# Patient Record
Sex: FEMALE | Race: WHITE | NOT HISPANIC OR LATINO | URBAN - METROPOLITAN AREA
[De-identification: names, ages, dates, MRNs, and addresses within clinical notes are randomized per-mention and may not be internally consistent; named-entity substitution may affect disease eponyms.]

---

## 2017-01-05 ENCOUNTER — OUTPATIENT (OUTPATIENT)
Dept: OUTPATIENT SERVICES | Facility: HOSPITAL | Age: 67
LOS: 1 days | Discharge: HOME | End: 2017-01-05

## 2017-06-27 DIAGNOSIS — G56.01 CARPAL TUNNEL SYNDROME, RIGHT UPPER LIMB: ICD-10-CM

## 2017-06-27 DIAGNOSIS — Z88.2 ALLERGY STATUS TO SULFONAMIDES: ICD-10-CM

## 2017-06-27 DIAGNOSIS — I10 ESSENTIAL (PRIMARY) HYPERTENSION: ICD-10-CM

## 2017-06-27 DIAGNOSIS — Z88.0 ALLERGY STATUS TO PENICILLIN: ICD-10-CM

## 2017-07-20 ENCOUNTER — OUTPATIENT (OUTPATIENT)
Dept: OUTPATIENT SERVICES | Facility: HOSPITAL | Age: 67
LOS: 1 days | Discharge: HOME | End: 2017-07-20

## 2017-07-20 DIAGNOSIS — E11.9 TYPE 2 DIABETES MELLITUS WITHOUT COMPLICATIONS: ICD-10-CM

## 2017-07-20 DIAGNOSIS — L03.90 CELLULITIS, UNSPECIFIED: ICD-10-CM

## 2017-07-20 DIAGNOSIS — D51.0 VITAMIN B12 DEFICIENCY ANEMIA DUE TO INTRINSIC FACTOR DEFICIENCY: ICD-10-CM

## 2017-09-18 ENCOUNTER — OUTPATIENT (OUTPATIENT)
Dept: OUTPATIENT SERVICES | Facility: HOSPITAL | Age: 67
LOS: 1 days | Discharge: HOME | End: 2017-09-18

## 2017-09-18 DIAGNOSIS — M86.9 OSTEOMYELITIS, UNSPECIFIED: ICD-10-CM

## 2017-09-18 DIAGNOSIS — M86.241: ICD-10-CM

## 2017-11-30 PROBLEM — Z00.00 ENCOUNTER FOR PREVENTIVE HEALTH EXAMINATION: Status: ACTIVE | Noted: 2017-11-30

## 2017-12-05 ENCOUNTER — APPOINTMENT (OUTPATIENT)
Dept: PLASTIC SURGERY | Facility: CLINIC | Age: 67
End: 2017-12-05
Payer: MEDICARE

## 2017-12-05 VITALS — HEIGHT: 67 IN | BODY MASS INDEX: 31.23 KG/M2 | WEIGHT: 199 LBS

## 2017-12-05 DIAGNOSIS — Z87.898 PERSONAL HISTORY OF OTHER SPECIFIED CONDITIONS: ICD-10-CM

## 2017-12-05 DIAGNOSIS — B36.9 SUPERFICIAL MYCOSIS, UNSPECIFIED: ICD-10-CM

## 2017-12-05 DIAGNOSIS — M17.0 BILATERAL PRIMARY OSTEOARTHRITIS OF KNEE: ICD-10-CM

## 2017-12-05 DIAGNOSIS — Z87.09 PERSONAL HISTORY OF OTHER DISEASES OF THE RESPIRATORY SYSTEM: ICD-10-CM

## 2017-12-05 PROCEDURE — 99203 OFFICE O/P NEW LOW 30 MIN: CPT

## 2017-12-05 RX ORDER — TRAMADOL HYDROCHLORIDE 25 MG/1
TABLET, COATED ORAL
Refills: 0 | Status: ACTIVE | COMMUNITY

## 2017-12-05 RX ORDER — CYCLOBENZAPRINE HYDROCHLORIDE 10 MG/1
10 TABLET, FILM COATED ORAL
Refills: 0 | Status: ACTIVE | COMMUNITY

## 2017-12-05 RX ORDER — MULTIVIT,IRON,MINERALS/LUTEIN
TABLET ORAL
Refills: 0 | Status: ACTIVE | COMMUNITY

## 2017-12-05 RX ORDER — MUPIROCIN 20 MG/G
2 OINTMENT TOPICAL
Refills: 0 | Status: ACTIVE | COMMUNITY

## 2017-12-05 RX ORDER — HYDROCHLOROTHIAZIDE 12.5 MG/1
12.5 CAPSULE ORAL
Refills: 0 | Status: ACTIVE | COMMUNITY

## 2017-12-05 RX ORDER — DULOXETINE HYDROCHLORIDE 30 MG/1
30 CAPSULE, DELAYED RELEASE PELLETS ORAL
Refills: 0 | Status: ACTIVE | COMMUNITY

## 2017-12-05 RX ORDER — DICLOFENAC POTASSIUM 50 MG/1
50 TABLET, COATED ORAL
Refills: 0 | Status: ACTIVE | COMMUNITY

## 2017-12-14 ENCOUNTER — OUTPATIENT (OUTPATIENT)
Dept: OUTPATIENT SERVICES | Facility: HOSPITAL | Age: 67
LOS: 1 days | Discharge: HOME | End: 2017-12-14

## 2017-12-14 DIAGNOSIS — Z02.9 ENCOUNTER FOR ADMINISTRATIVE EXAMINATIONS, UNSPECIFIED: ICD-10-CM

## 2021-06-25 ENCOUNTER — INPATIENT (INPATIENT)
Facility: HOSPITAL | Age: 71
LOS: 11 days | Discharge: NURSING, REHAB OR LONG-TERM CARE | End: 2021-07-07
Attending: INTERNAL MEDICINE | Admitting: INTERNAL MEDICINE
Payer: MEDICARE

## 2021-06-25 VITALS
RESPIRATION RATE: 18 BRPM | SYSTOLIC BLOOD PRESSURE: 106 MMHG | TEMPERATURE: 106 F | WEIGHT: 164.91 LBS | OXYGEN SATURATION: 97 % | DIASTOLIC BLOOD PRESSURE: 54 MMHG | HEART RATE: 89 BPM

## 2021-06-25 LAB
ALBUMIN SERPL ELPH-MCNC: 2.8 G/DL — LOW (ref 3.5–5.2)
ALP SERPL-CCNC: 98 U/L — SIGNIFICANT CHANGE UP (ref 30–115)
ALT FLD-CCNC: 12 U/L — SIGNIFICANT CHANGE UP (ref 0–41)
ANION GAP SERPL CALC-SCNC: 6 MMOL/L — LOW (ref 7–14)
APPEARANCE UR: CLEAR — SIGNIFICANT CHANGE UP
APTT BLD: 30 SEC — SIGNIFICANT CHANGE UP (ref 27–39.2)
AST SERPL-CCNC: 22 U/L — SIGNIFICANT CHANGE UP (ref 0–41)
BACTERIA # UR AUTO: ABNORMAL
BASE EXCESS BLDV CALC-SCNC: 7.9 MMOL/L — HIGH (ref -2–2)
BASOPHILS # BLD AUTO: 0.03 K/UL — SIGNIFICANT CHANGE UP (ref 0–0.2)
BASOPHILS NFR BLD AUTO: 0.4 % — SIGNIFICANT CHANGE UP (ref 0–1)
BILIRUB SERPL-MCNC: 0.6 MG/DL — SIGNIFICANT CHANGE UP (ref 0.2–1.2)
BILIRUB UR-MCNC: NEGATIVE — SIGNIFICANT CHANGE UP
BLD GP AB SCN SERPL QL: SIGNIFICANT CHANGE UP
BUN SERPL-MCNC: 13 MG/DL — SIGNIFICANT CHANGE UP (ref 10–20)
CA-I SERPL-SCNC: 1.14 MMOL/L — SIGNIFICANT CHANGE UP (ref 1.12–1.3)
CALCIUM SERPL-MCNC: 8 MG/DL — LOW (ref 8.5–10.1)
CHLORIDE SERPL-SCNC: 99 MMOL/L — SIGNIFICANT CHANGE UP (ref 98–110)
CO2 SERPL-SCNC: 31 MMOL/L — SIGNIFICANT CHANGE UP (ref 17–32)
COLOR SPEC: YELLOW — SIGNIFICANT CHANGE UP
CREAT SERPL-MCNC: 0.6 MG/DL — LOW (ref 0.7–1.5)
DIFF PNL FLD: ABNORMAL
EOSINOPHIL # BLD AUTO: 0.06 K/UL — SIGNIFICANT CHANGE UP (ref 0–0.7)
EOSINOPHIL NFR BLD AUTO: 0.8 % — SIGNIFICANT CHANGE UP (ref 0–8)
EPI CELLS # UR: 1 /HPF — SIGNIFICANT CHANGE UP (ref 0–5)
GAS PNL BLDV: 136 MMOL/L — SIGNIFICANT CHANGE UP (ref 136–145)
GAS PNL BLDV: SIGNIFICANT CHANGE UP
GLUCOSE SERPL-MCNC: 91 MG/DL — SIGNIFICANT CHANGE UP (ref 70–99)
GLUCOSE UR QL: NEGATIVE — SIGNIFICANT CHANGE UP
HCO3 BLDV-SCNC: 34 MMOL/L — HIGH (ref 22–29)
HCT VFR BLD CALC: 22.2 % — LOW (ref 37–47)
HCT VFR BLDA CALC: 19.1 % — LOW (ref 34–44)
HGB BLD CALC-MCNC: 6.2 G/DL — LOW (ref 14–18)
HGB BLD-MCNC: 6.4 G/DL — CRITICAL LOW (ref 12–16)
HYALINE CASTS # UR AUTO: 0 /LPF — SIGNIFICANT CHANGE UP (ref 0–7)
IMM GRANULOCYTES NFR BLD AUTO: 0.5 % — HIGH (ref 0.1–0.3)
INR BLD: 1.24 RATIO — SIGNIFICANT CHANGE UP (ref 0.65–1.3)
KETONES UR-MCNC: NEGATIVE — SIGNIFICANT CHANGE UP
LACTATE BLDV-MCNC: 1.6 MMOL/L — SIGNIFICANT CHANGE UP (ref 0.5–1.6)
LACTATE SERPL-SCNC: 1.6 MMOL/L — SIGNIFICANT CHANGE UP (ref 0.7–2)
LEUKOCYTE ESTERASE UR-ACNC: ABNORMAL
LIDOCAIN IGE QN: 12 U/L — SIGNIFICANT CHANGE UP (ref 7–60)
LYMPHOCYTES # BLD AUTO: 1.42 K/UL — SIGNIFICANT CHANGE UP (ref 1.2–3.4)
LYMPHOCYTES # BLD AUTO: 18.6 % — LOW (ref 20.5–51.1)
MCHC RBC-ENTMCNC: 22.5 PG — LOW (ref 27–31)
MCHC RBC-ENTMCNC: 28.6 G/DL — LOW (ref 32–37)
MCV RBC AUTO: 78.9 FL — LOW (ref 81–99)
MONOCYTES # BLD AUTO: 0.52 K/UL — SIGNIFICANT CHANGE UP (ref 0.1–0.6)
MONOCYTES NFR BLD AUTO: 6.8 % — SIGNIFICANT CHANGE UP (ref 1.7–9.3)
NEUTROPHILS # BLD AUTO: 5.58 K/UL — SIGNIFICANT CHANGE UP (ref 1.4–6.5)
NEUTROPHILS NFR BLD AUTO: 72.9 % — SIGNIFICANT CHANGE UP (ref 42.2–75.2)
NITRITE UR-MCNC: NEGATIVE — SIGNIFICANT CHANGE UP
NRBC # BLD: 0 /100 WBCS — SIGNIFICANT CHANGE UP (ref 0–0)
NT-PROBNP SERPL-SCNC: 1509 PG/ML — HIGH (ref 0–300)
PCO2 BLDV: 56 MMHG — HIGH (ref 41–51)
PH BLDV: 7.39 — SIGNIFICANT CHANGE UP (ref 7.26–7.43)
PH UR: 8 — SIGNIFICANT CHANGE UP (ref 5–8)
PLATELET # BLD AUTO: 305 K/UL — SIGNIFICANT CHANGE UP (ref 130–400)
PO2 BLDV: 21 MMHG — SIGNIFICANT CHANGE UP (ref 20–40)
POTASSIUM BLDV-SCNC: 3.9 MMOL/L — SIGNIFICANT CHANGE UP (ref 3.3–5.6)
POTASSIUM SERPL-MCNC: 4.9 MMOL/L — SIGNIFICANT CHANGE UP (ref 3.5–5)
POTASSIUM SERPL-SCNC: 4.9 MMOL/L — SIGNIFICANT CHANGE UP (ref 3.5–5)
PROT SERPL-MCNC: 5.8 G/DL — LOW (ref 6–8)
PROT UR-MCNC: SIGNIFICANT CHANGE UP
PROTHROM AB SERPL-ACNC: 14.3 SEC — HIGH (ref 9.95–12.87)
RBC # BLD: 2.84 M/UL — LOW (ref 4.2–5.4)
RBC # FLD: 24.9 % — HIGH (ref 11.5–14.5)
RBC CASTS # UR COMP ASSIST: 1 /HPF — SIGNIFICANT CHANGE UP (ref 0–4)
SAO2 % BLDV: 32 % — SIGNIFICANT CHANGE UP
SARS-COV-2 RNA SPEC QL NAA+PROBE: SIGNIFICANT CHANGE UP
SODIUM SERPL-SCNC: 136 MMOL/L — SIGNIFICANT CHANGE UP (ref 135–146)
SP GR SPEC: 1.01 — SIGNIFICANT CHANGE UP (ref 1.01–1.03)
TROPONIN T SERPL-MCNC: <0.01 NG/ML — SIGNIFICANT CHANGE UP
UROBILINOGEN FLD QL: SIGNIFICANT CHANGE UP
WBC # BLD: 7.65 K/UL — SIGNIFICANT CHANGE UP (ref 4.8–10.8)
WBC # FLD AUTO: 7.65 K/UL — SIGNIFICANT CHANGE UP (ref 4.8–10.8)
WBC UR QL: 5 /HPF — SIGNIFICANT CHANGE UP (ref 0–5)

## 2021-06-25 PROCEDURE — 99291 CRITICAL CARE FIRST HOUR: CPT

## 2021-06-25 PROCEDURE — 74177 CT ABD & PELVIS W/CONTRAST: CPT | Mod: 26,MA

## 2021-06-25 PROCEDURE — 93010 ELECTROCARDIOGRAM REPORT: CPT

## 2021-06-25 PROCEDURE — 71045 X-RAY EXAM CHEST 1 VIEW: CPT | Mod: 26

## 2021-06-25 RX ORDER — SODIUM ZIRCONIUM CYCLOSILICATE 10 G/10G
10 POWDER, FOR SUSPENSION ORAL ONCE
Refills: 0 | Status: DISCONTINUED | OUTPATIENT
Start: 2021-06-25 | End: 2021-06-25

## 2021-06-25 RX ORDER — CALCIUM GLUCONATE 100 MG/ML
1 VIAL (ML) INTRAVENOUS ONCE
Refills: 0 | Status: DISCONTINUED | OUTPATIENT
Start: 2021-06-25 | End: 2021-06-25

## 2021-06-25 RX ORDER — MORPHINE SULFATE 50 MG/1
6 CAPSULE, EXTENDED RELEASE ORAL ONCE
Refills: 0 | Status: DISCONTINUED | OUTPATIENT
Start: 2021-06-25 | End: 2021-06-25

## 2021-06-25 RX ORDER — IOHEXOL 300 MG/ML
30 INJECTION, SOLUTION INTRAVENOUS ONCE
Refills: 0 | Status: COMPLETED | OUTPATIENT
Start: 2021-06-25 | End: 2021-06-25

## 2021-06-25 RX ORDER — SODIUM CHLORIDE 9 MG/ML
1000 INJECTION, SOLUTION INTRAVENOUS ONCE
Refills: 0 | Status: COMPLETED | OUTPATIENT
Start: 2021-06-25 | End: 2021-06-25

## 2021-06-25 RX ORDER — METRONIDAZOLE 500 MG
500 TABLET ORAL ONCE
Refills: 0 | Status: COMPLETED | OUTPATIENT
Start: 2021-06-25 | End: 2021-06-25

## 2021-06-25 RX ORDER — INSULIN HUMAN 100 [IU]/ML
10 INJECTION, SOLUTION SUBCUTANEOUS ONCE
Refills: 0 | Status: DISCONTINUED | OUTPATIENT
Start: 2021-06-25 | End: 2021-06-25

## 2021-06-25 RX ORDER — IPRATROPIUM/ALBUTEROL SULFATE 18-103MCG
3 AEROSOL WITH ADAPTER (GRAM) INHALATION
Refills: 0 | Status: DISCONTINUED | OUTPATIENT
Start: 2021-06-25 | End: 2021-06-26

## 2021-06-25 RX ORDER — CEFEPIME 1 G/1
2000 INJECTION, POWDER, FOR SOLUTION INTRAMUSCULAR; INTRAVENOUS ONCE
Refills: 0 | Status: COMPLETED | OUTPATIENT
Start: 2021-06-25 | End: 2021-06-25

## 2021-06-25 RX ORDER — PANTOPRAZOLE SODIUM 20 MG/1
80 TABLET, DELAYED RELEASE ORAL ONCE
Refills: 0 | Status: COMPLETED | OUTPATIENT
Start: 2021-06-25 | End: 2021-06-25

## 2021-06-25 RX ORDER — DEXTROSE 50 % IN WATER 50 %
50 SYRINGE (ML) INTRAVENOUS ONCE
Refills: 0 | Status: DISCONTINUED | OUTPATIENT
Start: 2021-06-25 | End: 2021-06-25

## 2021-06-25 RX ADMIN — CEFEPIME 2000 MILLIGRAM(S): 1 INJECTION, POWDER, FOR SOLUTION INTRAMUSCULAR; INTRAVENOUS at 19:48

## 2021-06-25 RX ADMIN — SODIUM CHLORIDE 1000 MILLILITER(S): 9 INJECTION, SOLUTION INTRAVENOUS at 15:02

## 2021-06-25 RX ADMIN — SODIUM CHLORIDE 1000 MILLILITER(S): 9 INJECTION, SOLUTION INTRAVENOUS at 19:48

## 2021-06-25 RX ADMIN — Medication 100 MILLIGRAM(S): at 18:11

## 2021-06-25 RX ADMIN — PANTOPRAZOLE SODIUM 80 MILLIGRAM(S): 20 TABLET, DELAYED RELEASE ORAL at 17:23

## 2021-06-25 RX ADMIN — MORPHINE SULFATE 6 MILLIGRAM(S): 50 CAPSULE, EXTENDED RELEASE ORAL at 17:23

## 2021-06-25 RX ADMIN — IOHEXOL 30 MILLILITER(S): 300 INJECTION, SOLUTION INTRAVENOUS at 15:26

## 2021-06-25 RX ADMIN — Medication 500 MILLIGRAM(S): at 19:48

## 2021-06-25 RX ADMIN — SODIUM CHLORIDE 1000 MILLILITER(S): 9 INJECTION, SOLUTION INTRAVENOUS at 19:46

## 2021-06-25 RX ADMIN — Medication 3 MILLILITER(S): at 19:59

## 2021-06-25 RX ADMIN — CEFEPIME 100 MILLIGRAM(S): 1 INJECTION, POWDER, FOR SOLUTION INTRAMUSCULAR; INTRAVENOUS at 17:23

## 2021-06-25 NOTE — ED PROVIDER NOTE - PHYSICAL EXAMINATION
Vital Signs: I have reviewed the initial vital signs.  Constitutional: appears stated age, +appears fatigued, pale-yellow, in no acute distress  HEENT: Airway patent, moist MM, no erythema/swelling/deformity of oral structures. EOMI, PERRLA.  CV: regular rate, regular rhythm, well-perfused extremities  Lungs: +b/l wheeze, b/l chest rise  ABD: +abdomen soft, +ascites, nontender, +diffusely distended, +caput medusa, no guarding or rebound, +nontender mass on right hip  MSK: Neck supple, nontender, nl ROM, no stepoff. Chest nontender. Back nontender in TLS spine or to b/l bony structures. Ext nontender, nl rom, no deformity.   INTEG: Skin warm, dry, no rash.  NEURO: A&Ox3, moving all extremities, normal speech  PSYCH: Calm, cooperative, normal affect and interaction.

## 2021-06-25 NOTE — ED PROVIDER NOTE - CARE PLAN
Principal Discharge DX:	Colitis  Secondary Diagnosis:	Perianal infection  Secondary Diagnosis:	GI bleed

## 2021-06-25 NOTE — CONSULT NOTE ADULT - ASSESSMENT
ASSESSMENT:  70F w/ PMH of COPD, smoking, and PSH of gastric bypass in 1999 who presented to the ED with 1-2 weeks of diarrhea. Physical exam findings, imaging, and labs as documented above.     PLAN:  - extramural air on CT likely 2/2 multiple fistulas --> no acute surgical intervention  - recommend colonoscopy, transfuse PRN    Lines/Tubes: PIV    Above plan discussed with Attending Surgeon Dr. Roman, patient, and Primary team  06-25-21 @ 21:52 ASSESSMENT:  70F w/ PMH of COPD, smoking, and PSH of gastric bypass in 1999 who presented to the ED with 1-2 weeks of diarrhea. Physical exam findings, imaging, and labs as documented above.     PLAN:  - extramural air on CT likely 2/2 multiple fistulas --> no acute surgical intervention  - recommend EGD/colonoscopy to r/o marginal ulcer, cancer  - transfuse PRN    Lines/Tubes: PIV    Above plan discussed with Attending Surgeon Dr. Roman, patient, and Primary team  06-25-21 @ 21:52

## 2021-06-25 NOTE — ED PROVIDER NOTE - OBJECTIVE STATEMENT
71 y/o F with COPD not on O2, current smoker, remote h/o gastric bypass p/w 5 days of diarrhea. Pt admits to 5 days of "oily" brown - black stools, and today has constipation with red stools. Used Imodium for 2 days without relief. Admits to diffuse abdominal pain, fatigue, shortness of breath. Able to tolerate PO fluids. Denies n/v.    Vaccinated with 2 doses Moderna vaccine in March/April. 71 y/o F with COPD not on home O2, current smoker, remote h/o gastric bypass p/w 5 days of diarrhea. Pt admits to 5 days of "oily" brown - black stools, and today has constipation with red stools. Used Imodium for 2 days without relief.  Has been constipated and had a distended abdomen intermittent for past 3-4 months. Admits to diffuse abdominal pain, fatigue, shortness of breath. Able to tolerate PO fluids. Denies n/v, h/o or current IVDO, h/o Hep. Admits to current and former alcohol use limited to 1 drink (liquor or wine) 1-2x a week.    Vaccinated with 2 doses Moderna vaccine in March/April. 69 y/o F with COPD not on home O2, current smoker, remote h/o gastric bypass p/w 5 days of diarrhea. Pt admits to 5 days of "oily" brown - black stools, and today has constipation with red stools. Used Imodium for 2 days without relief.  Has been constipated and had a distended abdomen intermittenttently for past 3-4 months. Admits to diffuse abdominal pain, fatigue, shortness of breath. Able to tolerate PO fluids. Denies n/v, h/o or current IVDU, h/o Hep. Admits to current and former alcohol use limited to 1 drink (liquor or wine) 1-2x a week.    Pt has never had a colonoscopy.    Vaccinated with 2 doses Moderna COVID vaccine in March/April.

## 2021-06-25 NOTE — ED PROVIDER NOTE - NS ED ROS FT
Constitutional: (+) fever  Eyes/ENT: (-) blurry vision, (-) epistaxis  Cardiovascular: (-) chest pain, (-) syncope  Respiratory: (-) cough, (+) shortness of breath  Gastrointestinal: (-) vomiting, (+) diarrhea  Musculoskeletal: (-) neck pain, (-) back pain, (-) joint pain  Integumentary: (-) rash, (-) edema  Neurological: (+) headache, (-) altered mental status  Psychiatric: (-) hallucinations  Allergic/Immunologic: (-) pruritus

## 2021-06-25 NOTE — ED PROVIDER NOTE - ATTENDING CONTRIBUTION TO CARE
69yo F history of COPD gastric bypass, presenting with bloody stools- per pt, loose stools x5d but today noted it to be bloody, BRBPR. No fevers/chills. mild abdominal distension. no other acute complaints. See MDM

## 2021-06-25 NOTE — CONSULT NOTE ADULT - SUBJECTIVE AND OBJECTIVE BOX
GENERAL SURGERY CONSULT NOTE    Patient: WEGENER, LOIS , 70y (50)Female   MRN: [id number]  Location: [facility] ED  Visit: 21 Emergency  Date: 21 @ 21:52    HPI:  70F w/ PMH of COPD, smoking, and PSH of gastric bypass in  who presented to the ED with 1-2 weeks of diarrhea. Pt reports soft, loose, mucus-filled stools that intermittently contain bright red or dark blood. Denies associated abdominal pain, nausea, vomiting. Endorses subjective fever and chills at home. Pt reports recent history of constipation and more straining than usual while having a BM. In the ED, Hb 6.4. CT A/P showed diffuse wall thickening of the descending colon, sigmoid colon, rectum, and anus with extensive surrounding soft tissue inflammation of the anus as well as extramural air visualized posterolateral to the anus and 5.3 cm subcutaneous fluid collection lateral to the right gluteus described above.    PAST MEDICAL & SURGICAL HISTORY:  COPD  current smoker  PSH gastric bypass    VITALS:  T(F): 99 (21 @ 19:44), Max: 106 (21 @ 13:05)  HR: 73 (21 @ 19:44) (73 - 96)  BP: 106/55 (21 @ 19:44) (91/55 - 106/55)  RR: 18 (21 @ 21:45) (18 - 18)  SpO2: 97% (21 @ 21:45) (95% - 97%)    PHYSICAL EXAM:  General: NAD, AAOx3, calm and cooperative  HEENT: NCAT, SYL, EOMI, Trachea ML, Neck supple  Cardiac: RRR S1, S2, no Murmurs, rubs or gallops  Respiratory: CTAB, normal respiratory effort, breath sounds equal BL, no wheeze, rhonchi or crackles  Abdomen: Soft, distended, non-tender, no rebound, no guarding. +BS. well-healed midline scar  Rectal: Good tone, no blood, multiple louise-anal masses/lesions and fistulas (midline - pilonidal area and perirectal), +multiple external hemorrhoids  Skin: Warm/dry, normal color, no jaundice  Incision/wound: healing well, dressings in place, clean, dry and intact    MEDICATIONS  (STANDING):  albuterol/ipratropium for Nebulization 3 milliLiter(s) Nebulizer every 15 minutes    LAB/STUDIES:                      6.4    7.65  )-----------( 305      ( 2021 15:10 )             22.2         136  |  99  |  13  ----------------------------<  91  4.9   |  31  |  0.6<L>    Ca    8.0<L>      2021 15:10    TPro  5.8<L>  /  Alb  2.8<L>  /  TBili  0.6  /  DBili  x   /  AST  22  /  ALT  12  /  AlkPhos  98      PT/INR - ( 2021 17:54 )   PT: 14.30 sec;   INR: 1.24 ratio      PTT - ( 2021 17:54 )  PTT:30.0 sec  LIVER FUNCTIONS - ( 2021 15:10 )  Alb: 2.8 g/dL / Pro: 5.8 g/dL / ALK PHOS: 98 U/L / ALT: 12 U/L / AST: 22 U/L / GGT: x           Urinalysis Basic - ( 2021 15:50 )    Color: Yellow / Appearance: Clear / S.010 / pH: x  Gluc: x / Ketone: Negative  / Bili: Negative / Urobili: <2 mg/dL   Blood: x / Protein: Trace / Nitrite: Negative   Leuk Esterase: Small / RBC: 1 /HPF / WBC 5 /HPF   Sq Epi: x / Non Sq Epi: 1 /HPF / Bacteria: Few    CARDIAC MARKERS ( 2021 15:10 )  x     / <0.01 ng/mL / x     / x     / x          IMAGING:  < from: CT Abdomen and Pelvis w/ Oral Cont and w/ IV Cont (21 @ 18:38) >  FINDINGS:    PERITONEUM/MESENTERY/BOWEL: Postoperative appearance of the bowel. Diffuse wall thickening of the descending colon, sigmoid colon, rectum, and anus with extensive surrounding soft tissue infiltration of the anus. Extramural foci of air visualized posterolateral to the anus (series 4, image 369). No evidence of bowel obstruction, ascites, or free intraabdominal air.    BONES/SOFT TISSUES: 3.4 x 5.0 x 5.3 cm (TV x AP x CC) rim enhancing, subcutaneous fluid collection (series 4, image 237) adjacent to the right gluteal musculature. Severe degenerative changes of the spine.    IMPRESSION:  ·	Diffuse wall thickening of the descending colon, sigmoid colon, rectum, and anus with extensive surrounding soft tissue inflammation of the anus. Of note, extramural air visualized posterolateral to the anus.  ·	Small bilateral pleural effusions.  ·	5.3 cm subcutaneous fluid collection lateral to the right gluteus described above, which may represent an abscess.  ·	3 mm right middle lobe subpleural solid pulmonary nodule. Per Fleischner Society guidelines, an optional follow up CT in 12 months is recommended in high risk patients.  < end of copied text >      ACCESS DEVICES:  [X] Peripheral IV  [ ] Central Venous Line	[ ] R	[ ] L	[ ] IJ	[ ] Fem	[ ] SC	Placed:   [ ] Arterial Line		[ ] R	[ ] L	[ ] Fem	[ ] Rad	[ ] Ax	Placed:   [ ] PICC:					[ ] Mediport  [ ] Urinary Catheter, Date Placed:  GENERAL SURGERY CONSULT NOTE    Patient: WEGENER, LOIS , 70y (50)Female   MRN: [id number]  Location: [facility] ED  Visit: 21 Emergency  Date: 21 @ 21:52    HPI:  70F w/ PMH of COPD, smoking, and PSH of gastric bypass in  who presented to the ED with 1-2 weeks of diarrhea. Pt reports soft, loose, mucus-filled stools that intermittently contain bright red or dark blood. Denies associated abdominal pain, nausea, vomiting. Endorses subjective fever and chills at home. Pt reports recent history of constipation and more straining than usual while having a BM. In the ED, Hb 6.4. CT A/P showed diffuse wall thickening of the descending colon, sigmoid colon, rectum, and anus with extensive surrounding soft tissue inflammation of the anus as well as extramural air visualized posterolateral to the anus and 5.3 cm subcutaneous fluid collection lateral to the right gluteus described above.    PAST MEDICAL & SURGICAL HISTORY:  COPD  current smoker  PSH gastric bypass    VITALS:  T(F): 99 (21 @ 19:44), Max: 106 (21 @ 13:05)  HR: 73 (21 @ 19:44) (73 - 96)  BP: 106/55 (21 @ 19:44) (91/55 - 106/55)  RR: 18 (21 @ 21:45) (18 - 18)  SpO2: 97% (21 @ 21:45) (95% - 97%)    PHYSICAL EXAM:  General: NAD, AAOx3, calm and cooperative  HEENT: NCAT, SYL, EOMI, Trachea ML, Neck supple  Cardiac: RRR S1, S2, no Murmurs, rubs or gallops  Respiratory: CTAB, normal respiratory effort, breath sounds equal BL, no wheeze, rhonchi or crackles  Abdomen: Soft, distended, non-tender, no rebound, no guarding. +BS. well-healed midline scar  Rectal: Good tone, no blood, multiple louise-anal masses/lesions and fistulas (midline - pilonidal area and perirectal), +multiple external hemorrhoids  Skin: Warm/dry, normal color, no jaundice    MEDICATIONS  (STANDING):  albuterol/ipratropium for Nebulization 3 milliLiter(s) Nebulizer every 15 minutes    LAB/STUDIES:                      6.4    7.65  )-----------( 305      ( 2021 15:10 )             22.2         136  |  99  |  13  ----------------------------<  91  4.9   |  31  |  0.6<L>    Ca    8.0<L>      2021 15:10    TPro  5.8<L>  /  Alb  2.8<L>  /  TBili  0.6  /  DBili  x   /  AST  22  /  ALT  12  /  AlkPhos  98      PT/INR - ( 2021 17:54 )   PT: 14.30 sec;   INR: 1.24 ratio      PTT - ( 2021 17:54 )  PTT:30.0 sec  LIVER FUNCTIONS - ( 2021 15:10 )  Alb: 2.8 g/dL / Pro: 5.8 g/dL / ALK PHOS: 98 U/L / ALT: 12 U/L / AST: 22 U/L / GGT: x           Urinalysis Basic - ( 2021 15:50 )    Color: Yellow / Appearance: Clear / S.010 / pH: x  Gluc: x / Ketone: Negative  / Bili: Negative / Urobili: <2 mg/dL   Blood: x / Protein: Trace / Nitrite: Negative   Leuk Esterase: Small / RBC: 1 /HPF / WBC 5 /HPF   Sq Epi: x / Non Sq Epi: 1 /HPF / Bacteria: Few    CARDIAC MARKERS ( 2021 15:10 )  x     / <0.01 ng/mL / x     / x     / x          IMAGING:  < from: CT Abdomen and Pelvis w/ Oral Cont and w/ IV Cont (21 @ 18:38) >  FINDINGS:    PERITONEUM/MESENTERY/BOWEL: Postoperative appearance of the bowel. Diffuse wall thickening of the descending colon, sigmoid colon, rectum, and anus with extensive surrounding soft tissue infiltration of the anus. Extramural foci of air visualized posterolateral to the anus (series 4, image 369). No evidence of bowel obstruction, ascites, or free intraabdominal air.    BONES/SOFT TISSUES: 3.4 x 5.0 x 5.3 cm (TV x AP x CC) rim enhancing, subcutaneous fluid collection (series 4, image 237) adjacent to the right gluteal musculature. Severe degenerative changes of the spine.    IMPRESSION:  ·	Diffuse wall thickening of the descending colon, sigmoid colon, rectum, and anus with extensive surrounding soft tissue inflammation of the anus. Of note, extramural air visualized posterolateral to the anus.  ·	Small bilateral pleural effusions.  ·	5.3 cm subcutaneous fluid collection lateral to the right gluteus described above, which may represent an abscess.  ·	3 mm right middle lobe subpleural solid pulmonary nodule. Per Fleischner Society guidelines, an optional follow up CT in 12 months is recommended in high risk patients.  < end of copied text >      ACCESS DEVICES:  [X] Peripheral IV  [ ] Central Venous Line	[ ] R	[ ] L	[ ] IJ	[ ] Fem	[ ] SC	Placed:   [ ] Arterial Line		[ ] R	[ ] L	[ ] Fem	[ ] Rad	[ ] Ax	Placed:   [ ] PICC:					[ ] Mediport  [ ] Urinary Catheter, Date Placed:

## 2021-06-25 NOTE — ED ADULT TRIAGE NOTE - CHIEF COMPLAINT QUOTE
Patient c/o diarrhea dizziness weakness blurred vison and chills  x 1 week with no change in symptoms in the last 24 hours

## 2021-06-25 NOTE — ED PROVIDER NOTE - PROGRESS NOTE DETAILS
SS- Spoke with GI Fellow Dr. Jon who recommended adding C. diff and GI PCR stool panel. OK with CT Angio    Consented pt for transfusion.     Pt noted to be in new onset afib on EKG. Pt denies h/o afib, or current palpitations. Not on A/C or rate control agents. Previous EKGs in [place] from 2016 do not demonstrate afib. sp surgery eval, recs resusc first, scope, prior to any surgical procedure. jennifer Talley, approved for SDU. will admit

## 2021-06-25 NOTE — ED PROVIDER NOTE - CLINICAL SUMMARY MEDICAL DECISION MAKING FREE TEXT BOX
69yo F history of COPD gastric bypass, presenting with bloody stools- per pt, loose stools x5d but today noted it to be bloody, BRBPR. No fevers/chills. mild abdominal distension. no other acute complaints. ill appearing  NCAT PERRLA EOMI neck supple non tender normal wob cta bl rrr abdomen s mildly distended, no rebound no guarding [id number] neuro non focal +erythema and tenderness perianal with gluteal erythema warmth induration and tenderness. see progress ntoes for further eval. dw GI, ICU, surgery. will admit for further eval

## 2021-06-26 LAB
ALBUMIN SERPL ELPH-MCNC: 2.8 G/DL — LOW (ref 3.5–5.2)
ALP SERPL-CCNC: 98 U/L — SIGNIFICANT CHANGE UP (ref 30–115)
ALT FLD-CCNC: 11 U/L — SIGNIFICANT CHANGE UP (ref 0–41)
ANION GAP SERPL CALC-SCNC: 4 MMOL/L — LOW (ref 7–14)
AST SERPL-CCNC: 15 U/L — SIGNIFICANT CHANGE UP (ref 0–41)
BASOPHILS # BLD AUTO: 0.03 K/UL — SIGNIFICANT CHANGE UP (ref 0–0.2)
BASOPHILS NFR BLD AUTO: 0.6 % — SIGNIFICANT CHANGE UP (ref 0–1)
BILIRUB SERPL-MCNC: 1 MG/DL — SIGNIFICANT CHANGE UP (ref 0.2–1.2)
BUN SERPL-MCNC: 11 MG/DL — SIGNIFICANT CHANGE UP (ref 10–20)
C DIFF BY PCR RESULT: NEGATIVE — SIGNIFICANT CHANGE UP
C DIFF TOX GENS STL QL NAA+PROBE: SIGNIFICANT CHANGE UP
CALCIUM SERPL-MCNC: 7.9 MG/DL — LOW (ref 8.5–10.1)
CHLORIDE SERPL-SCNC: 99 MMOL/L — SIGNIFICANT CHANGE UP (ref 98–110)
CO2 SERPL-SCNC: 32 MMOL/L — SIGNIFICANT CHANGE UP (ref 17–32)
COVID-19 SPIKE DOMAIN AB INTERP: POSITIVE
COVID-19 SPIKE DOMAIN ANTIBODY RESULT: >250 U/ML — HIGH
CREAT SERPL-MCNC: 0.6 MG/DL — LOW (ref 0.7–1.5)
CRP SERPL-MCNC: 31 MG/L — HIGH
CULTURE RESULTS: SIGNIFICANT CHANGE UP
EOSINOPHIL # BLD AUTO: 0.1 K/UL — SIGNIFICANT CHANGE UP (ref 0–0.7)
EOSINOPHIL NFR BLD AUTO: 1.9 % — SIGNIFICANT CHANGE UP (ref 0–8)
ERYTHROCYTE [SEDIMENTATION RATE] IN BLOOD: 65 MM/HR — HIGH (ref 0–20)
GLUCOSE SERPL-MCNC: 84 MG/DL — SIGNIFICANT CHANGE UP (ref 70–99)
HCT VFR BLD CALC: 23.9 % — LOW (ref 37–47)
HCT VFR BLD CALC: 27.5 % — LOW (ref 37–47)
HCV AB S/CO SERPL IA: 0.04 COI — SIGNIFICANT CHANGE UP
HCV AB SERPL-IMP: SIGNIFICANT CHANGE UP
HGB BLD-MCNC: 6.9 G/DL — CRITICAL LOW (ref 12–16)
HGB BLD-MCNC: 8 G/DL — LOW (ref 12–16)
IMM GRANULOCYTES NFR BLD AUTO: 0.6 % — HIGH (ref 0.1–0.3)
LYMPHOCYTES # BLD AUTO: 1.33 K/UL — SIGNIFICANT CHANGE UP (ref 1.2–3.4)
LYMPHOCYTES # BLD AUTO: 25 % — SIGNIFICANT CHANGE UP (ref 20.5–51.1)
MAGNESIUM SERPL-MCNC: 2 MG/DL — SIGNIFICANT CHANGE UP (ref 1.8–2.4)
MCHC RBC-ENTMCNC: 22.5 PG — LOW (ref 27–31)
MCHC RBC-ENTMCNC: 23.5 PG — LOW (ref 27–31)
MCHC RBC-ENTMCNC: 28.9 G/DL — LOW (ref 32–37)
MCHC RBC-ENTMCNC: 29.1 G/DL — LOW (ref 32–37)
MCV RBC AUTO: 77.9 FL — LOW (ref 81–99)
MCV RBC AUTO: 80.6 FL — LOW (ref 81–99)
MONOCYTES # BLD AUTO: 0.41 K/UL — SIGNIFICANT CHANGE UP (ref 0.1–0.6)
MONOCYTES NFR BLD AUTO: 7.7 % — SIGNIFICANT CHANGE UP (ref 1.7–9.3)
NEUTROPHILS # BLD AUTO: 3.42 K/UL — SIGNIFICANT CHANGE UP (ref 1.4–6.5)
NEUTROPHILS NFR BLD AUTO: 64.2 % — SIGNIFICANT CHANGE UP (ref 42.2–75.2)
NRBC # BLD: 0 /100 WBCS — SIGNIFICANT CHANGE UP (ref 0–0)
NRBC # BLD: 0 /100 WBCS — SIGNIFICANT CHANGE UP (ref 0–0)
PLATELET # BLD AUTO: 312 K/UL — SIGNIFICANT CHANGE UP (ref 130–400)
PLATELET # BLD AUTO: 333 K/UL — SIGNIFICANT CHANGE UP (ref 130–400)
POTASSIUM SERPL-MCNC: 4.2 MMOL/L — SIGNIFICANT CHANGE UP (ref 3.5–5)
POTASSIUM SERPL-SCNC: 4.2 MMOL/L — SIGNIFICANT CHANGE UP (ref 3.5–5)
PROT SERPL-MCNC: 5.5 G/DL — LOW (ref 6–8)
RBC # BLD: 3.07 M/UL — LOW (ref 4.2–5.4)
RBC # BLD: 3.41 M/UL — LOW (ref 4.2–5.4)
RBC # FLD: 23.9 % — HIGH (ref 11.5–14.5)
RBC # FLD: 24 % — HIGH (ref 11.5–14.5)
SARS-COV-2 IGG+IGM SERPL QL IA: >250 U/ML — HIGH
SARS-COV-2 IGG+IGM SERPL QL IA: POSITIVE
SODIUM SERPL-SCNC: 135 MMOL/L — SIGNIFICANT CHANGE UP (ref 135–146)
SPECIMEN SOURCE: SIGNIFICANT CHANGE UP
T4 FREE SERPL-MCNC: 0.9 NG/DL — SIGNIFICANT CHANGE UP (ref 0.9–1.8)
TSH SERPL-MCNC: 4.73 UIU/ML — HIGH (ref 0.27–4.2)
WBC # BLD: 5.32 K/UL — SIGNIFICANT CHANGE UP (ref 4.8–10.8)
WBC # BLD: 7.57 K/UL — SIGNIFICANT CHANGE UP (ref 4.8–10.8)
WBC # FLD AUTO: 5.32 K/UL — SIGNIFICANT CHANGE UP (ref 4.8–10.8)
WBC # FLD AUTO: 7.57 K/UL — SIGNIFICANT CHANGE UP (ref 4.8–10.8)

## 2021-06-26 PROCEDURE — 99223 1ST HOSP IP/OBS HIGH 75: CPT

## 2021-06-26 PROCEDURE — 99221 1ST HOSP IP/OBS SF/LOW 40: CPT | Mod: GC

## 2021-06-26 PROCEDURE — 99232 SBSQ HOSP IP/OBS MODERATE 35: CPT

## 2021-06-26 RX ORDER — PANTOPRAZOLE SODIUM 20 MG/1
40 TABLET, DELAYED RELEASE ORAL
Refills: 0 | Status: DISCONTINUED | OUTPATIENT
Start: 2021-06-26 | End: 2021-07-04

## 2021-06-26 RX ORDER — CEFTRIAXONE 500 MG/1
2000 INJECTION, POWDER, FOR SOLUTION INTRAMUSCULAR; INTRAVENOUS EVERY 24 HOURS
Refills: 0 | Status: COMPLETED | OUTPATIENT
Start: 2021-06-26 | End: 2021-07-02

## 2021-06-26 RX ORDER — SUCRALFATE 1 G
1 TABLET ORAL EVERY 6 HOURS
Refills: 0 | Status: DISCONTINUED | OUTPATIENT
Start: 2021-06-26 | End: 2021-07-07

## 2021-06-26 RX ORDER — METRONIDAZOLE 500 MG
TABLET ORAL
Refills: 0 | Status: DISCONTINUED | OUTPATIENT
Start: 2021-06-26 | End: 2021-07-06

## 2021-06-26 RX ORDER — METRONIDAZOLE 500 MG
500 TABLET ORAL ONCE
Refills: 0 | Status: COMPLETED | OUTPATIENT
Start: 2021-06-26 | End: 2021-06-26

## 2021-06-26 RX ORDER — PIPERACILLIN AND TAZOBACTAM 4; .5 G/20ML; G/20ML
3.38 INJECTION, POWDER, LYOPHILIZED, FOR SOLUTION INTRAVENOUS EVERY 8 HOURS
Refills: 0 | Status: DISCONTINUED | OUTPATIENT
Start: 2021-06-26 | End: 2021-06-26

## 2021-06-26 RX ORDER — HYDROMORPHONE HYDROCHLORIDE 2 MG/ML
1 INJECTION INTRAMUSCULAR; INTRAVENOUS; SUBCUTANEOUS ONCE
Refills: 0 | Status: DISCONTINUED | OUTPATIENT
Start: 2021-06-26 | End: 2021-06-26

## 2021-06-26 RX ORDER — IPRATROPIUM/ALBUTEROL SULFATE 18-103MCG
3 AEROSOL WITH ADAPTER (GRAM) INHALATION EVERY 6 HOURS
Refills: 0 | Status: COMPLETED | OUTPATIENT
Start: 2021-06-26 | End: 2021-06-26

## 2021-06-26 RX ORDER — METRONIDAZOLE 500 MG
500 TABLET ORAL EVERY 8 HOURS
Refills: 0 | Status: DISCONTINUED | OUTPATIENT
Start: 2021-06-26 | End: 2021-07-06

## 2021-06-26 RX ADMIN — Medication 3 MILLILITER(S): at 13:46

## 2021-06-26 RX ADMIN — PANTOPRAZOLE SODIUM 40 MILLIGRAM(S): 20 TABLET, DELAYED RELEASE ORAL at 17:20

## 2021-06-26 RX ADMIN — Medication 1 GRAM(S): at 17:20

## 2021-06-26 RX ADMIN — PIPERACILLIN AND TAZOBACTAM 25 GRAM(S): 4; .5 INJECTION, POWDER, LYOPHILIZED, FOR SOLUTION INTRAVENOUS at 05:11

## 2021-06-26 RX ADMIN — Medication 100 MILLIGRAM(S): at 13:19

## 2021-06-26 RX ADMIN — PANTOPRAZOLE SODIUM 40 MILLIGRAM(S): 20 TABLET, DELAYED RELEASE ORAL at 01:40

## 2021-06-26 RX ADMIN — Medication 3 MILLILITER(S): at 21:09

## 2021-06-26 RX ADMIN — Medication 1 GRAM(S): at 21:53

## 2021-06-26 RX ADMIN — HYDROMORPHONE HYDROCHLORIDE 1 MILLIGRAM(S): 2 INJECTION INTRAMUSCULAR; INTRAVENOUS; SUBCUTANEOUS at 11:48

## 2021-06-26 RX ADMIN — Medication 1 GRAM(S): at 11:42

## 2021-06-26 RX ADMIN — PANTOPRAZOLE SODIUM 40 MILLIGRAM(S): 20 TABLET, DELAYED RELEASE ORAL at 05:11

## 2021-06-26 RX ADMIN — CEFTRIAXONE 100 MILLIGRAM(S): 500 INJECTION, POWDER, FOR SOLUTION INTRAMUSCULAR; INTRAVENOUS at 11:32

## 2021-06-26 RX ADMIN — Medication 3 MILLILITER(S): at 08:11

## 2021-06-26 RX ADMIN — HYDROMORPHONE HYDROCHLORIDE 1 MILLIGRAM(S): 2 INJECTION INTRAMUSCULAR; INTRAVENOUS; SUBCUTANEOUS at 11:33

## 2021-06-26 RX ADMIN — Medication 100 MILLIGRAM(S): at 21:53

## 2021-06-26 NOTE — H&P ADULT - ASSESSMENT
70 yr F COPD not on O2, current smoker, ETOH abuse and h/o gastric bypass presented due to bloody stool and change in bowel habits.    #left sided colitis with 5cm gluteal abscess and fistulas: very suspicous for IBD  #septic on admission, fever, tachycardia and evidenc of organ damage (colitis)  - GIB lower, r/u upper    - start Zosyn, IVF,  - s/p 1 units of PRBC, cbc q12h, hemodynamically stable, 2 large IV, keep active t/s  - f/u c.diff and GI PCR, unlikely the cause   - GI eval, will need EGD and C-scope once more stable  - colorectal surgery on board, f/u final recom  - ID evaluation    #new onset Afib suspected paroxysmal, likely caused by sepsis and anemia,   - currently rate controled with no meds  - hold AC given GIB    #COPD not on O2:  - current smoker, counselled to stop smoking  - mild wheeze on exam, start albuterol    #ETOH abuse   - drinks in the weekend but can drink very heavy if feel depressed, last heavy drink was 1 week ago  - monitor for withdrawal signs     #Diet: regular  #DVT pro: SCD  #GI pro: PPI  #Dispo: from home, lives alone      70 yr F COPD not on O2, current smoker, ETOH abuse and h/o gastric bypass presented due to bloody stool and change in bowel habits.    #left sided colitis with 5cm gluteal abscess and fistulas: very suspicous for IBD  #septic on admission, fever, tachycardia and evidenc of organ damage (colitis)  - GIB lower, r/u upper    - start Zosyn, IVF,  - s/p 1 units of PRBC, cbc q12h, hemodynamically stable, 2 large IV, keep active t/s  - given reported Melena give PPI BID  - f/u c.diff and GI PCR, unlikely the cause   - GI eval, will need EGD and C-scope once more stable  - colorectal surgery on board, f/u final recom  - ID evaluation    #new onset Afib suspected paroxysmal, likely caused by sepsis and anemia,   - check TSH/FT4  - currently rate controlled with no meds  - hold AC given GIB    #COPD not on O2:  - current smoker, counselled to stop smoking  - mild wheeze on exam, start albuterol    #ETOH abuse   - drinks in the weekend but can drink very heavy if feel depressed, last heavy drink was 1 week ago  - monitor for withdrawal signs     #Diet: regular  #DVT pro: SCD  #GI pro: PPI  #Dispo: from home, lives alone      70 yr F COPD not on O2, current smoker, ETOH abuse and h/o gastric bypass presented due to bloody stool and change in bowel habits.    please do med req in AM, pt not sure about her meds    #left sided colitis with 5cm gluteal abscess and fistulas: very suspicious for IBD on setting of chronic use of NSAID (Diclofenac), pt never had EGD or C-scope  #septic on admission, fever, tachycardia and evidence of organ damage (colitis)  - GIB lower, r/u upper    - start Zosyn, IVF,  - s/p 1 units of PRBC, cbc q12h, hemodynamically stable, 2 large IV, keep active t/s  - given reported Melena give PPI BID  - f/u c.diff and GI PCR, unlikely the cause   - GI eval, will need EGD and C-scope once more stable  - colorectal surgery on board, f/u final recom  - ID evaluation  - pt reports taking Diclofenac, avoid NSAID    #new onset Afib suspected paroxysmal, likely caused by sepsis and anemia,   - check TSH/FT4  - currently rate controlled with no meds  - hold AC given GIB    #COPD not on O2:  - current smoker, counselled to stop smoking  - mild wheeze on exam, start albuterol    #ETOH abuse   - drinks in the weekend but can drink very heavy if feel depressed, last heavy drink was 1 week ago  - monitor for withdrawal signs     #Diet: regular  #DVT pro: SCD  #GI pro: PPI  #Dispo: from home, lives alone

## 2021-06-26 NOTE — PRE-ANESTHESIA EVALUATION ADULT - NSPROPOSEDPROCEDFT_GEN_ALL_CORE
Pt will use otc salicylic acid daily as directed
Detail Level: Detailed
Pt will use a good moisturizer cream once to twice daily and will soak hands nightly for about 5 minutes at bedtime then will apply aquaphor ointment.
I&D

## 2021-06-26 NOTE — CONSULT NOTE ADULT - SUBJECTIVE AND OBJECTIVE BOX
WEGENER, LOIS  70y, Female  Allergy: No Known Allergies      All historical available data reviewed.    HPI:  70 yr F COPD O2, current smoker, ETOH abuse and h/o gastric bypass presented due to bloody stool and change in bowel habits.    the pt reports change in bowel habits 4 weeks ago, it started with having loose watery stool sometimes mixed with fresh blood and fecal incontinence,  3 days ago she noted dark black soft BM, and since then felt constipated and passed formed stool with fresh blood,   she also reports having distended abd since few months,  she denies fever, chills, n/v, abd pain, chest pain, SOB or urinary symptoms.  she reports that her father had Hx of fistula from his GI system.     ER: fever 106F, normal wbc, lactate 1.6, hbg 6.4, , new onset Afib on EKG, rectal exam showed blood in her clothes and at least 3 perianal fistula, CT abd  showed left sided colitis and 5 cm abscess at R gluteal area,, admitted for further veal.    (2021 00:00)    FAMILY HISTORY:    PAST MEDICAL & SURGICAL HISTORY:        VITALS:  T(F): 98.2, Max: 106 (21 @ 13:05)  HR: 74  BP: 128/65  RR: 18Vital Signs Last 24 Hrs  T(C): 36.8 (2021 05:00), Max: 41.1 (2021 13:05)  T(F): 98.2 (2021 05:00), Max: 106 (2021 13:05)  HR: 74 (2021 05:00) (68 - 97)  BP: 128/65 (2021 05:00) (91/55 - 160/81)  BP(mean): --  RR: 18 (2021 05:00) (18 - 20)  SpO2: 100% (2021 05:00) (95% - 100%)    TESTS & MEASUREMENTS:                        6.9    5.32  )-----------( 312      ( 2021 06:24 )             23.9     06-26    135  |  99  |  11  ----------------------------<  84  4.2   |  32  |  0.6<L>    Ca    7.9<L>      2021 06:24  Mg     2.0     -    TPro  5.5<L>  /  Alb  2.8<L>  /  TBili  1.0  /  DBili  x   /  AST  15  /  ALT  11  /  AlkPhos  98  -    LIVER FUNCTIONS - ( 2021 06:24 )  Alb: 2.8 g/dL / Pro: 5.5 g/dL / ALK PHOS: 98 U/L / ALT: 11 U/L / AST: 15 U/L / GGT: x             Urinalysis Basic - ( 2021 15:50 )    Color: Yellow / Appearance: Clear / S.010 / pH: x  Gluc: x / Ketone: Negative  / Bili: Negative / Urobili: <2 mg/dL   Blood: x / Protein: Trace / Nitrite: Negative   Leuk Esterase: Small / RBC: 1 /HPF / WBC 5 /HPF   Sq Epi: x / Non Sq Epi: 1 /HPF / Bacteria: Few          RADIOLOGY & ADDITIONAL TESTS:  Personal review of radiological diagnostics performed  Echo and EKG results noted when applicable.     MEDICATIONS:  albuterol/ipratropium for Nebulization 3 milliLiter(s) Nebulizer every 6 hours  pantoprazole  Injectable 40 milliGRAM(s) IV Push two times a day  piperacillin/tazobactam IVPB.. 3.375 Gram(s) IV Intermittent every 8 hours      ANTIBIOTICS:  piperacillin/tazobactam IVPB.. 3.375 Gram(s) IV Intermittent every 8 hours

## 2021-06-26 NOTE — CONSULT NOTE ADULT - ASSESSMENT
70F w/ PMH of COPD, smoking, and PSH of gastric bypass in 1999 who presented to the ED with 1-2 weeks of diarrhea.     IMPRESSION;  Colitis with right gluteus abscess  Infectious vs inflammatory colitis( despite high fevers )  6/25 CT A/P  Diffuse wall thickening of the descending colon, sigmoid colon, rectum, and anus with extensive surrounding soft tissue inflammation of the anus.  5.3 cm subcutaneous fluid collection lateral to the right gluteus described above, which may represent an abscess.  Colorectal Sx : conservative management    RECOMMENDATIONS;  GI evaluation   Stool PCR  Rocephin 2 gm iv q24h  Flagyl 500 mg iv q8h

## 2021-06-26 NOTE — PROGRESS NOTE ADULT - ASSESSMENT
ASSESSMENT:  70F w/ PMH of COPD, smoking, and PSH of gastric bypass in 1999 who presented to the ED with 1-2 weeks of diarrhea. Physical exam findings, imaging, and labs as documented above.     PLAN:  - extramural air on CT likely 2/2 multiple fistulas --> no acute surgical intervention  - recommend colonoscopy, transfuse PRN    Lines/Tubes: PIV    Above plan discussed with Attending Surgeon Dr. Roman, patient, and Primary team  06-25-21 @ 21:52 ASSESSMENT:  70F w/ PMH of COPD, smoking, and PSH of gastric bypass in 1999 who presented to the ED with 1-2 weeks of diarrhea. Physical exam findings, imaging, and labs as documented above.     PLAN:  - extramural air on CT likely 2/2 multiple fistulas -->* 5cm abscess spontaneously draining, will need further debridement*  (* added/edited by Dr. Honre on 6/26 at 8:51am)  - recommend colonoscopy, transfuse PRN    Lines/Tubes: PIV    Above plan discussed with Attending Surgeon Dr. Roman, patient, and Primary team  06-25-21 @ 21:52

## 2021-06-26 NOTE — PROGRESS NOTE ADULT - SUBJECTIVE AND OBJECTIVE BOX
Patient: WEGENER, LOIS , 70y (50)Female   MRN: [id number]  Location: [facility] ED  Visit: 21 Emergency  Date: 21 @ 21:52    Upon further workup patient found to have diffuse wall thickening of the descending colon, sigmoid clon and rectum with ext surrounding soft tissue inflammation of the anus. Patient needs colonoscopy. Hb 6.4, transfused overnight.    PAST MEDICAL & SURGICAL HISTORY:  COPD  current smoker  PSH gastric bypass    VITALS:  T(F): 99 (21 @ 19:44), Max: 106 (21 @ 13:05)  HR: 73 (21 @ 19:44) (73 - 96)  BP: 106/55 (21 @ 19:44) (91/55 - 106/55)  RR: 18 (21 @ 21:45) (18 - 18)  SpO2: 97% (21 @ 21:45) (95% - 97%)    PHYSICAL EXAM:  General: NAD, AAOx3, calm and cooperative  HEENT: NCAT, SYL, EOMI, Trachea ML, Neck supple  Cardiac: RRR S1, S2, no Murmurs, rubs or gallops  Respiratory: CTAB, normal respiratory effort, breath sounds equal BL, no wheeze, rhonchi or crackles  Abdomen: Soft, distended, non-tender, no rebound, no guarding. +BS. well-healed midline scar  Rectal: Good tone, no blood, multiple louise-anal masses/lesions and fistulas (midline - pilonidal area and perirectal), +multiple external hemorrhoids  Skin: Warm/dry, normal color, no jaundice  Incision/wound: healing well, dressings in place, clean, dry and intact    MEDICATIONS  (STANDING):  albuterol/ipratropium for Nebulization 3 milliLiter(s) Nebulizer every 15 minutes    LAB/STUDIES:                      6.4    7.65  )-----------( 305      ( 2021 15:10 )             22.2         136  |  99  |  13  ----------------------------<  91  4.9   |  31  |  0.6<L>    Ca    8.0<L>      2021 15:10    TPro  5.8<L>  /  Alb  2.8<L>  /  TBili  0.6  /  DBili  x   /  AST  22  /  ALT  12  /  AlkPhos  98  06-25    PT/INR - ( 2021 17:54 )   PT: 14.30 sec;   INR: 1.24 ratio      PTT - ( 2021 17:54 )  PTT:30.0 sec  LIVER FUNCTIONS - ( 2021 15:10 )  Alb: 2.8 g/dL / Pro: 5.8 g/dL / ALK PHOS: 98 U/L / ALT: 12 U/L / AST: 22 U/L / GGT: x           Urinalysis Basic - ( 2021 15:50 )    Color: Yellow / Appearance: Clear / S.010 / pH: x  Gluc: x / Ketone: Negative  / Bili: Negative / Urobili: <2 mg/dL   Blood: x / Protein: Trace / Nitrite: Negative   Leuk Esterase: Small / RBC: 1 /HPF / WBC 5 /HPF   Sq Epi: x / Non Sq Epi: 1 /HPF / Bacteria: Few    CARDIAC MARKERS ( 2021 15:10 )  x     / <0.01 ng/mL / x     / x     / x          IMAGING:  < from: CT Abdomen and Pelvis w/ Oral Cont and w/ IV Cont (21 @ 18:38) >  FINDINGS:    PERITONEUM/MESENTERY/BOWEL: Postoperative appearance of the bowel. Diffuse wall thickening of the descending colon, sigmoid colon, rectum, and anus with extensive surrounding soft tissue infiltration of the anus. Extramural foci of air visualized posterolateral to the anus (series 4, image 369). No evidence of bowel obstruction, ascites, or free intraabdominal air.    BONES/SOFT TISSUES: 3.4 x 5.0 x 5.3 cm (TV x AP x CC) rim enhancing, subcutaneous fluid collection (series 4, image 237) adjacent to the right gluteal musculature. Severe degenerative changes of the spine.    IMPRESSION:  ·	Diffuse wall thickening of the descending colon, sigmoid colon, rectum, and anus with extensive surrounding soft tissue inflammation of the anus. Of note, extramural air visualized posterolateral to the anus.  ·	Small bilateral pleural effusions.  ·	5.3 cm subcutaneous fluid collection lateral to the right gluteus described above, which may represent an abscess.  ·	3 mm right middle lobe subpleural solid pulmonary nodule. Per Fleischner Society guidelines, an optional follow up CT in 12 months is recommended in high risk patients.  < end of copied text >      ACCESS DEVICES:  [X] Peripheral IV  [ ] Central Venous Line	[ ] R	[ ] L	[ ] IJ	[ ] Fem	[ ] SC	Placed:   [ ] Arterial Line		[ ] R	[ ] L	[ ] Fem	[ ] Rad	[ ] Ax	Placed:   [ ] PICC:					[ ] Mediport  [ ] Urinary Catheter, Date Placed:

## 2021-06-26 NOTE — H&P ADULT - HISTORY OF PRESENT ILLNESS
70 yr F COPD O2, current smoker, ETOH abuse and h/o gastric bypass presented due to bloody stool and change in bowel habits.    the pt reports change in bowel habits 4 weeks ago, it started with having loose watery stool sometimes mixed with fresh blood and fecal incontinence,  3 days ago she noted dark black soft BM, and since then felt constipated and passed formed stool with fresh blood,   she also reports having distended abd since few months,  she denies fever, chills, n/v, abd pain, chest pain, SOB or urinary symptoms.    ER: fever 106F, normal wbc, lactate 1.6, hbg 6.4, , new onset Afib on EKG, rectal exam showed blood in her clothes and at least 3 perianal fistula, CT abd  showed left sided colitis and 5 cm abscess at R gluteal area,, admitted for further veal.    70 yr F COPD O2, current smoker, ETOH abuse and h/o gastric bypass presented due to bloody stool and change in bowel habits.    the pt reports change in bowel habits 4 weeks ago, it started with having loose watery stool sometimes mixed with fresh blood and fecal incontinence,  3 days ago she noted dark black soft BM, and since then felt constipated and passed formed stool with fresh blood,   she also reports having distended abd since few months,  she denies fever, chills, n/v, abd pain, chest pain, SOB or urinary symptoms.  she reports that her father had Hx of fistula from his GI system.     ER: fever 106F, normal wbc, lactate 1.6, hbg 6.4, , new onset Afib on EKG, rectal exam showed blood in her clothes and at least 3 perianal fistula, CT abd  showed left sided colitis and 5 cm abscess at R gluteal area,, admitted for further veal.

## 2021-06-26 NOTE — CHART NOTE - NSCHARTNOTEFT_GEN_A_CORE
Pt seen and examined. Admitted for GIB, and found to have diffuse colitis c/b colonic fistulas and rectal abscess. Surgery and GI team to follow-up for intervention. Hg was 6.9 today AM s/p 1U PRBC transfusion. Will transfuse 1 more unit today. Repeat Hg in the afternoon. Pt seen and examined. Admitted for GIB, and found to have diffuse colitis c/b colonic fistulas and rectal abscess. Surgery and GI team to follow-up for intervention. Hg was 6.9 today AM s/p 1U PRBC transfusion. Will transfuse 1 more unit today. Repeat Hg in the afternoon.    Pt reports minimal blood in stool overnight.

## 2021-06-26 NOTE — H&P ADULT - NSHPPHYSICALEXAM_GEN_ALL_CORE
CONSTITUTIONAL: elderly lady looks ill,  AAOx3  HEAD: Atraumatic, normocephalic  EYES: EOM intact, PERRLA, conjunctiva and sclera clear  ENT: Supple, no masses, no thyromegaly, no bruits, no JVD; moist mucous membranes  PULMONARY: Clear to auscultation bilaterally; no wheezes, rales, or rhonchi  CARDIOVASCULAR: Regular rate and rhythm; no murmurs, rubs, or gallops  GASTROINTESTINAL: Soft, non-tender, bu distended; bowel sounds present  MARVIN: blood noted on her underwear, at least 2 perianal fistula and one at midline sacral area, multiple hemorrhoidal lesions, no blood at the vault   MUSCULOSKELETAL: 2+ peripheral pulses; no clubbing, no cyanosis, no edema  NEUROLOGY: non-focal  SKIN: No rashes or lesions; warm and dry

## 2021-06-26 NOTE — H&P ADULT - ATTENDING COMMENTS
HPI:  70 yr F COPD O2, current smoker, ETOH abuse and h/o gastric bypass presented due to bloody stool and change in bowel habits.    the pt reports change in bowel habits 4 weeks ago, it started with having loose watery stool sometimes mixed with fresh blood and fecal incontinence,  3 days ago she noted dark black soft BM, and since then felt constipated and passed formed stool with fresh blood,   she also reports having distended abd since few months,  she denies fever, chills, n/v, abd pain, chest pain, SOB or urinary symptoms.  she reports that her father had Hx of fistula from his GI system.     ER: fever 106F, normal wbc, lactate 1.6, hbg 6.4, , new onset Afib on EKG, rectal exam showed blood in her clothes and at least 3 perianal fistula, CT abd  showed left sided colitis and 5 cm abscess at R gluteal area,, admitted for further veal.    (26 Jun 2021 00:00)    REVIEW OF SYSTEMS: see cc/HPI  CONSTITUTIONAL: No weakness, fevers or chills  EYES/ENT: No visual changes;  No vertigo or throat pain   NECK: No pain or stiffness  RESPIRATORY: No cough, wheezing, hemoptysis; No shortness of breath  CARDIOVASCULAR: No chest pain or palpitations  GASTROINTESTINAL: No abdominal or epigastric pain. No nausea, vomiting, or hematemesis; No diarrhea or constipation. No melena or hematochezia.  GENITOURINARY: No dysuria, frequency or hematuria  NEUROLOGICAL: No numbness or weakness  SKIN: No itching, rashes, (+) perirectal fistula     Physical Exam:   General: WN/WD NAD  Neurology: A&Ox3, nonfocal, follows commands  Eyes: PERRLA/ EOMI  ENT/Neck: Neck supple, trachea midline, No JVD  Respiratory: CTA B/L, No wheezing, rales, rhonchi  CV: Normal rate regular rhythm, S1S2, no murmurs, rubs or gallops  Abdominal: Soft, NT, ND +BS,   Rectal - fistula in the gluteal area - draining pus,  external hemorrhoids and deep ulceration superior to the anus, ?? fistula  Extremities: No edema, + peripheral pulses  Skin: No Rashes, Hematoma, Ecchymosis  Incisions: n/a  Tubes: n/a HPI:  70 yr F COPD O2, current smoker, ETOH abuse and h/o gastric bypass presented due to bloody stool and change in bowel habits.    the pt reports change in bowel habits 4 weeks ago, it started with having loose watery stool sometimes mixed with fresh blood and fecal incontinence,  3 days ago she noted dark black soft BM, and since then felt constipated and passed formed stool with fresh blood,   she also reports having distended abd since few months,  she denies fever, chills, n/v, abd pain, chest pain, SOB or urinary symptoms.  she reports that her father had Hx of fistula from his GI system.     ER: fever 106F, normal wbc, lactate 1.6, Hbg 6.4, , new onset Afib on EKG, rectal exam showed blood in her clothes and at least 3 perianal fistula, CT abd  showed left sided colitis and 5 cm abscess at R gluteal area,, admitted for further veal.    (26 Jun 2021 00:00)    REVIEW OF SYSTEMS: see cc/HPI  CONSTITUTIONAL: No weakness, fevers or chills  EYES/ENT: No visual changes;  No vertigo or throat pain   NECK: No pain or stiffness  RESPIRATORY: No cough, wheezing, hemoptysis; No shortness of breath  CARDIOVASCULAR: No chest pain or palpitations  GASTROINTESTINAL: No abdominal or epigastric pain. No nausea, vomiting, or hematemesis; No diarrhea or constipation. No melena or hematochezia.  GENITOURINARY: No dysuria, frequency or hematuria  NEUROLOGICAL: No numbness or weakness  SKIN: No itching, rashes, (+) perirectal fistula     Physical Exam:   General: WN/WD NAD  Neurology: A&Ox3, nonfocal, follows commands  Eyes: PERRLA/ EOMI  ENT/Neck: Neck supple, trachea midline, No JVD  Respiratory: CTA B/L, No wheezing, rales, rhonchi  CV: Normal rate regular rhythm, S1S2, no murmurs, rubs or gallops  Abdominal: Soft, NT, ND +BS,   Rectal - fistula in the gluteal area - draining pus,  external hemorrhoids and deep ulceration superior to the anus, ?? fistula  Extremities: No edema, + peripheral pulses  Skin: No Rashes, Hematoma, Ecchymosis  Incisions: n/a  Tubes: n/a    A/p  GI bleed ( initially melena and currently BRBPR)  Sepsis / Colitis   Gluteal abscess and fistula  - agree w/ IV fluid / IV abx - agree w/ Zosyn   -GI eval   -Surgical follow up   -local wound care   -ID eval   -PPI   -type and screen and serial CBC     New onset A.fib rate controlled   -TSH     COPD   -pulse ox monitoring   -PRN bronchodilators   - verify outpatient Rx    EtOH abuse   -[facility] protocol monitoring     DVT prophylaxis

## 2021-06-26 NOTE — H&P ADULT - NSHPSOCIALHISTORY_GEN_ALL_CORE
live alone, smokes cigarets, drinks alcohol in the weekend and sometimes heavy drink if she feels depressed, last drink 1 week ago

## 2021-06-26 NOTE — H&P ADULT - NSHPLABSRESULTS_GEN_ALL_CORE
LABS:                        6.4    7.65  )-----------( 305      ( 25 Jun 2021 15:10 )             22.2     25 Jun 2021 15:10    136    |  99     |  13     ----------------------------<  91     4.9     |  31     |  0.6      Ca    8.0        25 Jun 2021 15:10    TPro  5.8    /  Alb  2.8    /  TBili  0.6    /  DBili  x      /  AST  22     /  ALT  12     /  AlkPhos  98     25 Jun 2021 15:10    PT/INR - ( 25 Jun 2021 17:54 )   PT: 14.30 sec;   INR: 1.24 ratio         PTT - ( 25 Jun 2021 17:54 )  PTT:30.0 sec  Lactate, Blood: 1.6 mmol/L (06.25.21 @ 15:10)  < from: 12 Lead ECG (06.25.21 @ 16:18) >      Ventricular Rate 85 BPM    Atrial Rate 80 BPM    QRS Duration 94 ms    Q-T Interval 392 ms    QTC Calculation(Bazett) 466 ms    R Axis 103 degrees    T Axis 62 degrees    Diagnosis Line Atrial fibrillation  Rightward axis  Low voltage QRS  Incomplete right bundle branch block  Abnormal ECG    Confirmed by Dino Mcclure ([id number]) on 6/25/2021 6:06:54 PM    < end of copied text >    < from: CT Abdomen and Pelvis w/ Oral Cont and w/ IV Cont (06.25.21 @ 18:38) >    IMPRESSION:    Diffuse wall thickening of the descending colon, sigmoid colon, rectum, and anus with extensive surrounding soft tissue inflammation of the anus. Of note, extramural air visualized posterolateral to the anus.    Small bilateral pleural effusions.    5.3 cm subcutaneous fluid collection lateral to the right gluteus described above, which may represent an abscess.    3 mm right middle lobe subpleural solid pulmonary nodule. Per Fleischner Society guidelines, an optional follow up CT in 12 months is recommended in high risk patients.    < end of copied text >

## 2021-06-26 NOTE — PROGRESS NOTE ADULT - SUBJECTIVE AND OBJECTIVE BOX
LOIS WEGENER 70y Female  MRN#: [id number]   CODE STATUS: full    Hospital Day: 1d    Pt is currently admitted with the primary diagnosis of GI bleed    SUBJECTIVE    No acute events overnight, no bleeding. She denies any chest pain, shortness of breath, nausea, vomiting, diarrhea. Pt remains stable.      Present Today:   - Rodriguez:  No [ x ], Yes [   ] : Indication:     - Type of IV Access:       .. CVC/Piccline:  No [ x ], Yes [   ] : Indication:       .. Midline: No [x  ], Yes [   ] : Indication:                                             ----------------------------------------------------------  OBJECTIVE  PAST MEDICAL & SURGICAL HISTORY                                            -----------------------------------------------------------  ALLERGIES:  No Known Allergies                                            ------------------------------------------------------------    HOME MEDICATIONS  Home Medications:                           MEDICATIONS:  STANDING MEDICATIONS  albuterol/ipratropium for Nebulization 3 milliLiter(s) Nebulizer every 6 hours  cefTRIAXone   IVPB 2000 milliGRAM(s) IV Intermittent every 24 hours  metroNIDAZOLE  IVPB      metroNIDAZOLE  IVPB 500 milliGRAM(s) IV Intermittent every 8 hours  pantoprazole  Injectable 40 milliGRAM(s) IV Push two times a day  sucralfate 1 Gram(s) Oral every 6 hours    PRN MEDICATIONS                                            ------------------------------------------------------------  VITAL SIGNS: Last 24 Hours  T(C): 37.2 (2021 13:15), Max: 37.4 (2021 18:37)  T(F): 99 (2021 13:15), Max: 99.4 (2021 18:37)  HR: 73 (2021 13:15) (68 - 97)  BP: 101/60 (2021 13:15) (91/55 - 160/81)  BP(mean): 75 (2021 13:15) (75 - 77)  RR: 18 (2021 13:15) (17 - 20)  SpO2: 98% (2021 13:15) (95% - 100%)      21 @ 07:01  -  21 @ 14:57  --------------------------------------------------------  IN: 460 mL / OUT: 500 mL / NET: -40 mL                                             --------------------------------------------------------------  LABS:                        6.9    5.32  )-----------( 312      ( 2021 06:24 )             23.9     26    135  |  99  |  11  ----------------------------<  84  4.2   |  32  |  0.6<L>    Ca    7.9<L>      2021 06:24  Mg     2.0         TPro  5.5<L>  /  Alb  2.8<L>  /  TBili  1.0  /  DBili  x   /  AST  15  /  ALT  11  /  AlkPhos  98      PT/INR - ( 2021 17:54 )   PT: 14.30 sec;   INR: 1.24 ratio         PTT - ( 2021 17:54 )  PTT:30.0 sec  Urinalysis Basic - ( 2021 15:50 )    Color: Yellow / Appearance: Clear / S.010 / pH: x  Gluc: x / Ketone: Negative  / Bili: Negative / Urobili: <2 mg/dL   Blood: x / Protein: Trace / Nitrite: Negative   Leuk Esterase: Small / RBC: 1 /HPF / WBC 5 /HPF   Sq Epi: x / Non Sq Epi: 1 /HPF / Bacteria: Few        Sedimentation Rate, Erythrocyte: 65 mm/Hr *H* (21 @ 06:24)  Lactate, Blood: 1.6 mmol/L (21 @ 15:10)  Troponin T, Serum: <0.01 ng/mL (21 @ 15:10)          CARDIAC MARKERS ( 2021 15:10 )  x     / <0.01 ng/mL / x     / x     / x                                                  -------------------------------------------------------------  RADIOLOGY:  IMPRESSION:    Diffuse wall thickening of the descending colon, sigmoid colon, rectum, and anus with extensive surrounding soft tissue inflammation of the anus. Of note, extramural air visualized posterolateral to the anus.    Small bilateral pleural effusions.    5.3 cm subcutaneous fluid collection lateral to the right gluteus described above, which may represent an abscess.    3 mm right middle lobe subpleural solid pulmonary nodule. Per Fleischner Society guidelines, an optional follow up CT in 12 months is recommended in high risk patients.                                            --------------------------------------------------------------    PHYSICAL EXAM:  GENERAL: well appearing, in no acute distress  HEAD:  Atraumatic, normocephalic  EYES: EOMI, PERRL, conjunctiva and sclera clear  ENT: Moist mucous membranes  NECK: Supple, No JVD  CHEST/LUNG: Clear to auscultation bilaterally, non-labored respirations, no wheezes, no crackles  HEART: Regular rate and rhythm, S1S2  ABDOMEN: Bowel sounds present; Soft, Nontender, Nondistended. No guarding or rigidity    EXTREMITIES:  2+ Peripheral Pulses, No LE edema  NERVOUS SYSTEM:  Alert & Oriented X3, speech clear. Answers questions appropriately.    SKIN: Warm and dry, No cyanosis                                               --------------------------------------------------------------    ASSESSMENT & PLAN  70 yr F COPD O2, current smoker, ETOH abuse and h/o gastric bypass presented due to bloody stool and change in bowel habits. Admitted for GI bleed.    #GI bleed likely lower due to hematochezia  -pt currently hemodynamically stable  -hgb 6.4 -> 6.9 s/p 1 pRBC. will transfuse 1 more unit and monitor CBC cosely  -no hx colonoscopy, could be IBD given alternating bowel habitus or malignancy  -f/u C. diff, GI PCR. - Fecal calprotectin and lactoferrin  -clear liquid diet as per GI  -c/w PPI    #Sepsis / Colitis   Gluteal abscess and fistula  -Surgery -> s/p bedside I&D (), surgery following  -ID -> IV Rocephin/flagyl  -local wound care     New onset A-fib   -stable, rate controlled currently on no meds  -f/u TSH, ECHO    COPD   -pulse ox monitoring   -PRN bronchodilators   - verify outpatient Rx    EtOH abuse   -[facility] protocol monitoring     Tobacco abuse  -to  smoking cessation                                                                                  ----------------------------------------------------  # DVT prophylaxis : SCD, hold AC given GI BB    # GI prophylaxis : PPI     # Diet : clear liquid as per GI    # Activity Score (AM-PAC) : IAT    # Code status : full    # Disposition : acute condition

## 2021-06-26 NOTE — CONSULT NOTE ADULT - ASSESSMENT
70 yr F COPD O2, current smoker, ETOH abuse and h/o gastric bypass presented due to bloody stool and change in bowel habits.      # Hematochezia:  - Pancolitis in CT scan with louise-rectal abscess and fistulization  - Hgb: 6.4 ( no baseline) s/p 1UPRBC--> 6.9  - VS stable  - Never had colonoscopy  -No previous h/o IBD  - was seen by surgery team--> bedside I&D today    Rec:  - IV abx ( ID on board)  - C. diff, GI PCR  - Fecal calprotectin and lactoferrin  - transfuse 1 UPRBC  - Active type and screen  - Keep hgb>8  - Monitor CBC  - will consider colonoscopy next week  - surgery follow up  -clear liquid diet      #PSH gastric bypass:  -active smoker  -high risk for marginal ulcer which could also be contributing to her anemia       Rec:  -PPI BID  -Carafate liquid QID  - EGD as outpatient  - stop smoking

## 2021-06-26 NOTE — CONSULT NOTE ADULT - SUBJECTIVE AND OBJECTIVE BOX
Gastroenterology Consultation:    Patient is a 70y old  Female who presents with a chief complaint of abd abscess (26 Jun 2021 08:16)      Admitted on: 06-25-21  HPI:  70 yr F COPD O2, current smoker, ETOH abuse and h/o gastric bypass presented due to bloody stool and change in bowel habits.  pt reports change in bowel habits 4 weeks ago, it started with having loose watery stool sometimes mixed with fresh blood and fecal incontinence,  3 days ago she noted dark black soft BM, and since then felt constipated and passed formed stool with fresh blood,   she also reports having distended abd since few months,  she denies fever, chills, n/v, abd pain, chest pain, SOB or urinary symptoms.  she reports that her father had Hx of fistula from his GI system.     ER: fever 106F, normal wbc, lactate 1.6, hbg 6.4, , new onset Afib on EKG, rectal exam showed blood in her clothes and at least 3 perianal fistula, CT abd  showed left sided colitis and 5 cm abscess at R gluteal area,, admitted for further veal.         Prior records Reviewed (Y/N): Y  History obtained from person other than patient (Y/N): N    Prior EGD: 1999 before gastric bypass, unremarkable as per patient.   Prior Colonoscopy: Never      PAST MEDICAL & SURGICAL HISTORY:      FAMILY HISTORY:  Mother unknown cancer    Social History:  Tobacco: stopped 2-3 weeks ago  Alcohol: N  Drugs: N    Home Medications:    MEDICATIONS  (STANDING):  albuterol/ipratropium for Nebulization 3 milliLiter(s) Nebulizer every 6 hours  cefTRIAXone   IVPB 2000 milliGRAM(s) IV Intermittent every 24 hours  metroNIDAZOLE  IVPB      metroNIDAZOLE  IVPB 500 milliGRAM(s) IV Intermittent every 8 hours  pantoprazole  Injectable 40 milliGRAM(s) IV Push two times a day  sucralfate 1 Gram(s) Oral every 6 hours    MEDICATIONS  (PRN):      Allergies  No Known Allergies      Review of Systems:   Constitutional:  No Fever, No Chills  ENT/Mouth:  No Hearing Changes,  No Difficulty Swallowing  Eyes:  No Eye Pain, No Vision Changes  Cardiovascular:  No Chest Pain, No Palpitations  Respiratory:  No Cough, No Dyspnea  Gastrointestinal:  As described in HPI  Musculoskeletal:  No Joint Swelling, No Back Pain  Skin:  No Skin Lesions, No Jaundice  Neuro:  No Syncope, No Dizziness  Heme/Lymph:  No Bruising, No Bleeding.          Physical Examination:  T(C): 36.8 (06-26-21 @ 05:00), Max: 41.1 (06-25-21 @ 13:05)  HR: 78 (06-26-21 @ 08:22) (68 - 97)  BP: 119/56 (06-26-21 @ 08:22) (91/55 - 160/81)  RR: 18 (06-26-21 @ 08:22) (18 - 20)  SpO2: 100% (06-26-21 @ 08:22) (95% - 100%)    Weight (kg): 74.8 (06-25-21 @ 13:05)    06-26-21 @ 07:01  -  06-26-21 @ 11:40  --------------------------------------------------------  IN: 410 mL / OUT: 500 mL / NET: -90 mL        Constitutional: No acute distress.  Eyes:. Conjunctivae are clear, Sclera is non-icteric.  Ears Nose and Throat: The external ears are normal appearing,  Oral mucosa is pink and moist.  Respiratory:  No signs of respiratory distress. Lung sounds are clear bilaterally.  Cardiovascular:  S1 S2, Regular rate and rhythm.  GI: Abdomen is soft, symmetric, and non-tender without distention. There are no visible lesions. Bowel sounds are present and normoactive in all four quadrants. No masses, hepatomegaly, or splenomegaly are noted. MARVIN showed: Good tone, no blood, multiple louise-anal masses/lesions and fistulas (midline - pilonidal area and perirectal), +multiple external hemorrhoids  Neuro: No Tremor, No involuntary movements            Data: (reviewed by attending)                        6.9    5.32  )-----------( 312      ( 26 Jun 2021 06:24 )             23.9     Hgb Trend:  6.9  06-26-21 @ 06:24  6.4  06-25-21 @ 15:10        06-26    135  |  99  |  11  ----------------------------<  84  4.2   |  32  |  0.6<L>    Ca    7.9<L>      26 Jun 2021 06:24  Mg     2.0     06-26    TPro  5.5<L>  /  Alb  2.8<L>  /  TBili  1.0  /  DBili  x   /  AST  15  /  ALT  11  /  AlkPhos  98  06-26    Liver panel trend:  TBili 1.0   /   AST 15   /   ALT 11   /   AlkP 98   /   Tptn 5.5   /   Alb 2.8    /   DBili --      06-26  TBili 0.6   /   AST 22   /   ALT 12   /   AlkP 98   /   Tptn 5.8   /   Alb 2.8    /   DBili --      06-25      PT/INR - ( 25 Jun 2021 17:54 )   PT: 14.30 sec;   INR: 1.24 ratio         PTT - ( 25 Jun 2021 17:54 )  PTT:30.0 sec        Radiology:(reviewed by attending)  CT Abdomen and Pelvis w/ Oral Cont and w/ IV Cont:   EXAM:  CT ABDOMEN AND PELVIS OC IC            PROCEDURE DATE:  06/25/2021            INTERPRETATION:  CLINICAL STATEMENT: Bloody diarrhea of 5 day duration. Remote gastric bypass.    TECHNIQUE: Contiguous axial CT images were obtained from the lower chest to the pubic symphysis following the administration of intravenous contrast. Oral contrast was administered. Reformatted images in the coronal and sagittal planes were acquired.    COMPARISON: None    FINDINGS:    LOWER CHEST: 3 mm right middle lobe subpleural solid pulmonary nodule (series 4, image 63). Small bilateral pleural effusions and bilateral dependent atelectatic changes. Bibasilar scarring / subsegmental atelectasis. Coronary artery calcifications. Biatrial enlargement.    HEPATOBILIARY: Post cholecystectomy. Intrahepatic and extrahepatic biliary ductal dilation.    SPLEEN: Unremarkable.    PANCREAS: Unremarkable.    ADRENAL GLANDS: Left adrenal thickening. Unremarkable right adrenal gland.    KIDNEYS: Left upper pole renal cyst. Symmetric renal enhancement. No hydronephrosis.    ABDOMINOPELVIC NODES: No abdominopelvic lymphadenopathy.    PELVIC ORGANS: Unremarkable.    PERITONEUM/MESENTERY/BOWEL: Postoperative appearance of the bowel. Diffuse wall thickening of the descending colon, sigmoid colon, rectum, and anus with extensive surrounding soft tissue infiltration of the anus. Extramural foci of air visualized posterolateral to the anus (series 4, image 369). No evidence of bowel obstruction, ascites, or free intraabdominal air.    BONES/SOFT TISSUES: 3.4 x 5.0 x 5.3 cm (TV x AP x CC) rim enhancing, subcutaneous fluid collection (series 4, image 237) adjacent to the right gluteal musculature. Severe degenerative changes of the spine.    OTHER: Diffuse vascular calcifications.      IMPRESSION:    Diffuse wall thickening of the descending colon, sigmoid colon, rectum, and anus with extensive surrounding soft tissue inflammation of the anus. Of note, extramural air visualized posterolateral to the anus.    Small bilateral pleural effusions.    5.3 cm subcutaneous fluid collection lateral to the right gluteus described above, which may represent an abscess.    3 mm right middle lobe subpleural solid pulmonary nodule. Per Fleischner Society guidelines, an optional follow up CT in 12 months is recommended in high risk patients.    Dr. Laura Vazquez discussed preliminary findings with Dr. Vargas on 6/25/2021 at 7:22 PM with readback.            LAURA VAZQUEZ MD; Resident Radiologist  This document has been electronically signed.  KATHRINE CHRISTIAN MD; Attending Radiologist  This document has been electronically signed. Jun 25 2021  7:48PM (06-25-21 @ 18:38)

## 2021-06-27 LAB
ANION GAP SERPL CALC-SCNC: 6 MMOL/L — LOW (ref 7–14)
ANION GAP SERPL CALC-SCNC: 9 MMOL/L — SIGNIFICANT CHANGE UP (ref 7–14)
BUN SERPL-MCNC: 6 MG/DL — LOW (ref 10–20)
BUN SERPL-MCNC: 7 MG/DL — LOW (ref 10–20)
CALCIUM SERPL-MCNC: 8 MG/DL — LOW (ref 8.5–10.1)
CALCIUM SERPL-MCNC: 8.2 MG/DL — LOW (ref 8.5–10.1)
CHLORIDE SERPL-SCNC: 101 MMOL/L — SIGNIFICANT CHANGE UP (ref 98–110)
CHLORIDE SERPL-SCNC: 99 MMOL/L — SIGNIFICANT CHANGE UP (ref 98–110)
CO2 SERPL-SCNC: 30 MMOL/L — SIGNIFICANT CHANGE UP (ref 17–32)
CO2 SERPL-SCNC: 30 MMOL/L — SIGNIFICANT CHANGE UP (ref 17–32)
CREAT SERPL-MCNC: 0.6 MG/DL — LOW (ref 0.7–1.5)
CREAT SERPL-MCNC: 0.6 MG/DL — LOW (ref 0.7–1.5)
GLUCOSE SERPL-MCNC: 91 MG/DL — SIGNIFICANT CHANGE UP (ref 70–99)
GLUCOSE SERPL-MCNC: 94 MG/DL — SIGNIFICANT CHANGE UP (ref 70–99)
HCT VFR BLD CALC: 27.7 % — LOW (ref 37–47)
HCT VFR BLD CALC: 28.3 % — LOW (ref 37–47)
HGB BLD-MCNC: 8.3 G/DL — LOW (ref 12–16)
HGB BLD-MCNC: 8.3 G/DL — LOW (ref 12–16)
INR BLD: 1.31 RATIO — HIGH (ref 0.65–1.3)
MCHC RBC-ENTMCNC: 23.5 PG — LOW (ref 27–31)
MCHC RBC-ENTMCNC: 24.3 PG — LOW (ref 27–31)
MCHC RBC-ENTMCNC: 29.3 G/DL — LOW (ref 32–37)
MCHC RBC-ENTMCNC: 30 G/DL — LOW (ref 32–37)
MCV RBC AUTO: 80.2 FL — LOW (ref 81–99)
MCV RBC AUTO: 81 FL — SIGNIFICANT CHANGE UP (ref 81–99)
NRBC # BLD: 0 /100 WBCS — SIGNIFICANT CHANGE UP (ref 0–0)
NRBC # BLD: 0 /100 WBCS — SIGNIFICANT CHANGE UP (ref 0–0)
PLATELET # BLD AUTO: 341 K/UL — SIGNIFICANT CHANGE UP (ref 130–400)
PLATELET # BLD AUTO: 347 K/UL — SIGNIFICANT CHANGE UP (ref 130–400)
POTASSIUM SERPL-MCNC: 3.9 MMOL/L — SIGNIFICANT CHANGE UP (ref 3.5–5)
POTASSIUM SERPL-MCNC: 4.6 MMOL/L — SIGNIFICANT CHANGE UP (ref 3.5–5)
POTASSIUM SERPL-SCNC: 3.9 MMOL/L — SIGNIFICANT CHANGE UP (ref 3.5–5)
POTASSIUM SERPL-SCNC: 4.6 MMOL/L — SIGNIFICANT CHANGE UP (ref 3.5–5)
PROTHROM AB SERPL-ACNC: 15.1 SEC — HIGH (ref 9.95–12.87)
RBC # BLD: 3.42 M/UL — LOW (ref 4.2–5.4)
RBC # BLD: 3.53 M/UL — LOW (ref 4.2–5.4)
RBC # FLD: 24.1 % — HIGH (ref 11.5–14.5)
RBC # FLD: 24.5 % — HIGH (ref 11.5–14.5)
SODIUM SERPL-SCNC: 137 MMOL/L — SIGNIFICANT CHANGE UP (ref 135–146)
SODIUM SERPL-SCNC: 138 MMOL/L — SIGNIFICANT CHANGE UP (ref 135–146)
WBC # BLD: 6.2 K/UL — SIGNIFICANT CHANGE UP (ref 4.8–10.8)
WBC # BLD: 7.02 K/UL — SIGNIFICANT CHANGE UP (ref 4.8–10.8)
WBC # FLD AUTO: 6.2 K/UL — SIGNIFICANT CHANGE UP (ref 4.8–10.8)
WBC # FLD AUTO: 7.02 K/UL — SIGNIFICANT CHANGE UP (ref 4.8–10.8)

## 2021-06-27 PROCEDURE — 99233 SBSQ HOSP IP/OBS HIGH 50: CPT

## 2021-06-27 PROCEDURE — 93306 TTE W/DOPPLER COMPLETE: CPT | Mod: 26

## 2021-06-27 RX ORDER — SOD SULF/SODIUM/NAHCO3/KCL/PEG
4000 SOLUTION, RECONSTITUTED, ORAL ORAL ONCE
Refills: 0 | Status: COMPLETED | OUTPATIENT
Start: 2021-06-27 | End: 2021-06-27

## 2021-06-27 RX ADMIN — Medication 1 GRAM(S): at 22:07

## 2021-06-27 RX ADMIN — Medication 100 MILLIGRAM(S): at 06:05

## 2021-06-27 RX ADMIN — Medication 1 GRAM(S): at 11:34

## 2021-06-27 RX ADMIN — PANTOPRAZOLE SODIUM 40 MILLIGRAM(S): 20 TABLET, DELAYED RELEASE ORAL at 06:06

## 2021-06-27 RX ADMIN — Medication 10 MILLIGRAM(S): at 14:00

## 2021-06-27 RX ADMIN — Medication 1 GRAM(S): at 06:05

## 2021-06-27 RX ADMIN — CEFTRIAXONE 100 MILLIGRAM(S): 500 INJECTION, POWDER, FOR SOLUTION INTRAMUSCULAR; INTRAVENOUS at 11:34

## 2021-06-27 RX ADMIN — PANTOPRAZOLE SODIUM 40 MILLIGRAM(S): 20 TABLET, DELAYED RELEASE ORAL at 17:08

## 2021-06-27 RX ADMIN — Medication 100 MILLIGRAM(S): at 22:06

## 2021-06-27 RX ADMIN — Medication 1 GRAM(S): at 17:08

## 2021-06-27 RX ADMIN — Medication 4000 MILLILITER(S): at 16:14

## 2021-06-27 RX ADMIN — Medication 100 MILLIGRAM(S): at 14:00

## 2021-06-27 NOTE — PROGRESS NOTE ADULT - SUBJECTIVE AND OBJECTIVE BOX
LOIS WEGENER 70y Female  MRN#: [id number]   CODE STATUS:________    Hospital Day: 2d    Pt is currently admitted with the primary diagnosis of colitis    SUBJECTIVE  No acute events overnight. Pt remains hemodynamically stable.                                               ----------------------------------------------------------  OBJECTIVE  PAST MEDICAL & SURGICAL HISTORY                                            -----------------------------------------------------------  ALLERGIES:  No Known Allergies                                            ------------------------------------------------------------    HOME MEDICATIONS  Home Medications:                           MEDICATIONS:  STANDING MEDICATIONS  bisacodyl 10 milliGRAM(s) Oral once  bisacodyl 10 milliGRAM(s) Oral once  cefTRIAXone   IVPB 2000 milliGRAM(s) IV Intermittent every 24 hours  metroNIDAZOLE  IVPB      metroNIDAZOLE  IVPB 500 milliGRAM(s) IV Intermittent every 8 hours  pantoprazole  Injectable 40 milliGRAM(s) IV Push two times a day  polyethylene glycol/electrolyte Solution. 4000 milliLiter(s) Oral once  sucralfate 1 Gram(s) Oral every 6 hours    PRN MEDICATIONS                                            ------------------------------------------------------------  VITAL SIGNS: Last 24 Hours  T(C): 36.6 (2021 12:00), Max: 37.6 (2021 16:28)  T(F): 97.8 (2021 12:00), Max: 99.7 (2021 16:28)  HR: 66 (2021 12:00) (64 - 79)  BP: 98/58 (2021 12:00) (98/58 - 130/60)  BP(mean): 75 (2021 16:53) (75 - 79)  RR: 19 (2021 12:00) (17 - 19)  SpO2: 99% (2021 12:00) (98% - 99%)      21 @ 07:01  -  21 @ 07:00  --------------------------------------------------------  IN: 710 mL / OUT: 500 mL / NET: 210 mL                                             --------------------------------------------------------------  LABS:                        8.3    6.20  )-----------( 347      ( 2021 09:05 )             28.3     06-27    138  |  99  |  7<L>  ----------------------------<  94  3.9   |  30  |  0.6<L>    Ca    8.0<L>      2021 09:05  Mg     2.0     -    TPro  5.5<L>  /  Alb  2.8<L>  /  TBili  1.0  /  DBili  x   /  AST  15  /  ALT  11  /  AlkPhos  98  06-26    PT/INR - ( 2021 17:54 )   PT: 14.30 sec;   INR: 1.24 ratio         PTT - ( 2021 17:54 )  PTT:30.0 sec  Urinalysis Basic - ( 2021 15:50 )    Color: Yellow / Appearance: Clear / S.010 / pH: x  Gluc: x / Ketone: Negative  / Bili: Negative / Urobili: <2 mg/dL   Blood: x / Protein: Trace / Nitrite: Negative   Leuk Esterase: Small / RBC: 1 /HPF / WBC 5 /HPF   Sq Epi: x / Non Sq Epi: 1 /HPF / Bacteria: Few              GI PCR Panel, Stool (collected 2021 08:46)  Source: .Stool Feces  Final Report (2021 21:07):    GI PCR Results: NOT detected    *******Please Note:*******    GI panel PCR evaluates for:    Campylobacter, Plesiomonas shigelloides, Salmonella,    Vibrio, Yersinia enterocolitica, Enteroaggregative    Escherichia coli (EAEC), Enteropathogenic E.coli (EPEC),    Enterotoxigenic E. coli (ETEC) lt/st, Shiga-like    toxin-producing E. coli (STEC) stx1/stx2,    Shigella/ Enteroinvasive E. coli (EIEC), Cryptosporidium,    Cyclospora cayetanensis, Entamoeba histolytica,    Giardia lamblia, Adenovirus F 40/41, Astrovirus,    Norovirus GI/GII, Rotavirus A, Sapovirus    Culture - Urine (collected 2021 15:50)  Source: .Urine Clean Catch (Midstream)  Preliminary Report (2021 10:34):    >100,000 CFU/ml Gram Negative Rods    Culture - Blood (collected 2021 15:00)  Source: .Blood Blood  Preliminary Report (2021 01:01):    No growth to date.    Culture - Blood (collected 2021 15:00)  Source: .Blood Blood  Preliminary Report (2021 01:01):    No growth to date.        CARDIAC MARKERS ( 2021 15:10 )  x     / <0.01 ng/mL / x     / x     / x                                                  -------------------------------------------------------------  RADIOLOGY:< from: CT Abdomen and Pelvis w/ Oral Cont and w/ IV Cont (21 @ 18:38) >  IMPRESSION:    Diffuse wall thickening of the descending colon, sigmoid colon, rectum, and anus with extensive surrounding soft tissue inflammation of the anus. Of note, extramural air visualized posterolateral to the anus.    Small bilateral pleural effusions.    5.3 cm subcutaneous fluid collection lateral to the right gluteus described above, which may represent an abscess.    3 mm right middle lobe subpleural solid pulmonary nodule. Per Fleischner Society guidelines, an optional follow up CT in 12 months is recommended in high risk patients.      < end of copied text >                                              --------------------------------------------------------------    PHYSICAL EXAM:  GENERAL: NAD, lying in bed comfortably  HEAD:  Atraumatic, normocephalic  EYES: EOMI  ENT: Moist mucous membranes  NECK: Supple, no JVD  HEART: Regular rate and rhythm, no murmurs, rubs, or gallops  LUNGS: Unlabored respirations.  Clear to auscultation bilaterally, no crackles, wheezing, or rhonchi  ABDOMEN: Soft, nontender, nondistended, +BS  EXTREMITIES: 2+ peripheral pulses bilaterally. No clubbing, cyanosis, or edema  NERVOUS SYSTEM:  no focal deficits

## 2021-06-27 NOTE — PROGRESS NOTE ADULT - SUBJECTIVE AND OBJECTIVE BOX
GENERAL SURGERY    Patient: WEGENER, LOIS , 70y (50)Female   MRN: [id number]  Location: [facility] ED  Visit: 21 Emergency    HPI:  70F w/ PMH of COPD, smoking, and PSH of gastric bypass in  who presented to the ED with 1-2 weeks of diarrhea. Pt reports soft, loose, mucus-filled stools that intermittently contain bright red or dark blood. Denies associated abdominal pain, nausea, vomiting. Endorses subjective fever and chills at home. Pt reports recent history of constipation and more straining than usual while having a BM. In the ED, Hb 6.4. CT A/P showed diffuse wall thickening of the descending colon, sigmoid colon, rectum, and anus with extensive surrounding soft tissue inflammation of the anus as well as extramural air visualized posterolateral to the anus and 5.3 cm subcutaneous fluid collection lateral to the right gluteus described above.      Vitals:   T(F): 98.4 (21 @ 00:16), Max: 99.7 (21 @ 16:28)  HR: 68 (21 @ 00:16)  BP: 130/60 (21 @ 00:16)  RR: 18 (21 @ 00:16)  SpO2: 99% (21 @ 00:16)      Diet, NPO after Midnight:      NPO Start Date: 2021,   NPO Start Time: 23:59  Diet, Clear Liquid      Fluids:     I & O's:      Bowel Movement: : [x] YES [] NO  Flatus: : [x] YES [] NO    PHYSICAL EXAM:  General: NAD, AAOx3, calm and cooperative  HEENT: NCAT, SYL, EOMI, Trachea ML, Neck supple  Cardiac: RRR S1, S2, no Murmurs, rubs or gallops  Respiratory: CTAB, normal respiratory effort, breath sounds equal BL, no wheeze, rhonchi or crackles  Abdomen: Soft, distended, non-tender, no rebound, no guarding. +BS. well-healed midline scar  Rectal: Good tone, no blood, multiple louise-anal masses/lesions and fistulas (midline - pilonidal area and perirectal), +multiple external hemorrhoids  Skin: Warm/dry, normal color, no jaundice  Incision/wound: healing well, dressings in place, clean, dry and intact    MEDICATIONS  (STANDING):  cefTRIAXone   IVPB 2000 milliGRAM(s) IV Intermittent every 24 hours  metroNIDAZOLE  IVPB      metroNIDAZOLE  IVPB 500 milliGRAM(s) IV Intermittent every 8 hours  pantoprazole  Injectable 40 milliGRAM(s) IV Push two times a day  sucralfate 1 Gram(s) Oral every 6 hours    MEDICATIONS  (PRN):      DVT PROPHYLAXIS:   GI PROPHYLAXIS: pantoprazole  Injectable 40 milliGRAM(s) IV Push two times a day    ANTICOAGULATION:   ANTIBIOTICS:  cefTRIAXone   IVPB 2000 milliGRAM(s)  metroNIDAZOLE  IVPB    metroNIDAZOLE  IVPB 500 milliGRAM(s)            LAB/STUDIES:  Labs:  CAPILLARY BLOOD GLUCOSE                              8.0    7.57  )-----------( 333      ( 2021 17:21 )             27.5       Auto Immature Granulocyte %: 0.6 % (21 @ 06:24)  Auto Neutrophil %: 64.2 % (21 @ 06:24)        135  |  99  |  11  ----------------------------<  84  4.2   |  32  |  0.6<L>      Calcium, Total Serum: 7.9 mg/dL (21 @ 06:24)      LFTs:             5.5  | 1.0  | 15       ------------------[98      ( 2021 06:24 )  2.8  | x    | 11          Lipase:x      Amylase:x         Lactate, Blood: 1.6 mmol/L (21 @ 15:10)  Blood Gas Venous - Lactate: 1.6 mmoL/L (21 @ 14:15)      Coags:     14.30  ----< 1.24    ( 2021 17:54 )     30.0        CARDIAC MARKERS ( 2021 15:10 )  x     / <0.01 ng/mL / x     / x     / x          Serum Pro-Brain Natriuretic Peptide: 1509 pg/mL (21 @ 15:10)      Urinalysis Basic - ( 2021 15:50 )    Color: Yellow / Appearance: Clear / S.010 / pH: x  Gluc: x / Ketone: Negative  / Bili: Negative / Urobili: <2 mg/dL   Blood: x / Protein: Trace / Nitrite: Negative   Leuk Esterase: Small / RBC: 1 /HPF / WBC 5 /HPF   Sq Epi: x / Non Sq Epi: 1 /HPF / Bacteria: Few        GI PCR Panel, Stool (collected 2021 08:46)  Source: .Stool Feces  Final Report (2021 21:07):    GI PCR Results: NOT detected    *******Please Note:*******    GI panel PCR evaluates for:    Campylobacter, Plesiomonas shigelloides, Salmonella,    Vibrio, Yersinia enterocolitica, Enteroaggregative    Escherichia coli (EAEC), Enteropathogenic E.coli (EPEC),    Enterotoxigenic E. coli (ETEC) lt/st, Shiga-like    toxin-producing E. coli (STEC) stx1/stx2,    Shigella/ Enteroinvasive E. coli (EIEC), Cryptosporidium,    Cyclospora cayetanensis, Entamoeba histolytica,    Giardia lamblia, Adenovirus F 40/41, Astrovirus,    Norovirus GI/GII, Rotavirus A, Sapovirus    Culture - Blood (collected 2021 15:00)  Source: .Blood Blood  Preliminary Report (2021 01:01):    No growth to date.    Culture - Blood (collected 2021 15:00)  Source: .Blood Blood  Preliminary Report (2021 01:01):    No growth to date.              IMAGING:      ACCESS/ DEVICES:  [ ] Peripheral IV  [ ] Central Venous Line	[ ] R	[ ] L	[ ] IJ	[ ] Fem	[ ] SC	Placed:   [ ] Arterial Line		[ ] R	[ ] L	[ ] Fem	[ ] Rad	[ ] Ax	Placed:   [ ] PICC:					[ ] Mediport  [ ] Urinary Catheter,  Date Placed:   [ ] Chest tube: [ ] Right, [ ] Left  [ ] MARCO A/Nelson Drains      PAST MEDICAL & SURGICAL HISTORY:  COPD  current smoker  PSH gastric bypass      IMAGING:  < from: CT Abdomen and Pelvis w/ Oral Cont and w/ IV Cont (21 @ 18:38) >  FINDINGS:    PERITONEUM/MESENTERY/BOWEL: Postoperative appearance of the bowel. Diffuse wall thickening of the descending colon, sigmoid colon, rectum, and anus with extensive surrounding soft tissue infiltration of the anus. Extramural foci of air visualized posterolateral to the anus (series 4, image 369). No evidence of bowel obstruction, ascites, or free intraabdominal air.    BONES/SOFT TISSUES: 3.4 x 5.0 x 5.3 cm (TV x AP x CC) rim enhancing, subcutaneous fluid collection (series 4, image 237) adjacent to the right gluteal musculature. Severe degenerative changes of the spine.    IMPRESSION:  ·	Diffuse wall thickening of the descending colon, sigmoid colon, rectum, and anus with extensive surrounding soft tissue inflammation of the anus. Of note, extramural air visualized posterolateral to the anus.  ·	Small bilateral pleural effusions.  ·	5.3 cm subcutaneous fluid collection lateral to the right gluteus described above, which may represent an abscess.  ·	3 mm right middle lobe subpleural solid pulmonary nodule. Per Fleischner Society guidelines, an optional follow up CT in 12 months is recommended in high risk patients.  < end of copied text >      ACCESS DEVICES:  [X] Peripheral IV  [ ] Central Venous Line	[ ] R	[ ] L	[ ] IJ	[ ] Fem	[ ] SC	Placed:   [ ] Arterial Line		[ ] R	[ ] L	[ ] Fem	[ ] Rad	[ ] Ax	Placed:   [ ] PICC:					[ ] Mediport  [ ] Urinary Catheter, Date Placed:

## 2021-06-27 NOTE — PROGRESS NOTE ADULT - SUBJECTIVE AND OBJECTIVE BOX
WEGENER, LOIS  70y, Female  Allergy: No Known Allergies    Hospital Day: 2d    Patient seen and examined earlier today. No acute events overnight.    PMH/PSH:  PAST MEDICAL & SURGICAL HISTORY:    VITALS:  T(F): 97.8 (21 @ 12:00), Max: 99.7 (21 @ 16:28)  HR: 66 (21 @ 12:00)  BP: 98/58 (21 @ 12:00) (98/58 - 130/60)  RR: 19 (21 @ 12:00)  SpO2: 99% (21 @ 12:00)    TESTS & MEASUREMENTS:  Weight (Kg): 74.8 (21 @ 16:53)    21 @ 07:01  -  21 @ 07:00  --------------------------------------------------------  IN: 710 mL / OUT: 500 mL / NET: 210 mL                        8.3    6.20  )-----------( 347      ( 2021 09:05 )             28.3     PT/INR - ( 2021 17:54 )   PT: 14.30 sec;   INR: 1.24 ratio       PTT - ( 2021 17:54 )  PTT:30.0 sec      138  |  99  |  7<L>  ----------------------------<  94  3.9   |  30  |  0.6<L>    Ca    8.0<L>      2021 09:05  Mg     2.0         TPro  5.5<L>  /  Alb  2.8<L>  /  TBili  1.0  /  DBili  x   /  AST  15  /  ALT  11  /  AlkPhos  98      LIVER FUNCTIONS - ( 2021 06:24 )  Alb: 2.8 g/dL / Pro: 5.5 g/dL / ALK PHOS: 98 U/L / ALT: 11 U/L / AST: 15 U/L / GGT: x           CARDIAC MARKERS ( 2021 15:10 )  x     / <0.01 ng/mL / x     / x     / x        GI PCR Panel, Stool (collected 21 @ 08:46)  Source: .Stool Feces  Final Report (21 @ 21:07):    GI PCR Results: NOT detected    *******Please Note:*******    GI panel PCR evaluates for:    Campylobacter, Plesiomonas shigelloides, Salmonella,    Vibrio, Yersinia enterocolitica, Enteroaggregative    Escherichia coli (EAEC), Enteropathogenic E.coli (EPEC),    Enterotoxigenic E. coli (ETEC) lt/st, Shiga-like    toxin-producing E. coli (STEC) stx1/stx2,    Shigella/ Enteroinvasive E. coli (EIEC), Cryptosporidium,    Cyclospora cayetanensis, Entamoeba histolytica,    Giardia lamblia, Adenovirus F 40/41, Astrovirus,    Norovirus GI/GII, Rotavirus A, Sapovirus    Culture - Urine (collected 21 @ 15:50)  Source: .Urine Clean Catch (Midstream)  Preliminary Report (21 @ 12:45):    >100,000 CFU/ml Escherichia coli    Culture - Blood (collected 21 @ 15:00)  Source: .Blood Blood  Preliminary Report (21 @ 01:01):    No growth to date.    Culture - Blood (collected 21 @ 15:00)  Source: .Blood Blood  Preliminary Report (21 @ 01:01):    No growth to date.    Urinalysis Basic - ( 2021 15:50 )    Color: Yellow / Appearance: Clear / S.010 / pH: x  Gluc: x / Ketone: Negative  / Bili: Negative / Urobili: <2 mg/dL   Blood: x / Protein: Trace / Nitrite: Negative   Leuk Esterase: Small / RBC: 1 /HPF / WBC 5 /HPF   Sq Epi: x / Non Sq Epi: 1 /HPF / Bacteria: Few    RECENT DIAGNOSTIC ORDERS:  Prothrombin Time and INR, Plasma:  Start Date:2021. 16:00 (21 @ 09:45)  Basic Metabolic Panel: 16:00 (21 @ 09:45)  Diet, NPO after Midnight:      NPO Start Date: 2021,   NPO Start Time: 23:59 (21 @ 09:42)  Culture - Abscess with Gram Stain: 13:43 (21 @ 19:00)    MEDICATIONS:  MEDICATIONS  (STANDING):  bisacodyl 10 milliGRAM(s) Oral once  bisacodyl 10 milliGRAM(s) Oral once  cefTRIAXone   IVPB 2000 milliGRAM(s) IV Intermittent every 24 hours  metroNIDAZOLE  IVPB      metroNIDAZOLE  IVPB 500 milliGRAM(s) IV Intermittent every 8 hours  pantoprazole  Injectable 40 milliGRAM(s) IV Push two times a day  polyethylene glycol/electrolyte Solution. 4000 milliLiter(s) Oral once  sucralfate 1 Gram(s) Oral every 6 hours    MEDICATIONS  (PRN):    HOME MEDICATIONS:    REVIEW OF SYSTEMS:  All other review of systems is negative unless indicated above.     PHYSICAL EXAM:  GENERAL: NAD  HEENT: No Swelling  CHEST/LUNG: Good air entry, No wheezing  HEART: RRR, No murmurs  ABDOMEN: Soft, Bowel sounds present  EXTREMITIES:  No clubbing

## 2021-06-27 NOTE — PROGRESS NOTE ADULT - ASSESSMENT
70 yr F COPD O2, current smoker, ETOH abuse and h/o gastric bypass presented due to bloody stool and change in bowel habits. Admitted for GIB, and found to have diffuse colitis c/b colonic fistulas and rectal abscess.    #GI bleed  #Sepsis, POA due to Colitis   #Gluteal abscess and fistula  CT Abdomen/pelvis: Diffuse wall thickening of the descending colon, sigmoid colon, rectum, and anus, with extensive surround tissue inflammation of the anus  s/p ID by surgery 06/26, possible plans for OR after colonoscopy  Hg has been stable s/p 2U transfusion  - Cont ceftriaxone/flagyl  - Colonoscopy planned tomorrow per GI, will order bowel prep, NPO after midnight  -local wound care   -PPI   -type and screen and serial CBC     #Suspected persistent atrial fibrillation  - Currently rate controlled off medications  - Cont to monitor, AC to be possibly initiated after colitis and fistulas are managed    #EtOH abuse   -CIWA protocol monitoring     DVT PPX, SCD    #Progress Note Handoff  Pending (specify): Colonoscopy  Family discussion: d/w pt regarding pending procedure  Disposition: Home

## 2021-06-27 NOTE — PROGRESS NOTE ADULT - ASSESSMENT
ASSESSMENT & PLAN  70 yr F COPD O2, current smoker, ETOH abuse and h/o gastric bypass presented due to bloody stool and change in bowel habits. Admitted for GI bleed.    #GI bleed likely lower due to hematochezia  -pt currently hemodynamically stable  -s/p 2 pRBC  -monitor CBC cosely  -GI plans for scope tomorrow  - clear liquid diet  - Golytely 4 L start at 4pm, Dulcolax 20 mg po as per GI  -NPO after midnight  -C. diff neg,  GI PCR. not detected  -c/w PPI    #Sepsis / Colitis   Gluteal abscess and fistula  -Surgery -> s/p bedside I&D (6/26), surgery following  -ID -> IV Rocephin/flagyl  -local wound care     New onset A-fib   -stable, rate controlled currently on no meds  -f/u TSH, ECHO    COPD   -pulse ox monitoring   -PRN bronchodilators   - verify outpatient Rx    EtOH abuse   -CIWA protocol monitoring     Tobacco abuse  -to  smoking cessation                                                                                  ----------------------------------------------------  # DVT prophylaxis : SCD, hold AC given GI BB    # GI prophylaxis : PPI     # Diet : clear liquid, npo after midnight    # Activity Score (AM-PAC) : IAT    # Code status : full    # Disposition : acute condition

## 2021-06-27 NOTE — PROGRESS NOTE ADULT - SUBJECTIVE AND OBJECTIVE BOX
WEGENER, LOIS  70y, Female    All available historical data reviewed    OVERNIGHT EVENTS:    none  ROS:  General: Denies rigors, nightsweats  HEENT: Denies headache, rhinorrhea, sore throat, eye pain  CV: Denies CP, palpitations  PULM: Denies wheezing, hemoptysis  GI: Denies hematemesis, hematochezia, melena  : Denies discharge, hematuria  MSK: Denies arthralgias, myalgias  SKIN: Denies rash, lesions  NEURO: Denies paresthesias, weakness  PSYCH: Denies depression, anxiety    VITALS:  T(F): 97.6, Max: 99.7 (21 @ 16:28)  HR: 78  BP: 117/56  RR: 18Vital Signs Last 24 Hrs  T(C): 36.4 (2021 08:21), Max: 37.6 (2021 16:28)  T(F): 97.6 (2021 08:21), Max: 99.7 (2021 16:28)  HR: 78 (2021 08:21) (64 - 79)  BP: 117/56 (2021 08:21) (101/60 - 130/60)  BP(mean): 75 (2021 16:53) (75 - 79)  RR: 18 (2021 08:21) (17 - 19)  SpO2: 99% (2021 08:21) (98% - 99%)    TESTS & MEASUREMENTS:                        8.0    7.57  )-----------( 333      ( 2021 17:21 )             27.5     -    135  |  99  |  11  ----------------------------<  84  4.2   |  32  |  0.6<L>    Ca    7.9<L>      2021 06:24  Mg     2.0     -    TPro  5.5<L>  /  Alb  2.8<L>  /  TBili  1.0  /  DBili  x   /  AST  15  /  ALT  11  /  AlkPhos  98  06-    LIVER FUNCTIONS - ( 2021 06:24 )  Alb: 2.8 g/dL / Pro: 5.5 g/dL / ALK PHOS: 98 U/L / ALT: 11 U/L / AST: 15 U/L / GGT: x             GI PCR Panel, Stool (collected 21 @ 08:46)  Source: .Stool Feces  Final Report (21 @ 21:07):    GI PCR Results: NOT detected    *******Please Note:*******    GI panel PCR evaluates for:    Campylobacter, Plesiomonas shigelloides, Salmonella,    Vibrio, Yersinia enterocolitica, Enteroaggregative    Escherichia coli (EAEC), Enteropathogenic E.coli (EPEC),    Enterotoxigenic E. coli (ETEC) lt/st, Shiga-like    toxin-producing E. coli (STEC) stx1/stx2,    Shigella/ Enteroinvasive E. coli (EIEC), Cryptosporidium,    Cyclospora cayetanensis, Entamoeba histolytica,    Giardia lamblia, Adenovirus F 40/41, Astrovirus,    Norovirus GI/GII, Rotavirus A, Sapovirus    Culture - Blood (collected 21 @ 15:00)  Source: .Blood Blood  Preliminary Report (21 @ 01:01):    No growth to date.    Culture - Blood (collected 21 @ 15:00)  Source: .Blood Blood  Preliminary Report (21 @ 01:01):    No growth to date.      Urinalysis Basic - ( 2021 15:50 )    Color: Yellow / Appearance: Clear / S.010 / pH: x  Gluc: x / Ketone: Negative  / Bili: Negative / Urobili: <2 mg/dL   Blood: x / Protein: Trace / Nitrite: Negative   Leuk Esterase: Small / RBC: 1 /HPF / WBC 5 /HPF   Sq Epi: x / Non Sq Epi: 1 /HPF / Bacteria: Few          RADIOLOGY & ADDITIONAL TESTS:  Personal review of radiological diagnostics performed  Echo and EKG results noted when applicable.     MEDICATIONS:  cefTRIAXone   IVPB 2000 milliGRAM(s) IV Intermittent every 24 hours  metroNIDAZOLE  IVPB      metroNIDAZOLE  IVPB 500 milliGRAM(s) IV Intermittent every 8 hours  pantoprazole  Injectable 40 milliGRAM(s) IV Push two times a day  sucralfate 1 Gram(s) Oral every 6 hours      ANTIBIOTICS:  cefTRIAXone   IVPB 2000 milliGRAM(s) IV Intermittent every 24 hours  metroNIDAZOLE  IVPB      metroNIDAZOLE  IVPB 500 milliGRAM(s) IV Intermittent every 8 hours

## 2021-06-27 NOTE — PROGRESS NOTE ADULT - ASSESSMENT
ASSESSMENT:  70F w/ PMH of COPD, smoking, and PSH of gastric bypass in 1999 who presented to the ED with 1-2 weeks of diarrhea. Physical exam findings, imaging, and labs as documented above.     PLAN:  - s/p bedside i&d, likely will still need OR  - extramural air on CT likely 2/2 multiple fistula  - recommend EGD/colonoscopy to r/o marginal ulcer, cancer  - transfuse PRN    Lines/Tubes: PIV    BLUE [id number]

## 2021-06-27 NOTE — PROGRESS NOTE ADULT - ASSESSMENT
70 yr F COPD O2, current smoker, ETOH abuse and h/o gastric bypass presented due to bloody stool and change in bowel habits.      # Hematochezia:  - Pancolitis in CT scan with louise-rectal abscess and fistulization  - s/p I&D of perirectal abscess by surgery yesterday   - Hgb: 6.4 ( no baseline) s/p 2UPRBC--> 8.1  - VS stable  - Never had colonoscopy  - No previous h/o IBD  - C. diff, GI PCR--> neg    Rec:  - IV abx ( ID on board)  - Fecal calprotectin and lactoferrin  - Active type and screen  - Keep hgb>8  - Monitor CBC  - clear liquid diet  - Golytely 4 L start at 4pm  - Dulcolax 20 mg po   - NPO after midnight  - EGD/colonoscopy tomorrow  - surgery follow up  -clear liquid diet      #PSH gastric bypass:  -active smoker  -high risk for marginal ulcer which could also be contributing to her anemia       Rec:  -PPI BID  -Carafate liquid QID  - EGD tomorrow  - stop smoking

## 2021-06-27 NOTE — PROGRESS NOTE ADULT - SUBJECTIVE AND OBJECTIVE BOX
Gastroenterology progress note:     Patient is a 70y old  Female who presents with a chief complaint of abd abscess (27 Jun 2021 08:54)       Admitted on: 06-25-21    We are following the patient for: Hematochezia     Interval History:  s/p bedside I&D by surgery, patient had 1 BM last night, can tolerate PO intake      PAST MEDICAL & SURGICAL HISTORY:      MEDICATIONS  (STANDING):  bisacodyl 10 milliGRAM(s) Oral once  bisacodyl 10 milliGRAM(s) Oral once  cefTRIAXone   IVPB 2000 milliGRAM(s) IV Intermittent every 24 hours  metroNIDAZOLE  IVPB      metroNIDAZOLE  IVPB 500 milliGRAM(s) IV Intermittent every 8 hours  pantoprazole  Injectable 40 milliGRAM(s) IV Push two times a day  polyethylene glycol/electrolyte Solution. 4000 milliLiter(s) Oral once  sucralfate 1 Gram(s) Oral every 6 hours    MEDICATIONS  (PRN):      Allergies  No Known Allergies      Review of Systems:   Constitutional: Ne Fever, No chills, No weakness  ENT: No visual changes, No throat pain  Cardiovascular:  No Chest Pain, No Palpitations  Respiratory:  No Cough, No Dyspnea  Gastrointestinal:  As described in HPI  Neurological: No numbness or weakness  Skin: No rash, no itching    Physical Examination:  T(C): 36.4 (06-27-21 @ 08:21), Max: 37.6 (06-26-21 @ 16:28)  HR: 78 (06-27-21 @ 08:21) (64 - 79)  BP: 117/56 (06-27-21 @ 08:21) (101/60 - 130/60)  RR: 18 (06-27-21 @ 08:21) (17 - 19)  SpO2: 99% (06-27-21 @ 08:21) (98% - 99%)  Weight (kg): 74.8 (06-26-21 @ 16:53)    06-26-21 @ 07:01  -  06-27-21 @ 07:00  --------------------------------------------------------  IN: 710 mL / OUT: 500 mL / NET: 210 mL      Constitutional: No acute distress.  Respiratory:  No signs of respiratory distress. Lung sounds are clear bilaterally.  Cardiovascular:  S1 S2, Regular rate and rhythm.  Abdominal: Abdomen is soft, symmetric, and non-tender without distention. There are no visible lesions. Bowel sounds are present and normoactive in all four quadrants. No masses, hepatomegaly, or splenomegaly are noted.    Rectal: Good tone, no blood, multiple louise-anal masses/lesions and fistulas (midline - pilonidal area and perirectal), +multiple external hemorrhoids  Skin: No rashes, No Jaundice.  Neurology: AAOX3, Non-focal  Skin: No rash, No excoriation  Vascular: No varicose vein, No cyanosis, No edema        Data: (reviewed by attending)                        8.3    6.20  )-----------( 347      ( 27 Jun 2021 09:05 )             28.3     Hgb trend:  8.3  06-27-21 @ 09:05  8.0  06-26-21 @ 17:21  6.9  06-26-21 @ 06:24  6.4  06-25-21 @ 15:10      06-26-21 @ 07:01  -  06-27-21 @ 07:00  --------------------------------------------------------  IN: 250 mL      06-27    138  |  99  |  7<L>  ----------------------------<  94  3.9   |  30  |  0.6<L>    Ca    8.0<L>      27 Jun 2021 09:05  Mg     2.0     06-26    TPro  5.5<L>  /  Alb  2.8<L>  /  TBili  1.0  /  DBili  x   /  AST  15  /  ALT  11  /  AlkPhos  98  06-26    Liver panel trend:  TBili 1.0   /   AST 15   /   ALT 11   /   AlkP 98   /   Tptn 5.5   /   Alb 2.8    /   DBili --      06-26  TBili 0.6   /   AST 22   /   ALT 12   /   AlkP 98   /   Tptn 5.8   /   Alb 2.8    /   DBili --      06-25      PT/INR - ( 25 Jun 2021 17:54 )   PT: 14.30 sec;   INR: 1.24 ratio         PTT - ( 25 Jun 2021 17:54 )  PTT:30.0 sec    GI PCR Panel, Stool (collected 26 Jun 2021 08:46)  Source: .Stool Feces  Final Report (26 Jun 2021 21:07):    GI PCR Results: NOT detected    *******Please Note:*******    GI panel PCR evaluates for:    Campylobacter, Plesiomonas shigelloides, Salmonella,    Vibrio, Yersinia enterocolitica, Enteroaggregative    Escherichia coli (EAEC), Enteropathogenic E.coli (EPEC),    Enterotoxigenic E. coli (ETEC) lt/st, Shiga-like    toxin-producing E. coli (STEC) stx1/stx2,    Shigella/ Enteroinvasive E. coli (EIEC), Cryptosporidium,    Cyclospora cayetanensis, Entamoeba histolytica,    Giardia lamblia, Adenovirus F 40/41, Astrovirus,    Norovirus GI/GII, Rotavirus A, Sapovirus    Culture - Urine (collected 25 Jun 2021 15:50)  Source: .Urine Clean Catch (Midstream)  Preliminary Report (27 Jun 2021 10:34):    >100,000 CFU/ml Gram Negative Rods    Culture - Blood (collected 25 Jun 2021 15:00)  Source: .Blood Blood  Preliminary Report (27 Jun 2021 01:01):    No growth to date.    Culture - Blood (collected 25 Jun 2021 15:00)  Source: .Blood Blood  Preliminary Report (27 Jun 2021 01:01):    No growth to date.

## 2021-06-27 NOTE — PROGRESS NOTE ADULT - ASSESSMENT
70F w/ PMH of COPD, smoking, and PSH of gastric bypass in 1999 who presented to the ED with 1-2 weeks of diarrhea.     IMPRESSION;  Colitis with right gluteus abscess  Infectious vs inflammatory colitis  6/25 BCx NG  6/26 Stool PCR NG  6/25 CT A/P  Diffuse wall thickening of the descending colon, sigmoid colon, rectum, and anus with extensive surrounding soft tissue inflammation of the anus.  5.3 cm subcutaneous fluid collection lateral to the right gluteus described above, which may represent an abscess.  Colorectal Sx : conservative management  GI : possible colonoscopy this week    RECOMMENDATIONS;  F/u with GI   Rocephin 2 gm iv q24h  Flagyl 500 mg iv q8h

## 2021-06-28 LAB
-  AMIKACIN: SIGNIFICANT CHANGE UP
-  AMOXICILLIN/CLAVULANIC ACID: SIGNIFICANT CHANGE UP
-  AMPICILLIN/SULBACTAM: SIGNIFICANT CHANGE UP
-  AMPICILLIN: SIGNIFICANT CHANGE UP
-  AZTREONAM: SIGNIFICANT CHANGE UP
-  CEFAZOLIN: SIGNIFICANT CHANGE UP
-  CEFEPIME: SIGNIFICANT CHANGE UP
-  CEFOTAXIME: SIGNIFICANT CHANGE UP
-  CEFOXITIN: SIGNIFICANT CHANGE UP
-  CEFTAZIDIME: SIGNIFICANT CHANGE UP
-  CEFTRIAXONE: SIGNIFICANT CHANGE UP
-  CEFUROXIME: SIGNIFICANT CHANGE UP
-  CIPROFLOXACIN: SIGNIFICANT CHANGE UP
-  ERTAPENEM: SIGNIFICANT CHANGE UP
-  GENTAMICIN: SIGNIFICANT CHANGE UP
-  IMIPENEM: SIGNIFICANT CHANGE UP
-  LEVOFLOXACIN: SIGNIFICANT CHANGE UP
-  MEROPENEM: SIGNIFICANT CHANGE UP
-  MINOCYCLINE: SIGNIFICANT CHANGE UP
-  NITROFURANTOIN: SIGNIFICANT CHANGE UP
-  PIPERACILLIN/TAZOBACTAM: SIGNIFICANT CHANGE UP
-  TIGECYCLINE: SIGNIFICANT CHANGE UP
-  TOBRAMYCIN: SIGNIFICANT CHANGE UP
-  TRIMETHOPRIM/SULFAMETHOXAZOLE: SIGNIFICANT CHANGE UP
CULTURE RESULTS: SIGNIFICANT CHANGE UP
METHOD TYPE: SIGNIFICANT CHANGE UP
ORGANISM # SPEC MICROSCOPIC CNT: SIGNIFICANT CHANGE UP
SPECIMEN SOURCE: SIGNIFICANT CHANGE UP

## 2021-06-28 PROCEDURE — 99233 SBSQ HOSP IP/OBS HIGH 50: CPT

## 2021-06-28 PROCEDURE — 99232 SBSQ HOSP IP/OBS MODERATE 35: CPT

## 2021-06-28 RX ORDER — TIOTROPIUM BROMIDE 18 UG/1
1 CAPSULE ORAL; RESPIRATORY (INHALATION) DAILY
Refills: 0 | Status: DISCONTINUED | OUTPATIENT
Start: 2021-06-28 | End: 2021-06-30

## 2021-06-28 RX ORDER — SOD SULF/SODIUM/NAHCO3/KCL/PEG
4000 SOLUTION, RECONSTITUTED, ORAL ORAL ONCE
Refills: 0 | Status: COMPLETED | OUTPATIENT
Start: 2021-06-28 | End: 2021-06-28

## 2021-06-28 RX ORDER — BUDESONIDE AND FORMOTEROL FUMARATE DIHYDRATE 160; 4.5 UG/1; UG/1
2 AEROSOL RESPIRATORY (INHALATION)
Refills: 0 | Status: DISCONTINUED | OUTPATIENT
Start: 2021-06-28 | End: 2021-06-30

## 2021-06-28 RX ORDER — SOD SULF/SODIUM/NAHCO3/KCL/PEG
4000 SOLUTION, RECONSTITUTED, ORAL ORAL ONCE
Refills: 0 | Status: DISCONTINUED | OUTPATIENT
Start: 2021-06-28 | End: 2021-06-28

## 2021-06-28 RX ORDER — IPRATROPIUM/ALBUTEROL SULFATE 18-103MCG
3 AEROSOL WITH ADAPTER (GRAM) INHALATION EVERY 6 HOURS
Refills: 0 | Status: DISCONTINUED | OUTPATIENT
Start: 2021-06-28 | End: 2021-06-30

## 2021-06-28 RX ORDER — ALBUTEROL 90 UG/1
1 AEROSOL, METERED ORAL EVERY 4 HOURS
Refills: 0 | Status: DISCONTINUED | OUTPATIENT
Start: 2021-06-28 | End: 2021-06-30

## 2021-06-28 RX ORDER — ACETAMINOPHEN 500 MG
650 TABLET ORAL EVERY 6 HOURS
Refills: 0 | Status: DISCONTINUED | OUTPATIENT
Start: 2021-06-28 | End: 2021-07-07

## 2021-06-28 RX ORDER — ALBUTEROL 90 UG/1
2 AEROSOL, METERED ORAL EVERY 6 HOURS
Refills: 0 | Status: DISCONTINUED | OUTPATIENT
Start: 2021-06-28 | End: 2021-07-07

## 2021-06-28 RX ADMIN — Medication 60 MILLIGRAM(S): at 17:52

## 2021-06-28 RX ADMIN — Medication 60 MILLIGRAM(S): at 10:11

## 2021-06-28 RX ADMIN — BUDESONIDE AND FORMOTEROL FUMARATE DIHYDRATE 2 PUFF(S): 160; 4.5 AEROSOL RESPIRATORY (INHALATION) at 21:38

## 2021-06-28 RX ADMIN — Medication 4000 MILLILITER(S): at 13:32

## 2021-06-28 RX ADMIN — Medication 3 MILLILITER(S): at 21:48

## 2021-06-28 RX ADMIN — CEFTRIAXONE 100 MILLIGRAM(S): 500 INJECTION, POWDER, FOR SOLUTION INTRAMUSCULAR; INTRAVENOUS at 11:48

## 2021-06-28 RX ADMIN — PANTOPRAZOLE SODIUM 40 MILLIGRAM(S): 20 TABLET, DELAYED RELEASE ORAL at 06:28

## 2021-06-28 RX ADMIN — Medication 1 GRAM(S): at 23:30

## 2021-06-28 RX ADMIN — BUDESONIDE AND FORMOTEROL FUMARATE DIHYDRATE 2 PUFF(S): 160; 4.5 AEROSOL RESPIRATORY (INHALATION) at 11:48

## 2021-06-28 RX ADMIN — ALBUTEROL 2 PUFF(S): 90 AEROSOL, METERED ORAL at 10:11

## 2021-06-28 RX ADMIN — Medication 650 MILLIGRAM(S): at 21:51

## 2021-06-28 RX ADMIN — Medication 100 MILLIGRAM(S): at 13:59

## 2021-06-28 RX ADMIN — Medication 100 MILLIGRAM(S): at 21:48

## 2021-06-28 RX ADMIN — PANTOPRAZOLE SODIUM 40 MILLIGRAM(S): 20 TABLET, DELAYED RELEASE ORAL at 17:52

## 2021-06-28 RX ADMIN — Medication 100 MILLIGRAM(S): at 06:28

## 2021-06-28 RX ADMIN — Medication 1 GRAM(S): at 17:52

## 2021-06-28 RX ADMIN — Medication 1 GRAM(S): at 11:48

## 2021-06-28 RX ADMIN — Medication 20 MILLIGRAM(S): at 16:09

## 2021-06-28 NOTE — PROGRESS NOTE ADULT - ASSESSMENT
1 yr F COPD O2, current smoker, ETOH abuse and h/o gastric bypass presented due to bloody stool and change in bowel habits.      # Hematochezia:  - Pancolitis in CT scan with louise-rectal abscess and fistulization  - s/p I&D of perirectal abscess by surgery on 6/26  - Hgb: 6.4 ( no baseline) s/p 2UPRBC--> 8.1--> 8.3  - patient had only 40% of the prep and still have solid stool  -patient is wheezing bilaterally and have SOB  - VS stable  - Never had colonoscopy  - No previous h/o IBD  - C. diff, GI PCR--> neg    Rec:  - IV abx ( ID on board)  - Fecal calprotectin and lactoferrin  - Active type and screen  - Keep hgb>8  - Monitor CBC  - EGD/colonoscopy cancelled today due to inadequate prep and COPD exacerbation  - surgery follow up  -clear liquid diet  - plan for EGD/colonoscopy after medical optimization      #PSH gastric bypass:  -active smoker  -high risk for marginal ulcer which could also be contributing to her anemia       Rec:  -PPI BID  -Carafate liquid QID  - EGD after medical optimization  - stop smoking

## 2021-06-28 NOTE — PROGRESS NOTE ADULT - SUBJECTIVE AND OBJECTIVE BOX
WEGENER, LOIS  70y, Female  Allergy: No Known Allergies    Hospital Day: 3d    Patient seen and examined earlier today. No acute events overnight.    PMH/PSH:  PAST MEDICAL & SURGICAL HISTORY:    VITALS:  T(F): 98.3 (06-28-21 @ 08:35), Max: 98.3 (06-28-21 @ 08:35)  HR: 72 (06-28-21 @ 08:35)  BP: 102/62 (06-28-21 @ 08:35) (98/58 - 109/59)  RR: 20 (06-28-21 @ 08:35)  SpO2: 100% (06-28-21 @ 08:35)    TESTS & MEASUREMENTS:  Weight (Kg): 74.8 (06-26-21 @ 16:53)      06-26-21 @ 07:01  -  06-27-21 @ 07:00  --------------------------------------------------------  IN: 710 mL / OUT: 500 mL / NET: 210 mL    06-27-21 @ 07:01  -  06-28-21 @ 07:00  --------------------------------------------------------  IN: 0 mL / OUT: 1300 mL / NET: -1300 mL                        8.3    7.02  )-----------( 341      ( 27 Jun 2021 17:19 )             27.7     PT/INR - ( 27 Jun 2021 17:19 )   PT: 15.10 sec;   INR: 1.31 ratio           06-27    137  |  101  |  6<L>  ----------------------------<  91  4.6   |  30  |  0.6<L>    Ca    8.2<L>      27 Jun 2021 17:19    Culture - Abscess with Gram Stain (collected 06-26-21 @ 13:43)  Source: .Abscess perirectal abscess  Preliminary Report (06-27-21 @ 21:59):    Rare Escherichia coli    Culture - Abscess with Gram Stain (collected 06-26-21 @ 12:14)  Source: .Abscess louise rectal  Preliminary Report (06-27-21 @ 22:09):    Few Escherichia coli    GI PCR Panel, Stool (collected 06-26-21 @ 08:46)  Source: .Stool Feces  Final Report (06-26-21 @ 21:07):    GI PCR Results: NOT detected    *******Please Note:*******    GI panel PCR evaluates for:    Campylobacter, Plesiomonas shigelloides, Salmonella,    Vibrio, Yersinia enterocolitica, Enteroaggregative    Escherichia coli (EAEC), Enteropathogenic E.coli (EPEC),    Enterotoxigenic E. coli (ETEC) lt/st, Shiga-like    toxin-producing E. coli (STEC) stx1/stx2,    Shigella/ Enteroinvasive E. coli (EIEC), Cryptosporidium,    Cyclospora cayetanensis, Entamoeba histolytica,    Giardia lamblia, Adenovirus F 40/41, Astrovirus,    Norovirus GI/GII, Rotavirus A, Sapovirus    Culture - Urine (collected 06-25-21 @ 15:50)  Source: .Urine Clean Catch (Midstream)  Final Report (06-28-21 @ 10:49):    >100,000 CFU/ml Escherichia coli  Organism: Escherichia coli (06-28-21 @ 10:49)  Organism: Escherichia coli (06-28-21 @ 10:49)      -  Amikacin: S <=16      -  Amoxicillin/Clavulanic Acid: S <=8/4      -  Ampicillin: S <=8 These ampicillin results predict results for amoxicillin      -  Ampicillin/Sulbactam: S <=4/2 Enterobacter, Citrobacter, and Serratia may develop resistance during prolonged therapy (3-4 days)      -  Aztreonam: S <=4      -  Cefazolin: S <=2 (MIC_CL_[id number]) For uncomplicated UTI with K. pneumoniae, E. coli, or P. mirablis: GHISLAINE <=16 is sensitive and GHISLAINE >=32 is resistant. This also predicts results for oral agents cefaclor, cefdinir, cefpodoxime, cefprozil, cefuroxime axetil, cephalexin and locarbef for uncomplicated UTI. Note that some isolates may be susceptible to these agents while testing resistant to cefazolin.      -  Cefepime: S <=2      -  Cefotaxime: S <=2      -  Cefoxitin: S <=8      -  Ceftazidime: S <=1      -  Ceftriaxone: S <=1 Enterobacter, Citrobacter, and Serratia may develop resistance during prolonged therapy      -  Cefuroxime: S <=4      -  Ciprofloxacin: S <=0.25      -  Ertapenem: S <=0.5      -  Gentamicin: S <=2      -  Imipenem: S <=1      -  Levofloxacin: S <=0.5      -  Meropenem: S <=1      -  Minocycline: S <=4      -  Nitrofurantoin: S <=32 Should not be used to treat pyelonephritis      -  Piperacillin/Tazobactam: S <=8      -  Tigecycline: S <=2      -  Tobramycin: S <=2      -  Trimethoprim/Sulfamethoxazole: S <=0.5/9.5      Method Type: GHISLAINE    Culture - Blood (collected 06-25-21 @ 15:00)  Source: .Blood Blood  Preliminary Report (06-27-21 @ 01:01):    No growth to date.    Culture - Blood (collected 06-25-21 @ 15:00)  Source: .Blood Blood  Preliminary Report (06-27-21 @ 01:01):    No growth to date.    RECENT DIAGNOSTIC ORDERS:  Diet, NPO after Midnight:      NPO Start Date: 28-Jun-2021,   NPO Start Time: 23:59 (06-28-21 @ 11:26)  ABO Rh Confirmatory Specimen: AM  Addl Info: Conditional: ABO Rh Confirmatory Specimen (06-28-21 @ 11:08)  Type + Screen: AM  Sched. Collection:29-Jun-2021 04:30 (06-28-21 @ 11:08)  Magnesium, Serum: AM Sched. Collection: 29-Jun-2021 04:30 (06-28-21 @ 10:26)  Comprehensive Metabolic Panel: AM Sched. Collection: 29-Jun-2021 04:30 (06-28-21 @ 10:26)  Complete Blood Count + Automated Diff: AM Sched. Collection: 29-Jun-2021 04:30 (06-28-21 @ 10:26)    MEDICATIONS:  MEDICATIONS  (STANDING):  ALBUTerol    90 MICROgram(s) HFA Inhaler 1 Puff(s) Inhalation every 4 hours  albuterol/ipratropium for Nebulization 3 milliLiter(s) Nebulizer every 6 hours  budesonide 160 MICROgram(s)/formoterol 4.5 MICROgram(s) Inhaler 2 Puff(s) Inhalation two times a day  cefTRIAXone   IVPB 2000 milliGRAM(s) IV Intermittent every 24 hours  metroNIDAZOLE  IVPB      metroNIDAZOLE  IVPB 500 milliGRAM(s) IV Intermittent every 8 hours  pantoprazole  Injectable 40 milliGRAM(s) IV Push two times a day  sucralfate 1 Gram(s) Oral every 6 hours  tiotropium 18 MICROgram(s) Capsule 1 Capsule(s) Inhalation daily    MEDICATIONS  (PRN):  ALBUTerol    90 MICROgram(s) HFA Inhaler 2 Puff(s) Inhalation every 6 hours PRN Shortness of Breath and/or Wheezing      HOME MEDICATIONS:      REVIEW OF SYSTEMS:  All other review of systems is negative unless indicated above.     PHYSICAL EXAM:  GENERAL: NAD  HEENT: No Swelling  CHEST/LUNG: Good air entry, No wheezing  HEART: RRR, No murmurs  ABDOMEN: Soft, Bowel sounds present  EXTREMITIES:  No clubbing

## 2021-06-28 NOTE — PROGRESS NOTE ADULT - SUBJECTIVE AND OBJECTIVE BOX
GENERAL SURGERY PROGRESS NOTE    Patient: WEGENER, LOIS , 70y ([birth year])Female   MRN: [id number]  Location: [address]  Visit: 06-25-21 Inpatient  Date: 06-28-21 @ 07:47    Hospital Day #:4      Procedure/Dx/Injuries: s/p bedside I&D perirectal abscess    Events of past 24 hours:  PAST MEDICAL & SURGICAL HISTORY:    Vitals:   T(F): 97.8 (06-28-21 @ 05:32), Max: 98 (06-27-21 @ 17:46)  HR: 72 (06-28-21 @ 05:32)  BP: 104/56 (06-28-21 @ 05:32)  RR: 19 (06-28-21 @ 05:32)  SpO2: 100% (06-28-21 @ 00:15)    Diet, NPO after Midnight:      NPO Start Date: 27-Jun-2021,   NPO Start Time: 23:59  Diet, Clear Liquid      Fluids:   I & O's:    06-27-21 @ 07:01  -  06-28-21 @ 07:00  --------------------------------------------------------  IN:  Total IN: 0 mL    OUT:    Voided (mL): 1300 mL  Total OUT: 1300 mL    Total NET: -1300 mL    Bowel Movement: : [X] YES [] NO  Flatus: : [X] YES [] NO    PHYSICAL EXAM:  General: NAD, AAOx3, calm and cooperative  HEENT: NCAT, SYL, EOMI, Trachea ML, Neck supple  Cardiac: RRR S1, S2, no Murmurs, rubs or gallops  Respiratory: CTAB, normal respiratory effort, breath sounds equal BL, no wheeze, rhonchi or crackles  Abdomen: Soft, non-distended, non-tender, no rebound, no guarding. +BS.  Rectal: Good tone, +stool, no blood, external hemorrhoids, perirectal abscess s/p I&D, no oozing or bleeding   Musculoskeletal: Strength 5/5 BL UE/LE, ROM intact, compartments soft  Neuro: Sensation grossly intact and equal throughout, no focal deficits  Vascular: Pulses 2+ throughout, extremities well perfused  Skin: Warm/dry, normal color, no jaundice    MEDICATIONS  (STANDING):  cefTRIAXone   IVPB 2000 milliGRAM(s) IV Intermittent every 24 hours  metroNIDAZOLE  IVPB      metroNIDAZOLE  IVPB 500 milliGRAM(s) IV Intermittent every 8 hours  pantoprazole  Injectable 40 milliGRAM(s) IV Push two times a day  sucralfate 1 Gram(s) Oral every 6 hours    MEDICATIONS  (PRN):      DVT PROPHYLAXIS:   GI PROPHYLAXIS: pantoprazole  Injectable 40 milliGRAM(s) IV Push two times a day    ANTICOAGULATION:   ANTIBIOTICS:  cefTRIAXone   IVPB 2000 milliGRAM(s)  metroNIDAZOLE  IVPB    metroNIDAZOLE  IVPB 500 milliGRAM(s)      LAB/STUDIES:  Labs:  CAPILLARY BLOOD GLUCOSE               8.3    7.02  )-----------( 341      ( 27 Jun 2021 17:19 )             27.7         06-27    137  |  101  |  6<L>  ----------------------------<  91  4.6   |  30  |  0.6<L>      Calcium, Total Serum: 8.2 mg/dL (06-27-21 @ 17:19)      LFTs:     Lactate, Blood: 1.6 mmol/L (06-25-21 @ 15:10)  Blood Gas Venous - Lactate: 1.6 mmoL/L (06-25-21 @ 14:15)      Coags:     15.10  ----< 1.31    ( 27 Jun 2021 17:19 )     x           Serum Pro-Brain Natriuretic Peptide: 1509 pg/mL (06-25-21 @ 15:10)      Culture - Abscess with Gram Stain (collected 26 Jun 2021 13:43)  Source: .Abscess perirectal abscess  Preliminary Report (27 Jun 2021 21:59):    Rare Escherichia coli    Culture - Abscess with Gram Stain (collected 26 Jun 2021 12:14)  Source: .Abscess louise rectal  Preliminary Report (27 Jun 2021 22:09):    Few Escherichia coli    GI PCR Panel, Stool (collected 26 Jun 2021 08:46)  Source: .Stool Feces  Final Report (26 Jun 2021 21:07):    GI PCR Results: NOT detected    *******Please Note:*******    GI panel PCR evaluates for:    Campylobacter, Plesiomonas shigelloides, Salmonella,    Vibrio, Yersinia enterocolitica, Enteroaggregative    Escherichia coli (EAEC), Enteropathogenic E.coli (EPEC),    Enterotoxigenic E. coli (ETEC) lt/st, Shiga-like    toxin-producing E. coli (STEC) stx1/stx2,    Shigella/ Enteroinvasive E. coli (EIEC), Cryptosporidium,    Cyclospora cayetanensis, Entamoeba histolytica,    Giardia lamblia, Adenovirus F 40/41, Astrovirus,    Norovirus GI/GII, Rotavirus A, Sapovirus    Culture - Urine (collected 25 Jun 2021 15:50)  Source: .Urine Clean Catch (Midstream)  Preliminary Report (27 Jun 2021 12:45):    >100,000 CFU/ml Escherichia coli    Culture - Blood (collected 25 Jun 2021 15:00)  Source: .Blood Blood  Preliminary Report (27 Jun 2021 01:01):    No growth to date.    Culture - Blood (collected 25 Jun 2021 15:00)  Source: .Blood Blood  Preliminary Report (27 Jun 2021 01:01):    No growth to date.    IMAGING:  < from: CT Abdomen and Pelvis w/ Oral Cont and w/ IV Cont (06.25.21 @ 18:38) >  Diffuse wall thickening of the descending colon, sigmoid colon, rectum, and anus with extensive surrounding soft tissue inflammation of the anus. Of note, extramural air visualized posterolateral to the anus.    Small bilateral pleural effusions.    5.3 cm subcutaneous fluid collection lateral to the right gluteus described above, which may represent an abscess.    3 mm right middle lobe subpleural solid pulmonary nodule. Per Fleischner Society guidelines, an optional follow up CT in 12 months is recommended in high risk patients.    < end of copied text >      ACCESS/ DEVICES:  [ X] Peripheral IV

## 2021-06-28 NOTE — PROGRESS NOTE ADULT - ASSESSMENT
70 yr F COPD O2, current smoker, ETOH abuse and h/o gastric bypass presented due to bloody stool and change in bowel habits. Admitted for GIB, and found to have diffuse colitis c/b colonic fistulas and rectal abscess.    #GI bleed  #Sepsis, POA due to Colitis   #Gluteal abscess and fistula  CT Abdomen/pelvis: Diffuse wall thickening of the descending colon, sigmoid colon, rectum, and anus, with extensive surround tissue inflammation of the anus  s/p ID by surgery 06/26, possible plans for OR after colonoscopy  Hg has been stable s/p 2U transfusion  - Cont ceftriaxone/flagyl  - Colonoscopy planned delayed to tomorrow per GI due to inadequate prep. Will encourage to drink Golytely by tonight, NPO after midnight  -local wound care   -PPI   -type and screen and serial CBC     #Suspected persistent atrial fibrillation  - Currently rate controlled off medications  - Cont to monitor, AC to be possibly initiated after colitis and fistulas are managed    #EtOH abuse   -CIWA protocol monitoring     #COPD  Wheezing today  - Cont symbicort BID  - Methylprednisolone 40mg qD  - Duonebs ATC    DVT PPX, SCD    #Progress Note Handoff  Pending (specify): Colonoscopy  Family discussion: d/w pt regarding pending procedure  Disposition: Home

## 2021-06-28 NOTE — PROGRESS NOTE ADULT - SUBJECTIVE AND OBJECTIVE BOX
Gastroenterology progress note:     Patient is a 70y old  Female who presents with a chief complaint of abd abscess (28 Jun 2021 07:46)       Admitted on: 06-25-21    We are following the patient for: hematochezia      Interval History:  patient had only 40% of the prep and still have solid stool, patient is wheezing bilaterally and have SOB      PAST MEDICAL & SURGICAL HISTORY:      MEDICATIONS  (STANDING):  ALBUTerol    90 MICROgram(s) HFA Inhaler 1 Puff(s) Inhalation every 4 hours  albuterol/ipratropium for Nebulization 3 milliLiter(s) Nebulizer every 6 hours  budesonide 160 MICROgram(s)/formoterol 4.5 MICROgram(s) Inhaler 2 Puff(s) Inhalation two times a day  cefTRIAXone   IVPB 2000 milliGRAM(s) IV Intermittent every 24 hours  methylPREDNISolone sodium succinate Injectable 60 milliGRAM(s) IV Push daily  metroNIDAZOLE  IVPB      metroNIDAZOLE  IVPB 500 milliGRAM(s) IV Intermittent every 8 hours  pantoprazole  Injectable 40 milliGRAM(s) IV Push two times a day  sucralfate 1 Gram(s) Oral every 6 hours  tiotropium 18 MICROgram(s) Capsule 1 Capsule(s) Inhalation daily    MEDICATIONS  (PRN):  ALBUTerol    90 MICROgram(s) HFA Inhaler 2 Puff(s) Inhalation every 6 hours PRN Shortness of Breath and/or Wheezing      Allergies  No Known Allergies      Review of Systems:   Constitutional: Ne Fever, No chills, No weakness  ENT: No visual changes, No throat pain  Cardiovascular:  No Chest Pain, No Palpitations  Respiratory:  No Cough, No Dyspnea  Gastrointestinal:  As described in HPI  Neurological: No numbness or weakness  Skin: No rash, no itching    Physical Examination:  T(C): 36.8 (06-28-21 @ 08:35), Max: 36.8 (06-28-21 @ 08:35)  HR: 72 (06-28-21 @ 08:35) (64 - 72)  BP: 102/62 (06-28-21 @ 08:35) (98/58 - 109/56)  RR: 20 (06-28-21 @ 08:35) (18 - 20)  SpO2: 100% (06-28-21 @ 08:35) (98% - 100%)      06-27-21 @ 07:01  -  06-28-21 @ 07:00  --------------------------------------------------------  IN: 0 mL / OUT: 1300 mL / NET: -1300 mL      Constitutional: No acute distress.  Respiratory: mild resp distress, bilateral wheezing  Cardiovascular:  S1 S2, Regular rate and rhythm.  Abdominal: Abdomen is soft, symmetric, and non-tender without distention. There are no visible lesions. Bowel sounds are present and normoactive in all four quadrants. No masses, hepatomegaly, or splenomegaly are noted.   Skin: No rashes, No Jaundice.  Neurology: AAOX3, Non-focal  Skin: No rash, No excoriation  Vascular: No varicose vein, No cyanosis, No edema        Data: (reviewed by attending)                        8.3    7.02  )-----------( 341      ( 27 Jun 2021 17:19 )             27.7     Hgb trend:  8.3  06-27-21 @ 17:19  8.3  06-27-21 @ 09:05  8.0  06-26-21 @ 17:21  6.9  06-26-21 @ 06:24  6.4  06-25-21 @ 15:10      06-26-21 @ 07:01  -  06-27-21 @ 07:00  --------------------------------------------------------  IN: 250 mL      06-27    137  |  101  |  6<L>  ----------------------------<  91  4.6   |  30  |  0.6<L>    Ca    8.2<L>      27 Jun 2021 17:19      Liver panel trend:  TBili 1.0   /   AST 15   /   ALT 11   /   AlkP 98   /   Tptn 5.5   /   Alb 2.8    /   DBili -- 06-26  TBili 0.6   /   AST 22   /   ALT 12   /   AlkP 98   /   Tptn 5.8   /   Alb 2.8    /   DBili -- 06-25      PT/INR - ( 27 Jun 2021 17:19 )   PT: 15.10 sec;   INR: 1.31 ratio             Culture - Abscess with Gram Stain (collected 26 Jun 2021 13:43)  Source: .Abscess perirectal abscess  Preliminary Report (27 Jun 2021 21:59):    Rare Escherichia coli    Culture - Abscess with Gram Stain (collected 26 Jun 2021 12:14)  Source: .Abscess louise rectal  Preliminary Report (27 Jun 2021 22:09):    Few Escherichia coli    GI PCR Panel, Stool (collected 26 Jun 2021 08:46)  Source: .Stool Feces  Final Report (26 Jun 2021 21:07):    GI PCR Results: NOT detected    *******Please Note:*******    GI panel PCR evaluates for:    Campylobacter, Plesiomonas shigelloides, Salmonella,    Vibrio, Yersinia enterocolitica, Enteroaggregative    Escherichia coli (EAEC), Enteropathogenic E.coli (EPEC),    Enterotoxigenic E. coli (ETEC) lt/st, Shiga-like    toxin-producing E. coli (STEC) stx1/stx2,    Shigella/ Enteroinvasive E. coli (EIEC), Cryptosporidium,    Cyclospora cayetanensis, Entamoeba histolytica,    Giardia lamblia, Adenovirus F 40/41, Astrovirus,    Norovirus GI/GII, Rotavirus A, Sapovirus    Culture - Urine (collected 25 Jun 2021 15:50)  Source: .Urine Clean Catch (Midstream)  Preliminary Report (27 Jun 2021 12:45):    >100,000 CFU/ml Escherichia coli    Culture - Blood (collected 25 Jun 2021 15:00)  Source: .Blood Blood  Preliminary Report (27 Jun 2021 01:01):    No growth to date.    Culture - Blood (collected 25 Jun 2021 15:00)  Source: .Blood Blood  Preliminary Report (27 Jun 2021 01:01):    No growth to date.         Radiology: (reviewed by attending)

## 2021-06-28 NOTE — PROGRESS NOTE ADULT - ASSESSMENT
ASSESSMENT:  70y F w/ PMHx of  COPD, gastric bypass presenting with pilonidal sinus, perirectal abscess, external hemorrhoids s/p bedside I&D.    PLAN:  - EGD and colonoscopy today   -keep pre-oped for possible OR  -pain control            ASSESSMENT:  70y F w/ PMHx of  COPD, gastric bypass presenting with pilonidal sinus, perirectal abscess, external hemorrhoids s/p bedside I&D.    PLAN:  - EGD and colonoscopy today   -keep pre-oped for possible OR  -pain control       BLUE TEAM [id number]

## 2021-06-28 NOTE — PROGRESS NOTE ADULT - SUBJECTIVE AND OBJECTIVE BOX
SUBJECTIVE:    Patient is a 70y old Female who presents with a chief complaint of abd abscess (28 Jun 2021 08:50)    Currently admitted to medicine with the primary diagnosis of Colitis    Today is hospital day 3d. This morning she is resting comfortably in bed and reports mild confusion upon waking up without any signs of mental status change, vertigo or lightheadedness.    ROS:   CONSTITUTIONAL: No weakness, fevers or chills, doing well and denies pain in general  EYES/ENT: No visual changes; No vertigo or throat pain   NECK: No pain or stiffness   RESPIRATORY: No cough/shortness of breath   CARDIOVASCULAR: No chest pain/palpitations   GASTROINTESTINAL: No abdominal or epigastric pain. No nausea, vomiting, or hematemesis; No diarrhea or constipation. No melena or hematochezia.  GENITOURINARY: No dysuria, frequency or hematuria  NEUROLOGICAL: No numbness or weakness  SKIN: No itching, rashes      ALLERGIES:  No Known Allergies    MEDICATIONS:  STANDING MEDICATIONS  ALBUTerol    90 MICROgram(s) HFA Inhaler 1 Puff(s) Inhalation every 4 hours  albuterol/ipratropium for Nebulization 3 milliLiter(s) Nebulizer every 6 hours  budesonide 160 MICROgram(s)/formoterol 4.5 MICROgram(s) Inhaler 2 Puff(s) Inhalation two times a day  cefTRIAXone   IVPB 2000 milliGRAM(s) IV Intermittent every 24 hours  metroNIDAZOLE  IVPB      metroNIDAZOLE  IVPB 500 milliGRAM(s) IV Intermittent every 8 hours  pantoprazole  Injectable 40 milliGRAM(s) IV Push two times a day  sucralfate 1 Gram(s) Oral every 6 hours  tiotropium 18 MICROgram(s) Capsule 1 Capsule(s) Inhalation daily    PRN MEDICATIONS  ALBUTerol    90 MICROgram(s) HFA Inhaler 2 Puff(s) Inhalation every 6 hours PRN    VITALS:   T(F): 98.3  HR: 72  BP: 102/62  RR: 20  SpO2: 100%    LABS:  Negative for smoking/alcohol/drug use.                         8.3    7.02  )-----------( 341      ( 27 Jun 2021 17:19 )             27.7     06-27    137  |  101  |  6<L>  ----------------------------<  91  4.6   |  30  |  0.6<L>    Ca    8.2<L>      27 Jun 2021 17:19      PT/INR - ( 27 Jun 2021 17:19 )   PT: 15.10 sec;   INR: 1.31 ratio        Culture - Abscess with Gram Stain (collected 26 Jun 2021 13:43)  Source: .Abscess perirectal abscess  Preliminary Report (27 Jun 2021 21:59):    Rare Escherichia coli    Culture - Abscess with Gram Stain (collected 26 Jun 2021 12:14)  Source: .Abscess louise rectal  Preliminary Report (27 Jun 2021 22:09):    Few Escherichia coli    GI PCR Panel, Stool (collected 26 Jun 2021 08:46)  Source: .Stool Feces  Final Report (26 Jun 2021 21:07):    GI PCR Results: NOT detected    *******Please Note:*******    GI panel PCR evaluates for:    Campylobacter, Plesiomonas shigelloides, Salmonella,    Vibrio, Yersinia enterocolitica, Enteroaggregative    Escherichia coli (EAEC), Enteropathogenic E.coli (EPEC),    Enterotoxigenic E. coli (ETEC) lt/st, Shiga-like    toxin-producing E. coli (STEC) stx1/stx2,    Shigella/ Enteroinvasive E. coli (EIEC), Cryptosporidium,    Cyclospora cayetanensis, Entamoeba histolytica,    Giardia lamblia, Adenovirus F 40/41, Astrovirus,    Norovirus GI/GII, Rotavirus A, Sapovirus    Culture - Urine (collected 25 Jun 2021 15:50)  Source: .Urine Clean Catch (Midstream)  Final Report (28 Jun 2021 10:49):    >100,000 CFU/ml Escherichia coli  Organism: Escherichia coli (28 Jun 2021 10:49)  Organism: Escherichia coli (28 Jun 2021 10:49)    Culture - Blood (collected 25 Jun 2021 15:00)  Source: .Blood Blood  Preliminary Report (27 Jun 2021 01:01):    No growth to date.    Culture - Blood (collected 25 Jun 2021 15:00)  Source: .Blood Blood  Preliminary Report (27 Jun 2021 01:01):    No growth to date.      RADIOLOGY:    Diffuse wall thickening of the descending colon, sigmoid colon, rectum, and anus with extensive surrounding soft tissue inflammation of the anus. Of note, extramural air visualized posterolateral to the anus.    Small bilateral pleural effusions.    5.3 cm subcutaneous fluid collection lateral to the right gluteus described above, which may represent an abscess.    3 mm right middle lobe subpleural solid pulmonary nodule. Per Fleischner Society guidelines, an optional follow up CT in 12 months is recommended in high risk patients.    TTE: 06/26  1. Severely enlarged left atrium.  2. LV Ejection Fraction by Rivera's Method with a biplane EF of 63 %.  3. Spectral Doppler shows restrictive pattern of left ventricular myocardial filling (Grade III diastolic dysfunction).  4. Mildly enlarged right ventricle.  5. Right atrial enlargement.  6. Mild to moderate mitral valve regurgitation.  7. Mild tricuspid regurgitation.  8. Estimated pulmonary artery systolic pressure is 44.8 mmHg assuming a right atrial pressure of 15 mmHg, which is consistent with mild pulmonary hypertension.      PHYSICAL EXAM:  GEN: CCOx3  HEENT: normocephalic, atraumatic, aniceteric  LUNGS: wheezing bilaterally, no rales/ rhonchi  HEART: S1/S2 present. RRR, no murmurs  ABD: Soft, non-tender, non-distended. Bowel sounds present  EXT: NC/NC/NE/2+PP/GARCIA  NEURO: AAOX3, normal affect      ASSESSMENT AND PLAN:    A:  70 yr F COPD O2, current smoker, ETOH abuse and h/o gastric bypass presented due to bloody stool and change in bowel habits. Admitted for GI bleed and was scheduled today for colonoscopy  Patient today is awake, alert and fully responsive. She is known to have COPD and have today mild wheezing noted on physical exam.     #COPD  Wheezing today  - Cont symbicort 160 BID  - Methylprednisolone 40mg qD  - Spiriva 18  - Duonebs ATC    #GI bleed likely lower due to hematochezia  -pt currently hemodynamically stable  - s/p 2 pRBC  - monitor CBC cosely  - delay in colonoscopy due to inadequate prep and patient wheezing  -GI plans for scope tomorrow  - clear liquid diet  - Golytely 4 L start at 4pm, Dulcolax 20 mg po as per GI  - NPO after midnight  -C. diff neg,  GI PCR. not detected  -c/w PPI    #Sepsis / Colitis   Gluteal abscess and fistula  -Surgery -> s/p bedside I&D (6/26), surgery following  -ID ->continue on IV Rocephin/flagyl  -local wound care     New onset A-fib   -stable, rate controlled currently on no meds    EtOH abuse   -CIWA protocol monitoring     Tobacco abuse  -to  smoking cessation    Spectra: 2234 SUBJECTIVE:    Patient is a 70y old Female who presents with a chief complaint of abd abscess (28 Jun 2021 08:50)    Currently admitted to medicine with the primary diagnosis of Colitis    Today is hospital day 3d. This morning she is resting comfortably in bed and reports mild confusion upon waking up without any signs of mental status change, vertigo or lightheadedness.  She is known to have COPD not taking her home medications for COPD on admission and was found to have bilateral wheezing overnight and on physical exam this morning.    ROS:   CONSTITUTIONAL: No weakness, fevers or chills, doing well and denies pain in general  EYES/ENT: No visual changes; No vertigo or throat pain   NECK: No pain or stiffness   RESPIRATORY: No cough/shortness of breath   CARDIOVASCULAR: No chest pain/palpitations   GASTROINTESTINAL: No abdominal or epigastric pain. No nausea, vomiting, or hematemesis; No diarrhea or constipation. No melena or hematochezia.  GENITOURINARY: No dysuria, frequency or hematuria  NEUROLOGICAL: No numbness or weakness  SKIN: No itching, rashes      ALLERGIES:  No Known Allergies    MEDICATIONS:  STANDING MEDICATIONS  ALBUTerol    90 MICROgram(s) HFA Inhaler 1 Puff(s) Inhalation every 4 hours  albuterol/ipratropium for Nebulization 3 milliLiter(s) Nebulizer every 6 hours  budesonide 160 MICROgram(s)/formoterol 4.5 MICROgram(s) Inhaler 2 Puff(s) Inhalation two times a day  cefTRIAXone   IVPB 2000 milliGRAM(s) IV Intermittent every 24 hours  metroNIDAZOLE  IVPB      metroNIDAZOLE  IVPB 500 milliGRAM(s) IV Intermittent every 8 hours  pantoprazole  Injectable 40 milliGRAM(s) IV Push two times a day  sucralfate 1 Gram(s) Oral every 6 hours  tiotropium 18 MICROgram(s) Capsule 1 Capsule(s) Inhalation daily    PRN MEDICATIONS  ALBUTerol    90 MICROgram(s) HFA Inhaler 2 Puff(s) Inhalation every 6 hours PRN    VITALS:   T(F): 98.3  HR: 72  BP: 102/62  RR: 20  SpO2: 100%    LABS:  Negative for smoking/alcohol/drug use.                         8.3    7.02  )-----------( 341      ( 27 Jun 2021 17:19 )             27.7     06-27    137  |  101  |  6<L>  ----------------------------<  91  4.6   |  30  |  0.6<L>    Ca    8.2<L>      27 Jun 2021 17:19      PT/INR - ( 27 Jun 2021 17:19 )   PT: 15.10 sec;   INR: 1.31 ratio        Culture - Abscess with Gram Stain (collected 26 Jun 2021 13:43)  Source: .Abscess perirectal abscess  Preliminary Report (27 Jun 2021 21:59):    Rare Escherichia coli    Culture - Abscess with Gram Stain (collected 26 Jun 2021 12:14)  Source: .Abscess louise rectal  Preliminary Report (27 Jun 2021 22:09):    Few Escherichia coli    GI PCR Panel, Stool (collected 26 Jun 2021 08:46)  Source: .Stool Feces  Final Report (26 Jun 2021 21:07):    GI PCR Results: NOT detected    *******Please Note:*******    GI panel PCR evaluates for:    Campylobacter, Plesiomonas shigelloides, Salmonella,    Vibrio, Yersinia enterocolitica, Enteroaggregative    Escherichia coli (EAEC), Enteropathogenic E.coli (EPEC),    Enterotoxigenic E. coli (ETEC) lt/st, Shiga-like    toxin-producing E. coli (STEC) stx1/stx2,    Shigella/ Enteroinvasive E. coli (EIEC), Cryptosporidium,    Cyclospora cayetanensis, Entamoeba histolytica,    Giardia lamblia, Adenovirus F 40/41, Astrovirus,    Norovirus GI/GII, Rotavirus A, Sapovirus    Culture - Urine (collected 25 Jun 2021 15:50)  Source: .Urine Clean Catch (Midstream)  Final Report (28 Jun 2021 10:49):    >100,000 CFU/ml Escherichia coli  Organism: Escherichia coli (28 Jun 2021 10:49)  Organism: Escherichia coli (28 Jun 2021 10:49)    Culture - Blood (collected 25 Jun 2021 15:00)  Source: .Blood Blood  Preliminary Report (27 Jun 2021 01:01):    No growth to date.    Culture - Blood (collected 25 Jun 2021 15:00)  Source: .Blood Blood  Preliminary Report (27 Jun 2021 01:01):    No growth to date.      RADIOLOGY:    Diffuse wall thickening of the descending colon, sigmoid colon, rectum, and anus with extensive surrounding soft tissue inflammation of the anus. Of note, extramural air visualized posterolateral to the anus.    Small bilateral pleural effusions.    5.3 cm subcutaneous fluid collection lateral to the right gluteus described above, which may represent an abscess.    3 mm right middle lobe subpleural solid pulmonary nodule. Per Fleischner Society guidelines, an optional follow up CT in 12 months is recommended in high risk patients.    TTE: 06/26  1. Severely enlarged left atrium.  2. LV Ejection Fraction by Rivera's Method with a biplane EF of 63 %.  3. Spectral Doppler shows restrictive pattern of left ventricular myocardial filling (Grade III diastolic dysfunction).  4. Mildly enlarged right ventricle.  5. Right atrial enlargement.  6. Mild to moderate mitral valve regurgitation.  7. Mild tricuspid regurgitation.  8. Estimated pulmonary artery systolic pressure is 44.8 mmHg assuming a right atrial pressure of 15 mmHg, which is consistent with mild pulmonary hypertension.      PHYSICAL EXAM:  GEN: CCOx3  HEENT: normocephalic   LUNGS: wheezing bilaterally, no rales/ rhonchi  HEART: S1/S2 present. RRR, no murmurs  ABD: Soft, non-tender, non-distended. Bowel sounds present  EXT: NC/NC/NE/2+PP/GARCIA  NEURO: AAOX3, normal affect      ASSESSMENT AND PLAN:    70 yr F COPD O2, current smoker, ETOH abuse and h/o gastric bypass presented due to bloody stool and change in bowel habits. Admitted for GI bleed and was scheduled today for colonoscopy    #COPD  Wheezing today  - Cont symbicort 160 BID  - Methylprednisolone 60mg BID  - Spiriva 18  - Duonebs ATC    #GI bleed likely lower due to hematochezia  -pt currently hemodynamically stable  - s/p 2 pRBC after admission,  last 2 Hbs 8.3 (06/270  - monitor CBC cosely  - delay in colonoscopy due to inadequate prep and patient wheezing  -GI plans for scope tomorrow  - clear liquid diet  - Golytely 4 L start at 4pm, Dulcolax 20 mg po as per GI  - NPO after midnight      #admission for sepsis and Colitis   Gluteal abscess and fistula  -Surgery -> s/p bedside I&D (6/26), surgery following  -ID ->continue on IV Rocephin/flagyl  -local wound care  -C.diff neg  -repeat stool PCR (prev neg)    New onset A-fib   -stable, rate controlled currently on no meds    EtOH abuse   -CIWA protocol monitoring     Tobacco abuse  -to  smoking cessation    Spectra: 0014 SUBJECTIVE:    Patient is a 70y old Female who presents with a chief complaint of abd abscess (28 Jun 2021 08:50)    Currently admitted to medicine with the primary diagnosis of Colitis    Today is hospital day 3d. This morning she is resting comfortably in bed and reports mild confusion upon waking up without any signs of mental status change, vertigo or lightheadedness.  She is known to have COPD not taking her home medications for COPD on admission and was found to have bilateral wheezing overnight and on physical exam this morning with wet cough especially after resuming Symbicort.    ROS:   CONSTITUTIONAL: No weakness, fevers or chills, doing well and denies pain in general  EYES/ENT: No visual changes; No vertigo or throat pain   NECK: No pain or stiffness   RESPIRATORY: No cough/shortness of breath   CARDIOVASCULAR: No chest pain/palpitations   GASTROINTESTINAL: No abdominal or epigastric pain. No nausea, vomiting, or hematemesis; No diarrhea or constipation. No melena or hematochezia.  GENITOURINARY: No dysuria, frequency or hematuria  NEUROLOGICAL: No numbness or weakness  SKIN: No itching, rashes      ALLERGIES:  No Known Allergies    MEDICATIONS:  STANDING MEDICATIONS  ALBUTerol    90 MICROgram(s) HFA Inhaler 1 Puff(s) Inhalation every 4 hours  albuterol/ipratropium for Nebulization 3 milliLiter(s) Nebulizer every 6 hours  budesonide 160 MICROgram(s)/formoterol 4.5 MICROgram(s) Inhaler 2 Puff(s) Inhalation two times a day  cefTRIAXone   IVPB 2000 milliGRAM(s) IV Intermittent every 24 hours  metroNIDAZOLE  IVPB      metroNIDAZOLE  IVPB 500 milliGRAM(s) IV Intermittent every 8 hours  pantoprazole  Injectable 40 milliGRAM(s) IV Push two times a day  sucralfate 1 Gram(s) Oral every 6 hours  tiotropium 18 MICROgram(s) Capsule 1 Capsule(s) Inhalation daily    PRN MEDICATIONS  ALBUTerol    90 MICROgram(s) HFA Inhaler 2 Puff(s) Inhalation every 6 hours PRN    VITALS:   T(F): 98.3  HR: 72  BP: 102/62  RR: 20  SpO2: 100%    LABS:  Negative for smoking/alcohol/drug use.                         8.3    7.02  )-----------( 341      ( 27 Jun 2021 17:19 )             27.7     06-27    137  |  101  |  6<L>  ----------------------------<  91  4.6   |  30  |  0.6<L>    Ca    8.2<L>      27 Jun 2021 17:19      PT/INR - ( 27 Jun 2021 17:19 )   PT: 15.10 sec;   INR: 1.31 ratio        Culture - Abscess with Gram Stain (collected 26 Jun 2021 13:43)  Source: .Abscess perirectal abscess  Preliminary Report (27 Jun 2021 21:59):    Rare Escherichia coli    Culture - Abscess with Gram Stain (collected 26 Jun 2021 12:14)  Source: .Abscess louise rectal  Preliminary Report (27 Jun 2021 22:09):    Few Escherichia coli    GI PCR Panel, Stool (collected 26 Jun 2021 08:46)  Source: .Stool Feces  Final Report (26 Jun 2021 21:07):    GI PCR Results: NOT detected    *******Please Note:*******    GI panel PCR evaluates for:    Campylobacter, Plesiomonas shigelloides, Salmonella,    Vibrio, Yersinia enterocolitica, Enteroaggregative    Escherichia coli (EAEC), Enteropathogenic E.coli (EPEC),    Enterotoxigenic E. coli (ETEC) lt/st, Shiga-like    toxin-producing E. coli (STEC) stx1/stx2,    Shigella/ Enteroinvasive E. coli (EIEC), Cryptosporidium,    Cyclospora cayetanensis, Entamoeba histolytica,    Giardia lamblia, Adenovirus F 40/41, Astrovirus,    Norovirus GI/GII, Rotavirus A, Sapovirus    Culture - Urine (collected 25 Jun 2021 15:50)  Source: .Urine Clean Catch (Midstream)  Final Report (28 Jun 2021 10:49):    >100,000 CFU/ml Escherichia coli  Organism: Escherichia coli (28 Jun 2021 10:49)  Organism: Escherichia coli (28 Jun 2021 10:49)    Culture - Blood (collected 25 Jun 2021 15:00)  Source: .Blood Blood  Preliminary Report (27 Jun 2021 01:01):    No growth to date.    Culture - Blood (collected 25 Jun 2021 15:00)  Source: .Blood Blood  Preliminary Report (27 Jun 2021 01:01):    No growth to date.      RADIOLOGY:    Diffuse wall thickening of the descending colon, sigmoid colon, rectum, and anus with extensive surrounding soft tissue inflammation of the anus. Of note, extramural air visualized posterolateral to the anus.    Small bilateral pleural effusions.    5.3 cm subcutaneous fluid collection lateral to the right gluteus described above, which may represent an abscess.    3 mm right middle lobe subpleural solid pulmonary nodule. Per Fleischner Society guidelines, an optional follow up CT in 12 months is recommended in high risk patients.    TTE: 06/26  1. Severely enlarged left atrium.  2. LV Ejection Fraction by Rivera's Method with a biplane EF of 63 %.  3. Spectral Doppler shows restrictive pattern of left ventricular myocardial filling (Grade III diastolic dysfunction).  4. Mildly enlarged right ventricle.  5. Right atrial enlargement.  6. Mild to moderate mitral valve regurgitation.  7. Mild tricuspid regurgitation.  8. Estimated pulmonary artery systolic pressure is 44.8 mmHg assuming a right atrial pressure of 15 mmHg, which is consistent with mild pulmonary hypertension.      PHYSICAL EXAM:  GEN: CCOx3  HEENT: normocephalic   LUNGS: wheezing bilaterally, no rales/ rhonchi  HEART: S1/S2 present. RRR, no murmurs  ABD: Soft, non-tender, non-distended. Bowel sounds present  EXT: NC/NC/NE/2+PP/GARCIA  NEURO: AAOX3, normal affect      ASSESSMENT AND PLAN:    70 yr F COPD O2, current smoker, ETOH abuse and h/o gastric bypass presented due to bloody stool and change in bowel habits. Admitted for GI bleed and was scheduled today for colonoscopy    #COPD  Wheezing today  - Cont symbicort 160 BID  - Methylprednisolone 60mg BID  - Spiriva 18  - Duonebs ATC    #GI bleed likely lower due to hematochezia  -pt currently hemodynamically stable  - s/p 2 pRBC after admission,  last 2 Hbs 8.3 (06/270  - monitor CBC cosely  - delay in colonoscopy due to inadequate prep and patient wheezing  -GI plans for scope tomorrow  - clear liquid diet  - Golytely 4 L start at 4pm, Dulcolax 20 mg po as per GI  - NPO after midnight      #admission for sepsis and Colitis   Gluteal abscess and fistula  -Surgery -> s/p bedside I&D (6/26), surgery following  -ID ->continue on IV Rocephin/flagyl  -local wound care  -C.diff neg  -repeat stool PCR (prev neg)    New onset A-fib   -stable, rate controlled currently on no meds    EtOH abuse   -[place] protocol monitoring     Tobacco abuse  -to  smoking cessation    Spectra: 2746

## 2021-06-29 LAB
ALBUMIN SERPL ELPH-MCNC: 2.9 G/DL — LOW (ref 3.5–5.2)
ALP SERPL-CCNC: 94 U/L — SIGNIFICANT CHANGE UP (ref 30–115)
ALT FLD-CCNC: 10 U/L — SIGNIFICANT CHANGE UP (ref 0–41)
ANION GAP SERPL CALC-SCNC: 12 MMOL/L — SIGNIFICANT CHANGE UP (ref 7–14)
AST SERPL-CCNC: 17 U/L — SIGNIFICANT CHANGE UP (ref 0–41)
BASOPHILS # BLD AUTO: 0.01 K/UL — SIGNIFICANT CHANGE UP (ref 0–0.2)
BASOPHILS NFR BLD AUTO: 0.1 % — SIGNIFICANT CHANGE UP (ref 0–1)
BILIRUB SERPL-MCNC: 0.4 MG/DL — SIGNIFICANT CHANGE UP (ref 0.2–1.2)
BLD GP AB SCN SERPL QL: SIGNIFICANT CHANGE UP
BUN SERPL-MCNC: 5 MG/DL — LOW (ref 10–20)
CALCIUM SERPL-MCNC: 8.2 MG/DL — LOW (ref 8.5–10.1)
CHLORIDE SERPL-SCNC: 101 MMOL/L — SIGNIFICANT CHANGE UP (ref 98–110)
CO2 SERPL-SCNC: 25 MMOL/L — SIGNIFICANT CHANGE UP (ref 17–32)
CREAT SERPL-MCNC: 0.6 MG/DL — LOW (ref 0.7–1.5)
EOSINOPHIL # BLD AUTO: 0 K/UL — SIGNIFICANT CHANGE UP (ref 0–0.7)
EOSINOPHIL NFR BLD AUTO: 0 % — SIGNIFICANT CHANGE UP (ref 0–8)
GLUCOSE SERPL-MCNC: 103 MG/DL — HIGH (ref 70–99)
HCT VFR BLD CALC: 30.2 % — LOW (ref 37–47)
HGB BLD-MCNC: 8.8 G/DL — LOW (ref 12–16)
IMM GRANULOCYTES NFR BLD AUTO: 1.2 % — HIGH (ref 0.1–0.3)
LYMPHOCYTES # BLD AUTO: 1.19 K/UL — LOW (ref 1.2–3.4)
LYMPHOCYTES # BLD AUTO: 13.8 % — LOW (ref 20.5–51.1)
MAGNESIUM SERPL-MCNC: 1.8 MG/DL — SIGNIFICANT CHANGE UP (ref 1.8–2.4)
MCHC RBC-ENTMCNC: 24 PG — LOW (ref 27–31)
MCHC RBC-ENTMCNC: 29.1 G/DL — LOW (ref 32–37)
MCV RBC AUTO: 82.3 FL — SIGNIFICANT CHANGE UP (ref 81–99)
MONOCYTES # BLD AUTO: 0.22 K/UL — SIGNIFICANT CHANGE UP (ref 0.1–0.6)
MONOCYTES NFR BLD AUTO: 2.6 % — SIGNIFICANT CHANGE UP (ref 1.7–9.3)
NEUTROPHILS # BLD AUTO: 7.1 K/UL — HIGH (ref 1.4–6.5)
NEUTROPHILS NFR BLD AUTO: 82.3 % — HIGH (ref 42.2–75.2)
NRBC # BLD: 0 /100 WBCS — SIGNIFICANT CHANGE UP (ref 0–0)
PLATELET # BLD AUTO: 374 K/UL — SIGNIFICANT CHANGE UP (ref 130–400)
POTASSIUM SERPL-MCNC: 4.3 MMOL/L — SIGNIFICANT CHANGE UP (ref 3.5–5)
POTASSIUM SERPL-SCNC: 4.3 MMOL/L — SIGNIFICANT CHANGE UP (ref 3.5–5)
PROT SERPL-MCNC: 5.9 G/DL — LOW (ref 6–8)
RBC # BLD: 3.67 M/UL — LOW (ref 4.2–5.4)
RBC # FLD: 25.5 % — HIGH (ref 11.5–14.5)
SODIUM SERPL-SCNC: 138 MMOL/L — SIGNIFICANT CHANGE UP (ref 135–146)
WBC # BLD: 8.62 K/UL — SIGNIFICANT CHANGE UP (ref 4.8–10.8)
WBC # FLD AUTO: 8.62 K/UL — SIGNIFICANT CHANGE UP (ref 4.8–10.8)

## 2021-06-29 PROCEDURE — 93970 EXTREMITY STUDY: CPT | Mod: 26

## 2021-06-29 PROCEDURE — 99233 SBSQ HOSP IP/OBS HIGH 50: CPT

## 2021-06-29 PROCEDURE — 99222 1ST HOSP IP/OBS MODERATE 55: CPT

## 2021-06-29 RX ORDER — SIMETHICONE 80 MG/1
80 TABLET, CHEWABLE ORAL DAILY
Refills: 0 | Status: DISCONTINUED | OUTPATIENT
Start: 2021-06-29 | End: 2021-07-07

## 2021-06-29 RX ADMIN — Medication 650 MILLIGRAM(S): at 21:03

## 2021-06-29 RX ADMIN — Medication 3 MILLILITER(S): at 13:01

## 2021-06-29 RX ADMIN — Medication 60 MILLIGRAM(S): at 14:22

## 2021-06-29 RX ADMIN — Medication 100 MILLIGRAM(S): at 06:04

## 2021-06-29 RX ADMIN — Medication 1 GRAM(S): at 06:04

## 2021-06-29 RX ADMIN — PANTOPRAZOLE SODIUM 40 MILLIGRAM(S): 20 TABLET, DELAYED RELEASE ORAL at 06:04

## 2021-06-29 RX ADMIN — BUDESONIDE AND FORMOTEROL FUMARATE DIHYDRATE 2 PUFF(S): 160; 4.5 AEROSOL RESPIRATORY (INHALATION) at 21:03

## 2021-06-29 RX ADMIN — Medication 1 GRAM(S): at 12:14

## 2021-06-29 RX ADMIN — Medication 3 MILLILITER(S): at 20:13

## 2021-06-29 RX ADMIN — Medication 60 MILLIGRAM(S): at 06:04

## 2021-06-29 RX ADMIN — CEFTRIAXONE 100 MILLIGRAM(S): 500 INJECTION, POWDER, FOR SOLUTION INTRAMUSCULAR; INTRAVENOUS at 12:14

## 2021-06-29 RX ADMIN — Medication 3 MILLILITER(S): at 10:09

## 2021-06-29 RX ADMIN — Medication 60 MILLIGRAM(S): at 21:01

## 2021-06-29 RX ADMIN — Medication 100 MILLIGRAM(S): at 14:22

## 2021-06-29 RX ADMIN — Medication 1 GRAM(S): at 18:36

## 2021-06-29 RX ADMIN — Medication 100 MILLIGRAM(S): at 21:01

## 2021-06-29 RX ADMIN — Medication 650 MILLIGRAM(S): at 22:00

## 2021-06-29 RX ADMIN — PANTOPRAZOLE SODIUM 40 MILLIGRAM(S): 20 TABLET, DELAYED RELEASE ORAL at 18:36

## 2021-06-29 NOTE — CONSULT NOTE ADULT - SUBJECTIVE AND OBJECTIVE BOX
Patient is a 70y old  Female who presents with a chief complaint of abd abscess (28 Jun 2021 11:27)      HPI:  70 yr F COPD O2, current smoker, ETOH abuse and h/o gastric bypass presented due to bloody stool and change in bowel habits.    the pt reports change in bowel habits 4 weeks ago, it started with having loose watery stool sometimes mixed with fresh blood and fecal incontinence,  3 days ago she noted dark black soft BM, and since then felt constipated and passed formed stool with fresh blood,   she also reports having distended abd since few months,  she denies fever, chills, n/v, abd pain, chest pain, SOB or urinary symptoms.  she reports that her father had Hx of fistula from his GI system.     ER: fever 106F, normal wbc, lactate 1.6, hbg 6.4, , new onset Afib on EKG, rectal exam showed blood in her clothes and at least 3 perianal fistula, CT abd  showed left sided colitis and 5 cm abscess at R gluteal area,, admitted for further veal.    (26 Jun 2021 00:00)    Interval History:  Pt was noted to have worsening SOB, cough and wheezing. EGD/Colonoscopy postponed due to poor prep and suspected COPD exacerbation. Pulmonary consulted for pre op risk assessment.       PAST MEDICAL & SURGICAL HISTORY:      SOCIAL HX:   Smoking  Active smoker                       ETOH                            Other    FAMILY HISTORY:  .  No cardiovascular or pulmonary family history     REVIEW OF SYSTEMS:    All ROS are negative except per HPI     Allergies    No Known Allergies    Intolerances          PHYSICAL EXAM  Vital Signs Last 24 Hrs  T(C): 36.7 (29 Jun 2021 08:11), Max: 36.9 (28 Jun 2021 12:26)  T(F): 98 (29 Jun 2021 08:11), Max: 98.4 (28 Jun 2021 12:26)  HR: 70 (29 Jun 2021 08:11) (65 - 71)  BP: 121/67 (29 Jun 2021 08:11) (106/59 - 121/67)  BP(mean): 79 (28 Jun 2021 16:00) (79 - 79)  RR: 18 (29 Jun 2021 08:11) (17 - 18)  SpO2: 96% (29 Jun 2021 08:11) (96% - 100%)    CONSTITUTIONAL:  Well nourished.  NAD    ENT:   Airway patent,   No thrush    EYES:   Clear bilaterally,   pupils equal,   round and reactive to light.    CARDIAC:   Normal rate,   regular rhythm.    no edema      CAROTID:   normal systolic impulse  no bruits    RESPIRATORY:   No wheezing  Nnormal chest expansion  Not tachypneic,  No use of accessory muscles    GASTROINTESTINAL:  Abdomen soft,   non-tender,   no guarding,   + BS    GENITOURINARY  normal genitalia for sex  no edema    MUSCULOSKELETAL:   range of motion is not limited,  no clubbing, cyanosis    NEUROLOGICAL:   Alert and oriented   no motor deficits.    SKIN:   Skin normal color for race,   No evidence of rash.    PSYCHIATRIC:   normal mood and affect.   no apparent risk to self or others.    HEME LYMPH:   No cervical  lymphadenopathy.  no inguinal lymphadenopathy          LABS:                          8.8    8.62  )-----------( 374      ( 29 Jun 2021 07:19 )             30.2                                               06-29    138  |  101  |  5<L>  ----------------------------<  103<H>  4.3   |  25  |  0.6<L>    Ca    8.2<L>      29 Jun 2021 07:19  Mg     1.8     06-29    TPro  5.9<L>  /  Alb  2.9<L>  /  TBili  0.4  /  DBili  x   /  AST  17  /  ALT  10  /  AlkPhos  94  06-29      PT/INR - ( 27 Jun 2021 17:19 )   PT: 15.10 sec;   INR: 1.31 ratio                                                                                              LIVER FUNCTIONS - ( 29 Jun 2021 07:19 )  Alb: 2.9 g/dL / Pro: 5.9 g/dL / ALK PHOS: 94 U/L / ALT: 10 U/L / AST: 17 U/L / GGT: x                                                  Culture - Abscess with Gram Stain (collected 26 Jun 2021 13:43)  Source: .Abscess perirectal abscess  Final Report (28 Jun 2021 17:44):    Rare Escherichia coli    Few Bacteroides thetaiotamcron group "Susceptibilities not performed"    Moderate Most closely resembling Bifidobacterium species    "Susceptibilities not performed"  Organism: Escherichia coli (28 Jun 2021 17:44)  Organism: Escherichia coli (28 Jun 2021 17:44)    Culture - Abscess with Gram Stain (collected 26 Jun 2021 12:14)  Source: .Abscess louise rectal  Final Report (28 Jun 2021 17:45):    Few Escherichia coli    Few Bacteroides caccae "Susceptibilities not performed"  Organism: Escherichia coli (28 Jun 2021 17:45)  Organism: Escherichia coli (28 Jun 2021 17:45)                                                    MEDICATIONS  (STANDING):  ALBUTerol    90 MICROgram(s) HFA Inhaler 1 Puff(s) Inhalation every 4 hours  albuterol/ipratropium for Nebulization 3 milliLiter(s) Nebulizer every 6 hours  budesonide 160 MICROgram(s)/formoterol 4.5 MICROgram(s) Inhaler 2 Puff(s) Inhalation two times a day  cefTRIAXone   IVPB 2000 milliGRAM(s) IV Intermittent every 24 hours  methylPREDNISolone sodium succinate Injectable 60 milliGRAM(s) IV Push two times a day  metroNIDAZOLE  IVPB      metroNIDAZOLE  IVPB 500 milliGRAM(s) IV Intermittent every 8 hours  pantoprazole  Injectable 40 milliGRAM(s) IV Push two times a day  simethicone 80 milliGRAM(s) Chew daily  sucralfate 1 Gram(s) Oral every 6 hours  tiotropium 18 MICROgram(s) Capsule 1 Capsule(s) Inhalation daily    MEDICATIONS  (PRN):  acetaminophen   Tablet .. 650 milliGRAM(s) Oral every 6 hours PRN Temp greater or equal to 38C (100.4F), Mild Pain (1 - 3), Moderate Pain (4 - 6)  ALBUTerol    90 MICROgram(s) HFA Inhaler 2 Puff(s) Inhalation every 6 hours PRN Shortness of Breath and/or Wheezing      X-Rays reviewed:    CXR interpreted by me:  < from: TTE Echo Complete w/o Contrast w/ Doppler (06.27.21 @ 11:15) >  Summary:   1. Severely enlarged left atrium.   2. LV Ejection Fraction by Rivera's Method with a biplane EF of 63 %.   3. Spectral Doppler shows restrictive pattern of left ventricular myocardial filling (Grade III diastolic dysfunction).   4. Mildly enlarged right ventricle.   5. Right atrial enlargement.   6. Mild to moderate mitral valve regurgitation.   7. Mild tricuspid regurgitation.   8. Estimated pulmonary artery systolic pressure is 44.8 mmHg assuming a right atrial pressure of 15 mmHg, which is consistent with mild pulmonary hypertension.    < end of copied text >         Patient is a 70y old  Female who presents with a chief complaint of abd abscess (28 Jun 2021 11:27)      HPI:  70 yr F COPD O2, current smoker, ETOH abuse and h/o gastric bypass presented due to bloody stool and change in bowel habits.    the pt reports change in bowel habits 4 weeks ago, it started with having loose watery stool sometimes mixed with fresh blood and fecal incontinence,  3 days ago she noted dark black soft BM, and since then felt constipated and passed formed stool with fresh blood,   she also reports having distended abd since few months,  she denies fever, chills, n/v, abd pain, chest pain, SOB or urinary symptoms.  she reports that her father had Hx of fistula from his GI system.     ER: fever 106F, normal wbc, lactate 1.6, hbg 6.4, , new onset Afib on EKG, rectal exam showed blood in her clothes and at least 3 perianal fistula, CT abd  showed left sided colitis and 5 cm abscess at R gluteal area,, admitted for further veal.    (26 Jun 2021 00:00)    Interval History:  Pt was noted to have worsening SOB, cough and wheezing. EGD/Colonoscopy postponed due to poor prep and suspected COPD exacerbation. Pulmonary consulted for pre op risk assessment.       PAST MEDICAL & SURGICAL HISTORY:      SOCIAL HX:   Smoking  Active smoker 3ppy x 35 years        ETOH Active                           Other    FAMILY HISTORY:  .  No cardiovascular or pulmonary family history     REVIEW OF SYSTEMS:    All ROS are negative except per HPI     Allergies    No Known Allergies    Intolerances - none    PHYSICAL EXAM  Vital Signs Last 24 Hrs  T(C): 36.7 (29 Jun 2021 08:11), Max: 36.9 (28 Jun 2021 12:26)  T(F): 98 (29 Jun 2021 08:11), Max: 98.4 (28 Jun 2021 12:26)  HR: 70 (29 Jun 2021 08:11) (65 - 71)  BP: 121/67 (29 Jun 2021 08:11) (106/59 - 121/67)  BP(mean): 79 (28 Jun 2021 16:00) (79 - 79)  RR: 18 (29 Jun 2021 08:11) (17 - 18)  SpO2: 96% (29 Jun 2021 08:11) (96% - 100%)    CONSTITUTIONAL:  Well nourished.  NAD    ENT:   Airway patent,   No thrush    EYES:   Clear bilaterally,   pupils equal,   round and reactive to light.    CARDIAC:   ireegular rate,   irregular rhythm.    no edema      CAROTID:   normal systolic impulse  no bruits    RESPIRATORY:   mild wheezing bilaterally  Nnormal chest expansion  Not tachypneic,  No use of accessory muscles    GASTROINTESTINAL:  Abdomen soft,   non-tender,   no guarding,   + BS    GENITOURINARY  normal genitalia for sex  no edema    MUSCULOSKELETAL:   range of motion is not limited,  no clubbing, cyanosis    NEUROLOGICAL:   Alert and oriented   no motor deficits.    SKIN:   Skin normal color for race,   No evidence of rash.    PSYCHIATRIC:   normal mood and affect.   no apparent risk to self or others.    HEME LYMPH:   No cervical  lymphadenopathy.  no inguinal lymphadenopathy          LABS:                          8.8    8.62  )-----------( 374      ( 29 Jun 2021 07:19 )             30.2                                               06-29    138  |  101  |  5<L>  ----------------------------<  103<H>  4.3   |  25  |  0.6<L>    Ca    8.2<L>      29 Jun 2021 07:19  Mg     1.8     06-29    TPro  5.9<L>  /  Alb  2.9<L>  /  TBili  0.4  /  DBili  x   /  AST  17  /  ALT  10  /  AlkPhos  94  06-29      PT/INR - ( 27 Jun 2021 17:19 )   PT: 15.10 sec;   INR: 1.31 ratio                                                                                              LIVER FUNCTIONS - ( 29 Jun 2021 07:19 )  Alb: 2.9 g/dL / Pro: 5.9 g/dL / ALK PHOS: 94 U/L / ALT: 10 U/L / AST: 17 U/L / GGT: x                                                  Culture - Abscess with Gram Stain (collected 26 Jun 2021 13:43)  Source: .Abscess perirectal abscess  Final Report (28 Jun 2021 17:44):    Rare Escherichia coli    Few Bacteroides thetaiotamcron group "Susceptibilities not performed"    Moderate Most closely resembling Bifidobacterium species    "Susceptibilities not performed"  Organism: Escherichia coli (28 Jun 2021 17:44)  Organism: Escherichia coli (28 Jun 2021 17:44)    Culture - Abscess with Gram Stain (collected 26 Jun 2021 12:14)  Source: .Abscess louise rectal  Final Report (28 Jun 2021 17:45):    Few Escherichia coli    Few Bacteroides caccae "Susceptibilities not performed"  Organism: Escherichia coli (28 Jun 2021 17:45)  Organism: Escherichia coli (28 Jun 2021 17:45)                                                    MEDICATIONS  (STANDING):  ALBUTerol    90 MICROgram(s) HFA Inhaler 1 Puff(s) Inhalation every 4 hours  albuterol/ipratropium for Nebulization 3 milliLiter(s) Nebulizer every 6 hours  budesonide 160 MICROgram(s)/formoterol 4.5 MICROgram(s) Inhaler 2 Puff(s) Inhalation two times a day  cefTRIAXone   IVPB 2000 milliGRAM(s) IV Intermittent every 24 hours  methylPREDNISolone sodium succinate Injectable 60 milliGRAM(s) IV Push two times a day  metroNIDAZOLE  IVPB      metroNIDAZOLE  IVPB 500 milliGRAM(s) IV Intermittent every 8 hours  pantoprazole  Injectable 40 milliGRAM(s) IV Push two times a day  simethicone 80 milliGRAM(s) Chew daily  sucralfate 1 Gram(s) Oral every 6 hours  tiotropium 18 MICROgram(s) Capsule 1 Capsule(s) Inhalation daily    MEDICATIONS  (PRN):  acetaminophen   Tablet .. 650 milliGRAM(s) Oral every 6 hours PRN Temp greater or equal to 38C (100.4F), Mild Pain (1 - 3), Moderate Pain (4 - 6)  ALBUTerol    90 MICROgram(s) HFA Inhaler 2 Puff(s) Inhalation every 6 hours PRN Shortness of Breath and/or Wheezing      X-Rays reviewed:    CXR interpreted by me:  < from: TTE Echo Complete w/o Contrast w/ Doppler (06.27.21 @ 11:15) >  Summary:   1. Severely enlarged left atrium.   2. LV Ejection Fraction by Rivera's Method with a biplane EF of 63 %.   3. Spectral Doppler shows restrictive pattern of left ventricular myocardial filling (Grade III diastolic dysfunction).   4. Mildly enlarged right ventricle.   5. Right atrial enlargement.   6. Mild to moderate mitral valve regurgitation.   7. Mild tricuspid regurgitation.   8. Estimated pulmonary artery systolic pressure is 44.8 mmHg assuming a right atrial pressure of 15 mmHg, which is consistent with mild pulmonary hypertension.    < end of copied text >

## 2021-06-29 NOTE — CONSULT NOTE ADULT - SUBJECTIVE AND OBJECTIVE BOX
Patient is a 70y old  Female who presents with a chief complaint of abd abscess (28 Jun 2021 11:27)    HPI:  70 year old female current smoker, ETOH abuse and h/o gastric bypass presented due to bloody stool and change in bowel habits.  the pt reports change in bowel habits 4 weeks ago, it started with having loose watery stool sometimes mixed with fresh blood and fecal incontinence,  3 days ago she noted dark black soft BM, and since then felt constipated and passed formed stool with fresh blood,   she also reports having distended abd since few months,  she denies fever, chills, n/v, abd pain, chest pain, SOB or urinary symptoms.  she reports that her father had Hx of fistula from his GI system.   ER: fever 106F, normal wbc, lactate 1.6, hbg 6.4, , new onset Afib on EKG, rectal exam showed blood in her clothes and at least 3 perianal fistula, CT abd  showed left sided colitis and 5 cm abscess at R gluteal area,, admitted for further veal.    (26 Jun 2021 00:00)    PAST MEDICAL & SURGICAL HISTORY:  HO EtOH abuse  Gastric bypass    SOCIAL HX:   smoked for 55 years, last cigarette 3 weeks ago  binge alcohol abuse, last drink 1 week ago    FAMILY HISTORY:  No known cardiovascular family history     Review Of Systems:   All ROS are negative except per HPI     Allergies  No Known Allergies  Intolerances      PHYSICAL EXAM  ICU Vital Signs Last 24 Hrs  T(C): 36.7 (29 Jun 2021 08:11), Max: 36.9 (28 Jun 2021 12:26)  T(F): 98 (29 Jun 2021 08:11), Max: 98.4 (28 Jun 2021 12:26)  HR: 70 (29 Jun 2021 08:11) (65 - 71)  BP: 121/67 (29 Jun 2021 08:11) (106/59 - 121/67)  BP(mean): 79 (28 Jun 2021 16:00) (79 - 79)  RR: 18 (29 Jun 2021 08:11) (17 - 18)  SpO2: 96% (29 Jun 2021 08:11) (96% - 100%)    CONSTITUTIONAL:  Well nourished.  NAD    ENT:   Airway patent,   Mouth with normal mucosa.   No thrush    EYES:   pupils equal,   round and reactive to light.    CARDIAC:   Normal rate,   Irregular rhythm.    Heart sounds S1, S2.   mild lower extremity edema    RESPIRATORY:   + expiratory wheezing   Normal chest expansion  No use of accessory muscles    GASTROINTESTINAL:  Abdomen soft   Non-tender,   No guarding,   + BS    MUSCULOSKELETAL:   Range of motion is not limited,  No clubbing, cyanosis    NEUROLOGICAL:  Alert and oriented   No motor or sensory deficits.  Pertinent DTRs normal    SKIN:   Skin normal color for race,   Warm and dry  No evidence of rash.  perirectal abscess    PSYCHIATRIC:   Normal mood and affect.   No apparent risk to self or others.      06-28-21 @ 07:01  -  06-29-21 @ 07:00  --------------------------------------------------------  IN:    IV PiggyBack: 50 mL    IV PiggyBack: 100 mL  Total IN: 150 mL    OUT:    Voided (mL): 1110 mL  Total OUT: 1110 mL    Total NET: -960 mL    06-29-21 @ 07:01  -  06-29-21 @ 09:05  --------------------------------------------------------  IN:    Oral Fluid: 360 mL  Total IN: 360 mL    OUT:  Total OUT: 0 mL    Total NET: 360 mL      LABS:                          8.8    8.62  )-----------( 374      ( 29 Jun 2021 07:19 )             30.2     138  |  101  |  5<L>  ----------------------------<  103<H>  4.3   |  25  |  0.6<L>    Ca    8.2<L>      29 Jun 2021 07:19  Mg     1.8     06-29    TPro  5.9<L>  /  Alb  2.9<L>  /  TBili  0.4  /  DBili  x   /  AST  17  /  ALT  10  /  AlkPhos  94  06-29  PT/INR - ( 27 Jun 2021 17:19 )   PT: 15.10 sec;   INR: 1.31 ratio      LIVER FUNCTIONS - ( 29 Jun 2021 07:19 )  Alb: 2.9 g/dL / Pro: 5.9 g/dL / ALK PHOS: 94 U/L / ALT: 10 U/L / AST: 17 U/L / GGT: x                                              Culture - Abscess with Gram Stain (collected 26 Jun 2021 13:43)  Source: .Abscess perirectal abscess  Final Report (28 Jun 2021 17:44):    Rare Escherichia coli    Few Bacteroides thetaiotamcron group "Susceptibilities not performed"    Moderate Most closely resembling Bifidobacterium species    "Susceptibilities not performed"  Organism: Escherichia coli (28 Jun 2021 17:44)  Organism: Escherichia coli (28 Jun 2021 17:44)    Culture - Abscess with Gram Stain (collected 26 Jun 2021 12:14)  Source: .Abscess louise rectal  Final Report (28 Jun 2021 17:45):    Few Escherichia coli    Few Bacteroides caccae "Susceptibilities not performed"  Organism: Escherichia coli (28 Jun 2021 17:45)  Organism: Escherichia coli (28 Jun 2021 17:45)                                                 X-Rays reviewed    CXR interpreted by me:  no acute cardiopulmonary pathology    MEDICATIONS  (STANDING):  ALBUTerol    90 MICROgram(s) HFA Inhaler 1 Puff(s) Inhalation every 4 hours  albuterol/ipratropium for Nebulization 3 milliLiter(s) Nebulizer every 6 hours  budesonide 160 MICROgram(s)/formoterol 4.5 MICROgram(s) Inhaler 2 Puff(s) Inhalation two times a day  cefTRIAXone   IVPB 2000 milliGRAM(s) IV Intermittent every 24 hours  methylPREDNISolone sodium succinate Injectable 60 milliGRAM(s) IV Push two times a day  metroNIDAZOLE  IVPB      metroNIDAZOLE  IVPB 500 milliGRAM(s) IV Intermittent every 8 hours  pantoprazole  Injectable 40 milliGRAM(s) IV Push two times a day  simethicone 80 milliGRAM(s) Chew daily  sucralfate 1 Gram(s) Oral every 6 hours  tiotropium 18 MICROgram(s) Capsule 1 Capsule(s) Inhalation daily    MEDICATIONS  (PRN):  acetaminophen   Tablet .. 650 milliGRAM(s) Oral every 6 hours PRN Temp greater or equal to 38C (100.4F), Mild Pain (1 - 3), Moderate Pain (4 - 6)  ALBUTerol    90 MICROgram(s) HFA Inhaler 2 Puff(s) Inhalation every 6 hours PRN Shortness of Breath and/or Wheezing         Patient is a 70y old  Female who presents with a chief complaint of abd abscess (28 Jun 2021 11:27)    HPI:  70 year old female current smoker, ETOH abuse and h/o gastric bypass presented due to bloody stool and change in bowel habits.  the pt reports change in bowel habits 4 weeks ago, it started with having loose watery stool sometimes mixed with fresh blood and fecal incontinence,  3 days ago she noted dark black soft BM, and since then felt constipated and passed formed stool with fresh blood,   she also reports having distended abd since few months,  she denies fever, chills, n/v, abd pain, chest pain, SOB or urinary symptoms.  she reports that her father had Hx of fistula from his GI system.   ER: fever 106F, normal wbc, lactate 1.6, hbg 6.4, , new onset Afib on EKG, rectal exam showed blood in her clothes and at least 3 perianal fistula, CT abd  showed left sided colitis and 5 cm abscess at R gluteal area,, admitted for further veal.    (26 Jun 2021 00:00)    Interval History:  Pt was noted to have worsening SOB, cough associated with increased mucus production and wheezing yesteray. EGD/Colonoscopy postponed due to poor prep and suspected COPD exacerbation. Pulmonary consulted for pre op risk assessment. Pt reports that she does not follow up with pulmonologist as out patient, was told by PMD that she might have COPD less than a year ago, takes inhalers at home. No past PFTs.        PAST MEDICAL & SURGICAL HISTORY:  HO EtOH abuse  Gastric bypass    SOCIAL HX:   smoked for 55 years, last cigarette 3 weeks ago  binge alcohol abuse, last drink 1 week ago    FAMILY HISTORY:  No known cardiovascular family history     Review Of Systems:   All ROS are negative except per HPI     Allergies  No Known Allergies  Intolerances      PHYSICAL EXAM  ICU Vital Signs Last 24 Hrs  T(C): 36.7 (29 Jun 2021 08:11), Max: 36.9 (28 Jun 2021 12:26)  T(F): 98 (29 Jun 2021 08:11), Max: 98.4 (28 Jun 2021 12:26)  HR: 70 (29 Jun 2021 08:11) (65 - 71)  BP: 121/67 (29 Jun 2021 08:11) (106/59 - 121/67)  BP(mean): 79 (28 Jun 2021 16:00) (79 - 79)  RR: 18 (29 Jun 2021 08:11) (17 - 18)  SpO2: 96% (29 Jun 2021 08:11) (96% - 100%)    CONSTITUTIONAL:  Well nourished.  NAD    ENT:   Airway patent,   Mouth with normal mucosa.   No thrush    EYES:   pupils equal,   round and reactive to light.    CARDIAC:   Normal rate,   Irregular rhythm.    Heart sounds S1, S2.   mild lower extremity edema    RESPIRATORY:   + expiratory wheezing   Normal chest expansion  No use of accessory muscles    GASTROINTESTINAL:  Abdomen soft   Non-tender,   No guarding,   + BS    MUSCULOSKELETAL:   Range of motion is not limited,  No clubbing, cyanosis    NEUROLOGICAL:  Alert and oriented   No motor or sensory deficits.  Pertinent DTRs normal    SKIN:   Skin normal color for race,   Warm and dry  No evidence of rash.  perirectal abscess    PSYCHIATRIC:   Normal mood and affect.   No apparent risk to self or others.      06-28-21 @ 07:01  -  06-29-21 @ 07:00  --------------------------------------------------------  IN:    IV PiggyBack: 50 mL    IV PiggyBack: 100 mL  Total IN: 150 mL    OUT:    Voided (mL): 1110 mL  Total OUT: 1110 mL    Total NET: -960 mL    06-29-21 @ 07:01  -  06-29-21 @ 09:05  --------------------------------------------------------  IN:    Oral Fluid: 360 mL  Total IN: 360 mL    OUT:  Total OUT: 0 mL    Total NET: 360 mL      LABS:                          8.8    8.62  )-----------( 374      ( 29 Jun 2021 07:19 )             30.2     138  |  101  |  5<L>  ----------------------------<  103<H>  4.3   |  25  |  0.6<L>    Ca    8.2<L>      29 Jun 2021 07:19  Mg     1.8     06-29    TPro  5.9<L>  /  Alb  2.9<L>  /  TBili  0.4  /  DBili  x   /  AST  17  /  ALT  10  /  AlkPhos  94  06-29  PT/INR - ( 27 Jun 2021 17:19 )   PT: 15.10 sec;   INR: 1.31 ratio      LIVER FUNCTIONS - ( 29 Jun 2021 07:19 )  Alb: 2.9 g/dL / Pro: 5.9 g/dL / ALK PHOS: 94 U/L / ALT: 10 U/L / AST: 17 U/L / GGT: x                                              Culture - Abscess with Gram Stain (collected 26 Jun 2021 13:43)  Source: .Abscess perirectal abscess  Final Report (28 Jun 2021 17:44):    Rare Escherichia coli    Few Bacteroides thetaiotamcron group "Susceptibilities not performed"    Moderate Most closely resembling Bifidobacterium species    "Susceptibilities not performed"  Organism: Escherichia coli (28 Jun 2021 17:44)  Organism: Escherichia coli (28 Jun 2021 17:44)    Culture - Abscess with Gram Stain (collected 26 Jun 2021 12:14)  Source: .Abscess louise rectal  Final Report (28 Jun 2021 17:45):    Few Escherichia coli    Few Bacteroides caccae "Susceptibilities not performed"  Organism: Escherichia coli (28 Jun 2021 17:45)  Organism: Escherichia coli (28 Jun 2021 17:45)                                                 X-Rays reviewed    CXR interpreted by me:  no acute cardiopulmonary pathology    MEDICATIONS  (STANDING):  ALBUTerol    90 MICROgram(s) HFA Inhaler 1 Puff(s) Inhalation every 4 hours  albuterol/ipratropium for Nebulization 3 milliLiter(s) Nebulizer every 6 hours  budesonide 160 MICROgram(s)/formoterol 4.5 MICROgram(s) Inhaler 2 Puff(s) Inhalation two times a day  cefTRIAXone   IVPB 2000 milliGRAM(s) IV Intermittent every 24 hours  methylPREDNISolone sodium succinate Injectable 60 milliGRAM(s) IV Push two times a day  metroNIDAZOLE  IVPB      metroNIDAZOLE  IVPB 500 milliGRAM(s) IV Intermittent every 8 hours  pantoprazole  Injectable 40 milliGRAM(s) IV Push two times a day  simethicone 80 milliGRAM(s) Chew daily  sucralfate 1 Gram(s) Oral every 6 hours  tiotropium 18 MICROgram(s) Capsule 1 Capsule(s) Inhalation daily    MEDICATIONS  (PRN):  acetaminophen   Tablet .. 650 milliGRAM(s) Oral every 6 hours PRN Temp greater or equal to 38C (100.4F), Mild Pain (1 - 3), Moderate Pain (4 - 6)  ALBUTerol    90 MICROgram(s) HFA Inhaler 2 Puff(s) Inhalation every 6 hours PRN Shortness of Breath and/or Wheezing         Patient is a 70y old  Female who presents with a chief complaint of abd abscess (28 Jun 2021 11:27)    HPI:  70 year old female current smoker, ETOH abuse and h/o gastric bypass presented due to bloody stool and change in bowel habits.  the pt reports change in bowel habits 4 weeks ago, it started with having loose watery stool sometimes mixed with fresh blood and fecal incontinence,  3 days ago she noted dark black soft BM, and since then felt constipated and passed formed stool with fresh blood,   she also reports having distended abd since few months,  Patient reports episode of fever and chills at home.  Denies n/v, abd pain, chest pain, SOB or urinary symptoms.  she reports that her father had Hx of fistula from his GI system.   ER: fever 106F, normal wbc, lactate 1.6, hbg 6.4, , new onset Afib on EKG, rectal exam showed blood in her clothes and at least 3 perianal fistula, CT abd  showed left sided colitis and 5 cm abscess at R gluteal area,, admitted for further veal.    (26 Jun 2021 00:00)    Interval History:  Pt was noted to have worsening SOB, cough associated with increased mucus production and wheezing yesteray. EGD/Colonoscopy postponed due to poor prep and suspected COPD exacerbation. Pulmonary consulted for pre op risk assessment. Pt reports that she does not follow up with pulmonologist as out patient, was told by PMD that she might have COPD less than a year ago, takes inhalers at home. No past PFTs.      PAST MEDICAL & SURGICAL HISTORY:  HO EtOH abuse  Gastric bypass    SOCIAL HX:   smoked for 55 years, last cigarette 3 weeks ago  binge alcohol abuse, last drink 1 week ago    FAMILY HISTORY:  No known cardiovascular family history     Review Of Systems:   All ROS are negative except per HPI     Allergies  No Known Allergies  Intolerances      PHYSICAL EXAM  ICU Vital Signs Last 24 Hrs  T(C): 36.7 (29 Jun 2021 08:11), Max: 36.9 (28 Jun 2021 12:26)  T(F): 98 (29 Jun 2021 08:11), Max: 98.4 (28 Jun 2021 12:26)  HR: 70 (29 Jun 2021 08:11) (65 - 71)  BP: 121/67 (29 Jun 2021 08:11) (106/59 - 121/67)  BP(mean): 79 (28 Jun 2021 16:00) (79 - 79)  RR: 18 (29 Jun 2021 08:11) (17 - 18)  SpO2: 96% (29 Jun 2021 08:11) (96% - 100%)    CONSTITUTIONAL:  Well nourished.  NAD    ENT:   Airway patent,   Mouth with normal mucosa.   No thrush    EYES:   pupils equal,   round and reactive to light.    CARDIAC:   Normal rate,   Irregular rhythm.    Heart sounds S1, S2.   mild lower extremity edema    RESPIRATORY:   + expiratory wheezing   Normal chest expansion  No use of accessory muscles    GASTROINTESTINAL:  Abdomen soft   Non-tender,   No guarding,   + BS    MUSCULOSKELETAL:   Range of motion is not limited,  No clubbing, cyanosis    NEUROLOGICAL:  Alert and oriented   No motor or sensory deficits.  Pertinent DTRs normal    SKIN:   Skin normal color for race,   Warm and dry  No evidence of rash.  perirectal abscess    PSYCHIATRIC:   Normal mood and affect.   No apparent risk to self or others.      06-28-21 @ 07:01  -  06-29-21 @ 07:00  --------------------------------------------------------  IN:    IV PiggyBack: 50 mL    IV PiggyBack: 100 mL  Total IN: 150 mL    OUT:    Voided (mL): 1110 mL  Total OUT: 1110 mL    Total NET: -960 mL    06-29-21 @ 07:01  -  06-29-21 @ 09:05  --------------------------------------------------------  IN:    Oral Fluid: 360 mL  Total IN: 360 mL    OUT:  Total OUT: 0 mL    Total NET: 360 mL      LABS:                          8.8    8.62  )-----------( 374      ( 29 Jun 2021 07:19 )             30.2     138  |  101  |  5<L>  ----------------------------<  103<H>  4.3   |  25  |  0.6<L>    Ca    8.2<L>      29 Jun 2021 07:19  Mg     1.8     06-29    TPro  5.9<L>  /  Alb  2.9<L>  /  TBili  0.4  /  DBili  x   /  AST  17  /  ALT  10  /  AlkPhos  94  06-29  PT/INR - ( 27 Jun 2021 17:19 )   PT: 15.10 sec;   INR: 1.31 ratio      LIVER FUNCTIONS - ( 29 Jun 2021 07:19 )  Alb: 2.9 g/dL / Pro: 5.9 g/dL / ALK PHOS: 94 U/L / ALT: 10 U/L / AST: 17 U/L / GGT: x                                              Culture - Abscess with Gram Stain (collected 26 Jun 2021 13:43)  Source: .Abscess perirectal abscess  Final Report (28 Jun 2021 17:44):    Rare Escherichia coli    Few Bacteroides thetaiotamcron group "Susceptibilities not performed"    Moderate Most closely resembling Bifidobacterium species    "Susceptibilities not performed"  Organism: Escherichia coli (28 Jun 2021 17:44)  Organism: Escherichia coli (28 Jun 2021 17:44)    Culture - Abscess with Gram Stain (collected 26 Jun 2021 12:14)  Source: .Abscess louise rectal  Final Report (28 Jun 2021 17:45):    Few Escherichia coli    Few Bacteroides caccae "Susceptibilities not performed"  Organism: Escherichia coli (28 Jun 2021 17:45)  Organism: Escherichia coli (28 Jun 2021 17:45)                                                 X-Rays reviewed    CXR interpreted by me:  no acute cardiopulmonary pathology    MEDICATIONS  (STANDING):  ALBUTerol    90 MICROgram(s) HFA Inhaler 1 Puff(s) Inhalation every 4 hours  albuterol/ipratropium for Nebulization 3 milliLiter(s) Nebulizer every 6 hours  budesonide 160 MICROgram(s)/formoterol 4.5 MICROgram(s) Inhaler 2 Puff(s) Inhalation two times a day  cefTRIAXone   IVPB 2000 milliGRAM(s) IV Intermittent every 24 hours  methylPREDNISolone sodium succinate Injectable 60 milliGRAM(s) IV Push two times a day  metroNIDAZOLE  IVPB      metroNIDAZOLE  IVPB 500 milliGRAM(s) IV Intermittent every 8 hours  pantoprazole  Injectable 40 milliGRAM(s) IV Push two times a day  simethicone 80 milliGRAM(s) Chew daily  sucralfate 1 Gram(s) Oral every 6 hours  tiotropium 18 MICROgram(s) Capsule 1 Capsule(s) Inhalation daily    MEDICATIONS  (PRN):  acetaminophen   Tablet .. 650 milliGRAM(s) Oral every 6 hours PRN Temp greater or equal to 38C (100.4F), Mild Pain (1 - 3), Moderate Pain (4 - 6)  ALBUTerol    90 MICROgram(s) HFA Inhaler 2 Puff(s) Inhalation every 6 hours PRN Shortness of Breath and/or Wheezing

## 2021-06-29 NOTE — PROGRESS NOTE ADULT - ASSESSMENT
ASSESSMENT:  70y F w/ PMHx of COPD, gastric bypass presents with perirectal abscess, black stools, fever, leukocytosis. Surgery consulted and bedside I&D performed. EGD and colonoscopy scheduled for 6/28 cancelled 2/2 COPD exacerbation and poor prep.    PLAN:  - Continue prep for Cscope and EGD- GI states patient is not scheduled for scopes 6/29 at this time  - after scopes- Pt should have repeat CT A/P to determine need for possible OR  - pulm for COPD control   - surgery following     BLUE TEAM [id number]   ASSESSMENT:  70y F w/ PMHx of COPD, gastric bypass presents with perirectal abscess, black stools, fever, leukocytosis. Surgery consulted and bedside I&D performed. EGD and colonoscopy scheduled for 6/28 cancelled 2/2 COPD exacerbation and poor prep.    PLAN:  - Continue prep for Cscope and EGD- GI states patient is not scheduled for scopes 6/29 at this time  - Pt should have repeat CT A/P with PO and IV con to determine need for possible OR  - pulm for COPD control   - surgery following     BLUE TEAM [id number]

## 2021-06-29 NOTE — CONSULT NOTE ADULT - ASSESSMENT
Impression;  COPD exacerbation  Colitis r/o infectious vs inflammatory  Sepsis present on admission  HFpEF (LVEF 63%, G3DD, mild Pulm HTN)  Active smoker    Plan:  - get peak expiratory flow rate.   - CXR and echo noted  - ARISCAT score of 14  - get ABG  - c/w inhalers - Symbicort atc, albuterol hfa and duoneb prn  - c/w solumedrol  - abx as per ID     Impression;  COPD exacerbation  Colitis r/o infectious vs inflammatory  Sepsis present on admission  Perianal abscess & fistula s/p I & D  RML 3mm solid pulmonary nodule  HO HFpEF (LVEF 63%, G3DD, mild Pulm HTN)  Active smoker    Plan:  - get peak expiratory flow rate, get previous PFT records if possible   - CXR and echo noted  - ARISCAT score of 14  - get ABG  - c/w inhalers - Symbicort atc, albuterol hfa and duoneb prn  - c/w solumedrol  - abx as per ID  - NIV qhs  - PFT as out patient  - cardiac clearance      Impression;  COPD exacerbation  Colitis r/o infectious vs inflammatory  Sepsis present on admission  Perianal abscess & fistula s/p I & D  RML 3mm solid pulmonary nodule  Atrial fibrillation  HO HFpEF (LVEF 63%, G3DD, mild Pulm HTN)  Active smoker    Plan:  - get peak expiratory flow rate, get previous PFT records if possible   - CXR and echo noted  - ARISCAT score of 14   - get ABG  - c/w inhalers - Symbicort atc, albuterol hfa and duoneb prn  - c/w solumedrol  - NIV qhs  - abx as per ID  - bowel regimen as per GI  - PFT as out patient  - cardiac clearance   - rate control and diuresis as needed  - DVT ppx w/ SCD

## 2021-06-29 NOTE — PROGRESS NOTE ADULT - SUBJECTIVE AND OBJECTIVE BOX
GENERAL SURGERY PROGRESS NOTE    Patient: WEGENER, LOIS , 70y ([birth year])Female   MRN: [id number]  Location: [address]  Visit: 06-25-21 Inpatient  Date: 06-29-21 @ 09:28    Hospital Day #: 5      Procedure/Dx/Injuries: anal fistula, pilonidal sinus, perirectal abscess     Events of past 24 hours: CScope and EGD postponed 2/2 COPD exacerbation and poor prep     PAST MEDICAL & SURGICAL HISTORY:      Vitals:   T(F): 98 (06-29-21 @ 08:11), Max: 98.4 (06-28-21 @ 12:26)  HR: 70 (06-29-21 @ 08:11)  BP: 121/67 (06-29-21 @ 08:11)  RR: 18 (06-29-21 @ 08:11)  SpO2: 96% (06-29-21 @ 08:11)      Diet, Full Liquid  Diet, Clear Liquid      Fluids:     I & O's:    06-28-21 @ 07:01  -  06-29-21 @ 07:00  --------------------------------------------------------  IN:    IV PiggyBack: 50 mL    IV PiggyBack: 100 mL  Total IN: 150 mL    OUT:    Voided (mL): 1110 mL  Total OUT: 1110 mL    Total NET: -960 mL    Bowel Movement: : [X] YES [] NO  Flatus: : [X] YES [] NO    PHYSICAL EXAM:  General: NAD, AAOx3, calm and cooperative  HEENT: NCAT, SYL, EOMI, Trachea ML, Neck supple  Cardiac: RRR S1, S2, no Murmurs, rubs or gallops  Respiratory: CTAB, normal respiratory effort, breath sounds equal BL, no wheeze, rhonchi or crackles  Abdomen: Soft, non-distended, non-tender, no rebound, no guarding. +BS.  Rectal: Good tone, +stool, no blood, external hemorrhoids seen, perirectal abscess s/p I&D not actively draining, no packing   Musculoskeletal: Strength 5/5 BL UE/LE, ROM intact, compartments soft  Neuro: Sensation grossly intact and equal throughout, no focal deficits  Vascular: Pulses 2+ throughout, extremities well perfused  Skin: Warm/dry, normal color, no jaundice    MEDICATIONS  (STANDING):  ALBUTerol    90 MICROgram(s) HFA Inhaler 1 Puff(s) Inhalation every 4 hours  albuterol/ipratropium for Nebulization 3 milliLiter(s) Nebulizer every 6 hours  budesonide 160 MICROgram(s)/formoterol 4.5 MICROgram(s) Inhaler 2 Puff(s) Inhalation two times a day  cefTRIAXone   IVPB 2000 milliGRAM(s) IV Intermittent every 24 hours  methylPREDNISolone sodium succinate Injectable 60 milliGRAM(s) IV Push two times a day  metroNIDAZOLE  IVPB      metroNIDAZOLE  IVPB 500 milliGRAM(s) IV Intermittent every 8 hours  pantoprazole  Injectable 40 milliGRAM(s) IV Push two times a day  simethicone 80 milliGRAM(s) Chew daily  sucralfate 1 Gram(s) Oral every 6 hours  tiotropium 18 MICROgram(s) Capsule 1 Capsule(s) Inhalation daily    MEDICATIONS  (PRN):  acetaminophen   Tablet .. 650 milliGRAM(s) Oral every 6 hours PRN Temp greater or equal to 38C (100.4F), Mild Pain (1 - 3), Moderate Pain (4 - 6)  ALBUTerol    90 MICROgram(s) HFA Inhaler 2 Puff(s) Inhalation every 6 hours PRN Shortness of Breath and/or Wheezing      DVT PROPHYLAXIS:   GI PROPHYLAXIS: pantoprazole  Injectable 40 milliGRAM(s) IV Push two times a day    ANTICOAGULATION:   ANTIBIOTICS:  cefTRIAXone   IVPB 2000 milliGRAM(s)  metroNIDAZOLE  IVPB    metroNIDAZOLE  IVPB 500 milliGRAM(s)      LAB/STUDIES:  Labs:  CAPILLARY BLOOD GLUCOSE                          8.8    8.62  )-----------( 374      ( 29 Jun 2021 07:19 )             30.2       Auto Neutrophil %: 82.3 % (06-29-21 @ 07:19)  Auto Immature Granulocyte %: 1.2 % (06-29-21 @ 07:19)    06-29    138  |  101  |  5<L>  ----------------------------<  103<H>  4.3   |  25  |  0.6<L>      Calcium, Total Serum: 8.2 mg/dL (06-29-21 @ 07:19)      LFTs:             5.9  | 0.4  | 17       ------------------[94      ( 29 Jun 2021 07:19 )  2.9  | x    | 10          Lipase:x      Amylase:x             Coags:     15.10  ----< 1.31    ( 27 Jun 2021 17:19 )     x               Serum Pro-Brain Natriuretic Peptide: 1509 pg/mL (06-25-21 @ 15:10)      Culture - Abscess with Gram Stain (collected 26 Jun 2021 13:43)  Source: .Abscess perirectal abscess  Final Report (28 Jun 2021 17:44):    Rare Escherichia coli    Few Bacteroides thetaiotamcron group "Susceptibilities not performed"    Moderate Most closely resembling Bifidobacterium species    "Susceptibilities not performed"  Organism: Escherichia coli (28 Jun 2021 17:44)  Organism: Escherichia coli (28 Jun 2021 17:44)    Culture - Abscess with Gram Stain (collected 26 Jun 2021 12:14)  Source: .Abscess louise rectal  Final Report (28 Jun 2021 17:45):    Few Escherichia coli    Few Bacteroides caccae "Susceptibilities not performed"  Organism: Escherichia coli (28 Jun 2021 17:45)  Organism: Escherichia coli (28 Jun 2021 17:45)      ACCESS/ DEVICES:  [ X] Peripheral IV

## 2021-06-29 NOTE — PROGRESS NOTE ADULT - SUBJECTIVE AND OBJECTIVE BOX
WEGENER, LOIS  70y, Female  Allergy: No Known Allergies    Hospital Day: 4d    Patient seen and examined earlier today. No acute events overnight.    PMH/PSH:  PAST MEDICAL & SURGICAL HISTORY:      VITALS:  T(F): 98 (06-29-21 @ 08:11), Max: 98 (06-28-21 @ 16:00)  HR: 70 (06-29-21 @ 08:11)  BP: 121/67 (06-29-21 @ 08:11) (106/59 - 121/67)  RR: 18 (06-29-21 @ 08:11)  SpO2: 96% (06-29-21 @ 08:11)    TESTS & MEASUREMENTS:  Weight (Kg):       06-27-21 @ 07:01  -  06-28-21 @ 07:00  --------------------------------------------------------  IN: 0 mL / OUT: 1300 mL / NET: -1300 mL    06-28-21 @ 07:01  -  06-29-21 @ 07:00  --------------------------------------------------------  IN: 150 mL / OUT: 1110 mL / NET: -960 mL    06-29-21 @ 07:01  -  06-29-21 @ 12:48  --------------------------------------------------------  IN: 360 mL / OUT: 0 mL / NET: 360 mL                            8.8    8.62  )-----------( 374      ( 29 Jun 2021 07:19 )             30.2     PT/INR - ( 27 Jun 2021 17:19 )   PT: 15.10 sec;   INR: 1.31 ratio           06-29    138  |  101  |  5<L>  ----------------------------<  103<H>  4.3   |  25  |  0.6<L>    Ca    8.2<L>      29 Jun 2021 07:19  Mg     1.8     06-29    TPro  5.9<L>  /  Alb  2.9<L>  /  TBili  0.4  /  DBili  x   /  AST  17  /  ALT  10  /  AlkPhos  94  06-29    LIVER FUNCTIONS - ( 29 Jun 2021 07:19 )  Alb: 2.9 g/dL / Pro: 5.9 g/dL / ALK PHOS: 94 U/L / ALT: 10 U/L / AST: 17 U/L / GGT: x                 Culture - Abscess with Gram Stain (collected 06-26-21 @ 13:43)  Source: .Abscess perirectal abscess  Final Report (06-28-21 @ 17:44):    Rare Escherichia coli    Few Bacteroides thetaiotamcron group "Susceptibilities not performed"    Moderate Most closely resembling Bifidobacterium species    "Susceptibilities not performed"  Organism: Escherichia coli (06-28-21 @ 17:44)  Organism: Escherichia coli (06-28-21 @ 17:44)      -  Amikacin: S <=16      -  Amoxicillin/Clavulanic Acid: S <=8/4      -  Ampicillin: S <=8 These ampicillin results predict results for amoxicillin      -  Ampicillin/Sulbactam: S <=4/2 Enterobacter, Citrobacter, and Serratia may develop resistance during prolonged therapy (3-4 days)      -  Aztreonam: S <=4      -  Cefazolin: S <=2 Enterobacter, Citrobacter, and Serratia may develop resistance during prolonged therapy (3-4 days)      -  Cefepime: S <=2      -  Cefoxitin: S <=8      -  Ceftriaxone: S <=1 Enterobacter, Citrobacter, and Serratia may develop resistance during prolonged therapy      -  Ciprofloxacin: S <=0.25      -  Ertapenem: S <=0.5      -  Gentamicin: S <=2      -  Imipenem: S <=1      -  Levofloxacin: S <=0.5      -  Meropenem: S <=1      -  Piperacillin/Tazobactam: S <=8      -  Tobramycin: S <=2      -  Trimethoprim/Sulfamethoxazole: S <=0.5/9.5      Method Type: GHISLAINE    Culture - Abscess with Gram Stain (collected 06-26-21 @ 12:14)  Source: .Abscess louise rectal  Final Report (06-28-21 @ 17:45):    Few Escherichia coli    Few Bacteroides caccae "Susceptibilities not performed"  Organism: Escherichia coli (06-28-21 @ 17:45)  Organism: Escherichia coli (06-28-21 @ 17:45)      -  Amikacin: S <=16      -  Amoxicillin/Clavulanic Acid: S <=8/4      -  Ampicillin: S <=8 These ampicillin results predict results for amoxicillin      -  Ampicillin/Sulbactam: S <=4/2 Enterobacter, Citrobacter, and Serratia may develop resistance during prolonged therapy (3-4 days)      -  Aztreonam: S <=4      -  Cefazolin: S <=2 Enterobacter, Citrobacter, and Serratia may develop resistance during prolonged therapy (3-4 days)      -  Cefepime: S <=2      -  Cefoxitin: S <=8      -  Ceftriaxone: S <=1 Enterobacter, Citrobacter, and Serratia may develop resistance during prolonged therapy      -  Ciprofloxacin: S <=0.25      -  Ertapenem: S <=0.5      -  Gentamicin: S <=2      -  Imipenem: S <=1      -  Levofloxacin: S <=0.5      -  Meropenem: S <=1      -  Piperacillin/Tazobactam: S <=8      -  Tobramycin: S <=2      -  Trimethoprim/Sulfamethoxazole: S <=0.5/9.5      Method Type: GHISLAINE    GI PCR Panel, Stool (collected 06-26-21 @ 08:46)  Source: .Stool Feces  Final Report (06-26-21 @ 21:07):    GI PCR Results: NOT detected    *******Please Note:*******    GI panel PCR evaluates for:    Campylobacter, Plesiomonas shigelloides, Salmonella,    Vibrio, Yersinia enterocolitica, Enteroaggregative    Escherichia coli (EAEC), Enteropathogenic E.coli (EPEC),    Enterotoxigenic E. coli (ETEC) lt/st, Shiga-like    toxin-producing E. coli (STEC) stx1/stx2,    Shigella/ Enteroinvasive E. coli (EIEC), Cryptosporidium,    Cyclospora cayetanensis, Entamoeba histolytica,    Giardia lamblia, Adenovirus F 40/41, Astrovirus,    Norovirus GI/GII, Rotavirus A, Sapovirus    Culture - Urine (collected 06-25-21 @ 15:50)  Source: .Urine Clean Catch (Midstream)  Final Report (06-28-21 @ 10:49):    >100,000 CFU/ml Escherichia coli  Organism: Escherichia coli (06-28-21 @ 10:49)  Organism: Escherichia coli (06-28-21 @ 10:49)      -  Amikacin: S <=16      -  Amoxicillin/Clavulanic Acid: S <=8/4      -  Ampicillin: S <=8 These ampicillin results predict results for amoxicillin      -  Ampicillin/Sulbactam: S <=4/2 Enterobacter, Citrobacter, and Serratia may develop resistance during prolonged therapy (3-4 days)      -  Aztreonam: S <=4      -  Cefazolin: S <=2 (MIC_CL_COM_[id number]) For uncomplicated UTI with K. pneumoniae, E. coli, or P. mirablis: GHISLAINE <=16 is sensitive and GHISLAINE >=32 is resistant. This also predicts results for oral agents cefaclor, cefdinir, cefpodoxime, cefprozil, cefuroxime axetil, cephalexin and locarbef for uncomplicated UTI. Note that some isolates may be susceptible to these agents while testing resistant to cefazolin.      -  Cefepime: S <=2      -  Cefotaxime: S <=2      -  Cefoxitin: S <=8      -  Ceftazidime: S <=1      -  Ceftriaxone: S <=1 Enterobacter, Citrobacter, and Serratia may develop resistance during prolonged therapy      -  Cefuroxime: S <=4      -  Ciprofloxacin: S <=0.25      -  Ertapenem: S <=0.5      -  Gentamicin: S <=2      -  Imipenem: S <=1      -  Levofloxacin: S <=0.5      -  Meropenem: S <=1      -  Minocycline: S <=4      -  Nitrofurantoin: S <=32 Should not be used to treat pyelonephritis      -  Piperacillin/Tazobactam: S <=8      -  Tigecycline: S <=2      -  Tobramycin: S <=2      -  Trimethoprim/Sulfamethoxazole: S <=0.5/9.5      Method Type: GHISLAINE    Culture - Blood (collected 06-25-21 @ 15:00)  Source: .Blood Blood  Preliminary Report (06-27-21 @ 01:01):    No growth to date.    Culture - Blood (collected 06-25-21 @ 15:00)  Source: .Blood Blood  Preliminary Report (06-27-21 @ 01:01):    No growth to date.    RECENT DIAGNOSTIC ORDERS:  GI PCR Panel, Stool: Routine  Specimen Source: Feces (06-29-21 @ 09:09)  Diet, Full Liquid (06-28-21 @ 17:49)      MEDICATIONS:  MEDICATIONS  (STANDING):  ALBUTerol    90 MICROgram(s) HFA Inhaler 1 Puff(s) Inhalation every 4 hours  albuterol/ipratropium for Nebulization 3 milliLiter(s) Nebulizer every 6 hours  budesonide 160 MICROgram(s)/formoterol 4.5 MICROgram(s) Inhaler 2 Puff(s) Inhalation two times a day  cefTRIAXone   IVPB 2000 milliGRAM(s) IV Intermittent every 24 hours  methylPREDNISolone sodium succinate Injectable 60 milliGRAM(s) IV Push every 8 hours  metroNIDAZOLE  IVPB      metroNIDAZOLE  IVPB 500 milliGRAM(s) IV Intermittent every 8 hours  pantoprazole  Injectable 40 milliGRAM(s) IV Push two times a day  simethicone 80 milliGRAM(s) Chew daily  sucralfate 1 Gram(s) Oral every 6 hours  tiotropium 18 MICROgram(s) Capsule 1 Capsule(s) Inhalation daily    MEDICATIONS  (PRN):  acetaminophen   Tablet .. 650 milliGRAM(s) Oral every 6 hours PRN Temp greater or equal to 38C (100.4F), Mild Pain (1 - 3), Moderate Pain (4 - 6)  ALBUTerol    90 MICROgram(s) HFA Inhaler 2 Puff(s) Inhalation every 6 hours PRN Shortness of Breath and/or Wheezing    REVIEW OF SYSTEMS:  All other review of systems is negative unless indicated above.     PHYSICAL EXAM:  GENERAL: NAD  HEENT: No Swelling  CHEST/LUNG: Good air entry, No wheezing, b/l wheeze  HEART: RRR, No murmurs  ABDOMEN: Soft, Bowel sounds present  EXTREMITIES:  No clubbing

## 2021-06-29 NOTE — PROGRESS NOTE ADULT - SUBJECTIVE AND OBJECTIVE BOX
SUBJECTIVE:    Patient is a 70y old Female who presents with a chief complaint of abd abscess (29 Jun 2021 12:48)    Currently admitted to medicine with the primary diagnosis of Colitis    Today is hospital day 4d. This morning she is resting comfortably in bed and reports no new issues or overnight events.     ROS:   Patient -->  Denies headache, fatigue, shortness of breath, chest pain, abdominal pain  Positive for cough and abdominal distention.    ALLERGIES:  No Known Allergies    MEDICATIONS:  STANDING MEDICATIONS  ALBUTerol    90 MICROgram(s) HFA Inhaler 1 Puff(s) Inhalation every 4 hours  albuterol/ipratropium for Nebulization 3 milliLiter(s) Nebulizer every 6 hours  budesonide 160 MICROgram(s)/formoterol 4.5 MICROgram(s) Inhaler 2 Puff(s) Inhalation two times a day  cefTRIAXone   IVPB 2000 milliGRAM(s) IV Intermittent every 24 hours  methylPREDNISolone sodium succinate Injectable 60 milliGRAM(s) IV Push every 8 hours  metroNIDAZOLE  IVPB      metroNIDAZOLE  IVPB 500 milliGRAM(s) IV Intermittent every 8 hours  pantoprazole  Injectable 40 milliGRAM(s) IV Push two times a day  simethicone 80 milliGRAM(s) Chew daily  sucralfate 1 Gram(s) Oral every 6 hours  tiotropium 18 MICROgram(s) Capsule 1 Capsule(s) Inhalation daily    PRN MEDICATIONS  acetaminophen   Tablet .. 650 milliGRAM(s) Oral every 6 hours PRN  ALBUTerol    90 MICROgram(s) HFA Inhaler 2 Puff(s) Inhalation every 6 hours PRN    VITALS:   T(F): 98  HR: 70  BP: 121/67  RR: 18  SpO2: 96%    LABS:  Negative for smoking/alcohol/drug use.                         8.8    8.62  )-----------( 374      ( 29 Jun 2021 07:19 )             30.2     06-29    138  |  101  |  5<L>  ----------------------------<  103<H>  4.3   |  25  |  0.6<L>    Ca    8.2<L>      29 Jun 2021 07:19  Mg     1.8     06-29    TPro  5.9<L>  /  Alb  2.9<L>  /  TBili  0.4  /  DBili  x   /  AST  17  /  ALT  10  /  AlkPhos  94  06-29    PT/INR - ( 27 Jun 2021 17:19 )   PT: 15.10 sec;   INR: 1.31 ratio        RADIOLOGY:    US lower extremity --> no signs of DVT    PHYSICAL EXAM:  GEN: No acute distress  HEENT: normocephalic, atraumatic, aniceteric  LUNGS: wheezing diffuse all over and bilateral  HEART: S1/S2 present. RRR, -  ABD: Soft, non-tender, distended. Bowel sounds present  EXT: No edema/erythema  NEURO: AAOX3, normal affect and responsive      ASSESSMENT AND PLAN:    70 yr F COPD O2, current smoker, ETOH abuse and h/o gastric bypass presented due to bloody stool and change in bowel habits. Admitted for GIB, and found to have diffuse colitis c/b colonic fistulas and rectal abscess.    #GI bleed  #Sepsis, POA due to Colitis   #Gluteal abscess and fistula  CT Abdomen/pelvis: Diffuse wall thickening of the descending colon, sigmoid colon, rectum, and anus, with extensive surround tissue inflammation of the anus  s/p ID by surgery 06/26,  Hg has been stable s/p 2U transfusion  - Cont ceftriaxone/flagyl + wound care/sterilization  - Colonoscopy delayed due to COPD exacerbation  - Cardiology clearance required  - F/u on PCR stool sample    #COPD Exacerbation  Pulmonary on board for optimization prior to colonoscopy  - Symbicort BID  - Methylprednisolone increased to 60mg q8h  - Duonebs ATC and PRN    #Chronic HFpEF  - Monitor fluid status  - Cardiology clearance required for colonoscopy    #Suspected persistent atrial fibrillation  - Currently rate controlled off medications  - Monitor, AC to be possibly initiated after colitis and fistulas are managed    #EtOH abuse   -[facility] protocol monitoring     #Medication reconciliation done with pharmacy   SUBJECTIVE:    Patient is a 70y old Female who presents with a chief complaint of abd abscess (29 Jun 2021 12:48)    Currently admitted to medicine with the primary diagnosis of Colitis    Today is hospital day 4d. This morning she is resting comfortably in bed and reports no new issues or overnight events.     ROS:   Patient -->  Denies headache, fatigue, shortness of breath, chest pain, abdominal pain  Positive for cough and abdominal distention.    ALLERGIES:  No Known Allergies    MEDICATIONS:  STANDING MEDICATIONS  ALBUTerol    90 MICROgram(s) HFA Inhaler 1 Puff(s) Inhalation every 4 hours  albuterol/ipratropium for Nebulization 3 milliLiter(s) Nebulizer every 6 hours  budesonide 160 MICROgram(s)/formoterol 4.5 MICROgram(s) Inhaler 2 Puff(s) Inhalation two times a day  cefTRIAXone   IVPB 2000 milliGRAM(s) IV Intermittent every 24 hours  methylPREDNISolone sodium succinate Injectable 60 milliGRAM(s) IV Push every 8 hours  metroNIDAZOLE  IVPB      metroNIDAZOLE  IVPB 500 milliGRAM(s) IV Intermittent every 8 hours  pantoprazole  Injectable 40 milliGRAM(s) IV Push two times a day  simethicone 80 milliGRAM(s) Chew daily  sucralfate 1 Gram(s) Oral every 6 hours  tiotropium 18 MICROgram(s) Capsule 1 Capsule(s) Inhalation daily    PRN MEDICATIONS  acetaminophen   Tablet .. 650 milliGRAM(s) Oral every 6 hours PRN  ALBUTerol    90 MICROgram(s) HFA Inhaler 2 Puff(s) Inhalation every 6 hours PRN    VITALS:   T(F): 98  HR: 70  BP: 121/67  RR: 18  SpO2: 96%    I&O's Detail    28 Jun 2021 07:01  -  29 Jun 2021 07:00  --------------------------------------------------------  IN:    IV PiggyBack: 50 mL    IV PiggyBack: 100 mL  Total IN: 150 mL    OUT:    Voided (mL): 1110 mL  Total OUT: 1110 mL    Total NET: -960 mL      29 Jun 2021 07:01  -  29 Jun 2021 17:02  --------------------------------------------------------  IN:    Oral Fluid: 900 mL  Total IN: 900 mL    OUT:    Incontinent per Collection Bag (mL): 1000 mL  Total OUT: 1000 mL    Total NET: -100 mL      LABS:  Negative for smoking/alcohol/drug use.                         8.8    8.62  )-----------( 374      ( 29 Jun 2021 07:19 )             30.2     06-29    138  |  101  |  5<L>  ----------------------------<  103<H>  4.3   |  25  |  0.6<L>    Ca    8.2<L>      29 Jun 2021 07:19  Mg     1.8     06-29    TPro  5.9<L>  /  Alb  2.9<L>  /  TBili  0.4  /  DBili  x   /  AST  17  /  ALT  10  /  AlkPhos  94  06-29    PT/INR - ( 27 Jun 2021 17:19 )   PT: 15.10 sec;   INR: 1.31 ratio        RADIOLOGY:    US lower extremity --> no signs of DVT    PHYSICAL EXAM:  GEN: No acute distress  HEENT: normocephalic, atraumatic, aniceteric  LUNGS: wheezing diffuse all over and bilateral  HEART: S1/S2 present. RRR, -  ABD: Soft, non-tender, distended. Bowel sounds present  EXT: No edema/erythema  NEURO: AAOX3, normal affect and responsive      ASSESSMENT AND PLAN:    70 yr F COPD O2, current smoker, ETOH abuse and h/o gastric bypass presented due to bloody stool and change in bowel habits. Admitted for GIB, and found to have diffuse colitis c/b colonic fistulas and rectal abscess.    #GI bleed  #Sepsis, POA due to Colitis   #Gluteal abscess and fistula  CT Abdomen/pelvis: Diffuse wall thickening of the descending colon, sigmoid colon, rectum, and anus, with extensive surround tissue inflammation of the anus  s/p ID by surgery 06/26,  Hg has been stable s/p 2U transfusion  - Cont ceftriaxone/flagyl + wound care/sterilization  - Colonoscopy delayed due to COPD exacerbation  - Cardiology clearance required  - F/u on PCR stool sample    #COPD Exacerbation  Pulmonary on board for optimization prior to colonoscopy  - Symbicort BID  - Methylprednisolone increased to 60mg q8h  - Duonebs ATC and PRN    #Chronic HFpEF  - Monitor fluid status  - Cardiology clearance required for colonoscopy    #Suspected persistent atrial fibrillation  - Currently rate controlled off medications  - Monitor, AC to be possibly initiated after colitis and fistulas are managed    #EtOH abuse   -[facility] protocol monitoring     #Medication reconciliation done with pharmacy    [employer] [id number]

## 2021-06-29 NOTE — PROGRESS NOTE ADULT - ASSESSMENT
70 yr F COPD O2, current smoker, ETOH abuse and h/o gastric bypass presented due to bloody stool and change in bowel habits. Admitted for GIB, and found to have diffuse colitis c/b colonic fistulas and rectal abscess.    #GI bleed  #Sepsis, POA due to Colitis   #Gluteal abscess and fistula  CT Abdomen/pelvis: Diffuse wall thickening of the descending colon, sigmoid colon, rectum, and anus, with extensive surround tissue inflammation of the anus  s/p ID by surgery 06/26, possible plans for OR after colonoscopy  Hg has been stable s/p 2U transfusion  - Cont ceftriaxone/flagyl  - Colonoscopy delayed due to COPD exacerbation  - local wound care   - PPI   - type and screen and serial CBC     #COPD Exacerbation  Pulmonary on board for optimization prior to colonoscopy  - Cont symbicort BID  - Methylprednisolone increased to 60mg q8h  - Duonebs ATC and PRN    #Chronic HFpEF  - Monitor fluid status  - Cardio consult for med optimization and risk startification prior to colonoscopy    #Suspected persistent atrial fibrillation  - Currently rate controlled off medications  - Cont to monitor, AC to be possibly initiated after colitis and fistulas are managed    #EtOH abuse   -[facility] protocol monitoring     DVT PPX, SCD    #Progress Note Handoff  Pending (specify): Colonoscopy  Family discussion: d/w pt regarding pending procedure  Disposition: Home

## 2021-06-29 NOTE — CHART NOTE - NSCHARTNOTEFT_GEN_A_CORE
Transfer to telemetry service:    70 yr F COPD O2, current smoker, ETOH abuse and h/o gastric bypass presented due to bloody stool and change in bowel habits.    the pt reports change in bowel habits 4 weeks ago, it started with having loose watery stool sometimes mixed with fresh blood and fecal incontinence,  3 days ago she noted dark black soft BM, and since then felt constipated and passed formed stool with fresh blood,   she also reports having distended abd since few months,  she denies fever, chills, n/v, abd pain, chest pain, SOB or urinary symptoms.  she reports that her father had Hx of fistula from his GI system.     In the ER: fever 106F, normal wbc, lactate 1.6, hbg 6.4, , new onset Afib on EKG, rectal exam showed blood in her clothes and at least 3 perianal fistula, CT abd  showed left sided colitis (on Ceftriaxone and Flagyl) and 5 cm abscess at R gluteal area s/p I&D by surgery team. Hb on presentation was 6.4 --> 8.1 after 2 pRBC. She stopped her home medications including the COPD meds/inhalers    Transferred to CEU: for monitoring for GI bleeding and colitis, scheduled for colonoscopy unfortunately cancelled due to COPD exacerbation with increased wet cough and diffuse bilateral wheezing. She was started on Symbicort/Spiriva/Solumedrol 60 Q8. Pulmo/cardio consulted and patient requires clearance before rescheduling colonoscopy. Patient today had an episode of tachycardia and Spiriva/Symbicort were stopped.    Patient is stable (vitals/hemoglobin). Please transfer her to telemetry service for downgrading.    [employer] [phone]

## 2021-06-29 NOTE — CONSULT NOTE ADULT - ATTENDING COMMENTS
Seen and examined, has pancolitis and what appears to be a perirectal abscess.  For I&D by surgery, will plan for flexible sigmoidoscopy for early next week continue management as above
IMPRESSION:  COPD exacerbation  Heavy smoker  AFib   RML 3mm solid pulmonary nodule  HO HFpEF (III), moderate MR  HO Arthritis  HO EtOH abuse  From the pulmonary stand point, favor delaying non urgent procedure until more compensated     Plan as outlined above

## 2021-06-29 NOTE — CONSULT NOTE ADULT - ASSESSMENT
IMPRESSION:  COPD exacerbation  Chronic Hypercapnic Respiratory Failure  Heavy smoker  RML 3mm solid pulmonary nodule  AFib in SVR  Perianal abscess & fistula s/p I & D  HO HFpEF (III), moderate MR  HO Arthritis  HO EtOH abuse      PLAN:    CNS: Avoid CNS depressants.    HEENT: Oral care    PULMONARY:  HOB @ 45 degrees.  Aspiration precautions.  Solumedrol 60mg IV q8h.  Duoneb q6h & q4h prn.  NIV prn & HS.  ABG once clinically stable.  Patient is currently not cleared for EGD & colonoscopy.  Outpatient Pulmonary FU for PFTs & CT.  Nicotine patch.    CARDIOVASCULAR:  c/w diuresis.  Rate control if needed.  Avoid volume overload.  Maximize cardiac therapy and volume status.  Strict I & O.  Needs Cardio clearance.    GI: GI prophylaxis.  Feeding.  Bowel regimen.  EGD & Colonoscopy by GI once clinically stable.    RENAL:  Follow up lytes.  Correct as needed    INFECTIOUS DISEASE:  ABX per ID.  Follow up cultures    HEMATOLOGICAL:  DVT prophylaxis.    ENDOCRINE:  Follow up FS.  Insulin protocol if needed.    MUSCULOSKELETAL:  c/w home medications, bed rest    Full Code  Monitor in Step Down Unit IMPRESSION:  COPD exacerbation  Chronic Hypercapnic Respiratory Failure  Colitis r/o infectious vs inflammatory  Sepsis present on admission  Heavy smoker  RML 3mm solid pulmonary nodule  AFib in SVR  Perianal abscess & fistula s/p I & D  HO HFpEF (III), moderate MR  HO Arthritis  HO EtOH abuse      PLAN:    CNS: Avoid CNS depressants.    HEENT: Oral care    PULMONARY:  HOB @ 45 degrees.  Aspiration precautions.  Solumedrol 60mg IV q8h.  Duoneb q6h & q4h prn.  NIV prn & HS.  ABG once clinically stable.  Patient is currently not cleared for EGD & colonoscopy.  Outpatient Pulmonary FU for PFTs & CT.  Nicotine patch, get peak expiratory flow,     CARDIOVASCULAR:  c/w diuresis.  Rate control if needed.  Avoid volume overload.  Maximize cardiac therapy and volume status.  Strict I & O.  Needs Cardio clearance.    GI: GI prophylaxis.  Feeding.  Bowel regimen.  EGD & Colonoscopy by GI once clinically stable.    RENAL:  Follow up lytes.  Correct as needed    INFECTIOUS DISEASE:  ABX per ID.  Follow up cultures    HEMATOLOGICAL:  DVT prophylaxis w/ SCD    ENDOCRINE:  Follow up FS.  Insulin protocol if needed.    MUSCULOSKELETAL:  c/w home medications, bed rest    Full Code  Monitor in Step Down Unit IMPRESSION:  COPD exacerbation  Chronic Hypercapnic Respiratory Failure  Sepsis present on admission  Perianal abscess & fistula s/p I & D  Heavy smoker  AFib in SVR  RML 3mm solid pulmonary nodule  HO HFpEF (III), moderate MR  HO Arthritis  HO EtOH abuse      PLAN:    CNS: Avoid CNS depressants.    HEENT: Oral care    PULMONARY:  HOB @ 45 degrees.  Aspiration precautions.  Solumedrol 60mg IV q8h.  Duoneb q6h & q4h prn.  NIV prn & HS.  ABG once clinically stable.  Patient is currently not cleared for EGD & colonoscopy.  Outpatient Pulmonary FU for PFTs & CT.  Nicotine patch.    CARDIOVASCULAR:  c/w diuresis.  Rate control if needed.  Avoid volume overload.  Maximize cardiac therapy and volume status.  Strict I & O.  Needs Cardio clearance.    GI: GI prophylaxis.  Feeding.  Bowel regimen.  EGD & Colonoscopy by GI once clinically stable.    RENAL:  Follow up lytes.  Correct as needed    INFECTIOUS DISEASE:  ABX per ID.  Follow up cultures    HEMATOLOGICAL:  VDUS lower extremities.   DVT prophylaxis    ENDOCRINE:  Follow up FS.  Insulin protocol if needed.    MUSCULOSKELETAL:  c/w home medications, bed rest    Full Code  Monitor in Step Down Unit IMPRESSION:  COPD exacerbation  Chronic Hypercapnic Respiratory Failure  Sepsis present on admission  Perianal abscess & fistula s/p I & D  Heavy smoker  AFib in SVR  RML 3mm solid pulmonary nodule  HO HFpEF (III), moderate MR  HO Arthritis  HO EtOH abuse      PLAN:    CNS: Avoid CNS depressants.    HEENT: Oral care    PULMONARY:  HOB @ 45 degrees.  Aspiration precautions.  Solumedrol 60mg IV q8h.  Duoneb q6h & q4h prn.  NIV prn & HS.  ABG once clinically stable.  Patient is currently not cleared for EGD & colonoscopy.  Outpatient Pulmonary FU for PFTs & CT.  Nicotine patch.    CARDIOVASCULAR:  c/w diuresis.  Rate control if needed.  Avoid volume overload.  Maximize cardiac therapy and volume status.  Strict I & O.  Needs Cardio clearance.    GI: GI prophylaxis.  Feeding.  Bowel regimen.  EGD & Colonoscopy by GI once clinically stable.    RENAL:  Follow up lytes.  Correct as needed    INFECTIOUS DISEASE:  ABX per ID.  full RVP panel. Follow up cultures    HEMATOLOGICAL:  VDUS lower extremities.   DVT prophylaxis    ENDOCRINE:  Follow up FS.  Insulin protocol if needed.    MUSCULOSKELETAL:  c/w home medications, bed rest    Full Code  Monitor in Step Down Unit IMPRESSION:  COPD exacerbation  Heavy smoker  AFib   RML 3mm solid pulmonary nodule  HO HFpEF (III), moderate MR  HO Arthritis  HO EtOH abuse  From the pulmonary stand point, favor delaying non urgent procedure until more compensated     PLAN:    CNS: Avoid CNS depressants.    HEENT: Oral care    PULMONARY:  HOB @ 45 degrees.  Aspiration precautions.  Solumedrol 60mg IV q12h.  Duoneb q6h & q4h prn.  Outpatient Pulmonary FU for PFTs & CT.  Hold Symbicort and Spiriva at this time     CARDIOVASCULAR:   Rate control if needed.  Avoid volume overload.  Maximize cardiac therapy and volume status.  Strict I & O. FU with cardiology     GI: GI prophylaxis.  Feeding.  Bowel regimen.  EGD & Colonoscopy by GI once clinically stable.    RENAL:  Follow up lytes.  Correct as needed    INFECTIOUS DISEASE:  ABX per ID.  full RVP panel. Follow up cultures    HEMATOLOGICAL:  FU Duplex.  DVT prophylaxis    ENDOCRINE:  Follow up FS.  Insulin protocol if needed.    MUSCULOSKELETAL:  c/w home medications, bed rest    Full Code    Down grade to floor

## 2021-06-30 DIAGNOSIS — K52.9 NONINFECTIVE GASTROENTERITIS AND COLITIS, UNSPECIFIED: ICD-10-CM

## 2021-06-30 DIAGNOSIS — J44.1 CHRONIC OBSTRUCTIVE PULMONARY DISEASE WITH (ACUTE) EXACERBATION: ICD-10-CM

## 2021-06-30 DIAGNOSIS — D64.9 ANEMIA, UNSPECIFIED: ICD-10-CM

## 2021-06-30 DIAGNOSIS — I50.32 CHRONIC DIASTOLIC (CONGESTIVE) HEART FAILURE: ICD-10-CM

## 2021-06-30 DIAGNOSIS — I48.91 UNSPECIFIED ATRIAL FIBRILLATION: ICD-10-CM

## 2021-06-30 LAB
ALBUMIN SERPL ELPH-MCNC: 3.2 G/DL — LOW (ref 3.5–5.2)
ALP SERPL-CCNC: 88 U/L — SIGNIFICANT CHANGE UP (ref 30–115)
ALT FLD-CCNC: 10 U/L — SIGNIFICANT CHANGE UP (ref 0–41)
ANION GAP SERPL CALC-SCNC: 10 MMOL/L — SIGNIFICANT CHANGE UP (ref 7–14)
APTT BLD: 28.4 SEC — SIGNIFICANT CHANGE UP (ref 27–39.2)
AST SERPL-CCNC: 15 U/L — SIGNIFICANT CHANGE UP (ref 0–41)
BASOPHILS # BLD AUTO: 0 K/UL — SIGNIFICANT CHANGE UP (ref 0–0.2)
BASOPHILS NFR BLD AUTO: 0 % — SIGNIFICANT CHANGE UP (ref 0–1)
BILIRUB SERPL-MCNC: 0.4 MG/DL — SIGNIFICANT CHANGE UP (ref 0.2–1.2)
BLD GP AB SCN SERPL QL: SIGNIFICANT CHANGE UP
BUN SERPL-MCNC: 8 MG/DL — LOW (ref 10–20)
CALCIUM SERPL-MCNC: 8.5 MG/DL — SIGNIFICANT CHANGE UP (ref 8.5–10.1)
CALPROTECTIN STL-MCNT: 135 UG/G — HIGH (ref 0–120)
CHLORIDE SERPL-SCNC: 99 MMOL/L — SIGNIFICANT CHANGE UP (ref 98–110)
CO2 SERPL-SCNC: 23 MMOL/L — SIGNIFICANT CHANGE UP (ref 17–32)
CREAT SERPL-MCNC: 0.5 MG/DL — LOW (ref 0.7–1.5)
EOSINOPHIL # BLD AUTO: 0 K/UL — SIGNIFICANT CHANGE UP (ref 0–0.7)
EOSINOPHIL NFR BLD AUTO: 0 % — SIGNIFICANT CHANGE UP (ref 0–8)
GLUCOSE SERPL-MCNC: 111 MG/DL — HIGH (ref 70–99)
HCT VFR BLD CALC: 29.7 % — LOW (ref 37–47)
HGB BLD-MCNC: 8.8 G/DL — LOW (ref 12–16)
IMM GRANULOCYTES NFR BLD AUTO: 0.9 % — HIGH (ref 0.1–0.3)
INR BLD: 1.1 RATIO — SIGNIFICANT CHANGE UP (ref 0.65–1.3)
LYMPHOCYTES # BLD AUTO: 0.57 K/UL — LOW (ref 1.2–3.4)
LYMPHOCYTES # BLD AUTO: 5.5 % — LOW (ref 20.5–51.1)
MAGNESIUM SERPL-MCNC: 1.9 MG/DL — SIGNIFICANT CHANGE UP (ref 1.8–2.4)
MCHC RBC-ENTMCNC: 24.9 PG — LOW (ref 27–31)
MCHC RBC-ENTMCNC: 29.6 G/DL — LOW (ref 32–37)
MCV RBC AUTO: 83.9 FL — SIGNIFICANT CHANGE UP (ref 81–99)
MONOCYTES # BLD AUTO: 0.07 K/UL — LOW (ref 0.1–0.6)
MONOCYTES NFR BLD AUTO: 0.7 % — LOW (ref 1.7–9.3)
NEUTROPHILS # BLD AUTO: 9.63 K/UL — HIGH (ref 1.4–6.5)
NEUTROPHILS NFR BLD AUTO: 92.9 % — HIGH (ref 42.2–75.2)
NRBC # BLD: 0 /100 WBCS — SIGNIFICANT CHANGE UP (ref 0–0)
PHOSPHATE SERPL-MCNC: 2.9 MG/DL — SIGNIFICANT CHANGE UP (ref 2.1–4.9)
PLATELET # BLD AUTO: 340 K/UL — SIGNIFICANT CHANGE UP (ref 130–400)
POTASSIUM SERPL-MCNC: 4.2 MMOL/L — SIGNIFICANT CHANGE UP (ref 3.5–5)
POTASSIUM SERPL-SCNC: 4.2 MMOL/L — SIGNIFICANT CHANGE UP (ref 3.5–5)
PROT SERPL-MCNC: 6 G/DL — SIGNIFICANT CHANGE UP (ref 6–8)
PROTHROM AB SERPL-ACNC: 12.7 SEC — SIGNIFICANT CHANGE UP (ref 9.95–12.87)
RAPID RVP RESULT: SIGNIFICANT CHANGE UP
RBC # BLD: 3.54 M/UL — LOW (ref 4.2–5.4)
RBC # FLD: 26.7 % — HIGH (ref 11.5–14.5)
SARS-COV-2 RNA SPEC QL NAA+PROBE: SIGNIFICANT CHANGE UP
SODIUM SERPL-SCNC: 132 MMOL/L — LOW (ref 135–146)
WBC # BLD: 10.36 K/UL — SIGNIFICANT CHANGE UP (ref 4.8–10.8)
WBC # FLD AUTO: 10.36 K/UL — SIGNIFICANT CHANGE UP (ref 4.8–10.8)

## 2021-06-30 PROCEDURE — 99233 SBSQ HOSP IP/OBS HIGH 50: CPT

## 2021-06-30 PROCEDURE — 74177 CT ABD & PELVIS W/CONTRAST: CPT | Mod: 26

## 2021-06-30 PROCEDURE — 99232 SBSQ HOSP IP/OBS MODERATE 35: CPT

## 2021-06-30 PROCEDURE — 71045 X-RAY EXAM CHEST 1 VIEW: CPT | Mod: 26

## 2021-06-30 RX ORDER — IOHEXOL 300 MG/ML
30 INJECTION, SOLUTION INTRAVENOUS ONCE
Refills: 0 | Status: COMPLETED | OUTPATIENT
Start: 2021-06-30 | End: 2021-06-30

## 2021-06-30 RX ORDER — FOLIC ACID 0.8 MG
1 TABLET ORAL DAILY
Refills: 0 | Status: DISCONTINUED | OUTPATIENT
Start: 2021-06-30 | End: 2021-07-07

## 2021-06-30 RX ORDER — THIAMINE MONONITRATE (VIT B1) 100 MG
100 TABLET ORAL DAILY
Refills: 0 | Status: DISCONTINUED | OUTPATIENT
Start: 2021-06-30 | End: 2021-07-07

## 2021-06-30 RX ORDER — SOD SULF/SODIUM/NAHCO3/KCL/PEG
4000 SOLUTION, RECONSTITUTED, ORAL ORAL ONCE
Refills: 0 | Status: COMPLETED | OUTPATIENT
Start: 2021-06-30 | End: 2021-06-30

## 2021-06-30 RX ORDER — IPRATROPIUM/ALBUTEROL SULFATE 18-103MCG
3 AEROSOL WITH ADAPTER (GRAM) INHALATION EVERY 8 HOURS
Refills: 0 | Status: DISCONTINUED | OUTPATIENT
Start: 2021-06-30 | End: 2021-07-07

## 2021-06-30 RX ADMIN — Medication 100 MILLIGRAM(S): at 22:48

## 2021-06-30 RX ADMIN — Medication 1 GRAM(S): at 11:54

## 2021-06-30 RX ADMIN — Medication 60 MILLIGRAM(S): at 05:31

## 2021-06-30 RX ADMIN — PANTOPRAZOLE SODIUM 40 MILLIGRAM(S): 20 TABLET, DELAYED RELEASE ORAL at 05:32

## 2021-06-30 RX ADMIN — Medication 1 GRAM(S): at 17:42

## 2021-06-30 RX ADMIN — Medication 100 MILLIGRAM(S): at 05:32

## 2021-06-30 RX ADMIN — SIMETHICONE 80 MILLIGRAM(S): 80 TABLET, CHEWABLE ORAL at 12:51

## 2021-06-30 RX ADMIN — Medication 20 MILLIGRAM(S): at 22:48

## 2021-06-30 RX ADMIN — CEFTRIAXONE 100 MILLIGRAM(S): 500 INJECTION, POWDER, FOR SOLUTION INTRAMUSCULAR; INTRAVENOUS at 12:51

## 2021-06-30 RX ADMIN — BUDESONIDE AND FORMOTEROL FUMARATE DIHYDRATE 2 PUFF(S): 160; 4.5 AEROSOL RESPIRATORY (INHALATION) at 08:09

## 2021-06-30 RX ADMIN — Medication 100 MILLIGRAM(S): at 14:57

## 2021-06-30 RX ADMIN — PANTOPRAZOLE SODIUM 40 MILLIGRAM(S): 20 TABLET, DELAYED RELEASE ORAL at 17:42

## 2021-06-30 RX ADMIN — Medication 4000 MILLILITER(S): at 20:02

## 2021-06-30 RX ADMIN — Medication 1 GRAM(S): at 05:31

## 2021-06-30 RX ADMIN — IOHEXOL 30 MILLILITER(S): 300 INJECTION, SOLUTION INTRAVENOUS at 16:06

## 2021-06-30 RX ADMIN — Medication 60 MILLIGRAM(S): at 11:54

## 2021-06-30 NOTE — PROGRESS NOTE ADULT - ASSESSMENT
70 yr F COPD O2, current smoker, ETOH abuse and h/o gastric bypass presented due to bloody stool and change in bowel habits. Admitted for GIB, and found to have diffuse colitis c/b colonic fistulas and rectal abscess.    Hematochezia/Pancolitis  Sepsis, POA due to Colitis   Gluteal abscess and fistula  CT Abdomen/pelvis: Diffuse wall thickening of the descending colon, sigmoid colon, rectum, and anus, with extensive surround tissue inflammation of the anus  Hg has been stable s/p 2U transfusion  Cont ceftriaxone/flagyl for now  Colonoscopy planned for tomorrow, prep today and NPO after midnight  cleared by pulm and cardio  Pending repeat CT A/P with IV and po contrast to assess abscess as requested by colorectal team  monitor CBC/keep active T&S    COPD Exacerbation  s/p IV steroids, now adonay  start prednisone taper tomorrow  nebs Q8H and PRN  hold inhalers for now, will need full pulm eval as OP    Chronic HFpEF  clinically euvolemic, avoid fluid overload  monitor I&O    Suspected persistent atrial fibrillation  Currently rate controlled off medications  Cont to monitor, AC to be possibly initiated after colitis and fistulas are managed    EtOH abuse with suspected folate/thiamine deficiency  -[facility] protocol monitoring   - start folate and thiamine supplementation     DVT PPX, SCD    #Progress Note Handoff  Pending (specify): Colonoscopy tomorrow, CT A/P, surgery f/u, stability of Hb  Family discussion: plan of care discussed with patient, aware and agreeable   Disposition: Home

## 2021-06-30 NOTE — CONSULT NOTE ADULT - SUBJECTIVE AND OBJECTIVE BOX
HPI:  70 yr F COPD not on home O2, ex smoker, ETOH abuse and h/o gastric bypass presented due to bloody stool and change in bowel habits.    the pt reports change in bowel habits 4 weeks ago, it started with having loose watery stool sometimes mixed with fresh blood and fecal incontinence,  3 days ago she noted dark black soft BM, and since then felt constipated and passed formed stool with fresh blood,   she also reports having distended abd since few months,  she denies fever, chills, n/v, abd pain, chest pain, SOB or urinary symptoms.  she reports that her father had Hx of fistula from his GI system.     ER: fever 106F, normal wbc, lactate 1.6, hbg 6.4, , new onset Afib on EKG, rectal exam showed blood in her clothes and at least 3 perianal fistula, CT abd  showed left sided colitis and 5 cm abscess at R gluteal area,, admitted for further veal.    (26 Jun 2021 00:00)    Cardiology HPI:   Patient underwent cardiac cath in 2016 with Dr. Lundberg that showed mild coronary artery disease. Pt states she saw him for preop clearance for her carpel tunnel surgery. She also states she is on a "water pill" at home but does not know the name or dose. Denies chest pain or palpitations.       PAST MEDICAL & SURGICAL HISTORY    Gastric bypass surgery in 1999  Carpel tunnel surgery     FAMILY HISTORY:  FAMILY HISTORY: No cardiac history in first degree relatives       SOCIAL HISTORY:  [x]ex-smoker, quit 1 month ago. Smoked <1 PPD. Started at the age of 17.   [x]Alcohol: H/o alcohol abuse. Pt states she drinks socially now.   []Drug    ALLERGIES:  No Known Allergies      MEDICATIONS:  MEDICATIONS  (STANDING):  ALBUTerol    90 MICROgram(s) HFA Inhaler 1 Puff(s) Inhalation every 4 hours  albuterol/ipratropium for Nebulization 3 milliLiter(s) Nebulizer every 6 hours  budesonide 160 MICROgram(s)/formoterol 4.5 MICROgram(s) Inhaler 2 Puff(s) Inhalation two times a day  cefTRIAXone   IVPB 2000 milliGRAM(s) IV Intermittent every 24 hours  methylPREDNISolone sodium succinate Injectable 60 milliGRAM(s) IV Push every 8 hours  metroNIDAZOLE  IVPB      metroNIDAZOLE  IVPB 500 milliGRAM(s) IV Intermittent every 8 hours  pantoprazole  Injectable 40 milliGRAM(s) IV Push two times a day  simethicone 80 milliGRAM(s) Chew daily  sucralfate 1 Gram(s) Oral every 6 hours  tiotropium 18 MICROgram(s) Capsule 1 Capsule(s) Inhalation daily    MEDICATIONS  (PRN):  acetaminophen   Tablet .. 650 milliGRAM(s) Oral every 6 hours PRN Temp greater or equal to 38C (100.4F), Mild Pain (1 - 3), Moderate Pain (4 - 6)  ALBUTerol    90 MICROgram(s) HFA Inhaler 2 Puff(s) Inhalation every 6 hours PRN Shortness of Breath and/or Wheezing      HOME MEDICATIONS:  Home Medications:  albuterol 90 mcg/inh inhalation aerosol with adapter:  (29 Jun 2021 16:07)  diclofenac sodium 75 mg oral delayed release tablet: 1 tab(s) orally 2 times a day, As Needed (29 Jun 2021 16:07)  DULoxetine 60 mg oral delayed release capsule: 1 cap(s) orally once a day (29 Jun 2021 16:07)  Flexeril 10 mg oral tablet: 10 milligram(s) orally 2 times a day (29 Jun 2021 16:07)  hydroCHLOROthiazide 25 mg oral tablet: 25 milligram(s) orally once a day (29 Jun 2021 16:07)      VITALS:   T(F): 97.4 (06-30 @ 06:47), Max: 98.4 (06-28 @ 12:26)  HR: 72 (06-30 @ 06:47) (64 - 82)  BP: 136/77 (06-30 @ 06:47) (98/58 - 136/77)  BP(mean): 79 (06-28 @ 16:00) (79 - 79)  RR: 18 (06-30 @ 06:47) (17 - 20)  SpO2: 96% (06-30 @ 00:00) (96% - 100%)    I&O's Summary    29 Jun 2021 07:01  -  30 Jun 2021 07:00  --------------------------------------------------------  IN: 900 mL / OUT: 1800 mL / NET: -900 mL        REVIEW OF SYSTEMS:  CONSTITUTIONAL: No weakness, fevers or chills  EYES: No visual changes  ENT: No vertigo or throat pain   NECK: No pain or stiffness  RESPIRATORY: No cough, wheezing, hemoptysis; No shortness of breath  CARDIOVASCULAR: No chest pain or palpitations  GASTROINTESTINAL: No abdominal or epigastric pain. No nausea, vomiting, or hematemesis; No diarrhea or constipation. No melena or hematochezia.  GENITOURINARY: No dysuria, frequency or hematuria  NEUROLOGICAL: No numbness or weakness  SKIN: No itching, no rashes  MSK: no    PHYSICAL EXAM:  NEURO: patient is awake, alert and oriented, pale conjunctiva   GEN: Not in acute distress  NECK: no thyroid enlargement, no JVD  LUNGS: Clear to auscultation bilaterally   CARDIOVASCULAR: S1/S2 present, RRR , no murmurs or rubs, no carotid bruits,  + PP bilaterally  ABD: Soft, non-tender, non-distended, +BS  EXT: BL LE edema 2+    LABS:                        8.8    8.62  )-----------( 374      ( 29 Jun 2021 07:19 )             30.2     06-29    138  |  101  |  5<L>  ----------------------------<  103<H>  4.3   |  25  |  0.6<L>    Ca    8.2<L>      29 Jun 2021 07:19  Mg     1.8     06-29    TPro  5.9<L>  /  Alb  2.9<L>  /  TBili  0.4  /  DBili  x   /  AST  17  /  ALT  10  /  AlkPhos  94  06-29      Troponin trend:    trop <0.01        RADIOLOGY:  -CXR: < from: Xray Chest 1 View- PORTABLE-Urgent (Xray Chest 1 View- PORTABLE-Urgent .) (06.25.21 @ 16:20) >  Impression: Trace bilateral pleural effusion/opacities.    -TTE: < from: TTE Echo Complete w/o Contrast w/ Doppler (06.27.21 @ 11:15) >  Summary:   1. Severely enlarged left atrium.   2. LV Ejection Fraction by Rivera's Method with a biplane EF of 63 %.   3. Spectral Doppler shows restrictive pattern of left ventricular myocardial filling (Grade III diastolic dysfunction).   4. Mildly enlarged right ventricle.   5. Right atrial enlargement.   6. Mild to moderate mitral valve regurgitation.   7. Mild tricuspid regurgitation.   8. Estimated pulmonary artery systolic pressure is 44.8 mmHg assuming a right atrial pressure of 15 mmHg, which is consistent with mild pulmonary hypertension.    -CCTA:  -STRESS TEST:  -CATHETERIZATION: Cath in 2016 showing mild coronary artery disease.     ECG:  Prior EKGs showed first degree AV block  EKG this admission shows Atrial fibrillation     TELEMETRY EVENTS:  Rate controlled atrial fibrillation

## 2021-06-30 NOTE — PROGRESS NOTE ADULT - SUBJECTIVE AND OBJECTIVE BOX
WEGENER, LOIS  70y Female    CHIEF COMPLAINT:    Patient is a 70y old  Female who presents with a chief complaint of abd abscess (2021 09:53)      INTERVAL HPI/OVERNIGHT EVENTS:    Patient seen and examined. No acute events overnight. Clinically feels well, denies any SOB    ROS: All other systems are negative.    Vital Signs:    T(F): 97.9 (21 @ 14:02), Max: 98 (21 @ 20:00)  HR: 61 (21 @ 14:02) (61 - 72)  BP: 115/63 (21 @ 14:02) (115/59 - 136/77)  RR: 18 (21 @ 14:02) (18 - 18)  SpO2: 88% (21 @ 11:53) (88% - 98%)    2021 07:01  -  2021 07:00  --------------------------------------------------------  IN: 900 mL / OUT: 1800 mL / NET: -900 mL    2021 07:01  -  2021 16:49  --------------------------------------------------------  IN: 360 mL / OUT: 1000 mL / NET: -640 mL    Daily Weight in k.1 (2021 06:47)  CAPILLARY BLOOD GLUCOSE    PHYSICAL EXAM:    GENERAL:  NAD  SKIN: No rashes or lesions  HEENT: Atraumatic. Normocephalic.   NECK: Supple, No JVD.   PULMONARY: Coarse breath sounds. No wheezing. No rales  CVS: Normal S1, S2. Rate and Rhythm are regular.  ABDOMEN/GI: Soft, Nontender, mildly distended; BS present  MSK:  No clubbing or cyanosis   NEUROLOGIC: moves all extremities  PSYCH: Awake and alert, answers all questions appropriately     Consultant(s) Notes Reviewed:  [x ] YES  [ ] NO  Care Discussed with Consultants/Other Providers [ x] YES  [ ] NO    LABS:                        8.8    8.62  )-----------( 374      ( 2021 07:19 )             30.2     138  |  101  |  5<L>  ----------------------------<  103<H>  4.3   |  25  |  0.6<L>    Ca    8.2<L>      2021 07:19  Mg     1.8         TPro  5.9<L>  /  Alb  2.9<L>  /  TBili  0.4  /  DBili  x   /  AST  17  /  ALT  10  /  AlkPhos  94      Serum Pro-Brain Natriuretic Peptide: 1509 pg/mL (21 @ 15:10)    RADIOLOGY & ADDITIONAL TESTS:    Imaging or report Personally Reviewed:  [x] YES  [ ] NO  EKG reviewed: [x] YES  [ ] NO    Medications:  Standing  albuterol/ipratropium for Nebulization 3 milliLiter(s) Nebulizer every 8 hours  bisacodyl 20 milliGRAM(s) Oral <User Schedule>  cefTRIAXone   IVPB 2000 milliGRAM(s) IV Intermittent every 24 hours  metroNIDAZOLE  IVPB      metroNIDAZOLE  IVPB 500 milliGRAM(s) IV Intermittent every 8 hours  pantoprazole  Injectable 40 milliGRAM(s) IV Push two times a day  polyethylene glycol/electrolyte Solution. 4000 milliLiter(s) Oral once  simethicone 80 milliGRAM(s) Chew daily  sucralfate 1 Gram(s) Oral every 6 hours    PRN Meds  acetaminophen   Tablet .. 650 milliGRAM(s) Oral every 6 hours PRN  ALBUTerol    90 MICROgram(s) HFA Inhaler 2 Puff(s) Inhalation every 6 hours PRN

## 2021-06-30 NOTE — PROGRESS NOTE ADULT - ASSESSMENT
· Assessment	  70F w/ PMH of COPD, smoking, and PSH of gastric bypass in 1999 who presented to the ED with 1-2 weeks of diarrhea.     IMPRESSION;  Colitis with right gluteus abscess  S/p local debridement  6/26 Abscess cultures : E coli, B fragilis  6/25 BCx NG  6/26 Stool PCR NG  6/25 CT A/P  Diffuse wall thickening of the descending colon, sigmoid colon, rectum, and anus with extensive surrounding soft tissue inflammation of the anus.  5.3 cm subcutaneous fluid collection lateral to the right gluteus described above, which may represent an abscess.  Colorectal Sx : conservative management  GI : colonoscopy planned once stable    RECOMMENDATIONS;  Rocephin 2 gm iv q24h  Flagyl 500 mg iv q8h  F/u with GI/colorectal Sx

## 2021-06-30 NOTE — PROGRESS NOTE ADULT - SUBJECTIVE AND OBJECTIVE BOX
WEGENER, LOIS  70y, Female    All available historical data reviewed    OVERNIGHT EVENTS:  no fevers    ROS:  General: Denies rigors, nightsweats  HEENT: Denies headache, rhinorrhea, sore throat, eye pain  CV: Denies CP, palpitations  PULM: Denies wheezing, hemoptysis  GI: Denies hematemesis, hematochezia, melena  : Denies discharge, hematuria  MSK: Denies arthralgias, myalgias  SKIN: Denies rash, lesions  NEURO: Denies paresthesias, weakness  PSYCH: Denies depression, anxiety    VITALS:  T(F): 97.4, Max: 98 (06-29-21 @ 20:00)  HR: 72  BP: 136/77  RR: 18Vital Signs Last 24 Hrs  T(C): 36.3 (30 Jun 2021 06:47), Max: 36.7 (29 Jun 2021 20:00)  T(F): 97.4 (30 Jun 2021 06:47), Max: 98 (29 Jun 2021 20:00)  HR: 72 (30 Jun 2021 06:47) (65 - 74)  BP: 136/77 (30 Jun 2021 06:47) (115/59 - 136/77)  BP(mean): --  RR: 18 (30 Jun 2021 06:47) (18 - 18)  SpO2: 96% (30 Jun 2021 00:00) (96% - 98%)    TESTS & MEASUREMENTS:                        8.8    8.62  )-----------( 374      ( 29 Jun 2021 07:19 )             30.2     06-29    138  |  101  |  5<L>  ----------------------------<  103<H>  4.3   |  25  |  0.6<L>    Ca    8.2<L>      29 Jun 2021 07:19  Mg     1.8     06-29    TPro  5.9<L>  /  Alb  2.9<L>  /  TBili  0.4  /  DBili  x   /  AST  17  /  ALT  10  /  AlkPhos  94  06-29    LIVER FUNCTIONS - ( 29 Jun 2021 07:19 )  Alb: 2.9 g/dL / Pro: 5.9 g/dL / ALK PHOS: 94 U/L / ALT: 10 U/L / AST: 17 U/L / GGT: x             Culture - Abscess with Gram Stain (collected 06-26-21 @ 13:43)  Source: .Abscess perirectal abscess  Final Report (06-28-21 @ 17:44):    Rare Escherichia coli    Few Bacteroides thetaiotamcron group "Susceptibilities not performed"    Moderate Most closely resembling Bifidobacterium species    "Susceptibilities not performed"  Organism: Escherichia coli (06-28-21 @ 17:44)  Organism: Escherichia coli (06-28-21 @ 17:44)      -  Amikacin: S <=16      -  Amoxicillin/Clavulanic Acid: S <=8/4      -  Ampicillin: S <=8 These ampicillin results predict results for amoxicillin      -  Ampicillin/Sulbactam: S <=4/2 Enterobacter, Citrobacter, and Serratia may develop resistance during prolonged therapy (3-4 days)      -  Aztreonam: S <=4      -  Cefazolin: S <=2 Enterobacter, Citrobacter, and Serratia may develop resistance during prolonged therapy (3-4 days)      -  Cefepime: S <=2      -  Cefoxitin: S <=8      -  Ceftriaxone: S <=1 Enterobacter, Citrobacter, and Serratia may develop resistance during prolonged therapy      -  Ciprofloxacin: S <=0.25      -  Ertapenem: S <=0.5      -  Gentamicin: S <=2      -  Imipenem: S <=1      -  Levofloxacin: S <=0.5      -  Meropenem: S <=1      -  Piperacillin/Tazobactam: S <=8      -  Tobramycin: S <=2      -  Trimethoprim/Sulfamethoxazole: S <=0.5/9.5      Method Type: GHISLAINE    Culture - Abscess with Gram Stain (collected 06-26-21 @ 12:14)  Source: .Abscess louise rectal  Final Report (06-28-21 @ 17:45):    Few Escherichia coli    Few Bacteroides caccae "Susceptibilities not performed"  Organism: Escherichia coli (06-28-21 @ 17:45)  Organism: Escherichia coli (06-28-21 @ 17:45)      -  Amikacin: S <=16      -  Amoxicillin/Clavulanic Acid: S <=8/4      -  Ampicillin: S <=8 These ampicillin results predict results for amoxicillin      -  Ampicillin/Sulbactam: S <=4/2 Enterobacter, Citrobacter, and Serratia may develop resistance during prolonged therapy (3-4 days)      -  Aztreonam: S <=4      -  Cefazolin: S <=2 Enterobacter, Citrobacter, and Serratia may develop resistance during prolonged therapy (3-4 days)      -  Cefepime: S <=2      -  Cefoxitin: S <=8      -  Ceftriaxone: S <=1 Enterobacter, Citrobacter, and Serratia may develop resistance during prolonged therapy      -  Ciprofloxacin: S <=0.25      -  Ertapenem: S <=0.5      -  Gentamicin: S <=2      -  Imipenem: S <=1      -  Levofloxacin: S <=0.5      -  Meropenem: S <=1      -  Piperacillin/Tazobactam: S <=8      -  Tobramycin: S <=2      -  Trimethoprim/Sulfamethoxazole: S <=0.5/9.5      Method Type: GHISLAINE    GI PCR Panel, Stool (collected 06-26-21 @ 08:46)  Source: .Stool Feces  Final Report (06-26-21 @ 21:07):    GI PCR Results: NOT detected    *******Please Note:*******    GI panel PCR evaluates for:    Campylobacter, Plesiomonas shigelloides, Salmonella,    Vibrio, Yersinia enterocolitica, Enteroaggregative    Escherichia coli (EAEC), Enteropathogenic E.coli (EPEC),    Enterotoxigenic E. coli (ETEC) lt/st, Shiga-like    toxin-producing E. coli (STEC) stx1/stx2,    Shigella/ Enteroinvasive E. coli (EIEC), Cryptosporidium,    Cyclospora cayetanensis, Entamoeba histolytica,    Giardia lamblia, Adenovirus F 40/41, Astrovirus,    Norovirus GI/GII, Rotavirus A, Sapovirus    Culture - Urine (collected 06-25-21 @ 15:50)  Source: .Urine Clean Catch (Midstream)  Final Report (06-28-21 @ 10:49):    >100,000 CFU/ml Escherichia coli  Organism: Escherichia coli (06-28-21 @ 10:49)  Organism: Escherichia coli (06-28-21 @ 10:49)      -  Amikacin: S <=16      -  Amoxicillin/Clavulanic Acid: S <=8/4      -  Ampicillin: S <=8 These ampicillin results predict results for amoxicillin      -  Ampicillin/Sulbactam: S <=4/2 Enterobacter, Citrobacter, and Serratia may develop resistance during prolonged therapy (3-4 days)      -  Aztreonam: S <=4      -  Cefazolin: S <=2 ([id number]) For uncomplicated UTI with K. pneumoniae, E. coli, or P. mirablis: GHISLAINE <=16 is sensitive and GHISLAINE >=32 is resistant. This also predicts results for oral agents cefaclor, cefdinir, cefpodoxime, cefprozil, cefuroxime axetil, cephalexin and locarbef for uncomplicated UTI. Note that some isolates may be susceptible to these agents while testing resistant to cefazolin.      -  Cefepime: S <=2      -  Cefotaxime: S <=2      -  Cefoxitin: S <=8      -  Ceftazidime: S <=1      -  Ceftriaxone: S <=1 Enterobacter, Citrobacter, and Serratia may develop resistance during prolonged therapy      -  Cefuroxime: S <=4      -  Ciprofloxacin: S <=0.25      -  Ertapenem: S <=0.5      -  Gentamicin: S <=2      -  Imipenem: S <=1      -  Levofloxacin: S <=0.5      -  Meropenem: S <=1      -  Minocycline: S <=4      -  Nitrofurantoin: S <=32 Should not be used to treat pyelonephritis      -  Piperacillin/Tazobactam: S <=8      -  Tigecycline: S <=2      -  Tobramycin: S <=2      -  Trimethoprim/Sulfamethoxazole: S <=0.5/9.5      Method Type: GHISLAINE    Culture - Blood (collected 06-25-21 @ 15:00)  Source: .Blood Blood  Preliminary Report (06-27-21 @ 01:01):    No growth to date.    Culture - Blood (collected 06-25-21 @ 15:00)  Source: .Blood Blood  Preliminary Report (06-27-21 @ 01:01):    No growth to date.            RADIOLOGY & ADDITIONAL TESTS:  Personal review of radiological diagnostics performed  Echo and EKG results noted when applicable.     MEDICATIONS:  acetaminophen   Tablet .. 650 milliGRAM(s) Oral every 6 hours PRN  ALBUTerol    90 MICROgram(s) HFA Inhaler 1 Puff(s) Inhalation every 4 hours  ALBUTerol    90 MICROgram(s) HFA Inhaler 2 Puff(s) Inhalation every 6 hours PRN  albuterol/ipratropium for Nebulization 3 milliLiter(s) Nebulizer every 6 hours  budesonide 160 MICROgram(s)/formoterol 4.5 MICROgram(s) Inhaler 2 Puff(s) Inhalation two times a day  cefTRIAXone   IVPB 2000 milliGRAM(s) IV Intermittent every 24 hours  methylPREDNISolone sodium succinate Injectable 60 milliGRAM(s) IV Push every 8 hours  metroNIDAZOLE  IVPB      metroNIDAZOLE  IVPB 500 milliGRAM(s) IV Intermittent every 8 hours  pantoprazole  Injectable 40 milliGRAM(s) IV Push two times a day  simethicone 80 milliGRAM(s) Chew daily  sucralfate 1 Gram(s) Oral every 6 hours  tiotropium 18 MICROgram(s) Capsule 1 Capsule(s) Inhalation daily      ANTIBIOTICS:  cefTRIAXone   IVPB 2000 milliGRAM(s) IV Intermittent every 24 hours  metroNIDAZOLE  IVPB      metroNIDAZOLE  IVPB 500 milliGRAM(s) IV Intermittent every 8 hours

## 2021-06-30 NOTE — PROGRESS NOTE ADULT - PROBLEM SELECTOR PLAN 5
-monitor fluid status  -cardio consult for colonoscopy procedure clearance -EF 63% with grade III diastolic dysfunction on 6/27)  -Resume HCTZ home dose once patient is cleared by GI and pulm    #alcohol use disorder  -CIWA protocol

## 2021-06-30 NOTE — PROGRESS NOTE ADULT - PROBLEM SELECTOR PLAN 1
-Solumedrol 60 mg qd today  -Prednisone taper tomorrow  -duoneb q8h and q6h prn  -Hold symbicort and spiriva for now  -Head of bed at 45 deg  -aspiration precautions

## 2021-06-30 NOTE — PROGRESS NOTE ADULT - SUBJECTIVE AND OBJECTIVE BOX
LOIS WEGENER 70y Female  MRN#: [id number]   Hospital Day: 5d    HPI:  70 yr F COPD O2, current smoker, ETOH abuse and h/o gastric bypass presented due to bloody stool and change in bowel habits.    the pt reports change in bowel habits 4 weeks ago, it started with having loose watery stool sometimes mixed with fresh blood and fecal incontinence,  3 days ago she noted dark black soft BM, and since then felt constipated and passed formed stool with fresh blood,   she also reports having distended abd since few months,  she denies fever, chills, n/v, abd pain, chest pain, SOB or urinary symptoms.  she reports that her father had Hx of fistula from his GI system.     ER: fever 106F, normal wbc, lactate 1.6, hbg 6.4, , new onset Afib on EKG, rectal exam showed blood in her clothes and at least 3 perianal fistula, CT abd  showed left sided colitis and 5 cm abscess at R gluteal area,, admitted for further veal.    (26 Jun 2021 00:00)      SUBJECTIVE  Patient is a 70y old Female who presents with a chief complaint of abd abscess (30 Jun 2021 02:00)  Currently admitted to medicine with the primary diagnosis of Colitis      INTERVAL HPI AND OVERNIGHT EVENTS:  Patient was examined and seen at bedside. This morning she is resting comfortably in bed and reports no issues or overnight events.    REVIEW OF SYMPTOMS:  CONSTITUTIONAL: No weakness, fevers or chills; No headaches  EYES: No visual changes, eye pain, or discharge  ENT: No vertigo; No ear pain or change in hearing; No sore throat or difficulty swallowing  NECK: No pain or stiffness  RESPIRATORY: No cough, wheezing, or hemoptysis; No shortness of breath  CARDIOVASCULAR: No chest pain or palpitations  GASTROINTESTINAL: No abdominal or epigastric pain; No nausea, vomiting, or hematemesis; No diarrhea or constipation; No melena or hematochezia  GENITOURINARY: No dysuria, frequency or hematuria  MUSCULOSKELETAL: No joint pain, no muscle pain, no weakness  NEUROLOGICAL: No numbness or weakness  SKIN: No itching or rashes    OBJECTIVE  PAST MEDICAL & SURGICAL HISTORY    ALLERGIES:  No Known Allergies    MEDICATIONS:  STANDING MEDICATIONS  ALBUTerol    90 MICROgram(s) HFA Inhaler 1 Puff(s) Inhalation every 4 hours  albuterol/ipratropium for Nebulization 3 milliLiter(s) Nebulizer every 6 hours  budesonide 160 MICROgram(s)/formoterol 4.5 MICROgram(s) Inhaler 2 Puff(s) Inhalation two times a day  cefTRIAXone   IVPB 2000 milliGRAM(s) IV Intermittent every 24 hours  methylPREDNISolone sodium succinate Injectable 60 milliGRAM(s) IV Push every 8 hours  metroNIDAZOLE  IVPB      metroNIDAZOLE  IVPB 500 milliGRAM(s) IV Intermittent every 8 hours  pantoprazole  Injectable 40 milliGRAM(s) IV Push two times a day  simethicone 80 milliGRAM(s) Chew daily  sucralfate 1 Gram(s) Oral every 6 hours  tiotropium 18 MICROgram(s) Capsule 1 Capsule(s) Inhalation daily    PRN MEDICATIONS  acetaminophen   Tablet .. 650 milliGRAM(s) Oral every 6 hours PRN  ALBUTerol    90 MICROgram(s) HFA Inhaler 2 Puff(s) Inhalation every 6 hours PRN      VITAL SIGNS: Last 24 Hours  T(C): 36.7 (29 Jun 2021 20:00), Max: 36.7 (29 Jun 2021 08:11)  T(F): 98 (29 Jun 2021 20:00), Max: 98 (29 Jun 2021 08:11)  HR: 65 (30 Jun 2021 00:00) (65 - 74)  BP: 119/56 (30 Jun 2021 00:00) (115/59 - 130/65)  BP(mean): --  RR: 18 (30 Jun 2021 00:00) (18 - 18)  SpO2: 96% (30 Jun 2021 00:00) (96% - 98%)    LABS:                        8.8    8.62  )-----------( 374      ( 29 Jun 2021 07:19 )             30.2     06-29    138  |  101  |  5<L>  ----------------------------<  103<H>  4.3   |  25  |  0.6<L>    Ca    8.2<L>      29 Jun 2021 07:19  Mg     1.8     06-29    TPro  5.9<L>  /  Alb  2.9<L>  /  TBili  0.4  /  DBili  x   /  AST  17  /  ALT  10  /  AlkPhos  94  06-29                  RADIOLOGY:    < from: CT Abdomen and Pelvis w/ Oral Cont and w/ IV Cont (06.25.21 @ 18:38) >    IMPRESSION:    Diffuse wall thickening of the descending colon, sigmoid colon, rectum, and anus with extensive surrounding soft tissue inflammation of the anus. Of note, extramural air visualized posterolateral to the anus.    Small bilateral pleural effusions.    5.3 cm subcutaneous fluid collection lateral to the right gluteus described above, which may represent an abscess.    3 mm right middle lobe subpleural solid pulmonary nodule. Per Fleischner Society guidelines, an optional follow up CT in 12 months is recommended in high risk patients.    < end of copied text >  < from: Xray Chest 1 View- PORTABLE-Urgent (Xray Chest 1 View- PORTABLE-Urgent .) (06.25.21 @ 16:20) >    Impression: Trace bilateral pleural effusion/opacities..      < end of copied text >        PHYSICAL EXAM: incomplete  CONSTITUTIONAL: No acute distress, well-developed, well-groomed, AAOx3  HEAD: Atraumatic, normocephalic  EYES: EOM intact, PERRLA, conjunctiva and sclera clear  ENT: Supple, no masses, no thyromegaly, no bruits, no JVD; moist mucous membranes  PULMONARY: Clear to auscultation bilaterally; no wheezes, rales, or rhonchi  CARDIOVASCULAR: Regular rate and rhythm; no murmurs, rubs, or gallops  GASTROINTESTINAL: Soft, non-tender, non-distended; bowel sounds present  MUSCULOSKELETAL: 2+ peripheral pulses; no clubbing, no cyanosis, no edema  NEUROLOGY: non-focal  SKIN: No rashes or lesions; warm and dry   LOIS WEGENER 70y Female  MRN#: [id number]   Hospital Day: 5d    HPI:  70 yr F COPD, former smoker quit one month ago, ETOH abuse and h/o gastric bypass presented due to bloody stool and change in bowel habits.    the pt reports change in bowel habits 4 weeks ago, it started with having loose watery stool sometimes mixed with fresh blood and fecal incontinence,  3 days ago she noted dark black soft BM, and since then felt constipated and passed formed stool with fresh blood,   she also reports having distended abd since few months,  she denies fever, chills, n/v, abd pain, chest pain, SOB or urinary symptoms.  she reports that her father had Hx of fistula from his GI system.     ER: fever 106F, normal wbc, lactate 1.6, hbg 6.4, , new onset Afib on EKG, rectal exam showed blood in her clothes and at least 3 perianal fistula, CT abd  showed left sided colitis and 5 cm abscess at R gluteal area,, admitted for further veal.    (26 Jun 2021 00:00)      SUBJECTIVE  Patient is a 70y old Female who presents with a chief complaint of abd abscess (30 Jun 2021 02:00)  Currently admitted to medicine with the primary diagnosis of Colitis      INTERVAL HPI AND OVERNIGHT EVENTS:  Patient was examined and seen in chair at bedside. She complains of bloating and gas. States she stopped smoking 1 month ago. No longer has blood in the stool. No wheezing.     REVIEW OF SYMPTOMS:  CONSTITUTIONAL: No weakness, fevers or chills; No headaches  EYES: No visual changes, eye pain, or discharge  ENT: No vertigo; No ear pain or change in hearing; No sore throat or difficulty swallowing  NECK: No pain or stiffness  RESPIRATORY: No cough, wheezing, or hemoptysis; No shortness of breath  CARDIOVASCULAR: No chest pain or palpitations  GASTROINTESTINAL: No abdominal or epigastric pain; No nausea, vomiting, or hematemesis; No diarrhea or constipation; No melena or hematochezia  GENITOURINARY: No dysuria, frequency or hematuria  MUSCULOSKELETAL: No joint pain, no muscle pain, no weakness  NEUROLOGICAL: No numbness or weakness  SKIN: No itching or rashes    OBJECTIVE  PAST MEDICAL & SURGICAL HISTORY    ALLERGIES:  No Known Allergies    MEDICATIONS:  STANDING MEDICATIONS  ALBUTerol    90 MICROgram(s) HFA Inhaler 1 Puff(s) Inhalation every 4 hours  albuterol/ipratropium for Nebulization 3 milliLiter(s) Nebulizer every 6 hours  budesonide 160 MICROgram(s)/formoterol 4.5 MICROgram(s) Inhaler 2 Puff(s) Inhalation two times a day  cefTRIAXone   IVPB 2000 milliGRAM(s) IV Intermittent every 24 hours  methylPREDNISolone sodium succinate Injectable 60 milliGRAM(s) IV Push every 8 hours  metroNIDAZOLE  IVPB      metroNIDAZOLE  IVPB 500 milliGRAM(s) IV Intermittent every 8 hours  pantoprazole  Injectable 40 milliGRAM(s) IV Push two times a day  simethicone 80 milliGRAM(s) Chew daily  sucralfate 1 Gram(s) Oral every 6 hours  tiotropium 18 MICROgram(s) Capsule 1 Capsule(s) Inhalation daily    PRN MEDICATIONS  acetaminophen   Tablet .. 650 milliGRAM(s) Oral every 6 hours PRN  ALBUTerol    90 MICROgram(s) HFA Inhaler 2 Puff(s) Inhalation every 6 hours PRN      VITAL SIGNS: Last 24 Hours  T(C): 36.7 (29 Jun 2021 20:00), Max: 36.7 (29 Jun 2021 08:11)  T(F): 98 (29 Jun 2021 20:00), Max: 98 (29 Jun 2021 08:11)  HR: 65 (30 Jun 2021 00:00) (65 - 74)  BP: 119/56 (30 Jun 2021 00:00) (115/59 - 130/65)  BP(mean): --  RR: 18 (30 Jun 2021 00:00) (18 - 18)  SpO2: 96% (30 Jun 2021 00:00) (96% - 98%)    LABS:                        8.8    8.62  )-----------( 374      ( 29 Jun 2021 07:19 )             30.2     06-29    138  |  101  |  5<L>  ----------------------------<  103<H>  4.3   |  25  |  0.6<L>    Ca    8.2<L>      29 Jun 2021 07:19  Mg     1.8     06-29    TPro  5.9<L>  /  Alb  2.9<L>  /  TBili  0.4  /  DBili  x   /  AST  17  /  ALT  10  /  AlkPhos  94  06-29                  RADIOLOGY:    < from: CT Abdomen and Pelvis w/ Oral Cont and w/ IV Cont (06.25.21 @ 18:38) >    IMPRESSION:    Diffuse wall thickening of the descending colon, sigmoid colon, rectum, and anus with extensive surrounding soft tissue inflammation of the anus. Of note, extramural air visualized posterolateral to the anus.    Small bilateral pleural effusions.    5.3 cm subcutaneous fluid collection lateral to the right gluteus described above, which may represent an abscess.    3 mm right middle lobe subpleural solid pulmonary nodule. Per Fleischner Society guidelines, an optional follow up CT in 12 months is recommended in high risk patients.    < end of copied text >  < from: Xray Chest 1 View- PORTABLE-Urgent (Xray Chest 1 View- PORTABLE-Urgent .) (06.25.21 @ 16:20) >    Impression: Trace bilateral pleural effusion/opacities..      < end of copied text >        PHYSICAL EXAM: incomplete  CONSTITUTIONAL: No acute distress, well-developed, well-groomed, AAOx3  HEAD: Atraumatic, normocephalic  EYES: EOM intact, PERRLA, conjunctiva and sclera clear  ENT: Supple, no masses, no thyromegaly, no bruits, no JVD; moist mucous membranes  PULMONARY: Clear to auscultation bilaterally; no wheezes, rales, or rhonchi  CARDIOVASCULAR: Regular rate and rhythm; no murmurs, rubs, or gallops  GASTROINTESTINAL: Soft, non-tender, non-distended; bowel sounds present  vascular: 2+ peripheral pulses; no clubbing, no cyanosis, no edema  SKIN: No rashes or lesions; warm and dry   LOIS WEGENER 70y Female  MRN#: [id number]   Hospital Day: 5d    HPI:  70 yr F COPD, former smoker quit one month ago, ETOH abuse and h/o gastric bypass presented due to bloody stool and change in bowel habits.    the pt reports change in bowel habits 4 weeks ago, it started with having loose watery stool sometimes mixed with fresh blood and fecal incontinence,  3 days ago she noted dark black soft BM, and since then felt constipated and passed formed stool with fresh blood,   she also reports having distended abd since few months,  she denies fever, chills, n/v, abd pain, chest pain, SOB or urinary symptoms.  she reports that her father had Hx of fistula from his GI system.     ER: fever 106F, normal wbc, lactate 1.6, hbg 6.4, , new onset Afib on EKG, rectal exam showed blood in her clothes and at least 3 perianal fistula, CT abd  showed left sided colitis and 5 cm abscess at R gluteal area,, admitted for further veal.    (26 Jun 2021 00:00)      SUBJECTIVE  Patient is a 70y old Female who presents with a chief complaint of abd abscess (30 Jun 2021 02:00)  Currently admitted to medicine with the primary diagnosis of Colitis      INTERVAL HPI AND OVERNIGHT EVENTS:  Patient was examined and seen in chair at bedside. She complains of bloating and gas. States she stopped smoking 1 month ago. No longer has blood in the stool. No wheezing.     REVIEW OF SYMPTOMS:  CONSTITUTIONAL: No weakness, fevers or chills; No headaches  EYES: No visual changes, eye pain, or discharge  ENT: No vertigo; No ear pain or change in hearing; No sore throat or difficulty swallowing  NECK: No pain or stiffness  RESPIRATORY: No cough, wheezing, or hemoptysis; No shortness of breath  CARDIOVASCULAR: No chest pain or palpitations  GASTROINTESTINAL: No abdominal or epigastric pain; No nausea, vomiting, or hematemesis; No diarrhea or constipation; No melena or hematochezia  GENITOURINARY: No dysuria, frequency or hematuria  MUSCULOSKELETAL: No joint pain, no muscle pain, no weakness  NEUROLOGICAL: No numbness or weakness  SKIN: No itching or rashes    OBJECTIVE  PAST MEDICAL & SURGICAL HISTORY    ALLERGIES:  No Known Allergies    MEDICATIONS:  STANDING MEDICATIONS  ALBUTerol    90 MICROgram(s) HFA Inhaler 1 Puff(s) Inhalation every 4 hours  albuterol/ipratropium for Nebulization 3 milliLiter(s) Nebulizer every 6 hours  budesonide 160 MICROgram(s)/formoterol 4.5 MICROgram(s) Inhaler 2 Puff(s) Inhalation two times a day  cefTRIAXone   IVPB 2000 milliGRAM(s) IV Intermittent every 24 hours  methylPREDNISolone sodium succinate Injectable 60 milliGRAM(s) IV Push every 8 hours  metroNIDAZOLE  IVPB      metroNIDAZOLE  IVPB 500 milliGRAM(s) IV Intermittent every 8 hours  pantoprazole  Injectable 40 milliGRAM(s) IV Push two times a day  simethicone 80 milliGRAM(s) Chew daily  sucralfate 1 Gram(s) Oral every 6 hours  tiotropium 18 MICROgram(s) Capsule 1 Capsule(s) Inhalation daily    PRN MEDICATIONS  acetaminophen   Tablet .. 650 milliGRAM(s) Oral every 6 hours PRN  ALBUTerol    90 MICROgram(s) HFA Inhaler 2 Puff(s) Inhalation every 6 hours PRN      VITAL SIGNS: Last 24 Hours  T(C): 36.7 (29 Jun 2021 20:00), Max: 36.7 (29 Jun 2021 08:11)  T(F): 98 (29 Jun 2021 20:00), Max: 98 (29 Jun 2021 08:11)  HR: 65 (30 Jun 2021 00:00) (65 - 74)  BP: 119/56 (30 Jun 2021 00:00) (115/59 - 130/65)  BP(mean): --  RR: 18 (30 Jun 2021 00:00) (18 - 18)  SpO2: 96% (30 Jun 2021 00:00) (96% - 98%)    LABS:                        8.8    8.62  )-----------( 374      ( 29 Jun 2021 07:19 )             30.2     06-29    138  |  101  |  5<L>  ----------------------------<  103<H>  4.3   |  25  |  0.6<L>    Ca    8.2<L>      29 Jun 2021 07:19  Mg     1.8     06-29    TPro  5.9<L>  /  Alb  2.9<L>  /  TBili  0.4  /  DBili  x   /  AST  17  /  ALT  10  /  AlkPhos  94  06-29                  RADIOLOGY:        < from: CT Abdomen and Pelvis w/ Oral Cont and w/ IV Cont (06.25.21 @ 18:38) >    IMPRESSION:    Diffuse wall thickening of the descending colon, sigmoid colon, rectum, and anus with extensive surrounding soft tissue inflammation of the anus. Of note, extramural air visualized posterolateral to the anus.    Small bilateral pleural effusions.    5.3 cm subcutaneous fluid collection lateral to the right gluteus described above, which may represent an abscess.    3 mm right middle lobe subpleural solid pulmonary nodule. Per Fleischner Society guidelines, an optional follow up CT in 12 months is recommended in high risk patients.    < end of copied text >  < from: Xray Chest 1 View- PORTABLE-Urgent (Xray Chest 1 View- PORTABLE-Urgent .) (06.25.21 @ 16:20) >    Impression: Trace bilateral pleural effusion/opacities..      < end of copied text >        PHYSICAL EXAM: incomplete  CONSTITUTIONAL: No acute distress, well-developed, well-groomed, AAOx3  HEAD: Atraumatic, normocephalic  EYES: EOM intact, PERRLA, conjunctiva and sclera clear  ENT: Supple, no masses, no thyromegaly, no bruits, no JVD; moist mucous membranes  PULMONARY: Clear to auscultation bilaterally; no wheezes, rales, or rhonchi  CARDIOVASCULAR: Regular rate and rhythm; no murmurs, rubs, or gallops  GASTROINTESTINAL: Soft, non-tender, non-distended; bowel sounds present  vascular: 2+ peripheral pulses; no clubbing, no cyanosis, no edema  SKIN: No rashes or lesions; warm and dry

## 2021-06-30 NOTE — PROGRESS NOTE ADULT - SUBJECTIVE AND OBJECTIVE BOX
GENERAL SURGERY PROGRESS NOTE    Patient: WEGENER, LOIS , 70y ([birth year])Female   MRN: [id number]  Location: [address]  Visit: 06-25-21 Inpatient  Date: 06-29-21 @ 09:28    Hospital Day #: 6      Procedure/Dx/Injuries: anal fistula, pilonidal sinus, perirectal abscess     Events of past 24 hours: CScope and EGD postponed 2/2 COPD exacerbation and poor prep         Vitals:   T(F): 98 (06-29-21 @ 20:00), Max: 98 (06-29-21 @ 08:11)  HR: 65 (06-30-21 @ 00:00)  BP: 119/56 (06-30-21 @ 00:00)  RR: 18 (06-30-21 @ 00:00)  SpO2: 96% (06-30-21 @ 00:00)      Diet, Soft  Diet, Full Liquid      Fluids:     I & O's:    06-28-21 @ 07:01  -  06-29-21 @ 07:00  --------------------------------------------------------  IN:    IV PiggyBack: 50 mL    IV PiggyBack: 100 mL  Total IN: 150 mL    OUT:    Voided (mL): 1110 mL  Total OUT: 1110 mL    Total NET: -960 mL        Bowel Movement: : [] YES [] NO  Flatus: : [] YES [] NO    PHYSICAL EXAM:  General Appearance: NAD  HEENT: EOMI, sclera non-icteric.  Heart: RRR   Lungs: No increased work of breathing or accessory muscle use. Symmetric chest wall rise and fall.   Rectal: Good tone, +stool, no blood, external hemorrhoids seen, perirectal abscess s/p I&D not actively draining, no packing   Abdomen:  Soft, nontender, nondistended.   MSK/Extremities: Warm & well-perfused.   Skin: Warm, dry. No jaundice.   Incision/wound: healing well, dressings in place, clean, dry and intact    MEDICATIONS  (STANDING):  ALBUTerol    90 MICROgram(s) HFA Inhaler 1 Puff(s) Inhalation every 4 hours  albuterol/ipratropium for Nebulization 3 milliLiter(s) Nebulizer every 6 hours  budesonide 160 MICROgram(s)/formoterol 4.5 MICROgram(s) Inhaler 2 Puff(s) Inhalation two times a day  cefTRIAXone   IVPB 2000 milliGRAM(s) IV Intermittent every 24 hours  methylPREDNISolone sodium succinate Injectable 60 milliGRAM(s) IV Push every 8 hours  metroNIDAZOLE  IVPB      metroNIDAZOLE  IVPB 500 milliGRAM(s) IV Intermittent every 8 hours  pantoprazole  Injectable 40 milliGRAM(s) IV Push two times a day  simethicone 80 milliGRAM(s) Chew daily  sucralfate 1 Gram(s) Oral every 6 hours  tiotropium 18 MICROgram(s) Capsule 1 Capsule(s) Inhalation daily    MEDICATIONS  (PRN):  acetaminophen   Tablet .. 650 milliGRAM(s) Oral every 6 hours PRN Temp greater or equal to 38C (100.4F), Mild Pain (1 - 3), Moderate Pain (4 - 6)  ALBUTerol    90 MICROgram(s) HFA Inhaler 2 Puff(s) Inhalation every 6 hours PRN Shortness of Breath and/or Wheezing      DVT PROPHYLAXIS:   GI PROPHYLAXIS: pantoprazole  Injectable 40 milliGRAM(s) IV Push two times a day    ANTICOAGULATION:   ANTIBIOTICS:  cefTRIAXone   IVPB 2000 milliGRAM(s)  metroNIDAZOLE  IVPB    metroNIDAZOLE  IVPB 500 milliGRAM(s)            LAB/STUDIES:  Labs:  CAPILLARY BLOOD GLUCOSE                              8.8    8.62  )-----------( 374      ( 29 Jun 2021 07:19 )             30.2       Auto Neutrophil %: 82.3 % (06-29-21 @ 07:19)  Auto Immature Granulocyte %: 1.2 % (06-29-21 @ 07:19)    06-29    138  |  101  |  5<L>  ----------------------------<  103<H>  4.3   |  25  |  0.6<L>      Calcium, Total Serum: 8.2 mg/dL (06-29-21 @ 07:19)      LFTs:             5.9  | 0.4  | 17       ------------------[94      ( 29 Jun 2021 07:19 )  2.9  | x    | 10          Serum Pro-Brain Natriuretic Peptide: 1509 pg/mL (06-25-21 @ 15:10)        ACCESS/ DEVICES:  [ x] Peripheral IV  [ ] Central Venous Line	[ ] R	[ ] L	[ ] IJ	[ ] Fem	[ ] SC	Placed:   [ ] Arterial Line		[ ] R	[ ] L	[ ] Fem	[ ] Rad	[ ] Ax	Placed:   [ ] PICC:					[ ] Mediport  [ ] Urinary Catheter,  Date Placed:   [ ] Chest tube: [ ] Right, [ ] Left  [ ] MARCO A/Nelson Drains  [ ] Wound vac      [facility] [facility] #[id number]

## 2021-06-30 NOTE — CONSULT NOTE ADULT - ASSESSMENT
IMPRESSION:     Atrial fibrillation, new onset, rate controlled off meds. CHADSVASC 3  Heart failure with preserved EF (EF 63% with grade III diastolic dysfunction on 6/27)  H/O smoking (quit one month ago)  Microcytic Anemia 2/2 GI bleed s/p 2U pRBCs  Sepsis POA 2/2 colitis + gluteal abscess with fistula   TSH 4.73    RECOMMENDATIONS:  - can resume home medication HCTZ 25mg daily   - lasix 40mg PO daily   - Recommend starting anticoagulation after the patient has been cleared by GI     * Patient-based characteristics (Functional capacity)  Patient is able to achieve more than 4 MET (walk 4 blocks, climb 2 flights of stairs, etc...)          Y [] / N [x]    High-risk patient features:  - Recent (<30 days) or active MI          Y [] / N [X]  - Unstable or severe angina          Y [] / N [X]  - Decompensated heart failure, or worsening or new-onset heart failure          Y [] / N [X]  - Severe valvular disease          Y [] / N [X]  - Significant arrhythmia (Tachy- or Bradyarrhythmia)          Y [] / N [X]    * Surgery/Procedure-based characteristics (Type of surgery)  - Low-risk procedure    * Revised Cardiac Risk Index (RCRI)  1- History of ischemic heart disease          Y [] / N [X]  2- History of congestive heart failure          Y [x] / N []  3- History of stroke/TIA          Y [] / N [X]  4- History of insulin-dependent diabetes          Y [] / N [X]  5- Chronic kidney disease (Cr >2mg/dL)          Y [] / N [X]  6- Undergoing suprainguinal vascular, intraperitoneal, or intrathoracic surgery          Y [] / N [X]    Class I risk (Zero factors) --> 3.9% (30-day risk of death, MI, or cardiac arrest)    * IMPRESSION & RECOMMENDATIONS:  Moderate risk patient for a Low-risk surgery/procedure  No further cardiac work-up is needed at the moment. There are no current cardiac contraindications to prevent from proceeding with the scheduled surgery/procedure.    - This consult serves only as a louise-operative cardiac risk stratification and evaluation to predict 30-days cardiac complications risk and mortality. The decision to proceed with the surgery/procedure is made by the performing physician and the patient -      *****THIS IS AN INCOMPLETE NOTE. FINAL RECOMMENDATIONS TO FOLLOW PENDING DISCUSSION WITH THE ATTENDING IMPRESSION:     Atrial fibrillation, new onset, rate controlled off meds. CHADSVASC 3  Heart failure with preserved EF (EF 63% with grade III diastolic dysfunction on 6/27)  H/O smoking (quit one month ago)  Microcytic Anemia 2/2 GI bleed s/p 2U pRBCs  Sepsis POA 2/2 colitis + gluteal abscess with fistula   TSH 4.73    RECOMMENDATIONS:  - can resume home medication HCTZ 25mg daily   - lasix 40mg PO daily   - Recommend starting anticoagulation after the patient has been cleared by GI     * Patient-based characteristics (Functional capacity)  Patient is able to achieve more than 4 MET (walk 4 blocks, climb 2 flights of stairs, etc...)          Y [] / N [x]    High-risk patient features:  - Recent (<30 days) or active MI          Y [] / N [X]  - Unstable or severe angina          Y [] / N [X]  - Decompensated heart failure, or worsening or new-onset heart failure          Y [] / N [X]  - Severe valvular disease          Y [] / N [X]  - Significant arrhythmia (Tachy- or Bradyarrhythmia)          Y [] / N [X]    * Surgery/Procedure-based characteristics (Type of surgery)  - Low-risk procedure    * Revised Cardiac Risk Index (RCRI)  1- History of ischemic heart disease          Y [] / N [X]  2- History of congestive heart failure          Y [x] / N []  3- History of stroke/TIA          Y [] / N [X]  4- History of insulin-dependent diabetes          Y [] / N [X]  5- Chronic kidney disease (Cr >2mg/dL)          Y [] / N [X]  6- Undergoing suprainguinal vascular, intraperitoneal, or intrathoracic surgery          Y [] / N [X]    Class II risk --> 6.0% (30-day risk of death, MI, or cardiac arrest)    * IMPRESSION & RECOMMENDATIONS:  Moderate risk patient for a Low-risk surgery/procedure  No further cardiac work-up is needed at the moment. There are no current cardiac contraindications to prevent from proceeding with the scheduled surgery/procedure.    - This consult serves only as a louise-operative cardiac risk stratification and evaluation to predict 30-days cardiac complications risk and mortality. The decision to proceed with the surgery/procedure is made by the performing physician and the patient -      *****THIS IS AN INCOMPLETE NOTE. FINAL RECOMMENDATIONS TO FOLLOW PENDING DISCUSSION WITH THE ATTENDING IMPRESSION:     Atrial fibrillation, new onset, rate controlled off meds. CHADSVASC 3  Heart failure with preserved EF (EF 63% with grade III diastolic dysfunction on 6/27)  H/O smoking (quit one month ago)  Microcytic Anemia 2/2 GI bleed s/p 2U pRBCs  Sepsis POA 2/2 colitis + gluteal abscess with fistula   TSH 4.73    RECOMMENDATIONS:  - Recommend IV diuresis, monitor daily Is and Os and daily weights, adjust dose accordingly    - Recommend starting anticoagulation after the patient has been cleared by GI   - Once stabilized can start beta blocker     PREOP RISK ASSESSMENT   * Patient-based characteristics (Functional capacity)  Patient is able to achieve more than 4 MET (walk 4 blocks, climb 2 flights of stairs, etc...)          Y [] / N [x]    High-risk patient features:  - Recent (<30 days) or active MI          Y [] / N [X]  - Unstable or severe angina          Y [] / N [X]  - Decompensated heart failure, or worsening or new-onset heart failure          Y [] / N [X]  - Severe valvular disease          Y [] / N [X]  - Significant arrhythmia (Tachy- or Bradyarrhythmia)          Y [] / N [X]    * Surgery/Procedure-based characteristics (Type of surgery)  - Low-risk procedure    * Revised Cardiac Risk Index (RCRI)  1- History of ischemic heart disease          Y [] / N [X]  2- History of congestive heart failure          Y [x] / N []  3- History of stroke/TIA          Y [] / N [X]  4- History of insulin-dependent diabetes          Y [] / N [X]  5- Chronic kidney disease (Cr >2mg/dL)          Y [] / N [X]  6- Undergoing suprainguinal vascular, intraperitoneal, or intrathoracic surgery          Y [] / N [X]    Class II risk --> 6.0% (30-day risk of death, MI, or cardiac arrest)    * IMPRESSION & RECOMMENDATIONS:  Moderate risk patient for a Low-risk surgery/procedure  No further cardiac work-up is needed at the moment. There are no current cardiac contraindications to prevent from proceeding with the scheduled surgery/procedure.    - This consult serves only as a louise-operative cardiac risk stratification and evaluation to predict 30-days cardiac complications risk and mortality. The decision to proceed with the surgery/procedure is made by the performing physician and the patient -      *****THIS IS AN INCOMPLETE NOTE. FINAL RECOMMENDATIONS TO FOLLOW PENDING DISCUSSION WITH THE ATTENDING IMPRESSION:     Atrial fibrillation, new onset, rate controlled off meds. CHADSVASC 3  Heart failure with preserved EF (EF 63% with grade III diastolic dysfunction on 6/27)  H/O smoking (quit one month ago)  Microcytic Anemia 2/2 GI bleed s/p 2U pRBCs  Sepsis POA 2/2 colitis + gluteal abscess with fistula   TSH 4.73    RECOMMENDATIONS:  - Can resume home HCTZ 25mg PO daily if tolerated by blood pressure   - Recommend starting anticoagulation after the patient has been cleared by GI and primary team    PREOP RISK ASSESSMENT   * Patient-based characteristics (Functional capacity)  Patient is able to achieve more than 4 MET (walk 4 blocks, climb 2 flights of stairs, etc...)          Y [] / N [x]    High-risk patient features:  - Recent (<30 days) or active MI          Y [] / N [X]  - Unstable or severe angina          Y [] / N [X]  - Decompensated heart failure, or worsening or new-onset heart failure          Y [] / N [X]  - Severe valvular disease          Y [] / N [X]  - Significant arrhythmia (Tachy- or Bradyarrhythmia)          Y [] / N [X]    * Surgery/Procedure-based characteristics (Type of surgery)  - Low-risk procedure    * Revised Cardiac Risk Index (RCRI)  1- History of ischemic heart disease          Y [] / N [X]  2- History of congestive heart failure          Y [x] / N []  3- History of stroke/TIA          Y [] / N [X]  4- History of insulin-dependent diabetes          Y [] / N [X]  5- Chronic kidney disease (Cr >2mg/dL)          Y [] / N [X]  6- Undergoing suprainguinal vascular, intraperitoneal, or intrathoracic surgery          Y [] / N [X]    Class II risk --> 6.0% (30-day risk of death, MI, or cardiac arrest)    * IMPRESSION & RECOMMENDATIONS:  Moderate risk patient for a Low-risk surgery/procedure  No further cardiac work-up is needed at the moment. There are no current cardiac contraindications to prevent from proceeding with the scheduled surgery/procedure.    - This consult serves only as a louise-operative cardiac risk stratification and evaluation to predict 30-days cardiac complications risk and mortality. The decision to proceed with the surgery/procedure is made by the performing physician and the patient -      *****THIS IS AN INCOMPLETE NOTE. FINAL RECOMMENDATIONS TO FOLLOW PENDING DISCUSSION WITH THE ATTENDING IMPRESSION:     Atrial fibrillation, new onset, rate controlled off meds. CHADSVASC 3  Heart failure with preserved EF (EF 63% with grade III diastolic dysfunction on 6/27)  H/O smoking (quit one month ago)  Microcytic Anemia 2/2 GI bleed s/p 2U pRBCs  Sepsis POA 2/2 colitis + gluteal abscess with fistula   TSH 4.73    RECOMMENDATIONS:  - Can resume home HCTZ 25mg PO daily if tolerated by blood pressure   - Recommend starting anticoagulation after the patient has been cleared by GI and primary team    PREOP RISK ASSESSMENT   * Patient-based characteristics (Functional capacity)  Patient is able to achieve more than 4 MET (walk 4 blocks, climb 2 flights of stairs, etc...)          Y [] / N [x]    High-risk patient features:  - Recent (<30 days) or active MI          Y [] / N [X]  - Unstable or severe angina          Y [] / N [X]  - Decompensated heart failure, or worsening or new-onset heart failure          Y [] / N [X]  - Severe valvular disease          Y [] / N [X]  - Significant arrhythmia (Tachy- or Bradyarrhythmia)          Y [] / N [X]    * Surgery/Procedure-based characteristics (Type of surgery)  - Low-risk procedure    * Revised Cardiac Risk Index (RCRI)  1- History of ischemic heart disease          Y [] / N [X]  2- History of congestive heart failure          Y [x] / N []  3- History of stroke/TIA          Y [] / N [X]  4- History of insulin-dependent diabetes          Y [] / N [X]  5- Chronic kidney disease (Cr >2mg/dL)          Y [] / N [X]  6- Undergoing suprainguinal vascular, intraperitoneal, or intrathoracic surgery          Y [] / N [X]    Class II risk --> 6.0% (30-day risk of death, MI, or cardiac arrest)    * IMPRESSION & RECOMMENDATIONS:  Moderate risk patient for a Low-risk surgery/procedure  No further cardiac work-up is needed at the moment. There are no current cardiac contraindications to prevent from proceeding with the scheduled surgery/procedure.    - This consult serves only as a louise-operative cardiac risk stratification and evaluation to predict 30-days cardiac complications risk and mortality. The decision to proceed with the surgery/procedure is made by the performing physician and the patient -

## 2021-06-30 NOTE — PROGRESS NOTE ADULT - SUBJECTIVE AND OBJECTIVE BOX
Patient is a 70y old  Female who presents with a chief complaint of abd abscess (30 Jun 2021 09:41)    SUBJECTIVE:  Feels much better today    REVIEW OF SYSTEMS:  All Pertinent ROS are negative except per HPI     PHYSICAL EXAM  Vital Signs Last 24 Hrs  T(C): 36.3 (30 Jun 2021 06:47), Max: 36.7 (29 Jun 2021 20:00)  T(F): 97.4 (30 Jun 2021 06:47), Max: 98 (29 Jun 2021 20:00)  HR: 72 (30 Jun 2021 06:47) (65 - 74)  BP: 136/77 (30 Jun 2021 06:47) (115/59 - 136/77)  RR: 18 (30 Jun 2021 06:47) (18 - 18)  SpO2: 96% (30 Jun 2021 00:00) (96% - 98%)    CONSTITUTIONAL:  Well nourished.  NAD    ENT:   Airway patent,   Mouth with normal mucosa.   No thrush    EYES:   pupils equal,   round and reactive to light.    CARDIAC:   Normal rate,   Irregular rhythm.    Heart sounds S1, S2.   mild lower extremity edema    RESPIRATORY:   Good air entry bilaterally  No audible wheezes  Normal chest expansion  No use of accessory muscles    GASTROINTESTINAL:  Abdomen soft   Non-tender,   No guarding,   + BS    MUSCULOSKELETAL:   Range of motion is not limited,  No clubbing, cyanosis    NEUROLOGICAL:  Alert and oriented   No motor or sensory deficits.  Pertinent DTRs normal    SKIN:   Skin normal color for race,   Warm and dry  No evidence of rash.  perirectal abscess    PSYCHIATRIC:   Normal mood and affect.   No apparent risk to self or others.    06-29-21 @ 07:01  -  06-30-21 @ 07:00  --------------------------------------------------------  IN:    Oral Fluid: 900 mL  Total IN: 900 mL    OUT:    Incontinent per Collection Bag (mL): 1000 mL    Voided (mL): 800 mL  Total OUT: 1800 mL    Total NET: -900 mL      06-30-21 @ 07:01  -  06-30-21 @ 09:53  --------------------------------------------------------  IN:    Oral Fluid: 240 mL  Total IN: 240 mL    OUT:  Total OUT: 0 mL    Total NET: 240 mL      LABS:                          8.8    8.62  )-----------( 374      ( 29 Jun 2021 07:19 )             30.2     138  |  101  |  5<L>  ----------------------------<  103<H>  4.3   |  25  |  0.6<L>    Ca    8.2<L>      29 Jun 2021 07:19  Mg     1.8     06-29    TPro  5.9<L>  /  Alb  2.9<L>  /  TBili  0.4  /  DBili  x   /  AST  17  /  ALT  10  /  AlkPhos  94  06-29    LIVER FUNCTIONS - ( 29 Jun 2021 07:19 )  Alb: 2.9 g/dL / Pro: 5.9 g/dL / ALK PHOS: 94 U/L / ALT: 10 U/L / AST: 17 U/L / GGT: x                                                MEDICATIONS  (STANDING):  ALBUTerol    90 MICROgram(s) HFA Inhaler 1 Puff(s) Inhalation every 4 hours  albuterol/ipratropium for Nebulization 3 milliLiter(s) Nebulizer every 6 hours  budesonide 160 MICROgram(s)/formoterol 4.5 MICROgram(s) Inhaler 2 Puff(s) Inhalation two times a day  cefTRIAXone   IVPB 2000 milliGRAM(s) IV Intermittent every 24 hours  methylPREDNISolone sodium succinate Injectable 60 milliGRAM(s) IV Push every 8 hours  metroNIDAZOLE  IVPB      metroNIDAZOLE  IVPB 500 milliGRAM(s) IV Intermittent every 8 hours  pantoprazole  Injectable 40 milliGRAM(s) IV Push two times a day  simethicone 80 milliGRAM(s) Chew daily  sucralfate 1 Gram(s) Oral every 6 hours  tiotropium 18 MICROgram(s) Capsule 1 Capsule(s) Inhalation daily    MEDICATIONS  (PRN):  acetaminophen   Tablet .. 650 milliGRAM(s) Oral every 6 hours PRN Temp greater or equal to 38C (100.4F), Mild Pain (1 - 3), Moderate Pain (4 - 6)  ALBUTerol    90 MICROgram(s) HFA Inhaler 2 Puff(s) Inhalation every 6 hours PRN Shortness of Breath and/or Wheezing

## 2021-06-30 NOTE — CHART NOTE - NSCHARTNOTEFT_GEN_A_CORE
patient has been cleared by pulmonary team for EGD/colonoscopy.    Rec:  - clear liquid diet  - Golytely 4L start at 4pm  - dulcolax 20 mg po once at 10 pm  - NPO after midnight  - CBC, BMP, PT/INR at 16:00  - active type and screen

## 2021-06-30 NOTE — PROGRESS NOTE ADULT - ASSESSMENT
Impression:  COPD exacerbation, improving  Heavy smoker  RML 3mm solid pulmonary nodule  AFib  HO HFpEF (III), moderate MR  From the pulmonary stand point, favor delaying non urgent procedure until more compensated       Recommendations:  Solumedrol 60mg qd today  Prednisone taper tomorrow  Duoneb q8h and q6h prn  Hold Symbicort and Spiriva at this time  Maximize cardiac therapy, rate control and volume status  EGD & Colonoscopy by GI once clinically stable  ABX per ID, full RVP  HOB @ 45 degrees  Aspiration precautions.    Outpatient Pulmonary FU for PFTs & CT   Impression:  COPD exacerbation, improved  Heavy smoker  RML 3mm solid pulmonary nodule  AFib  HO HFpEF (III), moderate MR  From the pulmonary stand point, patient is stable for procedure      Recommendations:  Solumedrol 60mg qd today  Prednisone taper tomorrow  Duoneb q8h and q6h prn  Hold Symbicort and Spiriva at this time  Maximize cardiac therapy, rate control and volume status  EGD & Colonoscopy by GI once clinically stable  ABX per ID, full RVP  HOB @ 45 degrees  Aspiration precautions.    Outpatient Pulmonary FU for PFTs & CT   Impression:  COPD exacerbation, improved  Heavy smoker  RML 3mm solid pulmonary nodule  AFib  HO HFpEF (III), moderate MR  From the pulmonary stand point, patient is stable for endoscopy       Recommendations:  Solumedrol 60mg qd today  Prednisone taper tomorrow  Duoneb q8h and q6h prn  Hold Symbicort and Spiriva at this time  Maximize cardiac therapy, rate control and volume status  ABX per ID, full RVP  HOB @ 45 degrees  Aspiration precautions.    Outpatient Pulmonary FU for PFTs & CT

## 2021-06-30 NOTE — PROGRESS NOTE ADULT - ASSESSMENT
ASSESSMENT:  70y F w/ PMHx of COPD, gastric bypass presents with perirectal abscess, black stools, fever, leukocytosis. Surgery consulted and bedside I&D performed. EGD and colonoscopy scheduled for 6/28 cancelled 2/2 COPD exacerbation and poor prep.    PLAN:  - Cscope and EGD- GI states patient is not scheduled for scopes at this time  - Pt should have repeat CT Pelvis with PO and IV contrast to assess abscess and pathology again. this was relayed to day time twice yesterday  - pulm for COPD control   - surgery following   - follow h/h    BLUE TEAM [id number]

## 2021-06-30 NOTE — PROGRESS NOTE ADULT - ASSESSMENT
This is a 70 year old female former smoker (stopped one month ago) with COPD, ethoh use disorderm and h/o of gastric bypass who presented with bloody stool and change in bowel habits. While awaiting colonoscopy, pt had COPD exacerbation.  This is a 70 year old female former smoker (stopped one month ago) with COPD, ethoh use disorder and h/o of gastric bypass who presented with bloody stool and change in bowel habits. CT abd showed possible 5cm Rt gluteal abscess. In hospital while awaiting procedure pt began to have inc sob, and progressive cough with phlegm. Pt also had episode of tachycardia and ECG revealed new onset Afib.

## 2021-06-30 NOTE — PROGRESS NOTE ADULT - PROBLEM SELECTOR PLAN 3
-rate controlled, off meds  -Repeat 12 Lead ECG  -monitor; possibly restart anticoag after fistula and colitis managed

## 2021-07-01 ENCOUNTER — TRANSCRIPTION ENCOUNTER (OUTPATIENT)
Age: 71
End: 2021-07-01

## 2021-07-01 ENCOUNTER — RESULT REVIEW (OUTPATIENT)
Age: 71
End: 2021-07-01

## 2021-07-01 LAB
BASOPHILS # BLD AUTO: 0.01 K/UL — SIGNIFICANT CHANGE UP (ref 0–0.2)
BASOPHILS NFR BLD AUTO: 0.1 % — SIGNIFICANT CHANGE UP (ref 0–1)
CULTURE RESULTS: SIGNIFICANT CHANGE UP
CULTURE RESULTS: SIGNIFICANT CHANGE UP
EOSINOPHIL # BLD AUTO: 0 K/UL — SIGNIFICANT CHANGE UP (ref 0–0.7)
EOSINOPHIL NFR BLD AUTO: 0 % — SIGNIFICANT CHANGE UP (ref 0–8)
HCT VFR BLD CALC: 30.1 % — LOW (ref 37–47)
HGB BLD-MCNC: 8.6 G/DL — LOW (ref 12–16)
IMM GRANULOCYTES NFR BLD AUTO: 0.5 % — HIGH (ref 0.1–0.3)
LYMPHOCYTES # BLD AUTO: 0.98 K/UL — LOW (ref 1.2–3.4)
LYMPHOCYTES # BLD AUTO: 8.6 % — LOW (ref 20.5–51.1)
MCHC RBC-ENTMCNC: 24 PG — LOW (ref 27–31)
MCHC RBC-ENTMCNC: 28.6 G/DL — LOW (ref 32–37)
MCV RBC AUTO: 83.8 FL — SIGNIFICANT CHANGE UP (ref 81–99)
MONOCYTES # BLD AUTO: 0.37 K/UL — SIGNIFICANT CHANGE UP (ref 0.1–0.6)
MONOCYTES NFR BLD AUTO: 3.3 % — SIGNIFICANT CHANGE UP (ref 1.7–9.3)
NEUTROPHILS # BLD AUTO: 9.93 K/UL — HIGH (ref 1.4–6.5)
NEUTROPHILS NFR BLD AUTO: 87.5 % — HIGH (ref 42.2–75.2)
NRBC # BLD: 0 /100 WBCS — SIGNIFICANT CHANGE UP (ref 0–0)
PLATELET # BLD AUTO: 325 K/UL — SIGNIFICANT CHANGE UP (ref 130–400)
RBC # BLD: 3.59 M/UL — LOW (ref 4.2–5.4)
RBC # FLD: 26.8 % — HIGH (ref 11.5–14.5)
SPECIMEN SOURCE: SIGNIFICANT CHANGE UP
SPECIMEN SOURCE: SIGNIFICANT CHANGE UP
WBC # BLD: 11.35 K/UL — HIGH (ref 4.8–10.8)
WBC # FLD AUTO: 11.35 K/UL — HIGH (ref 4.8–10.8)

## 2021-07-01 PROCEDURE — 88312 SPECIAL STAINS GROUP 1: CPT | Mod: 26

## 2021-07-01 PROCEDURE — 45385 COLONOSCOPY W/LESION REMOVAL: CPT

## 2021-07-01 PROCEDURE — 45380 COLONOSCOPY AND BIOPSY: CPT | Mod: XU

## 2021-07-01 PROCEDURE — 99233 SBSQ HOSP IP/OBS HIGH 50: CPT

## 2021-07-01 PROCEDURE — 43239 EGD BIOPSY SINGLE/MULTIPLE: CPT | Mod: XS

## 2021-07-01 PROCEDURE — 88305 TISSUE EXAM BY PATHOLOGIST: CPT | Mod: 26

## 2021-07-01 RX ORDER — FUROSEMIDE 40 MG
40 TABLET ORAL ONCE
Refills: 0 | Status: COMPLETED | OUTPATIENT
Start: 2021-07-01 | End: 2021-07-01

## 2021-07-01 RX ADMIN — CEFTRIAXONE 100 MILLIGRAM(S): 500 INJECTION, POWDER, FOR SOLUTION INTRAMUSCULAR; INTRAVENOUS at 11:36

## 2021-07-01 RX ADMIN — Medication 100 MILLIGRAM(S): at 22:20

## 2021-07-01 RX ADMIN — PANTOPRAZOLE SODIUM 40 MILLIGRAM(S): 20 TABLET, DELAYED RELEASE ORAL at 06:42

## 2021-07-01 RX ADMIN — Medication 100 MILLIGRAM(S): at 06:37

## 2021-07-01 RX ADMIN — Medication 1 GRAM(S): at 06:42

## 2021-07-01 RX ADMIN — Medication 1 MILLIGRAM(S): at 11:35

## 2021-07-01 RX ADMIN — Medication 40 MILLIGRAM(S): at 17:46

## 2021-07-01 RX ADMIN — Medication 100 MILLIGRAM(S): at 11:35

## 2021-07-01 RX ADMIN — Medication 3 MILLILITER(S): at 08:38

## 2021-07-01 RX ADMIN — SIMETHICONE 80 MILLIGRAM(S): 80 TABLET, CHEWABLE ORAL at 11:35

## 2021-07-01 RX ADMIN — PANTOPRAZOLE SODIUM 40 MILLIGRAM(S): 20 TABLET, DELAYED RELEASE ORAL at 17:42

## 2021-07-01 RX ADMIN — Medication 1 GRAM(S): at 11:35

## 2021-07-01 RX ADMIN — Medication 100 MILLIGRAM(S): at 17:43

## 2021-07-01 RX ADMIN — Medication 40 MILLIGRAM(S): at 06:42

## 2021-07-01 RX ADMIN — Medication 1 GRAM(S): at 17:42

## 2021-07-01 NOTE — CHART NOTE - NSCHARTNOTEFT_GEN_A_CORE
PACU ANESTHESIA ADMISSION NOTE      Procedure:   Post op diagnosis:          __x__  Patent Airway    ____  Full return of protective reflexes    __x__  Full recovery from anesthesia / back to baseline     Vitals:   see anesthesia record      Mental Status:  __x__ Awake   _____ Alert   _____ Drowsy   _____ Sedated    Nausea/Vomiting:  __x__ NO  ______Yes,   See Post - Op Orders          Pain Scale (0-10):  _____    Treatment: ____ None    ___x_ See Post - Op/PCA Orders    Post - Operative Fluids:   ____ Oral   __x__ See Post - Op Orders    Plan: Discharge:   ____Home       _x____Floor     _____Critical Care    _____  Other:_________________    Comments:  Uneventful intraoperative course. No anesthesia issues or complications noted. Patient stable upon arrival to PACU. Report given to RN. Discharge when criteria met.

## 2021-07-01 NOTE — PROGRESS NOTE ADULT - SUBJECTIVE AND OBJECTIVE BOX
WEGENER, LOIS  70y Female    CHIEF COMPLAINT:    Patient is a 70y old  Female who presents with a chief complaint of abd abscess (01 Jul 2021 11:27)      INTERVAL HPI/OVERNIGHT EVENTS:    Patient seen and examined. No acute events overnight. Feels well, denies any complaints     ROS: All other systems are negative.    Vital Signs:    T(F): 98.4 (07-01-21 @ 13:31), Max: 98.4 (07-01-21 @ 13:31)  HR: 56 (07-01-21 @ 15:02) (50 - 61)  BP: 133/73 (07-01-21 @ 15:02) (124/62 - 133/73)  RR: 18 (07-01-21 @ 15:02) (18 - 18)  SpO2: 92% (07-01-21 @ 07:59) (92% - 92%)    30 Jun 2021 07:01  -  01 Jul 2021 07:00  --------------------------------------------------------  IN: 360 mL / OUT: 1800 mL / NET: -1440 mL    01 Jul 2021 07:01  -  01 Jul 2021 15:19  --------------------------------------------------------  IN: 0 mL / OUT: 475 mL / NET: -475 mL    Daily Height in cm: 170.18 (01 Jul 2021 15:02)    Daily     PHYSICAL EXAM:    GENERAL:  NAD  SKIN: No rashes or lesions  HEENT: Atraumatic. Normocephalic.   NECK: Supple, No JVD.    PULMONARY: fine bibasilar crackles. No wheezing. No rales  CVS: Normal S1, S2. Rate and Rhythm are regular.    ABDOMEN/GI: Soft, Nontender, mildly distended; BS present  MSK:  No clubbing or cyanosis   NEUROLOGIC: moves all extremities.  PSYCH: Alert & oriented x 3, normal affect    Consultant(s) Notes Reviewed:  [x ] YES  [ ] NO  Care Discussed with Consultants/Other Providers [ x] YES  [ ] NO    LABS:                        8.8    10.36 )-----------( 340      ( 30 Jun 2021 17:29 )             29.7     06-30    132<L>  |  99  |  8<L>  ----------------------------<  111<H>  4.2   |  23  |  0.5<L>    Ca    8.5      30 Jun 2021 17:29  Phos  2.9     06-30  Mg     1.9     06-30    TPro  6.0  /  Alb  3.2<L>  /  TBili  0.4  /  DBili  x   /  AST  15  /  ALT  10  /  AlkPhos  88  06-30    PT/INR - ( 30 Jun 2021 17:29 )   PT: 12.70 sec;   INR: 1.10 ratio      PTT - ( 30 Jun 2021 17:29 )  PTT:28.4 sec  Serum Pro-Brain Natriuretic Peptide: 1509 pg/mL (06-25-21 @ 15:10)    RADIOLOGY & ADDITIONAL TESTS:  Imaging or report Personally Reviewed:  [x] YES  [ ] NO  EKG reviewed: [x] YES  [ ] NO    Medications:  Standing  albuterol/ipratropium for Nebulization 3 milliLiter(s) Nebulizer every 8 hours  cefTRIAXone   IVPB 2000 milliGRAM(s) IV Intermittent every 24 hours  folic acid 1 milliGRAM(s) Oral daily  metroNIDAZOLE  IVPB      metroNIDAZOLE  IVPB 500 milliGRAM(s) IV Intermittent every 8 hours  pantoprazole  Injectable 40 milliGRAM(s) IV Push two times a day  predniSONE   Tablet 40 milliGRAM(s) Oral daily  simethicone 80 milliGRAM(s) Chew daily  sucralfate 1 Gram(s) Oral every 6 hours  thiamine 100 milliGRAM(s) Oral daily    PRN Meds  acetaminophen   Tablet .. 650 milliGRAM(s) Oral every 6 hours PRN  ALBUTerol    90 MICROgram(s) HFA Inhaler 2 Puff(s) Inhalation every 6 hours PRN

## 2021-07-01 NOTE — CHART NOTE - NSCHARTNOTESELECT_GEN_ALL_CORE
Transfer Note
Event Note
Gastroenetrology/Event Note
PACU Admission/Event Note
Pre-op Note/Event Note

## 2021-07-01 NOTE — PROGRESS NOTE ADULT - SUBJECTIVE AND OBJECTIVE BOX
LOIS WEGENER 70y Female  MRN#: [id number]   Hospital Day: 6d    HPI:  70 yr F COPD not on home O2, former smoker, ETOH abuse and h/o gastric bypass presented due to bloody stool and change in bowel habits.    the pt reports change in bowel habits 4 weeks ago, it started with having loose watery stool sometimes mixed with fresh blood and fecal incontinence,  3 days ago she noted dark black soft BM, and since then felt constipated and passed formed stool with fresh blood,   she also reports having distended abd since few months,  she denies fever, chills, n/v, abd pain, chest pain, SOB or urinary symptoms.  she reports that her father had Hx of fistula from his GI system.     ER: fever 106F, normal wbc, lactate 1.6, hbg 6.4, , new onset Afib on EKG, rectal exam showed blood in her clothes and at least 3 perianal fistula, CT abd  showed left sided colitis and 5 cm abscess at R gluteal area,, admitted for further eval.    (26 Jun 2021 00:00)      SUBJECTIVE  Patient is a 70y old Female who presents with a chief complaint of abd abscess (30 Jun 2021 16:49)  Currently admitted to medicine with the primary diagnosis of Colitis      INTERVAL HPI AND OVERNIGHT EVENTS:  Patient was examined and seen at bedside. Pt     OBJECTIVE  PAST MEDICAL & SURGICAL HISTORY    ALLERGIES:  No Known Allergies    MEDICATIONS:  STANDING MEDICATIONS  albuterol/ipratropium for Nebulization 3 milliLiter(s) Nebulizer every 8 hours  cefTRIAXone   IVPB 2000 milliGRAM(s) IV Intermittent every 24 hours  folic acid 1 milliGRAM(s) Oral daily  metroNIDAZOLE  IVPB      metroNIDAZOLE  IVPB 500 milliGRAM(s) IV Intermittent every 8 hours  pantoprazole  Injectable 40 milliGRAM(s) IV Push two times a day  predniSONE   Tablet 40 milliGRAM(s) Oral daily  simethicone 80 milliGRAM(s) Chew daily  sucralfate 1 Gram(s) Oral every 6 hours  thiamine 100 milliGRAM(s) Oral daily    PRN MEDICATIONS  acetaminophen   Tablet .. 650 milliGRAM(s) Oral every 6 hours PRN  ALBUTerol    90 MICROgram(s) HFA Inhaler 2 Puff(s) Inhalation every 6 hours PRN      VITAL SIGNS: Last 24 Hours  T(C): 35.7 (01 Jul 2021 07:59), Max: 36.6 (30 Jun 2021 14:02)  T(F): 96.2 (01 Jul 2021 07:59), Max: 97.9 (30 Jun 2021 14:02)  HR: 61 (01 Jul 2021 08:04) (50 - 61)  BP: 124/62 (01 Jul 2021 07:59) (115/63 - 124/62)  BP(mean): --  RR: 18 (01 Jul 2021 07:59) (18 - 18)  SpO2: 92% (01 Jul 2021 07:59) (88% - 92%)    LABS:                        8.8    10.36 )-----------( 340      ( 30 Jun 2021 17:29 )             29.7     06-30    132<L>  |  99  |  8<L>  ----------------------------<  111<H>  4.2   |  23  |  0.5<L>    Ca    8.5      30 Jun 2021 17:29  Phos  2.9     06-30  Mg     1.9     06-30    TPro  6.0  /  Alb  3.2<L>  /  TBili  0.4  /  DBili  x   /  AST  15  /  ALT  10  /  AlkPhos  88  06-30    PT/INR - ( 30 Jun 2021 17:29 )   PT: 12.70 sec;   INR: 1.10 ratio         PTT - ( 30 Jun 2021 17:29 )  PTT:28.4 sec              RADIOLOGY:  < from: CT Abdomen and Pelvis w/ Oral Cont and w/ IV Cont (06.30.21 @ 19:21) >    IMPRESSION:  Since June 25, 2021:    1. No significant change in gluteal subcutaneous stranding with foci of subcutaneous emphysema within right medial gluteal fold and perianal region, suggestive of soft tissue infection; evaluation for small abscesses and fistulas limited by modality. Previously described a gluteal abscess not definitely delineated on current CT.    2. Decreased colonic wall thickening,which may represent resolving colitis.    3. Increased small bilateral pleural effusions.      < end of copied text >    < from: CT Abdomen and Pelvis w/ Oral Cont and w/ IV Cont (06.25.21 @ 18:38) >    IMPRESSION:    Diffuse wall thickening of the descending colon, sigmoid colon, rectum, and anus with extensive surrounding soft tissue inflammation of the anus. Of note, extramural air visualized posterolateral to the anus.    Small bilateral pleural effusions.    5.3 cm subcutaneous fluid collection lateral to the right gluteus described above, which may represent an abscess.    3 mm right middle lobe subpleural solid pulmonary nodule. Per Fleischner Society guidelines, an optional follow up CT in 12 months is recommended in high risk patients.    < end of copied text >    < from: Xray Chest 1 View- PORTABLE-Urgent (Xray Chest 1 View- PORTABLE-Urgent .) (06.30.21 @ 10:35) >    Impression:    1. Stable small bibasilar opacities, possibly atelectasis.    < end of copied text >  < from: VA Duplex Lower Ext Vein Scan, Bilat (06.29.21 @ 14:53) >  Impression:    No evidence of deep venous thrombosis or superficial thrombophlebitis in the bilateral lower extremities.    < end of copied text >        PHYSICAL EXAM:  CONSTITUTIONAL: No acute distress, well-developed, well-groomed, AAOx3  HEAD: Atraumatic, normocephalic  EYES: EOM intact, PERRLA, conjunctiva and sclera clear  ENT: Supple, no masses, no thyromegaly, no bruits, no JVD; moist mucous membranes  PULMONARY: Clear to auscultation bilaterally; no wheezes, rales, or rhonchi  CARDIOVASCULAR: Regular rate and rhythm; no murmurs, rubs, or gallops  GASTROINTESTINAL: Soft, non-tender, non-distended; bowel sounds present  MUSCULOSKELETAL: 2+ peripheral pulses; no clubbing, no cyanosis, no edema  NEUROLOGY: non-focal  SKIN: No rashes or lesions; warm and dry   LOIS WEGENER 70y Female  MRN#: [id number]   Hospital Day: 6d    HPI:  70 yr F COPD not on home O2, former smoker, ETOH abuse and h/o gastric bypass presented due to bloody stool and change in bowel habits.    the pt reports change in bowel habits 4 weeks ago, it started with having loose watery stool sometimes mixed with fresh blood and fecal incontinence,  3 days ago she noted dark black soft BM, and since then felt constipated and passed formed stool with fresh blood,   she also reports having distended abd since few months,  she denies fever, chills, n/v, abd pain, chest pain, SOB or urinary symptoms.  she reports that her father had Hx of fistula from his GI system.     ER: fever 106F, normal wbc, lactate 1.6, hbg 6.4, , new onset Afib on EKG, rectal exam showed blood in her clothes and at least 3 perianal fistula, CT abd  showed left sided colitis and 5 cm abscess at R gluteal area,, admitted for further eval.    (26 Jun 2021 00:00)      SUBJECTIVE  Patient is a 70y old Female who presents with a chief complaint of abd abscess (30 Jun 2021 16:49)  Currently admitted to medicine with the primary diagnosis of Colitis      INTERVAL HPI AND OVERNIGHT EVENTS:  Patient was examined and seen at bedside. Pt looks uncomfortable. Pt complains of loose stools due to colonoscopy prep.    OBJECTIVE  PAST MEDICAL & SURGICAL HISTORY    ALLERGIES:  No Known Allergies    MEDICATIONS:  STANDING MEDICATIONS  albuterol/ipratropium for Nebulization 3 milliLiter(s) Nebulizer every 8 hours  cefTRIAXone   IVPB 2000 milliGRAM(s) IV Intermittent every 24 hours  folic acid 1 milliGRAM(s) Oral daily  metroNIDAZOLE  IVPB      metroNIDAZOLE  IVPB 500 milliGRAM(s) IV Intermittent every 8 hours  pantoprazole  Injectable 40 milliGRAM(s) IV Push two times a day  predniSONE   Tablet 40 milliGRAM(s) Oral daily  simethicone 80 milliGRAM(s) Chew daily  sucralfate 1 Gram(s) Oral every 6 hours  thiamine 100 milliGRAM(s) Oral daily    PRN MEDICATIONS  acetaminophen   Tablet .. 650 milliGRAM(s) Oral every 6 hours PRN  ALBUTerol    90 MICROgram(s) HFA Inhaler 2 Puff(s) Inhalation every 6 hours PRN      VITAL SIGNS: Last 24 Hours  T(C): 35.7 (01 Jul 2021 07:59), Max: 36.6 (30 Jun 2021 14:02)  T(F): 96.2 (01 Jul 2021 07:59), Max: 97.9 (30 Jun 2021 14:02)  HR: 61 (01 Jul 2021 08:04) (50 - 61)  BP: 124/62 (01 Jul 2021 07:59) (115/63 - 124/62)  BP(mean): --  RR: 18 (01 Jul 2021 07:59) (18 - 18)  SpO2: 92% (01 Jul 2021 07:59) (88% - 92%)    LABS:                        8.8    10.36 )-----------( 340      ( 30 Jun 2021 17:29 )             29.7     06-30    132<L>  |  99  |  8<L>  ----------------------------<  111<H>  4.2   |  23  |  0.5<L>    Ca    8.5      30 Jun 2021 17:29  Phos  2.9     06-30  Mg     1.9     06-30    TPro  6.0  /  Alb  3.2<L>  /  TBili  0.4  /  DBili  x   /  AST  15  /  ALT  10  /  AlkPhos  88  06-30    PT/INR - ( 30 Jun 2021 17:29 )   PT: 12.70 sec;   INR: 1.10 ratio         PTT - ( 30 Jun 2021 17:29 )  PTT:28.4 sec              RADIOLOGY:  < from: CT Abdomen and Pelvis w/ Oral Cont and w/ IV Cont (06.30.21 @ 19:21) >    IMPRESSION:  Since June 25, 2021:    1. No significant change in gluteal subcutaneous stranding with foci of subcutaneous emphysema within right medial gluteal fold and perianal region, suggestive of soft tissue infection; evaluation for small abscesses and fistulas limited by modality. Previously described a gluteal abscess not definitely delineated on current CT.    2. Decreased colonic wall thickening,which may represent resolving colitis.    3. Increased small bilateral pleural effusions.      < end of copied text >    < from: CT Abdomen and Pelvis w/ Oral Cont and w/ IV Cont (06.25.21 @ 18:38) >    IMPRESSION:    Diffuse wall thickening of the descending colon, sigmoid colon, rectum, and anus with extensive surrounding soft tissue inflammation of the anus. Of note, extramural air visualized posterolateral to the anus.    Small bilateral pleural effusions.    5.3 cm subcutaneous fluid collection lateral to the right gluteus described above, which may represent an abscess.    3 mm right middle lobe subpleural solid pulmonary nodule. Per Fleischner Society guidelines, an optional follow up CT in 12 months is recommended in high risk patients.    < end of copied text >    < from: Xray Chest 1 View- PORTABLE-Urgent (Xray Chest 1 View- PORTABLE-Urgent .) (06.30.21 @ 10:35) >    Impression:    1. Stable small bibasilar opacities, possibly atelectasis.    < end of copied text >  < from: VA Duplex Lower Ext Vein Scan, Bilat (06.29.21 @ 14:53) >  Impression:    No evidence of deep venous thrombosis or superficial thrombophlebitis in the bilateral lower extremities.    < end of copied text >        PHYSICAL EXAM:  CONSTITUTIONAL: No acute distress, well-developed, well-groomed, AAOx3  HEAD: Atraumatic, normocephalic  EYES: EOM intact, PERRLA, conjunctiva and sclera clear  ENT: Supple, no masses, no thyromegaly, no bruits, no JVD; moist mucous membranes  PULMONARY: Clear to auscultation bilaterally; no wheezes, rales, or rhonchi  CARDIOVASCULAR: Regular rate and rhythm; no murmurs, rubs, or gallops  GASTROINTESTINAL: Soft, non-tender, non-distended; bowel sounds present  MUSCULOSKELETAL: , b/l leg edema  NEUROLOGY: non-focal  SKIN: lower leg rashes bilaterally

## 2021-07-01 NOTE — PROGRESS NOTE ADULT - ASSESSMENT
70 yr F COPD O2, current smoker, ETOH abuse and h/o gastric bypass presented due to bloody stool and change in bowel habits. Admitted for GIB, and found to have diffuse colitis c/b colonic fistulas and rectal abscess.    Hematochezia/Pancolitis  Sepsis, POA due to Colitis   Gluteal abscess and fistula  CT Abdomen/pelvis: Diffuse wall thickening of the descending colon, sigmoid colon, rectum, and anus, with extensive surround tissue inflammation of the anus  Hg has been stable s/p 2U transfusion  Cont ceftriaxone/flagyl for now  cleared by pulm and cardio, for colonoscopy today  repeat CT A/P with IV and po contrast 6/30 unchanged, f/u colorectal sx  monitor CBC/keep active T&S    COPD Exacerbation  s/p IV steroids, now better  currently on prednisone taper   nebs Q8H and PRN  hold inhalers for now, will need full pulm eval as OP    Chronic HFpEF  clinically euvolemic but worsening b/l pleural effusions   avoid fluid overload, give lasix 40 x1 today   monitor I&O    Suspected persistent atrial fibrillation  Currently rate controlled off medications  Cont to monitor, AC to be possibly initiated after colitis and fistulas are managed    EtOH abuse with suspected folate/thiamine deficiency  -WA protocol monitoring   c/w  folate and thiamine supplementation     DVT PPX, SCD    #Progress Note Handoff  Pending (specify): Colonoscopy today, ct surgery f/u, stability of Hb and downtitrating supplemental o2 requirements   Family discussion: plan of care discussed with patient, aware and agreeable   Disposition: Home

## 2021-07-01 NOTE — PRE-ANESTHESIA EVALUATION ADULT - NSANTHOSAYNRD_GEN_A_CORE
No. OSEAS screening performed.  STOP BANG Legend: 0-2 = LOW Risk; 3-4 = INTERMEDIATE Risk; 5-8 = HIGH Risk
No. OSEAS screening performed.  STOP BANG Legend: 0-2 = LOW Risk; 3-4 = INTERMEDIATE Risk; 5-8 = HIGH Risk

## 2021-07-01 NOTE — PRE-ANESTHESIA EVALUATION ADULT - NSANTHPMHFT_GEN_ALL_CORE
70 yr F COPD O2, current smoker, ETOH abuse and h/o gastric bypass presented due to bloody stool and change in bowel habits.
COPD, Ex smoker, ETOH abuse, Gastric bypass (1999)

## 2021-07-01 NOTE — PHYSICAL THERAPY INITIAL EVALUATION ADULT - GAIT DEVIATIONS NOTED, PT EVAL
decreased darnell/decreased velocity of limb motion/decreased step length/decreased weight-shifting ability

## 2021-07-01 NOTE — PRE-ANESTHESIA EVALUATION ADULT - ANESTHESIA, PREVIOUS REACTION, PROFILE
states occasional nausea with anesthesia/nausea/vomiting
states occasional nausea with anesthesia/nausea/vomiting

## 2021-07-01 NOTE — DISCHARGE NOTE NURSING/CASE MANAGEMENT/SOCIAL WORK - PATIENT PORTAL LINK FT
You can access the FollowMyHealth Patient Portal offered by [facility] by registering at the following website: [email or web address]. By joining [employer]’s FollowMyHealth portal, you will also be able to view your health information using other applications (apps) compatible with our system.

## 2021-07-01 NOTE — PROGRESS NOTE ADULT - ASSESSMENT
· Assessment	  This is a 70 year old female former smoker (stopped one month ago) with COPD, ethoh use disorder and h/o of gastric bypass who presented with bloody stool and change in bowel habits. CT abd showed possible 5cm Rt gluteal abscess. In hospital while awaiting procedure pt began to have inc sob, and progressive cough with phlegm. Pt also had episode of tachycardia and ECG revealed new onset Afib.     #sepsis on POA #colitis #gluteal abscess    #HFpEF  (EF 63% with grade III diastolic dysfunction on 6/27)      #COPD exacerbation    #new onset afib  rate controlled off meds. CHADSVASC 3    #alcohol use disorder  -monitor; patient on CIWA protocol      Dispo: Pending colonoscopy · Assessment	  This is a 70 year old female former smoker (stopped one month ago) with COPD, ethoh use disorder and h/o of gastric bypass who presented with bloody stool and change in bowel habits. CT abd showed possible 5cm Rt gluteal abscess. In hospital while awaiting procedure pt began to have inc sob, and progressive cough with phlegm. Pt also had episode of tachycardia and ECG revealed new onset Afib.     #sepsis on POA #colitis #gluteal abscess    #HFpEF  (EF 63% with grade III diastolic dysfunction on 6/27)  -Lasix 40 mg x1 today  -monitoring I&O;s    RO #persistent atrial fib  -controlled off meds  -will modify management after management of fistulas and colitis (consider Anticoag)    #COPD exacerbation  -s/p iv steroids now on steroid taper pred 40 mg 5 days followed by pred 20 mg 5 days (d/c with course if pt leaves before completion)  #new onset afib  rate controlled off meds. CHADSVASC 3    #alcohol use disorder  -monitor; patient on CIWA protocol  -thiamine and folate IV supplementation      Dispo: Pending colonoscopy; discuss with colorectal about their rec's · Assessment	  This is a 70 year old female former smoker (stopped one month ago) with COPD, ethoh use disorder and h/o of gastric bypass who presented with bloody stool and change in bowel habits. CT abd showed possible 5cm Rt gluteal abscess. In hospital while awaiting procedure pt began to have inc sob, and progressive cough with phlegm. Pt also had episode of tachycardia and ECG revealed new onset Afib.     #sepsis on POA #colitis #gluteal abscess    #HFpEF  (EF 63% with grade III diastolic dysfunction on 6/27)  -Lasix 40 mg x1 today  -monitoring I&O;s    RO #persistent atrial fib  -controlled off meds  -will modify management after management of fistulas and colitis (consider Anticoag)    #COPD exacerbation  -s/p iv steroids now on steroid taper pred 40 mg 5 days followed by pred 20 mg 5 days (d/c with course if pt leaves before completion)  #new onset afib  rate controlled off meds. CHADSVASC 3    #alcohol use disorder  -monitor; patient on CIWA protocol  -thiamine and folate IV supplementation    #Candida  -Found on EGD; fluconazole recommended      Dispo: Pending colonoscopy; discuss with colorectal about their rec's

## 2021-07-01 NOTE — CHART NOTE - NSCHARTNOTEFT_GEN_A_CORE
Pre-Op Note    Patient: LOIS WEGENER  MRN: [id number]  70y Female  Location: [facility] [id number] 018 B  21 @ 17:38    Admit Diagnosis: COLITIS;PERIANAL INFECTION;GI BLEED        Procedure: Exam under anesthesia I&D of perirectal collection   Consent in Chart: [ ] Yes [ ] No  Diet: Diet, NPO after Midnight:      NPO Start Date: 2021,   NPO Start Time: 23:59  Except Medications (21 @ 21:57)  Diet, NPO after Midnight:      NPO Start Date: 2021,   NPO Start Time: 23:59  Except Medications (21 @ 21:14)    Fluids: folic acid 1 milliGRAM(s) Oral daily  thiamine 100 milliGRAM(s) Oral daily    EK Lead ECG:   Ventricular Rate 85 BPM    Atrial Rate 80 BPM    QRS Duration 94 ms    Q-T Interval 392 ms    QTC Calculation(Bazett) 466 ms    R Axis 103 degrees    T Axis 62 degrees    Diagnosis Line Atrial fibrillation  Rightward axis  Low voltage QRS  Incomplete right bundle branch block  Abnormal ECG    Confirmed by Dino Mcclure ([id number]) on 2021 6:06:54 PM (21 @ 16:18)    CXR:  Xray Chest 1 View- PORTABLE-Urgent:     EXAM:  XR CHEST PORTABLE URGENT 1V            PROCEDURE DATE:  2021        INTERPRETATION:  Clinical History / Reason for exam: COPD exacerbation.    Comparison : Chest radiograph dated 2021.    Technique/Positioning: Portable frontal.    Findings:    Support devices: Overlying EKG leads.    Cardiac/mediastinum/hilum: Stable.    Lung parenchyma/Pleura: Stable small bibasilar opacities. No pneumothorax.    Skeleton/soft tissues: Stable.    Impression:    1. Stable small bibasilar opacities, possibly atelectasis.                  CINDY DOWLING MD; Attending Radiologist  This document has been electronically signed. 2021 10:50AM (21 @ 10:35)      Vitals:  T(F): 97.7 (21 @ 16:40), Max: 98.4 (21 @ 13:31)  HR: 96 (21 @ 16:55) (50 - 96)  BP: 133/73 (21 @ 15:02) (124/62 - 133/73)  RR: 22 (21 @ 16:55) (18 - 34)  SpO2: 92% (21 @ 07:59) (92% - 92%)    Pre-OP Labs:  CAPILLARY BLOOD GLUCOSE                              8.8    10.36 )-----------( 340      ( 2021 17:29 )             29.7         132<L>  |  99  |  8<L>  ----------------------------<  111<H>  4.2   |  23  |  0.5<L>    Ca    8.5      2021 17:29  Phos  2.9       Mg     1.9         TPro  6.0  /  Alb  3.2<L>  /  TBili  0.4  /  DBili  x   /  AST  15  /  ALT  10  /  AlkPhos  88  30    PT/INR - ( 2021 17:29 )   PT: 12.70 sec;   INR: 1.10 ratio         PTT - ( 2021 17:29 )  PTT:28.4 sec      Type & Screen: ABO RH Interpretation: A POS (21 @ 17:29)      Pregnancy Test:  N/A    COVID: SARS-CoV-2: NotDetec (2021 10:50)  COVID-19 PCR: NotDetec (2021 15:00) Pre-Op Note    Patient: LOIS WEGENER  MRN: [id number]  70y Female  Location: [facility] T6-3C 018 B  21 @ 17:38    Admit Diagnosis: COLITIS;PERIANAL INFECTION;GI BLEED        Procedure: Exam under anesthesia I&D of perirectal collection   Consent in Chart: [ ] Yes [x] No  Diet: Diet, NPO after Midnight:      NPO Start Date: 2021,   NPO Start Time: 23:59  Except Medications (21 @ 21:57)  Diet, NPO after Midnight:      NPO Start Date: 2021,   NPO Start Time: 23:59  Except Medications (21 @ 21:14)    Fluids: folic acid 1 milliGRAM(s) Oral daily  thiamine 100 milliGRAM(s) Oral daily    EK Lead ECG:   Ventricular Rate 85 BPM    Atrial Rate 80 BPM    QRS Duration 94 ms    Q-T Interval 392 ms    QTC Calculation(Bazett) 466 ms    R Axis 103 degrees    T Axis 62 degrees    Diagnosis Line Atrial fibrillation  Rightward axis  Low voltage QRS  Incomplete right bundle branch block  Abnormal ECG    Confirmed by Dino Mcclure ([id number]) on 2021 6:06:54 PM (21 @ 16:18)    CXR:  Xray Chest 1 View- PORTABLE-Urgent:     EXAM:  XR CHEST PORTABLE URGENT 1V            PROCEDURE DATE:  2021        INTERPRETATION:  Clinical History / Reason for exam: COPD exacerbation.    Comparison : Chest radiograph dated 2021.    Technique/Positioning: Portable frontal.    Findings:    Support devices: Overlying EKG leads.    Cardiac/mediastinum/hilum: Stable.    Lung parenchyma/Pleura: Stable small bibasilar opacities. No pneumothorax.    Skeleton/soft tissues: Stable.    Impression:    1. Stable small bibasilar opacities, possibly atelectasis.                  CINDY DOWLING MD; Attending Radiologist  This document has been electronically signed. 2021 10:50AM (21 @ 10:35)      Vitals:  T(F): 97.7 (21 @ 16:40), Max: 98.4 (21 @ 13:31)  HR: 96 (21 @ 16:55) (50 - 96)  BP: 133/73 (21 @ 15:02) (124/62 - 133/73)  RR: 22 (21 @ 16:55) (18 - 34)  SpO2: 92% (21 @ 07:59) (92% - 92%)    Pre-OP Labs:  CAPILLARY BLOOD GLUCOSE                              8.8    10.36 )-----------( 340      ( 2021 17:29 )             29.7         132<L>  |  99  |  8<L>  ----------------------------<  111<H>  4.2   |  23  |  0.5<L>    Ca    8.5      2021 17:29  Phos  2.9       Mg     1.9         TPro  6.0  /  Alb  3.2<L>  /  TBili  0.4  /  DBili  x   /  AST  15  /  ALT  10  /  AlkPhos  88  30    PT/INR - ( 2021 17:29 )   PT: 12.70 sec;   INR: 1.10 ratio         PTT - ( 2021 17:29 )  PTT:28.4 sec      Type & Screen: ABO RH Interpretation: A POS (21 @ 17:29)      Pregnancy Test:  N/A    COVID: SARS-CoV-2: NotDetec (2021 10:50)  COVID-19 PCR: NotDetec (2021 15:00)

## 2021-07-01 NOTE — PHYSICAL THERAPY INITIAL EVALUATION ADULT - PERTINENT HX OF CURRENT PROBLEM, REHAB EVAL
70 yr F COPD O2, current smoker, ETOH abuse and h/o gastric bypass presented due to bloody stool and change in bowel habits. Admitted for GIB, and found to have diffuse colitis c/b colonic fistulas and rectal abscess COPD exacerbation

## 2021-07-01 NOTE — PHYSICAL THERAPY INITIAL EVALUATION ADULT - BED MOBILITY TRAINING, PT EVAL
Goal: pt will come supine to sit to supine with supervision by discharge to facilitate return to PLOF.

## 2021-07-02 LAB
ALBUMIN SERPL ELPH-MCNC: 3 G/DL — LOW (ref 3.5–5.2)
ALP SERPL-CCNC: 79 U/L — SIGNIFICANT CHANGE UP (ref 30–115)
ALT FLD-CCNC: 11 U/L — SIGNIFICANT CHANGE UP (ref 0–41)
ANION GAP SERPL CALC-SCNC: 11 MMOL/L — SIGNIFICANT CHANGE UP (ref 7–14)
ANION GAP SERPL CALC-SCNC: 9 MMOL/L — SIGNIFICANT CHANGE UP (ref 7–14)
AST SERPL-CCNC: 19 U/L — SIGNIFICANT CHANGE UP (ref 0–41)
BASOPHILS # BLD AUTO: 0 K/UL — SIGNIFICANT CHANGE UP (ref 0–0.2)
BASOPHILS NFR BLD AUTO: 0 % — SIGNIFICANT CHANGE UP (ref 0–1)
BILIRUB SERPL-MCNC: 0.4 MG/DL — SIGNIFICANT CHANGE UP (ref 0.2–1.2)
BUN SERPL-MCNC: 8 MG/DL — LOW (ref 10–20)
BUN SERPL-MCNC: 9 MG/DL — LOW (ref 10–20)
CALCIUM SERPL-MCNC: 8.2 MG/DL — LOW (ref 8.5–10.1)
CALCIUM SERPL-MCNC: 8.3 MG/DL — LOW (ref 8.5–10.1)
CHLORIDE SERPL-SCNC: 100 MMOL/L — SIGNIFICANT CHANGE UP (ref 98–110)
CHLORIDE SERPL-SCNC: 100 MMOL/L — SIGNIFICANT CHANGE UP (ref 98–110)
CO2 SERPL-SCNC: 26 MMOL/L — SIGNIFICANT CHANGE UP (ref 17–32)
CO2 SERPL-SCNC: 29 MMOL/L — SIGNIFICANT CHANGE UP (ref 17–32)
CREAT SERPL-MCNC: 0.6 MG/DL — LOW (ref 0.7–1.5)
CREAT SERPL-MCNC: 0.7 MG/DL — SIGNIFICANT CHANGE UP (ref 0.7–1.5)
EOSINOPHIL # BLD AUTO: 0 K/UL — SIGNIFICANT CHANGE UP (ref 0–0.7)
EOSINOPHIL NFR BLD AUTO: 0 % — SIGNIFICANT CHANGE UP (ref 0–8)
GLUCOSE SERPL-MCNC: 72 MG/DL — SIGNIFICANT CHANGE UP (ref 70–99)
GLUCOSE SERPL-MCNC: 96 MG/DL — SIGNIFICANT CHANGE UP (ref 70–99)
HCT VFR BLD CALC: 31 % — LOW (ref 37–47)
HGB BLD-MCNC: 8.9 G/DL — LOW (ref 12–16)
IMM GRANULOCYTES NFR BLD AUTO: 0.6 % — HIGH (ref 0.1–0.3)
IRON SATN MFR SERPL: 18 UG/DL — LOW (ref 35–150)
IRON SATN MFR SERPL: 6 % — LOW (ref 15–50)
LYMPHOCYTES # BLD AUTO: 1.1 K/UL — LOW (ref 1.2–3.4)
LYMPHOCYTES # BLD AUTO: 13.8 % — LOW (ref 20.5–51.1)
MAGNESIUM SERPL-MCNC: 1.8 MG/DL — SIGNIFICANT CHANGE UP (ref 1.8–2.4)
MAGNESIUM SERPL-MCNC: 1.9 MG/DL — SIGNIFICANT CHANGE UP (ref 1.8–2.4)
MCHC RBC-ENTMCNC: 24.3 PG — LOW (ref 27–31)
MCHC RBC-ENTMCNC: 28.7 G/DL — LOW (ref 32–37)
MCV RBC AUTO: 84.7 FL — SIGNIFICANT CHANGE UP (ref 81–99)
MONOCYTES # BLD AUTO: 0.33 K/UL — SIGNIFICANT CHANGE UP (ref 0.1–0.6)
MONOCYTES NFR BLD AUTO: 4.1 % — SIGNIFICANT CHANGE UP (ref 1.7–9.3)
NEUTROPHILS # BLD AUTO: 6.52 K/UL — HIGH (ref 1.4–6.5)
NEUTROPHILS NFR BLD AUTO: 81.5 % — HIGH (ref 42.2–75.2)
NRBC # BLD: 0 /100 WBCS — SIGNIFICANT CHANGE UP (ref 0–0)
PHOSPHATE SERPL-MCNC: 3.2 MG/DL — SIGNIFICANT CHANGE UP (ref 2.1–4.9)
PLATELET # BLD AUTO: 298 K/UL — SIGNIFICANT CHANGE UP (ref 130–400)
POTASSIUM SERPL-MCNC: 3.4 MMOL/L — LOW (ref 3.5–5)
POTASSIUM SERPL-MCNC: 3.8 MMOL/L — SIGNIFICANT CHANGE UP (ref 3.5–5)
POTASSIUM SERPL-SCNC: 3.4 MMOL/L — LOW (ref 3.5–5)
POTASSIUM SERPL-SCNC: 3.8 MMOL/L — SIGNIFICANT CHANGE UP (ref 3.5–5)
PROT SERPL-MCNC: 5.6 G/DL — LOW (ref 6–8)
RBC # BLD: 3.66 M/UL — LOW (ref 4.2–5.4)
RBC # BLD: 3.69 M/UL — LOW (ref 4.2–5.4)
RBC # FLD: 26.7 % — HIGH (ref 11.5–14.5)
RETICS #: 102.2 K/UL — SIGNIFICANT CHANGE UP (ref 25–125)
RETICS/RBC NFR: 2.8 % — HIGH (ref 0.5–1.5)
SARS-COV-2 RNA SPEC QL NAA+PROBE: SIGNIFICANT CHANGE UP
SODIUM SERPL-SCNC: 137 MMOL/L — SIGNIFICANT CHANGE UP (ref 135–146)
SODIUM SERPL-SCNC: 138 MMOL/L — SIGNIFICANT CHANGE UP (ref 135–146)
TIBC SERPL-MCNC: 297 UG/DL — SIGNIFICANT CHANGE UP (ref 220–430)
TRANSFERRIN SERPL-MCNC: 242 MG/DL — SIGNIFICANT CHANGE UP (ref 200–360)
UIBC SERPL-MCNC: 279 UG/DL — SIGNIFICANT CHANGE UP (ref 110–370)
WBC # BLD: 8 K/UL — SIGNIFICANT CHANGE UP (ref 4.8–10.8)
WBC # FLD AUTO: 8 K/UL — SIGNIFICANT CHANGE UP (ref 4.8–10.8)

## 2021-07-02 PROCEDURE — 99233 SBSQ HOSP IP/OBS HIGH 50: CPT

## 2021-07-02 RX ORDER — METHOCARBAMOL 500 MG/1
500 TABLET, FILM COATED ORAL
Refills: 0 | Status: DISCONTINUED | OUTPATIENT
Start: 2021-07-02 | End: 2021-07-07

## 2021-07-02 RX ORDER — POTASSIUM CHLORIDE 20 MEQ
40 PACKET (EA) ORAL ONCE
Refills: 0 | Status: COMPLETED | OUTPATIENT
Start: 2021-07-02 | End: 2021-07-02

## 2021-07-02 RX ORDER — POTASSIUM CHLORIDE 20 MEQ
20 PACKET (EA) ORAL EVERY 4 HOURS
Refills: 0 | Status: COMPLETED | OUTPATIENT
Start: 2021-07-02 | End: 2021-07-02

## 2021-07-02 RX ORDER — DULOXETINE HYDROCHLORIDE 30 MG/1
60 CAPSULE, DELAYED RELEASE ORAL DAILY
Refills: 0 | Status: DISCONTINUED | OUTPATIENT
Start: 2021-07-02 | End: 2021-07-07

## 2021-07-02 RX ADMIN — Medication 100 MILLIGRAM(S): at 06:24

## 2021-07-02 RX ADMIN — Medication 50 MILLIEQUIVALENT(S): at 12:00

## 2021-07-02 RX ADMIN — PANTOPRAZOLE SODIUM 40 MILLIGRAM(S): 20 TABLET, DELAYED RELEASE ORAL at 17:21

## 2021-07-02 RX ADMIN — Medication 1 GRAM(S): at 06:24

## 2021-07-02 RX ADMIN — Medication 1 GRAM(S): at 17:21

## 2021-07-02 RX ADMIN — Medication 1 GRAM(S): at 23:00

## 2021-07-02 RX ADMIN — Medication 100 MILLIGRAM(S): at 11:40

## 2021-07-02 RX ADMIN — Medication 100 MILLIGRAM(S): at 13:15

## 2021-07-02 RX ADMIN — Medication 1 MILLIGRAM(S): at 11:39

## 2021-07-02 RX ADMIN — Medication 1 GRAM(S): at 00:38

## 2021-07-02 RX ADMIN — CEFTRIAXONE 100 MILLIGRAM(S): 500 INJECTION, POWDER, FOR SOLUTION INTRAMUSCULAR; INTRAVENOUS at 11:37

## 2021-07-02 RX ADMIN — Medication 100 MILLIGRAM(S): at 21:55

## 2021-07-02 RX ADMIN — Medication 1 GRAM(S): at 11:40

## 2021-07-02 RX ADMIN — METHOCARBAMOL 500 MILLIGRAM(S): 500 TABLET, FILM COATED ORAL at 23:47

## 2021-07-02 RX ADMIN — PANTOPRAZOLE SODIUM 40 MILLIGRAM(S): 20 TABLET, DELAYED RELEASE ORAL at 06:24

## 2021-07-02 RX ADMIN — SIMETHICONE 80 MILLIGRAM(S): 80 TABLET, CHEWABLE ORAL at 11:39

## 2021-07-02 RX ADMIN — Medication 40 MILLIGRAM(S): at 06:24

## 2021-07-02 RX ADMIN — Medication 40 MILLIEQUIVALENT(S): at 17:58

## 2021-07-02 NOTE — PROGRESS NOTE ADULT - TIME BILLING
Counseled patient about diagnostic testing and treatment plan. All questions answered.

## 2021-07-02 NOTE — PROGRESS NOTE ADULT - SUBJECTIVE AND OBJECTIVE BOX
WEGENER, LOIS  70y Female    CHIEF COMPLAINT:    Patient is a 70y old  Female who presents with a chief complaint of abd abscess (02 Jul 2021 12:33)      INTERVAL HPI/OVERNIGHT EVENTS:    Patient seen and examined. No acute events overnight. awaiting I&D with Colorectal surgery     ROS: All other systems are negative.    Vital Signs:    T(F): 97.2 (07-02-21 @ 14:09), Max: 97.9 (07-02-21 @ 05:00)  HR: 69 (07-02-21 @ 14:09) (50 - 96)  BP: 107/89 (07-02-21 @ 14:09) (97/59 - 116/70)  RR: 18 (07-02-21 @ 14:09) (18 - 34)  SpO2: 87% (07-02-21 @ 12:30) (84% - 96%)    01 Jul 2021 07:01  -  02 Jul 2021 07:00  --------------------------------------------------------  IN: 320 mL / OUT: 1675 mL / NET: -1355 mL    02 Jul 2021 07:01  -  02 Jul 2021 15:57  --------------------------------------------------------  IN: 0 mL / OUT: 700 mL / NET: -700 mL    PHYSICAL EXAM:    GENERAL:  NAD  SKIN: No rashes or lesions  HEENT: Atraumatic. Normocephalic.   NECK: Supple, No JVD.  PULMONARY: CTA B/L. No wheezing. No rales  CVS: Normal S1, S2. Rate and Rhythm are regular.   ABDOMEN/GI: Soft, Nontender, mildly distended; BS present  MSK:  No edema B/L LE. No clubbing or cyanosis   NEUROLOGIC: moves all extremities  PSYCH: Alert & oriented x 3, normal affect    Consultant(s) Notes Reviewed:  [x ] YES  [ ] NO  Care Discussed with Consultants/Other Providers [ x] YES  [ ] NO    LABS:                        8.9    8.00  )-----------( 298      ( 02 Jul 2021 07:04 )             31.0     138  |  100  |  8<L>  ----------------------------<  72  3.4<L>   |  29  |  0.6<L>    Ca    8.3<L>      02 Jul 2021 07:04  Phos  3.2     07-01  Mg     1.8     07-02    TPro  5.6<L>  /  Alb  3.0<L>  /  TBili  0.4  /  DBili  x   /  AST  19  /  ALT  11  /  AlkPhos  79  07-02    PT/INR - ( 30 Jun 2021 17:29 )   PT: 12.70 sec;   INR: 1.10 ratio      PTT - ( 30 Jun 2021 17:29 )  PTT:28.4 sec    RADIOLOGY & ADDITIONAL TESTS:  Imaging or report Personally Reviewed:  [x] YES  [ ] NO  EKG reviewed: [x] YES  [ ] NO    Medications:  Standing  albuterol/ipratropium for Nebulization 3 milliLiter(s) Nebulizer every 8 hours  folic acid 1 milliGRAM(s) Oral daily  metroNIDAZOLE  IVPB 500 milliGRAM(s) IV Intermittent every 8 hours  metroNIDAZOLE  IVPB      pantoprazole  Injectable 40 milliGRAM(s) IV Push two times a day  predniSONE   Tablet 40 milliGRAM(s) Oral daily  simethicone 80 milliGRAM(s) Chew daily  sucralfate 1 Gram(s) Oral every 6 hours  thiamine 100 milliGRAM(s) Oral daily    PRN Meds  acetaminophen   Tablet .. 650 milliGRAM(s) Oral every 6 hours PRN  ALBUTerol    90 MICROgram(s) HFA Inhaler 2 Puff(s) Inhalation every 6 hours PRN

## 2021-07-02 NOTE — PROGRESS NOTE ADULT - ASSESSMENT
70 yr F COPD O2, current smoker, ETOH abuse and h/o gastric bypass presented due to bloody stool and change in bowel habits. Admitted for GIB, and found to have diffuse colitis c/b colonic fistulas and rectal abscess.    Hematochezia/Pancolitis  Sepsis, POA due to Colitis   Gluteal abscess and fistula  CT Abdomen/pelvis: Diffuse wall thickening of the descending colon, sigmoid colon, rectum, and anus, with extensive surround tissue inflammation of the anus  Hg has been stable s/p 2U transfusion  Cont ceftriaxone/flagyl for now  s/p EGD and colonoscopy 7/1: White plaques in esophagus P biopsy, nonerosive gastritis. Colono with small polyps s/p bx, large internal/external hemorrhoids, perianal abscess  repeat CT A/P with IV and po contrast 6/30 unchanged, for I&D with colorectal surgery today  monitor CBC/keep active T&S  ID on board     COPD Exacerbation  s/p IV steroids, now better  currently on prednisone taper   nebs Q8H and PRN  hold inhalers for now, will need full pulm eval as OP  may need home oxygen     Chronic HFpEF  clinically euvolemic but worsening b/l pleural effusions   avoid fluid overload, s/p IV lasix x1    monitor I&O    Suspected persistent atrial fibrillation  Currently rate controlled off medications  Cont to monitor, AC to be possibly initiated after colitis and fistulas are managed    EtOH abuse with suspected folate/thiamine deficiency  -WA protocol monitoring   c/w  folate and thiamine supplementation     DVT PPX, SCD    #Progress Note Handoff  Pending (specify): possible I&D with colorectal surgery, onced cleared by them, can switch to PO antibiotics,  stability of Hb and downtitrating supplemental o2 requirements   Family discussion: plan of care discussed with patient, aware and agreeable   Disposition: Home

## 2021-07-02 NOTE — PROGRESS NOTE ADULT - SUBJECTIVE AND OBJECTIVE BOX
LOIS WEGENER 70y Female  MRN#: [id number]   Hospital Day: 7d    HPI:  70 yr F COPD O2, current smoker, ETOH abuse and h/o gastric bypass presented due to bloody stool and change in bowel habits.    the pt reports change in bowel habits 4 weeks ago, it started with having loose watery stool sometimes mixed with fresh blood and fecal incontinence,  3 days ago she noted dark black soft BM, and since then felt constipated and passed formed stool with fresh blood,   she also reports having distended abd since few months,  she denies fever, chills, n/v, abd pain, chest pain, SOB or urinary symptoms.  she reports that her father had Hx of fistula from his GI system.     ER: fever 106F, normal wbc, lactate 1.6, hbg 6.4, , new onset Afib on EKG, rectal exam showed blood in her clothes and at least 3 perianal fistula, CT abd  showed left sided colitis and 5 cm abscess at R gluteal area,, admitted for further veal.    (26 Jun 2021 00:00)      SUBJECTIVE  Patient is a 70y old Female who presents with a chief complaint of abd abscess (01 Jul 2021 15:19)  Currently admitted to medicine with the primary diagnosis of Colitis      INTERVAL HPI AND OVERNIGHT EVENTS:  Patient was examined and seen at bedside. This morning she is resting comfortably in bed and reports no issues or overnight events.    REVIEW OF SYMPTOMS:  CONSTITUTIONAL: No weakness, fevers or chills; No headaches  EYES: No visual changes, eye pain, or discharge  ENT: No vertigo; No ear pain or change in hearing; No sore throat or difficulty swallowing  NECK: No pain or stiffness  RESPIRATORY: No cough, wheezing, or hemoptysis; No shortness of breath  CARDIOVASCULAR: No chest pain or palpitations  GASTROINTESTINAL: No abdominal or epigastric pain; No nausea, vomiting, or hematemesis; No diarrhea or constipation; No melena or hematochezia  GENITOURINARY: No dysuria, frequency or hematuria  MUSCULOSKELETAL: No joint pain, no muscle pain, no weakness  NEUROLOGICAL: No numbness or weakness  SKIN: No itching or rashes    OBJECTIVE  PAST MEDICAL & SURGICAL HISTORY    ALLERGIES:  No Known Allergies    MEDICATIONS:  STANDING MEDICATIONS  albuterol/ipratropium for Nebulization 3 milliLiter(s) Nebulizer every 8 hours  cefTRIAXone   IVPB 2000 milliGRAM(s) IV Intermittent every 24 hours  folic acid 1 milliGRAM(s) Oral daily  metroNIDAZOLE  IVPB      metroNIDAZOLE  IVPB 500 milliGRAM(s) IV Intermittent every 8 hours  pantoprazole  Injectable 40 milliGRAM(s) IV Push two times a day  predniSONE   Tablet 40 milliGRAM(s) Oral daily  simethicone 80 milliGRAM(s) Chew daily  sucralfate 1 Gram(s) Oral every 6 hours  thiamine 100 milliGRAM(s) Oral daily    PRN MEDICATIONS  acetaminophen   Tablet .. 650 milliGRAM(s) Oral every 6 hours PRN  ALBUTerol    90 MICROgram(s) HFA Inhaler 2 Puff(s) Inhalation every 6 hours PRN      VITAL SIGNS: Last 24 Hours  T(C): 36.6 (02 Jul 2021 05:00), Max: 36.9 (01 Jul 2021 13:31)  T(F): 97.9 (02 Jul 2021 05:00), Max: 98.4 (01 Jul 2021 13:31)  HR: 60 (02 Jul 2021 05:00) (50 - 96)  BP: 116/70 (02 Jul 2021 05:00) (97/59 - 133/73)  BP(mean): --  RR: 18 (02 Jul 2021 05:00) (18 - 34)  SpO2: 96% (01 Jul 2021 19:52) (84% - 96%)    LABS:                        8.6    11.35 )-----------( 325      ( 01 Jul 2021 21:44 )             30.1     07-01    137  |  100  |  9<L>  ----------------------------<  96  3.8   |  26  |  0.7    Ca    8.2<L>      01 Jul 2021 21:44  Phos  3.2     07-01  Mg     1.9     07-01    TPro  6.0  /  Alb  3.2<L>  /  TBili  0.4  /  DBili  x   /  AST  15  /  ALT  10  /  AlkPhos  88  06-30    PT/INR - ( 30 Jun 2021 17:29 )   PT: 12.70 sec;   INR: 1.10 ratio         PTT - ( 30 Jun 2021 17:29 )  PTT:28.4 sec              RADIOLOGY:   < from: CT Abdomen and Pelvis w/ Oral Cont and w/ IV Cont (06.30.21 @ 19:21) >  IMPRESSION:  Since June 25, 2021:    1. No significant change in gluteal subcutaneous stranding with foci of subcutaneous emphysema within right medial gluteal fold and perianal region, suggestive of soft tissue infection; evaluation for small abscesses and fistulas limited by modality. Previously described a gluteal abscess not definitely delineated on current CT.    2. Decreased colonic wall thickening,which may represent resolving colitis.    3. Increased small bilateral pleural effusions.    < end of copied text >    ECG 6/25  Atrial fibrillation  Rightward axis  Low voltage QRS  Incomplete right bundle branch block        PHYSICAL EXAM:   LOIS WEGENER 70y Female  MRN#: [id number]   Hospital Day: 7d    HPI:  70 yr F COPD O2, current smoker, ETOH abuse and h/o gastric bypass presented due to bloody stool and change in bowel habits.    the pt reports change in bowel habits 4 weeks ago, it started with having loose watery stool sometimes mixed with fresh blood and fecal incontinence,  3 days ago she noted dark black soft BM, and since then felt constipated and passed formed stool with fresh blood,   she also reports having distended abd since few months,  she denies fever, chills, n/v, abd pain, chest pain, SOB or urinary symptoms.  she reports that her father had Hx of fistula from his GI system.     ER: fever 106F, normal wbc, lactate 1.6, hbg 6.4, , new onset Afib on EKG, rectal exam showed blood in her clothes and at least 3 perianal fistula, CT abd  showed left sided colitis and 5 cm abscess at R gluteal area,, admitted for further veal.    (26 Jun 2021 00:00)      SUBJECTIVE  Patient is a 70y old Female who presents with a chief complaint of abd abscess (01 Jul 2021 15:19)  Currently admitted to medicine with the primary diagnosis of Colitis      INTERVAL HPI AND OVERNIGHT EVENTS:  Patient was examined and seen at bedside. This morning she is resting comfortably in bed and reports no issues or overnight events.    REVIEW OF SYMPTOMS:  CONSTITUTIONAL: No weakness, fevers or chills; No headaches  EYES: No visual changes, eye pain, or discharge  ENT: No vertigo; No ear pain or change in hearing; No sore throat or difficulty swallowing  NECK: No pain or stiffness  RESPIRATORY: No cough, wheezing, or hemoptysis; No shortness of breath  CARDIOVASCULAR: No chest pain or palpitations  GASTROINTESTINAL: No abdominal or epigastric pain; No nausea, vomiting, or hematemesis; No diarrhea or constipation; No melena or hematochezia  GENITOURINARY: No dysuria, frequency or hematuria  MUSCULOSKELETAL: No joint pain, no muscle pain, no weakness  NEUROLOGICAL: No numbness or weakness  SKIN: No itching or rashes    OBJECTIVE  PAST MEDICAL & SURGICAL HISTORY    ALLERGIES:  No Known Allergies    MEDICATIONS:  STANDING MEDICATIONS  albuterol/ipratropium for Nebulization 3 milliLiter(s) Nebulizer every 8 hours  cefTRIAXone   IVPB 2000 milliGRAM(s) IV Intermittent every 24 hours  folic acid 1 milliGRAM(s) Oral daily  metroNIDAZOLE  IVPB      metroNIDAZOLE  IVPB 500 milliGRAM(s) IV Intermittent every 8 hours  pantoprazole  Injectable 40 milliGRAM(s) IV Push two times a day  predniSONE   Tablet 40 milliGRAM(s) Oral daily  simethicone 80 milliGRAM(s) Chew daily  sucralfate 1 Gram(s) Oral every 6 hours  thiamine 100 milliGRAM(s) Oral daily    PRN MEDICATIONS  acetaminophen   Tablet .. 650 milliGRAM(s) Oral every 6 hours PRN  ALBUTerol    90 MICROgram(s) HFA Inhaler 2 Puff(s) Inhalation every 6 hours PRN      VITAL SIGNS: Last 24 Hours  T(C): 36.6 (02 Jul 2021 05:00), Max: 36.9 (01 Jul 2021 13:31)  T(F): 97.9 (02 Jul 2021 05:00), Max: 98.4 (01 Jul 2021 13:31)  HR: 60 (02 Jul 2021 05:00) (50 - 96)  BP: 116/70 (02 Jul 2021 05:00) (97/59 - 133/73)  BP(mean): --  RR: 18 (02 Jul 2021 05:00) (18 - 34)  SpO2: 96% (01 Jul 2021 19:52) (84% - 96%)    LABS:                        8.6    11.35 )-----------( 325      ( 01 Jul 2021 21:44 )             30.1     07-01    137  |  100  |  9<L>  ----------------------------<  96  3.8   |  26  |  0.7    Ca    8.2<L>      01 Jul 2021 21:44  Phos  3.2     07-01  Mg     1.9     07-01    TPro  6.0  /  Alb  3.2<L>  /  TBili  0.4  /  DBili  x   /  AST  15  /  ALT  10  /  AlkPhos  88  06-30    PT/INR - ( 30 Jun 2021 17:29 )   PT: 12.70 sec;   INR: 1.10 ratio         PTT - ( 30 Jun 2021 17:29 )  PTT:28.4 sec    RADIOLOGY:     < from: EGD-Colonoscopy (07.01.21 @ 14:30) >   White plaques in the esophagus (Biopsy).    Erythema in the stomach compatible with non-erosive gastritis. (Biopsy).    No evidence of gastric bypass surgery inEGD. 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 No ulcer was noted at the anastomosis site. .     < end of copied text >  < from: EGD-Colonoscopy (07.01.21 @ 14:30) >   Normal mucosa in the whole colon. (Biopsy).    Polyp (8 mm) in the cecum. (Polypectomy).    Polyp (8 mm) in the ascending colon. (Polypectomy).    Polyps (4 mm) in the ascending colon. (Polypectomy).    Internal and external hemorrhoids.    Multiple opening of perianal abscess were noted on physical exam. .     < end of copied text >    < from: CT Abdomen and Pelvis w/ Oral Cont and w/ IV Cont (06.30.21 @ 19:21) >  IMPRESSION:  Since June 25, 2021:    1. No significant change in gluteal subcutaneous stranding with foci of subcutaneous emphysema within right medial gluteal fold and perianal region, suggestive of soft tissue infection; evaluation for small abscesses and fistulas limited by modality. Previously described a gluteal abscess not definitely delineated on current CT.    2. Decreased colonic wall thickening,which may represent resolving colitis.    3. Increased small bilateral pleural effusions.    < end of copied text >    ECG 6/25  Atrial fibrillation  Rightward axis  Low voltage QRS  Incomplete right bundle branch block        PHYSICAL EXAM:

## 2021-07-02 NOTE — PROGRESS NOTE ADULT - SUBJECTIVE AND OBJECTIVE BOX
WEGENER, LOIS  70y, Female    All available historical data reviewed    OVERNIGHT EVENTS:  no fevers  feels well and has no complaints     ROS:  General: Denies rigors, nightsweats  HEENT: Denies headache, rhinorrhea, sore throat, eye pain  CV: Denies CP, palpitations  PULM: Denies wheezing, hemoptysis  GI: Denies hematemesis, hematochezia, melena  : Denies discharge, hematuria  MSK: Denies arthralgias, myalgias  SKIN: Denies rash, lesions  NEURO: Denies paresthesias, weakness  PSYCH: Denies depression, anxiety    VITALS:  T(F): 97.9, Max: 98.4 (07-01-21 @ 13:31)  HR: 60  BP: 116/70  RR: 18Vital Signs Last 24 Hrs  T(C): 36.6 (02 Jul 2021 05:00), Max: 36.9 (01 Jul 2021 13:31)  T(F): 97.9 (02 Jul 2021 05:00), Max: 98.4 (01 Jul 2021 13:31)  HR: 60 (02 Jul 2021 05:00) (50 - 96)  BP: 116/70 (02 Jul 2021 05:00) (97/59 - 133/73)  BP(mean): --  RR: 18 (02 Jul 2021 12:30) (18 - 34)  SpO2: 87% (02 Jul 2021 12:30) (84% - 96%)    TESTS & MEASUREMENTS:                        8.9    8.00  )-----------( 298      ( 02 Jul 2021 07:04 )             31.0     07-02    138  |  100  |  8<L>  ----------------------------<  72  3.4<L>   |  29  |  0.6<L>    Ca    8.3<L>      02 Jul 2021 07:04  Phos  3.2     07-01  Mg     1.8     07-02    TPro  5.6<L>  /  Alb  3.0<L>  /  TBili  0.4  /  DBili  x   /  AST  19  /  ALT  11  /  AlkPhos  79  07-02    LIVER FUNCTIONS - ( 02 Jul 2021 07:04 )  Alb: 3.0 g/dL / Pro: 5.6 g/dL / ALK PHOS: 79 U/L / ALT: 11 U/L / AST: 19 U/L / GGT: x             Culture - Abscess with Gram Stain (collected 06-26-21 @ 13:43)  Source: .Abscess perirectal abscess  Final Report (06-28-21 @ 17:44):    Rare Escherichia coli    Few Bacteroides thetaiotamcron group "Susceptibilities not performed"    Moderate Most closely resembling Bifidobacterium species    "Susceptibilities not performed"  Organism: Escherichia coli (06-28-21 @ 17:44)  Organism: Escherichia coli (06-28-21 @ 17:44)      -  Amikacin: S <=16      -  Amoxicillin/Clavulanic Acid: S <=8/4      -  Ampicillin: S <=8 These ampicillin results predict results for amoxicillin      -  Ampicillin/Sulbactam: S <=4/2 Enterobacter, Citrobacter, and Serratia may develop resistance during prolonged therapy (3-4 days)      -  Aztreonam: S <=4      -  Cefazolin: S <=2 Enterobacter, Citrobacter, and Serratia may develop resistance during prolonged therapy (3-4 days)      -  Cefepime: S <=2      -  Cefoxitin: S <=8      -  Ceftriaxone: S <=1 Enterobacter, Citrobacter, and Serratia may develop resistance during prolonged therapy      -  Ciprofloxacin: S <=0.25      -  Ertapenem: S <=0.5      -  Gentamicin: S <=2      -  Imipenem: S <=1      -  Levofloxacin: S <=0.5      -  Meropenem: S <=1      -  Piperacillin/Tazobactam: S <=8      -  Tobramycin: S <=2      -  Trimethoprim/Sulfamethoxazole: S <=0.5/9.5      Method Type: GHISLAINE    Culture - Abscess with Gram Stain (collected 06-26-21 @ 12:14)  Source: .Abscess louise rectal  Final Report (06-28-21 @ 17:45):    Few Escherichia coli    Few Bacteroides caccae "Susceptibilities not performed"  Organism: Escherichia coli (06-28-21 @ 17:45)  Organism: Escherichia coli (06-28-21 @ 17:45)      -  Amikacin: S <=16      -  Amoxicillin/Clavulanic Acid: S <=8/4      -  Ampicillin: S <=8 These ampicillin results predict results for amoxicillin      -  Ampicillin/Sulbactam: S <=4/2 Enterobacter, Citrobacter, and Serratia may develop resistance during prolonged therapy (3-4 days)      -  Aztreonam: S <=4      -  Cefazolin: S <=2 Enterobacter, Citrobacter, and Serratia may develop resistance during prolonged therapy (3-4 days)      -  Cefepime: S <=2      -  Cefoxitin: S <=8      -  Ceftriaxone: S <=1 Enterobacter, Citrobacter, and Serratia may develop resistance during prolonged therapy      -  Ciprofloxacin: S <=0.25      -  Ertapenem: S <=0.5      -  Gentamicin: S <=2      -  Imipenem: S <=1      -  Levofloxacin: S <=0.5      -  Meropenem: S <=1      -  Piperacillin/Tazobactam: S <=8      -  Tobramycin: S <=2      -  Trimethoprim/Sulfamethoxazole: S <=0.5/9.5      Method Type: GHISLAINE    GI PCR Panel, Stool (collected 06-26-21 @ 08:46)  Source: .Stool Feces  Final Report (06-26-21 @ 21:07):    GI PCR Results: NOT detected    *******Please Note:*******    GI panel PCR evaluates for:    Campylobacter, Plesiomonas shigelloides, Salmonella,    Vibrio, Yersinia enterocolitica, Enteroaggregative    Escherichia coli (EAEC), Enteropathogenic E.coli (EPEC),    Enterotoxigenic E. coli (ETEC) lt/st, Shiga-like    toxin-producing E. coli (STEC) stx1/stx2,    Shigella/ Enteroinvasive E. coli (EIEC), Cryptosporidium,    Cyclospora cayetanensis, Entamoeba histolytica,    Giardia lamblia, Adenovirus F 40/41, Astrovirus,    Norovirus GI/GII, Rotavirus A, Sapovirus    Culture - Urine (collected 06-25-21 @ 15:50)  Source: .Urine Clean Catch (Midstream)  Final Report (06-28-21 @ 10:49):    >100,000 CFU/ml Escherichia coli  Organism: Escherichia coli (06-28-21 @ 10:49)  Organism: Escherichia coli (06-28-21 @ 10:49)      -  Amikacin: S <=16      -  Amoxicillin/Clavulanic Acid: S <=8/4      -  Ampicillin: S <=8 These ampicillin results predict results for amoxicillin      -  Ampicillin/Sulbactam: S <=4/2 Enterobacter, Citrobacter, and Serratia may develop resistance during prolonged therapy (3-4 days)      -  Aztreonam: S <=4      -  Cefazolin: S <=2 (MIC_CL_[id number]) For uncomplicated UTI with K. pneumoniae, E. coli, or P. mirablis: GHISLAINE <=16 is sensitive and GHISLAINE >=32 is resistant. This also predicts results for oral agents cefaclor, cefdinir, cefpodoxime, cefprozil, cefuroxime axetil, cephalexin and locarbef for uncomplicated UTI. Note that some isolates may be susceptible to these agents while testing resistant to cefazolin.      -  Cefepime: S <=2      -  Cefotaxime: S <=2      -  Cefoxitin: S <=8      -  Ceftazidime: S <=1      -  Ceftriaxone: S <=1 Enterobacter, Citrobacter, and Serratia may develop resistance during prolonged therapy      -  Cefuroxime: S <=4      -  Ciprofloxacin: S <=0.25      -  Ertapenem: S <=0.5      -  Gentamicin: S <=2      -  Imipenem: S <=1      -  Levofloxacin: S <=0.5      -  Meropenem: S <=1      -  Minocycline: S <=4      -  Nitrofurantoin: S <=32 Should not be used to treat pyelonephritis      -  Piperacillin/Tazobactam: S <=8      -  Tigecycline: S <=2      -  Tobramycin: S <=2      -  Trimethoprim/Sulfamethoxazole: S <=0.5/9.5      Method Type: GHISLAINE    Culture - Blood (collected 06-25-21 @ 15:00)  Source: .Blood Blood  Final Report (07-01-21 @ 01:01):    No Growth Final    Culture - Blood (collected 06-25-21 @ 15:00)  Source: .Blood Blood  Final Report (07-01-21 @ 01:01):    No Growth Final            RADIOLOGY & ADDITIONAL TESTS:  Personal review of radiological diagnostics performed  Echo and EKG results noted when applicable.     MEDICATIONS:  acetaminophen   Tablet .. 650 milliGRAM(s) Oral every 6 hours PRN  ALBUTerol    90 MICROgram(s) HFA Inhaler 2 Puff(s) Inhalation every 6 hours PRN  albuterol/ipratropium for Nebulization 3 milliLiter(s) Nebulizer every 8 hours  folic acid 1 milliGRAM(s) Oral daily  metroNIDAZOLE  IVPB      metroNIDAZOLE  IVPB 500 milliGRAM(s) IV Intermittent every 8 hours  pantoprazole  Injectable 40 milliGRAM(s) IV Push two times a day  potassium chloride  20 mEq/100 mL IVPB 20 milliEquivalent(s) IV Intermittent every 4 hours  predniSONE   Tablet 40 milliGRAM(s) Oral daily  simethicone 80 milliGRAM(s) Chew daily  sucralfate 1 Gram(s) Oral every 6 hours  thiamine 100 milliGRAM(s) Oral daily      ANTIBIOTICS:  metroNIDAZOLE  IVPB      metroNIDAZOLE  IVPB 500 milliGRAM(s) IV Intermittent every 8 hours

## 2021-07-02 NOTE — PROGRESS NOTE ADULT - ASSESSMENT
ASSESSMENT:  70y F w/ PMHx of  COPD, gastric bypass presents with perirectal abscess, black stools, fever, leukocytosis. Surgery consulted and bedside I&D performed, still with large cavity that is not improved on CT and pain and is draining some pus as was examined while patient was undergoing flex sig yesterday in endoscopy suite    PLAN:  -Continue current management as per primary team  -possible OR today for EUA of anorectum  -F/u H/H and transfuse as needed  - will f/u biopsies from EGD/flex sig yesterday    Lines/Tubes: PIV.    BLUE TEAM: [id number]     ASSESSMENT:  70y F w/ PMHx of  COPD, gastric bypass presents with perirectal abscess, black stools, fever, leukocytosis. Surgery consulted and bedside I&D performed, still with large cavity that is not improved on CT and pain and is draining some pus as was examined while patient was undergoing flex sig yesterday in endoscopy suite    PLAN:  -Continue current management as per primary team  -OR cancelled 7/2, no current plan for surgical intervention  -Recommend burn consult  -F/u H/H and transfuse as needed  - will f/u biopsies from EGD/flex sig yesterday    Lines/Tubes: PIV.    BLUE TEAM: [id number]

## 2021-07-02 NOTE — PROGRESS NOTE ADULT - ASSESSMENT
70F w/ PMH of COPD, smoking, and PSH of gastric bypass in 1999 who presented to the ED with 1-2 weeks of diarrhea.     IMPRESSION;  Colitis with right gluteus abscess  S/p local debridement  6/26 Abscess cultures : E coli, B fragilis  6/25 BCx NG  6/26 Stool PCR NG  6/25 CT A/P  Diffuse wall thickening of the descending colon, sigmoid colon, rectum, and anus with extensive surrounding soft tissue inflammation of the anus.  5.3 cm subcutaneous fluid collection lateral to the right gluteus described above, which may represent an abscess.      RECOMMENDATIONS;  Rocephin 2 gm iv q24h  Flagyl 500 mg iv q8h  F/u with Burn. Once cleared by them could change iv to po  Augmentin 875 mg q12h till 7/14  recall prn please

## 2021-07-02 NOTE — PROGRESS NOTE ADULT - SUBJECTIVE AND OBJECTIVE BOX
GENERAL SURGERY PROGRESS NOTE    Patient: WEGENER, LOIS , 70y ([birth year])Female   MRN: [id number]  Location: [address]  Visit: 06-25-21 Inpatient    Hospital Day #: 8    Procedure/Dx/Injuries: Anal fistula, pilonidal sinus, perirectal abscess    Events of past 24 hours: No acute events overnight. Plan for possible EUA in OR today  npo since midnight     PAST MEDICAL & SURGICAL HISTORY:  COPD  Anal fissures  Gastric bypass 1999    Vitals:   T(F): 97.9 (07-02-21 @ 05:00), Max: 98.4 (07-01-21 @ 13:31)  HR: 60 (07-02-21 @ 05:00)  BP: 116/70 (07-02-21 @ 05:00)  RR: 18 (07-02-21 @ 05:00)  SpO2: 96% (07-01-21 @ 19:52)      Diet, NPO after Midnight:      NPO Start Date: 01-Jul-2021,   NPO Start Time: 23:59  Except Medications      Fluids:     I & O's:    07-01-21 @ 07:01  -  07-02-21 @ 07:00  --------------------------------------------------------  IN:    IV PiggyBack: 200 mL    Oral Fluid: 120 mL  Total IN: 320 mL    OUT:    Incontinent per Collection Bag (mL): 600 mL    Voided (mL): 1075 mL  Total OUT: 1675 mL    Total NET: -1355 mL      Bowel Movement: : [X] YES [] NO  Flatus: : [X] YES [] NO    PHYSICAL EXAM:  General: NAD, AAOx3, calm and cooperative.  HEENT: SYL, EOMI.  Cardiac: RRR S1, S2, no Murmurs, rubs or gallops.  Respiratory: CTAB, normal respiratory effort.  Abdomen: Soft, non-distended, non-tender, no rebound, no guarding. +BS.  Rectal: Good tone, +stool, no blood, external hemorrhoids seen, perirectal abscess s/p I&D not actively draining, no packing.  Musculoskeletal: Warm and well perfused.  Skin: Warm/dry, normal color, no jaundice      MEDICATIONS  (STANDING):  albuterol/ipratropium for Nebulization 3 milliLiter(s) Nebulizer every 8 hours  cefTRIAXone   IVPB 2000 milliGRAM(s) IV Intermittent every 24 hours  folic acid 1 milliGRAM(s) Oral daily  metroNIDAZOLE  IVPB      metroNIDAZOLE  IVPB 500 milliGRAM(s) IV Intermittent every 8 hours  pantoprazole  Injectable 40 milliGRAM(s) IV Push two times a day  predniSONE   Tablet 40 milliGRAM(s) Oral daily  simethicone 80 milliGRAM(s) Chew daily  sucralfate 1 Gram(s) Oral every 6 hours  thiamine 100 milliGRAM(s) Oral daily    MEDICATIONS  (PRN):  acetaminophen   Tablet .. 650 milliGRAM(s) Oral every 6 hours PRN Temp greater or equal to 38C (100.4F), Mild Pain (1 - 3), Moderate Pain (4 - 6)  ALBUTerol    90 MICROgram(s) HFA Inhaler 2 Puff(s) Inhalation every 6 hours PRN Shortness of Breath and/or Wheezing      DVT PROPHYLAXIS:   GI PROPHYLAXIS: pantoprazole  Injectable 40 milliGRAM(s) IV Push two times a day    ANTICOAGULATION:   ANTIBIOTICS:  cefTRIAXone   IVPB 2000 milliGRAM(s)  metroNIDAZOLE  IVPB    metroNIDAZOLE  IVPB 500 milliGRAM(s)            LAB/STUDIES:                          8.9    8.00  )-----------( 298      ( 02 Jul 2021 07:04 )             31.0       Auto Neutrophil %: 81.5 % (07-02-21 @ 07:04)  Auto Immature Granulocyte %: 0.6 % (07-02-21 @ 07:04)  Auto Immature Granulocyte %: 0.5 % (07-01-21 @ 21:44)  Auto Neutrophil %: 87.5 % (07-01-21 @ 21:44)    07-02    138  |  100  |  8<L>  ----------------------------<  72  3.4<L>   |  29  |  0.6<L>      Calcium, Total Serum: 8.3 mg/dL (07-02-21 @ 07:04)      LFTs:             5.6  | 0.4  | 19       ------------------[79      ( 02 Jul 2021 07:04 )  3.0  | x    | 11          Lipase:x      Amylase:x             Coags:     12.70  ----< 1.10    ( 30 Jun 2021 17:29 )     28.4            Serum Pro-Brain Natriuretic Peptide: 1509 pg/mL (06-25-21 @ 15:10)        BLUE TEAM SPECTRA #[id number]        IMAGING:  < from: Xray Chest 1 View- PORTABLE-Urgent (Xray Chest 1 View- PORTABLE-Urgent .) (06.30.21 @ 10:35) >  Impression:    1. Stable small bibasilar opacities, possibly atelectasis.    < end of copied text >    < from: CT Abdomen and Pelvis w/ Oral Cont and w/ IV Cont (06.30.21 @ 19:21) >  IMPRESSION:  Since June 25, 2021:    1. No significant change in gluteal subcutaneous stranding with foci of subcutaneous emphysema within right medial gluteal fold and perianal region, suggestive of soft tissue infection; evaluation for small abscesses and fistulas limited by modality. Previously described a gluteal abscess not definitely delineated on current CT.    2. Decreased colonic wall thickening,which may represent resolving colitis.    3. Increased small bilateral pleural effusions.    < end of copied text >    ACCESS/ DEVICES:  [ X ] Peripheral IV  [ ] Central Venous Line	[ ] R	[ ] L	[ ] IJ	[ ] Fem	[ ] SC	Placed:   [ ] Arterial Line		[ ] R	[ ] L	[ ] Fem	[ ] Rad	[ ] Ax	Placed:   [ ] PICC:					[ ] Mediport  [ ] Urinary Catheter,  Date Placed:   [ ] Chest tube: [ ] Right, [ ] Left  [ ] MARCO A/Nelson Drains

## 2021-07-02 NOTE — PROGRESS NOTE ADULT - ASSESSMENT
This is a 70 year old woman w/ pmhx COPD; etoh and tobacco use dx who came in with change in bowel habits over one month; hematochezia; melena; distended abdomen and fecal incontinence. On CT abd; gluteal abscess and multiple perianal fistulas were found. Pt developed wheezing and difficulty breathing prior to colonoscopy. Treated with prednisone and O2. Colonoscopy and EGD displayed gastrtis with multiple polyps in colon.     #Colitis # gluteal abscess  s/p EGD and colonoscopy 7/1: White plaques in esophagus; non erosive gastritis  s/p local debridement  -rocephin/ flagyl tx  -ID monitoring case  -HGB stable    #Afib  -management possble AC after colitis and fistula management    #HFpEF #COPD exacerbation  `-IV pred completed on Pred taper  -pred taper DAy 2 (Day 1-5 40 mg, Day 6-10 20 mg)  -nebs Q8H and PRN  -IV lasix one dose 7/1 (home meds include HCTZ; hold due to recent hyponatremia on lab 7/1?)    Misc: started pt on home med robaxin 10mg 2x day; duloxetine 60 mg 1x day    Pending: Pt waiting fr Burn consult; surgery recc's

## 2021-07-03 LAB
ANION GAP SERPL CALC-SCNC: 10 MMOL/L — SIGNIFICANT CHANGE UP (ref 7–14)
BASOPHILS # BLD AUTO: 0.01 K/UL — SIGNIFICANT CHANGE UP (ref 0–0.2)
BASOPHILS NFR BLD AUTO: 0.1 % — SIGNIFICANT CHANGE UP (ref 0–1)
BUN SERPL-MCNC: 8 MG/DL — LOW (ref 10–20)
CALCIUM SERPL-MCNC: 8.5 MG/DL — SIGNIFICANT CHANGE UP (ref 8.5–10.1)
CHLORIDE SERPL-SCNC: 100 MMOL/L — SIGNIFICANT CHANGE UP (ref 98–110)
CO2 SERPL-SCNC: 29 MMOL/L — SIGNIFICANT CHANGE UP (ref 17–32)
CREAT SERPL-MCNC: 0.7 MG/DL — SIGNIFICANT CHANGE UP (ref 0.7–1.5)
EOSINOPHIL # BLD AUTO: 0.01 K/UL — SIGNIFICANT CHANGE UP (ref 0–0.7)
EOSINOPHIL NFR BLD AUTO: 0.1 % — SIGNIFICANT CHANGE UP (ref 0–8)
FERRITIN SERPL-MCNC: 16 NG/ML — SIGNIFICANT CHANGE UP (ref 15–150)
GLUCOSE SERPL-MCNC: 78 MG/DL — SIGNIFICANT CHANGE UP (ref 70–99)
HCT VFR BLD CALC: 32.4 % — LOW (ref 37–47)
HGB BLD-MCNC: 9.2 G/DL — LOW (ref 12–16)
IMM GRANULOCYTES NFR BLD AUTO: 0.5 % — HIGH (ref 0.1–0.3)
LYMPHOCYTES # BLD AUTO: 1.12 K/UL — LOW (ref 1.2–3.4)
LYMPHOCYTES # BLD AUTO: 14.5 % — LOW (ref 20.5–51.1)
MAGNESIUM SERPL-MCNC: 1.9 MG/DL — SIGNIFICANT CHANGE UP (ref 1.8–2.4)
MCHC RBC-ENTMCNC: 24.1 PG — LOW (ref 27–31)
MCHC RBC-ENTMCNC: 28.4 G/DL — LOW (ref 32–37)
MCV RBC AUTO: 84.8 FL — SIGNIFICANT CHANGE UP (ref 81–99)
MONOCYTES # BLD AUTO: 0.28 K/UL — SIGNIFICANT CHANGE UP (ref 0.1–0.6)
MONOCYTES NFR BLD AUTO: 3.6 % — SIGNIFICANT CHANGE UP (ref 1.7–9.3)
NEUTROPHILS # BLD AUTO: 6.26 K/UL — SIGNIFICANT CHANGE UP (ref 1.4–6.5)
NEUTROPHILS NFR BLD AUTO: 81.2 % — HIGH (ref 42.2–75.2)
NRBC # BLD: 0 /100 WBCS — SIGNIFICANT CHANGE UP (ref 0–0)
PLATELET # BLD AUTO: 283 K/UL — SIGNIFICANT CHANGE UP (ref 130–400)
POTASSIUM SERPL-MCNC: 3.9 MMOL/L — SIGNIFICANT CHANGE UP (ref 3.5–5)
POTASSIUM SERPL-SCNC: 3.9 MMOL/L — SIGNIFICANT CHANGE UP (ref 3.5–5)
RBC # BLD: 3.82 M/UL — LOW (ref 4.2–5.4)
RBC # FLD: 26.8 % — HIGH (ref 11.5–14.5)
SODIUM SERPL-SCNC: 139 MMOL/L — SIGNIFICANT CHANGE UP (ref 135–146)
WBC # BLD: 7.72 K/UL — SIGNIFICANT CHANGE UP (ref 4.8–10.8)
WBC # FLD AUTO: 7.72 K/UL — SIGNIFICANT CHANGE UP (ref 4.8–10.8)

## 2021-07-03 PROCEDURE — 99221 1ST HOSP IP/OBS SF/LOW 40: CPT

## 2021-07-03 PROCEDURE — 99233 SBSQ HOSP IP/OBS HIGH 50: CPT

## 2021-07-03 PROCEDURE — 93010 ELECTROCARDIOGRAM REPORT: CPT

## 2021-07-03 RX ORDER — CEFTRIAXONE 500 MG/1
2 INJECTION, POWDER, FOR SOLUTION INTRAMUSCULAR; INTRAVENOUS EVERY 24 HOURS
Refills: 0 | Status: DISCONTINUED | OUTPATIENT
Start: 2021-07-03 | End: 2021-07-03

## 2021-07-03 RX ORDER — ONDANSETRON 8 MG/1
4 TABLET, FILM COATED ORAL ONCE
Refills: 0 | Status: COMPLETED | OUTPATIENT
Start: 2021-07-03 | End: 2021-07-04

## 2021-07-03 RX ORDER — FUROSEMIDE 40 MG
40 TABLET ORAL DAILY
Refills: 0 | Status: DISCONTINUED | OUTPATIENT
Start: 2021-07-03 | End: 2021-07-05

## 2021-07-03 RX ORDER — CEFTRIAXONE 500 MG/1
2000 INJECTION, POWDER, FOR SOLUTION INTRAMUSCULAR; INTRAVENOUS EVERY 24 HOURS
Refills: 0 | Status: DISCONTINUED | OUTPATIENT
Start: 2021-07-03 | End: 2021-07-06

## 2021-07-03 RX ORDER — ENOXAPARIN SODIUM 100 MG/ML
40 INJECTION SUBCUTANEOUS DAILY
Refills: 0 | Status: DISCONTINUED | OUTPATIENT
Start: 2021-07-03 | End: 2021-07-05

## 2021-07-03 RX ADMIN — Medication 100 MILLIGRAM(S): at 11:14

## 2021-07-03 RX ADMIN — Medication 40 MILLIGRAM(S): at 17:40

## 2021-07-03 RX ADMIN — Medication 1 GRAM(S): at 23:00

## 2021-07-03 RX ADMIN — Medication 100 MILLIGRAM(S): at 05:46

## 2021-07-03 RX ADMIN — METHOCARBAMOL 500 MILLIGRAM(S): 500 TABLET, FILM COATED ORAL at 05:48

## 2021-07-03 RX ADMIN — Medication 1 GRAM(S): at 17:25

## 2021-07-03 RX ADMIN — CEFTRIAXONE 100 MILLIGRAM(S): 500 INJECTION, POWDER, FOR SOLUTION INTRAMUSCULAR; INTRAVENOUS at 11:13

## 2021-07-03 RX ADMIN — Medication 1 MILLIGRAM(S): at 11:14

## 2021-07-03 RX ADMIN — Medication 40 MILLIGRAM(S): at 05:47

## 2021-07-03 RX ADMIN — DULOXETINE HYDROCHLORIDE 60 MILLIGRAM(S): 30 CAPSULE, DELAYED RELEASE ORAL at 11:14

## 2021-07-03 RX ADMIN — Medication 100 MILLIGRAM(S): at 17:24

## 2021-07-03 RX ADMIN — METHOCARBAMOL 500 MILLIGRAM(S): 500 TABLET, FILM COATED ORAL at 23:00

## 2021-07-03 RX ADMIN — PANTOPRAZOLE SODIUM 40 MILLIGRAM(S): 20 TABLET, DELAYED RELEASE ORAL at 17:25

## 2021-07-03 RX ADMIN — Medication 1 GRAM(S): at 05:47

## 2021-07-03 RX ADMIN — Medication 1 GRAM(S): at 11:14

## 2021-07-03 RX ADMIN — SIMETHICONE 80 MILLIGRAM(S): 80 TABLET, CHEWABLE ORAL at 11:14

## 2021-07-03 RX ADMIN — PANTOPRAZOLE SODIUM 40 MILLIGRAM(S): 20 TABLET, DELAYED RELEASE ORAL at 05:47

## 2021-07-03 NOTE — PROGRESS NOTE ADULT - ASSESSMENT
70 yr F COPD U4uclarmv smoker, ETOH abuse and h/o gastric bypass presented due to bloody stool and change in bowel habits. Admitted for GIB, and found to have diffuse colitis c/b colonic fistulas and rectal abscess.    Hematochezia/Pancolitis  Sepsis, POA due to Colitis   Gluteal abscess and fistula- improved  CT Abdomen/pelvis6/25: Diffuse wall thickening of the descending colon, sigmoid colon, rectum, and anus, with extensive surround tissue inflammation of the anus    repeat CT A/P with IV and po contrast 6/30 unchanged    s/p EGD and colonoscopy 7/1: White plaques in esophagus s/p biopsy, nonerosive gastritis. Colono with small polyps s/p bx, large internal/external hemorrhoids, perianal abscess    s/p I&D on 6/26  currently per colorectal surgery abscess has improved and no need for surgical intervention  burn team following for wound care- continue recommendations    Hg has been stable s/p 2U transfusion  Cont ceftriaxone/flagyl for now- plan for augmentin on d/c until 6/14- appreciate ID input    COPD Exacerbation- improved  s/p IV steroids, now better  currently on prednisone taper   nebs Q8H and PRN  may need home oxygen (patient denies being on home oxygen- but per care manaement note patient has home oxygen), - need to clarify  wean off oxygen; monitor for oxygen requirement    mild acute on Chronic HFpEF-with fluid overload status  monitor I&O  start on PO lasix 40 mg daily; i/o monitoring    Suspected persistent atrial fibrillation  Currently rate controlled off medications  Cont to monitor, AC to be possibly initiated after colitis and fistulas are managed    EtOH abuse with suspected folate/thiamine deficiency  -[facility] protocol monitoring   c/w  folate and thiamine supplementation     DVT PPX, SCD    pending: home care arrangements for wound care  home oxygen assessment  monitor hg; initiation of anticoagulation for a fib    Family discussion: plan of care discussed with patient, aware and agreeable   Disposition: Home

## 2021-07-03 NOTE — CONSULT NOTE ADULT - SUBJECTIVE AND OBJECTIVE BOX
70y  Female  HPI:  70 yr F COPD O2, current smoker, ETOH abuse and h/o gastric bypass presented due to bloody stool and change in bowel habits.    the pt reports change in bowel habits 4 weeks ago, it started with having loose watery stool sometimes mixed with fresh blood and fecal incontinence,  3 days ago she noted dark black soft BM, and since then felt constipated and passed formed stool with fresh blood,   she also reports having distended abd since few months,  she denies fever, chills, n/v, abd pain, chest pain, SOB or urinary symptoms.  she reports that her father had Hx of fistula from his GI system.     ER: fever 106F, normal wbc, lactate 1.6, hbg 6.4, , new onset Afib on EKG, rectal exam showed blood in her clothes and at least 3 perianal fistula, CT abd  showed left sided colitis and 5 cm abscess at R gluteal area,, admitted for further veal.    (26 Jun 2021 00:00)    Hospital course***  Allergies    No Known Allergies    Intolerances      PAST MEDICAL & SURGICAL HISTORY:      Labs:                        9.2    7.72  )-----------( 283      ( 03 Jul 2021 05:41 )             32.4         PE:  PHYSICAL EXAM: AAO x 3  Full thickness wound to sacrum with granulation tissue s/p pilonidal cyst - chronic  full thickness wound to perirectal area with granulation tissue  serosang dc  no erythema, no swelling

## 2021-07-03 NOTE — PROGRESS NOTE ADULT - SUBJECTIVE AND OBJECTIVE BOX
HPI  Patient is a 70y old Female who presents with a chief complaint of abd abscess (03 Jul 2021 13:55)    Currently admitted to medicine with the primary diagnosis of Colitis       Today is hospital day 8d.     INTERVAL HPI / OVERNIGHT EVENTS:  Patient was examined and seen at bedside.  no new complaints and asking when she can go home  denies any chest pain or SOB  no fever  currenlty on nasal canula oxygen        PAST MEDICAL & SURGICAL HISTORY    ALLERGIES  No Known Allergies    MEDICATIONS  STANDING MEDICATIONS  albuterol/ipratropium for Nebulization 3 milliLiter(s) Nebulizer every 8 hours  cefTRIAXone   IVPB 2000 milliGRAM(s) IV Intermittent every 24 hours  DULoxetine 60 milliGRAM(s) Oral daily  enoxaparin Injectable 40 milliGRAM(s) SubCutaneous daily  folic acid 1 milliGRAM(s) Oral daily  methocarbamol 500 milliGRAM(s) Oral two times a day  metroNIDAZOLE  IVPB      metroNIDAZOLE  IVPB 500 milliGRAM(s) IV Intermittent every 8 hours  pantoprazole  Injectable 40 milliGRAM(s) IV Push two times a day  predniSONE   Tablet 40 milliGRAM(s) Oral daily  simethicone 80 milliGRAM(s) Chew daily  sucralfate 1 Gram(s) Oral every 6 hours  thiamine 100 milliGRAM(s) Oral daily    PRN MEDICATIONS  acetaminophen   Tablet .. 650 milliGRAM(s) Oral every 6 hours PRN  ALBUTerol    90 MICROgram(s) HFA Inhaler 2 Puff(s) Inhalation every 6 hours PRN    VITALS:  T(F): 96.8  HR: 60  BP: 110/57  RR: 17  SpO2: --    PHYSICAL EXAM  GEN: NAD, Resting comfortably in bed  PULM:b/l bsal crepts present; no wheeze  CVS: Regular rate and rhythm, S1-S2, no murmurs  ABD: Soft, non-tender, non-distended, no guarding  EXT: 2+ edema  NEURO: A&Ox3, no focal deficits    LABS                        9.2    7.72  )-----------( 283      ( 03 Jul 2021 05:41 )             32.4     07-03    139  |  100  |  8<L>  ----------------------------<  78  3.9   |  29  |  0.7    Ca    8.5      03 Jul 2021 05:41  Phos  3.2     07-01  Mg     1.9     07-03    TPro  5.6<L>  /  Alb  3.0<L>  /  TBili  0.4  /  DBili  x   /  AST  19  /  ALT  11  /  AlkPhos  79  07-02                  RADIOLOGY

## 2021-07-03 NOTE — CONSULT NOTE ADULT - CONSULT REASON
COPD exacerbation
HFpEF (Grade 3 diastolic dysfunction), New Afib, cardio optimization and risk startification prior to colonoscopy
Perirectal wound
extramural air
COPD exacerbation, pre op clearance.
hematochezia
abscess

## 2021-07-03 NOTE — CONSULT NOTE ADULT - TIME BILLING
Counseled patient about diagnostic testing and treatment plan. All questions answered.
wound eval and treat  answered all pts concerns

## 2021-07-03 NOTE — CONSULT NOTE ADULT - ASSESSMENT
ASSESSMENT:  Sacral woudn s/p pilonidal cyst - chronic and perirectal full thickness wound, no sign of infection    RECOMMENDATION:  Wound care - Silvadene/ DPD BID to sacrum  Xeroform/ DPD BID to perirectal wound  No Surgical debridement   Offloading/ positional changes

## 2021-07-03 NOTE — CONSULT NOTE ADULT - CONSULT REQUESTED DATE/TIME
03-Jul-2021 11:00
25-Jun-2021 21:51
26-Jun-2021 11:40
29-Jun-2021 09:04
29-Jun-2021 09:13
30-Jun-2021 08:22
26-Jun-2021 08:16

## 2021-07-04 LAB
ALBUMIN SERPL ELPH-MCNC: 3.5 G/DL — SIGNIFICANT CHANGE UP (ref 3.5–5.2)
ALP SERPL-CCNC: 80 U/L — SIGNIFICANT CHANGE UP (ref 30–115)
ALT FLD-CCNC: 12 U/L — SIGNIFICANT CHANGE UP (ref 0–41)
ANION GAP SERPL CALC-SCNC: 4 MMOL/L — LOW (ref 7–14)
AST SERPL-CCNC: 16 U/L — SIGNIFICANT CHANGE UP (ref 0–41)
BASOPHILS # BLD AUTO: 0.01 K/UL — SIGNIFICANT CHANGE UP (ref 0–0.2)
BASOPHILS NFR BLD AUTO: 0.1 % — SIGNIFICANT CHANGE UP (ref 0–1)
BILIRUB SERPL-MCNC: 0.6 MG/DL — SIGNIFICANT CHANGE UP (ref 0.2–1.2)
BUN SERPL-MCNC: 9 MG/DL — LOW (ref 10–20)
CALCIUM SERPL-MCNC: 8.4 MG/DL — LOW (ref 8.5–10.1)
CHLORIDE SERPL-SCNC: 93 MMOL/L — LOW (ref 98–110)
CO2 SERPL-SCNC: 37 MMOL/L — HIGH (ref 17–32)
CREAT SERPL-MCNC: 0.8 MG/DL — SIGNIFICANT CHANGE UP (ref 0.7–1.5)
EOSINOPHIL # BLD AUTO: 0.03 K/UL — SIGNIFICANT CHANGE UP (ref 0–0.7)
EOSINOPHIL NFR BLD AUTO: 0.3 % — SIGNIFICANT CHANGE UP (ref 0–8)
GLUCOSE SERPL-MCNC: 70 MG/DL — SIGNIFICANT CHANGE UP (ref 70–99)
HCT VFR BLD CALC: 32.1 % — LOW (ref 37–47)
HGB BLD-MCNC: 9.3 G/DL — LOW (ref 12–16)
IMM GRANULOCYTES NFR BLD AUTO: 0.5 % — HIGH (ref 0.1–0.3)
LYMPHOCYTES # BLD AUTO: 1.34 K/UL — SIGNIFICANT CHANGE UP (ref 1.2–3.4)
LYMPHOCYTES # BLD AUTO: 15.1 % — LOW (ref 20.5–51.1)
MAGNESIUM SERPL-MCNC: 1.9 MG/DL — SIGNIFICANT CHANGE UP (ref 1.8–2.4)
MCHC RBC-ENTMCNC: 24.7 PG — LOW (ref 27–31)
MCHC RBC-ENTMCNC: 29 G/DL — LOW (ref 32–37)
MCV RBC AUTO: 85.4 FL — SIGNIFICANT CHANGE UP (ref 81–99)
MONOCYTES # BLD AUTO: 0.34 K/UL — SIGNIFICANT CHANGE UP (ref 0.1–0.6)
MONOCYTES NFR BLD AUTO: 3.8 % — SIGNIFICANT CHANGE UP (ref 1.7–9.3)
NEUTROPHILS # BLD AUTO: 7.11 K/UL — HIGH (ref 1.4–6.5)
NEUTROPHILS NFR BLD AUTO: 80.2 % — HIGH (ref 42.2–75.2)
NRBC # BLD: 0 /100 WBCS — SIGNIFICANT CHANGE UP (ref 0–0)
PLATELET # BLD AUTO: 290 K/UL — SIGNIFICANT CHANGE UP (ref 130–400)
POTASSIUM SERPL-MCNC: 4.5 MMOL/L — SIGNIFICANT CHANGE UP (ref 3.5–5)
POTASSIUM SERPL-SCNC: 4.5 MMOL/L — SIGNIFICANT CHANGE UP (ref 3.5–5)
PROT SERPL-MCNC: 5.9 G/DL — LOW (ref 6–8)
RBC # BLD: 3.76 M/UL — LOW (ref 4.2–5.4)
RBC # FLD: 26.4 % — HIGH (ref 11.5–14.5)
SODIUM SERPL-SCNC: 134 MMOL/L — LOW (ref 135–146)
WBC # BLD: 8.87 K/UL — SIGNIFICANT CHANGE UP (ref 4.8–10.8)
WBC # FLD AUTO: 8.87 K/UL — SIGNIFICANT CHANGE UP (ref 4.8–10.8)

## 2021-07-04 PROCEDURE — 99233 SBSQ HOSP IP/OBS HIGH 50: CPT

## 2021-07-04 RX ORDER — PANTOPRAZOLE SODIUM 20 MG/1
40 TABLET, DELAYED RELEASE ORAL
Refills: 0 | Status: DISCONTINUED | OUTPATIENT
Start: 2021-07-04 | End: 2021-07-07

## 2021-07-04 RX ORDER — IRON SUCROSE 20 MG/ML
100 INJECTION, SOLUTION INTRAVENOUS EVERY 24 HOURS
Refills: 0 | Status: COMPLETED | OUTPATIENT
Start: 2021-07-04 | End: 2021-07-06

## 2021-07-04 RX ADMIN — Medication 100 MILLIGRAM(S): at 11:03

## 2021-07-04 RX ADMIN — SIMETHICONE 80 MILLIGRAM(S): 80 TABLET, CHEWABLE ORAL at 11:08

## 2021-07-04 RX ADMIN — PANTOPRAZOLE SODIUM 40 MILLIGRAM(S): 20 TABLET, DELAYED RELEASE ORAL at 05:24

## 2021-07-04 RX ADMIN — Medication 1 APPLICATION(S): at 17:29

## 2021-07-04 RX ADMIN — Medication 100 MILLIGRAM(S): at 00:50

## 2021-07-04 RX ADMIN — METHOCARBAMOL 500 MILLIGRAM(S): 500 TABLET, FILM COATED ORAL at 22:39

## 2021-07-04 RX ADMIN — METHOCARBAMOL 500 MILLIGRAM(S): 500 TABLET, FILM COATED ORAL at 05:24

## 2021-07-04 RX ADMIN — Medication 100 MILLIGRAM(S): at 14:23

## 2021-07-04 RX ADMIN — Medication 1 GRAM(S): at 17:29

## 2021-07-04 RX ADMIN — Medication 1 GRAM(S): at 11:04

## 2021-07-04 RX ADMIN — CEFTRIAXONE 100 MILLIGRAM(S): 500 INJECTION, POWDER, FOR SOLUTION INTRAMUSCULAR; INTRAVENOUS at 11:10

## 2021-07-04 RX ADMIN — ENOXAPARIN SODIUM 40 MILLIGRAM(S): 100 INJECTION SUBCUTANEOUS at 11:10

## 2021-07-04 RX ADMIN — DULOXETINE HYDROCHLORIDE 60 MILLIGRAM(S): 30 CAPSULE, DELAYED RELEASE ORAL at 11:05

## 2021-07-04 RX ADMIN — Medication 1 APPLICATION(S): at 05:24

## 2021-07-04 RX ADMIN — Medication 40 MILLIGRAM(S): at 05:24

## 2021-07-04 RX ADMIN — Medication 100 MILLIGRAM(S): at 22:38

## 2021-07-04 RX ADMIN — Medication 100 MILLIGRAM(S): at 11:04

## 2021-07-04 RX ADMIN — Medication 1 GRAM(S): at 05:24

## 2021-07-04 RX ADMIN — Medication 1 GRAM(S): at 22:39

## 2021-07-04 RX ADMIN — IRON SUCROSE 210 MILLIGRAM(S): 20 INJECTION, SOLUTION INTRAVENOUS at 17:27

## 2021-07-04 RX ADMIN — Medication 1 MILLIGRAM(S): at 11:08

## 2021-07-04 RX ADMIN — ONDANSETRON 4 MILLIGRAM(S): 8 TABLET, FILM COATED ORAL at 20:28

## 2021-07-04 NOTE — PROGRESS NOTE ADULT - ASSESSMENT
70 yr F COPD current smoker, ETOH abuse and h/o gastric bypass presented due to bloody stool and change in bowel habits. Admitted for GIB, and found to have diffuse colitis c/b colonic fistulas and rectal abscess.    # Hematochezia/Pancolitis  Sepsis, POA due to Colitis   Gluteal abscess and fistula- improved  acute blood loss and iron deficiney related anemia- hg stable  CT Abdomen/pelvis6/25: Diffuse wall thickening of the descending colon, sigmoid colon, rectum, and anus, with extensive surround tissue inflammation of the anus    repeat CT A/P with IV and po contrast 6/30 unchanged    s/p EGD and colonoscopy 7/1: White plaques in esophagus s/p biopsy, nonerosive gastritis. Colono with small polyps s/p bx, large internal/external hemorrhoids, perianal abscess    s/p I&D on 6/26  currently per colorectal surgery abscess has improved and no need for surgical intervention  burn team following for wound care- continue recommendations    Hg has been stable s/p 2U transfusion; start on IV iron  Cont ceftriaxone/flagyl for now- plan for augmentin on d/c until 6/14  Protonix 40 mg po daily   Fluconazole if path shows esophageal candidiasis; Await pathology result  Repeat colonoscopy in 1 year  Follow up as outpatient with [facility]    # acute hypooxic respiratory failure secondary to COPD Exacerbation- improved  s/p IV steroids, now better  currently on prednisone taper   nebs Q8H and PRN  ashtyn does not have oxygen at home    mild acute on Chronic HFpEF-with fluid overload status  monitor I&O  start on PO lasix 40 mg daily; i/o monitoring    Suspected persistent atrial fibrillation  Currently rate controlled off medications  Cont to monitor, AC to be possibly initiated after colitis and fistulas are managed    EtOH abuse with suspected folate/thiamine deficiency  c/w  folate and thiamine supplementation     DVT PPX, lovenox    pending: patietn agreeable for STR; pending auth and approval  monitor hg; initiation of anticoagulation for a fib- to be cleared by GI and burn team    Family discussion: plan of care discussed with patient, aware and agreeable   Disposition: STR

## 2021-07-04 NOTE — PROGRESS NOTE ADULT - SUBJECTIVE AND OBJECTIVE BOX
LOIS WEGENER 70y Female  MRN#: [id number]   Hospital Day: 9d    HPI:  70 yr F COPD O2, current smoker, ETOH abuse and h/o gastric bypass presented due to bloody stool and change in bowel habits.    the pt reports change in bowel habits 4 weeks ago, it started with having loose watery stool sometimes mixed with fresh blood and fecal incontinence,  3 days ago she noted dark black soft BM, and since then felt constipated and passed formed stool with fresh blood,   she also reports having distended abd since few months,  she denies fever, chills, n/v, abd pain, chest pain, SOB or urinary symptoms.  she reports that her father had Hx of fistula from his GI system.     ER: fever 106F, normal wbc, lactate 1.6, hbg 6.4, , new onset Afib on EKG, rectal exam showed blood in her clothes and at least 3 perianal fistula, CT abd  showed left sided colitis and 5 cm abscess at R gluteal area,, admitted for further veal.    (26 Jun 2021 00:00)      SUBJECTIVE  Patient is a 70y old Female who presents with a chief complaint of abd abscess (03 Jul 2021 17:14)  Currently admitted to medicine with the primary diagnosis of Colitis      INTERVAL HPI AND OVERNIGHT EVENTS:  Patient was examined and seen at bedside. This morning she is resting comfortably in bed and reports no issues or overnight events.    REVIEW OF SYMPTOMS:  CONSTITUTIONAL: No weakness, fevers or chills; No headaches  EYES: No visual changes, eye pain, or discharge  ENT: No vertigo; No ear pain or change in hearing; No sore throat or difficulty swallowing  NECK: No pain or stiffness  RESPIRATORY: No cough, wheezing, or hemoptysis; No shortness of breath  CARDIOVASCULAR: No chest pain or palpitations  GASTROINTESTINAL: No abdominal or epigastric pain; No nausea, vomiting, or hematemesis; No diarrhea or constipation; No melena or hematochezia  GENITOURINARY: No dysuria, frequency or hematuria  MUSCULOSKELETAL: No joint pain, no muscle pain, no weakness  NEUROLOGICAL: No numbness or weakness  SKIN: No itching or rashes    OBJECTIVE  PAST MEDICAL & SURGICAL HISTORY    ALLERGIES:  No Known Allergies    MEDICATIONS:  STANDING MEDICATIONS  albuterol/ipratropium for Nebulization 3 milliLiter(s) Nebulizer every 8 hours  cefTRIAXone   IVPB 2000 milliGRAM(s) IV Intermittent every 24 hours  DULoxetine 60 milliGRAM(s) Oral daily  enoxaparin Injectable 40 milliGRAM(s) SubCutaneous daily  folic acid 1 milliGRAM(s) Oral daily  furosemide    Tablet 40 milliGRAM(s) Oral daily  methocarbamol 500 milliGRAM(s) Oral two times a day  metroNIDAZOLE  IVPB 500 milliGRAM(s) IV Intermittent every 8 hours  metroNIDAZOLE  IVPB      pantoprazole  Injectable 40 milliGRAM(s) IV Push two times a day  predniSONE   Tablet 40 milliGRAM(s) Oral daily  silver sulfADIAZINE 1% Cream 1 Application(s) Topical two times a day  simethicone 80 milliGRAM(s) Chew daily  sucralfate 1 Gram(s) Oral every 6 hours  thiamine 100 milliGRAM(s) Oral daily    PRN MEDICATIONS  acetaminophen   Tablet .. 650 milliGRAM(s) Oral every 6 hours PRN  ALBUTerol    90 MICROgram(s) HFA Inhaler 2 Puff(s) Inhalation every 6 hours PRN  ondansetron    Tablet 4 milliGRAM(s) Oral once PRN      VITAL SIGNS: Last 24 Hours  T(C): 36.3 (03 Jul 2021 20:00), Max: 36.3 (03 Jul 2021 20:00)  T(F): 97.4 (03 Jul 2021 20:00), Max: 97.4 (03 Jul 2021 20:00)  HR: 65 (03 Jul 2021 20:00) (60 - 65)  BP: 129/69 (03 Jul 2021 20:00) (110/57 - 133/65)  BP(mean): --  RR: 18 (03 Jul 2021 20:00) (17 - 18)  SpO2: 98% (03 Jul 2021 19:58) (98% - 98%)    LABS:                        9.2    7.72  )-----------( 283      ( 03 Jul 2021 05:41 )             32.4     07-03    139  |  100  |  8<L>  ----------------------------<  78  3.9   |  29  |  0.7    Ca    8.5      03 Jul 2021 05:41  Mg     1.9     07-03    TPro  5.6<L>  /  Alb  3.0<L>  /  TBili  0.4  /  DBili  x   /  AST  19  /  ALT  11  /  AlkPhos  79  07-02                  RADIOLOGY:      PHYSICAL EXAM:  CONSTITUTIONAL: No acute distress, well-developed, well-groomed, AAOx3  HEAD: Atraumatic, normocephalic  EYES: EOM intact, PERRLA, conjunctiva and sclera clear  ENT: Supple, no masses, no thyromegaly, no bruits, no JVD; moist mucous membranes  PULMONARY: Clear to auscultation bilaterally; no wheezes, rales, or rhonchi  CARDIOVASCULAR: Regular rate and rhythm; no murmurs, rubs, or gallops  GASTROINTESTINAL: Soft, non-tender, non-distended; bowel sounds present  MUSCULOSKELETAL: 2+ peripheral pulses; no clubbing, no cyanosis, no edema  NEUROLOGY: non-focal  SKIN: No rashes or lesions; warm and dry    ASSESSMENT & PLAN  #    PAST MEDICAL & SURGICAL HISTORY:      #Misc  - DVT Prophylaxis:  - Diet:  - GI Prophylaxis:  - Activity:  - IV Fluids:  - Code Status:    Dispo: LOIS WEGENER 70y Female  MRN#: [id number]   Hospital Day: 9d    HPI:  70 yr F COPD O2, current smoker, ETOH abuse and h/o gastric bypass presented due to bloody stool and change in bowel habits.    the pt reports change in bowel habits 4 weeks ago, it started with having loose watery stool sometimes mixed with fresh blood and fecal incontinence,  3 days ago she noted dark black soft BM, and since then felt constipated and passed formed stool with fresh blood,   she also reports having distended abd since few months,  she denies fever, chills, n/v, abd pain, chest pain, SOB or urinary symptoms.  she reports that her father had Hx of fistula from his GI system.     ER: fever 106F, normal wbc, lactate 1.6, hbg 6.4, , new onset Afib on EKG, rectal exam showed blood in her clothes and at least 3 perianal fistula, CT abd  showed left sided colitis and 5 cm abscess at R gluteal area,, admitted for further veal.    (26 Jun 2021 00:00)      SUBJECTIVE  Patient is a 70y old Female who presents with a chief complaint of abd abscess (03 Jul 2021 17:14)  Currently admitted to medicine with the primary diagnosis of Colitis      INTERVAL HPI AND OVERNIGHT EVENTS:  Patient was examined and seen at bedside. This afternoon she has no complaints. States she may be well enough to leave tomorrow.      OBJECTIVE  PAST MEDICAL & SURGICAL HISTORY    ALLERGIES:  No Known Allergies    MEDICATIONS:  STANDING MEDICATIONS  albuterol/ipratropium for Nebulization 3 milliLiter(s) Nebulizer every 8 hours  cefTRIAXone   IVPB 2000 milliGRAM(s) IV Intermittent every 24 hours  DULoxetine 60 milliGRAM(s) Oral daily  enoxaparin Injectable 40 milliGRAM(s) SubCutaneous daily  folic acid 1 milliGRAM(s) Oral daily  furosemide    Tablet 40 milliGRAM(s) Oral daily  methocarbamol 500 milliGRAM(s) Oral two times a day  metroNIDAZOLE  IVPB 500 milliGRAM(s) IV Intermittent every 8 hours  metroNIDAZOLE  IVPB      pantoprazole  Injectable 40 milliGRAM(s) IV Push two times a day  predniSONE   Tablet 40 milliGRAM(s) Oral daily  silver sulfADIAZINE 1% Cream 1 Application(s) Topical two times a day  simethicone 80 milliGRAM(s) Chew daily  sucralfate 1 Gram(s) Oral every 6 hours  thiamine 100 milliGRAM(s) Oral daily    PRN MEDICATIONS  acetaminophen   Tablet .. 650 milliGRAM(s) Oral every 6 hours PRN  ALBUTerol    90 MICROgram(s) HFA Inhaler 2 Puff(s) Inhalation every 6 hours PRN  ondansetron    Tablet 4 milliGRAM(s) Oral once PRN      VITAL SIGNS: Last 24 Hours  T(C): 36.3 (03 Jul 2021 20:00), Max: 36.3 (03 Jul 2021 20:00)  T(F): 97.4 (03 Jul 2021 20:00), Max: 97.4 (03 Jul 2021 20:00)  HR: 65 (03 Jul 2021 20:00) (60 - 65)  BP: 129/69 (03 Jul 2021 20:00) (110/57 - 133/65)  BP(mean): --  RR: 18 (03 Jul 2021 20:00) (17 - 18)  SpO2: 98% (03 Jul 2021 19:58) (98% - 98%)    LABS:                        9.2    7.72  )-----------( 283      ( 03 Jul 2021 05:41 )             32.4     07-03    139  |  100  |  8<L>  ----------------------------<  78  3.9   |  29  |  0.7    Ca    8.5      03 Jul 2021 05:41  Mg     1.9     07-03    TPro  5.6<L>  /  Alb  3.0<L>  /  TBili  0.4  /  DBili  x   /  AST  19  /  ALT  11  /  AlkPhos  79  07-02    RADIOLOGY:    < from: EGD-Colonoscopy (07.01.21 @ 14:30) >   White plaques in the esophagus (Biopsy).    Erythema in the stomach compatible with non-erosive gastritis. (Biopsy).    No evidence of gastric bypass surgery inEGD. 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 No ulcer was noted at the anastomosis site. .     < end of copied text >  < from: EGD-Colonoscopy (07.01.21 @ 14:30) >   Normal mucosa in the whole colon. (Biopsy).    Polyp (8 mm) in the cecum. (Polypectomy).    Polyp (8 mm) in the ascending colon. (Polypectomy).    Polyps (4 mm) in the ascending colon. (Polypectomy).    Internal and external hemorrhoids.    Multiple opening of perianal abscess were noted on physical exam. .     < end of copied text >    < from: CT Abdomen and Pelvis w/ Oral Cont and w/ IV Cont (06.30.21 @ 19:21) >  IMPRESSION:  Since June 25, 2021:    1. No significant change in gluteal subcutaneous stranding with foci of subcutaneous emphysema within right medial gluteal fold and perianal region, suggestive of soft tissue infection; evaluation for small abscesses and fistulas limited by modality. Previously described a gluteal abscess not definitely delineated on current CT.    2. Decreased colonic wall thickening, which may represent resolving colitis.    3. Increased small bilateral pleural effusions.    < end of copied text >    ECG 6/25  Atrial fibrillation  Rightward axis  Low voltage QRS  Incomplete right bundle branch block      PHYSICAL EXAM:  CONSTITUTIONAL: No acute distress, well-developed, well-groomed, AAOx3  HEAD: Atraumatic, normocephalic  EYES: conjunctiva and sclera clear  PULMONARY: rhonchi in right posterior middle lobes  CARDIOVASCULAR: Decreased S1 S2 intensity possibly due to stethoscope quality  GASTROINTESTINAL: , non-tender, slightly distended; bowel sounds present  MUSCULOSKELETAL: 3+ peripheral pulses rt radial ; no clubbing, no cyanosis, biaterally edematous le left over right  NEUROLOGY: non-focal  SKIN: venous stasis lesions left leg

## 2021-07-04 NOTE — PROGRESS NOTE ADULT - ASSESSMENT
70 year old female with copd and gastric bypass presents with change in stools abdominal abscess on CT experiencing copd exacerbation before her colonoscopy and s/p surgical debridement pilonidal cyst who developed new onset afib during hospital stay.    #Afib  -EKG 7/3 Atrial fibrillation with slow ventricular response  Low voltage QRS limb leads  RSR' or QR pattern in V1 suggests right ventricular conduction delay  Anteroseptal infarct Possible  -Anticipate AC treatment once cleared by burn/pulm    #colitis #pilonidal cyst #gluteal abscess  -< from: CT Abdomen and Pelvis w/ Oral Cont and w/ IV Cont (06.25.21 @ 18:38) >  Diffuse wall thickening of the descending colon, sigmoid colon, rectum, and anus with extensive surrounding soft tissue inflammation of the anus. Of note, extramural air visualized posterolateral to the anus   5.3 cm subcutaneous fluid collection lateral to the right gluteus described above, which may represent an abscess.  -< from: CT Abdomen and Pelvis w/ Oral Cont and w/ IV Cont (06.30.21 @ 19:21) >  . Decreased colonic wall thickening,which may represent resolving colitis.  -Sacral woudn s/p pilonidal cyst - chronic and perirectal full thickness wound, no sign of infection  -Wound care - Silvadene/ DPD BID to sacrum/Xeroform/ DPD BID to perirectal wound/No Surgical debridement     #HFpEF #COPD exacerbation  -`< from: TTE Echo Complete w/o Contrast w/ Doppler (06.27.21 @ 11:15) >  LV Ejection Fraction by Rivera's Method with a biplane EF of 63 %.  < from: TTE Echo Complete w/o Contrast w/ Doppler (06.27.21 @ 11:15) >  Spectral Doppler shows restrictive pattern of left ventricular myocardial filling (Grade III diastolic dysfunction).  < from: TTE Echo Complete w/o Contrast w/ Doppler (06.27.21 @ 11:15) >   Estimated pulmonary artery systolic pressure is 44.8 mmHg assuming a right atrial pressure of 15 mmHg, which is consistent with mild pulmonary hypertension.  `-Pred taper day 4;  (Day 1-5 40 mg, Day 6-10 20 mg)  -nebs Q8H and PRN  -IV lasix one dose 7/1 (home meds include HCTZ; hold due to recent hyponatremia on lab 7/1?)  -started on PO lasix 40 mg daily on 7/2  -PT NEEDS HOME OXYGEN; PT O2 ON RA 88% ON ROOM AIR    DVT proph enoxaparin Injectable 40 milliGRAM(s) SubCutaneous daily  GI proph pantoprazole  Injectable 40 milliGRAM(s) IV Push two times a day  activity As tolerated  diet: DASH  dispo: awaiting STR placement

## 2021-07-05 LAB
ALBUMIN SERPL ELPH-MCNC: 3.3 G/DL — LOW (ref 3.5–5.2)
ALP SERPL-CCNC: 74 U/L — SIGNIFICANT CHANGE UP (ref 30–115)
ALT FLD-CCNC: 12 U/L — SIGNIFICANT CHANGE UP (ref 0–41)
ANION GAP SERPL CALC-SCNC: 5 MMOL/L — LOW (ref 7–14)
AST SERPL-CCNC: 13 U/L — SIGNIFICANT CHANGE UP (ref 0–41)
BASOPHILS # BLD AUTO: 0 K/UL — SIGNIFICANT CHANGE UP (ref 0–0.2)
BASOPHILS NFR BLD AUTO: 0 % — SIGNIFICANT CHANGE UP (ref 0–1)
BILIRUB SERPL-MCNC: 0.5 MG/DL — SIGNIFICANT CHANGE UP (ref 0.2–1.2)
BUN SERPL-MCNC: 10 MG/DL — SIGNIFICANT CHANGE UP (ref 10–20)
CALCIUM SERPL-MCNC: 8.2 MG/DL — LOW (ref 8.5–10.1)
CHLORIDE SERPL-SCNC: 90 MMOL/L — LOW (ref 98–110)
CO2 SERPL-SCNC: 36 MMOL/L — HIGH (ref 17–32)
CREAT SERPL-MCNC: 0.5 MG/DL — LOW (ref 0.7–1.5)
EOSINOPHIL # BLD AUTO: 0.03 K/UL — SIGNIFICANT CHANGE UP (ref 0–0.7)
EOSINOPHIL NFR BLD AUTO: 0.3 % — SIGNIFICANT CHANGE UP (ref 0–8)
GLUCOSE SERPL-MCNC: 87 MG/DL — SIGNIFICANT CHANGE UP (ref 70–99)
HCT VFR BLD CALC: 30.3 % — LOW (ref 37–47)
HGB BLD-MCNC: 8.9 G/DL — LOW (ref 12–16)
IMM GRANULOCYTES NFR BLD AUTO: 0.4 % — HIGH (ref 0.1–0.3)
LYMPHOCYTES # BLD AUTO: 1.25 K/UL — SIGNIFICANT CHANGE UP (ref 1.2–3.4)
LYMPHOCYTES # BLD AUTO: 13.8 % — LOW (ref 20.5–51.1)
MAGNESIUM SERPL-MCNC: 1.8 MG/DL — SIGNIFICANT CHANGE UP (ref 1.8–2.4)
MCHC RBC-ENTMCNC: 24.2 PG — LOW (ref 27–31)
MCHC RBC-ENTMCNC: 29.4 G/DL — LOW (ref 32–37)
MCV RBC AUTO: 82.3 FL — SIGNIFICANT CHANGE UP (ref 81–99)
MONOCYTES # BLD AUTO: 0.34 K/UL — SIGNIFICANT CHANGE UP (ref 0.1–0.6)
MONOCYTES NFR BLD AUTO: 3.7 % — SIGNIFICANT CHANGE UP (ref 1.7–9.3)
NEUTROPHILS # BLD AUTO: 7.41 K/UL — HIGH (ref 1.4–6.5)
NEUTROPHILS NFR BLD AUTO: 81.8 % — HIGH (ref 42.2–75.2)
NRBC # BLD: 0 /100 WBCS — SIGNIFICANT CHANGE UP (ref 0–0)
PLATELET # BLD AUTO: 259 K/UL — SIGNIFICANT CHANGE UP (ref 130–400)
POTASSIUM SERPL-MCNC: 3.8 MMOL/L — SIGNIFICANT CHANGE UP (ref 3.5–5)
POTASSIUM SERPL-SCNC: 3.8 MMOL/L — SIGNIFICANT CHANGE UP (ref 3.5–5)
PROT SERPL-MCNC: 5.5 G/DL — LOW (ref 6–8)
RBC # BLD: 3.68 M/UL — LOW (ref 4.2–5.4)
RBC # FLD: 25.8 % — HIGH (ref 11.5–14.5)
SODIUM SERPL-SCNC: 131 MMOL/L — LOW (ref 135–146)
WBC # BLD: 9.07 K/UL — SIGNIFICANT CHANGE UP (ref 4.8–10.8)
WBC # FLD AUTO: 9.07 K/UL — SIGNIFICANT CHANGE UP (ref 4.8–10.8)

## 2021-07-05 PROCEDURE — 99233 SBSQ HOSP IP/OBS HIGH 50: CPT

## 2021-07-05 RX ORDER — ENOXAPARIN SODIUM 100 MG/ML
80 INJECTION SUBCUTANEOUS EVERY 12 HOURS
Refills: 0 | Status: DISCONTINUED | OUTPATIENT
Start: 2021-07-05 | End: 2021-07-05

## 2021-07-05 RX ORDER — MAGNESIUM SULFATE 500 MG/ML
2 VIAL (ML) INJECTION ONCE
Refills: 0 | Status: COMPLETED | OUTPATIENT
Start: 2021-07-05 | End: 2021-07-05

## 2021-07-05 RX ORDER — APIXABAN 2.5 MG/1
5 TABLET, FILM COATED ORAL EVERY 12 HOURS
Refills: 0 | Status: DISCONTINUED | OUTPATIENT
Start: 2021-07-05 | End: 2021-07-07

## 2021-07-05 RX ADMIN — CEFTRIAXONE 100 MILLIGRAM(S): 500 INJECTION, POWDER, FOR SOLUTION INTRAMUSCULAR; INTRAVENOUS at 12:01

## 2021-07-05 RX ADMIN — IRON SUCROSE 210 MILLIGRAM(S): 20 INJECTION, SOLUTION INTRAVENOUS at 17:53

## 2021-07-05 RX ADMIN — Medication 1 GRAM(S): at 17:54

## 2021-07-05 RX ADMIN — Medication 650 MILLIGRAM(S): at 15:49

## 2021-07-05 RX ADMIN — Medication 100 MILLIGRAM(S): at 21:06

## 2021-07-05 RX ADMIN — Medication 1 APPLICATION(S): at 17:54

## 2021-07-05 RX ADMIN — Medication 100 MILLIGRAM(S): at 16:30

## 2021-07-05 RX ADMIN — METHOCARBAMOL 500 MILLIGRAM(S): 500 TABLET, FILM COATED ORAL at 05:35

## 2021-07-05 RX ADMIN — METHOCARBAMOL 500 MILLIGRAM(S): 500 TABLET, FILM COATED ORAL at 17:53

## 2021-07-05 RX ADMIN — Medication 1 MILLIGRAM(S): at 11:57

## 2021-07-05 RX ADMIN — Medication 1 GRAM(S): at 11:57

## 2021-07-05 RX ADMIN — Medication 100 MILLIGRAM(S): at 11:57

## 2021-07-05 RX ADMIN — DULOXETINE HYDROCHLORIDE 60 MILLIGRAM(S): 30 CAPSULE, DELAYED RELEASE ORAL at 11:57

## 2021-07-05 RX ADMIN — PANTOPRAZOLE SODIUM 40 MILLIGRAM(S): 20 TABLET, DELAYED RELEASE ORAL at 05:35

## 2021-07-05 RX ADMIN — Medication 100 MILLIGRAM(S): at 05:36

## 2021-07-05 RX ADMIN — Medication 25 GRAM(S): at 11:57

## 2021-07-05 RX ADMIN — Medication 3 MILLILITER(S): at 08:35

## 2021-07-05 RX ADMIN — Medication 1 APPLICATION(S): at 05:36

## 2021-07-05 RX ADMIN — APIXABAN 5 MILLIGRAM(S): 2.5 TABLET, FILM COATED ORAL at 17:53

## 2021-07-05 RX ADMIN — Medication 3 MILLILITER(S): at 17:39

## 2021-07-05 RX ADMIN — Medication 40 MILLIGRAM(S): at 05:35

## 2021-07-05 RX ADMIN — Medication 1 GRAM(S): at 05:36

## 2021-07-05 RX ADMIN — Medication 40 MILLIGRAM(S): at 05:36

## 2021-07-05 RX ADMIN — SIMETHICONE 80 MILLIGRAM(S): 80 TABLET, CHEWABLE ORAL at 11:58

## 2021-07-05 NOTE — PROGRESS NOTE ADULT - ASSESSMENT
70 year old female with copd and gastric bypass presents with change in stools abdominal abscess on CT experiencing copd exacerbation before her colonoscopy and s/p surgical debridement pilonidal cyst who developed new onset afib during hospital stay.    # New onset Afib    - EKG 7/3 Atrial fibrillation with slow ventricular response  - not on rate control  - will start AC since no surgical plan if no other CI  - CHADVASC 3    #colitis #pilonidal cyst #gluteal abscess    - CT Abdomen and Pelvis  Diffuse wall thickening of the descending colon, sigmoid colon, rectum, and anus with extensive surrounding soft tissue inflammation of the anus. Of note, extramural air visualized posterolateral to the anus   5.3 cm subcutaneous fluid collection lateral to the right gluteus described above, which may represent an abscess.  -< from: CT Abdomen and Pelvis w/ Oral Cont and w/ IV Cont (06.30.21 @ 19:21) >  . Decreased colonic wall thickening,which may represent resolving colitis.  -Sacral woudn s/p pilonidal cyst - chronic and perirectal full thickness wound, no sign of infection  -Wound care - Silvadene/ DPD BID to sacrum/Xeroform/ DPD BID to perirectal wound/No Surgical debridement     #HFpEF #COPD exacerbation  -`< from: TTE Echo Complete w/o Contrast w/ Doppler (06.27.21 @ 11:15) >  LV Ejection Fraction by Rivera's Method with a biplane EF of 63 %.  < from: TTE Echo Complete w/o Contrast w/ Doppler (06.27.21 @ 11:15) >  Spectral Doppler shows restrictive pattern of left ventricular myocardial filling (Grade III diastolic dysfunction).  < from: TTE Echo Complete w/o Contrast w/ Doppler (06.27.21 @ 11:15) >   Estimated pulmonary artery systolic pressure is 44.8 mmHg assuming a right atrial pressure of 15 mmHg, which is consistent with mild pulmonary hypertension.  `-Pred taper day 4;  (Day 1-5 40 mg, Day 6-10 20 mg)  -nebs Q8H and PRN  -IV lasix one dose 7/1 (home meds include HCTZ; hold due to recent hyponatremia on lab 7/1?)  -started on PO lasix 40 mg daily on 7/2      DVT ppx lovenox  GI ppx protonix  activity As tolerated  diet: DASH TLC  dispo: awaiting STR placement     70 year old female with copd and gastric bypass presents with change in stools abdominal abscess on CT experiencing copd exacerbation before her colonoscopy and s/p surgical debridement pilonidal cyst who developed new onset afib during hospital stay.    # New onset Afib    - EKG 7/3 Atrial fibrillation with slow ventricular response  - not on rate control  - will start AC   - CHADVASC 3    # sepsis secondary to pancolitis   #pilonidal cyst #gluteal abscess    - CT Abdomen and Pelvis  Diffuse wall thickening of the descending colon, sigmoid colon, rectum, and anus with extensive surrounding soft tissue inflammation of the anus. Of note, extramural air visualized posterolateral to the anus   5.3 cm subcutaneous fluid collection lateral to the right gluteus described above, which may represent an abscess.  - CT Abdomen and Pelvis w/ Oral Cont and w/ IV Cont   Decreased colonic wall thickening,which may represent resolving colitis.  -Sacral woudn s/p pilonidal cyst - chronic and perirectal full thickness wound, no sign of infection  -Wound care - Silvadene/ DPD BID to sacrum/Xeroform/ DPD BID to perirectal wound/No Surgical debridement   - Burn on board  - No Surgical debridement  - CW rocephine d3 and flagyl d9   - Offloading/ positional changes  -  EGD-Colonoscopy (07.01.21 @ 14:30) >     Normal mucosa in the whole colon. (Biopsy).    Polyp (8 mm) in the cecum. (Polypectomy).    Polyp (8 mm) in the ascending colon. (Polypectomy).    Polyps (4 mm) in the ascending colon. (Polypectomy).    Internal and external hemorrhoids.    Multiple opening of perianal abscess were noted on physical exam. .     Colonoscopy Limitations/Complications:   There were no apparent limitations or complications    Plan: Advance diet as tolerated  Protonix 40 mg po daily   Fluconazole if path shows esophageal candidiasis  Await pathology result  Repeat colonoscopy in 1 year  Follow up as outpatient with [facility]      # acute hypooxic respiratory failure secondary to COPD Exacerbation- improved    - s/p IV steroids,  - currently on prednisone taper   - nebs Q8H and PRN  - pateitn does not have oxygen at home    #HFpEF    -`TTE EF of 63 %.  - monitor I&O  - start on PO lasix 40 mg daily    # EtOH abuse with suspected folate/thiamine deficiency    -c/w  folate and thiamine supplementation     DVT ppx lovenox  GI ppx protonix  activity As tolerated  diet: DASH TLC  dispo: awaiting STR placement     70 year old female with copd and gastric bypass presents with change in stools abdominal abscess on CT experiencing copd exacerbation before her colonoscopy and s/p surgical debridement pilonidal cyst who developed new onset afib during hospital stay.    # New onset Afib    - EKG 7/3 Atrial fibrillation with slow ventricular response  - not on rate control  - will start AC   - CHADVASC 3    # sepsis secondary to pancolitis   #pilonidal cyst #gluteal abscess    - CT Abdomen and Pelvis  Diffuse wall thickening of the descending colon, sigmoid colon, rectum, and anus with extensive surrounding soft tissue inflammation of the anus. Of note, extramural air visualized posterolateral to the anus   5.3 cm subcutaneous fluid collection lateral to the right gluteus described above, which may represent an abscess.  - CT Abdomen and Pelvis w/ Oral Cont and w/ IV Cont   Decreased colonic wall thickening,which may represent resolving colitis.  -Sacral woudn s/p pilonidal cyst - chronic and perirectal full thickness wound, no sign of infection  -Wound care - Silvadene/ DPD BID to sacrum/Xeroform/ DPD BID to perirectal wound/No Surgical debridement   - Burn on board  - No Surgical debridement  - CW rocephine d3 and flagyl d9   - Offloading/ positional changes  -  EGD-Colonoscopy (07.01.21 @ 14:30) >     Normal mucosa in the whole colon. (Biopsy).    Polyp (8 mm) in the cecum. (Polypectomy).    Polyp (8 mm) in the ascending colon. (Polypectomy).    Polyps (4 mm) in the ascending colon. (Polypectomy).    Internal and external hemorrhoids.    Multiple opening of perianal abscess were noted on physical exam. .     Colonoscopy Limitations/Complications:   There were no apparent limitations or complications    Plan: Advance diet as tolerated  Protonix 40 mg po daily   Fluconazole if path shows esophageal candidiasis  Await pathology result  Repeat colonoscopy in 1 year  Follow up as outpatient with [facility]      # acute hypooxic respiratory failure secondary to COPD Exacerbation- improved    - s/p IV steroids,  - currently on prednisone taper   - nebs Q8H and PRN  - pateitn does not have oxygen at home    #HFpEF    -`TTE EF of 63 %.  - monitor I&O  - start on PO lasix 40 mg daily    # EtOH abuse with suspected folate/thiamine deficiency    -c/w  folate and thiamine supplementation     DVT ppx eliquis  GI ppx protonix  activity As tolerated  diet: DASH TLC  dispo: awaiting STR placement

## 2021-07-05 NOTE — PROGRESS NOTE ADULT - SUBJECTIVE AND OBJECTIVE BOX
24H events:    Patient is a 70y old Female who presents with a chief complaint of abd abscess (04 Jul 2021 16:01)    Primary diagnosis of Colitis       Today is hospital day 10d. This morning patient was seen and examined at bedside, resting comfortably in bed.    Afib  Rvr on tele  no major events  having BM  no pain  stable clinically  on room air    PAST MEDICAL & SURGICAL HISTORY    SOCIAL HISTORY:  Social History:  live alone, smokes cigarets, drinks alcohol in the weekend and sometimes heavy drink if she feels depressed, last drink 1 week ago (26 Jun 2021 00:00)      ALLERGIES:  No Known Allergies    MEDICATIONS:  STANDING MEDICATIONS  albuterol/ipratropium for Nebulization 3 milliLiter(s) Nebulizer every 8 hours  cefTRIAXone   IVPB 2000 milliGRAM(s) IV Intermittent every 24 hours  DULoxetine 60 milliGRAM(s) Oral daily  enoxaparin Injectable 40 milliGRAM(s) SubCutaneous daily  folic acid 1 milliGRAM(s) Oral daily  furosemide    Tablet 40 milliGRAM(s) Oral daily  iron sucrose IVPB 100 milliGRAM(s) IV Intermittent every 24 hours  methocarbamol 500 milliGRAM(s) Oral two times a day  metroNIDAZOLE  IVPB      metroNIDAZOLE  IVPB 500 milliGRAM(s) IV Intermittent every 8 hours  pantoprazole    Tablet 40 milliGRAM(s) Oral before breakfast  predniSONE   Tablet 40 milliGRAM(s) Oral daily  silver sulfADIAZINE 1% Cream 1 Application(s) Topical two times a day  simethicone 80 milliGRAM(s) Chew daily  sucralfate 1 Gram(s) Oral every 6 hours  thiamine 100 milliGRAM(s) Oral daily    PRN MEDICATIONS  acetaminophen   Tablet .. 650 milliGRAM(s) Oral every 6 hours PRN  ALBUTerol    90 MICROgram(s) HFA Inhaler 2 Puff(s) Inhalation every 6 hours PRN    VITALS:   T(F): 97.8  HR: 72  BP: 96/62  RR: 18  SpO2: 97%    PHYSICAL EXAM:  GENERAL: NAD  NERVOUS SYSTEM:  Alert & Oriented X3, non focal   CHEST/LUNG: Clear to auscultation bilaterally;   HEART: irregular rate and rhythm;   ABDOMEN: Soft, Nontender, Nondistended; Bowel sounds present  EXTREMITIES:   No clubbing, cyanosis, edema pitting bilateral    LABS:                        8.9    9.07  )-----------( 259      ( 05 Jul 2021 06:04 )             30.3     07-05    131<L>  |  90<L>  |  10  ----------------------------<  87  3.8   |  36<H>  |  0.5<L>    Ca    8.2<L>      05 Jul 2021 06:04  Mg     1.8     07-05    TPro  5.5<L>  /  Alb  3.3<L>  /  TBili  0.5  /  DBili  x   /  AST  13  /  ALT  12  /  AlkPhos  74  07-05                  RADIOLOGY:

## 2021-07-05 NOTE — PROGRESS NOTE ADULT - ATTENDING COMMENTS
Patient seen and examined assessment and plan as above
Pt seen and examined.  She denies any abdominal pain. She states she had fecal incontinence at home, says the stool was brown, not bloody.  Reports that she was feeling very weak. She also reports history of pilonidal cyst surgery in the past. C/o discomfort perianal region  Tmax recorded as 106 in ER?  Currently afebrile, VSS, new onset afib  A&OX3, AND  Abd softly distended/tympanitic, no tenderness, no peritoneal signs  Rectal: multiple cutaneous pits on sacral region and upper posterior thighs; (+) external hemorrhoids, no BRBPR on digital exam, one sinus posterior to anus is spontaneously actively draining purulent fluid, I probed it with a q tip and it extended several cm. Fullness palpated on hluteal muscle, likely undrained abscess    Labs reviewed.  Hgb increased appropriately after 1 unit pRBCs but is still 6.5/24.9    WBC normal  Stool studies not resulted    CT reviewed; (+) colitis as well as 5cm perigluteal abscess    Plan  Will need further I&D of perirectal abscess; will have residents debride at bedside today. Pt will need additional pRBC transfusions, as Hgb still under 7. She will likely need definitive I&D of perirectal abscess in OR. D/W Dr. Roman  Continue Abx, f/u stool studies  Pt with PSH gastric bypass, is active smoker (+) alcohol, high risk for marginal ulcer which could also be contributing to her anemia.  She needs BID PPI IV plus Carafate liquid QID. Will need upper endoscopy as outpatient  Will need colonoscopy for colitis
pt with colitis, anal fistula with abscess now s/p abscess drainage. initially planned for colonoscopy for evaluation, however pt is having active wheezing. will await improvement in pulmonary status, can then determine whether pt needs colonoscopy inpt vs. outpt.
Impression:  COPD exacerbation, improved  Heavy smoker  RML 3mm solid pulmonary nodule  AFib  HO HFpEF (III), moderate MR  From the pulmonary stand point, patient is stable for endoscopy     Plan as above
Patient seen and examined independently. Agree with resident note/ history / physical exam and plan of care with following exceptions/additions/updates. Case discussed with patient. My notes supersedes the resident's notes in case of discrepancies.    O/e protuberant   # atrial fibrillation- new diagnosis;   discussed in detail with the patient regarding anticoagultion plan. patient verabalized understanding of benefits and risks and agreed with treatment plan to initiate anticoagulation. Gi cleared patient for anticoagulation    # # Hematochezia/Pancolitis- improved  # Sepsis, POA due to Colitis - improved  #Gluteal abscess and fistula- improved  # acute blood loss and iron deficiney related anemia- hg stable  as per resident physician. COntinue antibiotics. change to oral antibiotics augmentin till 7/14 on discharge  abscess culutre-6/26-  E coli  continue wound care per burn team's recommendation  continue IV iron and oral iron upon discharge    # possible mild acute on Chronic HFpEF  patient has 2+ leg edema; but with worsening hyponatremia and hypochloremia on diuretics  hold diuretics; monitor levels  check BNP; from 6/25- 1500    # COPD exacerbation - improving; off from oxygen supplementation  on day 8 of steroids; taper to 30 mg prednisone for 2 days and discontinue    discharge plan to STR once auth available  COVID swab once plan ready

## 2021-07-06 ENCOUNTER — TRANSCRIPTION ENCOUNTER (OUTPATIENT)
Age: 71
End: 2021-07-06

## 2021-07-06 LAB
ALBUMIN SERPL ELPH-MCNC: 3.2 G/DL — LOW (ref 3.5–5.2)
ALP SERPL-CCNC: 75 U/L — SIGNIFICANT CHANGE UP (ref 30–115)
ALT FLD-CCNC: 10 U/L — SIGNIFICANT CHANGE UP (ref 0–41)
ANION GAP SERPL CALC-SCNC: 4 MMOL/L — LOW (ref 7–14)
AST SERPL-CCNC: 12 U/L — SIGNIFICANT CHANGE UP (ref 0–41)
BASOPHILS # BLD AUTO: 0.01 K/UL — SIGNIFICANT CHANGE UP (ref 0–0.2)
BASOPHILS NFR BLD AUTO: 0.1 % — SIGNIFICANT CHANGE UP (ref 0–1)
BILIRUB SERPL-MCNC: 0.5 MG/DL — SIGNIFICANT CHANGE UP (ref 0.2–1.2)
BLD GP AB SCN SERPL QL: SIGNIFICANT CHANGE UP
BUN SERPL-MCNC: 10 MG/DL — SIGNIFICANT CHANGE UP (ref 10–20)
CALCIUM SERPL-MCNC: 8.3 MG/DL — LOW (ref 8.5–10.1)
CHLORIDE SERPL-SCNC: 87 MMOL/L — LOW (ref 98–110)
CO2 SERPL-SCNC: 37 MMOL/L — HIGH (ref 17–32)
CREAT SERPL-MCNC: 0.7 MG/DL — SIGNIFICANT CHANGE UP (ref 0.7–1.5)
EOSINOPHIL # BLD AUTO: 0.01 K/UL — SIGNIFICANT CHANGE UP (ref 0–0.7)
EOSINOPHIL NFR BLD AUTO: 0.1 % — SIGNIFICANT CHANGE UP (ref 0–8)
GLUCOSE SERPL-MCNC: 80 MG/DL — SIGNIFICANT CHANGE UP (ref 70–99)
HCT VFR BLD CALC: 30.1 % — LOW (ref 37–47)
HGB BLD-MCNC: 9 G/DL — LOW (ref 12–16)
IMM GRANULOCYTES NFR BLD AUTO: 0.6 % — HIGH (ref 0.1–0.3)
LYMPHOCYTES # BLD AUTO: 1.48 K/UL — SIGNIFICANT CHANGE UP (ref 1.2–3.4)
LYMPHOCYTES # BLD AUTO: 15.5 % — LOW (ref 20.5–51.1)
MAGNESIUM SERPL-MCNC: 1.7 MG/DL — LOW (ref 1.8–2.4)
MCHC RBC-ENTMCNC: 24.5 PG — LOW (ref 27–31)
MCHC RBC-ENTMCNC: 29.9 G/DL — LOW (ref 32–37)
MCV RBC AUTO: 82 FL — SIGNIFICANT CHANGE UP (ref 81–99)
MONOCYTES # BLD AUTO: 0.44 K/UL — SIGNIFICANT CHANGE UP (ref 0.1–0.6)
MONOCYTES NFR BLD AUTO: 4.6 % — SIGNIFICANT CHANGE UP (ref 1.7–9.3)
NEUTROPHILS # BLD AUTO: 7.53 K/UL — HIGH (ref 1.4–6.5)
NEUTROPHILS NFR BLD AUTO: 79.1 % — HIGH (ref 42.2–75.2)
NRBC # BLD: 0 /100 WBCS — SIGNIFICANT CHANGE UP (ref 0–0)
NT-PROBNP SERPL-SCNC: 912 PG/ML — HIGH (ref 0–300)
OSMOLALITY UR: 541 MOS/KG — SIGNIFICANT CHANGE UP (ref 50–1200)
PLATELET # BLD AUTO: 257 K/UL — SIGNIFICANT CHANGE UP (ref 130–400)
POTASSIUM SERPL-MCNC: 3.6 MMOL/L — SIGNIFICANT CHANGE UP (ref 3.5–5)
POTASSIUM SERPL-SCNC: 3.6 MMOL/L — SIGNIFICANT CHANGE UP (ref 3.5–5)
PROT SERPL-MCNC: 5.5 G/DL — LOW (ref 6–8)
RBC # BLD: 3.67 M/UL — LOW (ref 4.2–5.4)
RBC # FLD: 25.3 % — HIGH (ref 11.5–14.5)
SARS-COV-2 RNA SPEC QL NAA+PROBE: SIGNIFICANT CHANGE UP
SODIUM SERPL-SCNC: 128 MMOL/L — LOW (ref 135–146)
SODIUM UR-SCNC: 147 MMOL/L — SIGNIFICANT CHANGE UP
SURGICAL PATHOLOGY STUDY: SIGNIFICANT CHANGE UP
WBC # BLD: 9.53 K/UL — SIGNIFICANT CHANGE UP (ref 4.8–10.8)
WBC # FLD AUTO: 9.53 K/UL — SIGNIFICANT CHANGE UP (ref 4.8–10.8)

## 2021-07-06 PROCEDURE — 99232 SBSQ HOSP IP/OBS MODERATE 35: CPT

## 2021-07-06 RX ORDER — PANTOPRAZOLE SODIUM 20 MG/1
1 TABLET, DELAYED RELEASE ORAL
Qty: 0 | Refills: 0 | DISCHARGE
Start: 2021-07-06

## 2021-07-06 RX ORDER — ALBUTEROL 90 UG/1
2 AEROSOL, METERED ORAL
Qty: 0 | Refills: 0 | DISCHARGE
Start: 2021-07-06

## 2021-07-06 RX ORDER — CYCLOBENZAPRINE HYDROCHLORIDE 10 MG/1
10 TABLET, FILM COATED ORAL
Qty: 0 | Refills: 0 | DISCHARGE

## 2021-07-06 RX ORDER — DULOXETINE HYDROCHLORIDE 30 MG/1
1 CAPSULE, DELAYED RELEASE ORAL
Qty: 0 | Refills: 0 | DISCHARGE
Start: 2021-07-06

## 2021-07-06 RX ORDER — SUCRALFATE 1 G
1 TABLET ORAL
Qty: 0 | Refills: 0 | DISCHARGE
Start: 2021-07-06

## 2021-07-06 RX ORDER — METHOCARBAMOL 500 MG/1
1 TABLET, FILM COATED ORAL
Qty: 0 | Refills: 0 | DISCHARGE
Start: 2021-07-06

## 2021-07-06 RX ORDER — FOLIC ACID 0.8 MG
1 TABLET ORAL
Qty: 0 | Refills: 0 | DISCHARGE
Start: 2021-07-06

## 2021-07-06 RX ORDER — IPRATROPIUM/ALBUTEROL SULFATE 18-103MCG
3 AEROSOL WITH ADAPTER (GRAM) INHALATION ONCE
Refills: 0 | Status: COMPLETED | OUTPATIENT
Start: 2021-07-06 | End: 2021-07-06

## 2021-07-06 RX ORDER — APIXABAN 2.5 MG/1
1 TABLET, FILM COATED ORAL
Qty: 0 | Refills: 0 | DISCHARGE
Start: 2021-07-06

## 2021-07-06 RX ORDER — ALBUTEROL 90 UG/1
0 AEROSOL, METERED ORAL
Qty: 0 | Refills: 0 | DISCHARGE

## 2021-07-06 RX ORDER — DICLOFENAC SODIUM 75 MG/1
1 TABLET, DELAYED RELEASE ORAL
Qty: 0 | Refills: 0 | DISCHARGE

## 2021-07-06 RX ORDER — DULOXETINE HYDROCHLORIDE 30 MG/1
1 CAPSULE, DELAYED RELEASE ORAL
Qty: 0 | Refills: 0 | DISCHARGE

## 2021-07-06 RX ORDER — THIAMINE MONONITRATE (VIT B1) 100 MG
1 TABLET ORAL
Qty: 0 | Refills: 0 | DISCHARGE
Start: 2021-07-06

## 2021-07-06 RX ORDER — MAGNESIUM SULFATE 500 MG/ML
2 VIAL (ML) INJECTION ONCE
Refills: 0 | Status: COMPLETED | OUTPATIENT
Start: 2021-07-06 | End: 2021-07-06

## 2021-07-06 RX ADMIN — Medication 3 MILLILITER(S): at 16:05

## 2021-07-06 RX ADMIN — APIXABAN 5 MILLIGRAM(S): 2.5 TABLET, FILM COATED ORAL at 17:06

## 2021-07-06 RX ADMIN — Medication 1 TABLET(S): at 17:06

## 2021-07-06 RX ADMIN — Medication 3 MILLILITER(S): at 09:08

## 2021-07-06 RX ADMIN — Medication 1 MILLIGRAM(S): at 12:46

## 2021-07-06 RX ADMIN — Medication 3 MILLILITER(S): at 22:21

## 2021-07-06 RX ADMIN — METHOCARBAMOL 500 MILLIGRAM(S): 500 TABLET, FILM COATED ORAL at 17:06

## 2021-07-06 RX ADMIN — Medication 100 MILLIGRAM(S): at 05:04

## 2021-07-06 RX ADMIN — METHOCARBAMOL 500 MILLIGRAM(S): 500 TABLET, FILM COATED ORAL at 05:04

## 2021-07-06 RX ADMIN — Medication 1 APPLICATION(S): at 17:06

## 2021-07-06 RX ADMIN — Medication 1 GRAM(S): at 05:04

## 2021-07-06 RX ADMIN — APIXABAN 5 MILLIGRAM(S): 2.5 TABLET, FILM COATED ORAL at 05:04

## 2021-07-06 RX ADMIN — SIMETHICONE 80 MILLIGRAM(S): 80 TABLET, CHEWABLE ORAL at 12:46

## 2021-07-06 RX ADMIN — IRON SUCROSE 210 MILLIGRAM(S): 20 INJECTION, SOLUTION INTRAVENOUS at 17:05

## 2021-07-06 RX ADMIN — Medication 1 APPLICATION(S): at 05:05

## 2021-07-06 RX ADMIN — DULOXETINE HYDROCHLORIDE 60 MILLIGRAM(S): 30 CAPSULE, DELAYED RELEASE ORAL at 12:46

## 2021-07-06 RX ADMIN — Medication 1 GRAM(S): at 12:46

## 2021-07-06 RX ADMIN — Medication 30 MILLIGRAM(S): at 05:04

## 2021-07-06 RX ADMIN — Medication 1 GRAM(S): at 17:06

## 2021-07-06 RX ADMIN — PANTOPRAZOLE SODIUM 40 MILLIGRAM(S): 20 TABLET, DELAYED RELEASE ORAL at 05:04

## 2021-07-06 RX ADMIN — Medication 25 GRAM(S): at 12:46

## 2021-07-06 RX ADMIN — Medication 100 MILLIGRAM(S): at 12:46

## 2021-07-06 NOTE — DISCHARGE NOTE PROVIDER - NPI NUMBER (FOR SYSADMIN USE ONLY) :
[[id number]],[[id number]] [[id number]],[[id number]],[[id number]]

## 2021-07-06 NOTE — PROGRESS NOTE ADULT - ASSESSMENT
70 year old female with copd and gastric bypass presents with change in stools abdominal abscess on CT experiencing copd exacerbation before her colonoscopy and s/p surgical debridement pilonidal cyst who developed new onset afib during hospital stay.    # New onset Afib    - EKG 7/3 Atrial fibrillation with slow ventricular response  - not on rate control;   - on eliquis  - CHADVASC 3    # sepsis secondary to pancolitis   #pilonidal cyst #gluteal abscess    s/p I&D on 6/26  currently per colorectal surgery abscess has improved and no need for surgical intervention  burn team following for wound care- continue recommendations    - CT Abdomen and Pelvis  Diffuse wall thickening of the descending colon, sigmoid colon, rectum, and anus with extensive surrounding soft tissue inflammation of the anus. Of note, extramural air visualized posterolateral to the anus   5.3 cm subcutaneous fluid collection lateral to the right gluteus described above, which may represent an abscess.  - CT Abdomen and Pelvis w/ Oral Cont and w/ IV Cont   Decreased colonic wall thickening,which may represent resolving colitis.  -Sacral woudn s/p pilonidal cyst - chronic and perirectal full thickness wound, no sign of infection  -Wound care - Silvadene/ DPD BID to sacrum/Xeroform/ DPD BID to perirectal wound/No Surgical debridement   - Burn on board  - No further Surgical debridement  - CW rocephine d3 and flagyl d10 completed   - plan for augmentin on d/c until 7/14  - Offloading/ positional changes  -  EGD-Colonoscopy (07.01.21 @ 14:30) >     Normal mucosa in the whole colon. (Biopsy).    Polyp (8 mm) in the cecum. (Polypectomy).    Polyp (8 mm) in the ascending colon. (Polypectomy).    Polyps (4 mm) in the ascending colon. (Polypectomy).    Internal and external hemorrhoids.    Multiple opening of perianal abscess were noted on physical exam. .     Colonoscopy Limitations/Complications:   There were no apparent limitations or complications    Plan: Advance diet as tolerated  Protonix 40 mg po daily   Fluconazole if path shows esophageal candidiasis  Await pathology result  Repeat colonoscopy in 1 year  Follow up as outpatient with [facility]      # acute hypooxic respiratory failure secondary to COPD Exacerbation- improved    - s/p IV steroids,  - currently on prednisone taper ; 30 mg daily - will finish course today  - nebs Q8H and PRN  - patient does not have oxygen at home    #HFpEF    -`TTE EF of 63 %.  - monitor I&O  -  lasix 40 mg  on hold for hyponatremia  - BNP; from 6/25- 1500-->912  - fluid restriction; check urine sodium, osm, serum osm    # EtOH abuse with suspected folate/thiamine deficiency    -c/w  folate and thiamine supplementation     DVT ppx eliquis  GI ppx protonix  activity As tolerated  diet: DASH TLC  dispo: awaiting STR placement 70 year old female with copd and gastric bypass presents with change in stools abdominal abscess on CT experiencing copd exacerbation before her colonoscopy and s/p surgical debridement pilonidal cyst who developed new onset afib during hospital stay.    # New onset Afib    - EKG 7/3 Atrial fibrillation with slow ventricular response  - not on rate control;   - on eliquis  - CHADVASC 3    # sepsis secondary to pancolitis   #pilonidal cyst #gluteal abscess    s/p I&D on 6/26  currently per colorectal surgery abscess has improved and no need for surgical intervention  burn team following for wound care- continue recommendations    - CT Abdomen and Pelvis  Diffuse wall thickening of the descending colon, sigmoid colon, rectum, and anus with extensive surrounding soft tissue inflammation of the anus. Of note, extramural air visualized posterolateral to the anus   5.3 cm subcutaneous fluid collection lateral to the right gluteus described above, which may represent an abscess.  - CT Abdomen and Pelvis w/ Oral Cont and w/ IV Cont   Decreased colonic wall thickening,which may represent resolving colitis.  -Sacral woudn s/p pilonidal cyst - chronic and perirectal full thickness wound, no sign of infection  -Wound care - Silvadene/ DPD BID to sacrum/Xeroform/ DPD BID to perirectal wound/No Surgical debridement   - Burn on board  - No further Surgical debridement  - dc rocephine  and flagyl d10 completed   -  start augmentin until 7/14  - Offloading/ positional changes  -  EGD-Colonoscopy (07.01.21 @ 14:30) >     Normal mucosa in the whole colon. (Biopsy).    Polyp (8 mm) in the cecum. (Polypectomy).    Polyp (8 mm) in the ascending colon. (Polypectomy).    Polyps (4 mm) in the ascending colon. (Polypectomy).    Internal and external hemorrhoids.    Multiple opening of perianal abscess were noted on physical exam. .     Colonoscopy Limitations/Complications:   There were no apparent limitations or complications    Plan: Advance diet as tolerated  Protonix 40 mg po daily   Fluconazole if path shows esophageal candidiasis  Await pathology result  Repeat colonoscopy in 1 year  Follow up as outpatient with [facility]      # acute hypooxic respiratory failure secondary to COPD Exacerbation- improved    - s/p IV steroids,  - currently on prednisone taper ; 30 mg daily - will finish course today  - nebs Q8H and PRN  - patient does not have oxygen at home    #HFpEF    -`TTE EF of 63 %.  - monitor I&O  -  lasix 40 mg  on hold for hyponatremia  - BNP; from 6/25- 1500-->912  - fluid restriction; check urine sodium, osm, serum osm    # EtOH abuse with suspected folate/thiamine deficiency    -c/w  folate and thiamine supplementation     DVT ppx eliquis  GI ppx protonix  activity As tolerated  diet: DASH TLC  dispo: awaiting STR placement

## 2021-07-06 NOTE — PROGRESS NOTE ADULT - SUBJECTIVE AND OBJECTIVE BOX
HPI  Patient is a 70y old Female who presents with a chief complaint of abd abscess (05 Jul 2021 09:42)    Currently admitted to medicine with the primary diagnosis of Colitis       Today is hospital day 11d.     INTERVAL HPI / OVERNIGHT EVENTS:  Patient was seen and examined at bedside    Patient Feels better  No new complaints  Denies any complains of chest pain or shortness of breath  had regular bowel movements and reported hematochezia or melena      PAST MEDICAL & SURGICAL HISTORY    ALLERGIES  No Known Allergies    MEDICATIONS  STANDING MEDICATIONS  albuterol/ipratropium for Nebulization 3 milliLiter(s) Nebulizer every 8 hours  apixaban 5 milliGRAM(s) Oral every 12 hours  cefTRIAXone   IVPB 2000 milliGRAM(s) IV Intermittent every 24 hours  DULoxetine 60 milliGRAM(s) Oral daily  folic acid 1 milliGRAM(s) Oral daily  iron sucrose IVPB 100 milliGRAM(s) IV Intermittent every 24 hours  methocarbamol 500 milliGRAM(s) Oral two times a day  metroNIDAZOLE  IVPB      metroNIDAZOLE  IVPB 500 milliGRAM(s) IV Intermittent every 8 hours  pantoprazole    Tablet 40 milliGRAM(s) Oral before breakfast  predniSONE   Tablet 30 milliGRAM(s) Oral daily  silver sulfADIAZINE 1% Cream 1 Application(s) Topical two times a day  simethicone 80 milliGRAM(s) Chew daily  sucralfate 1 Gram(s) Oral every 6 hours  thiamine 100 milliGRAM(s) Oral daily    PRN MEDICATIONS  acetaminophen   Tablet .. 650 milliGRAM(s) Oral every 6 hours PRN  ALBUTerol    90 MICROgram(s) HFA Inhaler 2 Puff(s) Inhalation every 6 hours PRN    VITALS:  T(F): 98.1  HR: 63  BP: 101/60  RR: 18  SpO2: 95%    PHYSICAL EXAM  GEN: NAD, Resting comfortably in bed  PULM: Clear to auscultation bilaterally, No wheezing  CVS:irregular,, S1-S2, no murmurs  ABD: Soft, non-tender, non-distended, no guarding  EXT: 1+ edema  NEURO: A&Ox3, no focal deficits    LABS                        9.0    9.53  )-----------( 257      ( 06 Jul 2021 05:58 )             30.1     07-06    128<L>  |  87<L>  |  10  ----------------------------<  80  3.6   |  37<H>  |  0.7    Ca    8.3<L>      06 Jul 2021 05:58  Mg     1.7     07-06    TPro  5.5<L>  /  Alb  3.2<L>  /  TBili  0.5  /  DBili  x   /  AST  12  /  ALT  10  /  AlkPhos  75  07-06                  RADIOLOGY

## 2021-07-06 NOTE — PROGRESS NOTE ADULT - PROVIDER SPECIALTY LIST ADULT
Internal Medicine
Colorectal Surgery
Colorectal Surgery
Infectious Disease
Internal Medicine
Surgery
Colorectal Surgery
Gastroenterology
Gastroenterology
Hospitalist
Hospitalist
Internal Medicine
Internal Medicine
Pulmonology
Surgery
Colorectal Surgery
Internal Medicine
Colorectal Surgery
Hospitalist
Internal Medicine
Infectious Disease
Infectious Disease

## 2021-07-06 NOTE — PROGRESS NOTE ADULT - SUBJECTIVE AND OBJECTIVE BOX
24H events:    Patient is a 70y old Female who presents with a chief complaint of abd abscess (06 Jul 2021 09:17)    Primary diagnosis of Colitis       Today is hospital day 11d. This morning patient was seen and examined at bedside, resting comfortably in bed.    no major events overnight  no events on tele  no bloody BM  stable clinically]    PAST MEDICAL & SURGICAL HISTORY    SOCIAL HISTORY:  Social History:  live alone, smokes cigarets, drinks alcohol in the weekend and sometimes heavy drink if she feels depressed, last drink 1 week ago (26 Jun 2021 00:00)      ALLERGIES:  No Known Allergies    MEDICATIONS:  STANDING MEDICATIONS  albuterol/ipratropium for Nebulization 3 milliLiter(s) Nebulizer every 8 hours  apixaban 5 milliGRAM(s) Oral every 12 hours  cefTRIAXone   IVPB 2000 milliGRAM(s) IV Intermittent every 24 hours  DULoxetine 60 milliGRAM(s) Oral daily  folic acid 1 milliGRAM(s) Oral daily  iron sucrose IVPB 100 milliGRAM(s) IV Intermittent every 24 hours  magnesium sulfate  IVPB 2 Gram(s) IV Intermittent once  methocarbamol 500 milliGRAM(s) Oral two times a day  pantoprazole    Tablet 40 milliGRAM(s) Oral before breakfast  predniSONE   Tablet 30 milliGRAM(s) Oral daily  silver sulfADIAZINE 1% Cream 1 Application(s) Topical two times a day  simethicone 80 milliGRAM(s) Chew daily  sucralfate 1 Gram(s) Oral every 6 hours  thiamine 100 milliGRAM(s) Oral daily    PRN MEDICATIONS  acetaminophen   Tablet .. 650 milliGRAM(s) Oral every 6 hours PRN  ALBUTerol    90 MICROgram(s) HFA Inhaler 2 Puff(s) Inhalation every 6 hours PRN    VITALS:   T(F): 98.1  HR: 63  BP: 101/60  RR: 18  SpO2: 95%    PHYSICAL EXAM:  GENERAL: NAD  NERVOUS SYSTEM:  Alert & Oriented X3, non focal   CHEST/LUNG: Clear to auscultation bilaterally;   HEART: irregular rate and rhythm;   ABDOMEN: Soft, Nontender, Nondistended; Bowel sounds present  EXTREMITIES:   No clubbing, cyanosis, edema pitting bilateral  LABS:                        9.0    9.53  )-----------( 257      ( 06 Jul 2021 05:58 )             30.1     07-06    128<L>  |  87<L>  |  10  ----------------------------<  80  3.6   |  37<H>  |  0.7    Ca    8.3<L>      06 Jul 2021 05:58  Mg     1.7     07-06    TPro  5.5<L>  /  Alb  3.2<L>  /  TBili  0.5  /  DBili  x   /  AST  12  /  ALT  10  /  AlkPhos  75  07-06                  RADIOLOGY:

## 2021-07-06 NOTE — DISCHARGE NOTE PROVIDER - CARE PROVIDERS DIRECT ADDRESSES
,DirectAddress_Unknown,[email or web address],DirectAddress_Unknown,DirectAddress_Unknown ,DirectAddress_Unknown,[email or web address],DirectAddress_Unknown,DirectAddress_Unknown,DirectAddress_Unknown,[email or web address]

## 2021-07-06 NOTE — PROGRESS NOTE ADULT - NSICDXPILOT_GEN_ALL_CORE
[place]

## 2021-07-06 NOTE — PROGRESS NOTE ADULT - ASSESSMENT
70 yr F COPD current smoker, ETOH abuse and h/o gastric bypass presented due to bloody stool and change in bowel habits. Admitted for GIB, and found to have diffuse colitis c/b colonic fistulas and rectal abscess.    # Hematochezia/Pancolitis  Sepsis, POA due to Colitis   Gluteal abscess and fistula- improved  acute blood loss and iron deficiney related anemia- hg stable  CT Abdomen/pelvis6/25: Diffuse wall thickening of the descending colon, sigmoid colon, rectum, and anus, with extensive surround tissue inflammation of the anus  repeat CT A/P with IV and po contrast 6/30 unchanged    s/p EGD and colonoscopy 7/1: White plaques in esophagus s/p biopsy, nonerosive gastritis. Colono with small polyps s/p bx, large internal/external hemorrhoids, perianal abscess    s/p I&D on 6/26  currently per colorectal surgery abscess has improved and no need for surgical intervention  burn team following for wound care- continue recommendations    Hg has been stable s/p 2U transfusion; started on IV iron; iron supplement on discharge  Cont ceftriaxone/flagyl for now- plan for augmentin on d/c until 6/14  Protonix 40 mg po daily   Fluconazole if path shows esophageal candidiasis; results pending  Repeat colonoscopy in 1 year  Follow up as outpatient with [facility]    # New onset Afib- persistent  hear rate is controlled and BP unable to tolerate rate control medicaitons  - not on rate control medicaitons  discussed in detail with the patient regarding anticoagultion plan. patient verabalized understanding of benefits and risks and agreed with treatment plan to initiate anticoagulation. Gi cleared patient for anticoagulation  started on eliquis 5 mg BID      # possible mild acute on Chronic HFpEF- improved  # hyponatremia- with worsening; possibly due to diuretics  hold diuretics; monitor levels   BNP; from 6/25- 1500-->912   fluid restriction; check urine sodium, osm, serum osm    # COPD exacerbation - improving; off from oxygen supplementation  on steroid taper; on 30 mg prednisone- will finish course today    #EtOH abuse with suspected folate/thiamine deficiency  c/w  folate and thiamine supplementation     DVT PPX- eliquis    pending: pending auth and approval for STR  Family discussion: plan of care discussed with patient, aware and agreeable   Disposition: STR   70 yr F COPD current smoker, ETOH abuse and h/o gastric bypass presented due to bloody stool and change in bowel habits. Admitted for GIB, and found to have diffuse colitis c/b colonic fistulas and rectal abscess.    # Hematochezia/Pancolitis  Sepsis, POA due to Colitis   Gluteal abscess and fistula- improved  acute blood loss and iron deficiney related anemia- hg stable  CT Abdomen/pelvis6/25: Diffuse wall thickening of the descending colon, sigmoid colon, rectum, and anus, with extensive surround tissue inflammation of the anus  repeat CT A/P with IV and po contrast 6/30 unchanged    s/p EGD and colonoscopy 7/1: White plaques in esophagus s/p biopsy, nonerosive gastritis. Colono with small polyps s/p bx, large internal/external hemorrhoids, perianal abscess    s/p I&D on 6/26  currently per colorectal surgery abscess has improved and no need for surgical intervention  burn team following for wound care- continue recommendations    Hg has been stable s/p 2U transfusion; started on IV iron; iron supplement on discharge  Cont ceftriaxone/flagyl for now- plan for augmentin on d/c until 7/14  Protonix 40 mg po daily   Fluconazole if path shows esophageal candidiasis; results pending  Repeat colonoscopy in 1 year  Follow up as outpatient with [facility]    # New onset Afib- persistent  hear rate is controlled and BP unable to tolerate rate control medicaitons  - not on rate control medicaitons  discussed in detail with the patient regarding anticoagultion plan. patient verabalized understanding of benefits and risks and agreed with treatment plan to initiate anticoagulation. Gi cleared patient for anticoagulation  started on eliquis 5 mg BID      # possible mild acute on Chronic HFpEF- improved  # hyponatremia- with worsening; possibly due to diuretics  hold diuretics; monitor levels   BNP; from 6/25- 1500-->912   fluid restriction; check urine sodium, osm, serum osm    # COPD exacerbation - improving; off from oxygen supplementation  on steroid taper; on 30 mg prednisone- will finish course today    #EtOH abuse with suspected folate/thiamine deficiency  c/w  folate and thiamine supplementation     DVT PPX- eliquis    pending: pending auth and approval for STR  Family discussion: plan of care discussed with patient, aware and agreeable   Disposition: STR   70 yr F COPD current smoker, ETOH abuse and h/o gastric bypass presented due to bloody stool and change in bowel habits. Admitted for GIB, and found to have diffuse colitis c/b colonic fistulas and rectal abscess.    # Hematochezia/Pancolitis  Sepsis, POA due to Colitis   Gluteal abscess and fistula- improved  acute blood loss and iron deficiney related anemia- hg stable  CT Abdomen/pelvis6/25: Diffuse wall thickening of the descending colon, sigmoid colon, rectum, and anus, with extensive surround tissue inflammation of the anus  repeat CT A/P with IV and po contrast 6/30 unchanged    s/p EGD and colonoscopy 7/1: White plaques in esophagus s/p biopsy, nonerosive gastritis. Colono with small polyps s/p bx, large internal/external hemorrhoids, perianal abscess    s/p I&D on 6/26  currently per colorectal surgery abscess has improved and no need for surgical intervention  burn team following for wound care- continue recommendations    Hg has been stable s/p 2U transfusion; started on IV iron; iron supplement on discharge  patient was treated with ceftriaxone/flagyl - switch antibiotics to augmentin - until 7/14  Protonix 40 mg po daily   Fluconazole if path shows esophageal candidiasis; results pending  Repeat colonoscopy in 1 year  Follow up as outpatient with [facility]    # New onset Afib- persistent  hear rate is controlled and BP unable to tolerate rate control medicaitons  - not on rate control medicaitons  discussed in detail with the patient regarding anticoagultion plan. patient verabalized understanding of benefits and risks and agreed with treatment plan to initiate anticoagulation. Gi cleared patient for anticoagulation  started on eliquis 5 mg BID      # possible mild acute on Chronic HFpEF- improved  # hyponatremia- with worsening; possibly due to diuretics  hold diuretics; monitor levels   BNP; from 6/25- 1500-->912   fluid restriction; check urine sodium, osm, serum osm    # COPD exacerbation - improving; off from oxygen supplementation  on steroid taper; on 30 mg prednisone- will finish course today    #EtOH abuse with suspected folate/thiamine deficiency  c/w  folate and thiamine supplementation     DVT PPX- eliquis    pending: pending auth and approval for STR  Family discussion: plan of care discussed with patient, aware and agreeable   Disposition: STR

## 2021-07-06 NOTE — DISCHARGE NOTE PROVIDER - CARE PROVIDER_API CALL
Kamala Kwon  INTERNAL MEDICINE  [address] [facility] [facility] [address], [address]  [place] [address]  Phone: [phone]  Fax: [phone]  Follow Up Time: 2 weeks    Aminah Cervantes)  Gastroenterology; Internal Medicine  [address] [place] [address]  Phone: [phone]  Fax: [phone]  Follow Up Time: 2 weeks    Omar Shirley  CARDIOVASCULAR DISEASE  [address] [place] [address] [address]  [place] [address]  Phone: [phone]  Fax: [phone]  Follow Up Time: 2 weeks    Scar Keith)  Critical Care Medicine; Pulmonary Disease; Sleep Medicine  [address] [place], [place] [id number]  [place] [address]  Phone: [phone]  Fax: [phone]  Follow Up Time: 1 month   Kamala Kwon  INTERNAL MEDICINE  [address] [facility]  [place] [address]  Phone: [phone]  Fax: [phone]  Follow Up Time: 2 weeks    Aminah Cervantes)  Gastroenterology; Internal Medicine  [address] [place] [address]  Phone: [phone]  Fax: [phone]  Follow Up Time: 2 weeks    Omar Shirley  CARDIOVASCULAR DISEASE  [address] [facility] [address]  [place] [address]  Phone: [phone]  Fax: [phone]  Follow Up Time: 2 weeks    Scar Keith)  Critical Care Medicine; Pulmonary Disease; Sleep Medicine  [address] [facility]  [place] [address]  Phone: [phone]  Fax: [phone]  Follow Up Time: 1 month    Katerin Rahman)  Wound Care  [address]  [place] [address]  Phone: [phone]  Fax: [phone]  Follow Up Time:     Jori Tabares)  Plastic Surgery  [address] [place] [address]  Phone: [phone]  Fax: [phone]  Follow Up Time:

## 2021-07-06 NOTE — DISCHARGE NOTE PROVIDER - NSDCMRMEDTOKEN_GEN_ALL_CORE_FT
albuterol 90 mcg/inh inhalation aerosol: 2 puff(s) inhaled every 6 hours, As needed, Shortness of Breath and/or Wheezing  amoxicillin-clavulanate 875 mg-125 mg oral tablet: 1 tab(s) orally every 12 hours till 7/14/2021  apixaban 5 mg oral tablet: 1 tab(s) orally every 12 hours  DULoxetine 60 mg oral delayed release capsule: 1 cap(s) orally once a day  folic acid 1 mg oral tablet: 1 tab(s) orally once a day  methocarbamol 500 mg oral tablet: 1 tab(s) orally 2 times a day  pantoprazole 40 mg oral delayed release tablet: 1 tab(s) orally once a day (before a meal)  silver sulfADIAZINE 1% topical cream: 1 application topically 2 times a day  sucralfate 1 g oral tablet: 1 tab(s) orally every 6 hours  thiamine 100 mg oral tablet: 1 tab(s) orally once a day

## 2021-07-06 NOTE — DISCHARGE NOTE PROVIDER - PROVIDER TOKENS
PROVIDER:[TOKEN:[[id number]],FOLLOWUP:[2 weeks]],PROVIDER:[TOKEN:[[id number]],FOLLOWUP:[2 weeks]],PROVIDER:[TOKEN:[[id number]],FOLLOWUP:[2 weeks]],PROVIDER:[TOKEN:[[id number]],FOLLOWUP:[1 month]] PROVIDER:[TOKEN:[[id number]],FOLLOWUP:[2 weeks]],PROVIDER:[TOKEN:[[id number]],FOLLOWUP:[2 weeks]],PROVIDER:[TOKEN:[[id number]],FOLLOWUP:[2 weeks]],PROVIDER:[TOKEN:[[id number]],FOLLOWUP:[1 month]],PROVIDER:[TOKEN:[[id number]]],PROVIDER:[TOKEN:[[id number]]]

## 2021-07-06 NOTE — DISCHARGE NOTE PROVIDER - NSDCCPCAREPLAN_GEN_ALL_CORE_FT
PRINCIPAL DISCHARGE DIAGNOSIS  Diagnosis: Colitis  Assessment and Plan of Treatment:         SECONDARY DISCHARGE DIAGNOSES  Diagnosis: Atrial fibrillation  Assessment and Plan of Treatment: - EKG 7/3 Atrial fibrillation with slow ventricular response  - not on rate control;   - continue taking eliquis  you are at increased risk of stroke        PRINCIPAL DISCHARGE DIAGNOSIS  Diagnosis: Colitis  Assessment and Plan of Treatment: s/p I&D on 6/26  currently per colorectal surgery abscess has improved and no need for surgical intervention  burn team following for wound care- continue recommendations  - CT Abdomen and Pelvis  Diffuse wall thickening of the descending colon, sigmoid colon, rectum, and anus with extensive surrounding soft tissue inflammation of the anus. Of note, extramural air visualized posterolateral to the anus   5.3 cm subcutaneous fluid collection lateral to the right gluteus described above, which may represent an abscess.  - CT Abdomen and Pelvis w/ Oral Cont and w/ IV Cont   Decreased colonic wall thickening,which may represent resolving colitis.  -Sacral woudn s/p pilonidal cyst - chronic and perirectal full thickness wound, no sign of infection  -Wound care - Silvadene/ DPD BID to sacrum/Xeroform/ DPD BID to perirectal wound/No Surgical debridement   - Burn on board  - No further Surgical debridement  - dc rocephine  and flagyl d10 completed   -  start augmentin until 7/14  - Offloading/ positional changes  -  EGD-Colonoscopy (07.01.21 @ 14:30) >   Normal mucosa in the whole colon. (Biopsy).    Polyp (8 mm) in the cecum. (Polypectomy).    Polyp (8 mm) in the ascending colon. (Polypectomy).    Polyps (4 mm) in the ascending colon. (Polypectomy).    Internal and external hemorrhoids.    Multiple opening of perianal abscess were noted on physical exam. .   Colonoscopy Limitations/Complications:   There were no apparent limitations or complications  Plan: Advance diet as tolerated  Protonix 40 mg po daily   Fluconazole if path shows esophageal candidiasis  Await pathology result  Repeat colonoscopy in 1 year  Follow up as outpatient with [facility]      SECONDARY DISCHARGE DIAGNOSES  Diagnosis: Atrial fibrillation  Assessment and Plan of Treatment: - EKG 7/3 Atrial fibrillation with slow ventricular response  - not on rate control;   - continue taking eliquis  you are at increased risk of stroke

## 2021-07-06 NOTE — DISCHARGE NOTE PROVIDER - HOSPITAL COURSE
70 year old female with copd and gastric bypass presents with change in stools abdominal abscess on CT experiencing copd exacerbation before her colonoscopy and s/p surgical debridement pilonidal cyst who developed new onset afib during hospital stay.    # New onset Afib    - EKG 7/3 Atrial fibrillation with slow ventricular response  - not on rate control;   - on eliquis  - CHADVASC 3    # sepsis secondary to pancolitis   #pilonidal cyst #gluteal abscess    s/p I&D on 6/26  currently per colorectal surgery abscess has improved and no need for surgical intervention  burn team following for wound care- continue recommendations    - CT Abdomen and Pelvis  Diffuse wall thickening of the descending colon, sigmoid colon, rectum, and anus with extensive surrounding soft tissue inflammation of the anus. Of note, extramural air visualized posterolateral to the anus   5.3 cm subcutaneous fluid collection lateral to the right gluteus described above, which may represent an abscess.  - CT Abdomen and Pelvis w/ Oral Cont and w/ IV Cont   Decreased colonic wall thickening,which may represent resolving colitis.  -Sacral woudn s/p pilonidal cyst - chronic and perirectal full thickness wound, no sign of infection  -Wound care - Silvadene/ DPD BID to sacrum/Xeroform/ DPD BID to perirectal wound/No Surgical debridement   - Burn on board  - No further Surgical debridement  - dc rocephine  and flagyl d10 completed   -  start augmentin until 7/14  - Offloading/ positional changes  -  EGD-Colonoscopy (07.01.21 @ 14:30) >     Normal mucosa in the whole colon. (Biopsy).    Polyp (8 mm) in the cecum. (Polypectomy).    Polyp (8 mm) in the ascending colon. (Polypectomy).    Polyps (4 mm) in the ascending colon. (Polypectomy).    Internal and external hemorrhoids.    Multiple opening of perianal abscess were noted on physical exam. .     Colonoscopy Limitations/Complications:   There were no apparent limitations or complications    Plan: Advance diet as tolerated  Protonix 40 mg po daily   Fluconazole if path shows esophageal candidiasis  Await pathology result  Repeat colonoscopy in 1 year  Follow up as outpatient with [facility]      # acute hypooxic respiratory failure secondary to COPD Exacerbation- improved    - s/p IV steroids,  - currently on prednisone taper ; 30 mg daily - will finish course today  - nebs Q8H and PRN  - patient does not have oxygen at home    #HFpEF    -`TTE EF of 63 %.  - monitor I&O  -  lasix 40 mg  on hold for hyponatremia  - BNP; from 6/25- 1500-->912  - fluid restriction; check urine sodium, osm, serum osm    # EtOH abuse with suspected folate/thiamine deficiency    -c/w  folate and thiamine supplementation

## 2021-07-06 NOTE — PROGRESS NOTE ADULT - REASON FOR ADMISSION
abd abscess

## 2021-07-07 VITALS
TEMPERATURE: 97 F | RESPIRATION RATE: 18 BRPM | SYSTOLIC BLOOD PRESSURE: 98 MMHG | HEART RATE: 78 BPM | DIASTOLIC BLOOD PRESSURE: 57 MMHG

## 2021-07-07 LAB
ALBUMIN SERPL ELPH-MCNC: 3.2 G/DL — LOW (ref 3.5–5.2)
ALP SERPL-CCNC: 70 U/L — SIGNIFICANT CHANGE UP (ref 30–115)
ALT FLD-CCNC: 11 U/L — SIGNIFICANT CHANGE UP (ref 0–41)
ANION GAP SERPL CALC-SCNC: 7 MMOL/L — SIGNIFICANT CHANGE UP (ref 7–14)
AST SERPL-CCNC: 15 U/L — SIGNIFICANT CHANGE UP (ref 0–41)
BASOPHILS # BLD AUTO: 0.01 K/UL — SIGNIFICANT CHANGE UP (ref 0–0.2)
BASOPHILS NFR BLD AUTO: 0.1 % — SIGNIFICANT CHANGE UP (ref 0–1)
BILIRUB SERPL-MCNC: 0.7 MG/DL — SIGNIFICANT CHANGE UP (ref 0.2–1.2)
BUN SERPL-MCNC: 11 MG/DL — SIGNIFICANT CHANGE UP (ref 10–20)
CALCIUM SERPL-MCNC: 8.4 MG/DL — LOW (ref 8.5–10.1)
CHLORIDE SERPL-SCNC: 88 MMOL/L — LOW (ref 98–110)
CO2 SERPL-SCNC: 34 MMOL/L — HIGH (ref 17–32)
CREAT SERPL-MCNC: 0.5 MG/DL — LOW (ref 0.7–1.5)
EOSINOPHIL # BLD AUTO: 0.02 K/UL — SIGNIFICANT CHANGE UP (ref 0–0.7)
EOSINOPHIL NFR BLD AUTO: 0.2 % — SIGNIFICANT CHANGE UP (ref 0–8)
GLUCOSE SERPL-MCNC: 83 MG/DL — SIGNIFICANT CHANGE UP (ref 70–99)
HCT VFR BLD CALC: 31.7 % — LOW (ref 37–47)
HGB BLD-MCNC: 9.5 G/DL — LOW (ref 12–16)
IMM GRANULOCYTES NFR BLD AUTO: 0.6 % — HIGH (ref 0.1–0.3)
LYMPHOCYTES # BLD AUTO: 0.55 K/UL — LOW (ref 1.2–3.4)
LYMPHOCYTES # BLD AUTO: 5 % — LOW (ref 20.5–51.1)
MAGNESIUM SERPL-MCNC: 1.9 MG/DL — SIGNIFICANT CHANGE UP (ref 1.8–2.4)
MCHC RBC-ENTMCNC: 24.6 PG — LOW (ref 27–31)
MCHC RBC-ENTMCNC: 30 G/DL — LOW (ref 32–37)
MCV RBC AUTO: 82.1 FL — SIGNIFICANT CHANGE UP (ref 81–99)
MONOCYTES # BLD AUTO: 0.46 K/UL — SIGNIFICANT CHANGE UP (ref 0.1–0.6)
MONOCYTES NFR BLD AUTO: 4.2 % — SIGNIFICANT CHANGE UP (ref 1.7–9.3)
NEUTROPHILS # BLD AUTO: 9.84 K/UL — HIGH (ref 1.4–6.5)
NEUTROPHILS NFR BLD AUTO: 89.9 % — HIGH (ref 42.2–75.2)
NRBC # BLD: 0 /100 WBCS — SIGNIFICANT CHANGE UP (ref 0–0)
OSMOLALITY SERPL: 264 MOS/KG — LOW (ref 280–301)
PLATELET # BLD AUTO: 252 K/UL — SIGNIFICANT CHANGE UP (ref 130–400)
POTASSIUM SERPL-MCNC: 3.9 MMOL/L — SIGNIFICANT CHANGE UP (ref 3.5–5)
POTASSIUM SERPL-SCNC: 3.9 MMOL/L — SIGNIFICANT CHANGE UP (ref 3.5–5)
PROT SERPL-MCNC: 5.5 G/DL — LOW (ref 6–8)
RBC # BLD: 3.86 M/UL — LOW (ref 4.2–5.4)
RBC # FLD: 25.9 % — HIGH (ref 11.5–14.5)
SODIUM SERPL-SCNC: 129 MMOL/L — LOW (ref 135–146)
SURGICAL PATHOLOGY STUDY: SIGNIFICANT CHANGE UP
WBC # BLD: 10.95 K/UL — HIGH (ref 4.8–10.8)
WBC # FLD AUTO: 10.95 K/UL — HIGH (ref 4.8–10.8)

## 2021-07-07 PROCEDURE — 99239 HOSP IP/OBS DSCHRG MGMT >30: CPT

## 2021-07-07 RX ADMIN — Medication 1 APPLICATION(S): at 05:23

## 2021-07-07 RX ADMIN — Medication 1 GRAM(S): at 01:43

## 2021-07-07 RX ADMIN — PANTOPRAZOLE SODIUM 40 MILLIGRAM(S): 20 TABLET, DELAYED RELEASE ORAL at 05:24

## 2021-07-07 RX ADMIN — Medication 30 MILLIGRAM(S): at 05:24

## 2021-07-07 RX ADMIN — SIMETHICONE 80 MILLIGRAM(S): 80 TABLET, CHEWABLE ORAL at 11:11

## 2021-07-07 RX ADMIN — Medication 100 MILLIGRAM(S): at 11:11

## 2021-07-07 RX ADMIN — Medication 1 MILLIGRAM(S): at 11:11

## 2021-07-07 RX ADMIN — Medication 1 GRAM(S): at 05:24

## 2021-07-07 RX ADMIN — METHOCARBAMOL 500 MILLIGRAM(S): 500 TABLET, FILM COATED ORAL at 05:24

## 2021-07-07 RX ADMIN — APIXABAN 5 MILLIGRAM(S): 2.5 TABLET, FILM COATED ORAL at 05:24

## 2021-07-07 RX ADMIN — Medication 1 TABLET(S): at 05:24

## 2021-07-07 RX ADMIN — Medication 3 MILLILITER(S): at 08:43

## 2021-07-07 RX ADMIN — DULOXETINE HYDROCHLORIDE 60 MILLIGRAM(S): 30 CAPSULE, DELAYED RELEASE ORAL at 11:11

## 2021-07-07 RX ADMIN — Medication 1 GRAM(S): at 11:11

## 2021-07-13 DIAGNOSIS — K59.00 CONSTIPATION, UNSPECIFIED: ICD-10-CM

## 2021-07-13 DIAGNOSIS — K63.5 POLYP OF COLON: ICD-10-CM

## 2021-07-13 DIAGNOSIS — J96.01 ACUTE RESPIRATORY FAILURE WITH HYPOXIA: ICD-10-CM

## 2021-07-13 DIAGNOSIS — R91.8 OTHER NONSPECIFIC ABNORMAL FINDING OF LUNG FIELD: ICD-10-CM

## 2021-07-13 DIAGNOSIS — E51.9 THIAMINE DEFICIENCY, UNSPECIFIED: ICD-10-CM

## 2021-07-13 DIAGNOSIS — K52.9 NONINFECTIVE GASTROENTERITIS AND COLITIS, UNSPECIFIED: ICD-10-CM

## 2021-07-13 DIAGNOSIS — R19.7 DIARRHEA, UNSPECIFIED: ICD-10-CM

## 2021-07-13 DIAGNOSIS — F17.219 NICOTINE DEPENDENCE, CIGARETTES, WITH UNSPECIFIED NICOTINE-INDUCED DISORDERS: ICD-10-CM

## 2021-07-13 DIAGNOSIS — K61.1 RECTAL ABSCESS: ICD-10-CM

## 2021-07-13 DIAGNOSIS — I48.19 OTHER PERSISTENT ATRIAL FIBRILLATION: ICD-10-CM

## 2021-07-13 DIAGNOSIS — Z79.1 LONG TERM (CURRENT) USE OF NON-STEROIDAL ANTI-INFLAMMATORIES (NSAID): ICD-10-CM

## 2021-07-13 DIAGNOSIS — Z71.6 TOBACCO ABUSE COUNSELING: ICD-10-CM

## 2021-07-13 DIAGNOSIS — J44.1 CHRONIC OBSTRUCTIVE PULMONARY DISEASE WITH (ACUTE) EXACERBATION: ICD-10-CM

## 2021-07-13 DIAGNOSIS — A41.9 SEPSIS, UNSPECIFIED ORGANISM: ICD-10-CM

## 2021-07-13 DIAGNOSIS — R15.1 FECAL SMEARING: ICD-10-CM

## 2021-07-13 DIAGNOSIS — I50.33 ACUTE ON CHRONIC DIASTOLIC (CONGESTIVE) HEART FAILURE: ICD-10-CM

## 2021-07-13 DIAGNOSIS — E87.1 HYPO-OSMOLALITY AND HYPONATREMIA: ICD-10-CM

## 2021-07-13 DIAGNOSIS — K92.1 MELENA: ICD-10-CM

## 2021-07-13 DIAGNOSIS — I86.8 VARICOSE VEINS OF OTHER SPECIFIED SITES: ICD-10-CM

## 2021-07-13 DIAGNOSIS — I11.0 HYPERTENSIVE HEART DISEASE WITH HEART FAILURE: ICD-10-CM

## 2021-07-13 DIAGNOSIS — Z98.0 INTESTINAL BYPASS AND ANASTOMOSIS STATUS: ICD-10-CM

## 2021-07-13 DIAGNOSIS — L05.91 PILONIDAL CYST WITHOUT ABSCESS: ICD-10-CM

## 2021-07-13 DIAGNOSIS — I27.20 PULMONARY HYPERTENSION, UNSPECIFIED: ICD-10-CM

## 2021-07-13 DIAGNOSIS — E53.8 DEFICIENCY OF OTHER SPECIFIED B GROUP VITAMINS: ICD-10-CM

## 2021-07-13 DIAGNOSIS — D62 ACUTE POSTHEMORRHAGIC ANEMIA: ICD-10-CM

## 2021-07-13 DIAGNOSIS — F10.10 ALCOHOL ABUSE, UNCOMPLICATED: ICD-10-CM

## 2021-07-13 DIAGNOSIS — I45.10 UNSPECIFIED RIGHT BUNDLE-BRANCH BLOCK: ICD-10-CM

## 2021-07-13 DIAGNOSIS — L02.31 CUTANEOUS ABSCESS OF BUTTOCK: ICD-10-CM

## 2021-07-13 DIAGNOSIS — B96.20 UNSPECIFIED ESCHERICHIA COLI [E. COLI] AS THE CAUSE OF DISEASES CLASSIFIED ELSEWHERE: ICD-10-CM

## 2021-07-13 DIAGNOSIS — I34.0 NONRHEUMATIC MITRAL (VALVE) INSUFFICIENCY: ICD-10-CM

## 2021-07-22 ENCOUNTER — APPOINTMENT (OUTPATIENT)
Dept: BURN CARE | Facility: CLINIC | Age: 71
End: 2021-07-22
Payer: MEDICARE

## 2021-07-22 ENCOUNTER — OUTPATIENT (OUTPATIENT)
Dept: OUTPATIENT SERVICES | Facility: HOSPITAL | Age: 71
LOS: 1 days | Discharge: HOME | End: 2021-07-22

## 2021-07-22 DIAGNOSIS — L98.422: ICD-10-CM

## 2021-07-22 DIAGNOSIS — S71.101A UNSPECIFIED OPEN WOUND, RIGHT THIGH, INITIAL ENCOUNTER: ICD-10-CM

## 2021-07-22 PROCEDURE — 99203 OFFICE O/P NEW LOW 30 MIN: CPT

## 2021-07-25 PROBLEM — L98.422 SKIN ULCER OF SACRUM WITH FAT LAYER EXPOSED: Status: ACTIVE | Noted: 2021-07-25

## 2021-07-25 PROBLEM — S71.101A OPEN WOUND OF RIGHT THIGH, INITIAL ENCOUNTER: Status: ACTIVE | Noted: 2021-07-25

## 2021-07-25 NOTE — ASSESSMENT
[FreeTextEntry1] : Wounds right thigh and sacrum \par Rec: Triad cream to sites ; offloading sacrum

## 2021-07-25 NOTE — HISTORY OF PRESENT ILLNESS
[de-identified] : unknown  [de-identified] : pt developed pressure ulcer of sacrum and irregular wound right thigh  [de-identified] : according to record . Pt is s/p I & D of right buttock  abscess

## 2021-07-25 NOTE — REASON FOR VISIT
[Initial] : initial visit [Formal Caregiver] : formal caregiver [Were you seen in the Emergency Room?] : not seen in the emergency room [Were you admitted to the burn center at [facility]?] : not admitted to the burn center at [facility]

## 2021-07-25 NOTE — PHYSICAL EXAM
[Small] : small  [Infected?] : Infected: No [de-identified] : Triad cream  [] : no [de-identified] : < 1 cm open wound of sacrum shallow with loosening pale eschar /slough\par prominent sacral bone \par \par right thigh - irregular wound with raised areas \par  [TWNoteComboBox1] : adaptic

## 2021-08-19 ENCOUNTER — APPOINTMENT (OUTPATIENT)
Dept: BURN CARE | Facility: CLINIC | Age: 71
End: 2021-08-19

## 2021-09-03 ENCOUNTER — INPATIENT (INPATIENT)
Facility: HOSPITAL | Age: 71
LOS: 14 days | Discharge: HOME HEALTH | End: 2021-09-18
Attending: STUDENT IN AN ORGANIZED HEALTH CARE EDUCATION/TRAINING PROGRAM | Admitting: STUDENT IN AN ORGANIZED HEALTH CARE EDUCATION/TRAINING PROGRAM
Payer: MEDICARE

## 2021-09-03 VITALS
OXYGEN SATURATION: 98 % | HEART RATE: 77 BPM | HEIGHT: 67 IN | RESPIRATION RATE: 18 BRPM | DIASTOLIC BLOOD PRESSURE: 59 MMHG | WEIGHT: 134.92 LBS | TEMPERATURE: 98 F | SYSTOLIC BLOOD PRESSURE: 102 MMHG

## 2021-09-03 DIAGNOSIS — K59.39 OTHER MEGACOLON: ICD-10-CM

## 2021-09-03 DIAGNOSIS — R26.0 ATAXIC GAIT: ICD-10-CM

## 2021-09-03 DIAGNOSIS — R15.9 FULL INCONTINENCE OF FECES: ICD-10-CM
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
## 2021-09-03 DIAGNOSIS — E86.1 HYPOVOLEMIA: ICD-10-CM

## 2021-09-03 DIAGNOSIS — F17.210 NICOTINE DEPENDENCE, CIGARETTES, UNCOMPLICATED: ICD-10-CM

## 2021-09-03 DIAGNOSIS — Z98.84 BARIATRIC SURGERY STATUS: ICD-10-CM

## 2021-09-03 DIAGNOSIS — A04.72 ENTEROCOLITIS DUE TO CLOSTRIDIUM DIFFICILE, NOT SPECIFIED AS RECURRENT: ICD-10-CM

## 2021-09-03 DIAGNOSIS — K64.8 OTHER HEMORRHOIDS: ICD-10-CM

## 2021-09-03 DIAGNOSIS — Z86.010 PERSONAL HISTORY OF COLONIC POLYPS: ICD-10-CM

## 2021-09-03 DIAGNOSIS — E53.8 DEFICIENCY OF OTHER SPECIFIED B GROUP VITAMINS: ICD-10-CM

## 2021-09-03 DIAGNOSIS — I50.32 CHRONIC DIASTOLIC (CONGESTIVE) HEART FAILURE: ICD-10-CM

## 2021-09-03 DIAGNOSIS — S01.81XA LACERATION WITHOUT FOREIGN BODY OF OTHER PART OF HEAD, INITIAL ENCOUNTER: ICD-10-CM

## 2021-09-03 DIAGNOSIS — J44.9 CHRONIC OBSTRUCTIVE PULMONARY DISEASE, UNSPECIFIED: ICD-10-CM
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
## 2021-09-03 DIAGNOSIS — F10.10 ALCOHOL ABUSE, UNCOMPLICATED: ICD-10-CM

## 2021-09-03 DIAGNOSIS — I48.91 UNSPECIFIED ATRIAL FIBRILLATION: ICD-10-CM

## 2021-09-03 DIAGNOSIS — K64.4 RESIDUAL HEMORRHOIDAL SKIN TAGS: ICD-10-CM

## 2021-09-03 DIAGNOSIS — G47.00 INSOMNIA, UNSPECIFIED: ICD-10-CM

## 2021-09-03 DIAGNOSIS — E87.1 HYPO-OSMOLALITY AND HYPONATREMIA: ICD-10-CM

## 2021-09-03 DIAGNOSIS — M85.80 OTHER SPECIFIED DISORDERS OF BONE DENSITY AND STRUCTURE, UNSPECIFIED SITE: ICD-10-CM

## 2021-09-03 DIAGNOSIS — E51.9 THIAMINE DEFICIENCY, UNSPECIFIED: ICD-10-CM

## 2021-09-03 DIAGNOSIS — E87.6 HYPOKALEMIA: ICD-10-CM

## 2021-09-03 DIAGNOSIS — D62 ACUTE POSTHEMORRHAGIC ANEMIA: ICD-10-CM

## 2021-09-03 DIAGNOSIS — Z79.01 LONG TERM (CURRENT) USE OF ANTICOAGULANTS: ICD-10-CM

## 2021-09-03 DIAGNOSIS — F32.9 MAJOR DEPRESSIVE DISORDER, SINGLE EPISODE, UNSPECIFIED: ICD-10-CM

## 2021-09-03 DIAGNOSIS — I11.0 HYPERTENSIVE HEART DISEASE WITH HEART FAILURE: ICD-10-CM

## 2021-09-03 LAB
ALBUMIN SERPL ELPH-MCNC: 3.4 G/DL — LOW (ref 3.5–5.2)
ALP SERPL-CCNC: 81 U/L — SIGNIFICANT CHANGE UP (ref 30–115)
ALT FLD-CCNC: 21 U/L — SIGNIFICANT CHANGE UP (ref 0–41)
ANION GAP SERPL CALC-SCNC: 9 MMOL/L — SIGNIFICANT CHANGE UP (ref 7–14)
APTT BLD: 34 SEC — SIGNIFICANT CHANGE UP (ref 27–39.2)
AST SERPL-CCNC: 22 U/L — SIGNIFICANT CHANGE UP (ref 0–41)
BASOPHILS # BLD AUTO: 0.02 K/UL — SIGNIFICANT CHANGE UP (ref 0–0.2)
BASOPHILS NFR BLD AUTO: 0.2 % — SIGNIFICANT CHANGE UP (ref 0–1)
BILIRUB SERPL-MCNC: 0.9 MG/DL — SIGNIFICANT CHANGE UP (ref 0.2–1.2)
BLD GP AB SCN SERPL QL: SIGNIFICANT CHANGE UP
BUN SERPL-MCNC: 17 MG/DL — SIGNIFICANT CHANGE UP (ref 10–20)
CALCIUM SERPL-MCNC: 8.3 MG/DL — LOW (ref 8.5–10.1)
CHLORIDE SERPL-SCNC: 93 MMOL/L — LOW (ref 98–110)
CO2 SERPL-SCNC: 28 MMOL/L — SIGNIFICANT CHANGE UP (ref 17–32)
CREAT SERPL-MCNC: 0.6 MG/DL — LOW (ref 0.7–1.5)
EOSINOPHIL # BLD AUTO: 0.03 K/UL — SIGNIFICANT CHANGE UP (ref 0–0.7)
EOSINOPHIL NFR BLD AUTO: 0.4 % — SIGNIFICANT CHANGE UP (ref 0–8)
GLUCOSE SERPL-MCNC: 73 MG/DL — SIGNIFICANT CHANGE UP (ref 70–99)
HCT VFR BLD CALC: 31.6 % — LOW (ref 37–47)
HCT VFR BLD CALC: 33.1 % — LOW (ref 37–47)
HGB BLD-MCNC: 10.2 G/DL — LOW (ref 12–16)
HGB BLD-MCNC: 10.3 G/DL — LOW (ref 12–16)
IMM GRANULOCYTES NFR BLD AUTO: 0.5 % — HIGH (ref 0.1–0.3)
INR BLD: 1.24 RATIO — SIGNIFICANT CHANGE UP (ref 0.65–1.3)
LACTATE SERPL-SCNC: 1.2 MMOL/L — SIGNIFICANT CHANGE UP (ref 0.7–2)
LIDOCAIN IGE QN: 14 U/L — SIGNIFICANT CHANGE UP (ref 7–60)
LYMPHOCYTES # BLD AUTO: 1.26 K/UL — SIGNIFICANT CHANGE UP (ref 1.2–3.4)
LYMPHOCYTES # BLD AUTO: 14.9 % — LOW (ref 20.5–51.1)
MCHC RBC-ENTMCNC: 29.6 PG — SIGNIFICANT CHANGE UP (ref 27–31)
MCHC RBC-ENTMCNC: 29.9 PG — SIGNIFICANT CHANGE UP (ref 27–31)
MCHC RBC-ENTMCNC: 31.1 G/DL — LOW (ref 32–37)
MCHC RBC-ENTMCNC: 32.3 G/DL — SIGNIFICANT CHANGE UP (ref 32–37)
MCV RBC AUTO: 92.7 FL — SIGNIFICANT CHANGE UP (ref 81–99)
MCV RBC AUTO: 95.1 FL — SIGNIFICANT CHANGE UP (ref 81–99)
MONOCYTES # BLD AUTO: 0.47 K/UL — SIGNIFICANT CHANGE UP (ref 0.1–0.6)
MONOCYTES NFR BLD AUTO: 5.5 % — SIGNIFICANT CHANGE UP (ref 1.7–9.3)
NEUTROPHILS # BLD AUTO: 6.66 K/UL — HIGH (ref 1.4–6.5)
NEUTROPHILS NFR BLD AUTO: 78.5 % — HIGH (ref 42.2–75.2)
NRBC # BLD: 0 /100 WBCS — SIGNIFICANT CHANGE UP (ref 0–0)
NRBC # BLD: 0 /100 WBCS — SIGNIFICANT CHANGE UP (ref 0–0)
PLATELET # BLD AUTO: 213 K/UL — SIGNIFICANT CHANGE UP (ref 130–400)
PLATELET # BLD AUTO: 254 K/UL — SIGNIFICANT CHANGE UP (ref 130–400)
POTASSIUM SERPL-MCNC: 3.1 MMOL/L — LOW (ref 3.5–5)
POTASSIUM SERPL-SCNC: 3.1 MMOL/L — LOW (ref 3.5–5)
PROT SERPL-MCNC: 5.5 G/DL — LOW (ref 6–8)
PROTHROM AB SERPL-ACNC: 14.2 SEC — HIGH (ref 9.95–12.87)
RBC # BLD: 3.41 M/UL — LOW (ref 4.2–5.4)
RBC # BLD: 3.48 M/UL — LOW (ref 4.2–5.4)
RBC # FLD: 19.9 % — HIGH (ref 11.5–14.5)
RBC # FLD: 19.9 % — HIGH (ref 11.5–14.5)
SARS-COV-2 RNA SPEC QL NAA+PROBE: SIGNIFICANT CHANGE UP
SODIUM SERPL-SCNC: 130 MMOL/L — LOW (ref 135–146)
WBC # BLD: 7.11 K/UL — SIGNIFICANT CHANGE UP (ref 4.8–10.8)
WBC # BLD: 8.48 K/UL — SIGNIFICANT CHANGE UP (ref 4.8–10.8)
WBC # FLD AUTO: 7.11 K/UL — SIGNIFICANT CHANGE UP (ref 4.8–10.8)
WBC # FLD AUTO: 8.48 K/UL — SIGNIFICANT CHANGE UP (ref 4.8–10.8)

## 2021-09-03 PROCEDURE — 99223 1ST HOSP IP/OBS HIGH 75: CPT

## 2021-09-03 PROCEDURE — 99285 EMERGENCY DEPT VISIT HI MDM: CPT

## 2021-09-03 PROCEDURE — 99222 1ST HOSP IP/OBS MODERATE 55: CPT

## 2021-09-03 PROCEDURE — 74174 CTA ABD&PLVS W/CONTRAST: CPT | Mod: 26,MA

## 2021-09-03 RX ORDER — ALBUTEROL 90 UG/1
2 AEROSOL, METERED ORAL EVERY 6 HOURS
Refills: 0 | Status: DISCONTINUED | OUTPATIENT
Start: 2021-09-03 | End: 2021-09-18

## 2021-09-03 RX ORDER — CHLORHEXIDINE GLUCONATE 213 G/1000ML
1 SOLUTION TOPICAL
Refills: 0 | Status: DISCONTINUED | OUTPATIENT
Start: 2021-09-03 | End: 2021-09-18

## 2021-09-03 RX ORDER — POTASSIUM CHLORIDE 20 MEQ
40 PACKET (EA) ORAL ONCE
Refills: 0 | Status: COMPLETED | OUTPATIENT
Start: 2021-09-03 | End: 2021-09-03

## 2021-09-03 RX ORDER — FOLIC ACID 0.8 MG
1 TABLET ORAL DAILY
Refills: 0 | Status: DISCONTINUED | OUTPATIENT
Start: 2021-09-03 | End: 2021-09-18

## 2021-09-03 RX ORDER — MORPHINE SULFATE 50 MG/1
4 CAPSULE, EXTENDED RELEASE ORAL ONCE
Refills: 0 | Status: DISCONTINUED | OUTPATIENT
Start: 2021-09-03 | End: 2021-09-03

## 2021-09-03 RX ORDER — METHOCARBAMOL 500 MG/1
500 TABLET, FILM COATED ORAL
Refills: 0 | Status: DISCONTINUED | OUTPATIENT
Start: 2021-09-03 | End: 2021-09-18

## 2021-09-03 RX ORDER — DULOXETINE HYDROCHLORIDE 30 MG/1
60 CAPSULE, DELAYED RELEASE ORAL DAILY
Refills: 0 | Status: DISCONTINUED | OUTPATIENT
Start: 2021-09-03 | End: 2021-09-18

## 2021-09-03 RX ORDER — SODIUM CHLORIDE 9 MG/ML
1000 INJECTION, SOLUTION INTRAVENOUS ONCE
Refills: 0 | Status: COMPLETED | OUTPATIENT
Start: 2021-09-03 | End: 2021-09-03

## 2021-09-03 RX ORDER — THIAMINE MONONITRATE (VIT B1) 100 MG
100 TABLET ORAL DAILY
Refills: 0 | Status: DISCONTINUED | OUTPATIENT
Start: 2021-09-03 | End: 2021-09-18

## 2021-09-03 RX ORDER — SODIUM CHLORIDE 9 MG/ML
500 INJECTION, SOLUTION INTRAVENOUS
Refills: 0 | Status: DISCONTINUED | OUTPATIENT
Start: 2021-09-03 | End: 2021-09-06

## 2021-09-03 RX ADMIN — MORPHINE SULFATE 4 MILLIGRAM(S): 50 CAPSULE, EXTENDED RELEASE ORAL at 05:47

## 2021-09-03 RX ADMIN — Medication 40 MILLIEQUIVALENT(S): at 06:39

## 2021-09-03 RX ADMIN — SODIUM CHLORIDE 1000 MILLILITER(S): 9 INJECTION, SOLUTION INTRAVENOUS at 05:47

## 2021-09-03 RX ADMIN — METHOCARBAMOL 500 MILLIGRAM(S): 500 TABLET, FILM COATED ORAL at 18:00

## 2021-09-03 RX ADMIN — SODIUM CHLORIDE 50 MILLILITER(S): 9 INJECTION, SOLUTION INTRAVENOUS at 18:00

## 2021-09-03 NOTE — H&P ADULT - NSHPPHYSICALEXAM_GEN_ALL_CORE
GENERAL: NAD, lying in bed comfortably  HEAD:  Atraumatic, Normocephalic  EYES: EOMI, PERRLA, conjunctiva and sclera clear  ENT: Moist mucous membranes  NECK: Supple, No JVD  CHEST/LUNG: Clear to auscultation bilaterally  HEART: Regular rate and rhythm; No murmurs,  ABDOMEN: Soft, Nontender, Nondistended.   EXTREMITIES:  No clubbing, cyanosis, or edema  NERVOUS SYSTEM:  Alert & Oriented X3, speech clear. No deficits   MSK: FROM all 4 extremities, full and equal strength  SKIN: multiple bruises on all ext.
Attending Only

## 2021-09-03 NOTE — ED PROVIDER NOTE - PROGRESS NOTE DETAILS
Enoc: Received signout from Dr. Goldstein. Pending CT. Enoc: 70F PMHx Afib on Eliquis, abdominal abscess 2 mo ago, hx gastric bypass sx, COPD presents with bloody stools x 3 days. Reports noticing blood streak stools with bright red blood the past few days but last night had a large bloody BM with bright blood dripping all over toilet bowel.  Denies hx of colonoscopy. Last dose of Eliquis last night (on 5mg bid). No n/v/d, chest pain, sob, focal weakness, trauma. Reports mild dizziness.  On exam, calm, NAD in stretcher. CV RRR, lungs CTAB, abd s, nt, nd.  per Dr. Goldsetin, AMRVIN showed maroon stool. Hb 10.2 on CBC. BUN/CR wnl.  pending CTA. Enoc: CTA negative for active GI bleed. Pt reports she feels unsafe to go home, unable to care for herself. Plan for admission. Enoc: Pt endorsed to MAR

## 2021-09-03 NOTE — ED PROVIDER NOTE - PHYSICAL EXAMINATION
CONSTITUTIONAL: in no acute distress, afebrile  SKIN: Warm, dry  HEAD: Normocephalic; atraumatic  EYES: No conjunctival injection. EOMI  ENT: No nasal discharge; oropharynx nonerythematous; airway clear  CARD:  Regular rate and rhythm  RESP: CTAB; No wheezes, crackles, rales or rhonchi  ABD: Soft non distended, mild diffuse TTP, worse midline; No guarding or rebound tenderness, non peritonitic; no flank or cva tenderness  RECTAL: + tender external hemorrhoids, maroon stool on MARVIN exam, no fissures  EXT: Normal ROM.  chronic venous stasis ulcers  NEURO: A&O x3, grossly unremarkable, no focal deficits  PSYCH: Cooperative, appropriate  *Chaperone MIGUELITO Bhakta was used during the encounter. A professional environment was maintained and explained to patient CONSTITUTIONAL: in no acute distress, afebrile  SKIN: Warm, dry  HEAD: Normocephalic; atraumatic  EYES: No conjunctival injection. EOMI  ENT: No nasal discharge; oropharynx nonerythematous; airway clear  CARD:  Regular rate and irregular rhythm  RESP: CTAB; No wheezes, crackles, rales or rhonchi  ABD: Soft non distended, mild diffuse TTP, worse midline; No guarding or rebound tenderness, non peritonitic; no flank or cva tenderness  RECTAL: + tender external hemorrhoids, maroon stool on MARVIN exam, no bright red blood, no fissures  EXT: Normal ROM.  chronic venous stasis ulcers; multiple bruises on all 4 extremities after beginning Eliquis  NEURO: A&O x3, grossly unremarkable, no focal deficits  PSYCH: Cooperative, appropriate  *Chaperone MIGUELITO Bhakta was used during the encounter. A professional environment was maintained and explained to patient

## 2021-09-03 NOTE — CONSULT NOTE ADULT - SUBJECTIVE AND OBJECTIVE BOX
Gastroenterology Consultation:    Patient is a 70y old  Female who presents with a chief complaint of BRBPR (03 Sep 2021 14:33)      Admitted on: 09-03-21  HPI:  71 yo F with recent dx of Afib on Eliquis, Abdominal abscess 2 mo ago, Gastric surgery 20+ years ago, COPD who presents to the ED with BRBPR , one episode before she presented , no bleeding in hospital  The patient has been having intermittent abdominal cramps and watery diarrhea for months ,along with nausea and fecal incontinence that had affected her daily function .She reports an episode of bright red blood that filled the toilet ,and came to the ED.   Reports weight loss 30 Ib ,low appetite ,low energy and easy fatigue .  Reports sad ,depressed mood ,insomnia ,low energy   Denies fever ,vomiting ,abdominal distention .  patient was admitted in june 2021 for hematochezia and perianal abscess, colonoscopy and surgical debridement was done and developed new onset afib during hospital stay.    In ED , hemodynamically stable ,Hb 10.2 , /59 , MARVIN was in ED and showed melena and tender external hemorrhoidal ,  CT Angio Abdomen and Pelvis w/ IV Cont 09.03.21 : No CTA evidence of active GI bleeding. Anorectal wall thickening, neoplasm is not excluded.     (03 Sep 2021 14:33)      Prior records Reviewed (Y/N):  History obtained from person other than patient (Y/N):    Prior EGD:   white plaques in the esophagus , pathology negative for candida   Erythema in the stomach compatible with non-erosive gastritis. No H. pylori   No evidence of gastric bypass surgery in EGD. 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 No ulcer was noted at the anastomosis site.     Prior Colonoscopy: < from: EGD-Colonoscopy (07.01.21 @ 14:30) >  Normal mucosa in the whole colon. (Biopsy).    Polyp (8 mm) in the cecum. (Polypectomy).  T.A   Polyp (8 mm) in the ascending colon. (Polypectomy).  T.A    Polyps (4 mm) in the ascending colon. (Polypectomy). T. A   Internal and external hemorrhoids.    Multiple opening of perianal abscess were noted on physical exam. .   terminal ileum biopsy showed Fragments of enteric mucosa showing hyperemia with preserved  villous architecture.  random colon biopsy: Fragments of colonic mucosa showing hyperemia without  significant inflammation.        PAST MEDICAL & SURGICAL HISTORY:  Atrial fibrillation    History of COPD        FAMILY HISTORY: No pertinent family history         Home Medications:  albuterol 90 mcg/inh inhalation aerosol: 2 puff(s) inhaled every 6 hours, As needed, Shortness of Breath and/or Wheezing (06 Jul 2021 14:58)  apixaban 5 mg oral tablet: 1 tab(s) orally every 12 hours (06 Jul 2021 14:58)  DULoxetine 60 mg oral delayed release capsule: 1 cap(s) orally once a day (06 Jul 2021 14:58)  folic acid 1 mg oral tablet: 1 tab(s) orally once a day (06 Jul 2021 14:58)  methocarbamol 500 mg oral tablet: 1 tab(s) orally 2 times a day (06 Jul 2021 14:58)  thiamine 100 mg oral tablet: 1 tab(s) orally once a day (06 Jul 2021 14:58)    MEDICATIONS  (STANDING):  chlorhexidine 4% Liquid 1 Application(s) Topical <User Schedule>  DULoxetine 60 milliGRAM(s) Oral daily  folic acid 1 milliGRAM(s) Oral daily  lactated ringers. 500 milliLiter(s) (50 mL/Hr) IV Continuous <Continuous>  methocarbamol 500 milliGRAM(s) Oral two times a day  thiamine 100 milliGRAM(s) Oral daily    MEDICATIONS  (PRN):  ALBUTerol    90 MICROgram(s) HFA Inhaler 2 Puff(s) Inhalation every 6 hours PRN Shortness of Breath and/or Wheezing      Allergies  No Known Allergies      Review of Systems:   Constitutional:  No Fever, No Chills  ENT/Mouth:  No Hearing Changes,  No Difficulty Swallowing  Eyes:  No Eye Pain, No Vision Changes  Cardiovascular:  No Chest Pain, No Palpitations  Respiratory:  No Cough, No Dyspnea  Gastrointestinal:  As described in HPI  Musculoskeletal:  No Joint Swelling, No Back Pain  Skin:  No Skin Lesions, No Jaundice  Neuro:  No Syncope, No Dizziness  Heme/Lymph:  No Bruising, No Bleeding.          Physical Examination:  T(C): 36.3 (09-03-21 @ 15:27), Max: 36.9 (09-03-21 @ 04:15)  HR: 66 (09-03-21 @ 15:27) (66 - 77)  BP: 98/65 (09-03-21 @ 16:46) (81/51 - 102/59)  RR: 17 (09-03-21 @ 15:27) (17 - 18)  SpO2: 98% (09-03-21 @ 15:27) (98% - 98%)  Height (cm): 170.2 (09-03-21 @ 04:15)  Weight (kg): 61.2 (09-03-21 @ 04:15)      Constitutional: No acute distress.  Eyes:. Conjunctivae are clear, Sclera is non-icteric.  Ears Nose and Throat: The external ears are normal appearing,  Oral mucosa is pink and moist.  Respiratory:  No signs of respiratory distress. Lung sounds are clear bilaterally.  Cardiovascular:  S1 S2, Regular rate and rhythm.  GI: Abdomen is soft, symmetric, and non-tender without distention. Bowel sounds are present and normoactive in all four quadrants. No masses, hepatomegaly, or splenomegaly are noted.   Neuro: No Tremor, No involuntary movements  Skin: No rashes, No Jaundice.          Data: (reviewed by attending)                        10.2   8.48  )-----------( 254      ( 03 Sep 2021 05:07 )             31.6     Hgb Trend:  10.2  09-03-21 @ 05:07      09-03    130<L>  |  93<L>  |  17  ----------------------------<  73  3.1<L>   |  28  |  0.6<L>    Ca    8.3<L>      03 Sep 2021 05:07    TPro  5.5<L>  /  Alb  3.4<L>  /  TBili  0.9  /  DBili  x   /  AST  22  /  ALT  21  /  AlkPhos  81  09-03    Liver panel trend:  TBili 0.9   /   AST 22   /   ALT 21   /   AlkP 81   /   Tptn 5.5   /   Alb 3.4    /   DBili --      09-03      PT/INR - ( 03 Sep 2021 05:07 )   PT: 14.20 sec;   INR: 1.24 ratio         PTT - ( 03 Sep 2021 05:07 )  PTT:34.0 sec        Radiology:(reviewed by attending)  CT Angio Abdomen and Pelvis w/ IV Cont:   EXAM:  CT ANGIO ABD PELV (W)AW IC            PROCEDURE DATE:  09/03/2021            INTERPRETATION:  CTA abdomen and pelvis with and without IV contrast    CLINICAL HISTORY/REASON FOR EXAM: GI bleed.    TECHNIQUE: CTA abdomen and pelvis with and without IV contrast (gastrointestinal bleeding protocol). Contiguous axial CTA images of the abdomen and pelvis were obtained before and after the administration of 100 cc Omnipaque 350 intravenous contrast. Arterial and venous phase images obtained. 0 cc contrast discarded. Oral contrast was not administered. Reformatted images in the coronal and sagittal planes were acquired. 3-D/MIP images obtained.    COMPARISON: 6/30/2021.      LOWER CHEST: Bibasilar scarring / subsegmental atelectasis. Coronary artery calcifications. Biatrial enlargement.    HEPATOBILIARY: Post cholecystectomy. Intrahepatic and extrahepatic biliary ductal dilation.    SPLEEN: Unremarkable.    PANCREAS: Unremarkable.    ADRENAL GLANDS: Left adrenal thickening. Unremarkable right adrenal gland.    KIDNEYS: Left upper pole renal cyst. Symmetric renal enhancement. No hydronephrosis.    ABDOMINOPELVIC NODES: No abdominopelvic lymphadenopathy.    PELVIC ORGANS: Uterine atrophy.    PERITONEUM/MESENTERY/BOWEL: No evidence of gastrointestinal arterial extravasation or venous pooling to suggest active intestinal bleed. No bowel obstruction. No ascites or pneumoperitoneum. Postoperative appearance of the bowel. Anorectal wall thickening is noted.    BONES/SOFT TISSUES: Unchanged right gluteal subcutaneous 5.0 cm ovoid hypodense cyst an/or collection. Degenerative changes of the spine. No definite acute abnormalities.    OTHER: Diffuse vascular calcifications.      IMPRESSION:    No CTA evidence of active GI bleeding.    Anorectal wall thickening, neoplasm is not excluded.    Chronic and incidental findings as above    --- End of Report ---              MARTINEZ COMBS MD; Attending Radiologist  This document has been electronically signed. Sep  3 2021  9:51AM (09-03-21 @ 09:39)

## 2021-09-03 NOTE — H&P ADULT - ATTENDING COMMENTS
71 YO F with a PMH of COPD, ETOH abuse, h/o gastric bypass, and recent hx of pancolitis w/ louise-rectal abscess/fistulization who presents to the hospital with a c/o bloody BMs over the past x 2 days. Associated with + lower ABD pain. - hematemesis. - recent use of NSAIDs/EtOH/ASA. Denies any hematuria, dysuria, rashes, bruising, N/V/D, or fevers/chills. ROS negative, except as stated above.     In the ED, MARVIN with melena and tender external hemorrhoidal. CTA-AP w/ IV contrast showed no active bleed, but did show ano-rectal wall thickening. Hgb was 10.2. T&S obtained.     FMHx: Reviewed, not relevant    Physical exam shows pt in NAD. VSS, afebrile, not hypoxic on RA. Neuro exam intact. CTA B/L with no W/C/R. RRR, no M/G/R. ABD with TTP in the LLQ, normoactive BSs. LEs without swelling. No rashes or ecchymosis noted. Labs and radiology as above.     Normocytic anemia due to gastrointestinal bleeding, likely hemorrhoidal. HD stable. IVFs (LR). Trend CBC. Active T&S. Transfuse PRN for Hgb < 8. Two large bore IVs. NPO. GI consult.    Hypokalemia. Replace. Repeat BMP in the AM.     Hypoalbuminemia, from poor oral intake. Nutrition eval.     HX of COPD, ETOH abuse, and h/o gastric bypass. Restart home meds, except as stated above. DVT PPX. Inform PCP of pt's admission to hospital. My note supersedes the residents note.     Spoke with family:     Date seen by Attending:

## 2021-09-03 NOTE — H&P ADULT - HISTORY OF PRESENT ILLNESS
71 yo F with recent dx of Afib on Eliquis, Abdominal abscess 2 mo ago, Gastric Bypass surgery 20+ years ago, COPD who presents to the ED with BRBPR .  The patient has been having intermittent abdominal cramps and watery diarrhea for months ,along with nausea and fecal incontinence that had affected her daily function .  She reports an episode of bright red blood that filled the toilet ,and came to the ED.   Reports weight loss 30 Ib ,low appetite ,low energy and easy fatigue .  Reports sad ,depressed mood ,insomnia ,low energy ,no suicidal ideation.  Denies fever ,vomiting ,abdominal distention .    In ED , hemodynamically stable ,Hb 10.2 , /59 , MARVIN melena and tender external hemorrhoidal ,  CT Angio Abdomen and Pelvis w/ IV Cont 09.03.21 : No CTA evidence of active GI bleeding. Anorectal wall thickening, neoplasm is not excluded.  EGD-Colonoscopy 07.01.21 - White plaques in the esophagus (Biopsy).   Erythema in the stomach compatible with non-erosive gastritis. (Biopsy).   No evidence of gastric bypass surgery inEGD. 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 No ulcer was noted at the anastomosis site.        71 yo F with recent dx of Afib on Eliquis, Abdominal abscess 2 mo ago, Gastric Bypass surgery 20+ years ago, COPD who presents to the ED with BRBPR .  The patient has been having intermittent abdominal cramps and watery diarrhea for months ,along with nausea and fecal incontinence that had affected her daily function .She reports an episode of bright red blood that filled the toilet ,and came to the ED.   Reports weight loss 30 Ib ,low appetite ,low energy and easy fatigue .  Reports sad ,depressed mood ,insomnia ,low energy ,no suicidal ideation.  Denies fever ,vomiting ,abdominal distention .  patient was admitted in june 2021 for hematochezia and perianal abscess, colonoscopy and surgical debridement was done and developed new onset afib during hospital stay.    In ED , hemodynamically stable ,Hb 10.2 , /59 , MARVIN was in ED and showed melena and tender external hemorrhoidal ,  CT Angio Abdomen and Pelvis w/ IV Cont 09.03.21 : No CTA evidence of active GI bleeding. Anorectal wall thickening, neoplasm is not excluded.  EGD-Colonoscopy 07.01.21 - White plaques in the esophagus  Erythema in the stomach compatible with non-erosive gastritis.  No evidence of gastric bypass surgery in EGD. 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 No ulcer was noted at the anastomosis site.   multiple polyps in colonoscopy.

## 2021-09-03 NOTE — ED PROVIDER NOTE - CLINICAL SUMMARY MEDICAL DECISION MAKING FREE TEXT BOX
.  .  71 y/o F pmh afib on eliquis, gastric bypass > 20yr ago, COPD, smoker, p/w bloody stool x2d.  Labs reviewed. CT no active bleeding or acute finding. No active bleeding or diarrhea in ED.  No acute ED events, pt remains HD stable. Pt admitted to medicine for further w/up.  .  .

## 2021-09-03 NOTE — CONSULT NOTE ADULT - ASSESSMENT
71 yo F with recent dx of Afib on Eliquis, Abdominal abscess 2 mo ago, Gastric surgery 20+ years ago, COPD who presents to the ED with BRBPR , one episode before she presented , no bleeding in hospital.  No CTA evidence of active GI bleeding. Anorectal wall thickening      #hematochezia : lower GI bleed , likely hemorrhoidal   HD stable   HGB at baseline   recent EGD/ colon July 2020 showing large internal and external hemorrhoids   patient on Eliquis , needs 2 days off Eliquis before colonoscopy unless worsened GI bleed   CTA negative for GI bleed    REC:   trend CBC and keep HGB above 8  consult surgery for hemorrhoidal bleed (  July 2020 showing large internal and external hemorrhoids)   Anusol suppositories once every night    clear liquid diet and advance as tolerated if no further bleeding   please hold Eliquis , can switch to heparin if high thromboembolic risk  colonoscopy early next week or on urgent bases if worsened bleeding or any sign of HD instability secondary to GI bleed       #history of anorectal abscesses s/p drainage / fistulization   Endoscopic workup negative for IBD ( random biopsies taken from T.I and terminal ileum 7/2021 are negative )   REC:  Please check ANCA , ASCA serology   follow up with GI and colorectal surgery as outpatient after discharge

## 2021-09-03 NOTE — ED PROVIDER NOTE - NS ED ROS FT
Review of Systems:  CONSTITUTIONAL: No fever, No diaphoresis  SKIN: No rash  HEMATOLOGIC: + bleeding, No bruising  EYES: No eye pain, No blurred vision  ENT: No change in hearing, No sore throat, No neck pain, No rhinorrhea  RESPIRATORY: No shortness of breath, No cough  CARDIAC: No chest pain, No palpitations  GI: + abdominal pain, No nausea, No vomiting, No diarrhea, No constipation, + bright red blood per rectum, No melena. No flank pain  : No dysuria, frequency, hematuria  MUSCULOSKELETAL: No joint paint, No swelling, No back pain  NEUROLOGIC: No numbness, No focal weakness, No headache, No dizziness  All other systems negative, unless specified in HPI

## 2021-09-03 NOTE — H&P ADULT - ASSESSMENT
69 yo F with recent dx of Afib on Eliquis, Abdominal abscess 2 mo ago, Gastric Bypass surgery 20+ years ago, COPD who presents to the ED with BRBPR .    #episode of BRBPR , hemorrhoidal bleeding vs post polypectomy bleeding vs croh's disease flare  - chronic diarrhea and fecal incontinence   - hx of fistula , and multiple opening of perianal abscess  - Large non-bleeding internal and external hemorrhoids were noted.   - MARVIN melena and tender external hemorrhoidal   - colonoscopy 7/1/21 - multiple polypectomy   - Hb stable 10.2 ,f/u repeat ,active T&S .  - GI consult     #Atrial fibrillation on eliquis  - CHADVASC 3    #COPD ,stable not in exacerbation  - not on home oxygen ,no wheezing on exam  - inhaler PRN    #HFpEF ,euvolemic on exam   -TTE 6/27 EF of 63 %.  - monitor I&O  - lasix 40 mg    - BNP; from 6/25- 1500-->912    # EtOH abuse with suspected folate/thiamine deficiency  - on folic acid and thiamine     Diet - NPO  GI px  DVT px  Disposition - floor  71 yo F with recent dx of Afib on Eliquis, Abdominal abscess 2 mo ago, Gastric Bypass surgery 20+ years ago, COPD who presents to the ED with BRBPR .    #episode of BRBPR , hemorrhoidal bleeding vs post polypectomy bleeding vs croh's disease flare  - chronic diarrhea and fecal incontinence   - hx of fistula , and multiple opening of perianal abscess  - Large non-bleeding internal and external hemorrhoids were noted.   - MARVIN melena and tender external hemorrhoidal   - colonoscopy 7/1/21 - multiple polypectomy   - Hb stable 10.2 ,f/u repeat ,active T&S . started on IVF   - GI consult     #Hypokalemia   - K 3.1 ,repleted ,f/u repeat     #Atrial fibrillation on eliquis  - CHADVASC 3    #COPD ,stable not in exacerbation  - not on home oxygen ,no wheezing on exam  - inhaler PRN    #HFpEF ,euvolemic on exam   -TTE 6/27 EF of 63 %.  - monitor I&O  - lasix 40 mg    - BNP; from 6/25- 1500-->912    # EtOH abuse with suspected folate/thiamine deficiency  - on folic acid and thiamine     Diet - NPO for GI eval  GI px  DVT px  Disposition - floor  71 yo F with recent dx of Afib on Eliquis, Abdominal abscess 2 mo ago, Gastric Bypass surgery 20+ years ago, COPD who presents to the ED with BRBPR .    #episode of BRBPR , hemorrhoidal bleeding vs post polypectomy bleeding vs croh's disease flare  - chronic diarrhea and fecal incontinence   - hx of fistula , and multiple opening of perianal abscess  - Large non-bleeding internal and external hemorrhoids were noted.   - MARVIN melena and tender external hemorrhoidal   - colonoscopy 7/1/21 - multiple polypectomy   - Hb stable 10.2 ,f/u repeat ,active T&S . started on IVF   - GI consult   - orthostatic vital signs     #Hypokalemia   - K 3.1 ,repleted ,f/u repeat     #Atrial fibrillation on eliquis  - CHADVASC 3  - hold Eliquis for now     #COPD ,stable not in exacerbation  - not on home oxygen ,no wheezing on exam  - inhaler PRN    #HFpEF ,euvolemic on exam   -TTE 6/27 EF of 63 %.  - monitor I&O  - BNP; from 6/25- 1500-->912  - avoid volume overload    # EtOH abuse with suspected folate/thiamine deficiency  - on folic acid and thiamine     # Depression on duloxetine  - no suicidal ideation  - c/w SSRI  - f/u w psychiatry as outpatient    Diet - NPO for GI eval  GI px  DVT px  Disposition - floor

## 2021-09-03 NOTE — ED ADULT NURSE NOTE - NSIMPLEMENTINTERV_GEN_ALL_ED
Implemented All Fall with Harm Risk Interventions:  [place] to call system. Call bell, personal items and telephone within reach. Instruct patient to call for assistance. Room bathroom lighting operational. Non-slip footwear when patient is off stretcher. Physically safe environment: no spills, clutter or unnecessary equipment. Stretcher in lowest position, wheels locked, appropriate side rails in place. Provide visual cue, wrist band, yellow gown, etc. Monitor gait and stability. Monitor for mental status changes and reorient to person, place, and time. Review medications for side effects contributing to fall risk. Reinforce activity limits and safety measures with patient and family. Provide visual clues: red socks.

## 2021-09-03 NOTE — ED PROVIDER NOTE - OBJECTIVE STATEMENT
69 yo F with recent dx of Afib on Eliquis, Abdominal abscess 2 mo ago, Gastric Bypass surgery 20+ years ago, COPD who presents with 2 day hx of bloody stools.  Pt endorsing BRBPR in toilet, today, pt noticed worsening bleeding and blood on floor of bathroom so came to ER.  Last took Eliquis around 7 PM last night.  Pt also endorsing mild diffuse abdominal pain.  Pt denies n/v/d, chest pain, SOB, back pain, headache, focal weakness, fevers, chills. 69 yo F with recent dx of Afib on Eliquis, Abdominal abscess 2 mo ago, Gastric Bypass surgery 20+ years ago, COPD who presents with 2 day hx of bloody stools.  Pt endorsing BRBPR in toilet, today, pt noticed worsening bleeding and blood on floor of bathroom so came to ER.  Last took Eliquis around 7 PM last night.  Pt also endorsing mild diffuse abdominal pain, non radiating.  Pt endorses multiple bruises on all exremities since starting Eliquis 2 mo ago.  Pt denies n/v/d, chest pain, SOB, back pain, headache, focal weakness, fevers, chills, trauma, falls.

## 2021-09-03 NOTE — H&P ADULT - NSHPLABSRESULTS_GEN_ALL_CORE
< from: CT Angio Abdomen and Pelvis w/ IV Cont (09.03.21 @ 09:39) >    IMPRESSION:    No CTA evidence of active GI bleeding.    Anorectal wall thickening, neoplasm is not excluded.    Chronic and incidental findings as above    < end of copied text >    < from: CT Abdomen and Pelvis w/ Oral Cont and w/ IV Cont (06.30.21 @ 19:21) >    Since June 25, 2021:    1. No significant change in gluteal subcutaneous stranding with foci of subcutaneous emphysema within right medial gluteal fold and perianal region, suggestive of soft tissue infection; evaluation for small abscesses and fistulas limited by modality. Previously described a gluteal abscess not definitely delineated on current CT.    2. Decreased colonic wall thickening,which may represent resolving colitis.    3. Increased small bilateral pleural effusions.    < end of copied text >    < from: EGD-Colonoscopy (07.01.21 @ 14:30) >    Colonoscopy Findings:   Mucosa Normal mucosa was noted in the whole colon. There were no AVMs, masses,  evidence of colitis or other abnormalities seen. Retroflexion of the scope in  the rectum revealed no abnormalities. Multiple cold forceps biopsies were  performed for histology from terminal ileum, Cecum, ascending colon, transverse  colon, descending colon, sigmoid and rectum.   Protruding lesions A single sessile 8 mm polyp of benign appearance was found in  the cecum. A single-piece polypectomy was performed using a hot snare. The polyp  was completely removed.   A single sessile 8 mm polyp of benign appearance was found in the ascending  colon. A single-piece polypectomy was performed using a hot snare. The polyp was  completely removed.   Two sessile 4 mm polyps were found in the ascending colon. A single-piece  polypectomy was performed using a cold forceps. The polyps were completely  removed.   Large non-bleeding internal and external hemorrhoids were noted.   Additional findings Multiple opening of perianal abscess were noted on physical  exam..     Colonoscopy Impressions:    Normal mucosa in the whole colon. (Biopsy).    Polyp (8 mm) in the cecum. (Polypectomy).    Polyp (8 mm) in the ascending colon. (Polypectomy).    Polyps (4 mm) in the ascending colon. (Polypectomy).     < end of copied text >

## 2021-09-04 LAB
ALBUMIN SERPL ELPH-MCNC: 3.2 G/DL — LOW (ref 3.5–5.2)
ALP SERPL-CCNC: 90 U/L — SIGNIFICANT CHANGE UP (ref 30–115)
ALT FLD-CCNC: 20 U/L — SIGNIFICANT CHANGE UP (ref 0–41)
ANION GAP SERPL CALC-SCNC: 15 MMOL/L — HIGH (ref 7–14)
ANION GAP SERPL CALC-SCNC: 6 MMOL/L — LOW (ref 7–14)
AST SERPL-CCNC: 25 U/L — SIGNIFICANT CHANGE UP (ref 0–41)
BILIRUB SERPL-MCNC: 1.1 MG/DL — SIGNIFICANT CHANGE UP (ref 0.2–1.2)
BUN SERPL-MCNC: 12 MG/DL — SIGNIFICANT CHANGE UP (ref 10–20)
BUN SERPL-MCNC: 14 MG/DL — SIGNIFICANT CHANGE UP (ref 10–20)
CALCIUM SERPL-MCNC: 8.1 MG/DL — LOW (ref 8.5–10.1)
CALCIUM SERPL-MCNC: 8.3 MG/DL — LOW (ref 8.5–10.1)
CHLORIDE SERPL-SCNC: 101 MMOL/L — SIGNIFICANT CHANGE UP (ref 98–110)
CHLORIDE SERPL-SCNC: 98 MMOL/L — SIGNIFICANT CHANGE UP (ref 98–110)
CO2 SERPL-SCNC: 21 MMOL/L — SIGNIFICANT CHANGE UP (ref 17–32)
CO2 SERPL-SCNC: 28 MMOL/L — SIGNIFICANT CHANGE UP (ref 17–32)
CREAT SERPL-MCNC: 0.5 MG/DL — LOW (ref 0.7–1.5)
CREAT SERPL-MCNC: 0.6 MG/DL — LOW (ref 0.7–1.5)
GLUCOSE SERPL-MCNC: 73 MG/DL — SIGNIFICANT CHANGE UP (ref 70–99)
GLUCOSE SERPL-MCNC: 82 MG/DL — SIGNIFICANT CHANGE UP (ref 70–99)
MAGNESIUM SERPL-MCNC: 2 MG/DL — SIGNIFICANT CHANGE UP (ref 1.8–2.4)
POTASSIUM SERPL-MCNC: 3.5 MMOL/L — SIGNIFICANT CHANGE UP (ref 3.5–5)
POTASSIUM SERPL-MCNC: 3.9 MMOL/L — SIGNIFICANT CHANGE UP (ref 3.5–5)
POTASSIUM SERPL-SCNC: 3.5 MMOL/L — SIGNIFICANT CHANGE UP (ref 3.5–5)
POTASSIUM SERPL-SCNC: 3.9 MMOL/L — SIGNIFICANT CHANGE UP (ref 3.5–5)
PROT SERPL-MCNC: 5.3 G/DL — LOW (ref 6–8)
SODIUM SERPL-SCNC: 132 MMOL/L — LOW (ref 135–146)
SODIUM SERPL-SCNC: 137 MMOL/L — SIGNIFICANT CHANGE UP (ref 135–146)

## 2021-09-04 PROCEDURE — 99233 SBSQ HOSP IP/OBS HIGH 50: CPT

## 2021-09-04 RX ORDER — SODIUM CHLORIDE 9 MG/ML
500 INJECTION, SOLUTION INTRAVENOUS ONCE
Refills: 0 | Status: COMPLETED | OUTPATIENT
Start: 2021-09-04 | End: 2021-09-04

## 2021-09-04 RX ADMIN — DULOXETINE HYDROCHLORIDE 60 MILLIGRAM(S): 30 CAPSULE, DELAYED RELEASE ORAL at 14:30

## 2021-09-04 RX ADMIN — Medication 1 MILLIGRAM(S): at 14:31

## 2021-09-04 RX ADMIN — Medication 100 MILLIGRAM(S): at 14:31

## 2021-09-04 RX ADMIN — CHLORHEXIDINE GLUCONATE 1 APPLICATION(S): 213 SOLUTION TOPICAL at 05:32

## 2021-09-04 RX ADMIN — SODIUM CHLORIDE 500 MILLILITER(S): 9 INJECTION, SOLUTION INTRAVENOUS at 10:23

## 2021-09-04 RX ADMIN — METHOCARBAMOL 500 MILLIGRAM(S): 500 TABLET, FILM COATED ORAL at 05:32

## 2021-09-04 RX ADMIN — METHOCARBAMOL 500 MILLIGRAM(S): 500 TABLET, FILM COATED ORAL at 18:08

## 2021-09-04 NOTE — PROGRESS NOTE ADULT - SUBJECTIVE AND OBJECTIVE BOX
Gastroenterology progress note:     Patient is a 70y old  Female who presents with a chief complaint of BRBPR (04 Sep 2021 11:37)       Admitted on: 09-03-21    We are following the patient for: hematochezia       Interval History:    No acute events overnight.   - Diet - regular  - last BM - yesterday brown  - Abdominal pain - none      PAST MEDICAL & SURGICAL HISTORY:  Atrial fibrillation    History of COPD        MEDICATIONS  (STANDING):  chlorhexidine 4% Liquid 1 Application(s) Topical <User Schedule>  DULoxetine 60 milliGRAM(s) Oral daily  folic acid 1 milliGRAM(s) Oral daily  lactated ringers. 500 milliLiter(s) (50 mL/Hr) IV Continuous <Continuous>  methocarbamol 500 milliGRAM(s) Oral two times a day  thiamine 100 milliGRAM(s) Oral daily    MEDICATIONS  (PRN):  ALBUTerol    90 MICROgram(s) HFA Inhaler 2 Puff(s) Inhalation every 6 hours PRN Shortness of Breath and/or Wheezing      Allergies  No Known Allergies      Review of Systems:   Cardiovascular:  No Chest Pain, No Palpitations  Respiratory:  No Cough, No Dyspnea  Gastrointestinal:  As described in HPI  Skin:  No Skin Lesions, No Jaundice  Neuro:  No Syncope, No Dizziness    Physical Examination:  T(C): 37.1 (09-04-21 @ 13:52), Max: 37.2 (09-03-21 @ 22:18)  HR: 62 (09-04-21 @ 13:52) (62 - 75)  BP: 92/62 (09-04-21 @ 13:52) (87/51 - 98/62)  RR: 18 (09-04-21 @ 13:52) (18 - 18)  SpO2: --      09-03-21 @ 07:01  -  09-04-21 @ 07:00  --------------------------------------------------------  IN: 450 mL / OUT: 1 mL / NET: 449 mL        Constitutional: No acute distress.  Eyes:. Conjunctivae are clear, Sclera is non-icteric.  Ears Nose and Throat: The external ears are normal appearing,  Oral mucosa is pink and moist.  Respiratory:  No signs of respiratory distress. Lung sounds are clear bilaterally.  Cardiovascular:  S1 S2, Regular rate and rhythm.  GI: Abdomen is soft, symmetric, and non-tender without distention. Bowel sounds are present and normoactive in all four quadrants. No masses, hepatomegaly, or splenomegaly are noted.   Neuro: No Tremor, No involuntary movements  Skin: No rashes, No Jaundice.     Data:                        10.3   7.11  )-----------( 213      ( 03 Sep 2021 21:06 )             33.1     Hgb trend:  10.3  09-03-21 @ 21:06  10.2  09-03-21 @ 05:07        09-04    137  |  101  |  12  ----------------------------<  73  3.5   |  21  |  0.5<L>    Ca    8.3<L>      04 Sep 2021 04:30  Mg     2.0     09-04    TPro  5.3<L>  /  Alb  3.2<L>  /  TBili  1.1  /  DBili  x   /  AST  25  /  ALT  20  /  AlkPhos  90  09-04    Liver panel trend:  TBili 1.1   /   AST 25   /   ALT 20   /   AlkP 90   /   Tptn 5.3   /   Alb 3.2    /   DBili --      09-04  TBili 0.9   /   AST 22   /   ALT 21   /   AlkP 81   /   Tptn 5.5   /   Alb 3.4    /   DBili --      09-03      PT/INR - ( 03 Sep 2021 05:07 )   PT: 14.20 sec;   INR: 1.24 ratio         PTT - ( 03 Sep 2021 05:07 )  PTT:34.0 sec       Radiology:

## 2021-09-04 NOTE — PROGRESS NOTE ADULT - ASSESSMENT
71 yo F with recent dx of Afib on Eliquis, Abdominal abscess 2 mo ago, Gastric surgery 20+ years ago, COPD who presents to the ED with BRBPR , one episode before she presented , no bleeding in hospital.  No CTA evidence of active GI bleeding. Anorectal wall thickening    #hematochezia : lower GI bleed , likely hemorrhoidal  - no overt bleeding  HD stable   HGB at baseline   recent EGD/ colon July 2020 showing large internal and external hemorrhoids   patient on Eliquis , needs 2 days off Eliquis before colonoscopy unless worsened GI bleed   CTA negative for GI bleed    REC:   - Pt is actively refusing colonscopy at this time that was planned for tuesday. She states she is no longer bleeding and would rather be discharged on eliquis and have colonoscopy as outpatient. Risks and benefits discussed with patient and she is willing to take the risk to resume eliquis. As such, can resume eliquis and pt can have colonoscopy as outpatient as per her wishes. IF any overt bleeding please call GI  - Anusol suppositories once every night    - advance diet as tolerated      #history of anorectal abscesses s/p drainage / fistulization   Endoscopic workup negative for IBD ( random biopsies taken from T.I and terminal ileum 7/2021 are negative )     REC:  Please check ANCA , ASCA serology   follow up with GI and colorectal surgery as outpatient after discharge

## 2021-09-04 NOTE — PROGRESS NOTE ADULT - SUBJECTIVE AND OBJECTIVE BOX
WEGENER, LOIS  70y  Female      Patient is a 70y old  Female who presents with a chief complaint of BRBPR (03 Sep 2021 18:50)      INTERVAL HPI/OVERNIGHT EVENTS: admitted yesterday afternoon. no acute events overnight. no episodes of hemotchezia      REVIEW OF SYSTEMS:  CONSTITUTIONAL: No fever, weight loss, or fatigue  RESPIRATORY: No cough, wheezing, chills or hemoptysis; No shortness of breath  CARDIOVASCULAR: No chest pain, palpitations, dizziness, or leg swelling  GASTROINTESTINAL: No abdominal or epigastric pain. No nausea, vomiting, or hematemesis; No diarrhea or constipation. No melena or hematochezia.  GENITOURINARY: No dysuria, frequency, hematuria, or incontinence  NEUROLOGICAL: No headaches, memory loss, loss of strength, numbness, or tremors  SKIN: No itching, burning, rashes, or lesions   MUSCULOSKELETAL: No joint pain or swelling; No muscle, back, or extremity pain  PSYCHIATRIC: No depression, anxiety, mood swings, or difficulty sleeping  All other review of systems negative    VITALS  T(C): 36.2 (09-04-21 @ 05:47), Max: 37.2 (09-03-21 @ 22:18)  HR: 75 (09-04-21 @ 05:47) (62 - 75)  BP: 87/51 (09-04-21 @ 05:47) (81/51 - 98/65)  RR: 18 (09-03-21 @ 22:18) (17 - 18)  SpO2: 98% (09-03-21 @ 15:27) (98% - 98%)  Wt(kg): --Vital Signs Last 24 Hrs  T(C): 36.2 (04 Sep 2021 05:47), Max: 37.2 (03 Sep 2021 22:18)  T(F): 97.1 (04 Sep 2021 05:47), Max: 98.9 (03 Sep 2021 22:18)  HR: 75 (04 Sep 2021 05:47) (62 - 75)  BP: 87/51 (04 Sep 2021 05:47) (81/51 - 98/65)  BP(mean): --  RR: 18 (03 Sep 2021 22:18) (17 - 18)  SpO2: 98% (03 Sep 2021 15:27) (98% - 98%)      09-03-21 @ 07:01  -  09-04-21 @ 07:00  --------------------------------------------------------  IN: 450 mL / OUT: 1 mL / NET: 449 mL        PHYSICAL EXAM:  GENERAL: elderly F, NAD, non toxic appearing  EYES: anicteric sclera, non injected conjunctiva, EOMI  ENT: hearing grossly intact, oropharynx clear, MMM  PSYCH: no agitation, baseline mentation  NERVOUS SYSTEM:  Alert & Oriented X3 CN 2-12 grossly intact  PULMONARY: Clear to percussion bilaterally; No rales, rhonchi, wheezing, or rubs  CARDIOVASCULAR: Regular rate and rhythm; No murmurs, rubs, or gallops  GI: Soft, Nontender, Nondistended; Bowel sounds present  EXTREMITIES:  2+ Peripheral Pulses, No clubbing, cyanosis, spontaneously moving all 4 ext against gravity  LYMPH: No lymphadenopathy noted  SKIN: No rashes or lesions    Consultant(s) Notes Reviewed:  [x ] YES  [ ] NO    Discussed with Consultants/Other Providers [ x] YES     LABS                          10.3   7.11  )-----------( 213      ( 03 Sep 2021 21:06 )             33.1     09-04    137  |  101  |  12  ----------------------------<  73  3.5   |  21  |  0.5<L>    Ca    8.3<L>      04 Sep 2021 04:30  Mg     2.0     09-04    TPro  5.3<L>  /  Alb  3.2<L>  /  TBili  1.1  /  DBili  x   /  AST  25  /  ALT  20  /  AlkPhos  90  09-04  PT/INR - ( 03 Sep 2021 05:07 )   PT: 14.20 sec;   INR: 1.24 ratio    PTT - ( 03 Sep 2021 05:07 )  PTT:34.0 sec  Lactate Trend  09-03 @ 05:07 Lactate:1.2     RADIOLOGY & ADDITIONAL TESTS:  CT Angio Abdomen and Pelvis w/ IV Cont (09.03.21 @ 09:39) >  IMPRESSION:  No CTA evidence of active GI bleeding.  Anorectal wall thickening, neoplasm is not excluded.  Chronic and incidental findings as above    MEDICATIONS  (STANDING):  chlorhexidine 4% Liquid 1 Application(s) Topical <User Schedule>  DULoxetine 60 milliGRAM(s) Oral daily  folic acid 1 milliGRAM(s) Oral daily  lactated ringers. 500 milliLiter(s) (50 mL/Hr) IV Continuous <Continuous>  methocarbamol 500 milliGRAM(s) Oral two times a day  thiamine 100 milliGRAM(s) Oral daily    MEDICATIONS  (PRN):  ALBUTerol    90 MICROgram(s) HFA Inhaler 2 Puff(s) Inhalation every 6 hours PRN Shortness of Breath and/or Wheezing

## 2021-09-04 NOTE — PROGRESS NOTE ADULT - ASSESSMENT
69 yo F with recent dx of Afib on Eliquis, Abdominal abscess 2 mo ago, Gastric Bypass surgery 20+ years ago, COPD who presents to the ED with BRBPR .    # Hematochezia  # Hx of hemorrhoids  # Acute blood loss anemia  # suspect lower GIB  - currently non tachycardic but hypotensive  - MARVIN melena and tender external hemorrhoidal   - unsure etiology but history of hemorrhoids and thought to be likely source  - chronic diarrhea and fecal incontinence reported; will order c.diff  - hx of fistula , and multiple opening of perianal abscess  - colonoscopy 7/1/21 - multiple polypectomy   - Hb stable 10.2  - active T&S, transfuse for hgb <7  - started on IVF   - GI consult         Anusol suppositories once every night          clear liquid diet and advance as tolerated if no further bleeding         hold Eliquis , can switch to heparin if high thromboembolic risk        colonoscopy early next week or on urgent bases if worsened bleeding or any sign of HD instability secondary to GI bleed       #Hypokalemia   - K 3.1 ,repleted-->3.5    #Atrial fibrillation on eliquis  - CHADVASC 3  - hold Eliquis for now     #COPD ,stable not in exacerbation  - not on home oxygen ,no wheezing on exam  - inhaler PRN    #HFpEF ,euvolemic on exam   -TTE 6/27 EF of 63 %.  - monitor I&O  - BNP; from 6/25- 1500-->912  - avoid volume overload    # EtOH abuse with suspected folate/thiamine deficiency  - on folic acid and thiamine     # Depression on duloxetine  - no suicidal ideation  - c/w SSRI  - f/u w psychiatry as outpatient    Diet - NPO for GI eval  GI px  DVT px  Disposition - floor     #Progress Note Handoff  Pending (specify):  GI f/up with scope next week  Family discussion: patient aware of plan. in agreement. all questions answered  Disposition: Unknown at this time________

## 2021-09-05 LAB
ALBUMIN SERPL ELPH-MCNC: 2.9 G/DL — LOW (ref 3.5–5.2)
ALP SERPL-CCNC: 83 U/L — SIGNIFICANT CHANGE UP (ref 30–115)
ALT FLD-CCNC: 17 U/L — SIGNIFICANT CHANGE UP (ref 0–41)
ANION GAP SERPL CALC-SCNC: 7 MMOL/L — SIGNIFICANT CHANGE UP (ref 7–14)
ANION GAP SERPL CALC-SCNC: 8 MMOL/L — SIGNIFICANT CHANGE UP (ref 7–14)
AST SERPL-CCNC: 18 U/L — SIGNIFICANT CHANGE UP (ref 0–41)
BASOPHILS # BLD AUTO: 0.02 K/UL — SIGNIFICANT CHANGE UP (ref 0–0.2)
BASOPHILS NFR BLD AUTO: 0.3 % — SIGNIFICANT CHANGE UP (ref 0–1)
BILIRUB SERPL-MCNC: 0.7 MG/DL — SIGNIFICANT CHANGE UP (ref 0.2–1.2)
BUN SERPL-MCNC: 14 MG/DL — SIGNIFICANT CHANGE UP (ref 10–20)
BUN SERPL-MCNC: 14 MG/DL — SIGNIFICANT CHANGE UP (ref 10–20)
CALCIUM SERPL-MCNC: 7.4 MG/DL — LOW (ref 8.5–10.1)
CALCIUM SERPL-MCNC: 7.8 MG/DL — LOW (ref 8.5–10.1)
CHLORIDE SERPL-SCNC: 100 MMOL/L — SIGNIFICANT CHANGE UP (ref 98–110)
CHLORIDE SERPL-SCNC: 102 MMOL/L — SIGNIFICANT CHANGE UP (ref 98–110)
CO2 SERPL-SCNC: 25 MMOL/L — SIGNIFICANT CHANGE UP (ref 17–32)
CO2 SERPL-SCNC: 28 MMOL/L — SIGNIFICANT CHANGE UP (ref 17–32)
COVID-19 SPIKE DOMAIN AB INTERP: POSITIVE
COVID-19 SPIKE DOMAIN ANTIBODY RESULT: 196 U/ML — HIGH
CREAT SERPL-MCNC: 0.5 MG/DL — LOW (ref 0.7–1.5)
CREAT SERPL-MCNC: 0.5 MG/DL — LOW (ref 0.7–1.5)
EOSINOPHIL # BLD AUTO: 0.04 K/UL — SIGNIFICANT CHANGE UP (ref 0–0.7)
EOSINOPHIL NFR BLD AUTO: 0.6 % — SIGNIFICANT CHANGE UP (ref 0–8)
GLUCOSE SERPL-MCNC: 84 MG/DL — SIGNIFICANT CHANGE UP (ref 70–99)
GLUCOSE SERPL-MCNC: 97 MG/DL — SIGNIFICANT CHANGE UP (ref 70–99)
HCT VFR BLD CALC: 31.3 % — LOW (ref 37–47)
HGB BLD-MCNC: 9.8 G/DL — LOW (ref 12–16)
IMM GRANULOCYTES NFR BLD AUTO: 0.3 % — SIGNIFICANT CHANGE UP (ref 0.1–0.3)
LYMPHOCYTES # BLD AUTO: 1.36 K/UL — SIGNIFICANT CHANGE UP (ref 1.2–3.4)
LYMPHOCYTES # BLD AUTO: 22 % — SIGNIFICANT CHANGE UP (ref 20.5–51.1)
MAGNESIUM SERPL-MCNC: 1.8 MG/DL — SIGNIFICANT CHANGE UP (ref 1.8–2.4)
MCHC RBC-ENTMCNC: 30 PG — SIGNIFICANT CHANGE UP (ref 27–31)
MCHC RBC-ENTMCNC: 31.3 G/DL — LOW (ref 32–37)
MCV RBC AUTO: 95.7 FL — SIGNIFICANT CHANGE UP (ref 81–99)
MONOCYTES # BLD AUTO: 0.35 K/UL — SIGNIFICANT CHANGE UP (ref 0.1–0.6)
MONOCYTES NFR BLD AUTO: 5.7 % — SIGNIFICANT CHANGE UP (ref 1.7–9.3)
NEUTROPHILS # BLD AUTO: 4.4 K/UL — SIGNIFICANT CHANGE UP (ref 1.4–6.5)
NEUTROPHILS NFR BLD AUTO: 71.1 % — SIGNIFICANT CHANGE UP (ref 42.2–75.2)
NRBC # BLD: 0 /100 WBCS — SIGNIFICANT CHANGE UP (ref 0–0)
PLATELET # BLD AUTO: 228 K/UL — SIGNIFICANT CHANGE UP (ref 130–400)
POTASSIUM SERPL-MCNC: 3.8 MMOL/L — SIGNIFICANT CHANGE UP (ref 3.5–5)
POTASSIUM SERPL-MCNC: 4 MMOL/L — SIGNIFICANT CHANGE UP (ref 3.5–5)
POTASSIUM SERPL-SCNC: 3.8 MMOL/L — SIGNIFICANT CHANGE UP (ref 3.5–5)
POTASSIUM SERPL-SCNC: 4 MMOL/L — SIGNIFICANT CHANGE UP (ref 3.5–5)
PROT SERPL-MCNC: 4.7 G/DL — LOW (ref 6–8)
RBC # BLD: 3.27 M/UL — LOW (ref 4.2–5.4)
RBC # FLD: 19.9 % — HIGH (ref 11.5–14.5)
SARS-COV-2 IGG+IGM SERPL QL IA: 196 U/ML — HIGH
SARS-COV-2 IGG+IGM SERPL QL IA: POSITIVE
SODIUM SERPL-SCNC: 135 MMOL/L — SIGNIFICANT CHANGE UP (ref 135–146)
SODIUM SERPL-SCNC: 135 MMOL/L — SIGNIFICANT CHANGE UP (ref 135–146)
WBC # BLD: 6.19 K/UL — SIGNIFICANT CHANGE UP (ref 4.8–10.8)
WBC # FLD AUTO: 6.19 K/UL — SIGNIFICANT CHANGE UP (ref 4.8–10.8)

## 2021-09-05 PROCEDURE — 99233 SBSQ HOSP IP/OBS HIGH 50: CPT

## 2021-09-05 RX ORDER — SODIUM CHLORIDE 9 MG/ML
500 INJECTION, SOLUTION INTRAVENOUS ONCE
Refills: 0 | Status: COMPLETED | OUTPATIENT
Start: 2021-09-05 | End: 2021-09-05

## 2021-09-05 RX ORDER — INFLUENZA VIRUS VACCINE 15; 15; 15; 15 UG/.5ML; UG/.5ML; UG/.5ML; UG/.5ML
0.5 SUSPENSION INTRAMUSCULAR ONCE
Refills: 0 | Status: DISCONTINUED | OUTPATIENT
Start: 2021-09-05 | End: 2021-09-18

## 2021-09-05 RX ORDER — SODIUM CHLORIDE 9 MG/ML
1000 INJECTION INTRAMUSCULAR; INTRAVENOUS; SUBCUTANEOUS ONCE
Refills: 0 | Status: COMPLETED | OUTPATIENT
Start: 2021-09-05 | End: 2021-09-05

## 2021-09-05 RX ADMIN — Medication 1 MILLIGRAM(S): at 11:54

## 2021-09-05 RX ADMIN — CHLORHEXIDINE GLUCONATE 1 APPLICATION(S): 213 SOLUTION TOPICAL at 05:39

## 2021-09-05 RX ADMIN — Medication 100 MILLIGRAM(S): at 11:54

## 2021-09-05 RX ADMIN — SODIUM CHLORIDE 1000 MILLILITER(S): 9 INJECTION INTRAMUSCULAR; INTRAVENOUS; SUBCUTANEOUS at 13:51

## 2021-09-05 RX ADMIN — METHOCARBAMOL 500 MILLIGRAM(S): 500 TABLET, FILM COATED ORAL at 05:40

## 2021-09-05 RX ADMIN — DULOXETINE HYDROCHLORIDE 60 MILLIGRAM(S): 30 CAPSULE, DELAYED RELEASE ORAL at 11:54

## 2021-09-05 RX ADMIN — METHOCARBAMOL 500 MILLIGRAM(S): 500 TABLET, FILM COATED ORAL at 17:10

## 2021-09-05 RX ADMIN — SODIUM CHLORIDE 500 MILLILITER(S): 9 INJECTION, SOLUTION INTRAVENOUS at 10:40

## 2021-09-05 NOTE — PROGRESS NOTE ADULT - SUBJECTIVE AND OBJECTIVE BOX
WEGENER, LOIS  70y  Female      Patient is a 70y old  Female who presents with a chief complaint of BRBPR (04 Sep 2021 17:21)      INTERVAL HPI/OVERNIGHT EVENTS: no acute events overnight. patient walking around room and had "accidental BM" x3 yesterday. Nursing instructed/educated patient multiple times about calling them if she needs to use restroom.       REVIEW OF SYSTEMS:  CONSTITUTIONAL: No fever, weight loss, or fatigue  RESPIRATORY: No cough, wheezing, chills or hemoptysis; No shortness of breath  CARDIOVASCULAR: No chest pain, palpitations, dizziness, or leg swelling  GASTROINTESTINAL: No abdominal or epigastric pain. No nausea, vomiting, or hematemesis; No diarrhea or constipation. No melena or hematochezia.  GENITOURINARY: No dysuria, frequency, hematuria, or incontinence  NEUROLOGICAL: No headaches, memory loss, loss of strength, numbness, or tremors  SKIN: No itching, burning, rashes, or lesions   MUSCULOSKELETAL: No joint pain or swelling; No muscle, back, or extremity pain  PSYCHIATRIC: No depression, anxiety, mood swings, or difficulty sleeping  All other review of systems negative    VITALS  T(C): 36.2 (09-05-21 @ 05:18), Max: 37.1 (09-04-21 @ 13:52)  HR: 70 (09-05-21 @ 05:18) (62 - 79)  BP: 95/53 (09-05-21 @ 05:18) (92/62 - 96/54)  RR: 18 (09-05-21 @ 05:18) (18 - 18)  SpO2: --  Wt(kg): --Vital Signs Last 24 Hrs  T(C): 36.2 (05 Sep 2021 05:18), Max: 37.1 (04 Sep 2021 13:52)  T(F): 97.1 (05 Sep 2021 05:18), Max: 98.7 (04 Sep 2021 13:52)  HR: 70 (05 Sep 2021 05:18) (62 - 79)  BP: 95/53 (05 Sep 2021 05:18) (92/62 - 96/54)  BP(mean): --  RR: 18 (05 Sep 2021 05:18) (18 - 18)  SpO2: --      09-05-21 @ 07:01  -  09-05-21 @ 10:21  --------------------------------------------------------  IN: 310 mL / OUT: 1 mL / NET: 309 mL        PHYSICAL EXAM:  GENERAL: elderly F, NAD, non toxic appearing  EYES: anicteric sclera, non injected conjunctiva, EOMI  ENT: hearing grossly intact, oropharynx clear, MMM  PSYCH: no agitation, baseline mentation  NERVOUS SYSTEM:  Alert & Oriented X3 CN 2-12 grossly intact  PULMONARY: Clear to percussion bilaterally; No rales, rhonchi, wheezing, or rubs  CARDIOVASCULAR: Regular rate and rhythm; No murmurs, rubs, or gallops  GI: Soft, Nontender, Nondistended; Bowel sounds present  EXTREMITIES:  2+ Peripheral Pulses, No clubbing, cyanosis, spontaneously moving all 4 ext against gravity  LYMPH: No lymphadenopathy noted  SKIN: No rashes or lesions      Consultant(s) Notes Reviewed:  [x ] YES  [ ] NO    Discussed with Consultants/Other Providers [ x] YES     LABS                          10.3   7.11  )-----------( 213      ( 03 Sep 2021 21:06 )             33.1     09-04    132<L>  |  98  |  14  ----------------------------<  82  3.9   |  28  |  0.6<L>    Ca    8.1<L>      04 Sep 2021 20:09  Mg     2.0     09-04    TPro  5.3<L>  /  Alb  3.2<L>  /  TBili  1.1  /  DBili  x   /  AST  25  /  ALT  20  /  AlkPhos  90  09-04    Lactate Trend  09-03 @ 05:07 Lactate:1.2         RADIOLOGY & ADDITIONAL TESTS:  CT Angio Abdomen and Pelvis w/ IV Cont (09.03.21 @ 09:39) >  IMPRESSION:  No CTA evidence of active GI bleeding.  Anorectal wall thickening, neoplasm is not excluded.  Chronic and incidental findings as above    MEDICATIONS  (STANDING):  chlorhexidine 4% Liquid 1 Application(s) Topical <User Schedule>  DULoxetine 60 milliGRAM(s) Oral daily  folic acid 1 milliGRAM(s) Oral daily  lactated ringers. 500 milliLiter(s) (50 mL/Hr) IV Continuous <Continuous>  methocarbamol 500 milliGRAM(s) Oral two times a day  thiamine 100 milliGRAM(s) Oral daily    MEDICATIONS  (PRN):  ALBUTerol    90 MICROgram(s) HFA Inhaler 2 Puff(s) Inhalation every 6 hours PRN Shortness of Breath and/or Wheezing

## 2021-09-05 NOTE — PROGRESS NOTE ADULT - ASSESSMENT
71 yo F with recent dx of Afib on Eliquis, Abdominal abscess 2 mo ago, Gastric Bypass surgery 20+ years ago, COPD who presents to the ED with BRBPR .    # Hematochezia  # Hx of hemorrhoids  # Acute blood loss anemia  # suspect lower GIB  - currently non tachycardic but hypotensive; getting mIVF  - MARVIN melena and tender external hemorrhoidal   - unsure etiology but history of hemorrhoids and thought to be likely source  - chronic diarrhea and fecal incontinence reported; will order c.diff  - hx of fistula , and multiple opening of perianal abscess  - colonoscopy 7/1/21 - multiple polypectomy   - Hb stable 10.2  - active T&S, transfuse for hgb <7  - started on IVF   - GI consult         Anusol suppositories once every night          clear liquid diet and advance as tolerated if no further bleeding         hold Eliquis , can switch to heparin if high thromboembolic risk        colonoscopy early next week or on urgent bases if worsened bleeding or any sign of HD instability secondary to GI bleed       #Hypokalemia   - K 3.1 ,repleted-->3.5    # Hyponatremia  - 129->130->137->132  - likely hypotonic hypovolemic etiology  - continue mIVF as above    #Atrial fibrillation on eliquis  - CHADVASC 3  - hold Eliquis for now     #COPD ,stable not in exacerbation  - not on home oxygen ,no wheezing on exam  - inhaler PRN    #HFpEF ,euvolemic on exam   -TTE 6/27 EF of 63 %.  - monitor I&O  - BNP; from 6/25- 1500-->912  - avoid volume overload    # EtOH abuse with suspected folate/thiamine deficiency  - on folic acid and thiamine     # Depression on duloxetine  - no suicidal ideation  - c/w SSRI  - f/u w psychiatry as outpatient    Diet - NPO for GI eval  GI px  DVT px  Disposition - floor     #Progress Note Handoff  Pending (specify):  GI f/up with scope next week  Family discussion: patient aware of plan. in agreement. all questions answered  Disposition: Unknown at this time________

## 2021-09-06 ENCOUNTER — TRANSCRIPTION ENCOUNTER (OUTPATIENT)
Age: 71
End: 2021-09-06

## 2021-09-06 LAB
ALBUMIN SERPL ELPH-MCNC: 2.8 G/DL — LOW (ref 3.5–5.2)
ALP SERPL-CCNC: 80 U/L — SIGNIFICANT CHANGE UP (ref 30–115)
ALT FLD-CCNC: 16 U/L — SIGNIFICANT CHANGE UP (ref 0–41)
ANION GAP SERPL CALC-SCNC: 7 MMOL/L — SIGNIFICANT CHANGE UP (ref 7–14)
AST SERPL-CCNC: 16 U/L — SIGNIFICANT CHANGE UP (ref 0–41)
BASOPHILS # BLD AUTO: 0.03 K/UL — SIGNIFICANT CHANGE UP (ref 0–0.2)
BASOPHILS NFR BLD AUTO: 0.5 % — SIGNIFICANT CHANGE UP (ref 0–1)
BILIRUB SERPL-MCNC: 0.7 MG/DL — SIGNIFICANT CHANGE UP (ref 0.2–1.2)
BUN SERPL-MCNC: 12 MG/DL — SIGNIFICANT CHANGE UP (ref 10–20)
C DIFF BY PCR RESULT: POSITIVE
C DIFF TOX GENS STL QL NAA+PROBE: SIGNIFICANT CHANGE UP
CALCIUM SERPL-MCNC: 7.9 MG/DL — LOW (ref 8.5–10.1)
CHLORIDE SERPL-SCNC: 101 MMOL/L — SIGNIFICANT CHANGE UP (ref 98–110)
CO2 SERPL-SCNC: 28 MMOL/L — SIGNIFICANT CHANGE UP (ref 17–32)
CREAT SERPL-MCNC: 0.5 MG/DL — LOW (ref 0.7–1.5)
EOSINOPHIL # BLD AUTO: 0.06 K/UL — SIGNIFICANT CHANGE UP (ref 0–0.7)
EOSINOPHIL NFR BLD AUTO: 1.1 % — SIGNIFICANT CHANGE UP (ref 0–8)
GLUCOSE BLDC GLUCOMTR-MCNC: 103 MG/DL — HIGH (ref 70–99)
GLUCOSE BLDC GLUCOMTR-MCNC: 107 MG/DL — HIGH (ref 70–99)
GLUCOSE BLDC GLUCOMTR-MCNC: 172 MG/DL — HIGH (ref 70–99)
GLUCOSE BLDC GLUCOMTR-MCNC: 86 MG/DL — SIGNIFICANT CHANGE UP (ref 70–99)
GLUCOSE SERPL-MCNC: 69 MG/DL — LOW (ref 70–99)
HCT VFR BLD CALC: 30.3 % — LOW (ref 37–47)
HGB BLD-MCNC: 9.5 G/DL — LOW (ref 12–16)
IMM GRANULOCYTES NFR BLD AUTO: 0.4 % — HIGH (ref 0.1–0.3)
LYMPHOCYTES # BLD AUTO: 1.34 K/UL — SIGNIFICANT CHANGE UP (ref 1.2–3.4)
LYMPHOCYTES # BLD AUTO: 23.6 % — SIGNIFICANT CHANGE UP (ref 20.5–51.1)
MAGNESIUM SERPL-MCNC: 1.8 MG/DL — SIGNIFICANT CHANGE UP (ref 1.8–2.4)
MCHC RBC-ENTMCNC: 29.9 PG — SIGNIFICANT CHANGE UP (ref 27–31)
MCHC RBC-ENTMCNC: 31.4 G/DL — LOW (ref 32–37)
MCV RBC AUTO: 95.3 FL — SIGNIFICANT CHANGE UP (ref 81–99)
MONOCYTES # BLD AUTO: 0.32 K/UL — SIGNIFICANT CHANGE UP (ref 0.1–0.6)
MONOCYTES NFR BLD AUTO: 5.6 % — SIGNIFICANT CHANGE UP (ref 1.7–9.3)
NEUTROPHILS # BLD AUTO: 3.91 K/UL — SIGNIFICANT CHANGE UP (ref 1.4–6.5)
NEUTROPHILS NFR BLD AUTO: 68.8 % — SIGNIFICANT CHANGE UP (ref 42.2–75.2)
NRBC # BLD: 0 /100 WBCS — SIGNIFICANT CHANGE UP (ref 0–0)
PLATELET # BLD AUTO: 201 K/UL — SIGNIFICANT CHANGE UP (ref 130–400)
POTASSIUM SERPL-MCNC: 4 MMOL/L — SIGNIFICANT CHANGE UP (ref 3.5–5)
POTASSIUM SERPL-SCNC: 4 MMOL/L — SIGNIFICANT CHANGE UP (ref 3.5–5)
PROT SERPL-MCNC: 4.7 G/DL — LOW (ref 6–8)
RBC # BLD: 3.18 M/UL — LOW (ref 4.2–5.4)
RBC # FLD: 19.4 % — HIGH (ref 11.5–14.5)
SODIUM SERPL-SCNC: 136 MMOL/L — SIGNIFICANT CHANGE UP (ref 135–146)
WBC # BLD: 5.68 K/UL — SIGNIFICANT CHANGE UP (ref 4.8–10.8)
WBC # FLD AUTO: 5.68 K/UL — SIGNIFICANT CHANGE UP (ref 4.8–10.8)

## 2021-09-06 PROCEDURE — 99233 SBSQ HOSP IP/OBS HIGH 50: CPT

## 2021-09-06 RX ORDER — APIXABAN 2.5 MG/1
5 TABLET, FILM COATED ORAL EVERY 12 HOURS
Refills: 0 | Status: DISCONTINUED | OUTPATIENT
Start: 2021-09-06 | End: 2021-09-06

## 2021-09-06 RX ORDER — ENOXAPARIN SODIUM 100 MG/ML
40 INJECTION SUBCUTANEOUS AT BEDTIME
Refills: 0 | Status: DISCONTINUED | OUTPATIENT
Start: 2021-09-06 | End: 2021-09-08

## 2021-09-06 RX ORDER — LANOLIN ALCOHOL/MO/W.PET/CERES
5 CREAM (GRAM) TOPICAL AT BEDTIME
Refills: 0 | Status: DISCONTINUED | OUTPATIENT
Start: 2021-09-06 | End: 2021-09-18

## 2021-09-06 RX ORDER — VANCOMYCIN HCL 1 G
125 VIAL (EA) INTRAVENOUS EVERY 6 HOURS
Refills: 0 | Status: DISCONTINUED | OUTPATIENT
Start: 2021-09-06 | End: 2021-09-09

## 2021-09-06 RX ADMIN — Medication 100 MILLIGRAM(S): at 11:16

## 2021-09-06 RX ADMIN — METHOCARBAMOL 500 MILLIGRAM(S): 500 TABLET, FILM COATED ORAL at 17:41

## 2021-09-06 RX ADMIN — METHOCARBAMOL 500 MILLIGRAM(S): 500 TABLET, FILM COATED ORAL at 06:13

## 2021-09-06 RX ADMIN — Medication 1 MILLIGRAM(S): at 11:16

## 2021-09-06 RX ADMIN — ENOXAPARIN SODIUM 40 MILLIGRAM(S): 100 INJECTION SUBCUTANEOUS at 21:30

## 2021-09-06 RX ADMIN — DULOXETINE HYDROCHLORIDE 60 MILLIGRAM(S): 30 CAPSULE, DELAYED RELEASE ORAL at 11:16

## 2021-09-06 RX ADMIN — Medication 5 MILLIGRAM(S): at 21:49

## 2021-09-06 RX ADMIN — Medication 125 MILLIGRAM(S): at 21:31

## 2021-09-06 RX ADMIN — Medication 125 MILLIGRAM(S): at 17:41

## 2021-09-06 RX ADMIN — CHLORHEXIDINE GLUCONATE 1 APPLICATION(S): 213 SOLUTION TOPICAL at 06:13

## 2021-09-06 NOTE — PHYSICAL THERAPY INITIAL EVALUATION ADULT - ADDITIONAL COMMENTS
PTA: pt lives in [facility]. Uses RW to amb indoors. Not a community ambulator. Has HHA 5 days/week, 3 hours/day. Has tub shower. Has grab bars.

## 2021-09-06 NOTE — PHYSICAL THERAPY INITIAL EVALUATION ADULT - IMPAIRMENTS FOUND, PT EVAL
aerobic capacity/endurance/ergonomics and body mechanics/gait, locomotion, and balance/gross motor/joint integrity and mobility/muscle strength/posture/ROM

## 2021-09-06 NOTE — PROGRESS NOTE ADULT - SUBJECTIVE AND OBJECTIVE BOX
WEGENER, LOIS  70y, Female  Allergy: No Known Allergies    Hospital Day: 3d    Patient seen and examined earlier today.  Spoke with patient regarding need for colonoscopy, she states she is agreeable to colonoscopy at this time.     PMH/PSH:  PAST MEDICAL & SURGICAL HISTORY:  Atrial fibrillation    History of COPD        LAST 24-Hr EVENTS:    VITALS:  T(F): 98.6 (09-06-21 @ 14:47), Max: 98.6 (09-06-21 @ 14:47)  HR: 75 (09-06-21 @ 14:47)  BP: 87/60 (09-06-21 @ 14:47) (87/60 - 101/62)  RR: 18 (09-06-21 @ 14:47)  SpO2: --        TESTS & MEASUREMENTS:  Weight (Kg):   BMI (kg/m2): 21.1 (09-03)    09-05-21 @ 07:01  -  09-06-21 @ 07:00  --------------------------------------------------------  IN: 2030 mL / OUT: 2 mL / NET: 2028 mL                            9.5    5.68  )-----------( 201      ( 06 Sep 2021 05:29 )             30.3       09-06    136  |  101  |  12  ----------------------------<  69<L>  4.0   |  28  |  0.5<L>    Ca    7.9<L>      06 Sep 2021 05:29  Mg     1.8     09-06    TPro  4.7<L>  /  Alb  2.8<L>  /  TBili  0.7  /  DBili  x   /  AST  16  /  ALT  16  /  AlkPhos  80  09-06    LIVER FUNCTIONS - ( 06 Sep 2021 05:29 )  Alb: 2.8 g/dL / Pro: 4.7 g/dL / ALK PHOS: 80 U/L / ALT: 16 U/L / AST: 16 U/L / GGT: x                           COVID-19 PCR: NotDetec (09-03-21 @ 05:07)      RADIOLOGY, ECG, & ADDITIONAL TESTS:      RECENT DIAGNOSTIC ORDERS:      MEDICATIONS:  MEDICATIONS  (STANDING):  apixaban 5 milliGRAM(s) Oral every 12 hours  chlorhexidine 4% Liquid 1 Application(s) Topical <User Schedule>  DULoxetine 60 milliGRAM(s) Oral daily  folic acid 1 milliGRAM(s) Oral daily  influenza   Vaccine 0.5 milliLiter(s) IntraMuscular once  lactated ringers. 500 milliLiter(s) (50 mL/Hr) IV Continuous <Continuous>  methocarbamol 500 milliGRAM(s) Oral two times a day  thiamine 100 milliGRAM(s) Oral daily  vancomycin    Solution 125 milliGRAM(s) Oral every 6 hours    MEDICATIONS  (PRN):  ALBUTerol    90 MICROgram(s) HFA Inhaler 2 Puff(s) Inhalation every 6 hours PRN Shortness of Breath and/or Wheezing      HOME MEDICATIONS:  albuterol 90 mcg/inh inhalation aerosol (07-06)  apixaban 5 mg oral tablet (07-06)  DULoxetine 60 mg oral delayed release capsule (07-06)  folic acid 1 mg oral tablet (07-06)  methocarbamol 500 mg oral tablet (07-06)  thiamine 100 mg oral tablet (07-06)      PHYSICAL EXAM:  GENERAL: elderly F, NAD, non toxic appearing  EYES: anicteric sclera, non injected conjunctiva, EOMI  ENT: hearing grossly intact, oropharynx clear, MMM  PSYCH: no agitation, baseline mentation  NERVOUS SYSTEM:  Alert & Oriented X3 CN 2-12 grossly intact  PULMONARY: Clear to auscultation bilaterally; No rales, rhonchi, wheezing, or rubs  CARDIOVASCULAR: Regular rate and rhythm; No murmurs, rubs, or gallops  GI: Soft, Nontender, Nondistended; Bowel sounds present  EXTREMITIES:  2+ Peripheral Pulses, No clubbing, cyanosis, spontaneously moving all 4 ext against gravity  LYMPH: No lymphadenopathy noted  SKIN: No rashes or lesions

## 2021-09-06 NOTE — PROGRESS NOTE ADULT - SUBJECTIVE AND OBJECTIVE BOX
WEGENER, LOIS  70y  Female      Patient is a 70y old  Female who presents with a chief complaint of BRBPR (05 Sep 2021 10:21)      INTERVAL HPI/OVERNIGHT EVENTS: No acute overnight events. She was found resting comfortably in her room in no acute distress. Pt complains of nausea but no more episodes of abdominal pain, hematochezia, melena, hematemesis, chest pain, shortness of breath or palpitations. Per nursing staff, pt is still having diarrhea.      REVIEW OF SYSTEMS:  CONSTITUTIONAL: No fever, weight loss, or fatigue  EYES: No eye pain, visual disturbances, or discharge  ENMT:  No difficulty hearing, tinnitus, vertigo; No sinus or throat pain  NECK: No pain or stiffness  BREASTS: No pain, masses, or nipple discharge  RESPIRATORY: No cough, wheezing, chills or hemoptysis; No shortness of breath  CARDIOVASCULAR: No chest pain, palpitations, dizziness, or leg swelling  GASTROINTESTINAL: No abdominal or epigastric pain. +nausea, vomiting, or hematemesis; +diarrhea or constipation. No melena or hematochezia.  GENITOURINARY: No dysuria, frequency, hematuria, or incontinence  NEUROLOGICAL: No headaches, memory loss, loss of strength, numbness, or tremors  SKIN: No itching, burning, rashes, or lesions   LYMPH NODES: No enlarged glands  ENDOCRINE: No heat or cold intolerance; No hair loss  MUSCULOSKELETAL: No joint pain or swelling; No muscle, back, or extremity pain  PSYCHIATRIC: No depression, anxiety, mood swings, or difficulty sleeping  HEME/LYMPH: No easy bruising, or bleeding gums  ALLERY AND IMMUNOLOGIC: No hives or eczema  FAMILY HISTORY:    T(C): 35.9 (09-06-21 @ 05:39), Max: 36.6 (09-05-21 @ 12:54)  HR: 73 (09-06-21 @ 05:39) (57 - 74)  BP: 95/63 (09-06-21 @ 05:39) (86/51 - 102/56)  RR: 18 (09-06-21 @ 05:39) (18 - 19)  SpO2: --  Wt(kg): --Vital Signs Last 24 Hrs  T(C): 35.9 (06 Sep 2021 05:39), Max: 36.6 (05 Sep 2021 12:54)  T(F): 96.6 (06 Sep 2021 05:39), Max: 97.9 (05 Sep 2021 12:54)  HR: 73 (06 Sep 2021 05:39) (57 - 74)  BP: 95/63 (06 Sep 2021 05:39) (86/51 - 102/56)  BP(mean): --  RR: 18 (06 Sep 2021 05:39) (18 - 19)  SpO2: --  No Known Allergies      PHYSICAL EXAM:  GENERAL: NAD, well-groomed, well-developed  HEAD:  Atraumatic, Normocephalic  EYES: EOMI, PERRLA, conjunctiva and sclera clear  ENMT: No tonsillar erythema, exudates, or enlargement; Moist mucous membranes, Good dentition, No lesions  NECK: Supple, No JVD, Normal thyroid  NERVOUS SYSTEM:  Alert & Oriented X3, Good concentration; Motor Strength 5/5 B/L upper and lower extremities; DTRs 2+ intact and symmetric  CHEST/LUNG: Clear to percussion bilaterally; No rales, rhonchi, wheezing, or rubs  HEART: Regular rate and rhythm; No murmurs, rubs, or gallops  ABDOMEN: Soft, Nontender, Nondistended; Bowel sounds present  EXTREMITIES:  2+ Peripheral Pulses, No clubbing, cyanosis, or edema  LYMPH: No lymphadenopathy noted  SKIN: No rashes or lesions    Consultant(s) Notes Reviewed:  [x ] YES  [ ] NO  Care Discussed with Consultants/Other Providers [ x] YES  [ ] NO    LABS:                        9.5    5.68  )-----------( 201      ( 06 Sep 2021 05:29 )             30.3   09-06    136  |  101  |  12  ----------------------------<  69<L>  4.0   |  28  |  0.5<L>    Ca    7.9<L>      06 Sep 2021 05:29  Mg     1.8     09-06    TPro  4.7<L>  /  Alb  2.8<L>  /  TBili  0.7  /  DBili  x   /  AST  16  /  ALT  16  /  AlkPhos  80  09-06      RADIOLOGY & ADDITIONAL TESTS:  < from: EGD-Colonoscopy (07.01.21 @ 14:30) >  EGD Impressions:    White plaques in the esophagus (Biopsy).    Erythema in the stomach compatible with non-erosive gastritis. (Biopsy).    No evidence of gastric bypass surgery inEGD. 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 No ulcer was noted at the anastomosis site. .     < end of copied text >  < from: EGD-Colonoscopy (07.01.21 @ 14:30) >  Colonoscopy Impressions:    Normal mucosa in the whole colon. (Biopsy).    Polyp (8 mm) in the cecum. (Polypectomy).    Polyp (8 mm) in the ascending colon. (Polypectomy).    Polyps (4 mm) in the ascending colon. (Polypectomy).    Internal and external hemorrhoids.    Multiple opening of perianal abscess were noted on physical exam. .     < end of copied text >      Imaging Personally Reviewed:  [ ] YES  [ ] NO  ALBUTerol    90 MICROgram(s) HFA Inhaler 2 Puff(s) Inhalation every 6 hours PRN  apixaban 5 milliGRAM(s) Oral every 12 hours  chlorhexidine 4% Liquid 1 Application(s) Topical <User Schedule>  DULoxetine 60 milliGRAM(s) Oral daily  folic acid 1 milliGRAM(s) Oral daily  influenza   Vaccine 0.5 milliLiter(s) IntraMuscular once  lactated ringers. 500 milliLiter(s) IV Continuous <Continuous>  methocarbamol 500 milliGRAM(s) Oral two times a day  thiamine 100 milliGRAM(s) Oral daily      HEALTH ISSUES - PROBLEM Dx:           WEGENER, LOIS  70y  Female      Patient is a 70y old  Female who presents with a chief complaint of BRBPR (05 Sep 2021 10:21)      INTERVAL HPI/OVERNIGHT EVENTS: No acute overnight events. She was found resting comfortably in her room in no acute distress. Pt complains of nausea but no more episodes of abdominal pain, hematochezia, melena, hematemesis, chest pain, shortness of breath or palpitations. Per nursing staff, pt is still having watery diarrhea.  Spoke to the patient this afternoon and she reports that now she wants to have a colonoscopy done and is okay with the team holding her Eliquis.         REVIEW OF SYSTEMS:  CONSTITUTIONAL: No fever, weight loss, or fatigue  EYES: No eye pain, visual disturbances, or discharge  ENMT:  No difficulty hearing, tinnitus, vertigo; No sinus or throat pain  NECK: No pain or stiffness  BREASTS: No pain, masses, or nipple discharge  RESPIRATORY: No cough, wheezing, chills or hemoptysis; No shortness of breath  CARDIOVASCULAR: No chest pain, palpitations, dizziness, or leg swelling  GASTROINTESTINAL: No abdominal or epigastric pain. +nausea, vomiting, or hematemesis; +diarrhea or constipation. No melena or hematochezia.  GENITOURINARY: No dysuria, frequency, hematuria, or incontinence  NEUROLOGICAL: No headaches, memory loss, loss of strength, numbness, or tremors  SKIN: No itching, burning, rashes, or lesions   LYMPH NODES: No enlarged glands  ENDOCRINE: No heat or cold intolerance; No hair loss  MUSCULOSKELETAL: No joint pain or swelling; No muscle, back, or extremity pain  PSYCHIATRIC: No depression, anxiety, mood swings, or difficulty sleeping  HEME/LYMPH: No easy bruising, or bleeding gums  ALLERY AND IMMUNOLOGIC: No hives or eczema  FAMILY HISTORY:    T(C): 35.9 (09-06-21 @ 05:39), Max: 36.6 (09-05-21 @ 12:54)  HR: 73 (09-06-21 @ 05:39) (57 - 74)  BP: 95/63 (09-06-21 @ 05:39) (86/51 - 102/56)  RR: 18 (09-06-21 @ 05:39) (18 - 19)  SpO2: --  Wt(kg): --Vital Signs Last 24 Hrs  T(C): 35.9 (06 Sep 2021 05:39), Max: 36.6 (05 Sep 2021 12:54)  T(F): 96.6 (06 Sep 2021 05:39), Max: 97.9 (05 Sep 2021 12:54)  HR: 73 (06 Sep 2021 05:39) (57 - 74)  BP: 95/63 (06 Sep 2021 05:39) (86/51 - 102/56)  BP(mean): --  RR: 18 (06 Sep 2021 05:39) (18 - 19)  SpO2: --  No Known Allergies      PHYSICAL EXAM:  GENERAL: NAD, well-groomed, well-developed  HEAD:  Atraumatic, Normocephalic  EYES: EOMI, PERRLA, conjunctiva and sclera clear  ENMT: No tonsillar erythema, exudates, or enlargement; Moist mucous membranes, Good dentition, No lesions  NECK: Supple, No JVD, Normal thyroid  NERVOUS SYSTEM:  Alert & Oriented X3, Good concentration; Motor Strength 5/5 B/L upper and lower extremities; DTRs 2+ intact and symmetric  CHEST/LUNG: Clear to percussion bilaterally; No rales, rhonchi, wheezing, or rubs  HEART: Regular rate and rhythm; No murmurs, rubs, or gallops  ABDOMEN: Soft, Nontender, Nondistended; Bowel sounds present  EXTREMITIES:  2+ Peripheral Pulses, No clubbing, cyanosis, or edema  LYMPH: No lymphadenopathy noted  SKIN: No rashes or lesions    Consultant(s) Notes Reviewed:  [x ] YES  [ ] NO  Care Discussed with Consultants/Other Providers [ x] YES  [ ] NO    LABS:                        9.5    5.68  )-----------( 201      ( 06 Sep 2021 05:29 )             30.3   09-06    136  |  101  |  12  ----------------------------<  69<L>  4.0   |  28  |  0.5<L>    Ca    7.9<L>      06 Sep 2021 05:29  Mg     1.8     09-06    TPro  4.7<L>  /  Alb  2.8<L>  /  TBili  0.7  /  DBili  x   /  AST  16  /  ALT  16  /  AlkPhos  80  09-06    Clostridium difficile Toxin by PCR (09.06.21 @ 10:12)   Clostridium difficile Toxin by PCR: RESULT INTERPRETATION:   Detected - Clostridium difficile toxin B detected by amplified DNA PCR .     RADIOLOGY & ADDITIONAL TESTS:  < from: EGD-Colonoscopy (07.01.21 @ 14:30) >  EGD Impressions:    White plaques in the esophagus (Biopsy).    Erythema in the stomach compatible with non-erosive gastritis. (Biopsy).    No evidence of gastric bypass surgery inEGD. 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 No ulcer was noted at the anastomosis site. .     < end of copied text >  < from: EGD-Colonoscopy (07.01.21 @ 14:30) >  Colonoscopy Impressions:    Normal mucosa in the whole colon. (Biopsy).    Polyp (8 mm) in the cecum. (Polypectomy).    Polyp (8 mm) in the ascending colon. (Polypectomy).    Polyps (4 mm) in the ascending colon. (Polypectomy).    Internal and external hemorrhoids.    Multiple opening of perianal abscess were noted on physical exam. .     < end of copied text >      Imaging Personally Reviewed:  [ ] YES  [ ] NO  ALBUTerol    90 MICROgram(s) HFA Inhaler 2 Puff(s) Inhalation every 6 hours PRN  apixaban 5 milliGRAM(s) Oral every 12 hours  chlorhexidine 4% Liquid 1 Application(s) Topical <User Schedule>  DULoxetine 60 milliGRAM(s) Oral daily  folic acid 1 milliGRAM(s) Oral daily  influenza   Vaccine 0.5 milliLiter(s) IntraMuscular once  lactated ringers. 500 milliLiter(s) IV Continuous <Continuous>  methocarbamol 500 milliGRAM(s) Oral two times a day  thiamine 100 milliGRAM(s) Oral daily      HEALTH ISSUES - PROBLEM Dx:

## 2021-09-06 NOTE — DISCHARGE NOTE PROVIDER - CARE PROVIDER_API CALL
Renato Self)  Gastroenterology; Internal Medicine  [address] [place] [address]  Phone: [phone]  Fax: [phone]  Follow Up Time: 1 week   Renato Self)  Gastroenterology; Internal Medicine  [address] [place] [address]  Phone: [phone]  Fax: [phone]  Follow Up Time: 1 week    Kamala Kwon  INTERNAL MEDICINE  [address] [facility], [address]  [place] [address]  Phone: [phone]  Fax: [phone]  Follow Up Time:    Renato Self)  Gastroenterology; Internal Medicine  [address] [place] [address]  Phone: [phone]  Fax: [phone]  Follow Up Time: 1 week    Kamala Kwon  INTERNAL MEDICINE  [address] [facility], [address]  [place] [address]  Phone: [phone]  Fax: [phone]  Follow Up Time:     Esteban Hubbard  PLASTIC SURGERY  [address] [place] [address]  Phone: [phone]  Fax: [phone]  Follow Up Time: 1 week

## 2021-09-06 NOTE — PROGRESS NOTE ADULT - ASSESSMENT
71 yo F with recent dx of Afib on Eliquis, Abdominal abscess 2 mo ago, Gastric Bypass surgery 20+ years ago, COPD who presents to the ED with BRBPR .    # Hematochezia  # Hx of hemorrhoids  # Acute blood loss anemia  # suspect lower GIB  - currently non tachycardic but hypotensive; getting mIVF  - MARVIN w/melena and external hemorrhoids  - chronic diarrhea and fecal incontinence reported; c diff ordered and positive   - hx of fistula/ perianal abscess  - colonoscopy 7/1/21 - multiple polypectomy   - Hb stable   - active T&S, transfuse for hgb <7  - GI consult : plan for Colonoscopy on tuesday but evidently patient refused. Discussed colonoscopy with pt this morning and she is amenable. Will need to speak with GI, and hold Eliquis for now.     #C Diff Associated Diarrhea   - start PO Vancomycin   - monitor BM's     #Hypokalemia - resolved     #Hyponatremia- likely hypotonic hypovolemic , now resolved     #Atrial fibrillation on eliquis  - CHADVASC 3  - hold Eliquis for possible colonoscopy     #COPD ,stable not in exacerbation  - not on home oxygen ,no wheezing on exam  - inhaler PRN    #HFpEF ,euvolemic on exam   -TTE 6/27 EF of 63 %.  - monitor I&O  - BNP; from 6/25- 1500-->912  - avoid volume overload    # EtOH abuse with suspected folate/thiamine deficiency  - on folic acid and thiamine     # Depression on duloxetine  - no suicidal ideation  - c/w SSRI  - f/u w psychiatry as outpatient      #Progress Note Handoff  Pending (specify):  GI f/up with scope this week   Family discussion: patient aware of plan. in agreement. all questions answered  Disposition: Unknown at this time________    Dayami Henry, DO

## 2021-09-06 NOTE — DISCHARGE NOTE PROVIDER - PROVIDER TOKENS
PROVIDER:[TOKEN:[[id number]],FOLLOWUP:[1 week]] PROVIDER:[TOKEN:[[id number]],FOLLOWUP:[1 week]],PROVIDER:[TOKEN:[[id number]]] PROVIDER:[TOKEN:[[id number]],FOLLOWUP:[1 week]],PROVIDER:[TOKEN:[[id number]]],PROVIDER:[TOKEN:[[id number]],FOLLOWUP:[1 week]]

## 2021-09-06 NOTE — PHYSICAL THERAPY INITIAL EVALUATION ADULT - PLANNED THERAPY INTERVENTIONS, PT EVAL
balance training/gait training/lumbar stabilization/manual therapy techniques/neuromuscular re-education/postural re-education/strengthening/stretching/transfer training

## 2021-09-06 NOTE — PROGRESS NOTE ADULT - ASSESSMENT
69 yo F with recent dx of Afib on Eliquis, perianal abscess, fistula, and colitis 2 mo ago,  COPD who presents to the ED with BRBPR for 2d hemodynamically stable on admission, Hb 10.2 (baseline 9) , melena on MARVIN and external hemorrhoid  and no acute signs of bleed on CT angio abd and pelvis:    # Hematochezia 2/2 hemorroids vs colonic polyps vs IBD  # Acute blood loss anemia  #Melena    - Remains but hypotensive 86s-100s systolic,  getting mIVF, no acute signs of bleeding  - colonoscopy 7/1/21 - polypectomy for sessile polyps, perianal abscess, bx at the time negative for IBD  -EGD 7/1 non erosive gastritis and white plaques in esophagus  - Hb stable 10.2  - active T&S, maintain 2 large bore IV,  transfuse for hgb <7  - started on IVF   - GI consult recs:        Anusol suppositories once every night          colonoscopy scheduled for 9/7 but pt refuses inpatient colonoscopy and wants to resume eliquis. risks and benefits discussed with pt       and she's aware of risks of bleeding. Plan for outpatient colonoscopy.      #Atrial fibrillation on eliquis  - CHADVASC 3  - Restart Eliquis per pt's wishes    #Hypokalemia -RESOLVED  - K 3.1 ,repleted-->3.5    # Hyponatremia RESOLVED  - 129->130->137->132  - likely hypotonic hypovolemic etiology  - continue mIVF as above    #COPD ,stable not in exacerbation  - not on home oxygen ,no wheezing on exam  - inhaler PRN    #HFpEF ,euvolemic on exam   -TTE 6/27 EF of 63 %.  - monitor I&O  - BNP; from 6/25- 1500-->912  - avoid volume overload, not overloaded on exam    # EtOH abuse with suspected folate/thiamine deficiency  - C/w folic acid and thiamine     # Depression on duloxetine  - no suicidal ideation  - c/w SSRI  - f/u w psychiatry as outpatient    Diet - NPO for GI eval  GI px  DVT px  Disposition - floor     #Progress Note Handoff  Pending (specify):  PT eval  Family discussion: patient aware of plan. in agreement. all questions answered  Disposition: Home___     71 yo F with recent dx of Afib on Eliquis, perianal abscess, fistula, and colitis 2 mo ago,  COPD who presents to the ED with BRBPR for 2d hemodynamically stable on admission, Hb 10.2 (baseline 9) , melena on MARVIN and external hemorrhoid  and no acute signs of bleed on CT angio abd and pelvis:    # Hematochezia 2/2 hemorroids vs colonic polyps vs IBD  # Acute blood loss anemia  #Melena    - Remains but hypotensive 86s-100s systolic,  getting mIVF, no acute signs of bleeding  - colonoscopy 7/1/21 - polypectomy for sessile polyps, perianal abscess, bx at the time negative for IBD  -EGD 7/1 non erosive gastritis and white plaques in esophagus  - Hb stable 10.2  - active T&S, maintain 2 large bore IV,  transfuse for hgb <7  - started on IVF   - GI consult recs:        Anusol suppositories once every night          colonoscopy scheduled for 9/7 but pt refuses inpatient colonoscopy and wants to resume eliquis. risks and benefits discussed with pt       and she's aware of risks of bleeding. Plan for outpatient colonoscopy.  -Update: on 9/6  pt changed her mind and now wants inpatient colonoscopy before discharge. Will f/u with GI after c diff colitis treated.    #Acute on chronic diarrhea- C diff colitis  -Stool pcr positive for c.diff  -started on po vancomycin 125mg q6h     #Atrial fibrillation on eliquis  - CHADVASC 3  - Restart Eliquis per pt's wishes    #Hypokalemia -RESOLVED  - K 3.1 ,repleted-->3.5    # Hyponatremia RESOLVED  - 129->130->137->132  - likely hypotonic hypovolemic etiology  - continue mIVF as above    #COPD ,stable not in exacerbation  - not on home oxygen ,no wheezing on exam  - inhaler PRN    #HFpEF ,euvolemic on exam   -TTE 6/27 EF of 63 %.  - monitor I&O  - BNP; from 6/25- 1500-->912  - avoid volume overload, not overloaded on exam    # EtOH abuse with suspected folate/thiamine deficiency  - C/w folic acid and thiamine     # Depression on duloxetine  - no suicidal ideation  - c/w SSRI  - f/u w psychiatry as outpatient    Diet - NPO for GI eval  GI px  DVT px  Disposition - floor     #Progress Note Handoff  Pending (specify):  PT eval  Family discussion: patient aware of plan. in agreement. all questions answered  Disposition: Home___

## 2021-09-06 NOTE — DISCHARGE NOTE PROVIDER - NSDCCPCAREPLAN_GEN_ALL_CORE_FT
PRINCIPAL DISCHARGE DIAGNOSIS  Diagnosis: Rectal bleeding  Assessment and Plan of Treatment: The term minimal bright red blood per rectum (BRBPR) is used in this topic to indicate small amounts of red blood on toilet paper after wiping or a few drops of blood in the toilet bowl after defecation. Small amounts of blood on the surface of the stool is also considered minimal BRBPR, but red blood intermixed with stool is not. Benign etiologies of BRBPR are common and appear to account for 90 percent or more of all episodes of minimal BRBPR. However more serious causes of BRPR such as cancer, polyps, inflammatory bowel disease should be ruled out.  You have a history of polyps with polypectomies done, internal hemorrhoids, as well as perianal abscess from your previous admission. These could have caused you to have BRBPR but a colonoscopy is needed to identify the source of lower gi bleed.   We discussed the need of a colonoscopy with you during your hospital stay and the necessity to hold your eliquis due to increased risk of gastrointestinal bleed and other internal organs hemorrhage. You stated that you understand the risks and wished to continue with Eliquis for your A-fib.  We recommend prompt outpatient follow up with your gastroenterologist for a colonoscopy and that you monitor any signs of acute bleeding at home. If you experience bloody emesis, bloody stools, blood in your urine, confusion, weakness please call 911 and come to the nearest emergency room.       PRINCIPAL DISCHARGE DIAGNOSIS  Diagnosis: Rectal bleeding  Assessment and Plan of Treatment: The term minimal bright red blood per rectum (BRBPR) is used in this topic to indicate small amounts of red blood on toilet paper after wiping or a few drops of blood in the toilet bowl after defecation. Small amounts of blood on the surface of the stool is also considered minimal BRBPR, but red blood intermixed with stool is not. Benign etiologies of BRBPR are common and appear to account for 90 percent or more of all episodes of minimal BRBPR. However more serious causes of BRPR such as cancer, polyps, inflammatory bowel disease should be ruled out.  You have a history of polyps with polypectomies done, internal hemorrhoids, as well as perianal abscess from your previous admission. These could have caused you to have BRBPR but a colonoscopy is needed to identify the source of lower gi bleed.   We discussed the need of a colonoscopy with you during your hospital stay and the necessity to hold your eliquis due to increased risk of gastrointestinal bleed and other internal organs hemorrhage. You stated that you understand the risks and wished to continue with Eliquis for your A-fib.  We recommend prompt outpatient follow up with your gastroenterologist for a colonoscopy and that you monitor any signs of acute bleeding at home. If you experience bloody emesis, bloody stools, blood in your urine, confusion, weakness please call 911 and come to the nearest emergency room.      SECONDARY DISCHARGE DIAGNOSES  Diagnosis: C. difficile colitis  Assessment and Plan of Treatment: Clostridioides difficile is a spore-forming, toxin-producing, gram-positive anaerobic bacterium that causes antibiotic-associated colitis. It colonizes the human intestinal tract after the normal gut scott has been disrupted (frequently in association with antibiotic therapy). C. difficile infection (CDI) is one of the most common health care-associated infections and a significant cause of morbidity and mortality, especially among older adult hospitalized patients.  Clinical manifestations of nonsevere CDI include watery diarrhea (=3 loose stools in 24 hours) with lower abdominal pain and cramping, low-grade fever, and leukocytosis.   You were found to have c difficile colitis and also developed megacolon with abdominal distention. You were treated with IV antibiotics for 5 days and will continue with antibiotics by mouth at home.  If you experience any of the following at home, please go to the nearest emergency room:  -profuse watery diarrhea  -low blood pressure  -severe abdominal pain  -blood in your stool  -dizziness

## 2021-09-06 NOTE — DISCHARGE NOTE PROVIDER - HOSPITAL COURSE
71 yo F with recent dx of Afib on Eliquis, Abdominal abscess 2 mo ago, perianal abscess, fistula, and colitis 2 mo ago,  COPD who presents to the ED with BRBPR for 2d Gastric Bypass surgery 20+ years ago, COPD who presents to the ED with BRBPR .The patient has been having intermittent abdominal cramps and watery diarrhea for months ,along with nausea and fecal incontinence that had affected her daily function .She reports an episode of bright red blood that filled the toilet ,and came to the ED.  She was recently admitted in June 2021 for hematochezia and perianal abscess and her colonoscopy at the time showed multiple sessile polyps s/p polypectomies.  In ED , hemodynamically stable ,Hb 10.2 , /59 , MARVIN showed melena and a tender external hemorrhoidal , CT Angio Abdomen and Pelvis w/ IV Cont on 09/03 showed no evidence of active GI bleeding.  The patient was evaluated by the gastroenterology team who held pt's eliquis and planned to evaluate the patient with another colonoscopy on 09/07. However, on 9/4, GI team discussed colonoscopy procedure planningwith patient but she refused to have the procedure done inpatient and wants to restart her eliquis. The risks of bleeding on eliquis were explained as well as concerns of a possible underlying malignancy were explained. Risks of CVA of eliquis was also discussed. Patient states she understands and continued to refuse colonoscopy in this admission. Outpatient colonoscopy to be arranged after discharge.     She had no more episodes of BRBPR during this admission. She was seen by PT team who recommended       71 yo F with recent dx of Afib on Eliquis, perianal abscess, fistula, and colitis 2 mo ago,  COPD who presents to the ED with BRBPR for 2d hemodynamically stable on admission, Hb 10.2 (baseline 9) , melena on MARVIN and external hemorrhoid  and no acute signs of bleed on CT angio abd and pelvis:    # Hematochezia 2/2 hemorroids vs colonic polyps vs IBD  # Acute blood loss anemia  #Melena    - Remains but hypotensive 86s-100s systolic,  getting mIVF, no acute signs of bleeding  - colonoscopy 7/1/21 - polypectomy for sessile polyps, perianal abscess, bx at the time negative for IBD  -EGD 7/1 non erosive gastritis and white plaques in esophagus  - Hb stable 10.2  - active T&S, maintain 2 large bore IV,  transfuse for hgb <7  - started on IVF   - GI consult recs:        Anusol suppositories once every night          colonoscopy scheduled for 9/7 but pt refuses inpatient colonoscopy and wants to resume eliquis. risks and benefits discussed with pt and she's aware of risks of bleeding. Plan for outpatient colonoscopy.    #Atrial fibrillation on eliquis  - CHADVASC 3  - Restart Eliquis per pt's wishes    #Hypokalemia -RESOLVED  - K 3.1 ,repleted-->3.5    # Hyponatremia RESOLVED  - 129->130->137->132  - likely hypotonic hypovolemic etiology  - continue mIVF as above    #COPD ,stable not in exacerbation  - not on home oxygen ,no wheezing on exam  - inhaler PRN    #HFpEF ,euvolemic on exam   -TTE 6/27 EF of 63 %.  - monitor I&O  - BNP; from 6/25- 1500-->912  - avoid volume overload, not overloaded on exam    # EtOH abuse with suspected folate/thiamine deficiency  - C/w folic acid and thiamine     # Depression on duloxetine  - no suicidal ideation  - c/w SSRI  - f/u w psychiatry as outpatient    Diet - NPO for GI eval  GI px  DVT px  Disposition - floor     #Progress Note Handoff  Pending (specify):  PT eval  Family discussion: patient aware of plan. in agreement. all questions answered  Disposition: Home___         69 yo F with recent dx of Afib on Eliquis, Abdominal abscess 2 mo ago, perianal abscess, fistula, and colitis 2 mo ago,  COPD who presents to the ED with BRBPR for 2d Gastric Bypass surgery 20+ years ago, COPD who presents to the ED with BRBPR .The patient has been having intermittent abdominal cramps and watery diarrhea for months ,along with nausea and fecal incontinence that had affected her daily function .She reports an episode of bright red blood that filled the toilet ,and came to the ED.  She was recently admitted in June 2021 for hematochezia and perianal abscess and her colonoscopy at the time showed multiple sessile polyps s/p polypectomies.  In ED , hemodynamically stable ,Hb 10.2 , /59 , MARVIN showed melena and a tender external hemorrhoidal , CT Angio Abdomen and Pelvis w/ IV Cont on 09/03 showed no evidence of active GI bleeding.  The patient was evaluated by the gastroenterology team who held pt's eliquis and planned to evaluate the patient with another colonoscopy on 09/07. However, on 9/4, GI team discussed colonoscopy procedure planningwith patient but she refused to have the procedure done inpatient and wants to restart her eliquis. The risks of bleeding on eliquis were explained as well as concerns of a possible underlying malignancy were explained. Risks of CVA of eliquis was also discussed. Patient states she understands and continued to refuse colonoscopy in this admission. Outpatient colonoscopy to be arranged after discharge.  She had no more episodes of BRBPR during this admission.   On 9/6, pt was found to be c diff positive and was treated with po vancomycin 125mg po q6h for 4 days without any improvement in her bm frequency. She was also found to have abdominal distention and abdominal pain on exam on day 4 of vancomycin treatment. Her KUB showed megacolon but no signs of ileus, bowel perforation, or toxic megacolon.  ID was consulted and the patient was started on IV flagyl 500mg q8h and po vancomycin 500mg q6h.  The colorectal surgery team was also consulted for concerns of toxic megacolon but the pt was non peritonitic and hemodynamically stable. They recommended no acute surgical interventions. The patient has been improving now having <3 BM in the last 24hrs reports that she's feeling better.    She was seen by PT team who recommended     69 yo F with recent dx of Afib on Eliquis, Abdominal abscess 2 mo ago, Gastric Bypass surgery 20+ years ago, COPD who presents to the ED with BRBPR .    # Hematochezia  # Hx of hemorrhoids  # Acute blood loss anemia  # suspect lower GIB  - currently non tachycardic, BP runs low   - MARVIN w/melena and external hemorrhoids  - chronic diarrhea and fecal incontinence reported; c diff ordered and positive   - hx of fistula/ perianal abscess  - colonoscopy 7/1/21 - multiple polypectomy   - Hb stable   - active T&S, transfuse for hgb <7  - GI consult : plan for Colonoscopy but patient refused more than once, plan for outpatient colonoscopy     #C Diff Colitis -improving  - distended colon noted on KUB, nontoxic   - start 500mg PO vancomycin and IV Flagyl, improving  - <3 BM in 24 hours, pt stable    - ID following , GI following , Colorectal Surgery following  and recs: c/w antibiotics po ----  - CT A/P only notable for ? transient intussusception ( d/w CRS )     #Mechanical Fall in hospital   - CTH w/ Focal hypodensity in the inferior right cerebellar hemisphere possibly representing an age-indeterminate infarct  - MRI Brain ordered negative for infarct   - CT C/A/P per Trauma surgery - unremarkable   - Xray R wrist w/ widening of scapholunate interval c/w ligament tear   - Trauma surgery following: s/p suturing laceration of chin  - Ortho surgery consulted for possible wrist ligament tear , no acute intervention- OT and wrist splint     #Hypokalemia - resolved     #Hyponatremia- likely hypotonic hypovolemic , now resolved     #Atrial fibrillation on eliquis  - CHADVASC 3  -  Eliquis     #COPD ,stable not in exacerbation  - not on home oxygen ,no wheezing on exam  - inhaler PRN    #HFpEF ,euvolemic on exam   -TTE 6/27 EF of 63 %.  - monitor I&O  - BNP; from 6/25- 1500-->912  - avoid volume overload     # EtOH abuse with suspected folate/thiamine deficiency  - on folic acid and thiamine     # Depression on duloxetine  - no suicidal ideation  - c/w SSRI  - f/u w psychiatry as outpatient      #Progress Note Handoff  Pending (specify): improvement in c diff colitis then dc to rehab   Family discussion: patient aware of plan. in agreement. all questions answered  Disposition: Unknown at this time________    Dayami Henry, DO

## 2021-09-06 NOTE — PHYSICAL THERAPY INITIAL EVALUATION ADULT - PATIENT PROFILE REVIEW, REHAB EVAL
Attempted twice, once in AM, once in PM. In AM, pt was receiving lunch and declined. In PM, pt stated she couldn't stop belching and declined. PT to attempt again tomorrow, if able./yes
Chart reviewed. PT IE completed from 1:35-2:05PM. Total of 30 minutes./yes

## 2021-09-07 LAB
ALBUMIN SERPL ELPH-MCNC: 2.8 G/DL — LOW (ref 3.5–5.2)
ALP SERPL-CCNC: 85 U/L — SIGNIFICANT CHANGE UP (ref 30–115)
ALT FLD-CCNC: 16 U/L — SIGNIFICANT CHANGE UP (ref 0–41)
ANION GAP SERPL CALC-SCNC: 8 MMOL/L — SIGNIFICANT CHANGE UP (ref 7–14)
AST SERPL-CCNC: 16 U/L — SIGNIFICANT CHANGE UP (ref 0–41)
AUTO DIFF PNL BLD: NEGATIVE — SIGNIFICANT CHANGE UP
BASOPHILS # BLD AUTO: 0.03 K/UL — SIGNIFICANT CHANGE UP (ref 0–0.2)
BASOPHILS NFR BLD AUTO: 0.5 % — SIGNIFICANT CHANGE UP (ref 0–1)
BILIRUB SERPL-MCNC: 0.6 MG/DL — SIGNIFICANT CHANGE UP (ref 0.2–1.2)
BUN SERPL-MCNC: 13 MG/DL — SIGNIFICANT CHANGE UP (ref 10–20)
C-ANCA SER-ACNC: NEGATIVE — SIGNIFICANT CHANGE UP
CALCIUM SERPL-MCNC: 8 MG/DL — LOW (ref 8.5–10.1)
CHLORIDE SERPL-SCNC: 102 MMOL/L — SIGNIFICANT CHANGE UP (ref 98–110)
CO2 SERPL-SCNC: 28 MMOL/L — SIGNIFICANT CHANGE UP (ref 17–32)
CREAT SERPL-MCNC: 0.6 MG/DL — LOW (ref 0.7–1.5)
EOSINOPHIL # BLD AUTO: 0.06 K/UL — SIGNIFICANT CHANGE UP (ref 0–0.7)
EOSINOPHIL NFR BLD AUTO: 1.1 % — SIGNIFICANT CHANGE UP (ref 0–8)
GLUCOSE BLDC GLUCOMTR-MCNC: 75 MG/DL — SIGNIFICANT CHANGE UP (ref 70–99)
GLUCOSE BLDC GLUCOMTR-MCNC: 76 MG/DL — SIGNIFICANT CHANGE UP (ref 70–99)
GLUCOSE SERPL-MCNC: 72 MG/DL — SIGNIFICANT CHANGE UP (ref 70–99)
HCT VFR BLD CALC: 31.6 % — LOW (ref 37–47)
HGB BLD-MCNC: 9.8 G/DL — LOW (ref 12–16)
IMM GRANULOCYTES NFR BLD AUTO: 0.4 % — HIGH (ref 0.1–0.3)
LYMPHOCYTES # BLD AUTO: 1.41 K/UL — SIGNIFICANT CHANGE UP (ref 1.2–3.4)
LYMPHOCYTES # BLD AUTO: 25.1 % — SIGNIFICANT CHANGE UP (ref 20.5–51.1)
MAGNESIUM SERPL-MCNC: 1.8 MG/DL — SIGNIFICANT CHANGE UP (ref 1.8–2.4)
MCHC RBC-ENTMCNC: 29.8 PG — SIGNIFICANT CHANGE UP (ref 27–31)
MCHC RBC-ENTMCNC: 31 G/DL — LOW (ref 32–37)
MCV RBC AUTO: 96 FL — SIGNIFICANT CHANGE UP (ref 81–99)
MONOCYTES # BLD AUTO: 0.39 K/UL — SIGNIFICANT CHANGE UP (ref 0.1–0.6)
MONOCYTES NFR BLD AUTO: 6.9 % — SIGNIFICANT CHANGE UP (ref 1.7–9.3)
NEUTROPHILS # BLD AUTO: 3.71 K/UL — SIGNIFICANT CHANGE UP (ref 1.4–6.5)
NEUTROPHILS NFR BLD AUTO: 66 % — SIGNIFICANT CHANGE UP (ref 42.2–75.2)
NRBC # BLD: 0 /100 WBCS — SIGNIFICANT CHANGE UP (ref 0–0)
P-ANCA SER-ACNC: NEGATIVE — SIGNIFICANT CHANGE UP
PLATELET # BLD AUTO: 204 K/UL — SIGNIFICANT CHANGE UP (ref 130–400)
POTASSIUM SERPL-MCNC: 4.2 MMOL/L — SIGNIFICANT CHANGE UP (ref 3.5–5)
POTASSIUM SERPL-SCNC: 4.2 MMOL/L — SIGNIFICANT CHANGE UP (ref 3.5–5)
PROT SERPL-MCNC: 4.7 G/DL — LOW (ref 6–8)
RBC # BLD: 3.29 M/UL — LOW (ref 4.2–5.4)
RBC # FLD: 19.5 % — HIGH (ref 11.5–14.5)
SODIUM SERPL-SCNC: 138 MMOL/L — SIGNIFICANT CHANGE UP (ref 135–146)
WBC # BLD: 5.62 K/UL — SIGNIFICANT CHANGE UP (ref 4.8–10.8)
WBC # FLD AUTO: 5.62 K/UL — SIGNIFICANT CHANGE UP (ref 4.8–10.8)

## 2021-09-07 PROCEDURE — 99233 SBSQ HOSP IP/OBS HIGH 50: CPT

## 2021-09-07 RX ADMIN — ENOXAPARIN SODIUM 40 MILLIGRAM(S): 100 INJECTION SUBCUTANEOUS at 22:20

## 2021-09-07 RX ADMIN — CHLORHEXIDINE GLUCONATE 1 APPLICATION(S): 213 SOLUTION TOPICAL at 05:11

## 2021-09-07 RX ADMIN — Medication 1 MILLIGRAM(S): at 12:56

## 2021-09-07 RX ADMIN — Medication 125 MILLIGRAM(S): at 05:11

## 2021-09-07 RX ADMIN — Medication 100 MILLIGRAM(S): at 12:56

## 2021-09-07 RX ADMIN — DULOXETINE HYDROCHLORIDE 60 MILLIGRAM(S): 30 CAPSULE, DELAYED RELEASE ORAL at 12:56

## 2021-09-07 RX ADMIN — METHOCARBAMOL 500 MILLIGRAM(S): 500 TABLET, FILM COATED ORAL at 05:11

## 2021-09-07 RX ADMIN — Medication 125 MILLIGRAM(S): at 12:57

## 2021-09-07 RX ADMIN — METHOCARBAMOL 500 MILLIGRAM(S): 500 TABLET, FILM COATED ORAL at 17:28

## 2021-09-07 RX ADMIN — Medication 125 MILLIGRAM(S): at 17:28

## 2021-09-07 NOTE — DIETITIAN INITIAL EVALUATION ADULT. - RD TO REMAIN AVAILABLE
Interventions: pt declines nutrition intervention at this time including medical food supplements, large portions, vitamin/mineral supplements Monitoring/evaluation: nutrient intake, weight, labs, skin status, NFPF/yes

## 2021-09-07 NOTE — PROGRESS NOTE ADULT - SUBJECTIVE AND OBJECTIVE BOX
WEGENER, LOIS  70y  Female      Patient is a 70y old  Female who presents with a chief complaint of BRBPR (07 Sep 2021 09:14)      INTERVAL HPI/OVERNIGHT EVENTS: No acute overnight events, pt had one formed stool last night. She denies any abdominal pain, melena, hematochezia, dizziness, chest pain, palpitations, or shortness of breath.        REVIEW OF SYSTEMS:  CONSTITUTIONAL: No fever, weight loss, or fatigue  EYES: No eye pain, visual disturbances, or discharge  ENMT:  No difficulty hearing, tinnitus, vertigo; No sinus or throat pain  NECK: No pain or stiffness  BREASTS: No pain, masses, or nipple discharge  RESPIRATORY: No cough, wheezing, chills or hemoptysis; No shortness of breath  CARDIOVASCULAR: No chest pain, palpitations, dizziness, or leg swelling  GASTROINTESTINAL: No abdominal or epigastric pain. No nausea, vomiting, or hematemesis; No diarrhea or constipation. No melena or hematochezia.  GENITOURINARY: No dysuria, frequency, hematuria, or incontinence  NEUROLOGICAL: No headaches, memory loss, loss of strength, numbness, or tremors  SKIN: No itching, burning, rashes, or lesions   LYMPH NODES: No enlarged glands  ENDOCRINE: No heat or cold intolerance; No hair loss  MUSCULOSKELETAL: No joint pain or swelling; No muscle, back, or extremity pain  PSYCHIATRIC: No depression, anxiety, mood swings, or difficulty sleeping  HEME/LYMPH: No easy bruising, or bleeding gums  ALLERY AND IMMUNOLOGIC: No hives or eczema  FAMILY HISTORY:    T(C): 36.5 (09-07-21 @ 06:30), Max: 37 (09-06-21 @ 14:47)  HR: 73 (09-07-21 @ 06:30) (73 - 77)  BP: 109/60 (09-07-21 @ 06:30) (87/60 - 109/60)  RR: 18 (09-07-21 @ 06:30) (18 - 18)  SpO2: --  Wt(kg): --Vital Signs Last 24 Hrs  T(C): 36.5 (07 Sep 2021 06:30), Max: 37 (06 Sep 2021 14:47)  T(F): 97.7 (07 Sep 2021 06:30), Max: 98.6 (06 Sep 2021 14:47)  HR: 73 (07 Sep 2021 06:30) (73 - 77)  BP: 109/60 (07 Sep 2021 06:30) (87/60 - 109/60)  BP(mean): --  RR: 18 (07 Sep 2021 06:30) (18 - 18)  SpO2: --  No Known Allergies      PHYSICAL EXAM:  GENERAL: NAD, well-groomed, well-developed  HEAD:  Atraumatic, Normocephalic  EYES: EOMI, PERRLA, conjunctiva and sclera clear  ENMT: No tonsillar erythema, exudates, or enlargement; Moist mucous membranes, Good dentition, No lesions  NECK: Supple, No JVD, Normal thyroid  NERVOUS SYSTEM:  Alert & Oriented X3, Good concentration; Motor Strength 5/5 B/L upper and lower extremities; DTRs 2+ intact and symmetric  CHEST/LUNG: Clear to percussion bilaterally; No rales, rhonchi, wheezing, or rubs  HEART: Regular rate and rhythm; No murmurs, rubs, or gallops  ABDOMEN: Soft, Nontender, Nondistended; Bowel sounds present  EXTREMITIES:  2+ Peripheral Pulses, No clubbing, cyanosis, or edema  LYMPH: No lymphadenopathy noted  SKIN: No rashes or lesions    Consultant(s) Notes Reviewed:  [x ] YES  [ ] NO  Care Discussed with Consultants/Other Providers [ x] YES  [ ] NO    LABS:                        9.8    5.62  )-----------( 204      ( 07 Sep 2021 05:40 )             31.6     09-07    138  |  102  |  13  ----------------------------<  72  4.2   |  28  |  0.6<L>    Ca    8.0<L>      07 Sep 2021 05:40  Mg     1.8     09-07    TPro  4.7<L>  /  Alb  2.8<L>  /  TBili  0.6  /  DBili  x   /  AST  16  /  ALT  16  /  AlkPhos  85  09-07        RADIOLOGY & ADDITIONAL TESTS:    < from: CT Angio Abdomen and Pelvis w/ IV Cont (09.03.21 @ 09:39) >  IMPRESSION:    No CTA evidence of active GI bleeding.    Anorectal wall thickening, neoplasm is not excluded.    Chronic and incidental findings as above    < end of copied text >        Imaging Personally Reviewed:  [ ] YES  [ ] NO  ALBUTerol    90 MICROgram(s) HFA Inhaler 2 Puff(s) Inhalation every 6 hours PRN  chlorhexidine 4% Liquid 1 Application(s) Topical <User Schedule>  DULoxetine 60 milliGRAM(s) Oral daily  enoxaparin Injectable 40 milliGRAM(s) SubCutaneous at bedtime  folic acid 1 milliGRAM(s) Oral daily  influenza   Vaccine 0.5 milliLiter(s) IntraMuscular once  melatonin 5 milliGRAM(s) Oral at bedtime PRN  methocarbamol 500 milliGRAM(s) Oral two times a day  thiamine 100 milliGRAM(s) Oral daily  vancomycin    Solution 125 milliGRAM(s) Oral every 6 hours      HEALTH ISSUES - PROBLEM Dx:

## 2021-09-07 NOTE — DIETITIAN INITIAL EVALUATION ADULT. - ORAL INTAKE PTA/DIET HISTORY
Met w/pt at bedside for nutrition consult, pt states good appetite and intake, has dentures for meals, no problems chewing/swallowing or self-feeding, does not follow a special diet at home, NKFA, does not take nutrition supplements (does not like Ensure/Boost). Pt states -140 lbs, consistent w/CBW inpt, bedscale today 143 lbs, no recent significant wt gain/loss per pt. Denies any current N/V/D/C.

## 2021-09-07 NOTE — DIETITIAN INITIAL EVALUATION ADULT. - PHYSCIAL ASSESSMENT
Cog/neuro: alert/oriented x 4  GI: LBM 9/6 (soft, yellow, fecal incontinence)  Skin: PU sacrum stage 3 Cog/neuro: alert/oriented x 4  GI: LBM 9/6 (soft, yellow, fecal incontinence)  Skin: PU sacral spine stage 3 per RN flowsheets  Oral: edentulous, dentures @ bedside  No edema Airway patent, Nasal mucosa clear. Mouth with normal mucosa. Throat has no vesicles, no oropharyngeal exudates and uvula is midline.

## 2021-09-07 NOTE — DIETITIAN INITIAL EVALUATION ADULT. - PERTINENT LABORATORY DATA
POCT Blood Glucose.: 76 mg/dL (07 Sep 2021 07:32)  POCT Blood Glucose.: 107 mg/dL (06 Sep 2021 21:37)  POCT Blood Glucose.: 86 mg/dL (06 Sep 2021 16:40)  POCT Blood Glucose.: 103 mg/dL (06 Sep 2021 11:20)    9/7 H/H 9.8/31.6 L, Cr 0.6 L, Ca 8 L

## 2021-09-07 NOTE — DIETITIAN INITIAL EVALUATION ADULT. - ADD RECOMMEND
Discussed increase needs for wound healing w/pt, pt states that wound is chronic (10+ years) and does not accept nutrition intervention, pt remains at moderate nutrition risk, will f/u in 4-6 days.

## 2021-09-07 NOTE — DIETITIAN INITIAL EVALUATION ADULT. - OTHER INFO
Per H&P: Abdominal abscess 2 mo ago, Gastric Bypass 20+ years ago, COPD. Present to ER BRBPR, abdominal cramps, watery diarrhea for months w/nausea and fecal incontinence, 30 lb wt loss, low appetite, low energy, easy fatigue.     Dx: per medicine note: suspect lower GIB, C Diff diarrhea, ETOH abuse w/folate/thiamine deficiency, hx of anorectal abscesses s/p drainage/fistulization Per H&P: Abdominal abscess 2 mo ago, Gastric Bypass 20+ years ago, COPD. Present to ER BRBPR, abdominal cramps, watery diarrhea for months w/nausea and fecal incontinence, 30 lb wt loss, low appetite, low energy, easy fatigue.     Dx: per medicine note: suspect lower GIB, C Diff diarrhea, ETOH abuse w/folate/thiamine deficiency, hx of anorectal abscesses s/p drainage/fistulization.     RD consult for PU stage 2 or >, however per pt, wound is chronic, has had for >10 years, open at times, recent admission in July for procedure as noted above.

## 2021-09-07 NOTE — DIETITIAN INITIAL EVALUATION ADULT. - PERTINENT MEDS FT
MEDICATIONS  (STANDING):  DULoxetine 60 milliGRAM(s) Oral daily  folic acid 1 milliGRAM(s) Oral daily  methocarbamol 500 milliGRAM(s) Oral two times a day  thiamine 100 milliGRAM(s) Oral daily  vancomycin    Solution 125 milliGRAM(s) Oral every 6 hours    MEDICATIONS  (PRN):  melatonin 5 milliGRAM(s) Oral at bedtime PRN Insomnia

## 2021-09-07 NOTE — PROGRESS NOTE ADULT - SUBJECTIVE AND OBJECTIVE BOX
WEGENER, LOIS  70y  Female      Patient is a 70y old  Female who presents with a chief complaint of BRBPR (07 Sep 2021 11:33)      INTERVAL HPI/OVERNIGHT EVENTS: no acute events overnight. no complaints. remains on contact isolation for newly dx cdiff.      REVIEW OF SYSTEMS:  CONSTITUTIONAL: No fever, weight loss, or fatigue  RESPIRATORY: No cough, wheezing, chills or hemoptysis; No shortness of breath  CARDIOVASCULAR: No chest pain, palpitations, dizziness, or leg swelling  GASTROINTESTINAL: No abdominal or epigastric pain. No nausea, vomiting, or hematemesis; No diarrhea or constipation. No melena or hematochezia.  GENITOURINARY: No dysuria, frequency, hematuria, or incontinence  NEUROLOGICAL: No headaches, memory loss, loss of strength, numbness, or tremors  SKIN: No itching, burning, rashes, or lesions   MUSCULOSKELETAL: No joint pain or swelling; No muscle, back, or extremity pain  PSYCHIATRIC: No depression, anxiety, mood swings, or difficulty sleeping  All other review of systems negative    VITALS  T(C): 37 (09-07-21 @ 14:28), Max: 37 (09-07-21 @ 14:28)  HR: 66 (09-07-21 @ 14:28) (66 - 77)  BP: 84/54 (09-07-21 @ 14:28) (84/54 - 109/60)  RR: 18 (09-07-21 @ 14:28) (18 - 18)  SpO2: --  Wt(kg): --Vital Signs Last 24 Hrs  T(C): 37 (07 Sep 2021 14:28), Max: 37 (07 Sep 2021 14:28)  T(F): 98.6 (07 Sep 2021 14:28), Max: 98.6 (07 Sep 2021 14:28)  HR: 66 (07 Sep 2021 14:28) (66 - 77)  BP: 84/54 (07 Sep 2021 14:28) (84/54 - 109/60)  BP(mean): --  RR: 18 (07 Sep 2021 14:28) (18 - 18)  SpO2: --      PHYSICAL EXAM:  GENERAL: elderly F, NAD, non toxic appearing  EYES: anicteric sclera, non injected conjunctiva, EOMI  ENT: hearing grossly intact, oropharynx clear, MMM  PSYCH: no agitation, baseline mentation  NERVOUS SYSTEM:  Alert & Oriented X3 CN 2-12 grossly intact  PULMONARY: Clear to percussion bilaterally; No rales, rhonchi, wheezing, or rubs  CARDIOVASCULAR: Regular rate and rhythm; No murmurs, rubs, or gallops  GI: Soft, Nontender, Nondistended; Bowel sounds present  EXTREMITIES:  2+ Peripheral Pulses, No clubbing, cyanosis, spontaneously moving all 4 ext against gravity  LYMPH: No lymphadenopathy noted  SKIN: No rashes or lesions      Consultant(s) Notes Reviewed:  [x ] YES  [ ] NO    Discussed with Consultants/Other Providers [ x] YES     LABS                          9.8    5.62  )-----------( 204      ( 07 Sep 2021 05:40 )             31.6     09-07    138  |  102  |  13  ----------------------------<  72  4.2   |  28  |  0.6<L>    Ca    8.0<L>      07 Sep 2021 05:40  Mg     1.8     09-07    TPro  4.7<L>  /  Alb  2.8<L>  /  TBili  0.6  /  DBili  x   /  AST  16  /  ALT  16  /  AlkPhos  85  09-07    Lactate Trend  09-03 @ 05:07 Lactate:1.2     POCT Blood Glucose.: 75 mg/dL (07 Sep 2021 11:34)    RADIOLOGY & ADDITIONAL TESTS:  - no images 9/7    MEDICATIONS  (STANDING):  chlorhexidine 4% Liquid 1 Application(s) Topical <User Schedule>  DULoxetine 60 milliGRAM(s) Oral daily  enoxaparin Injectable 40 milliGRAM(s) SubCutaneous at bedtime  folic acid 1 milliGRAM(s) Oral daily  influenza   Vaccine 0.5 milliLiter(s) IntraMuscular once  methocarbamol 500 milliGRAM(s) Oral two times a day  thiamine 100 milliGRAM(s) Oral daily  vancomycin    Solution 125 milliGRAM(s) Oral every 6 hours    MEDICATIONS  (PRN):  ALBUTerol    90 MICROgram(s) HFA Inhaler 2 Puff(s) Inhalation every 6 hours PRN Shortness of Breath and/or Wheezing  melatonin 5 milliGRAM(s) Oral at bedtime PRN Insomnia

## 2021-09-07 NOTE — PROGRESS NOTE ADULT - ASSESSMENT
69 yo F with recent dx of Afib on Eliquis, perianal abscess, fistula, and colitis 2 mo ago,  COPD who presents to the ED with BRBPR for 2d hemodynamically stable on admission, Hb 10.2 (baseline 9) , melena on MARVIN and external hemorrhoid  and no acute signs of bleed on CT angio abd and pelvis:    # Hematochezia 2/2 hemorroids vs colonic polyps vs IBD  # Acute blood loss anemia  #Melena  - Remains but hypotensive 86s-100s systolic,  getting mIVF, no acute signs of bleeding  - colonoscopy 7/1/21 - polypectomy for sessile polyps, perianal abscess, bx at the time negative for IBD  -EGD 7/1 non erosive gastritis and white plaques in esophagus  - Hb stable 10.2  - active T&S, maintain 2 large bore IV,  transfuse for hgb <7  - started on IVF   - GI consult recs:        Anusol suppositories once every night          colonoscopy scheduled for 9/7 but pt refuses inpatient colonoscopy and wants to resume eliquis. risks and benefits discussed with pt       and she's aware of risks of bleeding. Plan for outpatient colonoscopy.  -Update: on 9/6  pt changed her mind and now wants inpatient colonoscopy before discharge. Will f/u with GI after c diff colitis treated.    #Acute on chronic diarrhea- C diff colitis  -Stool pcr positive for c.diff  -c/w on po vancomycin 125mg q6h   -Monitor episodes of watery diarrhea    #Hypotension- 80s-109 systolic  -Monitor BP  -Give 500cc bolus if pt unstable and symptomatic  -Monitor volume status due HF status    #Atrial fibrillation on eliquis  - CHADVASC 3  - Restart Eliquis per pt's wishes    #Hypokalemia -RESOLVED  - K 3.1 ,repleted-->3.5    # Hyponatremia RESOLVED  - 129->130->137->132  - likely hypotonic hypovolemic etiology  - continue mIVF as above    #COPD stable not in exacerbation  - not on home oxygen ,no wheezing on exam  - inhaler PRN    #HFpEF ,euvolemic on exam   -TTE 6/27 EF of 63 %.  - monitor I&O  - BNP; from 6/25- 1500-->912  - avoid volume overload, not overloaded on exam    # EtOH abuse with suspected folate/thiamine deficiency  - C/w folic acid and thiamine     # Depression on duloxetine  - no suicidal ideation  - c/w SSRI  - f/u w psychiatry as outpatient    Diet - NPO for GI eval  GI px  DVT px  Disposition - floor     #Progress Note Handoff  Pending (specify):  GI for inpatient or outpatient colonoscopy  Family discussion: patient aware of plan. in agreement. all questions answered  Disposition: Home___       71 yo F with recent dx of Afib on Eliquis, perianal abscess, fistula, and colitis 2 mo ago,  COPD who presents to the ED with BRBPR for 2d hemodynamically stable on admission, Hb 10.2 (baseline 9) , melena on MARVIN and external hemorrhoid  and no acute signs of bleed on CT angio abd and pelvis:    # Hematochezia 2/2 hemorroids vs colonic polyps vs IBD  # Acute blood loss anemia  #Melena  - Remains but hypotensive 86s-100s systolic,  getting mIVF, no acute signs of bleeding  - colonoscopy 7/1/21 - polypectomy for sessile polyps, perianal abscess, bx at the time negative for IBD  -EGD 7/1 non erosive gastritis and white plaques in esophagus  - Hb stable 10.2  - active T&S, maintain 2 large bore IV,  transfuse for hgb <7  - started on IVF   - GI consult recs:        Anusol suppositories once every night          colonoscopy scheduled for 9/7 but pt refuses inpatient colonoscopy and wants to resume eliquis. risks and benefits discussed with pt       and she's aware of risks of bleeding. Plan for outpatient colonoscopy.  -Update: on 9/6  pt changed her mind and now wants inpatient colonoscopy before discharge. Will f/u with GI after c diff colitis treated.    #Acute on chronic diarrhea- C diff colitis  -Stool pcr positive for c.diff  -c/w on po vancomycin 125mg q6h   -Monitor episodes of watery diarrhea    #Hypotension- 80s-109 systolic  -Monitor BP  -Give 500cc bolus if pt unstable and symptomatic  -Monitor volume status due HF status    #Atrial fibrillation on eliquis  - CHADVASC 3  - Hold eliquis due to GI bleed    #Hypokalemia -RESOLVED  - K 3.1 ,repleted-->3.5    # Hyponatremia RESOLVED  - 129->130->137->132  - likely hypotonic hypovolemic etiology  - continue mIVF as above    #COPD stable not in exacerbation  - not on home oxygen ,no wheezing on exam  - inhaler PRN    #HFpEF ,euvolemic on exam   -TTE 6/27 EF of 63 %.  - monitor I&O  - BNP; from 6/25- 1500-->912  - avoid volume overload, not overloaded on exam    # EtOH abuse with suspected folate/thiamine deficiency  - C/w folic acid and thiamine     # Depression on duloxetine  - no suicidal ideation  - c/w SSRI  - f/u w psychiatry as outpatient    Diet - NPO for GI eval  GI px  DVT px  Disposition - floor     #Progress Note Handoff  Pending (specify):  GI for inpatient or outpatient colonoscopy  Family discussion: patient aware of plan. in agreement. all questions answered  Disposition: Home___

## 2021-09-08 LAB
ALBUMIN SERPL ELPH-MCNC: 2.9 G/DL — LOW (ref 3.5–5.2)
ALP SERPL-CCNC: 92 U/L — SIGNIFICANT CHANGE UP (ref 30–115)
ALT FLD-CCNC: 17 U/L — SIGNIFICANT CHANGE UP (ref 0–41)
ANION GAP SERPL CALC-SCNC: 8 MMOL/L — SIGNIFICANT CHANGE UP (ref 7–14)
AST SERPL-CCNC: 18 U/L — SIGNIFICANT CHANGE UP (ref 0–41)
BAKER'S YEAST IGA QN IA: 22.1 UNITS — HIGH
BAKER'S YEAST IGA QN IA: ABNORMAL
BAKER'S YEAST IGG QN IA: 16.2 UNITS — SIGNIFICANT CHANGE UP
BAKER'S YEAST IGG QN IA: NEGATIVE — SIGNIFICANT CHANGE UP
BASOPHILS # BLD AUTO: 0.03 K/UL — SIGNIFICANT CHANGE UP (ref 0–0.2)
BASOPHILS NFR BLD AUTO: 0.5 % — SIGNIFICANT CHANGE UP (ref 0–1)
BILIRUB SERPL-MCNC: 0.5 MG/DL — SIGNIFICANT CHANGE UP (ref 0.2–1.2)
BUN SERPL-MCNC: 14 MG/DL — SIGNIFICANT CHANGE UP (ref 10–20)
CALCIUM SERPL-MCNC: 8.2 MG/DL — LOW (ref 8.5–10.1)
CHLORIDE SERPL-SCNC: 103 MMOL/L — SIGNIFICANT CHANGE UP (ref 98–110)
CO2 SERPL-SCNC: 26 MMOL/L — SIGNIFICANT CHANGE UP (ref 17–32)
CREAT SERPL-MCNC: 0.6 MG/DL — LOW (ref 0.7–1.5)
EOSINOPHIL # BLD AUTO: 0.06 K/UL — SIGNIFICANT CHANGE UP (ref 0–0.7)
EOSINOPHIL NFR BLD AUTO: 1.1 % — SIGNIFICANT CHANGE UP (ref 0–8)
GLUCOSE BLDC GLUCOMTR-MCNC: 100 MG/DL — HIGH (ref 70–99)
GLUCOSE SERPL-MCNC: 65 MG/DL — LOW (ref 70–99)
HCT VFR BLD CALC: 32 % — LOW (ref 37–47)
HGB BLD-MCNC: 10 G/DL — LOW (ref 12–16)
IMM GRANULOCYTES NFR BLD AUTO: 0.2 % — SIGNIFICANT CHANGE UP (ref 0.1–0.3)
LYMPHOCYTES # BLD AUTO: 1.53 K/UL — SIGNIFICANT CHANGE UP (ref 1.2–3.4)
LYMPHOCYTES # BLD AUTO: 26.9 % — SIGNIFICANT CHANGE UP (ref 20.5–51.1)
MAGNESIUM SERPL-MCNC: 1.8 MG/DL — SIGNIFICANT CHANGE UP (ref 1.8–2.4)
MCHC RBC-ENTMCNC: 30.6 PG — SIGNIFICANT CHANGE UP (ref 27–31)
MCHC RBC-ENTMCNC: 31.3 G/DL — LOW (ref 32–37)
MCV RBC AUTO: 97.9 FL — SIGNIFICANT CHANGE UP (ref 81–99)
MONOCYTES # BLD AUTO: 0.38 K/UL — SIGNIFICANT CHANGE UP (ref 0.1–0.6)
MONOCYTES NFR BLD AUTO: 6.7 % — SIGNIFICANT CHANGE UP (ref 1.7–9.3)
NEUTROPHILS # BLD AUTO: 3.68 K/UL — SIGNIFICANT CHANGE UP (ref 1.4–6.5)
NEUTROPHILS NFR BLD AUTO: 64.6 % — SIGNIFICANT CHANGE UP (ref 42.2–75.2)
NRBC # BLD: 0 /100 WBCS — SIGNIFICANT CHANGE UP (ref 0–0)
PLATELET # BLD AUTO: 208 K/UL — SIGNIFICANT CHANGE UP (ref 130–400)
POTASSIUM SERPL-MCNC: 5.2 MMOL/L — HIGH (ref 3.5–5)
POTASSIUM SERPL-SCNC: 5.2 MMOL/L — HIGH (ref 3.5–5)
PROT SERPL-MCNC: 4.9 G/DL — LOW (ref 6–8)
RBC # BLD: 3.27 M/UL — LOW (ref 4.2–5.4)
RBC # FLD: 19.7 % — HIGH (ref 11.5–14.5)
SODIUM SERPL-SCNC: 137 MMOL/L — SIGNIFICANT CHANGE UP (ref 135–146)
WBC # BLD: 5.69 K/UL — SIGNIFICANT CHANGE UP (ref 4.8–10.8)
WBC # FLD AUTO: 5.69 K/UL — SIGNIFICANT CHANGE UP (ref 4.8–10.8)

## 2021-09-08 PROCEDURE — 74018 RADEX ABDOMEN 1 VIEW: CPT | Mod: 26

## 2021-09-08 PROCEDURE — 99233 SBSQ HOSP IP/OBS HIGH 50: CPT

## 2021-09-08 RX ORDER — SODIUM CHLORIDE 9 MG/ML
500 INJECTION INTRAMUSCULAR; INTRAVENOUS; SUBCUTANEOUS
Refills: 0 | Status: DISCONTINUED | OUTPATIENT
Start: 2021-09-08 | End: 2021-09-09

## 2021-09-08 RX ORDER — SODIUM ZIRCONIUM CYCLOSILICATE 10 G/10G
5 POWDER, FOR SUSPENSION ORAL ONCE
Refills: 0 | Status: COMPLETED | OUTPATIENT
Start: 2021-09-08 | End: 2021-09-08

## 2021-09-08 RX ORDER — APIXABAN 2.5 MG/1
5 TABLET, FILM COATED ORAL EVERY 12 HOURS
Refills: 0 | Status: DISCONTINUED | OUTPATIENT
Start: 2021-09-08 | End: 2021-09-18

## 2021-09-08 RX ORDER — LACTOBACILLUS ACIDOPHILUS 100MM CELL
1 CAPSULE ORAL DAILY
Refills: 0 | Status: DISCONTINUED | OUTPATIENT
Start: 2021-09-08 | End: 2021-09-18

## 2021-09-08 RX ADMIN — Medication 125 MILLIGRAM(S): at 12:11

## 2021-09-08 RX ADMIN — Medication 1 MILLIGRAM(S): at 12:11

## 2021-09-08 RX ADMIN — SODIUM ZIRCONIUM CYCLOSILICATE 5 GRAM(S): 10 POWDER, FOR SUSPENSION ORAL at 09:43

## 2021-09-08 RX ADMIN — Medication 125 MILLIGRAM(S): at 00:00

## 2021-09-08 RX ADMIN — METHOCARBAMOL 500 MILLIGRAM(S): 500 TABLET, FILM COATED ORAL at 17:19

## 2021-09-08 RX ADMIN — DULOXETINE HYDROCHLORIDE 60 MILLIGRAM(S): 30 CAPSULE, DELAYED RELEASE ORAL at 12:11

## 2021-09-08 RX ADMIN — Medication 125 MILLIGRAM(S): at 23:59

## 2021-09-08 RX ADMIN — APIXABAN 5 MILLIGRAM(S): 2.5 TABLET, FILM COATED ORAL at 17:18

## 2021-09-08 RX ADMIN — Medication 100 MILLIGRAM(S): at 12:10

## 2021-09-08 RX ADMIN — METHOCARBAMOL 500 MILLIGRAM(S): 500 TABLET, FILM COATED ORAL at 06:23

## 2021-09-08 RX ADMIN — SODIUM CHLORIDE 50 MILLILITER(S): 9 INJECTION INTRAMUSCULAR; INTRAVENOUS; SUBCUTANEOUS at 12:35

## 2021-09-08 RX ADMIN — CHLORHEXIDINE GLUCONATE 1 APPLICATION(S): 213 SOLUTION TOPICAL at 06:22

## 2021-09-08 RX ADMIN — Medication 125 MILLIGRAM(S): at 17:18

## 2021-09-08 RX ADMIN — Medication 125 MILLIGRAM(S): at 06:23

## 2021-09-08 NOTE — PROGRESS NOTE ADULT - ASSESSMENT
71 yo F with recent dx of Afib on Eliquis, Abdominal abscess 2 mo ago, Gastric Bypass surgery 20+ years ago, COPD who presents to the ED with BRBPR .    # Hematochezia  # Hx of hemorrhoids  # Acute blood loss anemia  # suspect lower GIB  - currently non tachycardic but hypotensive; getting mIVF  - MARVIN w/melena and external hemorrhoids  - chronic diarrhea and fecal incontinence reported; c diff ordered and positive   - hx of fistula/ perianal abscess  - colonoscopy 7/1/21 - multiple polypectomy   - Hb stable   - active T&S, transfuse for hgb <7  - GI consult : plan for Colonoscopy but patient refused more than once, plan for outpatient colonoscopy     #C Diff Associated Diarrhea   - start PO Vancomycin   - monitor BM's , currently with 2 watery BM overnight, and another 4 today so far     #Hypokalemia - resolved     #Hyponatremia- likely hypotonic hypovolemic , now resolved     #Atrial fibrillation on eliquis  - CHADVASC 3  - restart Eliquis     #COPD ,stable not in exacerbation  - not on home oxygen ,no wheezing on exam  - inhaler PRN    #HFpEF ,euvolemic on exam   -TTE 6/27 EF of 63 %.  - monitor I&O  - BNP; from 6/25- 1500-->912  - avoid volume overload in light of hypotension and fluid bolus    # EtOH abuse with suspected folate/thiamine deficiency  - on folic acid and thiamine     # Depression on duloxetine  - no suicidal ideation  - c/w SSRI  - f/u w psychiatry as outpatient      #Progress Note Handoff  Pending (specify): improvement in c diff diarrhea then dc   Family discussion: patient aware of plan. in agreement. all questions answered  Disposition: Unknown at this time________    Dayami Henry, DO

## 2021-09-08 NOTE — ADVANCED PRACTICE NURSE CONSULT - ASSESSMENT
Patient is a 71 yo F with recent dx of Afib on Eliquis, Abdominal abscess 2 mo ago, Gastric Bypass surgery 20+ years ago, COPD who presents to the ED with BRBPR .  The patient has been having intermittent abdominal cramps and watery diarrhea for months ,along with nausea and fecal incontinence that had affected her daily function .She reports an episode of bright red blood that filled the toilet ,and came to the ED.   Reports weight loss 30 Ib ,low appetite ,low energy and easy fatigue .  Reports sad ,depressed mood ,insomnia ,low energy ,no suicidal ideation.  Denies fever ,vomiting ,abdominal distention .  patient was admitted in june 2021 for hematochezia and perianal abscess, colonoscopy and surgical debridement was done and developed new onset afib during hospital stay.  In ED , hemodynamically stable ,Hb 10.2 , /59 , MARVIN was in ED and showed melena and tender external hemorrhoidal ,  CT Angio Abdomen and Pelvis w/ IV Cont 09.03.21 : No CTA evidence of active GI bleeding. Anorectal wall thickening, neoplasm is not excluded.  EGD-Colonoscopy 07.01.21 - White plaques in the esophagus  Erythema in the stomach compatible with non-erosive gastritis.  No evidence of gastric bypass surgery in EGD. 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 No ulcer was noted at the anastomosis site.   multiple polyps in colonoscopy.    PAST MEDICAL & SURGICAL HISTORY:  Atrial fibrillation    History of COPD        Assessment:     Wound #1  Location: ****  Size: Length: **** Width: **** Depth: ****    Tissue Description (Place "x" if applicable/present):   [ ] Necrotic   [ ] Slough   [ ] Infected   [ ] Granulation (firm, beefy red tissue)   [ ] Hypergranulation (soft, gelatinous)  [ ] Poor-Quality Granulation (pale, grey/brown/red granulation tissue)   [ ] Epithelium (pink/mauve at wound edges)  [ ] Macerated  [ ] Other: _______  Wound Exudate :   Wound Edge):   [ ] Epithelisation [ ] Maceration [ ] Dehydration [ ] Undermining (use clock position) [ ] Rolled Edges [ ] Other: _____  Periwound Condition (area that extends 4cm from the edge of the wound):   [ ] Maceration [ ] Excoriation [ ] Dry skin [ ] Hyperkeratosis [ ] Callus [ ] Eczema [ ] Other: _______    Pressure due to: [ ] Immobility [ ] Medical Device   [ ] Stage I  [ ]  Stage II  [ ]  Stage III  [ ]  Stage VI  [ ]  Suspected Deep Tissue Injury (DTI)  [ ]  Unstageable    Other Etiology:  [ ] Aterial  [ ] Venous   [ ] Surgical Incision  [ ] Other: ________     Patient is a 69 yo F with recent dx of Afib on Eliquis, Abdominal abscess 2 mo ago, Gastric Bypass surgery 20+ years ago, COPD who presents to the ED with BRBPR .  The patient has been having intermittent abdominal cramps and watery diarrhea for months ,along with nausea and fecal incontinence that had affected her daily function .She reports an episode of bright red blood that filled the toilet ,and came to the ED.   Reports weight loss 30 Ib ,low appetite ,low energy and easy fatigue .  Reports sad ,depressed mood ,insomnia ,low energy ,no suicidal ideation.  Denies fever ,vomiting ,abdominal distention .  patient was admitted in june 2021 for hematochezia and perianal abscess, colonoscopy and surgical debridement was done and developed new onset afib during hospital stay.  In ED , hemodynamically stable ,Hb 10.2 , /59 , MARVIN was in ED and showed melena and tender external hemorrhoidal ,  CT Angio Abdomen and Pelvis w/ IV Cont 09.03.21 : No CTA evidence of active GI bleeding. Anorectal wall thickening, neoplasm is not excluded.  EGD-Colonoscopy 07.01.21 - White plaques in the esophagus  Erythema in the stomach compatible with non-erosive gastritis.  No evidence of gastric bypass surgery in EGD. 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 No ulcer was noted at the anastomosis site.   multiple polyps in colonoscopy.  PAST MEDICAL & SURGICAL HISTORY:  Atrial fibrillation  History of COPD                      Assessment:  Patient received in bed, A/O responds appropriately to verbal command.                      Skin assessed - Coccyx linear wound about 2x1x0.2 in size . 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 healed and recently had an abscess in the same area                                           Minimal amount of drainage note at tie of assessment , no foul smell noted       Wound #1  Location: ****  Size: Length: **** Width: **** Depth: ****    Tissue Description (Place "x" if applicable/present):   [ ] Necrotic   [ ] Slough   [ ] Infected   [ ] Granulation (firm, beefy red tissue)   [ ] Hypergranulation (soft, gelatinous)  [ ] Poor-Quality Granulation (pale, grey/brown/red granulation tissue)   [ ] Epithelium (pink/mauve at wound edges)  [ ] Macerated  [ ] Other: _______  Wound Exudate :   Wound Edge):   [ ] Epithelisation [ ] Maceration [ ] Dehydration [ ] Undermining (use clock position) [ ] Rolled Edges [ ] Other: _____  Periwound Condition (area that extends 4cm from the edge of the wound):   [ ] Maceration [ ] Excoriation [ ] Dry skin [ ] Hyperkeratosis [ ] Callus [ ] Eczema [ ] Other: _______    Pressure due to: [ ] Immobility [ ] Medical Device   [ ] Stage I  [ ]  Stage II  [ ]  Stage III  [ ]  Stage VI  [ ]  Suspected Deep Tissue Injury (DTI)  [ ]  Unstageable    Other Etiology:  [ ] Aterial  [ ] Venous   [ ] Surgical Incision  [ ] Other: ________

## 2021-09-08 NOTE — PROGRESS NOTE ADULT - SUBJECTIVE AND OBJECTIVE BOX
WEGENER, LOIS  70y  Female      Patient is a 70y old  Female who presents with a chief complaint of BRBPR (07 Sep 2021 16:08)      INTERVAL HPI/OVERNIGHT EVENTS: Pt was hypotensive overnight BP 89/54, was stable didn't received any IVF. Pt had 2 loose bm last night and this morning. She denies any abdominal pain, melena, hematochezia, dizziness, chest pain, palpitations, or shortness of breath.      REVIEW OF SYSTEMS:  CONSTITUTIONAL: No fever, weight loss, or fatigue  EYES: No eye pain, visual disturbances, or discharge  ENMT:  No difficulty hearing, tinnitus, vertigo; No sinus or throat pain  NECK: No pain or stiffness  BREASTS: No pain, masses, or nipple discharge  RESPIRATORY: No cough, wheezing, chills or hemoptysis; No shortness of breath  CARDIOVASCULAR: No chest pain, palpitations, dizziness, or leg swelling  GASTROINTESTINAL: +diarrhea ,No abdominal or epigastric pain. No nausea, vomiting, or hematemesis; no constipation. No melena or hematochezia.  GENITOURINARY: No dysuria, frequency, hematuria, or incontinence  NEUROLOGICAL: No headaches, memory loss, loss of strength, numbness, or tremors  SKIN: No itching, burning, rashes, or lesions   LYMPH NODES: No enlarged glands  ENDOCRINE: No heat or cold intolerance; No hair loss  MUSCULOSKELETAL: No joint pain or swelling; No muscle, back, or extremity pain  PSYCHIATRIC: No depression, anxiety, mood swings, or difficulty sleeping  HEME/LYMPH: No easy bruising, or bleeding gums  ALLERY AND IMMUNOLOGIC: No hives or eczema    FAMILY HISTORY:    T(C): 36.8 (09-08-21 @ 06:02), Max: 37 (09-07-21 @ 14:28)  HR: 74 (09-08-21 @ 06:02) (66 - 78)  BP: 89/54 (09-08-21 @ 06:02) (84/54 - 111/64)  RR: 18 (09-08-21 @ 06:02) (18 - 19)  SpO2: 98% (09-08-21 @ 06:02) (98% - 98%)  Wt(kg): --Vital Signs Last 24 Hrs  T(C): 36.8 (08 Sep 2021 06:02), Max: 37 (07 Sep 2021 14:28)  T(F): 98.2 (08 Sep 2021 06:02), Max: 98.6 (07 Sep 2021 14:28)  HR: 74 (08 Sep 2021 06:02) (66 - 78)  BP: 89/54 (08 Sep 2021 06:02) (84/54 - 111/64)  BP(mean): --  RR: 18 (08 Sep 2021 06:02) (18 - 19)  SpO2: 98% (08 Sep 2021 06:02) (98% - 98%)  No Known Allergies      PHYSICAL EXAM:  GENERAL: NAD, well-groomed, well-developed  HEAD:  Atraumatic, Normocephalic  EYES: EOMI, PERRLA, conjunctiva and sclera clear  ENMT: No tonsillar erythema, exudates, or enlargement; Moist mucous membranes, Good dentition, No lesions  NECK: Supple, No JVD, Normal thyroid  NERVOUS SYSTEM:  Alert & Oriented X3, Good concentration; Motor Strength 5/5 B/L upper and lower extremities; DTRs 2+ intact and symmetric  CHEST/LUNG: Clear to percussion bilaterally; No rales, rhonchi, wheezing, or rubs  HEART: Regular rate and rhythm; No murmurs, rubs, or gallops  ABDOMEN: Soft, Nontender, Nondistended; Bowel sounds present  EXTREMITIES:  2+ Peripheral Pulses, No clubbing, cyanosis, or edema  LYMPH: No lymphadenopathy noted  SKIN: No rashes or lesions    Consultant(s) Notes Reviewed:  [x ] YES  [ ] NO  Care Discussed with Consultants/Other Providers [ x] YES  [ ] NO    LABS:                          10.0   5.69  )-----------( 208      ( 08 Sep 2021 05:29 )             32.0     09-08    137  |  103  |  14  ----------------------------<  65<L>  5.2<H>   |  26  |  0.6<L>    Ca    8.2<L>      08 Sep 2021 05:29  Mg     1.8     09-08    TPro  4.9<L>  /  Alb  2.9<L>  /  TBili  0.5  /  DBili  x   /  AST  18  /  ALT  17  /  AlkPhos  92  09-08        RADIOLOGY & ADDITIONAL TESTS:    Imaging Personally Reviewed:  [ ] YES  [ ] NO  ALBUTerol    90 MICROgram(s) HFA Inhaler 2 Puff(s) Inhalation every 6 hours PRN  apixaban 5 milliGRAM(s) Oral every 12 hours  chlorhexidine 4% Liquid 1 Application(s) Topical <User Schedule>  DULoxetine 60 milliGRAM(s) Oral daily  folic acid 1 milliGRAM(s) Oral daily  influenza   Vaccine 0.5 milliLiter(s) IntraMuscular once  melatonin 5 milliGRAM(s) Oral at bedtime PRN  methocarbamol 500 milliGRAM(s) Oral two times a day  sodium chloride 0.9%. 500 milliLiter(s) IV Continuous <Continuous>  thiamine 100 milliGRAM(s) Oral daily  vancomycin    Solution 125 milliGRAM(s) Oral every 6 hours      HEALTH ISSUES - PROBLEM Dx:           WEGENER, LOIS  70y  Female      Patient is a 70y old  Female who presents with a chief complaint of BRBPR (07 Sep 2021 16:08)      INTERVAL HPI/OVERNIGHT EVENTS: Pt was hypotensive overnight BP 89/54, was stable didn't received any IVF. Pt had 2 loose bm last night and this morning. She denies any abdominal pain, melena, hematochezia, dizziness, chest pain, palpitations, or shortness of breath.  In the afternoon, pt had 4 episodes of loose bm and complains of abdominal pain.      REVIEW OF SYSTEMS:  CONSTITUTIONAL: No fever, weight loss, or fatigue  EYES: No eye pain, visual disturbances, or discharge  ENMT:  No difficulty hearing, tinnitus, vertigo; No sinus or throat pain  NECK: No pain or stiffness  BREASTS: No pain, masses, or nipple discharge  RESPIRATORY: No cough, wheezing, chills or hemoptysis; No shortness of breath  CARDIOVASCULAR: No chest pain, palpitations, dizziness, or leg swelling  GASTROINTESTINAL: +diarrhea ,No abdominal or epigastric pain. No nausea, vomiting, or hematemesis; no constipation. No melena or hematochezia.  GENITOURINARY: No dysuria, frequency, hematuria, or incontinence  NEUROLOGICAL: No headaches, memory loss, loss of strength, numbness, or tremors  SKIN: No itching, burning, rashes, or lesions   LYMPH NODES: No enlarged glands  ENDOCRINE: No heat or cold intolerance; No hair loss  MUSCULOSKELETAL: No joint pain or swelling; No muscle, back, or extremity pain  PSYCHIATRIC: No depression, anxiety, mood swings, or difficulty sleeping  HEME/LYMPH: No easy bruising, or bleeding gums  ALLERY AND IMMUNOLOGIC: No hives or eczema    FAMILY HISTORY:    T(C): 36.8 (09-08-21 @ 06:02), Max: 37 (09-07-21 @ 14:28)  HR: 74 (09-08-21 @ 06:02) (66 - 78)  BP: 89/54 (09-08-21 @ 06:02) (84/54 - 111/64)  RR: 18 (09-08-21 @ 06:02) (18 - 19)  SpO2: 98% (09-08-21 @ 06:02) (98% - 98%)  Wt(kg): --Vital Signs Last 24 Hrs  T(C): 36.8 (08 Sep 2021 06:02), Max: 37 (07 Sep 2021 14:28)  T(F): 98.2 (08 Sep 2021 06:02), Max: 98.6 (07 Sep 2021 14:28)  HR: 74 (08 Sep 2021 06:02) (66 - 78)  BP: 89/54 (08 Sep 2021 06:02) (84/54 - 111/64)  BP(mean): --  RR: 18 (08 Sep 2021 06:02) (18 - 19)  SpO2: 98% (08 Sep 2021 06:02) (98% - 98%)  No Known Allergies      PHYSICAL EXAM:  GENERAL: NAD, well-groomed, well-developed  HEAD:  Atraumatic, Normocephalic  EYES: EOMI, PERRLA, conjunctiva and sclera clear  ENMT: No tonsillar erythema, exudates, or enlargement; Moist mucous membranes, Good dentition, No lesions  NECK: Supple, No JVD, Normal thyroid  NERVOUS SYSTEM:  Alert & Oriented X3, Good concentration; Motor Strength 5/5 B/L upper and lower extremities; DTRs 2+ intact and symmetric  CHEST/LUNG: Clear to percussion bilaterally; No rales, rhonchi, wheezing, or rubs  HEART: Regular rate and rhythm; No murmurs, rubs, or gallops  ABDOMEN: tender in the lower quadrants, distended; Bowel sounds present  EXTREMITIES:  2+ Peripheral Pulses, No clubbing, cyanosis, or edema  LYMPH: No lymphadenopathy noted  SKIN: No rashes or lesions    Consultant(s) Notes Reviewed:  [x ] YES  [ ] NO  Care Discussed with Consultants/Other Providers [ x] YES  [ ] NO    LABS:                          10.0   5.69  )-----------( 208      ( 08 Sep 2021 05:29 )             32.0     09-08    137  |  103  |  14  ----------------------------<  65<L>  5.2<H>   |  26  |  0.6<L>    Ca    8.2<L>      08 Sep 2021 05:29  Mg     1.8     09-08    TPro  4.9<L>  /  Alb  2.9<L>  /  TBili  0.5  /  DBili  x   /  AST  18  /  ALT  17  /  AlkPhos  92  09-08        RADIOLOGY & ADDITIONAL TESTS:    Imaging Personally Reviewed:  [ ] YES  [ ] NO  ALBUTerol    90 MICROgram(s) HFA Inhaler 2 Puff(s) Inhalation every 6 hours PRN  apixaban 5 milliGRAM(s) Oral every 12 hours  chlorhexidine 4% Liquid 1 Application(s) Topical <User Schedule>  DULoxetine 60 milliGRAM(s) Oral daily  folic acid 1 milliGRAM(s) Oral daily  influenza   Vaccine 0.5 milliLiter(s) IntraMuscular once  melatonin 5 milliGRAM(s) Oral at bedtime PRN  methocarbamol 500 milliGRAM(s) Oral two times a day  sodium chloride 0.9%. 500 milliLiter(s) IV Continuous <Continuous>  thiamine 100 milliGRAM(s) Oral daily  vancomycin    Solution 125 milliGRAM(s) Oral every 6 hours      HEALTH ISSUES - PROBLEM Dx:

## 2021-09-08 NOTE — ADVANCED PRACTICE NURSE CONSULT - RECOMMEDATIONS
Plan: Clean wound with wound cleanser , pat dry then pack  wound with iodoform packing then  apply sterile dry dressing.  Apply triad to periwound area   Continue Pressure Injury  preventive  measures   Continue skin care   Asses wound and inform primary provider of any changes   Case discussed with primary Rn   Wound/ ostomy specialist  to f/u as needed     Offloading: [ ] Frequent position changes [ ] Devices/Equipment  Cleansing: [ ] Saline [ ] Soap/Water [ ] Other: ______  Topicals: [ ] Barrier Cream [ ] Antimicrobial [ ] Enzymatic Wound Debridement  Dressings: [ ] Dry, sterile [ ] Foam [ ] Absorbant Pads [ ] Collagenase

## 2021-09-08 NOTE — PROGRESS NOTE ADULT - ASSESSMENT
69 yo F with recent dx of Afib on Eliquis, perianal abscess, fistula, and colitis 2 mo ago,  COPD who presents to the ED with BRBPR for 2d hemodynamically stable on admission, Hb 10.2 (baseline 9) , melena on MARVIN and external hemorrhoid  and no acute signs of bleed on CT angio abd and pelvis:    # Hematochezia 2/2 hemorroids vs colonic polyps vs IBD  # Acute blood loss anemia  #Melena  - Remains but hypotensive 86s-100s systolic,  getting mIVF, no acute signs of bleeding  - colonoscopy 7/1/21 - polypectomy for sessile polyps, perianal abscess, bx at the time negative for IBD  -EGD 7/1 non erosive gastritis and white plaques in esophagus  - Hb stable 10.2  - active T&S, maintain 2 large bore IV,  transfuse for hgb <7  - started on IVF   - GI consult recs:        Anusol suppositories once every night          colonoscopy scheduled for 9/7 but pt refuses inpatient colonoscopy and wants to resume eliquis. risks and benefits discussed with pt       and she's aware of risks of bleeding. Plan for outpatient colonoscopy.  -Update: on 9/6  pt changed her mind and now wants inpatient colonoscopy before discharge. Will f/u with GI after c diff colitis treated>pt will get outpatient colonoscopy, planning for d/c tomorrow based on BM frequency    #Acute on chronic diarrhea- C diff colitis  -Stool pcr positive for c.diff  -c/w on po vancomycin 125mg q6h   -2 episodes of loose BM on 9/8    #Hypotension- 80s-109 systolic  -Monito r BP  -Give 500cc bolus if pt unstable and symptomatic  -Monitor volume status due HF status    #Atrial fibrillation on eliquis  - CHADVASC 3  - Restart eliquis and watch for signs of acute bleeding    #Hypokalemia -RESOLVED  - K 3.1 ,repleted-->3.5    # Hyponatremia RESOLVED  - 129->130->137->132  - likely hypotonic hypovolemic etiology  - continue mIVF as above    #COPD stable not in exacerbation  - not on home oxygen ,no wheezing on exam  - inhaler PRN    #HFpEF ,euvolemic on exam   -TTE 6/27 EF of 63 %.  - monitor I&O  - BNP; from 6/25- 1500-->912  - avoid volume overload, euvolemic    # EtOH abuse with suspected folate/thiamine deficiency  - C/w folic acid and thiamine     # Depression on duloxetine  - no suicidal ideation  - c/w SSRI  - f/u w psychiatry as outpatient    Diet - NPO for GI eval  GI px  DVT px  Disposition - floor     #Progress Note Handoff  Pending (specify):  cdiff diarrhea  Family discussion: patient aware of plan. in agreement. all questions answered  Disposition: Home___   69 yo F with recent dx of Afib on Eliquis, perianal abscess, fistula, and colitis 2 mo ago,  COPD who presents to the ED with BRBPR for 2d hemodynamically stable on admission, Hb 10.2 (baseline 9) , melena on MARVIN and external hemorrhoid  and no acute signs of bleed on CT angio abd and pelvis:    # Hematochezia 2/2 hemorroids vs colonic polyps vs IBD  # Acute blood loss anemia  #Melena  - Remains but hypotensive 86s-100s systolic,  getting mIVF, no acute signs of bleeding  - colonoscopy 7/1/21 - polypectomy for sessile polyps, perianal abscess, bx at the time negative for IBD  -EGD 7/1 non erosive gastritis and white plaques in esophagus  - Hb stable 10.2  - active T&S, maintain 2 large bore IV,  transfuse for hgb <7  - started on IVF   - GI consult recs:        Anusol suppositories once every night          colonoscopy scheduled for 9/7 but pt refuses inpatient colonoscopy and wants to resume eliquis. risks and benefits discussed with pt       and she's aware of risks of bleeding. Plan for outpatient colonoscopy.  -Update: on 9/6  pt changed her mind and now wants inpatient colonoscopy before discharge. Will f/u with GI after c diff colitis treated>pt will get outpatient colonoscopy, planning for d/c tomorrow based on BM frequency    #Acute on chronic diarrhea- C diff colitis  -Stool pcr positive for c.diff  -6 episodes of loose BM on 9/8 abdominal distension and pain r/o  -c/w on po vancomycin 125mg q6h   -order    #Hypotension- 80s-109 systolic  -Monito r BP  -Give 500cc bolus if pt unstable and symptomatic  -Monitor volume status due HF status    #Atrial fibrillation on eliquis  - CHADVASC 3  - Restart eliquis and watch for signs of acute bleeding    #Hypokalemia -RESOLVED  - K 3.1 ,repleted-->3.5    # Hyponatremia RESOLVED  - 129->130->137->132  - likely hypotonic hypovolemic etiology  - continue mIVF as above    #COPD stable not in exacerbation  - not on home oxygen ,no wheezing on exam  - inhaler PRN    #HFpEF ,euvolemic on exam   -TTE 6/27 EF of 63 %.  - monitor I&O  - BNP; from 6/25- 1500-->912  - avoid volume overload, euvolemic    # EtOH abuse with suspected folate/thiamine deficiency  - C/w folic acid and thiamine     # Depression on duloxetine  - no suicidal ideation  - c/w SSRI  - f/u w psychiatry as outpatient    Diet - NPO for GI eval  GI px  DVT px  Disposition - floor     #Progress Note Handoff  Pending (specify):  cdiff diarrhea  Family discussion: patient aware of plan. in agreement. all questions answered  Disposition: Home___

## 2021-09-08 NOTE — PROGRESS NOTE ADULT - SUBJECTIVE AND OBJECTIVE BOX
WEGENER, JULIAN  70y, Female  Allergy: No Known Allergies    Hospital Day: 5d    Patient seen and examined earlier today. Feeling well, but continues to have frequent watery BM.     PMH/PSH:  PAST MEDICAL & SURGICAL HISTORY:  Atrial fibrillation    History of COPD        LAST 24-Hr EVENTS:    VITALS:  T(F): 98.4 (09-08-21 @ 13:40), Max: 98.5 (09-07-21 @ 22:22)  HR: 70 (09-08-21 @ 13:40)  BP: 94/53 (09-08-21 @ 13:40) (89/54 - 111/64)  RR: 18 (09-08-21 @ 13:40)  SpO2: 98% (09-08-21 @ 06:02)        TESTS & MEASUREMENTS:  Weight (Kg):                             10.0   5.69  )-----------( 208      ( 08 Sep 2021 05:29 )             32.0       09-08    137  |  103  |  14  ----------------------------<  65<L>  5.2<H>   |  26  |  0.6<L>    Ca    8.2<L>      08 Sep 2021 05:29  Mg     1.8     09-08    TPro  4.9<L>  /  Alb  2.9<L>  /  TBili  0.5  /  DBili  x   /  AST  18  /  ALT  17  /  AlkPhos  92  09-08    LIVER FUNCTIONS - ( 08 Sep 2021 05:29 )  Alb: 2.9 g/dL / Pro: 4.9 g/dL / ALK PHOS: 92 U/L / ALT: 17 U/L / AST: 18 U/L / GGT: x                           COVID-19 PCR: NotDetec (09-03-21 @ 05:07)      RADIOLOGY, ECG, & ADDITIONAL TESTS:      RECENT DIAGNOSTIC ORDERS:  Xray Kidney Ureter Bladder: Routine   Indication: Abdominal distension  Transport: Portable (09-08-21 @ 15:03)      MEDICATIONS:  MEDICATIONS  (STANDING):  apixaban 5 milliGRAM(s) Oral every 12 hours  chlorhexidine 4% Liquid 1 Application(s) Topical <User Schedule>  DULoxetine 60 milliGRAM(s) Oral daily  folic acid 1 milliGRAM(s) Oral daily  influenza   Vaccine 0.5 milliLiter(s) IntraMuscular once  lactobacillus acidophilus 1 Tablet(s) Oral daily  methocarbamol 500 milliGRAM(s) Oral two times a day  sodium chloride 0.9%. 500 milliLiter(s) (50 mL/Hr) IV Continuous <Continuous>  thiamine 100 milliGRAM(s) Oral daily  vancomycin    Solution 125 milliGRAM(s) Oral every 6 hours    MEDICATIONS  (PRN):  ALBUTerol    90 MICROgram(s) HFA Inhaler 2 Puff(s) Inhalation every 6 hours PRN Shortness of Breath and/or Wheezing  melatonin 5 milliGRAM(s) Oral at bedtime PRN Insomnia      HOME MEDICATIONS:  albuterol 90 mcg/inh inhalation aerosol (07-06)  apixaban 5 mg oral tablet (07-06)  DULoxetine 60 mg oral delayed release capsule (07-06)  folic acid 1 mg oral tablet (07-06)  methocarbamol 500 mg oral tablet (07-06)  thiamine 100 mg oral tablet (07-06)      PHYSICAL EXAM:  GENERAL: elderly F, NAD, non toxic appearing  EYES: anicteric sclera, non injected conjunctiva, EOMI  ENT: hearing grossly intact, oropharynx clear, MMM  PSYCH: no agitation, baseline mentation  NERVOUS SYSTEM:  Alert & Oriented X3 CN 2-12 grossly intact  PULMONARY: Clear to auscultation bilaterally; No rales, rhonchi, wheezing, or rubs  CARDIOVASCULAR: Regular rate and rhythm; No murmurs, rubs, or gallops  GI: Soft, Nontender, Nondistended; Bowel sounds present  EXTREMITIES:  2+ Peripheral Pulses, No clubbing, cyanosis  LYMPH: No lymphadenopathy noted  SKIN: No rashes or lesions

## 2021-09-09 LAB
ALBUMIN SERPL ELPH-MCNC: 3 G/DL — LOW (ref 3.5–5.2)
ALP SERPL-CCNC: 92 U/L — SIGNIFICANT CHANGE UP (ref 30–115)
ALT FLD-CCNC: 19 U/L — SIGNIFICANT CHANGE UP (ref 0–41)
ANION GAP SERPL CALC-SCNC: 10 MMOL/L — SIGNIFICANT CHANGE UP (ref 7–14)
AST SERPL-CCNC: 31 U/L — SIGNIFICANT CHANGE UP (ref 0–41)
BILIRUB SERPL-MCNC: 0.5 MG/DL — SIGNIFICANT CHANGE UP (ref 0.2–1.2)
BUN SERPL-MCNC: 12 MG/DL — SIGNIFICANT CHANGE UP (ref 10–20)
CALCIUM SERPL-MCNC: 8.2 MG/DL — LOW (ref 8.5–10.1)
CHLORIDE SERPL-SCNC: 101 MMOL/L — SIGNIFICANT CHANGE UP (ref 98–110)
CO2 SERPL-SCNC: 23 MMOL/L — SIGNIFICANT CHANGE UP (ref 17–32)
CREAT SERPL-MCNC: 0.6 MG/DL — LOW (ref 0.7–1.5)
GLUCOSE BLDC GLUCOMTR-MCNC: 119 MG/DL — HIGH (ref 70–99)
GLUCOSE BLDC GLUCOMTR-MCNC: 81 MG/DL — SIGNIFICANT CHANGE UP (ref 70–99)
GLUCOSE BLDC GLUCOMTR-MCNC: 88 MG/DL — SIGNIFICANT CHANGE UP (ref 70–99)
GLUCOSE SERPL-MCNC: 65 MG/DL — LOW (ref 70–99)
MAGNESIUM SERPL-MCNC: 1.8 MG/DL — SIGNIFICANT CHANGE UP (ref 1.8–2.4)
POTASSIUM SERPL-MCNC: 4.8 MMOL/L — SIGNIFICANT CHANGE UP (ref 3.5–5)
POTASSIUM SERPL-SCNC: 4.8 MMOL/L — SIGNIFICANT CHANGE UP (ref 3.5–5)
PROT SERPL-MCNC: 5 G/DL — LOW (ref 6–8)
SODIUM SERPL-SCNC: 134 MMOL/L — LOW (ref 135–146)
TROPONIN T SERPL-MCNC: <0.01 NG/ML — SIGNIFICANT CHANGE UP

## 2021-09-09 PROCEDURE — 74177 CT ABD & PELVIS W/CONTRAST: CPT | Mod: 26

## 2021-09-09 PROCEDURE — 71260 CT THORAX DX C+: CPT | Mod: 26

## 2021-09-09 PROCEDURE — 93010 ELECTROCARDIOGRAM REPORT: CPT

## 2021-09-09 PROCEDURE — 12031 INTMD RPR S/A/T/EXT 2.5 CM/<: CPT

## 2021-09-09 PROCEDURE — 99233 SBSQ HOSP IP/OBS HIGH 50: CPT

## 2021-09-09 PROCEDURE — 70450 CT HEAD/BRAIN W/O DYE: CPT | Mod: 26

## 2021-09-09 PROCEDURE — 73110 X-RAY EXAM OF WRIST: CPT | Mod: 26,RT

## 2021-09-09 PROCEDURE — 73562 X-RAY EXAM OF KNEE 3: CPT | Mod: 26,RT

## 2021-09-09 PROCEDURE — 70486 CT MAXILLOFACIAL W/O DYE: CPT | Mod: 26

## 2021-09-09 PROCEDURE — 99223 1ST HOSP IP/OBS HIGH 75: CPT | Mod: 57

## 2021-09-09 PROCEDURE — 72125 CT NECK SPINE W/O DYE: CPT | Mod: 26

## 2021-09-09 PROCEDURE — 74018 RADEX ABDOMEN 1 VIEW: CPT | Mod: 26

## 2021-09-09 RX ORDER — METRONIDAZOLE 500 MG
500 TABLET ORAL ONCE
Refills: 0 | Status: COMPLETED | OUTPATIENT
Start: 2021-09-09 | End: 2021-09-09

## 2021-09-09 RX ORDER — VANCOMYCIN HCL 1 G
500 VIAL (EA) INTRAVENOUS EVERY 6 HOURS
Refills: 0 | Status: DISCONTINUED | OUTPATIENT
Start: 2021-09-09 | End: 2021-09-18

## 2021-09-09 RX ORDER — METRONIDAZOLE 500 MG
TABLET ORAL
Refills: 0 | Status: DISCONTINUED | OUTPATIENT
Start: 2021-09-09 | End: 2021-09-18

## 2021-09-09 RX ORDER — TETANUS AND DIPHTHERIA TOXOIDS ADSORBED 2; 2 [LF]/.5ML; [LF]/.5ML
0.5 INJECTION INTRAMUSCULAR ONCE
Refills: 0 | Status: COMPLETED | OUTPATIENT
Start: 2021-09-09 | End: 2021-09-09

## 2021-09-09 RX ORDER — SODIUM CHLORIDE 9 MG/ML
1000 INJECTION INTRAMUSCULAR; INTRAVENOUS; SUBCUTANEOUS
Refills: 0 | Status: DISCONTINUED | OUTPATIENT
Start: 2021-09-09 | End: 2021-09-10

## 2021-09-09 RX ORDER — METRONIDAZOLE 500 MG
500 TABLET ORAL EVERY 8 HOURS
Refills: 0 | Status: DISCONTINUED | OUTPATIENT
Start: 2021-09-09 | End: 2021-09-18

## 2021-09-09 RX ADMIN — TETANUS AND DIPHTHERIA TOXOIDS ADSORBED 0.5 MILLILITER(S): 2; 2 INJECTION INTRAMUSCULAR at 12:06

## 2021-09-09 RX ADMIN — Medication 5 MILLIGRAM(S): at 21:57

## 2021-09-09 RX ADMIN — Medication 100 MILLIGRAM(S): at 12:08

## 2021-09-09 RX ADMIN — METHOCARBAMOL 500 MILLIGRAM(S): 500 TABLET, FILM COATED ORAL at 18:12

## 2021-09-09 RX ADMIN — METHOCARBAMOL 500 MILLIGRAM(S): 500 TABLET, FILM COATED ORAL at 05:21

## 2021-09-09 RX ADMIN — Medication 100 MILLIGRAM(S): at 21:57

## 2021-09-09 RX ADMIN — Medication 1 MILLIGRAM(S): at 12:08

## 2021-09-09 RX ADMIN — Medication 100 MILLIGRAM(S): at 13:11

## 2021-09-09 RX ADMIN — Medication 500 MILLIGRAM(S): at 12:08

## 2021-09-09 RX ADMIN — Medication 500 MILLIGRAM(S): at 18:12

## 2021-09-09 RX ADMIN — Medication 125 MILLIGRAM(S): at 05:21

## 2021-09-09 RX ADMIN — DULOXETINE HYDROCHLORIDE 60 MILLIGRAM(S): 30 CAPSULE, DELAYED RELEASE ORAL at 12:08

## 2021-09-09 RX ADMIN — APIXABAN 5 MILLIGRAM(S): 2.5 TABLET, FILM COATED ORAL at 18:13

## 2021-09-09 RX ADMIN — Medication 1 TABLET(S): at 12:08

## 2021-09-09 NOTE — PROGRESS NOTE ADULT - SUBJECTIVE AND OBJECTIVE BOX
WEGENER, LOIS  70y  Female      Patient is a 70y old  Female who presents with a chief complaint of BRBPR (08 Sep 2021 16:42)      INTERVAL HPI/OVERNIGHT EVENTS: Pt had an unwitnessed fall this morning no HT, no LOC, no tongue biting. She hit her chin and sustained a 6cm laceration to her chin and has pain in her knee and R wrist. CTH negative for any intracranial bleed and EKG  NSR and surgery was c/s for laceration repair. This morning       REVIEW OF SYSTEMS:  CONSTITUTIONAL: No fever, weight loss, or fatigue  EYES: No eye pain, visual disturbances, or discharge  ENMT:  No difficulty hearing, tinnitus, vertigo; No sinus or throat pain  NECK: No pain or stiffness  BREASTS: No pain, masses, or nipple discharge  RESPIRATORY: No cough, wheezing, chills or hemoptysis; No shortness of breath  CARDIOVASCULAR: No chest pain, palpitations, dizziness, or leg swelling  GASTROINTESTINAL: No abdominal or epigastric pain. No nausea, vomiting, or hematemesis; No diarrhea or constipation. No melena or hematochezia.  GENITOURINARY: No dysuria, frequency, hematuria, or incontinence  NEUROLOGICAL: No headaches, memory loss, loss of strength, numbness, or tremors  SKIN: No itching, burning, rashes, or lesions   LYMPH NODES: No enlarged glands  ENDOCRINE: No heat or cold intolerance; No hair loss  MUSCULOSKELETAL: No joint pain or swelling; No muscle, back, or extremity pain  PSYCHIATRIC: No depression, anxiety, mood swings, or difficulty sleeping  HEME/LYMPH: No easy bruising, or bleeding gums  ALLERY AND IMMUNOLOGIC: No hives or eczema  FAMILY HISTORY:    T(C): 36 (09-09-21 @ 05:51), Max: 37.2 (09-08-21 @ 21:26)  HR: 74 (09-09-21 @ 05:51) (68 - 74)  BP: 105/55 (09-09-21 @ 05:51) (94/53 - 126/68)  RR: 18 (09-09-21 @ 05:51) (18 - 18)  SpO2: --  Wt(kg): --Vital Signs Last 24 Hrs  T(C): 36 (09 Sep 2021 05:51), Max: 37.2 (08 Sep 2021 21:26)  T(F): 96.8 (09 Sep 2021 05:51), Max: 98.9 (08 Sep 2021 21:26)  HR: 74 (09 Sep 2021 05:51) (68 - 74)  BP: 105/55 (09 Sep 2021 05:51) (94/53 - 126/68)  BP(mean): --  RR: 18 (09 Sep 2021 05:51) (18 - 18)  SpO2: --  No Known Allergies      PHYSICAL EXAM:  GENERAL: NAD, well-groomed, well-developed  HEAD:  Atraumatic, Normocephalic  EYES: EOMI, PERRLA, conjunctiva and sclera clear  ENMT: No tonsillar erythema, exudates, or enlargement; Moist mucous membranes, Good dentition, No lesions  NECK: Supple, No JVD, Normal thyroid  NERVOUS SYSTEM:  Alert & Oriented X3, Good concentration; Motor Strength 5/5 B/L upper and lower extremities; DTRs 2+ intact and symmetric  CHEST/LUNG: Clear to percussion bilaterally; No rales, rhonchi, wheezing, or rubs  HEART: Regular rate and rhythm; No murmurs, rubs, or gallops  ABDOMEN: Soft, Nontender, Nondistended; Bowel sounds present  EXTREMITIES:  2+ Peripheral Pulses, No clubbing, cyanosis, or edema  LYMPH: No lymphadenopathy noted  SKIN: No rashes or lesions    Consultant(s) Notes Reviewed:  [x ] YES  [ ] NO  Care Discussed with Consultants/Other Providers [ x] YES  [ ] NO    LABS:      RADIOLOGY & ADDITIONAL TESTS:    Imaging Personally Reviewed:  [ ] YES  [ ] NO  ALBUTerol    90 MICROgram(s) HFA Inhaler 2 Puff(s) Inhalation every 6 hours PRN  apixaban 5 milliGRAM(s) Oral every 12 hours  chlorhexidine 4% Liquid 1 Application(s) Topical <User Schedule>  DULoxetine 60 milliGRAM(s) Oral daily  folic acid 1 milliGRAM(s) Oral daily  influenza   Vaccine 0.5 milliLiter(s) IntraMuscular once  lactobacillus acidophilus 1 Tablet(s) Oral daily  melatonin 5 milliGRAM(s) Oral at bedtime PRN  methocarbamol 500 milliGRAM(s) Oral two times a day  sodium chloride 0.9%. 500 milliLiter(s) IV Continuous <Continuous>  thiamine 100 milliGRAM(s) Oral daily  vancomycin    Solution 125 milliGRAM(s) Oral every 6 hours      HEALTH ISSUES - PROBLEM Dx:           WEGENER, LOIS  70y  Female      Patient is a 70y old  Female who presents with a chief complaint of BRBPR (08 Sep 2021 16:42)      INTERVAL HPI/OVERNIGHT EVENTS: Pt had an unwitnessed fall this morning no HT, no LOC, no tongue biting. She hit her chin and sustained a 6cm laceration to her chin and has pain in her knee and R wrist. CTH negative for any intracranial bleed and EKG  NSR and surgery was c/s for laceration repair. This morning, she's AO*3 complaining of pain to her R wrist and R knee as well as pain to her chin due to the laceration. She had 3 loose BM overnight but denies abdominal pain, bloating, nausea, vomiting, dizziness, palpitations.      REVIEW OF SYSTEMS:  CONSTITUTIONAL: No fever, weight loss, or fatigue  HEAD: 4cm laceration to the left chin, oozing  EYES: No eye pain, visual disturbances, or discharge  ENMT:  No difficulty hearing, tinnitus, vertigo; No sinus or throat pain  NECK: No pain or stiffness  BREASTS: No pain, masses, or nipple discharge  RESPIRATORY: No cough, wheezing, chills or hemoptysis; No shortness of breath  CARDIOVASCULAR: No chest pain, palpitations, dizziness, or leg swelling  GASTROINTESTINAL: +diarrhea, No abdominal or epigastric pain. No nausea, vomiting, or hematemesis;constipation. No melena or hematochezia.  GENITOURINARY: No dysuria, frequency, hematuria, or incontinence  NEUROLOGICAL: No headaches, memory loss, loss of strength, numbness, or tremors  SKIN: No itching, burning, rashes, or lesions   LYMPH NODES: No enlarged glands  ENDOCRINE: No heat or cold intolerance; No hair loss  MUSCULOSKELETAL: +knee pain and R wrist pain  PSYCHIATRIC: No depression, anxiety, mood swings, or difficulty sleeping  HEME/LYMPH: No easy bruising, or bleeding gums  ALLERY AND IMMUNOLOGIC: No hives or eczema  FAMILY HISTORY:    ICU Vital Signs Last 24 Hrs  T(C): 36 (09 Sep 2021 05:51), Max: 37.2 (08 Sep 2021 21:26)  T(F): 96.8 (09 Sep 2021 05:51), Max: 98.9 (08 Sep 2021 21:26)  HR: 65 (09 Sep 2021 09:38) (65 - 74)  BP: 89/59 (09 Sep 2021 09:38) (89/59 - 126/68)  BP(mean): --  ABP: --  ABP(mean): --  RR: 18 (09 Sep 2021 09:38) (18 - 18)  SpO2: 97% (09 Sep 2021 09:38) (97% - 97%)  --  No Known Allergies      PHYSICAL EXAM:  GENERAL: NAD, well-groomed, well-developed  HEAD:  Atraumatic, Normocephalic  EYES: EOMI, PERRLA, conjunctiva and sclera clear  ENMT: No tonsillar erythema, exudates, or enlargement; Moist mucous membranes, Good dentition, No lesions  NECK: Supple, No JVD, Normal thyroid  NERVOUS SYSTEM:  Alert & Oriented X3, Good concentration; Motor Strength 5/5 B/L upper and lower extremities; DTRs 2+ intact and symmetric  CHEST/LUNG: Clear to percussion bilaterally; No rales, rhonchi, wheezing, or rubs  HEART: Regular rate and rhythm; No murmurs, rubs, or gallops  ABDOMEN: Soft, Nontender, distended; Bowel sounds present  EXTREMITIES:  2+ Peripheral Pulses, No clubbing, cyanosis, or edema  LYMPH: No lymphadenopathy noted  SKIN: No rashes or lesions    Consultant(s) Notes Reviewed:  [x ] YES  [ ] NO  Care Discussed with Consultants/Other Providers [ x] YES  [ ] NO    LABS:                          10.0   5.69  )-----------( 208      ( 08 Sep 2021 05:29 )             32.0     09-09    134<L>  |  101  |  12  ----------------------------<  65<L>  4.8   |  23  |  0.6<L>    Ca    8.2<L>      09 Sep 2021 04:30  Mg     1.8     09-09    TPro  5.0<L>  /  Alb  3.0<L>  /  TBili  0.5  /  DBili  x   /  AST  31  /  ALT  19  /  AlkPhos  92  09-09      RADIOLOGY & ADDITIONAL TESTS:  < from: Xray Kidney Ureter Bladder (09.08.21 @ 17:28) >  FINDINGS/  IMPRESSION:    Multiple dilated large bowel loops, overall slightly increased in caliber since reference CT of September 3, 2021. No supine evidence of free intraperitoneal air. Osseous structures are stable.    --- End of Report ---    < end of copied text >    < from: CT Head No Cont (09.09.21 @ 04:26) >  IMPRESSION:    Focal hypodensity in the inferior right cerebellar hemisphere possibly representing an age-indeterminate infarct. Further evaluation with MRI of the brain can be obtained if clinically indicated.    No evidence of intracranial hemorrhage, mass effect or midline shift.      Imaging Personally Reviewed:  [ ] YES  [ ] NO  ALBUTerol    90 MICROgram(s) HFA Inhaler 2 Puff(s) Inhalation every 6 hours PRN  apixaban 5 milliGRAM(s) Oral every 12 hours  chlorhexidine 4% Liquid 1 Application(s) Topical <User Schedule>  DULoxetine 60 milliGRAM(s) Oral daily  folic acid 1 milliGRAM(s) Oral daily  influenza   Vaccine 0.5 milliLiter(s) IntraMuscular once  lactobacillus acidophilus 1 Tablet(s) Oral daily  melatonin 5 milliGRAM(s) Oral at bedtime PRN  methocarbamol 500 milliGRAM(s) Oral two times a day  sodium chloride 0.9%. 500 milliLiter(s) IV Continuous <Continuous>  thiamine 100 milliGRAM(s) Oral daily  vancomycin    Solution 125 milliGRAM(s) Oral every 6 hours      HEALTH ISSUES - PROBLEM Dx:           WEGENER, LOIS  70y  Female      Patient is a 70y old  Female who presents with a chief complaint of BRBPR (08 Sep 2021 16:42)      INTERVAL HPI/OVERNIGHT EVENTS: Pt had an unwitnessed fall this morning no HT, no LOC, no tongue biting. She hit her chin and sustained a 6cm laceration to her chin and has pain in her knee and R wrist. CTH negative for any intracranial bleed and EKG  NSR and surgery was c/s for laceration repair. This morning, she's AO*3 complaining of pain to her R wrist and R knee as well as pain to her chin due to the laceration. She had 3 loose BM overnight but denies abdominal pain, bloating, nausea, vomiting, dizziness, palpitations.      REVIEW OF SYSTEMS:  CONSTITUTIONAL: No fever, weight loss, or fatigue  HEAD: 4cm laceration to the left chin, oozing  EYES: No eye pain, visual disturbances, or discharge  ENMT:  No difficulty hearing, tinnitus, vertigo; No sinus or throat pain  NECK: No pain or stiffness  BREASTS: No pain, masses, or nipple discharge  RESPIRATORY: No cough, wheezing, chills or hemoptysis; No shortness of breath  CARDIOVASCULAR: No chest pain, palpitations, dizziness, or leg swelling  GASTROINTESTINAL: +diarrhea, No abdominal or epigastric pain. No nausea, vomiting, or hematemesis;constipation. No melena or hematochezia.  GENITOURINARY: No dysuria, frequency, hematuria, or incontinence  NEUROLOGICAL: No headaches, memory loss, loss of strength, numbness, or tremors  SKIN: No itching, burning, rashes, or lesions   LYMPH NODES: No enlarged glands  ENDOCRINE: No heat or cold intolerance; No hair loss  MUSCULOSKELETAL: +knee pain and R wrist pain  PSYCHIATRIC: No depression, anxiety, mood swings, or difficulty sleeping  HEME/LYMPH: No easy bruising, or bleeding gums  ALLERY AND IMMUNOLOGIC: No hives or eczema  FAMILY HISTORY:    ICU Vital Signs Last 24 Hrs  T(C): 36 (09 Sep 2021 05:51), Max: 37.2 (08 Sep 2021 21:26)  T(F): 96.8 (09 Sep 2021 05:51), Max: 98.9 (08 Sep 2021 21:26)  HR: 65 (09 Sep 2021 09:38) (65 - 74)  BP: 89/59 (09 Sep 2021 09:38) (89/59 - 126/68)  BP(mean): --  ABP: --  ABP(mean): --  RR: 18 (09 Sep 2021 09:38) (18 - 18)  SpO2: 97% (09 Sep 2021 09:38) (97% - 97%)  --  No Known Allergies      PHYSICAL EXAM:  GENERAL: NAD, well-groomed, well-developed  HEAD:  Atraumatic, Normocephalic  EYES: EOMI, PERRLA, conjunctiva and sclera clear  ENMT: No tonsillar erythema, exudates, or enlargement; Moist mucous membranes, Good dentition, No lesions  NECK: Supple, No JVD, Normal thyroid  NERVOUS SYSTEM:  Alert & Oriented X3, Good concentration; Motor Strength 5/5 B/L upper and lower extremities; DTRs 2+ intact and symmetric  CHEST/LUNG: Clear to percussion bilaterally; No rales, rhonchi, wheezing, or rubs  HEART: Regular rate and rhythm; No murmurs, rubs, or gallops  ABDOMEN: Soft, Nontender, distended; Bowel sounds present  EXTREMITIES:  2+ Peripheral Pulses, No clubbing, cyanosis, or edema  LYMPH: No lymphadenopathy noted  SKIN: No rashes or lesions    Consultant(s) Notes Reviewed:  [x ] YES  [ ] NO  Care Discussed with Consultants/Other Providers [ x] YES  [ ] NO    LABS:                          10.0   5.69  )-----------( 208      ( 08 Sep 2021 05:29 )             32.0     09-09    134<L>  |  101  |  12  ----------------------------<  65<L>  4.8   |  23  |  0.6<L>    Ca    8.2<L>      09 Sep 2021 04:30  Mg     1.8     09-09    TPro  5.0<L>  /  Alb  3.0<L>  /  TBili  0.5  /  DBili  x   /  AST  31  /  ALT  19  /  AlkPhos  92  09-09      RADIOLOGY & ADDITIONAL TESTS:  < from: Xray Kidney Ureter Bladder (09.08.21 @ 17:28) >  FINDINGS/  IMPRESSION:    Multiple dilated large bowel loops, overall slightly increased in caliber since reference CT of September 3, 2021. No supine evidence of free intraperitoneal air. Osseous structures are stable.    --- End of Report ---    < end of copied text >    < from: CT Head No Cont (09.09.21 @ 04:26) >  IMPRESSION:    Focal hypodensity in the inferior right cerebellar hemisphere possibly representing an age-indeterminate infarct. Further evaluation with MRI of the brain can be obtained if clinically indicated.    No evidence of intracranial hemorrhage, mass effect or midline shift.    < from: Xray Knee 3 Views, Right (09.09.21 @ 12:23) >  Findings/  impression: Diffuse osteopenia. No acute displaced fracture or dislocation. Severe tricompartmental osteoarthritis. Trace joint effusion.    < end of copied text >  < from: Xray Wrist 3 Views, Right (09.09.21 @ 10:49) >  Findings/  impression: Diffuse osteopenia. Noacute displaced fracture or dislocation. Degenerative changes of the basal and radiocarpal and distal radioulnar joints. Widening of the scapholunate interval indicative of scapholunate ligament tear.    < end of copied text >      Imaging Personally Reviewed:  [ ] YES  [ ] NO  ALBUTerol    90 MICROgram(s) HFA Inhaler 2 Puff(s) Inhalation every 6 hours PRN  apixaban 5 milliGRAM(s) Oral every 12 hours  chlorhexidine 4% Liquid 1 Application(s) Topical <User Schedule>  DULoxetine 60 milliGRAM(s) Oral daily  folic acid 1 milliGRAM(s) Oral daily  influenza   Vaccine 0.5 milliLiter(s) IntraMuscular once  lactobacillus acidophilus 1 Tablet(s) Oral daily  melatonin 5 milliGRAM(s) Oral at bedtime PRN  methocarbamol 500 milliGRAM(s) Oral two times a day  sodium chloride 0.9%. 500 milliLiter(s) IV Continuous <Continuous>  thiamine 100 milliGRAM(s) Oral daily  vancomycin    Solution 125 milliGRAM(s) Oral every 6 hours      HEALTH ISSUES - PROBLEM Dx:

## 2021-09-09 NOTE — PROGRESS NOTE ADULT - ASSESSMENT
69 yo F with recent dx of Afib on Eliquis, Abdominal abscess 2 mo ago, Gastric Bypass surgery 20+ years ago, COPD who presents to the ED with BRBPR .    # Hematochezia  # Hx of hemorrhoids  # Acute blood loss anemia  # suspect lower GIB  - currently non tachycardic but relative hypotension ; getting mIVF  - MARVIN w/melena and external hemorrhoids  - chronic diarrhea and fecal incontinence reported; c diff ordered and positive   - hx of fistula/ perianal abscess  - colonoscopy 7/1/21 - multiple polypectomy   - Hb stable   - active T&S, transfuse for hgb <7  - GI consult : plan for Colonoscopy but patient refused more than once, plan for outpatient colonoscopy     #C Diff Colitis   - distended colon noted on KUB, nontoxic   - start 500mg PO vancomycin and IV Flagyl , may need vancomycin enemas if not having BMs   - ID following , GI following     #Mechanical Fall in hospital   - CTH unremarkable   - Xray R wrist w/ widening of scapholunate interval c/w ligament tear   - Trauma surgery following: s/p suturing laceration of chin, recc pan scan due to age and AC status   - Ortho surgery consulted for possible wrist ligament tear     #Hypokalemia - resolved     #Hyponatremia- likely hypotonic hypovolemic , now resolved     #Atrial fibrillation on eliquis  - CHADVASC 3  - restart Eliquis     #COPD ,stable not in exacerbation  - not on home oxygen ,no wheezing on exam  - inhaler PRN    #HFpEF ,euvolemic on exam   -TTE 6/27 EF of 63 %.  - monitor I&O  - BNP; from 6/25- 1500-->912  - avoid volume overload in light of hypotension and fluid bolus    # EtOH abuse with suspected folate/thiamine deficiency  - on folic acid and thiamine     # Depression on duloxetine  - no suicidal ideation  - c/w SSRI  - f/u w psychiatry as outpatient      #Progress Note Handoff  Pending (specify): improvement in c diff colitis, and trauma workup   Family discussion: patient aware of plan. in agreement. all questions answered  Disposition: Unknown at this time________    Dayami Henry, DO

## 2021-09-09 NOTE — CONSULT NOTE ADULT - SUBJECTIVE AND OBJECTIVE BOX
69 yo F with recent dx of Afib on Eliquis, Abdominal abscess 2 mo ago, Gastric Bypass surgery 20+ years ago, COPD who presents to the ED with BRBPR .  The patient has been having intermittent abdominal cramps and watery diarrhea for months ,along with nausea and fecal incontinence that had affected her daily function .She reports an episode of bright red blood that filled the toilet ,and came to the ED.   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  The patient has been having intermittent abdominal cramps and watery diarrhea for months ,along with nausea and fecal incontinence that had affected her daily function .She reports an episode of bright red blood that filled the toilet ,and came to the ED.   s/p fall today   the pt was a  for years with chronic rue pain to the shoulder elbow and wrist.  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

## 2021-09-09 NOTE — CONSULT NOTE ADULT - ASSESSMENT
ASSESSMENT:  70y Female  w/ PMHx of Afib on Eliquis, Abdominal abscess 2 mo ago, Gastric Bypass surgery 20+ years ago, COPD who presents to the ED with BRBPR , now called as a trauma consult s/p mechanical slip and fall while admitted to the medical floor, +HT, -LOC, +AC. Trauma assessment: ABCs intact , GCS 15 , AAOx3,  GARCIA.     Injuries identified:   - 3 cm laceration under left mandible  -   -     PLAN:   - Tetanus shot  - F/U Plain films  - Will suture chin  - Attending to see    Disposition pending results of above labs and imaging  Above plan discussed with Trauma attending, Dr. Meredith  , patient, patient family, and ED team  --------------------------------------------------------------------------------------  09-09-21 @ 10:00 ASSESSMENT:  70y Female w/ PMHx of Afib on Eliquis, Abdominal abscess 2 mo ago, Gastric Bypass surgery 20+ years ago, COPD who presents to the ED with BRBPR , now called as a trauma consult s/p mechanical slip and fall while admitted to the medical floor, +HT, -LOC, +AC. Trauma assessment: ABCs intact, GCS 15, AAOx3, GARCIA.     Injuries identified:   - 3 cm laceration under left mandible    PLAN:   - Tetanus shot  - F/U Plain films  - Will suture chin    RECOMMEND COMPLETION ROSS SCAN (d/t age, therapeutic AC, and unwitnessed fall):  CT max/face, CT C/A/P, CT C-spine    Above plan discussed with Trauma attending, Dr. Meredith, patient, and ED team  --------------------------------------------------------------------------------------  09-09-21 @ 10:00

## 2021-09-09 NOTE — CONSULT NOTE ADULT - ASSESSMENT
ASSESSMENT  71 yo F with recent dx of Afib on Eliquis, Abdominal abscess 2 mo ago, Gastric Bypass surgery 20+ years ago, COPD who presents to the ED with BRBPR .      IMPRESSION  #BRBPR  #Severe C diff Colitis with illeus   #Weight Loss with anorectal wall thickening   - CT Angio Abdomen and Pelvis w/ IV Cont (09.03.21 @ 09:39):  No CTA evidence of active GI bleeding. Anorectal wall thickening, neoplasm is not excluded. Chronic and incidental findings as above  #Hx of Perirecal abscess -s s/p I and D 6/26/2021    #Abx allergy: NKDA    RECOMMENDATIONS  This is a preliminary incomplete pended note, all final recommendations to follow after interview and examination of the patient.    Please call or message on [employer] Teams if with any questions.  Spectra [phone]   ASSESSMENT  71 yo F with recent dx of Afib on Eliquis, Abdominal abscess 2 mo ago, Gastric Bypass surgery 20+ years ago, COPD who presents to the ED with BRBPR .      IMPRESSION  #BRBPR  #C diff Colitis with illeus   #Weight Loss with anorectal wall thickening   - CT Angio Abdomen and Pelvis w/ IV Cont (09.03.21 @ 09:39):  No CTA evidence of active GI bleeding. Anorectal wall thickening, neoplasm is not excluded. Chronic and incidental findings as above  #Hx of Perirecal abscess -s s/p I and D 6/26/2021    #Abx allergy: NKDA    RECOMMENDATIONS  - continue PO vancomycin 500 mg QID  - continue IV flagyl 500 mg TID   - WBC and Cr not meeting criteria for severe C diff -- however does have illeus and reports no bowel movements today -- if no bowel movements in 24 hours, would give vancomycin enemas   - monitor abdominal exam closely       Please call or message on [employer] Teams if with any questions.  Spectra [phone]

## 2021-09-09 NOTE — CONSULT NOTE ADULT - ATTENDING COMMENTS
Patient with a hx of Afib on eliquis admitted for hematochezia. Currently stable Hgb and hemodynamics. Will plan for colonoscopy next week or earlier if HD instability/active GI bleed.
70y Female w/ PMHx of Afib on Eliquis, Abdominal abscess 2 mo ago, Gastric Bypass surgery 20+ years ago, COPD who presents to the ED with BRBPR , now called as a trauma consult s/p mechanical slip and fall while admitted to the medical floor, +HT, -LOC, +AC. Trauma assessment: ABCs intact, GCS 15, AAOx3, GARCIA.     Injuries identified:   - 3 cm laceration under left mandible    PLAN:   - Tetanus shot  - F/U Plain films  -  chin lac sutured    RECOMMEND COMPLETION ROSS SCAN (d/t age, therapeutic AC, and unwitnessed fall):  CT max/face, CT C/A/P, CT C-spine

## 2021-09-09 NOTE — PROGRESS NOTE ADULT - SUBJECTIVE AND OBJECTIVE BOX
Gastroenterology progress note:     Patient is a 70y old  Female who presents with a chief complaint of BRBPR (09 Sep 2021 15:03)       Admitted on: 09-03-21    We are following the patient for: C. DIFF , history of fresh blood per rectum      Interval History: Patient had worsening of her diarrhea yesterday , 9 episodes total , with abdominal distension , KUB showed distended colon up to 8 cm . Abdominal distension improved today , no bowel  movements reported today. Denies abdominal pain , no fever or leucocytosis     Patient's medical problems are stable    Prior records reviewed (Y/N): Y  History obtained from someone other than patient (Y/N): Y      PAST MEDICAL & SURGICAL HISTORY:  Atrial fibrillation    History of COPD        MEDICATIONS  (STANDING):  apixaban 5 milliGRAM(s) Oral every 12 hours  chlorhexidine 4% Liquid 1 Application(s) Topical <User Schedule>  DULoxetine 60 milliGRAM(s) Oral daily  folic acid 1 milliGRAM(s) Oral daily  influenza   Vaccine 0.5 milliLiter(s) IntraMuscular once  lactobacillus acidophilus 1 Tablet(s) Oral daily  methocarbamol 500 milliGRAM(s) Oral two times a day  metroNIDAZOLE  IVPB      metroNIDAZOLE  IVPB 500 milliGRAM(s) IV Intermittent every 8 hours  sodium chloride 0.9%. 1000 milliLiter(s) (75 mL/Hr) IV Continuous <Continuous>  thiamine 100 milliGRAM(s) Oral daily  vancomycin    Solution 500 milliGRAM(s) Oral every 6 hours    MEDICATIONS  (PRN):  ALBUTerol    90 MICROgram(s) HFA Inhaler 2 Puff(s) Inhalation every 6 hours PRN Shortness of Breath and/or Wheezing  melatonin 5 milliGRAM(s) Oral at bedtime PRN Insomnia      Allergies  No Known Allergies      Review of Systems:   Cardiovascular:  No Chest Pain, No Palpitations  Respiratory:  No Cough, No Dyspnea  Gastrointestinal:  As described in HPI    Physical Examination:  T(C): 35.8 (09-09-21 @ 12:27), Max: 37.2 (09-08-21 @ 21:26)  HR: 80 (09-09-21 @ 12:27) (65 - 80)  BP: 87/53 (09-09-21 @ 12:27) (87/53 - 126/68)  RR: 18 (09-09-21 @ 12:27) (18 - 18)  SpO2: 97% (09-09-21 @ 09:38) (97% - 97%)      09-09-21 @ 07:01  -  09-09-21 @ 16:03  --------------------------------------------------------  IN: 350 mL / OUT: 0 mL / NET: 350 mL      Constitutional: No acute distress.  Respiratory:  No signs of respiratory distress. Lung sounds are clear bilaterally.  Cardiovascular:  S1 S2, Regular rate and rhythm.  Abdominal: Abdomen is soft, symmetric, and non-tender with mild distention. Bowel sounds are present and normoactive in all four quadrants. No masses, hepatomegaly, or splenomegaly are noted.   Skin: No rashes, No Jaundice.        Data: (reviewed by attending)                        10.0   5.69  )-----------( 208      ( 08 Sep 2021 05:29 )             32.0     Hgb trend:  10.0  09-08-21 @ 05:29  9.8  09-07-21 @ 05:40        09-09    134<L>  |  101  |  12  ----------------------------<  65<L>  4.8   |  23  |  0.6<L>    Ca    8.2<L>      09 Sep 2021 04:30  Mg     1.8     09-09    TPro  5.0<L>  /  Alb  3.0<L>  /  TBili  0.5  /  DBili  x   /  AST  31  /  ALT  19  /  AlkPhos  92  09-09    Liver panel trend:  TBili 0.5   /   AST 31   /   ALT 19   /   AlkP 92   /   Tptn 5.0   /   Alb 3.0    /   DBili --      09-09  TBili 0.5   /   AST 18   /   ALT 17   /   AlkP 92   /   Tptn 4.9   /   Alb 2.9    /   DBili --      09-08  TBili 0.6   /   AST 16   /   ALT 16   /   AlkP 85   /   Tptn 4.7   /   Alb 2.8    /   DBili --      09-07  TBili 0.7   /   AST 16   /   ALT 16   /   AlkP 80   /   Tptn 4.7   /   Alb 2.8    /   DBili --      09-06  TBili 0.7   /   AST 18   /   ALT 17   /   AlkP 83   /   Tptn 4.7   /   Alb 2.9    /   DBili --      09-05  TBili 1.1   /   AST 25   /   ALT 20   /   AlkP 90   /   Tptn 5.3   /   Alb 3.2    /   DBili --      09-04  TBili 0.9   /   AST 22   /   ALT 21   /   AlkP 81   /   Tptn 5.5   /   Alb 3.4    /   DBili --      09-03             Radiology: (reviewed by attending)       FAMILY HISTORY:  Father  Still living? Unknown  Family history of atrial fibrillation, Age at diagnosis: Age Unknown  Family history of diabetes mellitus, Age at diagnosis: Age Unknown    Mother  Still living? Unknown  Family history of angina, Age at diagnosis: Age Unknown  Family history of diabetes mellitus, Age at diagnosis: Age Unknown

## 2021-09-09 NOTE — PROGRESS NOTE ADULT - ASSESSMENT
71 yo F with recent dx of Afib on Eliquis, Abdominal abscess 2 mo ago, Gastric surgery 20+ years ago, COPD who presents to the ED with BRBPR , one episode before she presented , no bleeding in hospital.  No CTA evidence of active GI bleeding. Anorectal wall thickening    #hematochezia : lower GI bleed , likely hemorrhoidal  - no overt bleeding  HD stable   HGB at baseline   recent EGD/ colon July 2020 showing large internal and external hemorrhoids   CTA negative for GI bleed  patient refused repeat colonoscopy understanding the risks and benefits     REC:   - Pt is actively refusing colonscopy at this time that was planned for tuesday. She states she is no longer bleeding and would rather be discharged on eliquis and have colonoscopy as outpatient. Risks and benefits discussed with patient and she is willing to take the risk to resume eliquis. As such, can resume eliquis and pt can have colonoscopy as outpatient as per her wishes. IF any overt bleeding please call GI  - Anusol suppositories once every night    - advance diet as tolerated      #history of anorectal abscesses s/p drainage / fistulization   Endoscopic workup negative for IBD ( random biopsies taken from T.I and terminal ileum 7/2021 are negative )     REC:  Please check ANCA , ASCA serology   follow up with GI and colorectal surgery as outpatient after discharge      69 yo F with recent dx of Afib on Eliquis, Abdominal abscess 2 mo ago, Gastric surgery 20+ years ago, COPD who presents to the ED with BRBPR , one episode before she presented , no bleeding in hospital.  No CTA evidence of active GI bleeding. Anorectal wall thickening    #hematochezia : lower GI bleed , likely hemorrhoidal  - no overt bleeding  HD stable   HGB at baseline   recent EGD/ colon July 2020 showing large internal and external hemorrhoids   CTA negative for GI bleed  patient refused repeat colonoscopy understanding the risks and benefits     REC:   - Anusol suppositories once every night    - follow up with GI as outpatient for possible colonoscopy      #C. Diff with colon distension  No signs of toxic megacolon   colon distension is improving , diarrhea improving today      REC:  continue with vancomycin 500 mg QID   Flagyl 500 mg IV tid   ID input  appreciated     #history of anorectal abscesses s/p drainage / fistulization   Endoscopic workup negative for IBD ( random biopsies taken from T.I and terminal ileum 7/2021 are negative )     REC:   follow up with GI as outpatient        69 yo F with recent dx of Afib on Eliquis, Abdominal abscess 2 mo ago, Gastric surgery 20+ years ago, COPD who presents to the ED with BRBPR , one episode before she presented , no bleeding in hospital.  No CTA evidence of active GI bleeding. Anorectal wall thickening    #hematochezia : lower GI bleed , likely hemorrhoidal  - no overt bleeding  HD stable   HGB at baseline   recent EGD/ colon July 2020 showing large internal and external hemorrhoids   CTA negative for GI bleed  patient refused repeat colonoscopy understanding the risks and benefits     REC:   - Anusol suppositories once every night    - follow up with GI as outpatient for possible colonoscopy      #C. Diff with colon distension  No signs of toxic megacolon   colon distension is improving , no bowel movement today    REC:  vancomycin 500 mg QID   Flagyl 500 mg IV tid   if no bowel movement 24 hrs and worsening abdominal distension, would add vancomycin enemas  serial abdominal exam   daily KUB  avoid narcotics and NSAIDS  ID input  appreciated     #history of anorectal abscesses s/p drainage / fistulization   Endoscopic workup negative for IBD ( random biopsies taken from T.I and terminal ileum 7/2021 are negative )     REC:   follow up with GI as outpatient

## 2021-09-09 NOTE — PROGRESS NOTE ADULT - SUBJECTIVE AND OBJECTIVE BOX
WEGENER, JULIAN  70y, Female  Allergy: No Known Allergies    Hospital Day: 6d    Patient seen and examined earlier today. Patient sustained a fall overnight after getting out of bed unassisted. CTH overnight WNL. Patient sustained a laceration to chin which was sutured by surgical team. Pt noted to have some widening of scapholunate joint of R wrist on Xray, pending Ortho consult.     PMH/PSH:  PAST MEDICAL & SURGICAL HISTORY:  Atrial fibrillation    History of COPD        LAST 24-Hr EVENTS:    VITALS:  T(F): 96.4 (09-09-21 @ 12:27), Max: 98.9 (09-08-21 @ 21:26)  HR: 80 (09-09-21 @ 12:27)  BP: 87/53 (09-09-21 @ 12:27) (87/53 - 126/68)  RR: 18 (09-09-21 @ 12:27)  SpO2: 97% (09-09-21 @ 09:38)        TESTS & MEASUREMENTS:  Weight (Kg):       09-09-21 @ 07:01  -  09-09-21 @ 17:44  --------------------------------------------------------  IN: 350 mL / OUT: 0 mL / NET: 350 mL                            10.0   5.69  )-----------( 208      ( 08 Sep 2021 05:29 )             32.0       09-09    134<L>  |  101  |  12  ----------------------------<  65<L>  4.8   |  23  |  0.6<L>    Ca    8.2<L>      09 Sep 2021 04:30  Mg     1.8     09-09    TPro  5.0<L>  /  Alb  3.0<L>  /  TBili  0.5  /  DBili  x   /  AST  31  /  ALT  19  /  AlkPhos  92  09-09    LIVER FUNCTIONS - ( 09 Sep 2021 04:30 )  Alb: 3.0 g/dL / Pro: 5.0 g/dL / ALK PHOS: 92 U/L / ALT: 19 U/L / AST: 31 U/L / GGT: x           CARDIAC MARKERS ( 09 Sep 2021 12:02 )  x     / <0.01 ng/mL / x     / x     / x                      COVID-19 PCR: NotDetec (09-03-21 @ 05:07)      RADIOLOGY, ECG, & ADDITIONAL TESTS:  12 Lead ECG:   Ventricular Rate 62 BPM    Atrial Rate 58 BPM    QRS Duration 102 ms    Q-T Interval 440 ms    QTC Calculation(Bazett) 446 ms    R Axis 70 degrees    T Axis 74 degrees    Diagnosis Line Atrial fibrillation  Abnormal ECG    Confirmed by Carlos A Flower ([id number]) on 9/9/2021 8:19:26 AM (09-09-21 @ 04:29)      RECENT DIAGNOSTIC ORDERS:  CT Cervical Spine No Cont: Urgent   Indication: s/p unwitnessed fall w/ +HT  Transport: Stretcher-Crib (09-09-21 @ 17:39)  CT Abdomen and Pelvis w/ IV Cont: Urgent   Indication: s/p unwitnessed fall w/ +HT  Transport: Stretcher-Crib (09-09-21 @ 17:38)  CT Chest w/ IV Cont: Urgent   Indication: s/p unwitnessed fall w/ +HT  Transport: Stretcher-Crib (09-09-21 @ 17:38)  CT Maxillofacial w/ IV Cont: Urgent   Indication: s/p unwitnessed fall w/ +HT  Transport: Stretcher-Crib (09-09-21 @ 17:38)  Xray Kidney Ureter Bladder: Routine   Indication: r/o megacolon  Transport: Stretcher-Crib  Exam Completed (09-09-21 @ 09:09)  COVID-19 PCR: AM Sched. Collection: 10-Sep-2021 04:30  Specimen Source: Nasopharyngeal (09-09-21 @ 05:07)      MEDICATIONS:  MEDICATIONS  (STANDING):  apixaban 5 milliGRAM(s) Oral every 12 hours  chlorhexidine 4% Liquid 1 Application(s) Topical <User Schedule>  DULoxetine 60 milliGRAM(s) Oral daily  folic acid 1 milliGRAM(s) Oral daily  influenza   Vaccine 0.5 milliLiter(s) IntraMuscular once  lactobacillus acidophilus 1 Tablet(s) Oral daily  methocarbamol 500 milliGRAM(s) Oral two times a day  metroNIDAZOLE  IVPB      metroNIDAZOLE  IVPB 500 milliGRAM(s) IV Intermittent every 8 hours  sodium chloride 0.9%. 1000 milliLiter(s) (75 mL/Hr) IV Continuous <Continuous>  thiamine 100 milliGRAM(s) Oral daily  vancomycin    Solution 500 milliGRAM(s) Oral every 6 hours    MEDICATIONS  (PRN):  ALBUTerol    90 MICROgram(s) HFA Inhaler 2 Puff(s) Inhalation every 6 hours PRN Shortness of Breath and/or Wheezing  melatonin 5 milliGRAM(s) Oral at bedtime PRN Insomnia      HOME MEDICATIONS:  albuterol 90 mcg/inh inhalation aerosol (07-06)  apixaban 5 mg oral tablet (07-06)  DULoxetine 60 mg oral delayed release capsule (07-06)  folic acid 1 mg oral tablet (07-06)  methocarbamol 500 mg oral tablet (07-06)  thiamine 100 mg oral tablet (07-06)      PHYSICAL EXAM:  GENERAL: NAD, well-groomed, well-developed  HEAD:  Atraumatic, Normocephalic  EYES: EOMI, PERRLA, conjunctiva and sclera clear  NECK: Supple, No JVD, Normal thyroid  NERVOUS SYSTEM:  Alert & Oriented X3, Good concentration  CHEST/LUNG: Clear to auscultation bilaterally; No rales, rhonchi, wheezing, or rubs  HEART: Regular rate and rhythm; No murmurs, rubs, or gallops  ABDOMEN: Soft, Nontender, distended; Bowel sounds present  EXTREMITIES:  2+ Peripheral Pulses, No clubbing, cyanosis, or edema  LYMPH: No lymphadenopathy noted  SKIN: No rashes or lesions

## 2021-09-09 NOTE — PROGRESS NOTE ADULT - ASSESSMENT
69 yo F with recent dx of Afib on Eliquis, perianal abscess, fistula, and colitis 2 mo ago,  COPD who presents to the ED with [facility] for 2d hemodynamically stable on admission, Hb 10.2 (baseline 9) , melena on MARVIN and external hemorrhoid  and no acute signs of bleed on CT angio abd and pelvis:    # Hematochezia 2/2 hemorroids vs colonic polyps vs IBD  # Acute blood loss anemia  #Melena  - Remains but hypotensive 86s-100s systolic,  getting mIVF, no acute signs of bleeding  - colonoscopy 7/1/21 - polypectomy for sessile polyps, perianal abscess, bx at the time negative for IBD  -EGD 7/1 non erosive gastritis and white plaques in esophagus  - Hb stable 10  - active T&S, maintain 2 large bore IV,  transfuse for hgb <7  - started on IVF   - GI consult recs: no episodes of hematochezia during this admission +c diff colitis>> pt will get outpatient colonoscopy, planning for d/c tomorrow based on BM frequency    #C diff colitis + Megacolon consistent with Fulminant colitis  -Stool pcr positive for c.diff  -6 episodes of loose BM on 9/8 abdominal distension and pain r/o  -KUB- Megacolon and 10cm dilated large bowel concerning for fulminant colitis  -Will start IV flagyl 500mg q24h and po vancomycin 500mg q24h  -repeat KUB showed slightly improved dilated bowel loops    #Hypotension- 80s-109 systolic 2/2 to hypovolemia  -Monitor BP  -Started 1 L IVF @75cc/hr for 10hrs  -Monitor volume status due HF status    #Atrial fibrillation on eliquis  - CHADVASC 3  - Restart eliquis and watch for signs of acute bleeding    #Hypokalemia -RESOLVED  - K 3.1 ,repleted-->3.5    # Hyponatremia RESOLVED  - 129->130->137->132  - likely hypotonic hypovolemic etiology  - continue mIVF as above    #COPD stable not in exacerbation  - not on home oxygen ,no wheezing on exam  - inhaler PRN    #HFpEF ,euvolemic on exam   -TTE 6/27 EF of 63 %.  - monitor I&O  - BNP; from 6/25- 1500-->912  - avoid volume overload, euvolemic    # EtOH abuse with suspected folate/thiamine deficiency  - C/w folic acid and thiamine     # Depression on duloxetine  - no suicidal ideation  - c/w SSRI  - f/u w psychiatry as outpatient    Diet - NPO for GI eval  GI px  DVT px  Disposition - floor     #Progress Note Handoff  Pending (specify):  cdiff diarrhea  Family discussion: patient aware of plan. in agreement. all questions answered  Disposition: Home___     69 yo F with recent dx of Afib on Eliquis, perianal abscess, fistula, and colitis 2 mo ago,  COPD who presents to the ED with [facility] for 2d hemodynamically stable on admission, Hb 10.2 (baseline 9) , melena on MARVIN and external hemorrhoid  and no acute signs of bleed on CT angio abd and pelvis:    # Hematochezia 2/2 hemorroids vs colonic polyps vs IBD  # Acute blood loss anemia  #Melena  - Remains but hypotensive 86s-100s systolic,  getting mIVF, no acute signs of bleeding  - colonoscopy 7/1/21 - polypectomy for sessile polyps, perianal abscess, bx at the time negative for IBD  -EGD 7/1 non erosive gastritis and white plaques in esophagus  - Hb stable 10  - active T&S, maintain 2 large bore IV,  transfuse for hgb <7  - started on IVF   - GI consult recs: no episodes of hematochezia during this admission +c diff colitis>> pt will get outpatient colonoscopy, planning for d/c tomorrow based on BM frequency    #C diff colitis + Megacolon consistent with Fulminant colitis  -Stool pcr positive for c.diff  -6 episodes of loose BM on 9/8 abdominal distension and pain r/o  -KUB- Megacolon and 10cm dilated large bowel concerning for fulminant colitis  -Will start IV flagyl 500mg q24h and po vancomycin 500mg q24h  -repeat KUB showed slightly improved dilated bowel loops    #Unwitnessed fall , L chin laceration -LOC +HT on eliquis  -Negative CTH for intracranial bleed  -4cm L chin laceration evaluated by trauma surgery team  -f/u knee Xray and wrist Xray    #Hypotension- 80s-109 systolic 2/2 to hypovolemia  -Monitor BP  -Started 1 L IVF @75cc/hr for 10hrs  -Monitor volume status due HF status    #Atrial fibrillation on eliquis  - CHADVASC 3  - Restart eliquis and watch for signs of acute bleeding    #Hypokalemia -RESOLVED  - K 3.1 ,repleted-->3.5    # Hyponatremia RESOLVED  - 129->130->137->132  - likely hypotonic hypovolemic etiology  - continue mIVF as above    #COPD stable not in exacerbation  - not on home oxygen ,no wheezing on exam  - inhaler PRN    #HFpEF ,euvolemic on exam   -TTE 6/27 EF of 63 %.  - monitor I&O  - BNP; from 6/25- 1500-->912  - avoid volume overload, euvolemic    # EtOH abuse with suspected folate/thiamine deficiency  - C/w folic acid and thiamine     # Depression on duloxetine  - no suicidal ideation  - c/w SSRI  - f/u w psychiatry as outpatient    Diet - NPO for GI eval  GI px  DVT px  Disposition - floor     #Progress Note Handoff  Pending (specify):  cdiff diarrhea  Family discussion: patient aware of plan. in agreement. all questions answered  Disposition: Home___     69 yo F with recent dx of Afib on Eliquis, perianal abscess, fistula, and colitis 2 mo ago,  COPD who presents to the ED with [facility] for 2d hemodynamically stable on admission, Hb 10.2 (baseline 9) , melena on MARVIN and external hemorrhoid  and no acute signs of bleed on CT angio abd and pelvis:    # Hematochezia 2/2 hemorroids vs colonic polyps vs IBD  # Acute blood loss anemia  #Melena  - Remains but hypotensive 86s-100s systolic,  getting mIVF, no acute signs of bleeding  - colonoscopy 7/1/21 - polypectomy for sessile polyps, perianal abscess, bx at the time negative for IBD  -EGD 7/1 non erosive gastritis and white plaques in esophagus  - Hb stable 10  - active T&S, maintain 2 large bore IV,  transfuse for hgb <7  - started on IVF   - GI consult recs: no episodes of hematochezia during this admission +c diff colitis>> pt will get outpatient colonoscopy, planning for d/c tomorrow based on BM frequency    #C diff colitis + Megacolon consistent with Fulminant colitis  -Stool pcr positive for c.diff  -6 episodes of loose BM on 9/8 abdominal distension and pain r/o  -KUB- Megacolon and 10cm dilated large bowel concerning for fulminant colitis  -Will start IV flagyl 500mg q24h and po vancomycin 500mg q24h  -repeat KUB showed slightly improved dilated bowel loops    #Unwitnessed fall , L chin laceration -LOC +HT on eliquis  -Negative CTH for intracranial bleed  -4cm L chin laceration evaluated by trauma surgery team  -Wrist Xray showed widening of the scapholunate interval indicative of scapholunate ligament tear> orthopedic surgery and OT c/s    #Hypotension- 80s-109 systolic 2/2 to hypovolemia  -Monitor BP  -Started 1 L IVF @75cc/hr for 10hrs  -Monitor volume status due HF status    #Atrial fibrillation on eliquis  - CHADVASC 3  - Restart eliquis and watch for signs of acute bleeding    #Hypokalemia -RESOLVED  - K 3.1 ,repleted-->3.5    # Hyponatremia RESOLVED  - 129->130->137->132  - likely hypotonic hypovolemic etiology  - continue mIVF as above    #COPD stable not in exacerbation  - not on home oxygen ,no wheezing on exam  - inhaler PRN    #HFpEF ,euvolemic on exam   -TTE 6/27 EF of 63 %.  - monitor I&O  - BNP; from 6/25- 1500-->912  - avoid volume overload, euvolemic    # EtOH abuse with suspected folate/thiamine deficiency  - C/w folic acid and thiamine     # Depression on duloxetine  - no suicidal ideation  - c/w SSRI  - f/u w psychiatry as outpatient    Diet - NPO for GI eval  GI px  DVT px  Disposition - floor     #Progress Note Handoff  Pending (specify):  cdiff diarrhea  Family discussion: patient aware of plan. in agreement. all questions answered  Disposition: Home___

## 2021-09-09 NOTE — CONSULT NOTE ADULT - SUBJECTIVE AND OBJECTIVE BOX
WEGENER, LOIS  70y, Female  Allergy: No Known Allergies      CHIEF COMPLAINT: BRBPR (09 Sep 2021 12:59)      LOS  6d    HPI:  71 yo F with recent dx of Afib on Eliquis, Abdominal abscess 2 mo ago, Gastric Bypass surgery 20+ years ago, COPD who presents to the ED with BRBPR .  The patient has been having intermittent abdominal cramps and watery diarrhea for months ,along with nausea and fecal incontinence that had affected her daily function .She reports an episode of bright red blood that filled the toilet ,and came to the ED.   Reports weight loss 30 Ib ,low appetite ,low energy and easy fatigue .  Reports sad ,depressed mood ,insomnia ,low energy ,no suicidal ideation.  Denies fever ,vomiting ,abdominal distention .  patient was admitted in june 2021 for hematochezia and perianal abscess, colonoscopy and surgical debridement was done and developed new onset afib during hospital stay.    In ED , hemodynamically stable ,Hb 10.2 , /59 , MARVIN was in ED and showed melena and tender external hemorrhoidal ,  CT Angio Abdomen and Pelvis w/ IV Cont 09.03.21 : No CTA evidence of active GI bleeding. Anorectal wall thickening, neoplasm is not excluded.  EGD-Colonoscopy 07.01.21 - White plaques in the esophagus  Erythema in the stomach compatible with non-erosive gastritis.  No evidence of gastric bypass surgery in EGD. 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 No ulcer was noted at the anastomosis site.   multiple polyps in colonoscopy.     (03 Sep 2021 14:33)      INFECTIOUS DISEASE HISTORY:  Found to be C diff positive.   Started on Oral vancomycin 9/6   Underwent KUB on 9/8 showing multiple dilated bowel lobs.   Started on PO vancomyci 500 mg QID 9/9 and IV flagyl     PAST MEDICAL & SURGICAL HISTORY:  Atrial fibrillation    History of COPD        FAMILY HISTORY  Family Hx reviewed and non-contributory     SOCIAL HISTORY  Social History:  Lives alone , smokes one pack for 48 years.  Alcohol twice a week .  No illicit drug use. (03 Sep 2021 14:33)        ROS  General: Denies rigors, nightsweats  HEENT: Denies headache, rhinorrhea, sore throat, eye pain  CV: Denies CP, palpitations  PULM: Denies wheezing, hemoptysis  GI: Denies hematemesis, hematochezia, melena  : Denies discharge, hematuria  MSK: Denies arthralgias, myalgias  SKIN: Denies rash, lesions  NEURO: Denies paresthesias, weakness  PSYCH: Denies depression, anxiety    VITALS:  T(F): 96.4, Max: 98.9 (09-08-21 @ 21:26)  HR: 80  BP: 87/53  RR: 18Vital Signs Last 24 Hrs  T(C): 35.8 (09 Sep 2021 12:27), Max: 37.2 (08 Sep 2021 21:26)  T(F): 96.4 (09 Sep 2021 12:27), Max: 98.9 (08 Sep 2021 21:26)  HR: 80 (09 Sep 2021 12:27) (65 - 80)  BP: 87/53 (09 Sep 2021 12:27) (87/53 - 126/68)  BP(mean): --  RR: 18 (09 Sep 2021 12:27) (18 - 18)  SpO2: 97% (09 Sep 2021 09:38) (97% - 97%)    PHYSICAL EXAM:  Gen: NAD, resting in bed  HEENT: Normocephalic, atraumatic  Neck: supple, no lymphadenopathy  CV: Regular rate & regular rhythm  Lungs: decreased BS at bases, no fremitus  Abdomen: Soft, BS present  Ext: Warm, well perfused  Neuro: non focal, awake  Skin: no rash, no erythema  Lines: no phlebitis    TESTS & MEASUREMENTS:                        10.0   5.69  )-----------( 208      ( 08 Sep 2021 05:29 )             32.0     09-09    134<L>  |  101  |  12  ----------------------------<  65<L>  4.8   |  23  |  0.6<L>    Ca    8.2<L>      09 Sep 2021 04:30  Mg     1.8     09-09    TPro  5.0<L>  /  Alb  3.0<L>  /  TBili  0.5  /  DBili  x   /  AST  31  /  ALT  19  /  AlkPhos  92  09-09    eGFR if Non African American: 92 mL/min/1.73M2 (09-09-21 @ 04:30)  eGFR if African American: 107 mL/min/1.73M2 (09-09-21 @ 04:30)    LIVER FUNCTIONS - ( 09 Sep 2021 04:30 )  Alb: 3.0 g/dL / Pro: 5.0 g/dL / ALK PHOS: 92 U/L / ALT: 19 U/L / AST: 31 U/L / GGT: x               Culture - Abscess with Gram Stain (collected 06-26-21 @ 13:43)  Source: .Abscess perirectal abscess  Final Report (06-28-21 @ 17:44):    Rare Escherichia coli    Few Bacteroides thetaiotamcron group "Susceptibilities not performed"    Moderate Most closely resembling Bifidobacterium species    "Susceptibilities not performed"  Organism: Escherichia coli (06-28-21 @ 17:44)  Organism: Escherichia coli (06-28-21 @ 17:44)      -  Amikacin: S <=16      -  Amoxicillin/Clavulanic Acid: S <=8/4      -  Ampicillin: S <=8 These ampicillin results predict results for amoxicillin      -  Ampicillin/Sulbactam: S <=4/2 Enterobacter, Citrobacter, and Serratia may develop resistance during prolonged therapy (3-4 days)      -  Aztreonam: S <=4      -  Cefazolin: S <=2 Enterobacter, Citrobacter, and Serratia may develop resistance during prolonged therapy (3-4 days)      -  Cefepime: S <=2      -  Cefoxitin: S <=8      -  Ceftriaxone: S <=1 Enterobacter, Citrobacter, and Serratia may develop resistance during prolonged therapy      -  Ciprofloxacin: S <=0.25      -  Ertapenem: S <=0.5      -  Gentamicin: S <=2      -  Imipenem: S <=1      -  Levofloxacin: S <=0.5      -  Meropenem: S <=1      -  Piperacillin/Tazobactam: S <=8      -  Tobramycin: S <=2      -  Trimethoprim/Sulfamethoxazole: S <=0.5/9.5      Method Type: GHISLAINE    Culture - Abscess with Gram Stain (collected 06-26-21 @ 12:14)  Source: .Abscess louise rectal  Final Report (06-28-21 @ 17:45):    Few Escherichia coli    Few Bacteroides caccae "Susceptibilities not performed"  Organism: Escherichia coli (06-28-21 @ 17:45)  Organism: Escherichia coli (06-28-21 @ 17:45)      -  Amikacin: S <=16      -  Amoxicillin/Clavulanic Acid: S <=8/4      -  Ampicillin: S <=8 These ampicillin results predict results for amoxicillin      -  Ampicillin/Sulbactam: S <=4/2 Enterobacter, Citrobacter, and Serratia may develop resistance during prolonged therapy (3-4 days)      -  Aztreonam: S <=4      -  Cefazolin: S <=2 Enterobacter, Citrobacter, and Serratia may develop resistance during prolonged therapy (3-4 days)      -  Cefepime: S <=2      -  Cefoxitin: S <=8      -  Ceftriaxone: S <=1 Enterobacter, Citrobacter, and Serratia may develop resistance during prolonged therapy      -  Ciprofloxacin: S <=0.25      -  Ertapenem: S <=0.5      -  Gentamicin: S <=2      -  Imipenem: S <=1      -  Levofloxacin: S <=0.5      -  Meropenem: S <=1      -  Piperacillin/Tazobactam: S <=8      -  Tobramycin: S <=2      -  Trimethoprim/Sulfamethoxazole: S <=0.5/9.5      Method Type: GHISLAINE    GI PCR Panel, Stool (collected 06-26-21 @ 08:46)  Source: .Stool Feces  Final Report (06-26-21 @ 21:07):    GI PCR Results: NOT detected    *******Please Note:*******    GI panel PCR evaluates for:    Campylobacter, Plesiomonas shigelloides, Salmonella,    Vibrio, Yersinia enterocolitica, Enteroaggregative    Escherichia coli (EAEC), Enteropathogenic E.coli (EPEC),    Enterotoxigenic E. coli (ETEC) lt/st, Shiga-like    toxin-producing E. coli (STEC) stx1/stx2,    Shigella/ Enteroinvasive E. coli (EIEC), Cryptosporidium,    Cyclospora cayetanensis, Entamoeba histolytica,    Giardia lamblia, Adenovirus F 40/41, Astrovirus,    Norovirus GI/GII, Rotavirus A, Sapovirus    Culture - Urine (collected 06-25-21 @ 15:50)  Source: .Urine Clean Catch (Midstream)  Final Report (06-28-21 @ 10:49):    >100,000 CFU/ml Escherichia coli  Organism: Escherichia coli (06-28-21 @ 10:49)  Organism: Escherichia coli (06-28-21 @ 10:49)      -  Amikacin: S <=16      -  Amoxicillin/Clavulanic Acid: S <=8/4      -  Ampicillin: S <=8 These ampicillin results predict results for amoxicillin      -  Ampicillin/Sulbactam: S <=4/2 Enterobacter, Citrobacter, and Serratia may develop resistance during prolonged therapy (3-4 days)      -  Aztreonam: S <=4      -  Cefazolin: S <=2 (MIC_CL_COM_ENTERIC_CEFAZU) For uncomplicated UTI with K. pneumoniae, E. coli, or P. mirablis: GHISLAINE <=16 is sensitive and GHISLAINE >=32 is resistant. This also predicts results for oral agents cefaclor, cefdinir, cefpodoxime, cefprozil, cefuroxime axetil, cephalexin and locarbef for uncomplicated UTI. Note that some isolates may be susceptible to these agents while testing resistant to cefazolin.      -  Cefepime: S <=2      -  Cefotaxime: S <=2      -  Cefoxitin: S <=8      -  Ceftazidime: S <=1      -  Ceftriaxone: S <=1 Enterobacter, Citrobacter, and Serratia may develop resistance during prolonged therapy      -  Cefuroxime: S <=4      -  Ciprofloxacin: S <=0.25      -  Ertapenem: S <=0.5      -  Gentamicin: S <=2      -  Imipenem: S <=1      -  Levofloxacin: S <=0.5      -  Meropenem: S <=1      -  Minocycline: S <=4      -  Nitrofurantoin: S <=32 Should not be used to treat pyelonephritis      -  Piperacillin/Tazobactam: S <=8      -  Tigecycline: S <=2      -  Tobramycin: S <=2      -  Trimethoprim/Sulfamethoxazole: S <=0.5/9.5      Method Type: GHISLAINE    Culture - Blood (collected 06-25-21 @ 15:00)  Source: .Blood Blood  Final Report (07-01-21 @ 01:01):    No Growth Final    Culture - Blood (collected 06-25-21 @ 15:00)  Source: .Blood Blood  Final Report (07-01-21 @ 01:01):    No Growth Final            INFECTIOUS DISEASES TESTING  Clostridium difficile Toxin by PCR: RESULT INTERPRETATION:    Detected - Clostridium difficile toxin B detected by amplified DNA PCR .    C. difficile PCR test results should be interpreted only with  consideration of the patient's clinical situation and history.  This test  will detect the presence of toxigenic C. difficile.  However it cannot be  used as the sole criteria for the diagnosis of antibiotic associated  diarrhea, antibiotic associated colitis, or pseudomembranous colitis.  Colonization with C. difficile may exceed 20%in hospital patients, the  majority of whom are without Toxigenic Clostridium Difficile disease.  Testing is generally not recommended in children below the age of 1 year,  as up to half of healthy infants are asymptomatically colonized with C.  difficile.  In addition, C. difficile PCR testing cannot be used as a  "test of cure" as dead organism nucleic acids will persist and be  detected after treatment.  Successful treatment of C. difficile disease  is determined by resolution of clinical symptoms. Method: CDPCR  TOXIGENIC CLOST.DIFFICILE POSITIVE;  027-NAP1-B1 PRESUMPTIVE NEGATIVE.  By: Real-Time PCR (Polymerase Chain Reaction)  NOTE: This method detects the Toxin B gene (tCdB), the  binary toxin gene (CDT), and the single-base-pair  deletion at nucleotide 117 within the gene encoding  a negative regulator of toxin production (tcdC 117).  The combined presence of genes encoding Toxin B and  binary toxin and the tcdC 117 deletion has been  associated with hypervirulent C. difficile strain  known as 027/NAP1/B1, which has been associated with  severe disease outbreaks in healthcare facilities  worldwide. (09-06-21 @ 10:12)  COVID-19 PCR: NotDetec (09-03-21 @ 05:07)  COVID-19 PCR: NotDetec (07-06-21 @ 17:00)  COVID-19 PCR: NotDetec (07-02-21 @ 11:35)  Clostridium difficile Toxin by PCR: RESULT INTERPRETATION:    Not detected - No Clostridium difficile toxins detected by amplified DNA  PCR.    C. difficile PCR test results should be interpreted only with  consideration of the patient's clinical situation and history.  This test  willdetect the presence of toxigenic C. difficile.  However it cannot be  used as the sole criteria for the diagnosis of antibiotic associated  diarrhea, antibiotic associated colitis, or pseudomembranous colitis.  Colonization with C. difficile may exceed 20% in hospital patients, the  majority of whom are without Toxigenic Clostridium Difficile disease.  Testing is generally not recommended in children below the age of 1 year,  as up to half of healthy infants are asymptomatically colonized with C.  difficile.  In addition, C. difficile PCR testing cannot be used as a  "test of cure" as dead organism nucleic acids will persist and be  detected after treatment.  Successful treatment of C. difficile disease  is determined by resolution of clinical symptoms. Method: CDPCR  TOXIGENIC CLOST.DIFFICILE NEGATIVE;  027-NAP1-B1 PRESUMPTIVE NEGATIVE.  By: Real-Time PCR (Polymerase Chain Reaction)  NOTE: This method detects the Toxin B gene (tCdB), the  binary toxin gene (CDT), and the single-base-pair  deletion at nucleotide 117 within the gene encoding  a negative regulator of toxin production (tcdC 117).  The combined presence of genes encoding Toxin B and  binary toxin and the tcdC 117 deletion has been  associated with hypervirulent C. difficile strain  known as 027/NAP1/B1, which has been associated with  severe disease outbreaks in healthcare facilities  worldwide. (06-25-21 @ 16:31)  COVID-19 PCR: NotDetec (06-25-21 @ 15:00)      RADIOLOGY & ADDITIONAL TESTS:  I have personally reviewed the last Chest xray  CXR      CT      CARDIOLOGY TESTING  12 Lead ECG:   Ventricular Rate 62 BPM    Atrial Rate 58 BPM    QRS Duration 102 ms    Q-T Interval 440 ms    QTC Calculation(Bazett) 446 ms    R Axis 70 degrees    T Axis 74 degrees    Diagnosis Line Atrial fibrillation  Abnormal ECG    Confirmed by Carlos A Flower ([id number]) on 9/9/2021 8:19:26 AM (09-09-21 @ 04:29)      MEDICATIONS  apixaban 5 Oral every 12 hours  chlorhexidine 4% Liquid 1 Topical <User Schedule>  DULoxetine 60 Oral daily  folic acid 1 Oral daily  influenza   Vaccine 0.5 IntraMuscular once  lactobacillus acidophilus 1 Oral daily  methocarbamol 500 Oral two times a day  metroNIDAZOLE  IVPB     metroNIDAZOLE  IVPB 500 IV Intermittent every 8 hours  sodium chloride 0.9%. 1000 IV Continuous <Continuous>  thiamine 100 Oral daily  vancomycin    Solution 500 Oral every 6 hours      Weight  Weight (kg): 61.2 (09-03-21 @ 04:15)    ANTIBIOTICS:  metroNIDAZOLE  IVPB      metroNIDAZOLE  IVPB 500 milliGRAM(s) IV Intermittent every 8 hours  vancomycin    Solution 500 milliGRAM(s) Oral every 6 hours      ALLERGIES:  No Known Allergies         WEGENER, LOIS  70y, Female  Allergy: No Known Allergies      CHIEF COMPLAINT: BRBPR (09 Sep 2021 12:59)      LOS  6d    HPI:  69 yo F with recent dx of Afib on Eliquis, Abdominal abscess 2 mo ago, Gastric Bypass surgery 20+ years ago, COPD who presents to the ED with BRBPR .  The patient has been having intermittent abdominal cramps and watery diarrhea for months ,along with nausea and fecal incontinence that had affected her daily function .She reports an episode of bright red blood that filled the toilet ,and came to the ED.   Reports weight loss 30 Ib ,low appetite ,low energy and easy fatigue .  Reports sad ,depressed mood ,insomnia ,low energy ,no suicidal ideation.  Denies fever ,vomiting ,abdominal distention .  patient was admitted in june 2021 for hematochezia and perianal abscess, colonoscopy and surgical debridement was done and developed new onset afib during hospital stay.    In ED , hemodynamically stable ,Hb 10.2 , /59 , MARVIN was in ED and showed melena and tender external hemorrhoidal ,  CT Angio Abdomen and Pelvis w/ IV Cont 09.03.21 : No CTA evidence of active GI bleeding. Anorectal wall thickening, neoplasm is not excluded.  EGD-Colonoscopy 07.01.21 - White plaques in the esophagus  Erythema in the stomach compatible with non-erosive gastritis.  No evidence of gastric bypass surgery in EGD. 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 No ulcer was noted at the anastomosis site.   multiple polyps in colonoscopy.     (03 Sep 2021 14:33)      INFECTIOUS DISEASE HISTORY:  Found to be C diff positive.   Started on Oral vancomycin 9/6   Underwent KUB on 9/8 showing multiple dilated bowel lobs.   Started on PO vancomycin 500 mg QID 9/9 and IV flagyl     PAST MEDICAL & SURGICAL HISTORY:  Atrial fibrillation    History of COPD        FAMILY HISTORY  Family Hx reviewed and non-contributory     SOCIAL HISTORY  Social History:  Lives alone , smokes one pack for 48 years.  Alcohol twice a week .  No illicit drug use. (03 Sep 2021 14:33)        ROS  General: Denies rigors, nightsweats  HEENT: Denies headache, rhinorrhea, sore throat, eye pain  CV: Denies CP, palpitations  PULM: Denies wheezing, hemoptysis  GI: Denies hematemesis, hematochezia, melena  : Denies discharge, hematuria  MSK: Denies arthralgias, myalgias  SKIN: Denies rash, lesions  NEURO: Denies paresthesias, weakness  PSYCH: Denies depression, anxiety    VITALS:  T(F): 96.4, Max: 98.9 (09-08-21 @ 21:26)  HR: 80  BP: 87/53  RR: 18Vital Signs Last 24 Hrs  T(C): 35.8 (09 Sep 2021 12:27), Max: 37.2 (08 Sep 2021 21:26)  T(F): 96.4 (09 Sep 2021 12:27), Max: 98.9 (08 Sep 2021 21:26)  HR: 80 (09 Sep 2021 12:27) (65 - 80)  BP: 87/53 (09 Sep 2021 12:27) (87/53 - 126/68)  BP(mean): --  RR: 18 (09 Sep 2021 12:27) (18 - 18)  SpO2: 97% (09 Sep 2021 09:38) (97% - 97%)    PHYSICAL EXAM:  Gen: NAD, resting in bed  HEENT: Normocephalic, atraumatic  Neck: supple, no lymphadenopathy  CV: Regular rate & regular rhythm  Lungs: decreased BS at bases, no fremitus  Abdomen: Soft, BS present  Ext: Warm, well perfused  Neuro: non focal, awake  Skin: no rash, no erythema  Lines: no phlebitis    TESTS & MEASUREMENTS:                        10.0   5.69  )-----------( 208      ( 08 Sep 2021 05:29 )             32.0     09-09    134<L>  |  101  |  12  ----------------------------<  65<L>  4.8   |  23  |  0.6<L>    Ca    8.2<L>      09 Sep 2021 04:30  Mg     1.8     09-09    TPro  5.0<L>  /  Alb  3.0<L>  /  TBili  0.5  /  DBili  x   /  AST  31  /  ALT  19  /  AlkPhos  92  09-09    eGFR if Non African American: 92 mL/min/1.73M2 (09-09-21 @ 04:30)  eGFR if African American: 107 mL/min/1.73M2 (09-09-21 @ 04:30)    LIVER FUNCTIONS - ( 09 Sep 2021 04:30 )  Alb: 3.0 g/dL / Pro: 5.0 g/dL / ALK PHOS: 92 U/L / ALT: 19 U/L / AST: 31 U/L / GGT: x               Culture - Abscess with Gram Stain (collected 06-26-21 @ 13:43)  Source: .Abscess perirectal abscess  Final Report (06-28-21 @ 17:44):    Rare Escherichia coli    Few Bacteroides thetaiotamcron group "Susceptibilities not performed"    Moderate Most closely resembling Bifidobacterium species    "Susceptibilities not performed"  Organism: Escherichia coli (06-28-21 @ 17:44)  Organism: Escherichia coli (06-28-21 @ 17:44)      -  Amikacin: S <=16      -  Amoxicillin/Clavulanic Acid: S <=8/4      -  Ampicillin: S <=8 These ampicillin results predict results for amoxicillin      -  Ampicillin/Sulbactam: S <=4/2 Enterobacter, Citrobacter, and Serratia may develop resistance during prolonged therapy (3-4 days)      -  Aztreonam: S <=4      -  Cefazolin: S <=2 Enterobacter, Citrobacter, and Serratia may develop resistance during prolonged therapy (3-4 days)      -  Cefepime: S <=2      -  Cefoxitin: S <=8      -  Ceftriaxone: S <=1 Enterobacter, Citrobacter, and Serratia may develop resistance during prolonged therapy      -  Ciprofloxacin: S <=0.25      -  Ertapenem: S <=0.5      -  Gentamicin: S <=2      -  Imipenem: S <=1      -  Levofloxacin: S <=0.5      -  Meropenem: S <=1      -  Piperacillin/Tazobactam: S <=8      -  Tobramycin: S <=2      -  Trimethoprim/Sulfamethoxazole: S <=0.5/9.5      Method Type: GHISLAINE    Culture - Abscess with Gram Stain (collected 06-26-21 @ 12:14)  Source: .Abscess louise rectal  Final Report (06-28-21 @ 17:45):    Few Escherichia coli    Few Bacteroides caccae "Susceptibilities not performed"  Organism: Escherichia coli (06-28-21 @ 17:45)  Organism: Escherichia coli (06-28-21 @ 17:45)      -  Amikacin: S <=16      -  Amoxicillin/Clavulanic Acid: S <=8/4      -  Ampicillin: S <=8 These ampicillin results predict results for amoxicillin      -  Ampicillin/Sulbactam: S <=4/2 Enterobacter, Citrobacter, and Serratia may develop resistance during prolonged therapy (3-4 days)      -  Aztreonam: S <=4      -  Cefazolin: S <=2 Enterobacter, Citrobacter, and Serratia may develop resistance during prolonged therapy (3-4 days)      -  Cefepime: S <=2      -  Cefoxitin: S <=8      -  Ceftriaxone: S <=1 Enterobacter, Citrobacter, and Serratia may develop resistance during prolonged therapy      -  Ciprofloxacin: S <=0.25      -  Ertapenem: S <=0.5      -  Gentamicin: S <=2      -  Imipenem: S <=1      -  Levofloxacin: S <=0.5      -  Meropenem: S <=1      -  Piperacillin/Tazobactam: S <=8      -  Tobramycin: S <=2      -  Trimethoprim/Sulfamethoxazole: S <=0.5/9.5      Method Type: GHISLAINE    GI PCR Panel, Stool (collected 06-26-21 @ 08:46)  Source: .Stool Feces  Final Report (06-26-21 @ 21:07):    GI PCR Results: NOT detected    *******Please Note:*******    GI panel PCR evaluates for:    Campylobacter, Plesiomonas shigelloides, Salmonella,    Vibrio, Yersinia enterocolitica, Enteroaggregative    Escherichia coli (EAEC), Enteropathogenic E.coli (EPEC),    Enterotoxigenic E. coli (ETEC) lt/st, Shiga-like    toxin-producing E. coli (STEC) stx1/stx2,    Shigella/ Enteroinvasive E. coli (EIEC), Cryptosporidium,    Cyclospora cayetanensis, Entamoeba histolytica,    Giardia lamblia, Adenovirus F 40/41, Astrovirus,    Norovirus GI/GII, Rotavirus A, Sapovirus    Culture - Urine (collected 06-25-21 @ 15:50)  Source: .Urine Clean Catch (Midstream)  Final Report (06-28-21 @ 10:49):    >100,000 CFU/ml Escherichia coli  Organism: Escherichia coli (06-28-21 @ 10:49)  Organism: Escherichia coli (06-28-21 @ 10:49)      -  Amikacin: S <=16      -  Amoxicillin/Clavulanic Acid: S <=8/4      -  Ampicillin: S <=8 These ampicillin results predict results for amoxicillin      -  Ampicillin/Sulbactam: S <=4/2 Enterobacter, Citrobacter, and Serratia may develop resistance during prolonged therapy (3-4 days)      -  Aztreonam: S <=4      -  Cefazolin: S <=2 (MIC_CL_COM_ENTERIC_CEFAZU) For uncomplicated UTI with K. pneumoniae, E. coli, or P. mirablis: GHISLAINE <=16 is sensitive and GHISLAINE >=32 is resistant. This also predicts results for oral agents cefaclor, cefdinir, cefpodoxime, cefprozil, cefuroxime axetil, cephalexin and locarbef for uncomplicated UTI. Note that some isolates may be susceptible to these agents while testing resistant to cefazolin.      -  Cefepime: S <=2      -  Cefotaxime: S <=2      -  Cefoxitin: S <=8      -  Ceftazidime: S <=1      -  Ceftriaxone: S <=1 Enterobacter, Citrobacter, and Serratia may develop resistance during prolonged therapy      -  Cefuroxime: S <=4      -  Ciprofloxacin: S <=0.25      -  Ertapenem: S <=0.5      -  Gentamicin: S <=2      -  Imipenem: S <=1      -  Levofloxacin: S <=0.5      -  Meropenem: S <=1      -  Minocycline: S <=4      -  Nitrofurantoin: S <=32 Should not be used to treat pyelonephritis      -  Piperacillin/Tazobactam: S <=8      -  Tigecycline: S <=2      -  Tobramycin: S <=2      -  Trimethoprim/Sulfamethoxazole: S <=0.5/9.5      Method Type: GHISLAINE    Culture - Blood (collected 06-25-21 @ 15:00)  Source: .Blood Blood  Final Report (07-01-21 @ 01:01):    No Growth Final    Culture - Blood (collected 06-25-21 @ 15:00)  Source: .Blood Blood  Final Report (07-01-21 @ 01:01):    No Growth Final            INFECTIOUS DISEASES TESTING  Clostridium difficile Toxin by PCR: RESULT INTERPRETATION:    Detected - Clostridium difficile toxin B detected by amplified DNA PCR .    C. difficile PCR test results should be interpreted only with  consideration of the patient's clinical situation and history.  This test  will detect the presence of toxigenic C. difficile.  However it cannot be  used as the sole criteria for the diagnosis of antibiotic associated  diarrhea, antibiotic associated colitis, or pseudomembranous colitis.  Colonization with C. difficile may exceed 20%in hospital patients, the  majority of whom are without Toxigenic Clostridium Difficile disease.  Testing is generally not recommended in children below the age of 1 year,  as up to half of healthy infants are asymptomatically colonized with C.  difficile.  In addition, C. difficile PCR testing cannot be used as a  "test of cure" as dead organism nucleic acids will persist and be  detected after treatment.  Successful treatment of C. difficile disease  is determined by resolution of clinical symptoms. Method: CDPCR  TOXIGENIC CLOST.DIFFICILE POSITIVE;  027-NAP1-B1 PRESUMPTIVE NEGATIVE.  By: Real-Time PCR (Polymerase Chain Reaction)  NOTE: This method detects the Toxin B gene (tCdB), the  binary toxin gene (CDT), and the single-base-pair  deletion at nucleotide 117 within the gene encoding  a negative regulator of toxin production (tcdC 117).  The combined presence of genes encoding Toxin B and  binary toxin and the tcdC 117 deletion has been  associated with hypervirulent C. difficile strain  known as 027/NAP1/B1, which has been associated with  severe disease outbreaks in healthcare facilities  worldwide. (09-06-21 @ 10:12)  COVID-19 PCR: NotDetec (09-03-21 @ 05:07)  COVID-19 PCR: NotDetec (07-06-21 @ 17:00)  COVID-19 PCR: NotDetec (07-02-21 @ 11:35)  Clostridium difficile Toxin by PCR: RESULT INTERPRETATION:    Not detected - No Clostridium difficile toxins detected by amplified DNA  PCR.    C. difficile PCR test results should be interpreted only with  consideration of the patient's clinical situation and history.  This test  willdetect the presence of toxigenic C. difficile.  However it cannot be  used as the sole criteria for the diagnosis of antibiotic associated  diarrhea, antibiotic associated colitis, or pseudomembranous colitis.  Colonization with C. difficile may exceed 20% in hospital patients, the  majority of whom are without Toxigenic Clostridium Difficile disease.  Testing is generally not recommended in children below the age of 1 year,  as up to half of healthy infants are asymptomatically colonized with C.  difficile.  In addition, C. difficile PCR testing cannot be used as a  "test of cure" as dead organism nucleic acids will persist and be  detected after treatment.  Successful treatment of C. difficile disease  is determined by resolution of clinical symptoms. Method: CDPCR  TOXIGENIC CLOST.DIFFICILE NEGATIVE;  027-NAP1-B1 PRESUMPTIVE NEGATIVE.  By: Real-Time PCR (Polymerase Chain Reaction)  NOTE: This method detects the Toxin B gene (tCdB), the  binary toxin gene (CDT), and the single-base-pair  deletion at nucleotide 117 within the gene encoding  a negative regulator of toxin production (tcdC 117).  The combined presence of genes encoding Toxin B and  binary toxin and the tcdC 117 deletion has been  associated with hypervirulent C. difficile strain  known as 027/NAP1/B1, which has been associated with  severe disease outbreaks in healthcare facilities  worldwide. (06-25-21 @ 16:31)  COVID-19 PCR: NotDetec (06-25-21 @ 15:00)      RADIOLOGY & ADDITIONAL TESTS:  I have personally reviewed the last Chest xray  CXR      CT      CARDIOLOGY TESTING  12 Lead ECG:   Ventricular Rate 62 BPM    Atrial Rate 58 BPM    QRS Duration 102 ms    Q-T Interval 440 ms    QTC Calculation(Bazett) 446 ms    R Axis 70 degrees    T Axis 74 degrees    Diagnosis Line Atrial fibrillation  Abnormal ECG    Confirmed by Carlos A Flower ([id number]) on 9/9/2021 8:19:26 AM (09-09-21 @ 04:29)      MEDICATIONS  apixaban 5 Oral every 12 hours  chlorhexidine 4% Liquid 1 Topical <User Schedule>  DULoxetine 60 Oral daily  folic acid 1 Oral daily  influenza   Vaccine 0.5 IntraMuscular once  lactobacillus acidophilus 1 Oral daily  methocarbamol 500 Oral two times a day  metroNIDAZOLE  IVPB     metroNIDAZOLE  IVPB 500 IV Intermittent every 8 hours  sodium chloride 0.9%. 1000 IV Continuous <Continuous>  thiamine 100 Oral daily  vancomycin    Solution 500 Oral every 6 hours      Weight  Weight (kg): 61.2 (09-03-21 @ 04:15)    ANTIBIOTICS:  metroNIDAZOLE  IVPB      metroNIDAZOLE  IVPB 500 milliGRAM(s) IV Intermittent every 8 hours  vancomycin    Solution 500 milliGRAM(s) Oral every 6 hours      ALLERGIES:  No Known Allergies

## 2021-09-09 NOTE — CHART NOTE - NSCHARTNOTEFT_GEN_A_CORE
I was notified by RN at 03:45 that patient suffered an unwitnessed fall. Pt reports that she had to urinate and rang the call bell and immediately after ringing the call bell, she decided to walk to the commode a few steps away from her bedside. As the patient stood up, she urinated on the floor and slipped on her urine. She reports hitting her chin, right knee and right forearm. Pt says she did not lose consciousness before or after falling. Pt denies head trauma.    On physical exam, she has a ~6cm by 2cm long skin lesion on the chin and skin abrasions on the right forearm and right knee. There was no tongue biting, bowel incontinence, no post ictal confusion. Pt is AAOx3, no motor deficits were noted, 5/5 strength B/L on UE and LE, sensation intact b/l, reflexes are 2+b/l, CN II-XII grossly intact.    Of note, pt has a hx of A-fib and is on Eliquis.    Assessment:  #Unwitnessed mechanical fall and trauma   -Stat Non contrast Head CT ordered and performed  -Stat EKG ordered and performed  -needs sutures on chin I was notified by RN at 03:45 that patient suffered an unwitnessed fall. Pt reports that she had to urinate and rang the call bell and immediately after ringing the call bell, she decided to walk to the commode a few steps away from her bedside. As the patient stood up, she urinated on the floor and slipped on her urine. She reports hitting her chin, right knee and right forearm. Pt says she did not lose consciousness before or after falling. Pt denies head trauma.    On physical exam, she has a ~6cm by 2cm long skin lesion on the chin and skin abrasions on the right forearm and right knee. There was no tongue biting, bowel incontinence, no post ictal confusion. Pt is AAOx3, no motor deficits were noted, 5/5 strength B/L on UE and LE, sensation intact b/l, reflexes are 2+b/l, CN II-XII grossly intact.    Vitals:  BP: 126/68  Pulse: 68  RR: 18  Temp: 97.6    Of note, pt has a hx of A-fib and is on Eliquis.    Assessment:  #Unwitnessed mechanical fall and trauma   -Stat Non contrast Head CT ordered and performed  -Stat EKG ordered and performed  -needs sutures on chin

## 2021-09-09 NOTE — CONSULT NOTE ADULT - SUBJECTIVE AND OBJECTIVE BOX
GENERAL SURGERY CONSULT NOTE    Patient: WEGENER, LOIS , 70y ([birth year])Female   MRN: [id number]  Location: [address]  Visit: 09-03-21 Inpatient  Date: 09-09-21 @ 13:01    HPI:  71 yo F with recent dx of Afib on Eliquis, Abdominal abscess 2 mo ago, Gastric Bypass surgery 20+ years ago, COPD who presents to the ED with BRBPR .  The patient has been having intermittent abdominal cramps and watery diarrhea for months ,along with nausea and fecal incontinence that had affected her daily function .She reports an episode of bright red blood that filled the toilet ,and came to the ED.   Reports weight loss 30 Ib ,low appetite ,low energy and easy fatigue .  Reports sad ,depressed mood ,insomnia ,low energy ,no suicidal ideation.  Denies fever ,vomiting ,abdominal distention .  patient was admitted in june 2021 for hematochezia and perianal abscess, colonoscopy and surgical debridement was done and developed new onset afib during hospital stay.    In ED , hemodynamically stable ,Hb 10.2 , /59 , MARVIN was in ED and showed melena and tender external hemorrhoidal ,  CT Angio Abdomen and Pelvis w/ IV Cont 09.03.21 : No CTA evidence of active GI bleeding. Anorectal wall thickening, neoplasm is not excluded.  EGD-Colonoscopy 07.01.21 - White plaques in the esophagus  Erythema in the stomach compatible with non-erosive gastritis.  No evidence of gastric bypass surgery in EGD. 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 No ulcer was noted at the anastomosis site.   multiple polyps in colonoscopy. (03 Sep 2021 14:33)    Pt sustained a laceration to her L chin. Irrigated and closed w/ 5 5-0 nylon interrupted sutures. Covered w/ xeroform, gauze, and tegaderm.    PAST MEDICAL & SURGICAL HISTORY:  Atrial fibrillation  History of COPD    Home Medications:  albuterol 90 mcg/inh inhalation aerosol: 2 puff(s) inhaled every 6 hours, As needed, Shortness of Breath and/or Wheezing (06 Jul 2021 14:58)  apixaban 5 mg oral tablet: 1 tab(s) orally every 12 hours (06 Jul 2021 14:58)  DULoxetine 60 mg oral delayed release capsule: 1 cap(s) orally once a day (06 Jul 2021 14:58)  folic acid 1 mg oral tablet: 1 tab(s) orally once a day (06 Jul 2021 14:58)  methocarbamol 500 mg oral tablet: 1 tab(s) orally 2 times a day (06 Jul 2021 14:58)  thiamine 100 mg oral tablet: 1 tab(s) orally once a day (06 Jul 2021 14:58)    VITALS:  T(F): 96.4 (09-09-21 @ 12:27), Max: 98.9 (09-08-21 @ 21:26)  HR: 80 (09-09-21 @ 12:27) (65 - 80)  BP: 87/53 (09-09-21 @ 12:27) (87/53 - 126/68)  RR: 18 (09-09-21 @ 12:27) (18 - 18)  SpO2: 97% (09-09-21 @ 09:38) (97% - 97%)    PHYSICAL EXAM:  General: NAD, AAOx3, calm and cooperative  Incision/wound: ~3cm laceration to left chin underneath mandible; extending to subcutaneous tissue; no active bleeding, edges easily approximated    MEDICATIONS  (STANDING):  apixaban 5 milliGRAM(s) Oral every 12 hours  chlorhexidine 4% Liquid 1 Application(s) Topical <User Schedule>  DULoxetine 60 milliGRAM(s) Oral daily  folic acid 1 milliGRAM(s) Oral daily  influenza   Vaccine 0.5 milliLiter(s) IntraMuscular once  lactobacillus acidophilus 1 Tablet(s) Oral daily  methocarbamol 500 milliGRAM(s) Oral two times a day  metroNIDAZOLE  IVPB      metroNIDAZOLE  IVPB 500 milliGRAM(s) IV Intermittent once  metroNIDAZOLE  IVPB 500 milliGRAM(s) IV Intermittent every 8 hours  sodium chloride 0.9%. 1000 milliLiter(s) (75 mL/Hr) IV Continuous <Continuous>  thiamine 100 milliGRAM(s) Oral daily  vancomycin    Solution 500 milliGRAM(s) Oral every 6 hours    MEDICATIONS  (PRN):  ALBUTerol    90 MICROgram(s) HFA Inhaler 2 Puff(s) Inhalation every 6 hours PRN Shortness of Breath and/or Wheezing  melatonin 5 milliGRAM(s) Oral at bedtime PRN Insomnia    LAB/STUDIES:                      10.0   5.69  )-----------( 208      ( 08 Sep 2021 05:29 )             32.0     09-09    134<L>  |  101  |  12  ----------------------------<  65<L>  4.8   |  23  |  0.6<L>    Ca    8.2<L>      09 Sep 2021 04:30  Mg     1.8     09-09    TPro  5.0<L>  /  Alb  3.0<L>  /  TBili  0.5  /  DBili  x   /  AST  31  /  ALT  19  /  AlkPhos  92  09-09  LIVER FUNCTIONS - ( 09 Sep 2021 04:30 )  Alb: 3.0 g/dL / Pro: 5.0 g/dL / ALK PHOS: 92 U/L / ALT: 19 U/L / AST: 31 U/L / GGT: x

## 2021-09-09 NOTE — CONSULT NOTE ADULT - ASSESSMENT
ASSESSMENT:  70y Female w/ PMHx of Afib on Eliquis, Abdominal abscess 2 mo ago, Gastric Bypass surgery 20+ years ago, COPD who presents to the ED with BRBPR , now called as a trauma consult s/p mechanical slip and fall while admitted to the medical floor, +HT, -LOC, +AC. Physical exam findings, imaging, and labs as documented above.     PLAN:  - s/p laceration repair w/ 5-0 nylon  - keep dressing in place  - FU w/ Dr. Hubbard in 2 weeks  - recommend 5 days ancef    Above plan discussed with Attending Surgeon Dr. Hubbard, patient, and Primary team  09-09-21 @ 13:01

## 2021-09-09 NOTE — CONSULT NOTE ADULT - SUBJECTIVE AND OBJECTIVE BOX
TRAUMA ACTIVATION LEVEL:  CODE / ALERT  / CONSULT  ACTIVATED BY: Floor  INTUBATED: YES** / NO**      MECHANISM OF INJURY:   [] Blunt     [] MVC	  [x] Fall	  [] Pedestrian Struck	  [] Motorcycle     [] Assault     [] Bicycle collision    [] Sports injury    [] Penetrating    [] Gun Shot Wound      [] Stab Wound    GCS: 15 	E: 4	V: 5	M: 6    HPI:  69 yo F with recent dx of Afib on Eliquis, Abdominal abscess 2 mo ago, Gastric Bypass surgery 20+ years ago, COPD who presents to the ED with BRBPR .  The patient has been having intermittent abdominal cramps and watery diarrhea for months ,along with nausea and fecal incontinence that had affected her daily function .She reports an episode of bright red blood that filled the toilet ,and came to the ED.   Reports weight loss 30 Ib ,low appetite ,low energy and easy fatigue .  Reports sad ,depressed mood ,insomnia ,low energy ,no suicidal ideation.  Denies fever ,vomiting ,abdominal distention .  patient was admitted in june 2021 for hematochezia and perianal abscess, colonoscopy and surgical debridement was done and developed new onset afib during hospital stay.    In ED , hemodynamically stable ,Hb 10.2 , /59 , MARVIN was in ED and showed melena and tender external hemorrhoidal ,  CT Angio Abdomen and Pelvis w/ IV Cont 09.03.21 : No CTA evidence of active GI bleeding. Anorectal wall thickening, neoplasm is not excluded.  EGD-Colonoscopy 07.01.21 - White plaques in the esophagus  Erythema in the stomach compatible with non-erosive gastritis.  No evidence of gastric bypass surgery in EGD. 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 No ulcer was noted at the anastomosis site.   multiple polyps in colonoscopy.   (03 Sep 2021 14:33)    On morning of 9/9 patient was called as a trauma consult s/p slip and fall while admitted to the floor, sustaining head trauma, no Loss of consciousness, and the patient is currently on Eliquis. Patient sustained about a 3cm laceration to the left chin under the mandible, that has since stopped bleeding. Trauma assessment in ED: ABCs intact , GCS 15 , AAOx3.    Patient had head CT done showing no hemorrhage but did show Focal hypodensity in the inferior right cerebellar hemisphere possibly representing an age-indeterminate infarct. Further evaluation with MRI of the brain can be obtained if clinically indicated.  Patient is complaining of pain to chin, R knee and R forearm.      PAST MEDICAL & SURGICAL HISTORY:  Atrial fibrillation on Eliquis    History of COPD    Allergies    No Known Allergies    Intolerances        Home Medications:  albuterol 90 mcg/inh inhalation aerosol: 2 puff(s) inhaled every 6 hours, As needed, Shortness of Breath and/or Wheezing (06 Jul 2021 14:58)  apixaban 5 mg oral tablet: 1 tab(s) orally every 12 hours (06 Jul 2021 14:58)  DULoxetine 60 mg oral delayed release capsule: 1 cap(s) orally once a day (06 Jul 2021 14:58)  folic acid 1 mg oral tablet: 1 tab(s) orally once a day (06 Jul 2021 14:58)  methocarbamol 500 mg oral tablet: 1 tab(s) orally 2 times a day (06 Jul 2021 14:58)  thiamine 100 mg oral tablet: 1 tab(s) orally once a day (06 Jul 2021 14:58)      ROS: 10-system review is otherwise negative except HPI above.      Primary Survey:    A - airway intact  B - bilateral breath sounds and good chest rise  C - palpable pulses in all extremities  D - GCS 15 on arrival, GARCIA  Exposure obtained    Vital Signs Last 24 Hrs  T(C): 36 (09 Sep 2021 05:51), Max: 37.2 (08 Sep 2021 21:26)  T(F): 96.8 (09 Sep 2021 05:51), Max: 98.9 (08 Sep 2021 21:26)  HR: 65 (09 Sep 2021 09:38) (65 - 74)  BP: 89/59 (09 Sep 2021 09:38) (89/59 - 126/68)  BP(mean): --  RR: 18 (09 Sep 2021 09:38) (18 - 18)  SpO2: 97% (09 Sep 2021 09:38) (97% - 97%)    Secondary Survey:   General: NAD  HEENT: Normocephalic, atraumatic, EOMI, PEERLA. no scalp lacerations   Neck: Soft, midline trachea. no c-spine tenderness  Chest: No chest wall tenderness, no subcutaneous emphysema   Cardiac: S1, S2, RRR  Respiratory: Bilateral breath sounds, clear and equal bilaterally  Abdomen: Soft, non-distended, non-tender, no rebound, no guarding.  Groin: Normal appearing, pelvis stable   Ext:  Moving b/l upper and lower extremities. Palpable Radial b/l UE, b/l DP palpable in LE.   Back: No T/L/S spine tenderness, No palpable runoff/stepoff/deformity  Rectal: No rocío blood, MARVIN with good tone      Labs:  CAPILLARY BLOOD GLUCOSE      POCT Blood Glucose.: 100 mg/dL (08 Sep 2021 11:13)                          10.0   5.69  )-----------( 208      ( 08 Sep 2021 05:29 )             32.0         09-09    134<L>  |  101  |  12  ----------------------------<  65<L>  4.8   |  23  |  0.6<L>      Calcium, Total Serum: 8.2 mg/dL (09-09-21 @ 04:30)      LFTs:             5.0  | 0.5  | 31       ------------------[92      ( 09 Sep 2021 04:30 )  3.0  | x    | 19          Lipase:x      Amylase:x          RADIOLOGY & ADDITIONAL STUDIES:    < from: CT Head No Cont (09.09.21 @ 04:26) >  IMPRESSION:    Focal hypodensity in the inferior right cerebellar hemisphere possibly representing an age-indeterminate infarct. Further evaluation with MRI of the brain can be obtained if clinically indicated.    No evidence of intracranial hemorrhage, mass effect or midline shift.    < end of copied text >    **F/U Plain films    ---------------------------------------------------------------------------------------     TRAUMA ACTIVATION LEVEL:  CODE / ALERT  / CONSULT  ACTIVATED BY: Floor  INTUBATED: YES** / NO**      MECHANISM OF INJURY:   [] Blunt     [] MVC	  [x] Fall	  [] Pedestrian Struck	  [] Motorcycle     [] Assault     [] Bicycle collision    [] Sports injury    [] Penetrating    [] Gun Shot Wound      [] Stab Wound    GCS: 15 	E: 4	V: 5	M: 6    HPI:  69 yo F with recent dx of Afib on Eliquis, Abdominal abscess 2 mo ago, Gastric Bypass surgery 20+ years ago, COPD who presents to the ED with BRBPR .  The patient has been having intermittent abdominal cramps and watery diarrhea for months ,along with nausea and fecal incontinence that had affected her daily function .She reports an episode of bright red blood that filled the toilet ,and came to the ED.   Reports weight loss 30 Ib ,low appetite ,low energy and easy fatigue .  Reports sad ,depressed mood ,insomnia ,low energy ,no suicidal ideation.  Denies fever ,vomiting ,abdominal distention .  patient was admitted in june 2021 for hematochezia and perianal abscess, colonoscopy and surgical debridement was done and developed new onset afib during hospital stay.    In ED , hemodynamically stable ,Hb 10.2 , /59 , MARVIN was in ED and showed melena and tender external hemorrhoidal ,  CT Angio Abdomen and Pelvis w/ IV Cont 09.03.21 : No CTA evidence of active GI bleeding. Anorectal wall thickening, neoplasm is not excluded.  EGD-Colonoscopy 07.01.21 - White plaques in the esophagus  Erythema in the stomach compatible with non-erosive gastritis.  No evidence of gastric bypass surgery in EGD. 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 No ulcer was noted at the anastomosis site.   multiple polyps in colonoscopy.   (03 Sep 2021 14:33)    On morning of 9/9 patient was called as a trauma consult s/p slip and fall while admitted to the floor, sustaining head trauma, no Loss of consciousness, and the patient is currently on Eliquis for afib (last dose last night at 5:30PM; she refused AM dose). Patient sustained about a 3cm laceration to the left chin under the mandible that has since stopped bleeding. Trauma assessment ABCs intact , GCS 15 , AAOx3.    Patient had head CT done showing no hemorrhage but did show Focal hypodensity in the inferior right cerebellar hemisphere possibly representing an age-indeterminate infarct. Patient is complaining of pain to chin, R knee and R forearm.    PAST MEDICAL & SURGICAL HISTORY:  Atrial fibrillation on Eliquis  History of COPD    Allergies  No Known Allergies    Home Medications:  albuterol 90 mcg/inh inhalation aerosol: 2 puff(s) inhaled every 6 hours, As needed, Shortness of Breath and/or Wheezing (06 Jul 2021 14:58)  apixaban 5 mg oral tablet: 1 tab(s) orally every 12 hours (06 Jul 2021 14:58)  DULoxetine 60 mg oral delayed release capsule: 1 cap(s) orally once a day (06 Jul 2021 14:58)  folic acid 1 mg oral tablet: 1 tab(s) orally once a day (06 Jul 2021 14:58)  methocarbamol 500 mg oral tablet: 1 tab(s) orally 2 times a day (06 Jul 2021 14:58)  thiamine 100 mg oral tablet: 1 tab(s) orally once a day (06 Jul 2021 14:58)    ROS: 10-system review is otherwise negative except HPI above.      Primary Survey:    A - airway intact  B - bilateral breath sounds and good chest rise  C - palpable pulses in all extremities  D - GCS 15 on arrival, GARCIA  Exposure obtained    Vital Signs Last 24 Hrs  T(C): 36 (09 Sep 2021 05:51), Max: 37.2 (08 Sep 2021 21:26)  T(F): 96.8 (09 Sep 2021 05:51), Max: 98.9 (08 Sep 2021 21:26)  HR: 65 (09 Sep 2021 09:38) (65 - 74)  BP: 89/59 (09 Sep 2021 09:38) (89/59 - 126/68)  RR: 18 (09 Sep 2021 09:38) (18 - 18)  SpO2: 97% (09 Sep 2021 09:38) (97% - 97%)    Secondary Survey:   General: NAD  HEENT: Normocephalic, L chin laceration (through subcutaneous tissue, no active bleeding), PEERLA. no scalp lacerations   Neck: Soft, midline trachea. no c-spine tenderness  Chest: No chest wall tenderness, no subcutaneous emphysema   Cardiac: S1, S2, RRR  Respiratory: Bilateral breath sounds, clear and equal bilaterally  Abdomen: Soft, non-distended, non-tender, no rebound, no guarding  Groin: Normal appearing, pelvis stable   Ext:  Moving b/l upper and lower extremities. Palpable Radial b/l UE, b/l DP palpable in LE. old b/l bruising  Back: No T/L/S spine tenderness, No palpable runoff/stepoff/deformity    Labs:  CAPILLARY BLOOD GLUCOSE  POCT Blood Glucose.: 100 mg/dL (08 Sep 2021 11:13)                        10.0   5.69  )-----------( 208      ( 08 Sep 2021 05:29 )             32.0       09-09    134<L>  |  101  |  12  ----------------------------<  65<L>  4.8   |  23  |  0.6<L>    Calcium, Total Serum: 8.2 mg/dL (09-09-21 @ 04:30)    LFTs:             5.0  | 0.5  | 31       ------------------[92      ( 09 Sep 2021 04:30 )  3.0  | x    | 19          ---------------------------------------------------------------------------------------  RADIOLOGY & ADDITIONAL STUDIES:    < from: CT Head No Cont (09.09.21 @ 04:26) >  IMPRESSION:  Focal hypodensity in the inferior right cerebellar hemisphere possibly representing an age-indeterminate infarct. Further evaluation with MRI of the brain can be obtained if clinically indicated.  No evidence of intracranial hemorrhage, mass effect or midline shift.  < end of copied text >    < from: Xray Wrist 3 Views, Right (09.09.21 @ 10:49) >  Findings/impression:   Diffuse osteopenia. No acute displaced fracture or dislocation. Degenerative changes of the basal and radiocarpal and distal radioulnar joints. Widening of the scapholunate interval indicative of scapholunate ligament tear.  < end of copied text >    < from: Xray Knee 3 Views, Right (09.09.21 @ 12:23) >  Findings/impression: Diffuse osteopenia. No acute displaced fracture or dislocation. Severe tricompartmental osteoarthritis. Trace joint effusion.  < end of copied text >

## 2021-09-10 LAB
ALBUMIN SERPL ELPH-MCNC: 2.8 G/DL — LOW (ref 3.5–5.2)
ALP SERPL-CCNC: 91 U/L — SIGNIFICANT CHANGE UP (ref 30–115)
ALT FLD-CCNC: 19 U/L — SIGNIFICANT CHANGE UP (ref 0–41)
ANION GAP SERPL CALC-SCNC: 8 MMOL/L — SIGNIFICANT CHANGE UP (ref 7–14)
AST SERPL-CCNC: 19 U/L — SIGNIFICANT CHANGE UP (ref 0–41)
BASOPHILS # BLD AUTO: 0.02 K/UL — SIGNIFICANT CHANGE UP (ref 0–0.2)
BASOPHILS NFR BLD AUTO: 0.4 % — SIGNIFICANT CHANGE UP (ref 0–1)
BILIRUB SERPL-MCNC: 0.5 MG/DL — SIGNIFICANT CHANGE UP (ref 0.2–1.2)
BUN SERPL-MCNC: 12 MG/DL — SIGNIFICANT CHANGE UP (ref 10–20)
CALCIUM SERPL-MCNC: 8.2 MG/DL — LOW (ref 8.5–10.1)
CHLORIDE SERPL-SCNC: 102 MMOL/L — SIGNIFICANT CHANGE UP (ref 98–110)
CO2 SERPL-SCNC: 27 MMOL/L — SIGNIFICANT CHANGE UP (ref 17–32)
CREAT SERPL-MCNC: 0.5 MG/DL — LOW (ref 0.7–1.5)
EOSINOPHIL # BLD AUTO: 0.08 K/UL — SIGNIFICANT CHANGE UP (ref 0–0.7)
EOSINOPHIL NFR BLD AUTO: 1.8 % — SIGNIFICANT CHANGE UP (ref 0–8)
GLUCOSE BLDC GLUCOMTR-MCNC: 104 MG/DL — HIGH (ref 70–99)
GLUCOSE BLDC GLUCOMTR-MCNC: 138 MG/DL — HIGH (ref 70–99)
GLUCOSE BLDC GLUCOMTR-MCNC: 68 MG/DL — LOW (ref 70–99)
GLUCOSE BLDC GLUCOMTR-MCNC: 99 MG/DL — SIGNIFICANT CHANGE UP (ref 70–99)
GLUCOSE SERPL-MCNC: 71 MG/DL — SIGNIFICANT CHANGE UP (ref 70–99)
HCT VFR BLD CALC: 32.4 % — LOW (ref 37–47)
HGB BLD-MCNC: 10.1 G/DL — LOW (ref 12–16)
IMM GRANULOCYTES NFR BLD AUTO: 0.2 % — SIGNIFICANT CHANGE UP (ref 0.1–0.3)
LYMPHOCYTES # BLD AUTO: 1.39 K/UL — SIGNIFICANT CHANGE UP (ref 1.2–3.4)
LYMPHOCYTES # BLD AUTO: 30.5 % — SIGNIFICANT CHANGE UP (ref 20.5–51.1)
MAGNESIUM SERPL-MCNC: 1.8 MG/DL — SIGNIFICANT CHANGE UP (ref 1.8–2.4)
MCHC RBC-ENTMCNC: 30.1 PG — SIGNIFICANT CHANGE UP (ref 27–31)
MCHC RBC-ENTMCNC: 31.2 G/DL — LOW (ref 32–37)
MCV RBC AUTO: 96.7 FL — SIGNIFICANT CHANGE UP (ref 81–99)
MONOCYTES # BLD AUTO: 0.35 K/UL — SIGNIFICANT CHANGE UP (ref 0.1–0.6)
MONOCYTES NFR BLD AUTO: 7.7 % — SIGNIFICANT CHANGE UP (ref 1.7–9.3)
NEUTROPHILS # BLD AUTO: 2.7 K/UL — SIGNIFICANT CHANGE UP (ref 1.4–6.5)
NEUTROPHILS NFR BLD AUTO: 59.4 % — SIGNIFICANT CHANGE UP (ref 42.2–75.2)
NRBC # BLD: 0 /100 WBCS — SIGNIFICANT CHANGE UP (ref 0–0)
PLATELET # BLD AUTO: 199 K/UL — SIGNIFICANT CHANGE UP (ref 130–400)
POTASSIUM SERPL-MCNC: 4.3 MMOL/L — SIGNIFICANT CHANGE UP (ref 3.5–5)
POTASSIUM SERPL-SCNC: 4.3 MMOL/L — SIGNIFICANT CHANGE UP (ref 3.5–5)
PROT SERPL-MCNC: 4.8 G/DL — LOW (ref 6–8)
RBC # BLD: 3.35 M/UL — LOW (ref 4.2–5.4)
RBC # FLD: 19.2 % — HIGH (ref 11.5–14.5)
SODIUM SERPL-SCNC: 137 MMOL/L — SIGNIFICANT CHANGE UP (ref 135–146)
WBC # BLD: 4.55 K/UL — LOW (ref 4.8–10.8)
WBC # FLD AUTO: 4.55 K/UL — LOW (ref 4.8–10.8)

## 2021-09-10 PROCEDURE — 99233 SBSQ HOSP IP/OBS HIGH 50: CPT

## 2021-09-10 PROCEDURE — 74018 RADEX ABDOMEN 1 VIEW: CPT | Mod: 26

## 2021-09-10 PROCEDURE — 99232 SBSQ HOSP IP/OBS MODERATE 35: CPT

## 2021-09-10 PROCEDURE — 99221 1ST HOSP IP/OBS SF/LOW 40: CPT

## 2021-09-10 PROCEDURE — 70551 MRI BRAIN STEM W/O DYE: CPT | Mod: 26

## 2021-09-10 RX ORDER — ATORVASTATIN CALCIUM 80 MG/1
40 TABLET, FILM COATED ORAL AT BEDTIME
Refills: 0 | Status: DISCONTINUED | OUTPATIENT
Start: 2021-09-10 | End: 2021-09-11

## 2021-09-10 RX ORDER — SODIUM CHLORIDE 9 MG/ML
1000 INJECTION INTRAMUSCULAR; INTRAVENOUS; SUBCUTANEOUS
Refills: 0 | Status: DISCONTINUED | OUTPATIENT
Start: 2021-09-10 | End: 2021-09-11

## 2021-09-10 RX ADMIN — Medication 500 MILLIGRAM(S): at 23:03

## 2021-09-10 RX ADMIN — Medication 100 MILLIGRAM(S): at 23:02

## 2021-09-10 RX ADMIN — Medication 500 MILLIGRAM(S): at 05:29

## 2021-09-10 RX ADMIN — Medication 1 TABLET(S): at 11:30

## 2021-09-10 RX ADMIN — Medication 500 MILLIGRAM(S): at 18:09

## 2021-09-10 RX ADMIN — METHOCARBAMOL 500 MILLIGRAM(S): 500 TABLET, FILM COATED ORAL at 05:28

## 2021-09-10 RX ADMIN — APIXABAN 5 MILLIGRAM(S): 2.5 TABLET, FILM COATED ORAL at 18:09

## 2021-09-10 RX ADMIN — CHLORHEXIDINE GLUCONATE 1 APPLICATION(S): 213 SOLUTION TOPICAL at 05:29

## 2021-09-10 RX ADMIN — Medication 100 MILLIGRAM(S): at 11:30

## 2021-09-10 RX ADMIN — Medication 100 MILLIGRAM(S): at 05:28

## 2021-09-10 RX ADMIN — ATORVASTATIN CALCIUM 40 MILLIGRAM(S): 80 TABLET, FILM COATED ORAL at 23:03

## 2021-09-10 RX ADMIN — Medication 1 MILLIGRAM(S): at 11:30

## 2021-09-10 RX ADMIN — Medication 500 MILLIGRAM(S): at 00:54

## 2021-09-10 RX ADMIN — Medication 5 MILLIGRAM(S): at 23:02

## 2021-09-10 RX ADMIN — METHOCARBAMOL 500 MILLIGRAM(S): 500 TABLET, FILM COATED ORAL at 18:09

## 2021-09-10 RX ADMIN — DULOXETINE HYDROCHLORIDE 60 MILLIGRAM(S): 30 CAPSULE, DELAYED RELEASE ORAL at 11:30

## 2021-09-10 RX ADMIN — APIXABAN 5 MILLIGRAM(S): 2.5 TABLET, FILM COATED ORAL at 05:29

## 2021-09-10 RX ADMIN — SODIUM CHLORIDE 50 MILLILITER(S): 9 INJECTION INTRAMUSCULAR; INTRAVENOUS; SUBCUTANEOUS at 18:12

## 2021-09-10 RX ADMIN — Medication 100 MILLIGRAM(S): at 15:10

## 2021-09-10 RX ADMIN — Medication 500 MILLIGRAM(S): at 11:30

## 2021-09-10 NOTE — PROGRESS NOTE ADULT - SUBJECTIVE AND OBJECTIVE BOX
GENERAL SURGERY PROGRESS NOTE    Patient: WEGENER, LOIS , 70y ([birth year])Female   MRN: [id number]  Location: [address]  Visit: 09-03-21 Inpatient  Date: 09-10-21 @ 07:34    Hospital Day #: 7  Post-Op Day #: none    Procedure/Dx/Injuries:   70y Female w/ PMHx of Afib on Eliquis, Abdominal abscess 2 mo ago, Gastric Bypass surgery 20+ years ago, COPD who presents to the ED with BRBPR , now called as a trauma consult s/p mechanical slip and fall while admitted to the medical floor, +HT, -LOC, +AC. Trauma assessment: ABCs intact, GCS 15, AAOx3, GARCIA presenting with - 3 cm laceration under left mandible    Events of past 24 hours: No acute events    PAST MEDICAL & SURGICAL HISTORY:  Atrial fibrillation    History of COPD    Vitals:   T(F): 96.4 (09-10-21 @ 06:14), Max: 97.6 (09-09-21 @ 19:07)  HR: 66 (09-10-21 @ 06:14)  BP: 109/61 (09-10-21 @ 06:14)  RR: 18 (09-10-21 @ 06:14)  SpO2: 97% (09-09-21 @ 09:38)      Diet, DASH/TLC:   Sodium & Cholesterol Restricted      Fluids: sodium chloride 0.9%.: Solution, 1000 milliLiter(s) infuse at 75 mL/Hr, Stop After 10 Hours      I & O's:    09-09-21 @ 07:01  -  09-10-21 @ 07:00  --------------------------------------------------------  IN:    Oral Fluid: 350 mL  Total IN: 350 mL    OUT:    Voided (mL): 800 mL  Total OUT: 800 mL    Total NET: -450 mL        Bowel Movement: : [x] YES [] NO  Flatus: : [x] YES [] NO    General: NAD  HEENT: Normocephalic, L chin laceration (through subcutaneous tissue, no active bleeding), PEERLA. no scalp lacerations   Neck: Soft, midline trachea  Chest: No chest wall tenderness, no subcutaneous emphysema   Cardiac: S1, S2, RRR  Respiratory: Bilateral breath sounds, clear and equal bilaterally  Abdomen: Soft, non-distended, non-tender, no rebound, no guarding    MEDICATIONS  (STANDING):  apixaban 5 milliGRAM(s) Oral every 12 hours  chlorhexidine 4% Liquid 1 Application(s) Topical <User Schedule>  DULoxetine 60 milliGRAM(s) Oral daily  folic acid 1 milliGRAM(s) Oral daily  influenza   Vaccine 0.5 milliLiter(s) IntraMuscular once  lactobacillus acidophilus 1 Tablet(s) Oral daily  methocarbamol 500 milliGRAM(s) Oral two times a day  metroNIDAZOLE  IVPB      metroNIDAZOLE  IVPB 500 milliGRAM(s) IV Intermittent every 8 hours  sodium chloride 0.9%. 1000 milliLiter(s) (75 mL/Hr) IV Continuous <Continuous>  thiamine 100 milliGRAM(s) Oral daily  vancomycin    Solution 500 milliGRAM(s) Oral every 6 hours    MEDICATIONS  (PRN):  ALBUTerol    90 MICROgram(s) HFA Inhaler 2 Puff(s) Inhalation every 6 hours PRN Shortness of Breath and/or Wheezing  melatonin 5 milliGRAM(s) Oral at bedtime PRN Insomnia      DVT PROPHYLAXIS:   GI PROPHYLAXIS:   ANTICOAGULATION:   ANTIBIOTICS:  metroNIDAZOLE  IVPB    metroNIDAZOLE  IVPB 500 milliGRAM(s)  vancomycin    Solution 500 milliGRAM(s)    LAB/STUDIES:  Labs:  CAPILLARY BLOOD GLUCOSE      POCT Blood Glucose.: 88 mg/dL (09 Sep 2021 21:48)  POCT Blood Glucose.: 119 mg/dL (09 Sep 2021 16:31)  POCT Blood Glucose.: 81 mg/dL (09 Sep 2021 11:30)                          10.1   4.55  )-----------( 199      ( 10 Sep 2021 05:48 )             32.4       Auto Immature Granulocyte %: 0.2 % (09-10-21 @ 05:48)  Auto Neutrophil %: 59.4 % (09-10-21 @ 05:48)    09-09    134<L>  |  101  |  12  ----------------------------<  65<L>  4.8   |  23  |  0.6<L>      LFTs:             5.0  | 0.5  | 31       ------------------[92      ( 09 Sep 2021 04:30 )  3.0  | x    | 19          Lipase:x      Amylase:x             Coags:    CARDIAC MARKERS ( 09 Sep 2021 12:02 )  x     / <0.01 ng/mL / x     / x     / x        ACCESS/ DEVICES:  [x] Peripheral IV

## 2021-09-10 NOTE — PROGRESS NOTE ADULT - SUBJECTIVE AND OBJECTIVE BOX
WEGENER, LOIS  70y  Female      Patient is a 70y old  Female who presents with a chief complaint of s/p unwitness fal with +ht, -loc + eliquis (10 Sep 2021 07:34)      INTERVAL HPI/OVERNIGHT EVENTS: No acute overnight events. Pt in no acute distress.   She had 3 loose BM overnight but denies any abdominal, melena, or hematochezia. No chest pain, shortness of breath, palpitations.    REVIEW OF SYSTEMS:  CONSTITUTIONAL: No fever, weight loss, or fatigue  EYES: No eye pain, visual disturbances, or discharge  ENMT:  No difficulty hearing, tinnitus, vertigo; No sinus or throat pain  NECK: No pain or stiffness  BREASTS: No pain, masses, or nipple discharge  RESPIRATORY: No cough, wheezing, chills or hemoptysis; No shortness of breath  CARDIOVASCULAR: No chest pain, palpitations, dizziness, or leg swelling  GASTROINTESTINAL: +diarrhea, No abdominal or epigastric pain. No nausea, vomiting, or hematemesis; No constipation. No melena or hematochezia.  GENITOURINARY: No dysuria, frequency, hematuria, or incontinence  NEUROLOGICAL: No headaches, memory loss, loss of strength, numbness, or tremors  SKIN: No itching, burning, rashes, or lesions   LYMPH NODES: No enlarged glands  ENDOCRINE: No heat or cold intolerance; No hair loss  MUSCULOSKELETAL: No joint pain or swelling; No muscle, back, or extremity pain  PSYCHIATRIC: No depression, anxiety, mood swings, or difficulty sleeping  HEME/LYMPH: No easy bruising, or bleeding gums  ALLERY AND IMMUNOLOGIC: No hives or eczema  FAMILY HISTORY:    T(C): 35.8 (09-10-21 @ 06:14), Max: 36.4 (09-09-21 @ 19:07)  HR: 66 (09-10-21 @ 06:14) (66 - 73)  BP: 109/61 (09-10-21 @ 06:14) (109/61 - 115/63)  RR: 18 (09-10-21 @ 06:14) (18 - 18)  SpO2: --  Wt(kg): --Vital Signs Last 24 Hrs  T(C): 35.8 (10 Sep 2021 06:14), Max: 36.4 (09 Sep 2021 19:07)  T(F): 96.4 (10 Sep 2021 06:14), Max: 97.6 (09 Sep 2021 19:07)  HR: 66 (10 Sep 2021 06:14) (66 - 73)  BP: 109/61 (10 Sep 2021 06:14) (109/61 - 115/63)  BP(mean): --  RR: 18 (10 Sep 2021 06:14) (18 - 18)  SpO2: --  No Known Allergies      PHYSICAL EXAM:  GENERAL: NAD, well-groomed, well-developed  HEAD:  Atraumatic, Normocephalic  EYES: EOMI, PERRLA, conjunctiva and sclera clear  ENMT: No tonsillar erythema, exudates, or enlargement; Moist mucous membranes, Good dentition, No lesions  NECK: Supple, No JVD, Normal thyroid  NERVOUS SYSTEM:  Alert & Oriented X3, Good concentration; Motor Strength 5/5 B/L upper and lower extremities; DTRs 2+ intact and symmetric  CHEST/LUNG: Clear to percussion bilaterally; No rales, rhonchi, wheezing, or rubs  HEART: Regular rate and rhythm; No murmurs, rubs, or gallops  ABDOMEN: Soft, Nontender, mildly distended; Bowel sounds present  EXTREMITIES:  2+ Peripheral Pulses, No clubbing, cyanosis, or edema  LYMPH: No lymphadenopathy noted  SKIN: No rashes or lesions    Consultant(s) Notes Reviewed:  [x ] YES  [ ] NO  Care Discussed with Consultants/Other Providers [ x] YES  [ ] NO    LABS:                          10.1   4.55  )-----------( 199      ( 10 Sep 2021 05:48 )             32.4     09-10    137  |  102  |  12  ----------------------------<  71  4.3   |  27  |  0.5<L>    Ca    8.2<L>      10 Sep 2021 05:48  Mg     1.8     09-10    TPro  4.8<L>  /  Alb  2.8<L>  /  TBili  0.5  /  DBili  x   /  AST  19  /  ALT  19  /  AlkPhos  91  09-10      RADIOLOGY & ADDITIONAL TESTS:      Imaging Personally Reviewed:  [ ] YES  [ ] NO  ALBUTerol    90 MICROgram(s) HFA Inhaler 2 Puff(s) Inhalation every 6 hours PRN  apixaban 5 milliGRAM(s) Oral every 12 hours  atorvastatin 40 milliGRAM(s) Oral at bedtime  chlorhexidine 4% Liquid 1 Application(s) Topical <User Schedule>  DULoxetine 60 milliGRAM(s) Oral daily  folic acid 1 milliGRAM(s) Oral daily  influenza   Vaccine 0.5 milliLiter(s) IntraMuscular once  lactobacillus acidophilus 1 Tablet(s) Oral daily  melatonin 5 milliGRAM(s) Oral at bedtime PRN  methocarbamol 500 milliGRAM(s) Oral two times a day  metroNIDAZOLE  IVPB      metroNIDAZOLE  IVPB 500 milliGRAM(s) IV Intermittent every 8 hours  sodium chloride 0.9%. 1000 milliLiter(s) IV Continuous <Continuous>  thiamine 100 milliGRAM(s) Oral daily  vancomycin    Solution 500 milliGRAM(s) Oral every 6 hours      HEALTH ISSUES - PROBLEM Dx:

## 2021-09-10 NOTE — OCCUPATIONAL THERAPY INITIAL EVALUATION ADULT - GENERAL OBSERVATIONS, REHAB EVAL
Pt encountered sidelying in bed in NAD, + IV locked. Pt agreeable to bedside OT assessment, May be seen as confirmed with RN. Pt returned to bed as found in NAD, + IV locked, + R wrist support. RN aware

## 2021-09-10 NOTE — PROGRESS NOTE ADULT - ASSESSMENT
69 yo F with recent dx of Afib on Eliquis, Abdominal abscess 2 mo ago, Gastric Bypass surgery 20+ years ago, COPD who presents to the ED with BRBPR .    # Hematochezia  # Hx of hemorrhoids  # Acute blood loss anemia  # suspect lower GIB  - currently non tachycardic but relative hypotension ; getting mIVF  - MARVIN w/melena and external hemorrhoids  - chronic diarrhea and fecal incontinence reported; c diff ordered and positive   - hx of fistula/ perianal abscess  - colonoscopy 7/1/21 - multiple polypectomy   - Hb stable   - active T&S, transfuse for hgb <7  - GI consult : plan for Colonoscopy but patient refused more than once, plan for outpatient colonoscopy     #C Diff Colitis   - distended colon noted on KUB, nontoxic   - start 500mg PO vancomycin and IV Flagyl , may need vancomycin enemas if not having BMs   - had 3 BMs overnight   - ID following , GI following     #Mechanical Fall in hospital   - CTH w/ Focal hypodensity in the inferior right cerebellar hemisphere possibly representing an age-indeterminate infarct  - MRI ordered and pending  - CT C/A/P per Trauma surgery - unremarkable   - Xray R wrist w/ widening of scapholunate interval c/w ligament tear   - Trauma surgery following: s/p suturing laceration of chin  - Ortho surgery consulted for possible wrist ligament tear , no acute intervention- OT and wrist splint     #Hypokalemia - resolved     #Hyponatremia- likely hypotonic hypovolemic , now resolved     #Atrial fibrillation on eliquis  - CHADVASC 3  - restart Eliquis     #COPD ,stable not in exacerbation  - not on home oxygen ,no wheezing on exam  - inhaler PRN    #HFpEF ,euvolemic on exam   -TTE 6/27 EF of 63 %.  - monitor I&O  - BNP; from 6/25- 1500-->912  - avoid volume overload in light of hypotension and fluid bolus    # EtOH abuse with suspected folate/thiamine deficiency  - on folic acid and thiamine     # Depression on duloxetine  - no suicidal ideation  - c/w SSRI  - f/u w psychiatry as outpatient      #Progress Note Handoff  Pending (specify): improvement in c diff colitis, MRI Brain   Family discussion: patient aware of plan. in agreement. all questions answered  Disposition: Unknown at this time________    Dayami Henry, DO

## 2021-09-10 NOTE — CHART NOTE - NSCHARTNOTEFT_GEN_A_CORE
Tertiary Trauma Survey (TTS)    Date of TTS: 09-10-21 @ 17:50   Admit Date: 09-03-21  Trauma Activation: CONSULT  Admit GCS: 15       E-4     V-5     M- 6    HPI:  69 yo F with recent dx of Afib on Eliquis, Abdominal abscess 2 mo ago, Gastric Bypass surgery 20+ years ago, COPD who presents to the ED with BRBPR .  The patient has been having intermittent abdominal cramps and watery diarrhea for months ,along with nausea and fecal incontinence that had affected her daily function .She reports an episode of bright red blood that filled the toilet ,and came to the ED.   Reports weight loss 30 Ib ,low appetite ,low energy and easy fatigue .  Reports sad ,depressed mood ,insomnia ,low energy ,no suicidal ideation.  Denies fever ,vomiting ,abdominal distention .  patient was admitted in june 2021 for hematochezia and perianal abscess, colonoscopy and surgical debridement was done and developed new onset afib during hospital stay.    In ED , hemodynamically stable ,Hb 10.2 , /59 , MARVIN was in ED and showed melena and tender external hemorrhoidal ,  CT Angio Abdomen and Pelvis w/ IV Cont 09.03.21 : No CTA evidence of active GI bleeding. Anorectal wall thickening, neoplasm is not excluded.  EGD-Colonoscopy 07.01.21 - White plaques in the esophagus  Erythema in the stomach compatible with non-erosive gastritis.  No evidence of gastric bypass surgery in EGD. 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 No ulcer was noted at the anastomosis site.   multiple polyps in colonoscopy.     (03 Sep 2021 14:33)    Patient seen and examined.     PHYSICAL EXAM:  General: NAD  HEENT: Normocephalic, bandage over laceration on left mandible  Neck: Soft, midline trachea. no c-spine tenderness  Chest: No chest wall tenderness, no subcutaneous emphysema   Cardiac: S1, S2, RRR  Respiratory: Bilateral breath sounds, clear and equal bilaterally  Abdomen: Soft, non-distended, non-tender, no rebound, no guarding, engorged veins present  Groin: Normal appearing, pelvis stable   Ext:  Moving b/l upper and lower extremities. Palpable Radial b/l UE, b/l DP palpable in LE. Bandage over right knee, onychomycosis of b/l feet, multiple keratotic lesions on b/l feet, sensation intact bilaterally, contusion over right forearm  Back: No T/L/S spine tenderness, No palpable runoff/stepoff/deformity  Rectal: No rocío blood    Vital Signs Last 24 Hrs  T(C): 36.2 (10 Sep 2021 13:05), Max: 36.4 (09 Sep 2021 19:07)  T(F): 97.1 (10 Sep 2021 13:05), Max: 97.6 (09 Sep 2021 19:07)  HR: 84 (10 Sep 2021 13:44) (66 - 84)  BP: 92/52 (10 Sep 2021 13:44) (90/55 - 115/63)  BP(mean): --  RR: 18 (10 Sep 2021 13:05) (18 - 18)  SpO2: --    Labs:  CAPILLARY BLOOD GLUCOSE      POCT Blood Glucose.: 104 mg/dL (10 Sep 2021 16:46)  POCT Blood Glucose.: 138 mg/dL (10 Sep 2021 11:32)  POCT Blood Glucose.: 68 mg/dL (10 Sep 2021 07:36)  POCT Blood Glucose.: 88 mg/dL (09 Sep 2021 21:48)                          10.1   4.55  )-----------( 199      ( 10 Sep 2021 05:48 )             32.4       Auto Immature Granulocyte %: 0.2 % (09-10-21 @ 05:48)  Auto Neutrophil %: 59.4 % (09-10-21 @ 05:48)    09-10    137  |  102  |  12  ----------------------------<  71  4.3   |  27  |  0.5<L>      Calcium, Total Serum: 8.2 mg/dL (09-10-21 @ 05:48)      LFTs:             4.8  | 0.5  | 19       ------------------[91      ( 10 Sep 2021 05:48 )  2.8  | x    | 19          Lipase:x      Amylase:x             Coags:    CARDIAC MARKERS ( 09 Sep 2021 12:02 )  x     / <0.01 ng/mL / x     / x     / x                          CAGE SUBSTANCE ABUSE SCREENING TOOL:  1.	Have you ever felt you should cut down on your drinking?   [  ] YES = 1      [x] NO = 0  2.	Have people annoyed you by criticizing your drinking?    [  ] YES = 1      [x] NO = 0  3.	Have you ever felt bad or guilty about your drinking?   [  ] YES = 1      [X] NO = 0  4.	Have you ever had a drink first thing in the morning to steady your nerves or to get rid of a hangover (eye-opener)?    [  ] YES = 1      [X] NO = 0    Total = 0, patient states she has cut down on her drinking to a few cocktails two nights a week.    [  ] Score is two or greater which is considered clinically significant, social work consult will be placed.   [X] Score is not two or greater which is not considered clinically significant, social work consult not warranted at this time.    RADIOLOGICAL FINDINGS REVIEW:    < from: Xray Kidney Ureter Bladder (09.08.21 @ 17:28) >      FINDINGS/  IMPRESSION:    Multiple dilated large bowel loops, overall slightly increased in caliber since reference CT of September 3, 2021. No supine evidence of free intraperitoneal air. Osseous structures are stable.    --- End of Report ---    < end of copied text >    < from: CT Head No Cont (09.09.21 @ 04:26) >      IMPRESSION:    Focal hypodensity in the inferior right cerebellar hemisphere possibly representing an age-indeterminate infarct. Further evaluation with MRI of the brain can be obtained if clinically indicated.    No evidence of intracranial hemorrhage, mass effect or midline shift.    --- End of Report ---    < end of copied text >    < from: Xray Wrist 3 Views, Right (09.09.21 @ 10:49) >      Findings/  impression: Diffuse osteopenia. Noacute displaced fracture or dislocation. Degenerative changes of the basal and radiocarpal and distal radioulnar joints. Widening of the scapholunate interval indicative of scapholunate ligament tear.    --- End of Report ---    < end of copied text >    < from: CT Cervical Spine No Cont (09.09.21 @ 21:09) >      IMPRESSION:    Degenerative changes as described above. No evidence of fracture or facet subluxation.    --- End of Report ---        < end of copied text >    < from: CT Maxillofacial No Cont (09.09.21 @ 21:20) >      IMPRESSION:    1.  No evidence of acute displaced fracture.    2.  Soft tissue swelling, skin laceration, and focal air density inferior to the left side of the mandible.    --- End of Report ---      < end of copied text >    < from: CT Chest w/ IV Cont (09.09.21 @ 21:35) >      IMPRESSION:    No evidence of intra-thoracic, abdominal or pelvic visceral laceration or hematoma.    No evidence of acute fracture.    --- End of Report ---< from: Xray Kidney Ureter Bladder (09.10.21 @ 14:55) >      Findings/  impression:    Stable appearance of Air and stool-filled loops of bowel. Surgical clips overlie the abdomen.    Degenerative changes of the osseous structures.    Vascular calcifications are noted.    Contrast is visualized within the urinary bladder.    --- End of Report ---    < end of copied text >        ASSESSMENT/ PLAN:   70yF w/ PMHx of ETOH abuse, A fib on eliquis, COPD, gastric bypass 20 years ago, and abdominal abscess 2 months ago seen as Trauma Consult s/p bright red blood per rectum.  Trauma assessment in ED: ABCs intact , GCS 15 , AAOx3 , with physical exam findings, imaging, and labs as documented above, injuries are identified:     -3 cm laceration to left mandible  -abrasion over right knee  -contusions over right forearm    - All images/reports reviewed. No further traumatic work-up warranted.

## 2021-09-10 NOTE — OCCUPATIONAL THERAPY INITIAL EVALUATION ADULT - PERTINENT HX OF CURRENT PROBLEM, REHAB EVAL
Pt reports that she had to urinate and rang the call bell and immediately after ringing the call bell, she decided to walk to the commode a few steps away from her bedside. As the patient stood up, she urinated on the floor and slipped on her urine. She reports hitting her chin, right knee and right forearm

## 2021-09-10 NOTE — PROGRESS NOTE ADULT - ASSESSMENT
69 yo F with recent dx of Afib on Eliquis, Abdominal abscess 2 mo ago, Gastric surgery 20+ years ago, COPD who presents to the ED with BRBPR , one episode before she presented , no bleeding in hospital.  No CTA evidence of active GI bleeding. Anorectal wall thickening    #hematochezia : lower GI bleed , likely hemorrhoidal  - no overt bleeding  HD stable   HGB at baseline   recent EGD/ colon July 2020 showing large internal and external hemorrhoids   CTA negative for GI bleed  patient refused repeat colonoscopy understanding the risks and benefits     REC:   - Anusol suppositories once every night    - follow up with GI as outpatient for possible colonoscopy      #C. Diff with colon distension  No signs of toxic megacolon   colon distension is improving , no bowel movement today    REC:  vancomycin 500 mg QID   Flagyl 500 mg IV tid   serial abdominal exam   daily KUB  avoid narcotics and NSAIDS  ID input  appreciated   Lactose free diet     #history of anorectal abscesses s/p drainage / fistulization   Endoscopic workup negative for IBD ( random biopsies taken from T.I and terminal ileum 7/2021 are negative )   REC:   follow up with GI as outpatient

## 2021-09-10 NOTE — OCCUPATIONAL THERAPY INITIAL EVALUATION ADULT - ORTHOTIC FITTING/TRAINING, PT EVAL
Pt will be educated on R static wrist orthosis donning/ doffing and maintenance and will be independent with use of orthosis by discharge

## 2021-09-10 NOTE — PROGRESS NOTE ADULT - SUBJECTIVE AND OBJECTIVE BOX
WEGENER, LOIS  70y, Female  Allergy: No Known Allergies    Hospital Day: 7d    Patient seen and examined earlier today. Feeling well, right wrist pain and knee pain improving. Chin laceration well dressed not bleeding. CTH notable for ? subacute stroke, will get an MRI to evaluate, patient agreeable.     PMH/PSH:  PAST MEDICAL & SURGICAL HISTORY:  Atrial fibrillation    History of COPD        LAST 24-Hr EVENTS:    VITALS:  T(F): 97.1 (09-10-21 @ 13:05), Max: 97.6 (09-09-21 @ 19:07)  HR: 84 (09-10-21 @ 13:44)  BP: 92/52 (09-10-21 @ 13:44) (90/55 - 115/63)  RR: 18 (09-10-21 @ 13:05)  SpO2: --        TESTS & MEASUREMENTS:  Weight (Kg):       09-09-21 @ 07:01  -  09-10-21 @ 07:00  --------------------------------------------------------  IN: 350 mL / OUT: 800 mL / NET: -450 mL    09-10-21 @ 07:01  -  09-10-21 @ 17:01  --------------------------------------------------------  IN: 250 mL / OUT: 0 mL / NET: 250 mL                            10.1   4.55  )-----------( 199      ( 10 Sep 2021 05:48 )             32.4       09-10    137  |  102  |  12  ----------------------------<  71  4.3   |  27  |  0.5<L>    Ca    8.2<L>      10 Sep 2021 05:48  Mg     1.8     09-10    TPro  4.8<L>  /  Alb  2.8<L>  /  TBili  0.5  /  DBili  x   /  AST  19  /  ALT  19  /  AlkPhos  91  09-10    LIVER FUNCTIONS - ( 10 Sep 2021 05:48 )  Alb: 2.8 g/dL / Pro: 4.8 g/dL / ALK PHOS: 91 U/L / ALT: 19 U/L / AST: 19 U/L / GGT: x           CARDIAC MARKERS ( 09 Sep 2021 12:02 )  x     / <0.01 ng/mL / x     / x     / x                          RADIOLOGY, ECG, & ADDITIONAL TESTS:  12 Lead ECG:   Ventricular Rate 62 BPM    Atrial Rate 58 BPM    QRS Duration 102 ms    Q-T Interval 440 ms    QTC Calculation(Bazett) 446 ms    R Axis 70 degrees    T Axis 74 degrees    Diagnosis Line Atrial fibrillation  Abnormal ECG    Confirmed by Carlos A Flower ([id number]) on 9/9/2021 8:19:26 AM (09-09-21 @ 04:29)      RECENT DIAGNOSTIC ORDERS:  Complete Blood Count + Automated Diff: AM Sched. Collection: 11-Sep-2021 04:30 (09-10-21 @ 14:23)  Comprehensive Metabolic Panel: AM Sched. Collection: 11-Sep-2021 04:30 (09-10-21 @ 14:23)  Magnesium, Serum: AM Sched. Collection: 11-Sep-2021 04:30 (09-10-21 @ 14:23)  Magnesium, Serum: Repeat From: 10-Sep-2021 14:22 To: 17-Sep-2021 04:30, Every 1 day(s) (09-10-21 @ 14:23)  Comprehensive Metabolic Panel: Repeat From: 10-Sep-2021 14:22 To: 17-Sep-2021 04:30, Every 1 day(s) (09-10-21 @ 14:23)  Complete Blood Count + Automated Diff: Repeat From: 10-Sep-2021 14:23 To: 17-Sep-2021 04:30, Every 1 day(s) (09-10-21 @ 14:23)  MR Head No Cont: Routine   Indication: age-indeterminate infarct on CT  [facility] (09-10-21 @ 13:39)      MEDICATIONS:  MEDICATIONS  (STANDING):  apixaban 5 milliGRAM(s) Oral every 12 hours  atorvastatin 40 milliGRAM(s) Oral at bedtime  chlorhexidine 4% Liquid 1 Application(s) Topical <User Schedule>  DULoxetine 60 milliGRAM(s) Oral daily  folic acid 1 milliGRAM(s) Oral daily  influenza   Vaccine 0.5 milliLiter(s) IntraMuscular once  lactobacillus acidophilus 1 Tablet(s) Oral daily  methocarbamol 500 milliGRAM(s) Oral two times a day  metroNIDAZOLE  IVPB      metroNIDAZOLE  IVPB 500 milliGRAM(s) IV Intermittent every 8 hours  sodium chloride 0.9%. 1000 milliLiter(s) (50 mL/Hr) IV Continuous <Continuous>  thiamine 100 milliGRAM(s) Oral daily  vancomycin    Solution 500 milliGRAM(s) Oral every 6 hours    MEDICATIONS  (PRN):  ALBUTerol    90 MICROgram(s) HFA Inhaler 2 Puff(s) Inhalation every 6 hours PRN Shortness of Breath and/or Wheezing  melatonin 5 milliGRAM(s) Oral at bedtime PRN Insomnia      HOME MEDICATIONS:  albuterol 90 mcg/inh inhalation aerosol (07-06)  apixaban 5 mg oral tablet (07-06)  DULoxetine 60 mg oral delayed release capsule (07-06)  folic acid 1 mg oral tablet (07-06)  methocarbamol 500 mg oral tablet (07-06)  thiamine 100 mg oral tablet (07-06)      PHYSICAL EXAM:  GENERAL: NAD, well-groomed, well-developed  HEAD:  Normocephalic, chin laceration well dressed   EYES: EOMI, PERRLA, conjunctiva and sclera clear  NECK: Supple, No JVD, Normal thyroid  NERVOUS SYSTEM:  Alert & Oriented X3, Good concentration  CHEST/LUNG: Clear to auscultation bilaterally; No rales, rhonchi, wheezing, or rubs  HEART: Regular rate and rhythm; No murmurs, rubs, or gallops  ABDOMEN: Soft, Nontender, distended; Bowel sounds present  EXTREMITIES:  2+ Peripheral Pulses, No clubbing, cyanosis, or edema  LYMPH: No lymphadenopathy noted  SKIN: No rashes or lesions

## 2021-09-10 NOTE — PROGRESS NOTE ADULT - SUBJECTIVE AND OBJECTIVE BOX
Gastroenterology progress note:     Patient is a 70y old  Female who presents with a chief complaint of BRBPR (10 Sep 2021 17:01)       Admitted on: 09-03-21    We are following the patient for: c. diff      Interval History: patient with three bowel movements today , no abdominal pain , no fever , tolerating diet     Patient's medical problems are stable    Prior records reviewed (Y/N): Y  History obtained from someone other than patient (Y/N): Y      PAST MEDICAL & SURGICAL HISTORY:  Atrial fibrillation    History of COPD        MEDICATIONS  (STANDING):  apixaban 5 milliGRAM(s) Oral every 12 hours  atorvastatin 40 milliGRAM(s) Oral at bedtime  chlorhexidine 4% Liquid 1 Application(s) Topical <User Schedule>  DULoxetine 60 milliGRAM(s) Oral daily  folic acid 1 milliGRAM(s) Oral daily  influenza   Vaccine 0.5 milliLiter(s) IntraMuscular once  lactobacillus acidophilus 1 Tablet(s) Oral daily  methocarbamol 500 milliGRAM(s) Oral two times a day  metroNIDAZOLE  IVPB      metroNIDAZOLE  IVPB 500 milliGRAM(s) IV Intermittent every 8 hours  sodium chloride 0.9%. 1000 milliLiter(s) (50 mL/Hr) IV Continuous <Continuous>  thiamine 100 milliGRAM(s) Oral daily  vancomycin    Solution 500 milliGRAM(s) Oral every 6 hours    MEDICATIONS  (PRN):  ALBUTerol    90 MICROgram(s) HFA Inhaler 2 Puff(s) Inhalation every 6 hours PRN Shortness of Breath and/or Wheezing  melatonin 5 milliGRAM(s) Oral at bedtime PRN Insomnia      Allergies  No Known Allergies      Review of Systems:   Cardiovascular:  No Chest Pain, No Palpitations  Respiratory:  No Cough, No Dyspnea  Gastrointestinal:  As described in HPI    Physical Examination:  T(C): 36.2 (09-10-21 @ 13:05), Max: 36.4 (09-09-21 @ 19:07)  HR: 84 (09-10-21 @ 13:44) (66 - 84)  BP: 92/52 (09-10-21 @ 13:44) (90/55 - 115/63)  RR: 18 (09-10-21 @ 13:05) (18 - 18)  SpO2: --      09-09-21 @ 07:01  -  09-10-21 @ 07:00  --------------------------------------------------------  IN: 350 mL / OUT: 800 mL / NET: -450 mL    09-10-21 @ 07:01  -  09-10-21 @ 18:20  --------------------------------------------------------  IN: 250 mL / OUT: 0 mL / NET: 250 mL      Constitutional: No acute distress.  Respiratory:  No signs of respiratory distress. Lung sounds are clear bilaterally.  Cardiovascular:  S1 S2, Regular rate and rhythm.  Abdominal: Abdomen is soft, symmetric, and non-tender with MILD  distention. There are no visible lesions or scars. Bowel sounds are present and normoactive in all four quadrants. No masses, hepatomegaly, or splenomegaly are noted.   Skin: No rashes, No Jaundice.        Data: (reviewed by attending)                        10.1   4.55  )-----------( 199      ( 10 Sep 2021 05:48 )             32.4     Hgb trend:  10.1  09-10-21 @ 05:48  10.0  09-08-21 @ 05:29        09-10    137  |  102  |  12  ----------------------------<  71  4.3   |  27  |  0.5<L>    Ca    8.2<L>      10 Sep 2021 05:48  Mg     1.8     09-10    TPro  4.8<L>  /  Alb  2.8<L>  /  TBili  0.5  /  DBili  x   /  AST  19  /  ALT  19  /  AlkPhos  91  09-10    Liver panel trend:  TBili 0.5   /   AST 19   /   ALT 19   /   AlkP 91   /   Tptn 4.8   /   Alb 2.8    /   DBili --      09-10  TBili 0.5   /   AST 31   /   ALT 19   /   AlkP 92   /   Tptn 5.0   /   Alb 3.0    /   DBili --      09-09  TBili 0.5   /   AST 18   /   ALT 17   /   AlkP 92   /   Tptn 4.9   /   Alb 2.9    /   DBili --      09-08  TBili 0.6   /   AST 16   /   ALT 16   /   AlkP 85   /   Tptn 4.7   /   Alb 2.8    /   DBili --      09-07  TBili 0.7   /   AST 16   /   ALT 16   /   AlkP 80   /   Tptn 4.7   /   Alb 2.8    /   DBili --      09-06  TBili 0.7   /   AST 18   /   ALT 17   /   AlkP 83   /   Tptn 4.7   /   Alb 2.9    /   DBili --      09-05  TBili 1.1   /   AST 25   /   ALT 20   /   AlkP 90   /   Tptn 5.3   /   Alb 3.2    /   DBili --      09-04  TBili 0.9   /   AST 22   /   ALT 21   /   AlkP 81   /   Tptn 5.5   /   Alb 3.4    /   DBili --      09-03             Radiology: (reviewed by attending)  CT Abdomen and Pelvis w/ IV Cont:   EXAM:  CT ABDOMEN AND PELVIS IC        EXAM:  CT CHEST IC            PROCEDURE DATE:  09/09/2021            INTERPRETATION:  REASON FOR EXAM / CLINICAL STATEMENT:  Trauma Code. Unwitnessed fall. WBC5.69    PMHx of A Fib, COPD, S/P gastric bypass, Lower GI bleed    TECHNIQUE:  Contiguous axial CT images were obtained from the thoracic inlet to the pubic symphysis with IV contrast.  Reformatted images in the coronal and sagittal planes were acquired.      COMPARISON CT: CT scan of the abdomen pelvis dated 9/3/2021    OTHER STUDIES USED FOR CORRELATION: None.    FINDINGS:    TUBES AND LINES: None.    HEART AND VESSELS: Biatrial enlargement. Coronary artery calcifications are noted.  There is no pericardial effusion.    Normal caliber aorta,  The pulmonary artery is dilated. There is no evidence of central pulmonary embolism.    Aortic calcifications are noted. There is no evidence of aortic aneurysm. There is no evidence of aortic dissection.    The ascending thoracic aorta measures 3.2 cmin diameter; the descending thoracic aorta measures 3.0 cm; the main pulmonary artery is dilated, measuring 4.7 cm in diameter, which may be seen with pulmonary hypertension.    Brachiocephalic vessels are unremarkable.    MEDIASTINUM: There are no enlarged mediastinal, hilar or axillary lymph nodes. The visualized portion of the thyroid gland is unremarkable.    AIRWAYS, LUNGS AND PLEURA: The central tracheobronchial tree is patent. There are small bilateral pleural effusions. There are mild dependent atelectatic changes at the lung bases.  There is no pneumothorax.        FINDINGS ABDOMEN:    HEPATIC: The liver is normal in size with no evidence of solid mass. The portal vein is patent.    BILIARY: Status post cholecystectomy with mild bileduct dilatation that may be seen following cholecystectomy.    SPLEEN: Unremarkable.    PANCREAS: The pancreas is normal in size and configuration. No evidence of mass or pancreatitis.    ADRENAL GLANDS: Unremarkable.    KIDNEYS: There is symmetric renal enhancement. No evidence of hydronephrosis, calcified stones, or solid mass. Left renal cyst.    ABDOMINOPELVIC NODES: Unremarkable.    PELVIC ORGANS: No evidence of pelvic mass, lymphadenopathy, or fluid collection.    PERITONEUM/MESENTERY/BOWEL: Status post gastric bypass surgery. No evidence of bowel obstruction, colitis, inflammatory process, or ascites. Anastomosis is noted in the right lower quadrant. No pneumoperitoneum.    BONES/SOFT TISSUES: Degenerative changes and scoliosis of the spine are noted. No evidence of acute fracture. Unchanged right gluteal subcutaneous cystic lesion.    OTHER: Aortoiliac calcifications are noted with no evidence of abdominal aortic aneurysm.      IMPRESSION:    No evidence of intra-thoracic, abdominal or pelvic visceral laceration or hematoma.    No evidence of acute fracture.    --- End of Report ---              MARCO A SOLIS MD; Attending Interventional Radiologist  This document has been electronically signed. Sep  9 2021 10:51PM (09-09-21 @ 21:35)

## 2021-09-10 NOTE — PROGRESS NOTE ADULT - ASSESSMENT
69 yo F with recent dx of Afib on Eliquis, perianal abscess, fistula, and colitis 2 mo ago,  COPD who presents to the ED with BRBPR for 2d hemodynamically stable on admission, Hb 10.2 (baseline 9) , melena on MARVIN and external hemorrhoid  and no acute signs of bleed on CT angio abd and pelvis:    # Hematochezia 2/2 hemorroids vs colonic polyps vs IBD RESOLVED  # Acute blood loss anemia  #Melena  - Remains but hypotensive 86s-100s systolic,  getting mIVF, no acute signs of bleeding  - colonoscopy 7/1/21 - polypectomy for sessile polyps, perianal abscess, bx at the time negative for IBD  -EGD 7/1 non erosive gastritis and white plaques in esophagus  - Hb stable 10  - active T&S, maintain 2 large bore IV,  transfuse for hgb <7  - started on IVF   - GI consult recs: no episodes of hematochezia during this admission +c diff colitis>> pt will get outpatient colonoscopy    #C diff colitis + Megacolon consistent with Fulminant colitis  -Stool pcr positive for c.diff  -6 episodes of loose BM on 9/8 abdominal distension and abdominal pain  -KUB- Megacolon and 10cm dilated large bowel concerning for fulminant colitis  -C/w IV flagyl 500mg q24h and po vancomycin 500mg q24h  -repeat KUB showed slightly improved dilated bowel loops  -f/u repeat KUB on 9/10 and monitor BM frequency  -GI following- c/w current antibiotics course    #Unwitnessed fall , L chin laceration -LOC +HT on eliquis  -Negative CTH for intracranial bleed  -4cm L chin laceration evaluated by trauma surgery team  -Wrist Xray showed widening of the scapholunate interval indicative of scapholunate ligament tear> orthopedic surgery and OT c/s  -Seen by OT > pt will get R wrist splint    #Hypotension- 80s-109 systolic 2/2 to hypovolemia  -Monitor BP  -Started 1 L IVF @75cc/hr for 10hrs  -Monitor volume status due HF status    #Atrial fibrillation on eliquis  - CHADVASC 3  - C/w eliquis and watch for signs of acute bleeding    #Hypokalemia -RESOLVED  - K 3.1 ,repleted-->3.5    # Hyponatremia RESOLVED  - 129->130->137->132  - likely hypotonic hypovolemic etiology  - continue mIVF as above    #COPD stable not in exacerbation  - not on home oxygen ,no wheezing on exam  - inhaler PRN    #HFpEF ,euvolemic on exam   -TTE 6/27 EF of 63 %.  - monitor I&O  - BNP; from 6/25- 1500-->912  - avoid volume overload, euvolemic    # EtOH abuse with suspected folate/thiamine deficiency  - C/w folic acid and thiamine     # Depression on duloxetine  - no suicidal ideation  - c/w SSRI  - f/u w psychiatry as outpatient    Diet - NPO for GI eval  GI px  DVT px  Disposition - floor     #Progress Note Handoff  Pending (specify):  cdiff diarrhea  Family discussion: patient aware of plan. in agreement. all questions answered  Disposition: Home___

## 2021-09-10 NOTE — PROGRESS NOTE ADULT - ASSESSMENT
ASSESSMENT:  70y Female w/ PMHx of Afib on Eliquis, Abdominal abscess 2 mo ago, Gastric Bypass surgery 20+ years ago, COPD who presents to the ED with BRBPR , now called as a trauma consult s/p mechanical slip and fall while admitted to the medical floor, +HT, -LOC, +AC. Trauma assessment: ABCs intact, GCS 15, AAOx3, GARCIA presenting with - 3 cm laceration under left mandible    PLAN:  - Pain Management  - Strict Ins and Out  - Continue Current Diet Orders  - Continue ABX  - Continue IVF    Lines/Tubes: PIV    TRAUMA SPECTRA: [id number]

## 2021-09-10 NOTE — OCCUPATIONAL THERAPY INITIAL EVALUATION ADULT - ADDITIONAL COMMENTS
PTA: pt lives in [facility]. Uses RW to amb indoors. Not a community ambulator. Has HHA 5 days/week, 3 hours/day. Has bath tub with grab bars.

## 2021-09-11 LAB
ALBUMIN SERPL ELPH-MCNC: 3 G/DL — LOW (ref 3.5–5.2)
ALP SERPL-CCNC: 83 U/L — SIGNIFICANT CHANGE UP (ref 30–115)
ALT FLD-CCNC: 18 U/L — SIGNIFICANT CHANGE UP (ref 0–41)
ANION GAP SERPL CALC-SCNC: 7 MMOL/L — SIGNIFICANT CHANGE UP (ref 7–14)
AST SERPL-CCNC: 21 U/L — SIGNIFICANT CHANGE UP (ref 0–41)
BASOPHILS # BLD AUTO: 0.03 K/UL — SIGNIFICANT CHANGE UP (ref 0–0.2)
BASOPHILS NFR BLD AUTO: 0.5 % — SIGNIFICANT CHANGE UP (ref 0–1)
BILIRUB SERPL-MCNC: 0.5 MG/DL — SIGNIFICANT CHANGE UP (ref 0.2–1.2)
BUN SERPL-MCNC: 11 MG/DL — SIGNIFICANT CHANGE UP (ref 10–20)
CALCIUM SERPL-MCNC: 8.3 MG/DL — LOW (ref 8.5–10.1)
CHLORIDE SERPL-SCNC: 102 MMOL/L — SIGNIFICANT CHANGE UP (ref 98–110)
CHOLEST SERPL-MCNC: 136 MG/DL — SIGNIFICANT CHANGE UP
CO2 SERPL-SCNC: 27 MMOL/L — SIGNIFICANT CHANGE UP (ref 17–32)
CREAT SERPL-MCNC: 0.5 MG/DL — LOW (ref 0.7–1.5)
EOSINOPHIL # BLD AUTO: 0.1 K/UL — SIGNIFICANT CHANGE UP (ref 0–0.7)
EOSINOPHIL NFR BLD AUTO: 1.7 % — SIGNIFICANT CHANGE UP (ref 0–8)
GLUCOSE BLDC GLUCOMTR-MCNC: 76 MG/DL — SIGNIFICANT CHANGE UP (ref 70–99)
GLUCOSE BLDC GLUCOMTR-MCNC: 89 MG/DL — SIGNIFICANT CHANGE UP (ref 70–99)
GLUCOSE BLDC GLUCOMTR-MCNC: 90 MG/DL — SIGNIFICANT CHANGE UP (ref 70–99)
GLUCOSE BLDC GLUCOMTR-MCNC: 94 MG/DL — SIGNIFICANT CHANGE UP (ref 70–99)
GLUCOSE SERPL-MCNC: 68 MG/DL — LOW (ref 70–99)
HCT VFR BLD CALC: 33.4 % — LOW (ref 37–47)
HDLC SERPL-MCNC: 71 MG/DL — SIGNIFICANT CHANGE UP
HGB BLD-MCNC: 10.4 G/DL — LOW (ref 12–16)
IMM GRANULOCYTES NFR BLD AUTO: 0.3 % — SIGNIFICANT CHANGE UP (ref 0.1–0.3)
LACTATE SERPL-SCNC: 1.2 MMOL/L — SIGNIFICANT CHANGE UP (ref 0.7–2)
LIPID PNL WITH DIRECT LDL SERPL: 55 MG/DL — SIGNIFICANT CHANGE UP
LYMPHOCYTES # BLD AUTO: 1.59 K/UL — SIGNIFICANT CHANGE UP (ref 1.2–3.4)
LYMPHOCYTES # BLD AUTO: 27.4 % — SIGNIFICANT CHANGE UP (ref 20.5–51.1)
MAGNESIUM SERPL-MCNC: 2.1 MG/DL — SIGNIFICANT CHANGE UP (ref 1.8–2.4)
MCHC RBC-ENTMCNC: 30.4 PG — SIGNIFICANT CHANGE UP (ref 27–31)
MCHC RBC-ENTMCNC: 31.1 G/DL — LOW (ref 32–37)
MCV RBC AUTO: 97.7 FL — SIGNIFICANT CHANGE UP (ref 81–99)
MONOCYTES # BLD AUTO: 0.46 K/UL — SIGNIFICANT CHANGE UP (ref 0.1–0.6)
MONOCYTES NFR BLD AUTO: 7.9 % — SIGNIFICANT CHANGE UP (ref 1.7–9.3)
NEUTROPHILS # BLD AUTO: 3.6 K/UL — SIGNIFICANT CHANGE UP (ref 1.4–6.5)
NEUTROPHILS NFR BLD AUTO: 62.2 % — SIGNIFICANT CHANGE UP (ref 42.2–75.2)
NON HDL CHOLESTEROL: 65 MG/DL — SIGNIFICANT CHANGE UP
NRBC # BLD: 0 /100 WBCS — SIGNIFICANT CHANGE UP (ref 0–0)
PLATELET # BLD AUTO: 179 K/UL — SIGNIFICANT CHANGE UP (ref 130–400)
POTASSIUM SERPL-MCNC: 4.7 MMOL/L — SIGNIFICANT CHANGE UP (ref 3.5–5)
POTASSIUM SERPL-SCNC: 4.7 MMOL/L — SIGNIFICANT CHANGE UP (ref 3.5–5)
PROT SERPL-MCNC: 5.1 G/DL — LOW (ref 6–8)
RBC # BLD: 3.42 M/UL — LOW (ref 4.2–5.4)
RBC # FLD: 19 % — HIGH (ref 11.5–14.5)
SODIUM SERPL-SCNC: 136 MMOL/L — SIGNIFICANT CHANGE UP (ref 135–146)
TRIGL SERPL-MCNC: 51 MG/DL — SIGNIFICANT CHANGE UP
WBC # BLD: 5.8 K/UL — SIGNIFICANT CHANGE UP (ref 4.8–10.8)
WBC # FLD AUTO: 5.8 K/UL — SIGNIFICANT CHANGE UP (ref 4.8–10.8)

## 2021-09-11 PROCEDURE — 74018 RADEX ABDOMEN 1 VIEW: CPT | Mod: 26

## 2021-09-11 PROCEDURE — 99232 SBSQ HOSP IP/OBS MODERATE 35: CPT

## 2021-09-11 RX ORDER — IOHEXOL 300 MG/ML
30 INJECTION, SOLUTION INTRAVENOUS ONCE
Refills: 0 | Status: COMPLETED | OUTPATIENT
Start: 2021-09-11 | End: 2021-09-11

## 2021-09-11 RX ORDER — HYDROCORTISONE 1 %
1 OINTMENT (GRAM) TOPICAL AT BEDTIME
Refills: 0 | Status: DISCONTINUED | OUTPATIENT
Start: 2021-09-11 | End: 2021-09-18

## 2021-09-11 RX ADMIN — Medication 1 SUPPOSITORY(S): at 21:01

## 2021-09-11 RX ADMIN — Medication 100 MILLIGRAM(S): at 11:24

## 2021-09-11 RX ADMIN — Medication 500 MILLIGRAM(S): at 17:44

## 2021-09-11 RX ADMIN — Medication 500 MILLIGRAM(S): at 23:02

## 2021-09-11 RX ADMIN — Medication 100 MILLIGRAM(S): at 05:57

## 2021-09-11 RX ADMIN — CHLORHEXIDINE GLUCONATE 1 APPLICATION(S): 213 SOLUTION TOPICAL at 05:57

## 2021-09-11 RX ADMIN — DULOXETINE HYDROCHLORIDE 60 MILLIGRAM(S): 30 CAPSULE, DELAYED RELEASE ORAL at 11:24

## 2021-09-11 RX ADMIN — APIXABAN 5 MILLIGRAM(S): 2.5 TABLET, FILM COATED ORAL at 05:57

## 2021-09-11 RX ADMIN — Medication 1 TABLET(S): at 11:24

## 2021-09-11 RX ADMIN — Medication 500 MILLIGRAM(S): at 11:24

## 2021-09-11 RX ADMIN — Medication 500 MILLIGRAM(S): at 05:58

## 2021-09-11 RX ADMIN — APIXABAN 5 MILLIGRAM(S): 2.5 TABLET, FILM COATED ORAL at 17:44

## 2021-09-11 RX ADMIN — Medication 100 MILLIGRAM(S): at 15:45

## 2021-09-11 RX ADMIN — IOHEXOL 30 MILLILITER(S): 300 INJECTION, SOLUTION INTRAVENOUS at 21:00

## 2021-09-11 RX ADMIN — Medication 100 MILLIGRAM(S): at 21:00

## 2021-09-11 RX ADMIN — METHOCARBAMOL 500 MILLIGRAM(S): 500 TABLET, FILM COATED ORAL at 05:57

## 2021-09-11 RX ADMIN — METHOCARBAMOL 500 MILLIGRAM(S): 500 TABLET, FILM COATED ORAL at 17:44

## 2021-09-11 RX ADMIN — Medication 1 MILLIGRAM(S): at 11:24

## 2021-09-11 NOTE — PROGRESS NOTE ADULT - ASSESSMENT
71 yo F with recent dx of Afib on Eliquis, Abdominal abscess 2 mo ago, Gastric Bypass surgery 20+ years ago, COPD who presents to the ED with BRBPR .    # Hematochezia  # Hx of hemorrhoids  # Acute blood loss anemia  # suspect lower GIB  - currently non tachycardic but relative hypotension ; s/p IVF   - MARVIN w/melena and external hemorrhoids  - chronic diarrhea and fecal incontinence reported; c diff ordered and positive   - hx of fistula/ perianal abscess  - colonoscopy 7/1/21 - multiple polypectomy   - Hb stable   - active T&S, transfuse for hgb <7  - GI consult : plan for Colonoscopy but patient refused more than once, plan for outpatient colonoscopy     #C Diff Colitis   - distended colon noted on KUB, nontoxic   - start 500mg PO vancomycin and IV Flagyl , may need vancomycin enemas if not having BMs   - had another 3 watery BMs overnight   - ID following , GI following     #Mechanical Fall in hospital   - CTH w/ Focal hypodensity in the inferior right cerebellar hemisphere possibly representing an age-indeterminate infarct  - MRI Brain ordered negative for infarct   - CT C/A/P per Trauma surgery - unremarkable   - Xray R wrist w/ widening of scapholunate interval c/w ligament tear   - Trauma surgery following: s/p suturing laceration of chin  - Ortho surgery consulted for possible wrist ligament tear , no acute intervention- OT and wrist splint     #Hypokalemia - resolved     #Hyponatremia- likely hypotonic hypovolemic , now resolved     #Atrial fibrillation on eliquis  - CHADVASC 3  -  Eliquis     #COPD ,stable not in exacerbation  - not on home oxygen ,no wheezing on exam  - inhaler PRN    #HFpEF ,euvolemic on exam   -TTE 6/27 EF of 63 %.  - monitor I&O  - BNP; from 6/25- 1500-->912  - avoid volume overload     # EtOH abuse with suspected folate/thiamine deficiency  - on folic acid and thiamine     # Depression on duloxetine  - no suicidal ideation  - c/w SSRI  - f/u w psychiatry as outpatient      #Progress Note Handoff  Pending (specify): improvement in c diff colitis  Family discussion: patient aware of plan. in agreement. all questions answered  Disposition: Unknown at this time________    Dayami Henry, DO

## 2021-09-11 NOTE — PROGRESS NOTE ADULT - ASSESSMENT
69 yo F with recent dx of Afib on Eliquis, Abdominal abscess 2 mo ago, Gastric surgery 20+ years ago, COPD who presents to the ED with BRBPR , one episode before she presented , no bleeding in hospital.  No CTA evidence of active GI bleeding. Anorectal wall thickening    #)C. Diff colitis with colon distension  No signs of toxic megacolon, no ileus, 4 bowel movements yesterday getting better.     REC:  c/w vancomycin 500 mg QID, Flagyl 500 mg IV tid   serial abdominal exam   daily KUB  avoid narcotics and NSAIDS  ID input  appreciated   Lactose free diet     #)hematochezia : lower GI bleed , likely hemorrhoidal  - no overt bleeding  HD stable , HB at baseline   recent EGD/ colon July 2020 showing large internal and external hemorrhoids   CTA negative for GI bleed  patient refused repeat colonoscopy understanding the risks and benefits     Rec:   - Anusol suppositories once every night    - follow up with GI as outpatient for possible colonoscopy      #)history of anorectal abscesses s/p drainage / fistulization   Endoscopic workup negative for IBD ( random biopsies taken from T.I and terminal ileum 7/2021 are negative )   follow up with GI as outpatient  69 yo F with recent dx of Afib on Eliquis, Abdominal abscess 2 mo ago, Gastric surgery 20+ years ago, COPD who presents to the ED with BRBPR , one episode before she presented , no bleeding in hospital.  No CTA evidence of active GI bleeding. Anorectal wall thickening    #)C. Diff colitis with colon distension  No signs of toxic megacolon, no ileus, 4 bowel movements yesterday getting better.     REC:  c/w vancomycin 500 mg QID, Flagyl 500 mg IV tid   serial abdominal exam   daily KUB  avoid narcotics and NSAIDS  ID input  appreciated   Lactose free diet     #)hematochezia : lower GI bleed , likely hemorrhoidal  - no overt bleeding  HD stable , HB at baseline   recent EGD/ colon July 2020 showing large internal and external hemorrhoids   CTA negative for GI bleed  patient refused repeat colonoscopy understanding the risks and benefits     Rec:   - Anusol suppositories once every night    - follow up with GI as outpatient for possible colonoscopy    #)history of anorectal abscesses s/p drainage / fistulization   Endoscopic workup negative for IBD ( random biopsies taken from T.I and terminal ileum 7/2021 are negative )   follow up with GI as outpatient     recall as needed

## 2021-09-11 NOTE — CONSULT NOTE ADULT - ASSESSMENT
ASSESSMENT:  70yF w/ PMHx of  a fib, copd, pshx gastric bypass 20 years ago, open gallbladder (unknown when) who presented s/p fall and is being treated for C diff colitis.   Physical exam findings, imaging, and labs as documented above.     PLAN:  -CT scan of abdomen/pelvis with IV contrast  -Plan as per GI  -No surgical intervention at this time. low clinical suspicion for toxic megacolon as the patient has no abdominal tenderness, her abdominal exam is improving, no WBC, is on the appropriate treatment for C diff colitis.   -Will follow    Lines/Tubes: PIV,     Above plan discussed with Attending Surgeon Dr. Roman  , patient, patient family, and Primary team  09-11-21 @ 14:00 ASSESSMENT:  70yF w/ PMHx of  a fib, copd, pshx gastric bypass 20 years ago, open gallbladder (unknown when) who presented s/p fall and is being treated for C diff colitis.   Physical exam findings, imaging, and labs as documented above.     PLAN:  -CT scan of abdomen/pelvis with IV contrast  - Maintain NPO for now  -Plan as per GI  -No surgical intervention at this time. low clinical suspicion for toxic megacolon as the patient has no abdominal tenderness, her abdominal exam is improving, no WBC, is on the appropriate treatment for C diff colitis.   -Will follow    Lines/Tubes: PIV,     Above plan discussed with Attending Surgeon Dr. Roman  , patient, patient family, and Primary team  09-11-21 @ 14:00    Senior Note  I have personally examined and evaluated the patient  I agree with the above plan and note, and I have edited where appropriate  Surgical Attending aware and agrees with plan

## 2021-09-11 NOTE — PROGRESS NOTE ADULT - ATTENDING COMMENTS
Patient with a Hx of colonic fistulization admitted for hematochezia. Patient is on ELiquis for Afib. She is refusing colonoscopy. Risks of bleeding on eliquis explained. concerns of a possible underlying malignancy were explained. An risks of CVA of eliquis was also discussed. Patient states she understands and continued to refuse colonoscopy.
abd distention improved. cont oral vanc and IV flagyl. daily KUBs for now
fall on medical floor. chin laceration sutured. Any physician can remove in 5-7 days. No injuries on CT h/cs/cap    no further trauma w/u needed. recall prn.
Patient case reviewed discussed. Agree with HPI/ PMH/ PE/ Labs and Assessment and plan per fellow note. Patient seen and examined     71 yo F with recent dx of Afib on Eliquis, Abdominal abscess 2 mo ago, Gastric surgery 20+ years ago, COPD who presents to the ED with BRBPR , one episode before she presented , no bleeding in hospital.  No CTA evidence of active GI bleeding. Anorectal wall thickening    #)C. Diff colitis with colon distension  No signs of toxic megacolon, no ileus, 4 bowel movements yesterday getting better.     REC:  c/w vancomycin 500 mg QID, Flagyl 500 mg IV tid   serial abdominal exam   daily KUB  avoid narcotics and NSAIDS  ID input  appreciated   Lactose free diet     #)hematochezia : lower GI bleed , likely hemorrhoidal  - no overt bleeding  HD stable , HB at baseline   recent EGD/ colon July 2020 showing large internal and external hemorrhoids   CTA negative for GI bleed  patient refused repeat colonoscopy understanding the risks and benefits     Rec:   - Anusol suppositories once every night    - follow up with GI as outpatient for possible colonoscopy    #)history of anorectal abscesses s/p drainage / fistulization   Endoscopic workup negative for IBD ( random biopsies taken from T.I and terminal ileum 7/2021 are negative )   follow up with GI as outpatient     recall as needed
pt who initially presented with BRBPR, CT showed anorectal wall thickening. pt was offered colonoscopy but refused, understanding the risks. pt also had C.dif in the hospital, stopped having BMs and had colonic distention up to 8 cm, now improving. please give vanco and flagyl as above with vanco enemas tomorrow if still not having BMs. daily KUBs.

## 2021-09-11 NOTE — PROGRESS NOTE ADULT - ASSESSMENT
69 yo F with recent dx of Afib on Eliquis, perianal abscess, fistula, and colitis 2 mo ago,  COPD who presents to the ED with BRBPR for 2d hemodynamically stable on admission, Hb 10.2 (baseline 9) , melena on MARVIN and external hemorrhoid  and no acute signs of bleed on CT angio abd and pelvis:    # Hematochezia 2/2 hemorroids vs colonic polyps vs IBD RESOLVED  # Acute blood loss anemia  #Melena  - Remains but hypotensive 86s-100s systolic,  getting mIVF, no acute signs of bleeding  - colonoscopy 7/1/21 - polypectomy for sessile polyps, perianal abscess, bx at the time negative for IBD  -EGD 7/1 non erosive gastritis and white plaques in esophagus  - Hb stable 10  - active T&S, maintain 2 large bore IV,  transfuse for hgb <7  - started on IVF   - GI consult recs: no episodes of hematochezia during this admission +c diff colitis>> pt will get outpatient colonoscopy    #C diff colitis + Megacolon consistent with Fulminant colitis  -Stool pcr positive for c.diff  -6 episodes of loose BM on 9/8 abdominal distension and abdominal pain  -KUB- Megacolon and 10cm dilated large bowel concerning for fulminant colitis on 9/8  -C/w IV flagyl 500mg q24h and po vancomycin 500mg q24h  -repeat KUB on 9/11 shows no improvement in dilated bowel loops compared to 9/10  -GI following- c/w current antibiotics course and monitor for any signs of peritonitis    #Unwitnessed fall , L chin laceration -LOC +HT on eliquis  -Negative CTH for intracranial bleed  -MRI- negative for any acute infarcts   -Negative trauma work up: CT Abd and Maxillofacial, CTspine- negative for fracture, intrathoracic, abdominal or pelvic visceral laceration or hematoma  -4cm L chin laceration repaired by surgery team   -Wrist Xray showed widening of the scapholunate interval indicative of scapholunate ligament tear> orthopedic surgery and OT c/s  -Seen by OT > Pt has R wrist brace    #Hypotension- 80s-109 systolic 2/2 to hypovolemia- IMPROVING  -Monitor BP 92-140s systolic  -s/p 1 L IVF @50cc/hr for 10hrs  -Monitor volume status due HF status  -Not overloaded on exam    #Atrial fibrillation on eliquis  - CHADVASC 3  - C/w eliquis and watch for signs of acute bleeding    #Hypokalemia -RESOLVED  - K 3.1 ,repleted-->3.5    # Hyponatremia RESOLVED  - 129->130->137->132  - likely hypotonic hypovolemic etiology  - continue mIVF as above    #COPD stable not in exacerbation  - not on home oxygen ,no wheezing on exam  - inhaler PRN    #HFpEF ,euvolemic on exam   -TTE 6/27 EF of 63 %.  - monitor I&O  - BNP; from 6/25- 1500-->912  - avoid volume overload, euvolemic    # EtOH abuse with suspected folate/thiamine deficiency  - C/w folic acid and thiamine     # Depression on duloxetine  - no suicidal ideation  - c/w SSRI  - f/u w psychiatry as outpatient    Diet - NPO for GI eval  GI px  DVT px  Disposition - floor     #Progress Note Handoff  Pending (specify):  cdiff diarrhea  Family discussion: patient aware of plan. in agreement. all questions answered  Disposition: Home___

## 2021-09-11 NOTE — CONSULT NOTE ADULT - CONSULT REQUESTED DATE/TIME
09-Sep-2021 06:00
11-Sep-2021 14:00
09-Sep-2021 13:00
09-Sep-2021 17:08
03-Sep-2021 18:50
09-Sep-2021 15:03

## 2021-09-11 NOTE — CONSULT NOTE ADULT - SUBJECTIVE AND OBJECTIVE BOX
GENERAL SURGERY CONSULT NOTE    Patient: WEGENER, LOIS , 70y ([birth year])Female   MRN: [id number]  Location: [address]  Visit: 09-03-21 Inpatient  Date: 09-11-21 @ 14:00    HPI:  71 yo F with recent dx of Afib on Eliquis, Abdominal abscess 2 mo ago, Gastric Bypass surgery 20+ years ago, COPD who presents to the ED with BRBPR .  The patient has been having intermittent abdominal cramps and watery diarrhea for months ,along with nausea and fecal incontinence that had affected her daily function .She reports an episode of bright red blood that filled the toilet ,and came to the ED.   Reports weight loss 30 Ib ,low appetite ,low energy and easy fatigue .  Reports sad ,depressed mood ,insomnia ,low energy ,no suicidal ideation.  Denies fever ,vomiting ,abdominal distention .  patient was admitted in june 2021 for hematochezia and perianal abscess, colonoscopy and surgical debridement was done and developed new onset afib during hospital stay.    In ED , hemodynamically stable ,Hb 10.2 , /59 , MARVIN was in ED and showed melena and tender external hemorrhoidal ,  CT Angio Abdomen and Pelvis w/ IV Cont 09.03.21 : No CTA evidence of active GI bleeding. Anorectal wall thickening, neoplasm is not excluded.  EGD-Colonoscopy 07.01.21 - White plaques in the esophagus  Erythema in the stomach compatible with non-erosive gastritis.  No evidence of gastric bypass surgery in EGD. 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 No ulcer was noted at the anastomosis site.   multiple polyps in colonoscopy.     (03 Sep 2021 14:33)    Surgery consulted to evaluate for toxic megacolon based on KUB that showed worsening colonic distension and reported worsening distension on abdominal exam. The patient is being treated for C. Diff, followed by gastroenterology, with noted improvement in exam. The patient has no abdominal pain, reports mild distension but improvement over the last few days. PSHX includes gastric bypass 20 years ago in [place] and an open gallbladder many years prior to that.       PAST MEDICAL & SURGICAL HISTORY:  Atrial fibrillation    History of COPD        Home Medications:  albuterol 90 mcg/inh inhalation aerosol: 2 puff(s) inhaled every 6 hours, As needed, Shortness of Breath and/or Wheezing (06 Jul 2021 14:58)  apixaban 5 mg oral tablet: 1 tab(s) orally every 12 hours (06 Jul 2021 14:58)  DULoxetine 60 mg oral delayed release capsule: 1 cap(s) orally once a day (06 Jul 2021 14:58)  folic acid 1 mg oral tablet: 1 tab(s) orally once a day (06 Jul 2021 14:58)  methocarbamol 500 mg oral tablet: 1 tab(s) orally 2 times a day (06 Jul 2021 14:58)  thiamine 100 mg oral tablet: 1 tab(s) orally once a day (06 Jul 2021 14:58)        VITALS:  T(F): 97.4 (09-11-21 @ 05:22), Max: 98.5 (09-10-21 @ 23:06)  HR: 67 (09-11-21 @ 05:22) (64 - 67)  BP: 141/63 (09-11-21 @ 05:22) (107/57 - 141/63)  RR: 18 (09-11-21 @ 05:22) (18 - 18)  SpO2: --    PHYSICAL EXAM:  General: NAD, AAOx3, calm and cooperative  HEENT: NCAT, SYL, EOMI, Trachea ML, Neck supple  Cardiac: RRR S1, S2, no Murmurs, rubs or gallops  Respiratory: CTAB, normal respiratory effort, breath sounds equal BL, no wheeze, rhonchi or crackles  Abdomen: mildly distended, Soft, , non-tender, no rebound, no guarding. +BS. well healed ex-lap surgical scar.   Musculoskeletal: Strength 5/5 BL UE/LE, ROM intact, compartments soft  Neuro: Sensation grossly intact and equal throughout, no focal deficits  Vascular: Pulses 2+ throughout, extremities well perfused  Skin: Warm/dry, normal color, no jaundice      MEDICATIONS  (STANDING):  apixaban 5 milliGRAM(s) Oral every 12 hours  chlorhexidine 4% Liquid 1 Application(s) Topical <User Schedule>  DULoxetine 60 milliGRAM(s) Oral daily  folic acid 1 milliGRAM(s) Oral daily  hydrocortisone hemorrhoidal Suppository 1 Suppository(s) Rectal at bedtime  influenza   Vaccine 0.5 milliLiter(s) IntraMuscular once  iohexol 300 mG (iodine)/mL Oral Solution 30 milliLiter(s) Oral once  lactobacillus acidophilus 1 Tablet(s) Oral daily  methocarbamol 500 milliGRAM(s) Oral two times a day  metroNIDAZOLE  IVPB      metroNIDAZOLE  IVPB 500 milliGRAM(s) IV Intermittent every 8 hours  thiamine 100 milliGRAM(s) Oral daily  vancomycin    Solution 500 milliGRAM(s) Oral every 6 hours    MEDICATIONS  (PRN):  ALBUTerol    90 MICROgram(s) HFA Inhaler 2 Puff(s) Inhalation every 6 hours PRN Shortness of Breath and/or Wheezing  melatonin 5 milliGRAM(s) Oral at bedtime PRN Insomnia      LAB/STUDIES:                        10.4   5.80  )-----------( 179      ( 11 Sep 2021 04:30 )             33.4     09-11    136  |  102  |  11  ----------------------------<  68<L>  4.7   |  27  |  0.5<L>    Ca    8.3<L>      11 Sep 2021 04:30  Mg     2.1     09-11    TPro  5.1<L>  /  Alb  3.0<L>  /  TBili  0.5  /  DBili  x   /  AST  21  /  ALT  18  /  AlkPhos  83  09-11      LIVER FUNCTIONS - ( 11 Sep 2021 04:30 )  Alb: 3.0 g/dL / Pro: 5.1 g/dL / ALK PHOS: 83 U/L / ALT: 18 U/L / AST: 21 U/L / GGT: x           IMAGING:  < from: Xray Kidney Ureter Bladder (09.10.21 @ 14:55) >  impression:    Stable appearance of Air and stool-filled loops of bowel. Surgical clips overlie the abdomen.    Degenerative changes of the osseous structures.    Vascular calcifications are noted.    Contrast is visualized within the urinary bladder.    --- End of Report ---      < end of copied text >      ACCESS DEVICES:  [ x] Peripheral IV  [ ] Central Venous Line	[ ] R	[ ] L	[ ] IJ	[ ] Fem	[ ] SC	Placed:   [ ] Arterial Line		[ ] R	[ ] L	[ ] Fem	[ ] Rad	[ ] Ax	Placed:   [ ] PICC:					[ ] Mediport  [ ] Urinary Catheter, Date Placed:

## 2021-09-11 NOTE — PROGRESS NOTE ADULT - SUBJECTIVE AND OBJECTIVE BOX
WEGENER, LOIS  70y  Female      Patient is a 70y old  Female who presents with a chief complaint of BRBPR (10 Sep 2021 18:19)      INTERVAL HPI/OVERNIGHT EVENTS: No acute overnight events. Pt had 3 loose bm overnight, no abdominal pain, no hematochezia or melena.   This morning, she was found resting comfortably in her room in no acute distress. She denies any abdominal pain just some anal discomfort from her multiple bm. No fever, chills, nausea, vomiting, dizziness, abdominal pain.      REVIEW OF SYSTEMS:  CONSTITUTIONAL: No fever, weight loss, or fatigue  EYES: No eye pain, visual disturbances, or discharge  ENMT:  No difficulty hearing, tinnitus, vertigo; No sinus or throat pain  NECK: No pain or stiffness  BREASTS: No pain, masses, or nipple discharge  RESPIRATORY: No cough, wheezing, chills or hemoptysis; No shortness of breath  CARDIOVASCULAR: No chest pain, palpitations, dizziness, or leg swelling  GASTROINTESTINAL: No abdominal or epigastric pain. No nausea, vomiting, or hematemesis; +diarrhea, no melena or hematochezia.  GENITOURINARY: No dysuria, frequency, hematuria, or incontinence  NEUROLOGICAL: No headaches, memory loss, loss of strength, numbness, or tremors  SKIN: No itching, burning, rashes, or lesions   LYMPH NODES: No enlarged glands  ENDOCRINE: No heat or cold intolerance; No hair loss  MUSCULOSKELETAL: No joint pain or swelling; No muscle, back, or extremity pain  PSYCHIATRIC: No depression, anxiety, mood swings, or difficulty sleeping  HEME/LYMPH: No easy bruising, or bleeding gums  ALLERY AND IMMUNOLOGIC: No hives or eczema  FAMILY HISTORY:    T(C): 36.3 (09-11-21 @ 05:22), Max: 36.9 (09-10-21 @ 23:06)  HR: 67 (09-11-21 @ 05:22) (64 - 84)  BP: 141/63 (09-11-21 @ 05:22) (90/55 - 141/63)  RR: 18 (09-11-21 @ 05:22) (18 - 18)  SpO2: --  Wt(kg): --Vital Signs Last 24 Hrs  T(C): 36.3 (11 Sep 2021 05:22), Max: 36.9 (10 Sep 2021 23:06)  T(F): 97.4 (11 Sep 2021 05:22), Max: 98.5 (10 Sep 2021 23:06)  HR: 67 (11 Sep 2021 05:22) (64 - 84)  BP: 141/63 (11 Sep 2021 05:22) (90/55 - 141/63)  BP(mean): --  RR: 18 (11 Sep 2021 05:22) (18 - 18)  SpO2: --  No Known Allergies      PHYSICAL EXAM:  GENERAL: NAD, well-groomed, well-developed  HEAD:  Atraumatic, Normocephalic  EYES: EOMI, PERRLA, conjunctiva and sclera clear  ENMT: No tonsillar erythema, exudates, or enlargement; Moist mucous membranes, Good dentition, No lesions  NECK: Supple, No JVD, Normal thyroid  NERVOUS SYSTEM:  Alert & Oriented X3, Good concentration; Motor Strength 5/5 B/L upper and lower extremities; DTRs 2+ intact and symmetric  CHEST/LUNG: Clear to percussion bilaterally; No rales, rhonchi, wheezing, or rubs  HEART: Regular rate and rhythm; No murmurs, rubs, or gallops  ABDOMEN: mildly distended; Bowel sounds present, non tender, no rigidity, rebound tenderness or guarding  EXTREMITIES:  2+ Peripheral Pulses, No clubbing, cyanosis, or edema  LYMPH: No lymphadenopathy noted  SKIN: No rashes or lesions    Consultant(s) Notes Reviewed:  [x ] YES  [ ] NO  Care Discussed with Consultants/Other Providers [ x] YES  [ ] NO    LABS:                     10.4   5.80  )-----------( 179      ( 11 Sep 2021 04:30 )             33.4     09-11    136  |  102  |  11  ----------------------------<  68<L>  4.7   |  27  |  0.5<L>    Ca    8.3<L>      11 Sep 2021 04:30  Mg     2.1     09-11    TPro  5.1<L>  /  Alb  3.0<L>  /  TBili  0.5  /  DBili  x   /  AST  21  /  ALT  18  /  AlkPhos  83  09-11        RADIOLOGY & ADDITIONAL TESTS:    < from: MR Head No Cont (09.10.21 @ 21:36) >  IMPRESSION: No acute territorial infarct seen.    Nonspecific focus of T1 prolongation involving the clivus as described above.    < from: CT Head No Cont (09.09.21 @ 04:26) >  IMPRESSION:    Focal hypodensity in the inferior right cerebellar hemisphere possibly representing an age-indeterminate infarct. Further evaluation with MRI of the brain can be obtained if clinically indicated.    No evidence of intracranial hemorrhage, mass effect or midline shift.    < end of copied text >    < from: CT Abdomen and Pelvis w/ IV Cont (09.09.21 @ 21:35) >  IMPRESSION:    No evidence of intra-thoracic, abdominal or pelvic visceral laceration or hematoma.    No evidence of acute fracture.    < end of copied text >  < from: CT Maxillofacial No Cont (09.09.21 @ 21:20) >  IMPRESSION:    1.  No evidence of acute displaced fracture.    2.  Soft tissue swelling, skin laceration, and focal air density inferior to the left side of the mandible.    < end of copied text >  < from: CT Cervical Spine No Cont (09.09.21 @ 21:09) >    IMPRESSION:    Degenerative changes as described above. No evidence of fracture or facet subluxation.    < end of copied text >      < end of copied text >    x< from: Xray Kidney Ureter Bladder (09.10.21 @ 14:55) >  Findings/  impression:    Stable appearance of Air and stool-filled loops of bowel. Surgical clips overlie the abdomen.    Degenerative changes of the osseous structures.    Vascular calcifications are noted.    Contrast is visualized within the urinary bladder.    < end of copied text >      Imaging Personally Reviewed:  [ ] YES  [ ] NO  ALBUTerol    90 MICROgram(s) HFA Inhaler 2 Puff(s) Inhalation every 6 hours PRN  apixaban 5 milliGRAM(s) Oral every 12 hours  chlorhexidine 4% Liquid 1 Application(s) Topical <User Schedule>  DULoxetine 60 milliGRAM(s) Oral daily  folic acid 1 milliGRAM(s) Oral daily  hydrocortisone hemorrhoidal Suppository 1 Suppository(s) Rectal at bedtime  influenza   Vaccine 0.5 milliLiter(s) IntraMuscular once  lactobacillus acidophilus 1 Tablet(s) Oral daily  melatonin 5 milliGRAM(s) Oral at bedtime PRN  methocarbamol 500 milliGRAM(s) Oral two times a day  metroNIDAZOLE  IVPB      metroNIDAZOLE  IVPB 500 milliGRAM(s) IV Intermittent every 8 hours  thiamine 100 milliGRAM(s) Oral daily  vancomycin    Solution 500 milliGRAM(s) Oral every 6 hours      HEALTH ISSUES - PROBLEM Dx:

## 2021-09-11 NOTE — PROGRESS NOTE ADULT - SUBJECTIVE AND OBJECTIVE BOX
Gastroenterology progress note:     Patient is a 70y old  Female who presents with a chief complaint of BRBPR (11 Sep 2021 10:10)       Admitted on: 09-03-21    We are following the patient for c.diff infection     Interval History:  feeling better had 3-4 loose stools yesterday, tolerating PO intake.        PAST MEDICAL & SURGICAL HISTORY:  Atrial fibrillation    History of COPD        MEDICATIONS  (STANDING):  apixaban 5 milliGRAM(s) Oral every 12 hours  chlorhexidine 4% Liquid 1 Application(s) Topical <User Schedule>  DULoxetine 60 milliGRAM(s) Oral daily  folic acid 1 milliGRAM(s) Oral daily  hydrocortisone hemorrhoidal Suppository 1 Suppository(s) Rectal at bedtime  influenza   Vaccine 0.5 milliLiter(s) IntraMuscular once  lactobacillus acidophilus 1 Tablet(s) Oral daily  methocarbamol 500 milliGRAM(s) Oral two times a day  metroNIDAZOLE  IVPB      metroNIDAZOLE  IVPB 500 milliGRAM(s) IV Intermittent every 8 hours  thiamine 100 milliGRAM(s) Oral daily  vancomycin    Solution 500 milliGRAM(s) Oral every 6 hours    MEDICATIONS  (PRN):  ALBUTerol    90 MICROgram(s) HFA Inhaler 2 Puff(s) Inhalation every 6 hours PRN Shortness of Breath and/or Wheezing  melatonin 5 milliGRAM(s) Oral at bedtime PRN Insomnia      Allergies  No Known Allergies      Review of Systems:   Cardiovascular:  No Chest Pain, No Palpitations  Respiratory:  No Cough, No Dyspnea  Gastrointestinal:  As described in HPI    Physical Examination:  T(C): 36.3 (09-11-21 @ 05:22), Max: 36.9 (09-10-21 @ 23:06)  HR: 67 (09-11-21 @ 05:22) (64 - 84)  BP: 141/63 (09-11-21 @ 05:22) (90/55 - 141/63)  RR: 18 (09-11-21 @ 05:22) (18 - 18)  SpO2: --      09-10-21 @ 07:01  -  09-11-21 @ 07:00  --------------------------------------------------------  IN: 250 mL / OUT: 0 mL / NET: 250 mL      Constitutional: No acute distress.  Respiratory:  No signs of respiratory distress. Lung sounds are clear bilaterally.  Cardiovascular:  S1 S2, Regular rate and rhythm.  Abdominal: Abdomen is soft, symmetric, and non-tender without distention. There are no visible lesions or scars. Bowel sounds are present and normoactive in all four quadrants. No masses, hepatomegaly, or splenomegaly are noted.   Skin: No rashes, No Jaundice.        Data:                        10.4   5.80  )-----------( 179      ( 11 Sep 2021 04:30 )             33.4     Hgb trend:  10.4  09-11-21 @ 04:30  10.1  09-10-21 @ 05:48        09-11    136  |  102  |  11  ----------------------------<  68<L>  4.7   |  27  |  0.5<L>    Ca    8.3<L>      11 Sep 2021 04:30  Mg     2.1     09-11    TPro  5.1<L>  /  Alb  3.0<L>  /  TBili  0.5  /  DBili  x   /  AST  21  /  ALT  18  /  AlkPhos  83  09-11    Liver panel trend:  TBili 0.5   /   AST 21   /   ALT 18   /   AlkP 83   /   Tptn 5.1   /   Alb 3.0    /   DBili --      09-11  TBili 0.5   /   AST 19   /   ALT 19   /   AlkP 91   /   Tptn 4.8   /   Alb 2.8    /   DBili --      09-10  TBili 0.5   /   AST 31   /   ALT 19   /   AlkP 92   /   Tptn 5.0   /   Alb 3.0    /   DBili --      09-09  TBili 0.5   /   AST 18   /   ALT 17   /   AlkP 92   /   Tptn 4.9   /   Alb 2.9    /   DBili --      09-08  TBili 0.6   /   AST 16   /   ALT 16   /   AlkP 85   /   Tptn 4.7   /   Alb 2.8    /   DBili --      09-07  TBili 0.7   /   AST 16   /   ALT 16   /   AlkP 80   /   Tptn 4.7   /   Alb 2.8    /   DBili --      09-06  TBili 0.7   /   AST 18   /   ALT 17   /   AlkP 83   /   Tptn 4.7   /   Alb 2.9    /   DBili --      09-05  TBili 1.1   /   AST 25   /   ALT 20   /   AlkP 90   /   Tptn 5.3   /   Alb 3.2    /   DBili --      09-04  TBili 0.9   /   AST 22   /   ALT 21   /   AlkP 81   /   Tptn 5.5   /   Alb 3.4    /   DBili --      09-03             Radiology:

## 2021-09-11 NOTE — PROGRESS NOTE ADULT - SUBJECTIVE AND OBJECTIVE BOX
WEGENER, LOIS  70y, Female  Allergy: No Known Allergies    Hospital Day: 8d    Patient seen and examined earlier today. Continues to have 3 watery BM's overnight. Otherwise feeling well, using wrist splint.     PMH/PSH:  PAST MEDICAL & SURGICAL HISTORY:  Atrial fibrillation    History of COPD        LAST 24-Hr EVENTS:    VITALS:  T(F): 97.4 (09-11-21 @ 05:22), Max: 98.5 (09-10-21 @ 23:06)  HR: 67 (09-11-21 @ 05:22)  BP: 141/63 (09-11-21 @ 05:22) (107/57 - 141/63)  RR: 18 (09-11-21 @ 05:22)  SpO2: --        TESTS & MEASUREMENTS:  Weight (Kg):       09-09-21 @ 07:01  -  09-10-21 @ 07:00  --------------------------------------------------------  IN: 350 mL / OUT: 800 mL / NET: -450 mL    09-10-21 @ 07:01  -  09-11-21 @ 07:00  --------------------------------------------------------  IN: 250 mL / OUT: 0 mL / NET: 250 mL    09-11-21 @ 07:01  -  09-11-21 @ 14:14  --------------------------------------------------------  IN: 0 mL / OUT: 700 mL / NET: -700 mL                            10.4   5.80  )-----------( 179      ( 11 Sep 2021 04:30 )             33.4       09-11    136  |  102  |  11  ----------------------------<  68<L>  4.7   |  27  |  0.5<L>    Ca    8.3<L>      11 Sep 2021 04:30  Mg     2.1     09-11    TPro  5.1<L>  /  Alb  3.0<L>  /  TBili  0.5  /  DBili  x   /  AST  21  /  ALT  18  /  AlkPhos  83  09-11    LIVER FUNCTIONS - ( 11 Sep 2021 04:30 )  Alb: 3.0 g/dL / Pro: 5.1 g/dL / ALK PHOS: 83 U/L / ALT: 18 U/L / AST: 21 U/L / GGT: x                               RADIOLOGY, ECG, & ADDITIONAL TESTS:      RECENT DIAGNOSTIC ORDERS:  CT Abdomen and Pelvis w/ Oral Cont and w/ IV Cont: Routine   Indication: Evaluate megacolon  Transport: Stretcher-Crib (09-11-21 @ 12:45)  Diet, NPO:   Except Medications (09-11-21 @ 12:45)  Lactate, Blood: 16:00 (09-11-21 @ 10:40)  Xray Kidney Ureter Bladder: Routine   Indication: c. diff colitis, megacolon  Transport: Portable  Exam Completed (09-11-21 @ 08:52)  Magnesium, Serum: Repeat From: 10-Sep-2021 14:22 To: 17-Sep-2021 04:30, Every 1 day(s) (09-10-21 @ 14:23)  Comprehensive Metabolic Panel: Repeat From: 10-Sep-2021 14:22 To: 17-Sep-2021 04:30, Every 1 day(s) (09-10-21 @ 14:23)  Complete Blood Count + Automated Diff: Repeat From: 10-Sep-2021 14:23 To: 17-Sep-2021 04:30, Every 1 day(s) (09-10-21 @ 14:23)      MEDICATIONS:  MEDICATIONS  (STANDING):  apixaban 5 milliGRAM(s) Oral every 12 hours  chlorhexidine 4% Liquid 1 Application(s) Topical <User Schedule>  DULoxetine 60 milliGRAM(s) Oral daily  folic acid 1 milliGRAM(s) Oral daily  hydrocortisone hemorrhoidal Suppository 1 Suppository(s) Rectal at bedtime  influenza   Vaccine 0.5 milliLiter(s) IntraMuscular once  iohexol 300 mG (iodine)/mL Oral Solution 30 milliLiter(s) Oral once  lactobacillus acidophilus 1 Tablet(s) Oral daily  methocarbamol 500 milliGRAM(s) Oral two times a day  metroNIDAZOLE  IVPB      metroNIDAZOLE  IVPB 500 milliGRAM(s) IV Intermittent every 8 hours  thiamine 100 milliGRAM(s) Oral daily  vancomycin    Solution 500 milliGRAM(s) Oral every 6 hours    MEDICATIONS  (PRN):  ALBUTerol    90 MICROgram(s) HFA Inhaler 2 Puff(s) Inhalation every 6 hours PRN Shortness of Breath and/or Wheezing  melatonin 5 milliGRAM(s) Oral at bedtime PRN Insomnia      HOME MEDICATIONS:  albuterol 90 mcg/inh inhalation aerosol (07-06)  apixaban 5 mg oral tablet (07-06)  DULoxetine 60 mg oral delayed release capsule (07-06)  folic acid 1 mg oral tablet (07-06)  methocarbamol 500 mg oral tablet (07-06)  thiamine 100 mg oral tablet (07-06)      PHYSICAL EXAM:  GENERAL: NAD, well-groomed, well-developed  HEAD:  Normocephalic, chin laceration well dressed   EYES: EOMI, PERRLA, conjunctiva and sclera clear  NECK: Supple, No JVD, Normal thyroid  NERVOUS SYSTEM:  Alert & Oriented X3, Good concentration  CHEST/LUNG: Clear to auscultation bilaterally; No rales, rhonchi, wheezing, or rubs  HEART: Regular rate and rhythm; No murmurs, rubs, or gallops  ABDOMEN: Soft, Nontender, distended; Bowel sounds present  EXTREMITIES:  2+ Peripheral Pulses, No clubbing, cyanosis, or edema  LYMPH: No lymphadenopathy noted  SKIN: No rashes or lesions

## 2021-09-12 LAB
ALBUMIN SERPL ELPH-MCNC: 2.9 G/DL — LOW (ref 3.5–5.2)
ALP SERPL-CCNC: 80 U/L — SIGNIFICANT CHANGE UP (ref 30–115)
ALT FLD-CCNC: 17 U/L — SIGNIFICANT CHANGE UP (ref 0–41)
ANION GAP SERPL CALC-SCNC: 9 MMOL/L — SIGNIFICANT CHANGE UP (ref 7–14)
AST SERPL-CCNC: 17 U/L — SIGNIFICANT CHANGE UP (ref 0–41)
BASOPHILS # BLD AUTO: 0.04 K/UL — SIGNIFICANT CHANGE UP (ref 0–0.2)
BASOPHILS NFR BLD AUTO: 0.7 % — SIGNIFICANT CHANGE UP (ref 0–1)
BILIRUB SERPL-MCNC: 0.5 MG/DL — SIGNIFICANT CHANGE UP (ref 0.2–1.2)
BUN SERPL-MCNC: 12 MG/DL — SIGNIFICANT CHANGE UP (ref 10–20)
CALCIUM SERPL-MCNC: 8.3 MG/DL — LOW (ref 8.5–10.1)
CHLORIDE SERPL-SCNC: 101 MMOL/L — SIGNIFICANT CHANGE UP (ref 98–110)
CO2 SERPL-SCNC: 26 MMOL/L — SIGNIFICANT CHANGE UP (ref 17–32)
CREAT SERPL-MCNC: 0.6 MG/DL — LOW (ref 0.7–1.5)
EOSINOPHIL # BLD AUTO: 0.08 K/UL — SIGNIFICANT CHANGE UP (ref 0–0.7)
EOSINOPHIL NFR BLD AUTO: 1.4 % — SIGNIFICANT CHANGE UP (ref 0–8)
GLUCOSE BLDC GLUCOMTR-MCNC: 114 MG/DL — HIGH (ref 70–99)
GLUCOSE BLDC GLUCOMTR-MCNC: 129 MG/DL — HIGH (ref 70–99)
GLUCOSE SERPL-MCNC: 71 MG/DL — SIGNIFICANT CHANGE UP (ref 70–99)
HCT VFR BLD CALC: 32.9 % — LOW (ref 37–47)
HGB BLD-MCNC: 10.2 G/DL — LOW (ref 12–16)
IMM GRANULOCYTES NFR BLD AUTO: 0.3 % — SIGNIFICANT CHANGE UP (ref 0.1–0.3)
LYMPHOCYTES # BLD AUTO: 1.57 K/UL — SIGNIFICANT CHANGE UP (ref 1.2–3.4)
LYMPHOCYTES # BLD AUTO: 26.6 % — SIGNIFICANT CHANGE UP (ref 20.5–51.1)
MAGNESIUM SERPL-MCNC: 2 MG/DL — SIGNIFICANT CHANGE UP (ref 1.8–2.4)
MCHC RBC-ENTMCNC: 30.7 PG — SIGNIFICANT CHANGE UP (ref 27–31)
MCHC RBC-ENTMCNC: 31 G/DL — LOW (ref 32–37)
MCV RBC AUTO: 99.1 FL — HIGH (ref 81–99)
MONOCYTES # BLD AUTO: 0.44 K/UL — SIGNIFICANT CHANGE UP (ref 0.1–0.6)
MONOCYTES NFR BLD AUTO: 7.4 % — SIGNIFICANT CHANGE UP (ref 1.7–9.3)
NEUTROPHILS # BLD AUTO: 3.76 K/UL — SIGNIFICANT CHANGE UP (ref 1.4–6.5)
NEUTROPHILS NFR BLD AUTO: 63.6 % — SIGNIFICANT CHANGE UP (ref 42.2–75.2)
NRBC # BLD: 0 /100 WBCS — SIGNIFICANT CHANGE UP (ref 0–0)
PHOSPHATE SERPL-MCNC: 4 MG/DL — SIGNIFICANT CHANGE UP (ref 2.1–4.9)
PLATELET # BLD AUTO: 185 K/UL — SIGNIFICANT CHANGE UP (ref 130–400)
POTASSIUM SERPL-MCNC: 4.4 MMOL/L — SIGNIFICANT CHANGE UP (ref 3.5–5)
POTASSIUM SERPL-SCNC: 4.4 MMOL/L — SIGNIFICANT CHANGE UP (ref 3.5–5)
PROT SERPL-MCNC: 5.1 G/DL — LOW (ref 6–8)
RBC # BLD: 3.32 M/UL — LOW (ref 4.2–5.4)
RBC # FLD: 18.6 % — HIGH (ref 11.5–14.5)
SODIUM SERPL-SCNC: 136 MMOL/L — SIGNIFICANT CHANGE UP (ref 135–146)
WBC # BLD: 5.91 K/UL — SIGNIFICANT CHANGE UP (ref 4.8–10.8)
WBC # FLD AUTO: 5.91 K/UL — SIGNIFICANT CHANGE UP (ref 4.8–10.8)

## 2021-09-12 PROCEDURE — 99232 SBSQ HOSP IP/OBS MODERATE 35: CPT

## 2021-09-12 PROCEDURE — 74177 CT ABD & PELVIS W/CONTRAST: CPT | Mod: 26

## 2021-09-12 RX ADMIN — APIXABAN 5 MILLIGRAM(S): 2.5 TABLET, FILM COATED ORAL at 05:24

## 2021-09-12 RX ADMIN — Medication 500 MILLIGRAM(S): at 23:34

## 2021-09-12 RX ADMIN — CHLORHEXIDINE GLUCONATE 1 APPLICATION(S): 213 SOLUTION TOPICAL at 05:24

## 2021-09-12 RX ADMIN — Medication 5 MILLIGRAM(S): at 01:52

## 2021-09-12 RX ADMIN — METHOCARBAMOL 500 MILLIGRAM(S): 500 TABLET, FILM COATED ORAL at 05:24

## 2021-09-12 RX ADMIN — Medication 1 SUPPOSITORY(S): at 22:41

## 2021-09-12 RX ADMIN — Medication 1 MILLIGRAM(S): at 12:32

## 2021-09-12 RX ADMIN — Medication 100 MILLIGRAM(S): at 05:23

## 2021-09-12 RX ADMIN — Medication 500 MILLIGRAM(S): at 05:24

## 2021-09-12 RX ADMIN — Medication 100 MILLIGRAM(S): at 22:41

## 2021-09-12 RX ADMIN — DULOXETINE HYDROCHLORIDE 60 MILLIGRAM(S): 30 CAPSULE, DELAYED RELEASE ORAL at 12:32

## 2021-09-12 RX ADMIN — METHOCARBAMOL 500 MILLIGRAM(S): 500 TABLET, FILM COATED ORAL at 18:15

## 2021-09-12 RX ADMIN — Medication 100 MILLIGRAM(S): at 15:34

## 2021-09-12 RX ADMIN — Medication 125 MILLIGRAM(S): at 18:15

## 2021-09-12 RX ADMIN — Medication 125 MILLIGRAM(S): at 12:33

## 2021-09-12 RX ADMIN — Medication 100 MILLIGRAM(S): at 12:32

## 2021-09-12 RX ADMIN — APIXABAN 5 MILLIGRAM(S): 2.5 TABLET, FILM COATED ORAL at 18:15

## 2021-09-12 RX ADMIN — Medication 1 TABLET(S): at 12:32

## 2021-09-12 NOTE — PROGRESS NOTE ADULT - SUBJECTIVE AND OBJECTIVE BOX
GENERAL SURGERY PROGRESS NOTE    Patient: WEGENER, LOIS , 70y ([birth year])Female   MRN: [id number]  Location: [address]  Visit: 09-03-21 Inpatient  Date: 09-12-21 @ 08:09    Hospital Day #:10    Procedure/Dx/Injuries: C. Diff Colitis     Events of past 24 hours: Pt seen and examined at bedside. No acute overnight events. Pt states he feels hungry this morning. Afebrile and vitals are stable    PAST MEDICAL & SURGICAL HISTORY:  Atrial fibrillation  History of COPD    Vitals:   T(F): 97.5 (09-12-21 @ 05:20), Max: 98.4 (09-11-21 @ 14:50)  HR: 67 (09-12-21 @ 05:20)  BP: 104/60 (09-12-21 @ 05:20)  RR: 19 (09-12-21 @ 05:20)  SpO2: --    09-11-21 @ 07:01  -  09-12-21 @ 07:00  --------------------------------------------------------  IN:    IV PiggyBack: 100 mL  Total IN: 100 mL  OUT:    Voided (mL): 700 mL  Total OUT: 700 mL  Total NET: -600 mL    Bowel Movement: : [x] YES [] NO  Flatus: : [x] YES [] NO    PHYSICAL EXAM:  General: NAD, AAOx3, calm and cooperative  HEENT: NCAT, EOMI  Cardiac: RRR S1, S2  Respiratory: CTAB, normal respiratory effort, breath sounds equal BL  Abdomen: Soft, non-distended, non-tender, no rebound, no guarding. Healed midline surgical scar   Skin: Warm/dry, normal color, no jaundice    MEDICATIONS  (STANDING):  apixaban 5 milliGRAM(s) Oral every 12 hours  chlorhexidine 4% Liquid 1 Application(s) Topical <User Schedule>  DULoxetine 60 milliGRAM(s) Oral daily  folic acid 1 milliGRAM(s) Oral daily  hydrocortisone hemorrhoidal Suppository 1 Suppository(s) Rectal at bedtime  influenza   Vaccine 0.5 milliLiter(s) IntraMuscular once  lactobacillus acidophilus 1 Tablet(s) Oral daily  methocarbamol 500 milliGRAM(s) Oral two times a day  metroNIDAZOLE  IVPB      metroNIDAZOLE  IVPB 500 milliGRAM(s) IV Intermittent every 8 hours  thiamine 100 milliGRAM(s) Oral daily  vancomycin    Solution 500 milliGRAM(s) Oral every 6 hours    MEDICATIONS  (PRN):  ALBUTerol    90 MICROgram(s) HFA Inhaler 2 Puff(s) Inhalation every 6 hours PRN Shortness of Breath and/or Wheezing  melatonin 5 milliGRAM(s) Oral at bedtime PRN Insomnia    DVT PROPHYLAXIS:   GI PROPHYLAXIS:   ANTICOAGULATION:   ANTIBIOTICS:  metroNIDAZOLE  IVPB    metroNIDAZOLE  IVPB 500 milliGRAM(s)  vancomycin    Solution 500 milliGRAM(s)    LAB/STUDIES:  Labs:  CAPILLARY BLOOD GLUCOSE  POCT Blood Glucose.: 90 mg/dL (11 Sep 2021 21:31)  POCT Blood Glucose.: 94 mg/dL (11 Sep 2021 16:42)  POCT Blood Glucose.: 89 mg/dL (11 Sep 2021 11:40)                       10.2   5.91  )-----------( 185      ( 12 Sep 2021 05:43 )             32.9       Auto Neutrophil %: 63.6 % (09-12-21 @ 05:43)  Auto Immature Granulocyte %: 0.3 % (09-12-21 @ 05:43)    09-12    136  |  101  |  12  ----------------------------<  71  4.4   |  26  |  0.6<L>    Calcium, Total Serum: 8.3 mg/dL (09-12-21 @ 05:43)    LFTs:             5.1  | 0.5  | 17       ------------------[80      ( 12 Sep 2021 05:43 )  2.9  | x    | 17             Lactate, Blood: 1.2 mmol/L (09-11-21 @ 17:12)    IMAGING: No new imaging past 24hrs

## 2021-09-12 NOTE — PROGRESS NOTE ADULT - SUBJECTIVE AND OBJECTIVE BOX
WEGENER, LOIS  70y, Female  Allergy: No Known Allergies    Hospital Day: 9d    Patient seen and examined earlier today. Feeling better, endorses less frequent BM, only 3 documented in last 24 hours.     PMH/PSH:  PAST MEDICAL & SURGICAL HISTORY:  Atrial fibrillation    History of COPD        LAST 24-Hr EVENTS:    VITALS:  T(F): 97.1 (09-12-21 @ 13:22), Max: 98 (09-11-21 @ 20:30)  HR: 75 (09-12-21 @ 13:22)  BP: 89/54 (09-12-21 @ 13:22) (89/54 - 104/60)  RR: 18 (09-12-21 @ 13:22)  SpO2: --        TESTS & MEASUREMENTS:  Weight (Kg):       09-10-21 @ 07:01  -  09-11-21 @ 07:00  --------------------------------------------------------  IN: 250 mL / OUT: 0 mL / NET: 250 mL    09-11-21 @ 07:01  -  09-12-21 @ 07:00  --------------------------------------------------------  IN: 100 mL / OUT: 700 mL / NET: -600 mL    09-12-21 @ 07:01  -  09-12-21 @ 15:37  --------------------------------------------------------  IN: 200 mL / OUT: 0 mL / NET: 200 mL                            10.2   5.91  )-----------( 185      ( 12 Sep 2021 05:43 )             32.9       09-12    136  |  101  |  12  ----------------------------<  71  4.4   |  26  |  0.6<L>    Ca    8.3<L>      12 Sep 2021 05:43  Phos  4.0     09-12  Mg     2.0     09-12    TPro  5.1<L>  /  Alb  2.9<L>  /  TBili  0.5  /  DBili  x   /  AST  17  /  ALT  17  /  AlkPhos  80  09-12    LIVER FUNCTIONS - ( 12 Sep 2021 05:43 )  Alb: 2.9 g/dL / Pro: 5.1 g/dL / ALK PHOS: 80 U/L / ALT: 17 U/L / AST: 17 U/L / GGT: x                               RADIOLOGY, ECG, & ADDITIONAL TESTS:      RECENT DIAGNOSTIC ORDERS:      MEDICATIONS:  MEDICATIONS  (STANDING):  apixaban 5 milliGRAM(s) Oral every 12 hours  chlorhexidine 4% Liquid 1 Application(s) Topical <User Schedule>  DULoxetine 60 milliGRAM(s) Oral daily  folic acid 1 milliGRAM(s) Oral daily  hydrocortisone hemorrhoidal Suppository 1 Suppository(s) Rectal at bedtime  influenza   Vaccine 0.5 milliLiter(s) IntraMuscular once  lactobacillus acidophilus 1 Tablet(s) Oral daily  methocarbamol 500 milliGRAM(s) Oral two times a day  metroNIDAZOLE  IVPB      metroNIDAZOLE  IVPB 500 milliGRAM(s) IV Intermittent every 8 hours  thiamine 100 milliGRAM(s) Oral daily  vancomycin    Solution 500 milliGRAM(s) Oral every 6 hours    MEDICATIONS  (PRN):  ALBUTerol    90 MICROgram(s) HFA Inhaler 2 Puff(s) Inhalation every 6 hours PRN Shortness of Breath and/or Wheezing  melatonin 5 milliGRAM(s) Oral at bedtime PRN Insomnia      HOME MEDICATIONS:  albuterol 90 mcg/inh inhalation aerosol (07-06)  apixaban 5 mg oral tablet (07-06)  DULoxetine 60 mg oral delayed release capsule (07-06)  folic acid 1 mg oral tablet (07-06)  methocarbamol 500 mg oral tablet (07-06)  thiamine 100 mg oral tablet (07-06)      PHYSICAL EXAM:  GENERAL: NAD, well-groomed, well-developed  HEAD:  Normocephalic, chin laceration well dressed   EYES: EOMI, PERRLA, conjunctiva and sclera clear  NECK: Supple, No JVD, Normal thyroid  NERVOUS SYSTEM:  Alert & Oriented X3, Good concentration  CHEST/LUNG: Clear to auscultation bilaterally; No rales, rhonchi, wheezing, or rubs  HEART: Regular rate and rhythm; No murmurs, rubs, or gallops  ABDOMEN: Soft, Nontender, distended; Bowel sounds present  EXTREMITIES:  2+ Peripheral Pulses, No clubbing, cyanosis, or edema  LYMPH: No lymphadenopathy noted  SKIN: No rashes or lesions

## 2021-09-12 NOTE — PROGRESS NOTE ADULT - ASSESSMENT
ASSESSMENT:  70yF w/ PMHx of  a fib, copd, pshx gastric bypass 20 years ago, open gallbladder (unknown when) who presented s/p fall and is being treated for C diff colitis.     PLAN:  -Management per primary team  -Continue Abx for C. diff   -Outpatient colonoscopy with GI   -No surgical intervention at this time. Low clinical suspicion for toxic megacolon as the patient has no abdominal tenderness  -Please reconsult surgery as needed    BLUE TEAM SPECTRA: [id number]

## 2021-09-12 NOTE — PROGRESS NOTE ADULT - ASSESSMENT
71 yo F with recent dx of Afib on Eliquis, Abdominal abscess 2 mo ago, Gastric Bypass surgery 20+ years ago, COPD who presents to the ED with BRBPR .    # Hematochezia  # Hx of hemorrhoids  # Acute blood loss anemia  # suspect lower GIB  - currently non tachycardic, BP runs low   - MARVIN w/melena and external hemorrhoids  - chronic diarrhea and fecal incontinence reported; c diff ordered and positive   - hx of fistula/ perianal abscess  - colonoscopy 7/1/21 - multiple polypectomy   - Hb stable   - active T&S, transfuse for hgb <7  - GI consult : plan for Colonoscopy but patient refused more than once, plan for outpatient colonoscopy     #C Diff Colitis   - distended colon noted on KUB, nontoxic   - start 500mg PO vancomycin and IV Flagyl, improving  - only 3 BM in 24 hours, if continues to be stable, will dc   - ID following , GI following , Colorectal Surgery following   - CT A/P only notable for ? transient intussusception ( d/w CRS )     #Mechanical Fall in hospital   - CTH w/ Focal hypodensity in the inferior right cerebellar hemisphere possibly representing an age-indeterminate infarct  - MRI Brain ordered negative for infarct   - CT C/A/P per Trauma surgery - unremarkable   - Xray R wrist w/ widening of scapholunate interval c/w ligament tear   - Trauma surgery following: s/p suturing laceration of chin  - Ortho surgery consulted for possible wrist ligament tear , no acute intervention- OT and wrist splint     #Hypokalemia - resolved     #Hyponatremia- likely hypotonic hypovolemic , now resolved     #Atrial fibrillation on eliquis  - CHADVASC 3  -  Eliquis     #COPD ,stable not in exacerbation  - not on home oxygen ,no wheezing on exam  - inhaler PRN    #HFpEF ,euvolemic on exam   -TTE 6/27 EF of 63 %.  - monitor I&O  - BNP; from 6/25- 1500-->912  - avoid volume overload     # EtOH abuse with suspected folate/thiamine deficiency  - on folic acid and thiamine     # Depression on duloxetine  - no suicidal ideation  - c/w SSRI  - f/u w psychiatry as outpatient      #Progress Note Handoff  Pending (specify): improvement in c diff colitis then dc to rehab   Family discussion: patient aware of plan. in agreement. all questions answered  Disposition: Unknown at this time________    Dayami Henry, DO

## 2021-09-13 LAB
ALBUMIN SERPL ELPH-MCNC: 3.1 G/DL — LOW (ref 3.5–5.2)
ALP SERPL-CCNC: 81 U/L — SIGNIFICANT CHANGE UP (ref 30–115)
ALT FLD-CCNC: 16 U/L — SIGNIFICANT CHANGE UP (ref 0–41)
ANION GAP SERPL CALC-SCNC: 7 MMOL/L — SIGNIFICANT CHANGE UP (ref 7–14)
AST SERPL-CCNC: 18 U/L — SIGNIFICANT CHANGE UP (ref 0–41)
BASOPHILS # BLD AUTO: 0.05 K/UL — SIGNIFICANT CHANGE UP (ref 0–0.2)
BASOPHILS NFR BLD AUTO: 0.9 % — SIGNIFICANT CHANGE UP (ref 0–1)
BILIRUB SERPL-MCNC: 0.4 MG/DL — SIGNIFICANT CHANGE UP (ref 0.2–1.2)
BUN SERPL-MCNC: 11 MG/DL — SIGNIFICANT CHANGE UP (ref 10–20)
CALCIUM SERPL-MCNC: 8.2 MG/DL — LOW (ref 8.5–10.1)
CHLORIDE SERPL-SCNC: 102 MMOL/L — SIGNIFICANT CHANGE UP (ref 98–110)
CO2 SERPL-SCNC: 27 MMOL/L — SIGNIFICANT CHANGE UP (ref 17–32)
CREAT SERPL-MCNC: <0.5 MG/DL — LOW (ref 0.7–1.5)
EOSINOPHIL # BLD AUTO: 0.09 K/UL — SIGNIFICANT CHANGE UP (ref 0–0.7)
EOSINOPHIL NFR BLD AUTO: 1.6 % — SIGNIFICANT CHANGE UP (ref 0–8)
GLUCOSE SERPL-MCNC: 70 MG/DL — SIGNIFICANT CHANGE UP (ref 70–99)
HCT VFR BLD CALC: 30.6 % — LOW (ref 37–47)
HGB BLD-MCNC: 9.5 G/DL — LOW (ref 12–16)
IMM GRANULOCYTES NFR BLD AUTO: 0.5 % — HIGH (ref 0.1–0.3)
LYMPHOCYTES # BLD AUTO: 1.5 K/UL — SIGNIFICANT CHANGE UP (ref 1.2–3.4)
LYMPHOCYTES # BLD AUTO: 27.1 % — SIGNIFICANT CHANGE UP (ref 20.5–51.1)
MAGNESIUM SERPL-MCNC: 2 MG/DL — SIGNIFICANT CHANGE UP (ref 1.8–2.4)
MCHC RBC-ENTMCNC: 30.1 PG — SIGNIFICANT CHANGE UP (ref 27–31)
MCHC RBC-ENTMCNC: 31 G/DL — LOW (ref 32–37)
MCV RBC AUTO: 96.8 FL — SIGNIFICANT CHANGE UP (ref 81–99)
MONOCYTES # BLD AUTO: 0.41 K/UL — SIGNIFICANT CHANGE UP (ref 0.1–0.6)
MONOCYTES NFR BLD AUTO: 7.4 % — SIGNIFICANT CHANGE UP (ref 1.7–9.3)
NEUTROPHILS # BLD AUTO: 3.46 K/UL — SIGNIFICANT CHANGE UP (ref 1.4–6.5)
NEUTROPHILS NFR BLD AUTO: 62.5 % — SIGNIFICANT CHANGE UP (ref 42.2–75.2)
NRBC # BLD: 0 /100 WBCS — SIGNIFICANT CHANGE UP (ref 0–0)
PLATELET # BLD AUTO: 180 K/UL — SIGNIFICANT CHANGE UP (ref 130–400)
POTASSIUM SERPL-MCNC: 4.4 MMOL/L — SIGNIFICANT CHANGE UP (ref 3.5–5)
POTASSIUM SERPL-SCNC: 4.4 MMOL/L — SIGNIFICANT CHANGE UP (ref 3.5–5)
PROT SERPL-MCNC: 5.2 G/DL — LOW (ref 6–8)
RBC # BLD: 3.16 M/UL — LOW (ref 4.2–5.4)
RBC # FLD: 18.5 % — HIGH (ref 11.5–14.5)
SODIUM SERPL-SCNC: 136 MMOL/L — SIGNIFICANT CHANGE UP (ref 135–146)
WBC # BLD: 5.54 K/UL — SIGNIFICANT CHANGE UP (ref 4.8–10.8)
WBC # FLD AUTO: 5.54 K/UL — SIGNIFICANT CHANGE UP (ref 4.8–10.8)

## 2021-09-13 PROCEDURE — 99232 SBSQ HOSP IP/OBS MODERATE 35: CPT

## 2021-09-13 RX ADMIN — APIXABAN 5 MILLIGRAM(S): 2.5 TABLET, FILM COATED ORAL at 17:24

## 2021-09-13 RX ADMIN — Medication 100 MILLIGRAM(S): at 14:40

## 2021-09-13 RX ADMIN — Medication 100 MILLIGRAM(S): at 05:09

## 2021-09-13 RX ADMIN — CHLORHEXIDINE GLUCONATE 1 APPLICATION(S): 213 SOLUTION TOPICAL at 05:10

## 2021-09-13 RX ADMIN — Medication 100 MILLIGRAM(S): at 14:39

## 2021-09-13 RX ADMIN — APIXABAN 5 MILLIGRAM(S): 2.5 TABLET, FILM COATED ORAL at 05:10

## 2021-09-13 RX ADMIN — Medication 100 MILLIGRAM(S): at 21:43

## 2021-09-13 RX ADMIN — Medication 1 MILLIGRAM(S): at 14:40

## 2021-09-13 RX ADMIN — Medication 500 MILLIGRAM(S): at 23:37

## 2021-09-13 RX ADMIN — DULOXETINE HYDROCHLORIDE 60 MILLIGRAM(S): 30 CAPSULE, DELAYED RELEASE ORAL at 14:39

## 2021-09-13 RX ADMIN — Medication 5 MILLIGRAM(S): at 21:43

## 2021-09-13 RX ADMIN — Medication 500 MILLIGRAM(S): at 14:41

## 2021-09-13 RX ADMIN — Medication 500 MILLIGRAM(S): at 05:10

## 2021-09-13 RX ADMIN — METHOCARBAMOL 500 MILLIGRAM(S): 500 TABLET, FILM COATED ORAL at 05:10

## 2021-09-13 RX ADMIN — Medication 1 TABLET(S): at 14:40

## 2021-09-13 RX ADMIN — Medication 500 MILLIGRAM(S): at 17:24

## 2021-09-13 RX ADMIN — METHOCARBAMOL 500 MILLIGRAM(S): 500 TABLET, FILM COATED ORAL at 17:24

## 2021-09-13 NOTE — CHART NOTE - NSCHARTNOTEFT_GEN_A_CORE
Registered Dietitian Follow-Up     Patient Profile Reviewed                           Yes [X]   No []     Nutrition History Previously Obtained        Yes [X]  No []       Pertinent Subjective Information: Met pt at bedside, having lunch, pt reports good appetite intake, taking % of meals, independently.      Pertinent Medical Interventions: C-diff; improving- then STR.      Diet order:  Diet, DASH/TLC:   Sodium & Cholesterol Restricted  Lactose Restricted (Milk Sugar Intoler.) (09-13-21 @ 13:22) [Pending Verification By Attending]       Anthropometrics:  - Ht. 67 inches  - Wt. 135 lbs  - %wt change- no updated weight  - BMI 21  -      Pertinent Lab Data:  9/13 9.5/30.6 H/H L, Cr 0.5 L, Ca 8.2 L     Pertinent Meds:  DULoxetine 60 milliGRAM(s) Oral daily  folic acid 1 milliGRAM(s) Oral daily  hydrocortisone hemorrhoidal Suppository 1 Suppository(s) Rectal at bedtime  lactobacillus acidophilus 1 Tablet(s) Oral daily  methocarbamol 500 milliGRAM(s) Oral two times a day  metroNIDAZOLE  IVPB 500 milliGRAM(s) IV Intermittent every 8 hours  thiamine 100 milliGRAM(s) Oral daily  vancomycin    Solution 500 milliGRAM(s) Oral every 6 hours  melatonin 5 milliGRAM(s) Oral at bedtime PRN Insomnia       Physical Findings:  - Appearance: alert/oriented  - GI function: LBM today  - Tubes: n/a  - Oral/Mouth cavity: dentures  - Skin: Coccyx linear wound 2x1x0.2 r/t perinodal cyst and abscess     Nutrition Requirements  Weight Used: 135 lbs  8279-8038 kcal/day (30-35 kcal/kg), 77-92 g protein (1.25-1.5 g/kg), fluids needs 1 mL/kcal or per LIP     Nutrient Intake: Improving; % of meals.      [] Previous Nutrition Diagnosis:            [X] Ongoing          [] Resolved  Nutrition Diagnostic Terminology #1 Increased Nutrient Needs...   Increased Nutrient Needs kcal/protein.   Etiology physiological demands for wound healing.   Signs/Symptoms ?? chronic stage 3 sacral spine wound.     Goal/Expected Outcome Pt to meet >75% estimated needs in 3 days.     Nutrition Intervention: none at this time    Indicator/Monitoring: Monitoring/evaluation: nutrient intake, weight, labs, skin status, NFPF    RD Recommendations: pt declines nutrition interventions, declines MFS/ONS, MVI/min supplements, recommend continue w/current nutrition POC, will follow pt at low nutrition risk, f/u in 7-10 days.

## 2021-09-13 NOTE — PROGRESS NOTE ADULT - SUBJECTIVE AND OBJECTIVE BOX
WEGENER, LOIS  70y, Female  Allergy: No Known Allergies    Hospital Day: 10d    Patient seen and examined earlier today. Ambulating to the bathroom, feeling well, only 1 BM overnight.     PMH/PSH:  PAST MEDICAL & SURGICAL HISTORY:  Atrial fibrillation    History of COPD        LAST 24-Hr EVENTS:    VITALS:  T(F): 98.2 (09-13-21 @ 13:04), Max: 98.2 (09-13-21 @ 05:33)  HR: 75 (09-13-21 @ 13:04)  BP: 89/51 (09-13-21 @ 13:04) (89/51 - 105/56)  RR: 18 (09-13-21 @ 13:04)  SpO2: --        TESTS & MEASUREMENTS:  Weight (Kg):       09-11-21 @ 07:01  -  09-12-21 @ 07:00  --------------------------------------------------------  IN: 100 mL / OUT: 700 mL / NET: -600 mL    09-12-21 @ 07:01  -  09-13-21 @ 07:00  --------------------------------------------------------  IN: 200 mL / OUT: 0 mL / NET: 200 mL                            9.5    5.54  )-----------( 180      ( 13 Sep 2021 06:18 )             30.6       09-13    136  |  102  |  11  ----------------------------<  70  4.4   |  27  |  <0.5<L>    Ca    8.2<L>      13 Sep 2021 06:18  Phos  4.0     09-12  Mg     2.0     09-13    TPro  5.2<L>  /  Alb  3.1<L>  /  TBili  0.4  /  DBili  x   /  AST  18  /  ALT  16  /  AlkPhos  81  09-13    LIVER FUNCTIONS - ( 13 Sep 2021 06:18 )  Alb: 3.1 g/dL / Pro: 5.2 g/dL / ALK PHOS: 81 U/L / ALT: 16 U/L / AST: 18 U/L / GGT: x                               RADIOLOGY, ECG, & ADDITIONAL TESTS:      RECENT DIAGNOSTIC ORDERS:  COVID-19 PCR: STAT  Specimen Source: Nasopharyngeal (09-13-21 @ 13:22)  Diet, DASH/TLC:   Sodium & Cholesterol Restricted  Lactose Restricted (Milk Sugar Intoler.) (09-13-21 @ 13:22)  Magnesium, Serum: AM Sched. Collection: 14-Sep-2021 04:30 (09-13-21 @ 10:52)  Comprehensive Metabolic Panel: AM Sched. Collection: 14-Sep-2021 04:30 (09-13-21 @ 10:52)  Complete Blood Count: AM Sched. Collection: 14-Sep-2021 04:30 (09-13-21 @ 10:52)      MEDICATIONS:  MEDICATIONS  (STANDING):  apixaban 5 milliGRAM(s) Oral every 12 hours  chlorhexidine 4% Liquid 1 Application(s) Topical <User Schedule>  DULoxetine 60 milliGRAM(s) Oral daily  folic acid 1 milliGRAM(s) Oral daily  hydrocortisone hemorrhoidal Suppository 1 Suppository(s) Rectal at bedtime  influenza   Vaccine 0.5 milliLiter(s) IntraMuscular once  lactobacillus acidophilus 1 Tablet(s) Oral daily  methocarbamol 500 milliGRAM(s) Oral two times a day  metroNIDAZOLE  IVPB      metroNIDAZOLE  IVPB 500 milliGRAM(s) IV Intermittent every 8 hours  thiamine 100 milliGRAM(s) Oral daily  vancomycin    Solution 500 milliGRAM(s) Oral every 6 hours    MEDICATIONS  (PRN):  ALBUTerol    90 MICROgram(s) HFA Inhaler 2 Puff(s) Inhalation every 6 hours PRN Shortness of Breath and/or Wheezing  melatonin 5 milliGRAM(s) Oral at bedtime PRN Insomnia      HOME MEDICATIONS:  albuterol 90 mcg/inh inhalation aerosol (07-06)  apixaban 5 mg oral tablet (07-06)  DULoxetine 60 mg oral delayed release capsule (07-06)  folic acid 1 mg oral tablet (07-06)  methocarbamol 500 mg oral tablet (07-06)  thiamine 100 mg oral tablet (07-06)      PHYSICAL EXAM:  GENERAL: NAD, well-groomed, well-developed  HEAD:  Normocephalic, chin laceration well dressed   EYES: EOMI, PERRLA, conjunctiva and sclera clear  NECK: Supple, No JVD, Normal thyroid  NERVOUS SYSTEM:  Alert & Oriented X3, Good concentration  CHEST/LUNG: Clear to auscultation bilaterally; No rales, rhonchi, wheezing, or rubs  HEART: Regular rate and rhythm; No murmurs, rubs, or gallops  ABDOMEN: Soft, Nontender, distended; Bowel sounds present  EXTREMITIES:  2+ Peripheral Pulses, No clubbing, cyanosis, or edema  LYMPH: No lymphadenopathy noted  SKIN: No rashes or lesions

## 2021-09-13 NOTE — PROGRESS NOTE ADULT - ASSESSMENT
ASSESSMENT  69 yo F with recent dx of Afib on Eliquis, Abdominal abscess 2 mo ago, Gastric Bypass surgery 20+ years ago, COPD who presents to the ED with BRBPR .      IMPRESSION  #BRBPR  #C diff Colitis with illeus   #Weight Loss with anorectal wall thickening   - CT Angio Abdomen and Pelvis w/ IV Cont (09.03.21 @ 09:39):  No CTA evidence of active GI bleeding. Anorectal wall thickening, neoplasm is not excluded. Chronic and incidental findings as above  #Hx of Perirecal abscess -s s/p I and D 6/26/2021    #Abx allergy: NKDA    RECOMMENDATIONS  - continue PO vancomycin 500 mg QID   - plan for 14 day course (end date 9/20)  - continue IV flagyl 500 mg TID while here, but can stop when discharged    Please call or message on [employer] Teams if with any questions.  Spectra [id number]

## 2021-09-13 NOTE — PROGRESS NOTE ADULT - ASSESSMENT
71 yo F with recent dx of Afib on Eliquis, Abdominal abscess 2 mo ago, Gastric Bypass surgery 20+ years ago, COPD who presents to the ED with BRBPR .    # Hematochezia- resolved   # Hx of hemorrhoids  # Acute blood loss anemia- stable   - currently non tachycardic, BP runs low   - MARVIN w/melena and external hemorrhoids  - chronic diarrhea and fecal incontinence reported; c diff ordered and positive   - hx of fistula/ perianal abscess  - colonoscopy 7/1/21 - multiple polypectomy   - Hb stable   - active T&S, transfuse for hgb <7  - GI consult : plan for Colonoscopy but patient refused more than once, plan for outpatient colonoscopy     #C Diff Colitis - improving   - distended colon noted on KUB, nontoxic   - start 500mg PO vancomycin and IV Flagyl, improving  - ID following , GI following , Colorectal Surgery following   - CT A/P only notable for ? transient intussusception ( d/w CRS )   - plan for Vanco 500mg 14d course of discharge per ID     #Mechanical Fall in hospital   - CTH w/ Focal hypodensity in the inferior right cerebellar hemisphere possibly representing an age-indeterminate infarct  - MRI Brain ordered negative for infarct   - CT C/A/P per Trauma surgery - unremarkable   - Xray R wrist w/ widening of scapholunate interval c/w ligament tear   - Trauma surgery following: s/p suturing laceration of chin  - Ortho surgery consulted for possible wrist ligament tear , no acute intervention- OT and wrist splint     #Hypokalemia - resolved     #Hyponatremia- likely hypotonic hypovolemic , now resolved     #Atrial fibrillation on eliquis  - CHADVASC 3  -  Eliquis     #COPD ,stable not in exacerbation  - not on home oxygen ,no wheezing on exam  - inhaler PRN    #HFpEF ,euvolemic on exam   -TTE 6/27 EF of 63 %.  - monitor I&O  - BNP; from 6/25- 1500-->912  - avoid volume overload     # EtOH abuse with suspected folate/thiamine deficiency  - on folic acid and thiamine     # Depression on duloxetine  - no suicidal ideation  - c/w SSRI  - f/u w psychiatry as outpatient      #Progress Note Handoff  Pending (specify):  STR pending   Family discussion: patient aware, STR pending   Disposition: Unknown at this time________    Dayami Henry,

## 2021-09-13 NOTE — PROGRESS NOTE ADULT - SUBJECTIVE AND OBJECTIVE BOX
WEGENER, LOIS  70y, Female  Allergy: No Known Allergies      LOS  10d    CHIEF COMPLAINT: BRBPR (13 Sep 2021 13:40)      INTERVAL EVENTS/HPI  - No acute events overnight  - T(F): , Max: 98.2 (09-13-21 @ 05:33)  - reports stools are now formed   - WBC Count: 5.54 (09-13-21 @ 06:18)  WBC Count: 5.91 (09-12-21 @ 05:43)     - Creatinine, Serum: <0.5 (09-13-21 @ 06:18)  Creatinine, Serum: 0.6 (09-12-21 @ 05:43)       ROS  General: Denies rigors, nightsweats  HEENT: Denies headache, rhinorrhea, sore throat, eye pain  CV: Denies CP, palpitations  PULM: Denies wheezing, hemoptysis  GI: Denies hematemesis, hematochezia, melena  : Denies discharge, hematuria  MSK: Denies arthralgias, myalgias  SKIN: Denies rash, lesions  NEURO: Denies paresthesias, weakness  PSYCH: Denies depression, anxiety    VITALS:  T(F): 98.2, Max: 98.2 (09-13-21 @ 05:33)  HR: 75  BP: 89/51  RR: 18Vital Signs Last 24 Hrs  T(C): 36.8 (13 Sep 2021 13:04), Max: 36.8 (13 Sep 2021 05:33)  T(F): 98.2 (13 Sep 2021 13:04), Max: 98.2 (13 Sep 2021 05:33)  HR: 75 (13 Sep 2021 13:04) (61 - 75)  BP: 89/51 (13 Sep 2021 13:04) (89/51 - 105/56)  BP(mean): 82 (12 Sep 2021 20:11) (82 - 82)  RR: 18 (13 Sep 2021 13:04) (18 - 20)  SpO2: --    PHYSICAL EXAM:  Gen: NAD, resting in bed  HEENT: Normocephalic, atraumatic  Neck: supple, no lymphadenopathy  CV: Regular rate & regular rhythm  Lungs: decreased BS at bases, no fremitus  Abdomen: Soft, BS present, distended  Ext: Warm, well perfused  Neuro: non focal, awake  Skin: no rash, no erythema  Lines: no phlebitis    FH: Non-contributory  Social Hx: Non-contributory    TESTS & MEASUREMENTS:                        9.5    5.54  )-----------( 180      ( 13 Sep 2021 06:18 )             30.6     09-13    136  |  102  |  11  ----------------------------<  70  4.4   |  27  |  <0.5<L>    Ca    8.2<L>      13 Sep 2021 06:18  Phos  4.0     09-12  Mg     2.0     09-13    TPro  5.2<L>  /  Alb  3.1<L>  /  TBili  0.4  /  DBili  x   /  AST  18  /  ALT  16  /  AlkPhos  81  09-13    eGFR if Non : 106 mL/min/1.73M2 (09-13-21 @ 06:18)  eGFR if African American: 122 mL/min/1.73M2 (09-13-21 @ 06:18)    LIVER FUNCTIONS - ( 13 Sep 2021 06:18 )  Alb: 3.1 g/dL / Pro: 5.2 g/dL / ALK PHOS: 81 U/L / ALT: 16 U/L / AST: 18 U/L / GGT: x                 Lactate, Blood: 1.2 mmol/L (09-11-21 @ 17:12)      INFECTIOUS DISEASES TESTING  COVID-19 PCR: NotDetec (09-03-21 @ 05:07)  COVID-19 PCR: NotDetec (07-06-21 @ 17:00)  COVID-19 PCR: NotDetec (07-02-21 @ 11:35)  Rapid RVP Result: NotDetec (06-30-21 @ 10:50)  COVID-19 PCR: NotDetec (06-25-21 @ 15:00)      INFLAMMATORY MARKERS  Sedimentation Rate, Erythrocyte: 65 mm/Hr (06-26-21 @ 06:24)  C-Reactive Protein, Serum: 31 mg/L (06-26-21 @ 06:24)      RADIOLOGY & ADDITIONAL TESTS:  I have personally reviewed the last available Chest xray  CXR      CT  CT Abdomen and Pelvis w/ Oral Cont and w/ IV Cont:   EXAM:  CT ABDOMEN AND PELVIS OC IC            PROCEDURE DATE:  09/12/2021            INTERPRETATION:  CLINICAL STATEMENT: Colitis with worsening colonic distention.    TECHNIQUE: Contiguous axial CT images were obtained from the lower chest to the pubic symphysis following administration of 100cc Optiray 320 intravenous contrast.  Oral contrast was not administered.  Reformatted images in the coronal and sagittal planes were acquired.    COMPARISON CT: September 9, 2021    OTHER STUDIES USED FOR CORRELATION: None.      FINDINGS:    LOWER CHEST: Small bilateral effusions, overall unchanged. Bibasilar atelectasis..    HEPATOBILIARY: Subcentimeter hypodensity too small to further characterize. Postcholecystectomy with unchanged biliary ductal dilatation.    SPLEEN: Unremarkable.    PANCREAS: Unremarkable.    ADRENAL GLANDS: Unremarkable.    KIDNEYS: Symmetric renal enhancement without hydronephrosis bilaterally. Left renal cyst/hypodensity, unchanged..    ABDOMINOPELVIC NODES: Unremarkable.    PELVIC ORGANS: Unremarkable.    PERITONEUM/MESENTERY/BOWEL: Status post gastric bypass surgery, unchanged. No evidence for small bowel obstruction, pneumoperitoneum or ascites. Circumferential wall thickening of the sigmoid colon, possibly secondary to under distention. Right lower quadrant surgical anastomosis, unchanged. Nonspecific small bowel - small bowel intussusception right lower quadrant (series 6 image 11), likely transient. Moderate fecal load throughout the colon..    BONES/SOFT TISSUES: No acute osseous abnormality. Multilevel degenerative changes of the spine including ankylosis of lower thoracic/upper lumbar vertebra. Unchanged right gluteal subcutaneous cystic lesion.    OTHER: Atherosclerosis of abdominal aorta and branches.      IMPRESSION:    Since September 9, 2021;    1. Moderate fecal load throughout the colon. No evidence for small bowel obstruction.    2. Circumferential bowel wall thickening of the sigmoid colon, likely related to underdistention.    3. Right lower quadrant nonspecific small bowel-small bowel intussusception, likely a transient finding.    4. Small bilateral pleural effusions, overall unchanged.    --- End of Report ---              ELLIOT LANDAU MD; Attending Radiologist  This document has been electronically signed. Sep 12 2021  8:36AM (09-12-21 @ 01:46)      CARDIOLOGY TESTING  12 Lead ECG:   Ventricular Rate 62 BPM    Atrial Rate 58 BPM    QRS Duration 102 ms    Q-T Interval 440 ms    QTC Calculation(Bazett) 446 ms    R Axis 70 degrees    T Axis 74 degrees    Diagnosis Line Atrial fibrillation  Abnormal ECG    Confirmed by Carlos A Flower ([id number]) on 9/9/2021 8:19:26 AM (09-09-21 @ 04:29)      MEDICATIONS  apixaban 5 Oral every 12 hours  chlorhexidine 4% Liquid 1 Topical <User Schedule>  DULoxetine 60 Oral daily  folic acid 1 Oral daily  hydrocortisone hemorrhoidal Suppository 1 Rectal at bedtime  influenza   Vaccine 0.5 IntraMuscular once  lactobacillus acidophilus 1 Oral daily  methocarbamol 500 Oral two times a day  metroNIDAZOLE  IVPB     metroNIDAZOLE  IVPB 500 IV Intermittent every 8 hours  thiamine 100 Oral daily  vancomycin    Solution 500 Oral every 6 hours      WEIGHT  Weight (kg): 61.2 (09-03-21 @ 04:15)  Creatinine, Serum: <0.5 mg/dL (09-13-21 @ 06:18)      ANTIBIOTICS:  metroNIDAZOLE  IVPB      metroNIDAZOLE  IVPB 500 milliGRAM(s) IV Intermittent every 8 hours  vancomycin    Solution 500 milliGRAM(s) Oral every 6 hours      All available historical records have been reviewed

## 2021-09-13 NOTE — PROGRESS NOTE ADULT - SUBJECTIVE AND OBJECTIVE BOX
WEGENER, LOIS  70y  Female      Patient is a 70y old  Female who presents with a chief complaint of BRBPR (12 Sep 2021 15:37)      INTERVAL HPI/OVERNIGHT EVENTS: No acute overnight events. Pt had one bm overnight.  She denies any nausea, vomiting, abdominal pain, hematochezia, or melena.       REVIEW OF SYSTEMS:  CONSTITUTIONAL: No fever, weight loss, or fatigue  EYES: No eye pain, visual disturbances, or discharge  ENMT:  No difficulty hearing, tinnitus, vertigo; No sinus or throat pain  NECK: No pain or stiffness  BREASTS: No pain, masses, or nipple discharge  RESPIRATORY: No cough, wheezing, chills or hemoptysis; No shortness of breath  CARDIOVASCULAR: No chest pain, palpitations, dizziness, or leg swelling  GASTROINTESTINAL: No abdominal or epigastric pain. No nausea, vomiting, or hematemesis; No diarrhea or constipation. No melena or hematochezia.  GENITOURINARY: No dysuria, frequency, hematuria, or incontinence  NEUROLOGICAL: No headaches, memory loss, loss of strength, numbness, or tremors  SKIN: No itching, burning, rashes, or lesions   LYMPH NODES: No enlarged glands  ENDOCRINE: No heat or cold intolerance; No hair loss  MUSCULOSKELETAL: No joint pain or swelling; No muscle, back, or extremity pain  PSYCHIATRIC: No depression, anxiety, mood swings, or difficulty sleeping  HEME/LYMPH: No easy bruising, or bleeding gums  ALLERY AND IMMUNOLOGIC: No hives or eczema  FAMILY HISTORY:    T(C): 36.8 (09-13-21 @ 13:04), Max: 36.8 (09-13-21 @ 05:33)  HR: 75 (09-13-21 @ 13:04) (61 - 75)  BP: 89/51 (09-13-21 @ 13:04) (89/51 - 105/56)  RR: 18 (09-13-21 @ 13:04) (18 - 20)  SpO2: --  Wt(kg): --Vital Signs Last 24 Hrs  T(C): 36.8 (13 Sep 2021 13:04), Max: 36.8 (13 Sep 2021 05:33)  T(F): 98.2 (13 Sep 2021 13:04), Max: 98.2 (13 Sep 2021 05:33)  HR: 75 (13 Sep 2021 13:04) (61 - 75)  BP: 89/51 (13 Sep 2021 13:04) (89/51 - 105/56)  BP(mean): 82 (12 Sep 2021 20:11) (82 - 82)  RR: 18 (13 Sep 2021 13:04) (18 - 20)  SpO2: --  No Known Allergies      PHYSICAL EXAM:  GENERAL: NAD, well-groomed, well-developed  HEAD:  Atraumatic, Normocephalic  EYES: EOMI, PERRLA, conjunctiva and sclera clear  ENMT: No tonsillar erythema, exudates, or enlargement; Moist mucous membranes, Good dentition, No lesions  NECK: Supple, No JVD, Normal thyroid  NERVOUS SYSTEM:  Alert & Oriented X3, Good concentration; Motor Strength 5/5 B/L upper and lower extremities; DTRs 2+ intact and symmetric  CHEST/LUNG: Clear to percussion bilaterally; No rales, rhonchi, wheezing, or rubs  HEART: Regular rate and rhythm; No murmurs, rubs, or gallops  ABDOMEN: Soft, Nontender, Nondistended; Bowel sounds present  EXTREMITIES:  2+ Peripheral Pulses, No clubbing, cyanosis, or edema  LYMPH: No lymphadenopathy noted  SKIN: No rashes or lesions    Consultant(s) Notes Reviewed:  [x ] YES  [ ] NO  Care Discussed with Consultants/Other Providers [ x] YES  [ ] NO    LABS:                        9.5    5.54  )-----------( 180      ( 13 Sep 2021 06:18 )             30.6     09-13    136  |  102  |  11  ----------------------------<  70  4.4   |  27  |  <0.5<L>    Ca    8.2<L>      13 Sep 2021 06:18  Phos  4.0     09-12  Mg     2.0     09-13    TPro  5.2<L>  /  Alb  3.1<L>  /  TBili  0.4  /  DBili  x   /  AST  18  /  ALT  16  /  AlkPhos  81  09-13        RADIOLOGY & ADDITIONAL TESTS:    Imaging Personally Reviewed:  [ ] YES  [ ] NO  ALBUTerol    90 MICROgram(s) HFA Inhaler 2 Puff(s) Inhalation every 6 hours PRN  apixaban 5 milliGRAM(s) Oral every 12 hours  chlorhexidine 4% Liquid 1 Application(s) Topical <User Schedule>  DULoxetine 60 milliGRAM(s) Oral daily  folic acid 1 milliGRAM(s) Oral daily  hydrocortisone hemorrhoidal Suppository 1 Suppository(s) Rectal at bedtime  influenza   Vaccine 0.5 milliLiter(s) IntraMuscular once  lactobacillus acidophilus 1 Tablet(s) Oral daily  melatonin 5 milliGRAM(s) Oral at bedtime PRN  methocarbamol 500 milliGRAM(s) Oral two times a day  metroNIDAZOLE  IVPB      metroNIDAZOLE  IVPB 500 milliGRAM(s) IV Intermittent every 8 hours  thiamine 100 milliGRAM(s) Oral daily  vancomycin    Solution 500 milliGRAM(s) Oral every 6 hours      HEALTH ISSUES - PROBLEM Dx:

## 2021-09-14 ENCOUNTER — TRANSCRIPTION ENCOUNTER (OUTPATIENT)
Age: 71
End: 2021-09-14

## 2021-09-14 LAB
ALBUMIN SERPL ELPH-MCNC: 3.1 G/DL — LOW (ref 3.5–5.2)
ALP SERPL-CCNC: 84 U/L — SIGNIFICANT CHANGE UP (ref 30–115)
ALT FLD-CCNC: 17 U/L — SIGNIFICANT CHANGE UP (ref 0–41)
ANION GAP SERPL CALC-SCNC: 12 MMOL/L — SIGNIFICANT CHANGE UP (ref 7–14)
AST SERPL-CCNC: 24 U/L — SIGNIFICANT CHANGE UP (ref 0–41)
BASOPHILS # BLD AUTO: 0.05 K/UL — SIGNIFICANT CHANGE UP (ref 0–0.2)
BASOPHILS NFR BLD AUTO: 0.8 % — SIGNIFICANT CHANGE UP (ref 0–1)
BILIRUB SERPL-MCNC: 0.4 MG/DL — SIGNIFICANT CHANGE UP (ref 0.2–1.2)
BUN SERPL-MCNC: 12 MG/DL — SIGNIFICANT CHANGE UP (ref 10–20)
CALCIUM SERPL-MCNC: 8.1 MG/DL — LOW (ref 8.5–10.1)
CHLORIDE SERPL-SCNC: 100 MMOL/L — SIGNIFICANT CHANGE UP (ref 98–110)
CO2 SERPL-SCNC: 23 MMOL/L — SIGNIFICANT CHANGE UP (ref 17–32)
CREAT SERPL-MCNC: 0.5 MG/DL — LOW (ref 0.7–1.5)
EOSINOPHIL # BLD AUTO: 0.11 K/UL — SIGNIFICANT CHANGE UP (ref 0–0.7)
EOSINOPHIL NFR BLD AUTO: 1.8 % — SIGNIFICANT CHANGE UP (ref 0–8)
GLUCOSE BLDC GLUCOMTR-MCNC: 81 MG/DL — SIGNIFICANT CHANGE UP (ref 70–99)
GLUCOSE BLDC GLUCOMTR-MCNC: 91 MG/DL — SIGNIFICANT CHANGE UP (ref 70–99)
GLUCOSE SERPL-MCNC: 145 MG/DL — HIGH (ref 70–99)
HCT VFR BLD CALC: 32.4 % — LOW (ref 37–47)
HGB BLD-MCNC: 9.9 G/DL — LOW (ref 12–16)
IMM GRANULOCYTES NFR BLD AUTO: 0.3 % — SIGNIFICANT CHANGE UP (ref 0.1–0.3)
LYMPHOCYTES # BLD AUTO: 1.46 K/UL — SIGNIFICANT CHANGE UP (ref 1.2–3.4)
LYMPHOCYTES # BLD AUTO: 24.2 % — SIGNIFICANT CHANGE UP (ref 20.5–51.1)
MAGNESIUM SERPL-MCNC: 1.9 MG/DL — SIGNIFICANT CHANGE UP (ref 1.8–2.4)
MCHC RBC-ENTMCNC: 29.6 PG — SIGNIFICANT CHANGE UP (ref 27–31)
MCHC RBC-ENTMCNC: 30.6 G/DL — LOW (ref 32–37)
MCV RBC AUTO: 97 FL — SIGNIFICANT CHANGE UP (ref 81–99)
MONOCYTES # BLD AUTO: 0.26 K/UL — SIGNIFICANT CHANGE UP (ref 0.1–0.6)
MONOCYTES NFR BLD AUTO: 4.3 % — SIGNIFICANT CHANGE UP (ref 1.7–9.3)
NEUTROPHILS # BLD AUTO: 4.13 K/UL — SIGNIFICANT CHANGE UP (ref 1.4–6.5)
NEUTROPHILS NFR BLD AUTO: 68.6 % — SIGNIFICANT CHANGE UP (ref 42.2–75.2)
NRBC # BLD: 0 /100 WBCS — SIGNIFICANT CHANGE UP (ref 0–0)
PLATELET # BLD AUTO: 193 K/UL — SIGNIFICANT CHANGE UP (ref 130–400)
POTASSIUM SERPL-MCNC: 4.3 MMOL/L — SIGNIFICANT CHANGE UP (ref 3.5–5)
POTASSIUM SERPL-SCNC: 4.3 MMOL/L — SIGNIFICANT CHANGE UP (ref 3.5–5)
PROT SERPL-MCNC: 5.4 G/DL — LOW (ref 6–8)
RBC # BLD: 3.34 M/UL — LOW (ref 4.2–5.4)
RBC # FLD: 18.4 % — HIGH (ref 11.5–14.5)
SARS-COV-2 RNA SPEC QL NAA+PROBE: SIGNIFICANT CHANGE UP
SODIUM SERPL-SCNC: 135 MMOL/L — SIGNIFICANT CHANGE UP (ref 135–146)
WBC # BLD: 6.03 K/UL — SIGNIFICANT CHANGE UP (ref 4.8–10.8)
WBC # FLD AUTO: 6.03 K/UL — SIGNIFICANT CHANGE UP (ref 4.8–10.8)

## 2021-09-14 RX ORDER — BACITRACIN ZINC 500 UNIT/G
1 OINTMENT IN PACKET (EA) TOPICAL
Refills: 0 | Status: DISCONTINUED | OUTPATIENT
Start: 2021-09-14 | End: 2021-09-18

## 2021-09-14 RX ADMIN — Medication 500 MILLIGRAM(S): at 18:02

## 2021-09-14 RX ADMIN — METHOCARBAMOL 500 MILLIGRAM(S): 500 TABLET, FILM COATED ORAL at 18:02

## 2021-09-14 RX ADMIN — Medication 1 MILLIGRAM(S): at 12:59

## 2021-09-14 RX ADMIN — Medication 100 MILLIGRAM(S): at 21:55

## 2021-09-14 RX ADMIN — APIXABAN 5 MILLIGRAM(S): 2.5 TABLET, FILM COATED ORAL at 18:02

## 2021-09-14 RX ADMIN — Medication 100 MILLIGRAM(S): at 14:13

## 2021-09-14 RX ADMIN — Medication 100 MILLIGRAM(S): at 05:50

## 2021-09-14 RX ADMIN — Medication 1 TABLET(S): at 12:59

## 2021-09-14 RX ADMIN — APIXABAN 5 MILLIGRAM(S): 2.5 TABLET, FILM COATED ORAL at 05:51

## 2021-09-14 RX ADMIN — Medication 100 MILLIGRAM(S): at 12:59

## 2021-09-14 RX ADMIN — Medication 500 MILLIGRAM(S): at 05:52

## 2021-09-14 RX ADMIN — DULOXETINE HYDROCHLORIDE 60 MILLIGRAM(S): 30 CAPSULE, DELAYED RELEASE ORAL at 12:59

## 2021-09-14 RX ADMIN — Medication 5 MILLIGRAM(S): at 22:56

## 2021-09-14 RX ADMIN — CHLORHEXIDINE GLUCONATE 1 APPLICATION(S): 213 SOLUTION TOPICAL at 05:50

## 2021-09-14 RX ADMIN — METHOCARBAMOL 500 MILLIGRAM(S): 500 TABLET, FILM COATED ORAL at 05:51

## 2021-09-14 RX ADMIN — Medication 500 MILLIGRAM(S): at 12:59

## 2021-09-14 NOTE — PROGRESS NOTE ADULT - SUBJECTIVE AND OBJECTIVE BOX
WEGENER, LOIS  71y  Female      Patient is a 71y old  Female who presents with a chief complaint of BRBPR (14 Sep 2021 11:28)      INTERVAL HPI/OVERNIGHT EVENTS: no acute events overnight. no nursing concerns.       REVIEW OF SYSTEMS:  CONSTITUTIONAL: No fever, weight loss, or fatigue  RESPIRATORY: No cough, wheezing, chills or hemoptysis; No shortness of breath  CARDIOVASCULAR: No chest pain, palpitations, dizziness, or leg swelling  GASTROINTESTINAL: No abdominal or epigastric pain. No nausea, vomiting, or hematemesis; No diarrhea or constipation. No melena or hematochezia.  GENITOURINARY: No dysuria, frequency, hematuria, or incontinence  NEUROLOGICAL: No headaches, memory loss, loss of strength, numbness, or tremors  SKIN: No itching, burning, rashes, or lesions   MUSCULOSKELETAL: No joint pain or swelling; No muscle, back, or extremity pain  PSYCHIATRIC: No depression, anxiety, mood swings, or difficulty sleeping  All other review of systems negative    VITALS  T(C): 37.1 (09-14-21 @ 13:24), Max: 37.1 (09-14-21 @ 13:24)  HR: 73 (09-14-21 @ 13:24) (61 - 79)  BP: 92/57 (09-14-21 @ 13:24) (86/53 - 100/55)  RR: 18 (09-14-21 @ 13:24) (18 - 18)  SpO2: --  Wt(kg): --Vital Signs Last 24 Hrs  T(C): 37.1 (14 Sep 2021 13:24), Max: 37.1 (14 Sep 2021 13:24)  T(F): 98.7 (14 Sep 2021 13:24), Max: 98.7 (14 Sep 2021 13:24)  HR: 73 (14 Sep 2021 13:24) (61 - 79)  BP: 92/57 (14 Sep 2021 13:24) (86/53 - 100/55)  BP(mean): --  RR: 18 (14 Sep 2021 13:24) (18 - 18)  SpO2: --        PHYSICAL EXAM:  GENERAL: elderly F, NAD, non toxic appearing  EYES: anicteric sclera, non injected conjunctiva, EOMI  ENT: hearing grossly intact, MMM  PSYCH: no agitation, baseline mentation  NERVOUS SYSTEM:  Alert & Oriented X3, Motor Strength 5/5 B/L upper and lower extremities; Sensory intact  PULMONARY: Clear to percussion bilaterally; No rales, rhonchi, wheezing, or rubs  CARDIOVASCULAR: Regular rate and rhythm; No murmurs, rubs, or gallops  GI: Soft, Nontender, Nondistended; Bowel sounds present  EXTREMITIES:  2+ Peripheral Pulses, No clubbing, cyanosis, or edema  LYMPH: No lymphadenopathy noted  SKIN: No rashes or lesions    Consultant(s) Notes Reviewed:  [x ] YES  [ ] NO    Discussed with Consultants/Other Providers [ x] YES     LABS                          9.9    6.03  )-----------( 193      ( 14 Sep 2021 04:30 )             32.4     09-14    135  |  100  |  12  ----------------------------<  145<H>  4.3   |  23  |  0.5<L>    Ca    8.1<L>      14 Sep 2021 04:30  Mg     1.9     09-14    TPro  5.4<L>  /  Alb  3.1<L>  /  TBili  0.4  /  DBili  x   /  AST  24  /  ALT  17  /  AlkPhos  84  09-14      Lactate Trend  09-11 @ 17:12 Lactate:1.2     POCT Blood Glucose.: 91 mg/dL (14 Sep 2021 11:55)    RADIOLOGY & ADDITIONAL TESTS:  CT Abdomen and Pelvis w/ Oral Cont and w/ IV Cont (09.12.21 @ 01:46) >    IMPRESSION:  Since September 9, 2021;  1. Moderate fecal load throughout the colon. No evidence for small bowel obstruction.  2. Circumferential bowel wall thickening of the sigmoid colon, likely related to underdistention.  3. Right lower quadrant nonspecific small bowel-small bowel intussusception, likely a transient finding.  4. Small bilateral pleural effusions, overall unchanged.      MEDICATIONS  (STANDING):  apixaban 5 milliGRAM(s) Oral every 12 hours  chlorhexidine 4% Liquid 1 Application(s) Topical <User Schedule>  DULoxetine 60 milliGRAM(s) Oral daily  folic acid 1 milliGRAM(s) Oral daily  hydrocortisone hemorrhoidal Suppository 1 Suppository(s) Rectal at bedtime  influenza   Vaccine 0.5 milliLiter(s) IntraMuscular once  lactobacillus acidophilus 1 Tablet(s) Oral daily  methocarbamol 500 milliGRAM(s) Oral two times a day  metroNIDAZOLE  IVPB      metroNIDAZOLE  IVPB 500 milliGRAM(s) IV Intermittent every 8 hours  thiamine 100 milliGRAM(s) Oral daily  vancomycin    Solution 500 milliGRAM(s) Oral every 6 hours    MEDICATIONS  (PRN):  ALBUTerol    90 MICROgram(s) HFA Inhaler 2 Puff(s) Inhalation every 6 hours PRN Shortness of Breath and/or Wheezing  melatonin 5 milliGRAM(s) Oral at bedtime PRN Insomnia

## 2021-09-14 NOTE — DISCHARGE NOTE NURSING/CASE MANAGEMENT/SOCIAL WORK - NSDCPEFALRISK_GEN_ALL_CORE
For information on Fall & injury Prevention, visit [email or web address]

## 2021-09-14 NOTE — PROGRESS NOTE ADULT - SUBJECTIVE AND OBJECTIVE BOX
WEGENER, LOIS  71y  Female      Patient is a 71y old  Female who presents with a chief complaint of BRBPR (13 Sep 2021 17:21)      INTERVAL HPI/OVERNIGHT EVENTS: No acute overnight events. Pt had 1 formed stool last night.  This morning, she was found resting comfortably in her room in no acute distress denying abdominal pain, hematochezia, diarrhea, and nausea.        REVIEW OF SYSTEMS:  CONSTITUTIONAL: No fever, weight loss, or fatigue  EYES: No eye pain, visual disturbances, or discharge  ENMT:  No difficulty hearing, tinnitus, vertigo; No sinus or throat pain  NECK: No pain or stiffness  BREASTS: No pain, masses, or nipple discharge  RESPIRATORY: No cough, wheezing, chills or hemoptysis; No shortness of breath  CARDIOVASCULAR: No chest pain, palpitations, dizziness, or leg swelling  GASTROINTESTINAL: No abdominal or epigastric pain. No nausea, vomiting, or hematemesis; No diarrhea or constipation. No melena or hematochezia.  GENITOURINARY: No dysuria, frequency, hematuria, or incontinence  NEUROLOGICAL: No headaches, memory loss, loss of strength, numbness, or tremors  SKIN: No itching, burning, rashes, or lesions   LYMPH NODES: No enlarged glands  ENDOCRINE: No heat or cold intolerance; No hair loss  MUSCULOSKELETAL: No joint pain or swelling; No muscle, back, or extremity pain  PSYCHIATRIC: No depression, anxiety, mood swings, or difficulty sleeping  HEME/LYMPH: No easy bruising, or bleeding gums  ALLERY AND IMMUNOLOGIC: No hives or eczema  FAMILY HISTORY:    T(C): 36 (09-14-21 @ 05:09), Max: 36.8 (09-13-21 @ 13:04)  HR: 61 (09-14-21 @ 05:09) (61 - 79)  BP: 100/55 (09-14-21 @ 05:09) (86/53 - 100/55)  RR: 18 (09-14-21 @ 05:09) (18 - 18)  SpO2: --  Wt(kg): --Vital Signs Last 24 Hrs  T(C): 36 (14 Sep 2021 05:09), Max: 36.8 (13 Sep 2021 13:04)  T(F): 96.8 (14 Sep 2021 05:09), Max: 98.2 (13 Sep 2021 13:04)  HR: 61 (14 Sep 2021 05:09) (61 - 79)  BP: 100/55 (14 Sep 2021 05:09) (86/53 - 100/55)  BP(mean): --  RR: 18 (14 Sep 2021 05:09) (18 - 18)  SpO2: --  No Known Allergies      PHYSICAL EXAM:  GENERAL: NAD, well-groomed, well-developed  HEAD:  Atraumatic, Normocephalic  EYES: EOMI, PERRLA, conjunctiva and sclera clear  ENMT: No tonsillar erythema, exudates, or enlargement; Moist mucous membranes, Good dentition, No lesions  NECK: Supple, No JVD, Normal thyroid  NERVOUS SYSTEM:  Alert & Oriented X3, Good concentration; Motor Strength 5/5 B/L upper and lower extremities; DTRs 2+ intact and symmetric  CHEST/LUNG: Clear to percussion bilaterally; No rales, rhonchi, wheezing, or rubs  HEART: Regular rate and rhythm; No murmurs, rubs, or gallops  ABDOMEN: Soft, Nontender, Nondistended; Bowel sounds present  EXTREMITIES:  2+ Peripheral Pulses, No clubbing, cyanosis, or edema  LYMPH: No lymphadenopathy noted  SKIN: No rashes or lesions    Consultant(s) Notes Reviewed:  [x ] YES  [ ] NO  Care Discussed with Consultants/Other Providers [ x] YES  [ ] NO    LABS:                          9.9    6.03  )-----------( 193      ( 14 Sep 2021 04:30 )             32.4     09-14    135  |  100  |  12  ----------------------------<  145<H>  4.3   |  23  |  0.5<L>    Ca    8.1<L>      14 Sep 2021 04:30  Mg     1.9     09-14    TPro  5.4<L>  /  Alb  3.1<L>  /  TBili  0.4  /  DBili  x   /  AST  24  /  ALT  17  /  AlkPhos  84  09-14      RADIOLOGY & ADDITIONAL TESTS:    Imaging Personally Reviewed:  [ ] YES  [ ] NO  ALBUTerol    90 MICROgram(s) HFA Inhaler 2 Puff(s) Inhalation every 6 hours PRN  apixaban 5 milliGRAM(s) Oral every 12 hours  chlorhexidine 4% Liquid 1 Application(s) Topical <User Schedule>  DULoxetine 60 milliGRAM(s) Oral daily  folic acid 1 milliGRAM(s) Oral daily  hydrocortisone hemorrhoidal Suppository 1 Suppository(s) Rectal at bedtime  influenza   Vaccine 0.5 milliLiter(s) IntraMuscular once  lactobacillus acidophilus 1 Tablet(s) Oral daily  melatonin 5 milliGRAM(s) Oral at bedtime PRN  methocarbamol 500 milliGRAM(s) Oral two times a day  metroNIDAZOLE  IVPB      metroNIDAZOLE  IVPB 500 milliGRAM(s) IV Intermittent every 8 hours  thiamine 100 milliGRAM(s) Oral daily  vancomycin    Solution 500 milliGRAM(s) Oral every 6 hours      HEALTH ISSUES - PROBLEM Dx:

## 2021-09-14 NOTE — PROGRESS NOTE ADULT - ASSESSMENT
69 yo F with recent dx of Afib on Eliquis, Abdominal abscess 2 mo ago, Gastric Bypass surgery 20+ years ago, COPD who presents to the ED with BRBPR .    # Hematochezia RESOLVED  # Hx of hemorrhoids  # Acute blood loss anemia  # suspect lower GIB  - currently non tachycardic, BP runs low   - MARVIN w/melena and external hemorrhoids  - chronic diarrhea and fecal incontinence reported; c diff ordered and positive   - hx of fistula/ perianal abscess  - colonoscopy 7/1/21 - multiple polypectomy   - Hb stable   - active T&S, transfuse for hgb <7  - GI consult : plan for Colonoscopy but patient refused more than once, plan for outpatient colonoscopy     #C Diff Colitis -RESOLVED  - distended colon noted on KUB, nontoxic   - start 500mg PO vancomycin and IV Flagyl, improving  - < 3 BM in 24 hours, if continues to be stable, will dc   - ID following , GI following , Colorectal Surgery following   -ID recommends stopping IV flagyl at dc and c/w with po vancomycin 500mg tid until 9/20/21  - CT A/P only notable for ? transient intussusception ( d/w CRS )     #Mechanical Fall in hospital   - CTH w/ Focal hypodensity in the inferior right cerebellar hemisphere possibly representing an age-indeterminate infarct  - MRI Brain ordered negative for infarct   - CT C/A/P per Trauma surgery - unremarkable   - Xray R wrist w/ widening of scapholunate interval c/w ligament tear   - Trauma surgery following: s/p suturing laceration of chin  - Ortho surgery consulted for possible wrist ligament tear , no acute intervention- OT and wrist splint     #Hypokalemia - resolved     #Hyponatremia- likely hypotonic hypovolemic , now resolved     #Atrial fibrillation on eliquis  - CHADVASC 3  - C/w  Eliquis     #COPD ,stable not in exacerbation  - not on home oxygen ,no wheezing on exam  - inhaler PRN    #HFpEF ,euvolemic on exam   -TTE 6/27 EF of 63 %.  - monitor I&O  - BNP; from 6/25- 1500-->912  - avoid volume overload     # EtOH abuse with suspected folate/thiamine deficiency  - on folic acid and thiamine     # Depression on duloxetine  - no suicidal ideation  - c/w SSRI  - f/u w psychiatry as outpatient      #Progress Note Handoff  Pending (specify): Authorization rehab  Family discussion: patient aware of plan. in agreement. all questions answered  Disposition: Unknown at this time________

## 2021-09-14 NOTE — DISCHARGE NOTE NURSING/CASE MANAGEMENT/SOCIAL WORK - NSDCVIVACCINE_GEN_ALL_CORE_FT
Td (adult) preservative free; 09-Sep-2021 12:06; Dandre Ledezma (MIGUELITO); [employer]; [id number] (Exp. Date: 10-Sep-2021); IntraMuscular; Deltoid Left.; 0.5 milliLiter(s); VIS (VIS Published: 09-Sep-2021, VIS Presented: 09-Sep-2021);

## 2021-09-14 NOTE — DISCHARGE NOTE NURSING/CASE MANAGEMENT/SOCIAL WORK - PATIENT PORTAL LINK FT
You can access the FollowMyHealth Patient Portal offered by [facility] by registering at the following website: [email or web address]. By joining [email or web address]’s FollowMyHealth portal, you will also be able to view your health information using other applications (apps) compatible with our system.

## 2021-09-15 LAB
ALBUMIN SERPL ELPH-MCNC: 2.9 G/DL — LOW (ref 3.5–5.2)
ALP SERPL-CCNC: 65 U/L — SIGNIFICANT CHANGE UP (ref 30–115)
ALT FLD-CCNC: 15 U/L — SIGNIFICANT CHANGE UP (ref 0–41)
ANION GAP SERPL CALC-SCNC: 7 MMOL/L — SIGNIFICANT CHANGE UP (ref 7–14)
AST SERPL-CCNC: 16 U/L — SIGNIFICANT CHANGE UP (ref 0–41)
BASOPHILS # BLD AUTO: 0.06 K/UL — SIGNIFICANT CHANGE UP (ref 0–0.2)
BASOPHILS NFR BLD AUTO: 1 % — SIGNIFICANT CHANGE UP (ref 0–1)
BILIRUB SERPL-MCNC: 0.3 MG/DL — SIGNIFICANT CHANGE UP (ref 0.2–1.2)
BUN SERPL-MCNC: 12 MG/DL — SIGNIFICANT CHANGE UP (ref 10–20)
CALCIUM SERPL-MCNC: 8.1 MG/DL — LOW (ref 8.5–10.1)
CHLORIDE SERPL-SCNC: 104 MMOL/L — SIGNIFICANT CHANGE UP (ref 98–110)
CO2 SERPL-SCNC: 27 MMOL/L — SIGNIFICANT CHANGE UP (ref 17–32)
CREAT SERPL-MCNC: 0.6 MG/DL — LOW (ref 0.7–1.5)
EOSINOPHIL # BLD AUTO: 0.11 K/UL — SIGNIFICANT CHANGE UP (ref 0–0.7)
EOSINOPHIL NFR BLD AUTO: 1.9 % — SIGNIFICANT CHANGE UP (ref 0–8)
GLUCOSE SERPL-MCNC: 67 MG/DL — LOW (ref 70–99)
HCT VFR BLD CALC: 30 % — LOW (ref 37–47)
HGB BLD-MCNC: 9.4 G/DL — LOW (ref 12–16)
IMM GRANULOCYTES NFR BLD AUTO: 0.5 % — HIGH (ref 0.1–0.3)
LYMPHOCYTES # BLD AUTO: 1.72 K/UL — SIGNIFICANT CHANGE UP (ref 1.2–3.4)
LYMPHOCYTES # BLD AUTO: 29.1 % — SIGNIFICANT CHANGE UP (ref 20.5–51.1)
MAGNESIUM SERPL-MCNC: 2 MG/DL — SIGNIFICANT CHANGE UP (ref 1.8–2.4)
MCHC RBC-ENTMCNC: 30.5 PG — SIGNIFICANT CHANGE UP (ref 27–31)
MCHC RBC-ENTMCNC: 31.3 G/DL — LOW (ref 32–37)
MCV RBC AUTO: 97.4 FL — SIGNIFICANT CHANGE UP (ref 81–99)
MONOCYTES # BLD AUTO: 0.44 K/UL — SIGNIFICANT CHANGE UP (ref 0.1–0.6)
MONOCYTES NFR BLD AUTO: 7.4 % — SIGNIFICANT CHANGE UP (ref 1.7–9.3)
NEUTROPHILS # BLD AUTO: 3.55 K/UL — SIGNIFICANT CHANGE UP (ref 1.4–6.5)
NEUTROPHILS NFR BLD AUTO: 60.1 % — SIGNIFICANT CHANGE UP (ref 42.2–75.2)
NRBC # BLD: 0 /100 WBCS — SIGNIFICANT CHANGE UP (ref 0–0)
PLATELET # BLD AUTO: 192 K/UL — SIGNIFICANT CHANGE UP (ref 130–400)
POTASSIUM SERPL-MCNC: 5.2 MMOL/L — HIGH (ref 3.5–5)
POTASSIUM SERPL-SCNC: 5.2 MMOL/L — HIGH (ref 3.5–5)
PROT SERPL-MCNC: 5.1 G/DL — LOW (ref 6–8)
RBC # BLD: 3.08 M/UL — LOW (ref 4.2–5.4)
RBC # FLD: 18.2 % — HIGH (ref 11.5–14.5)
SODIUM SERPL-SCNC: 138 MMOL/L — SIGNIFICANT CHANGE UP (ref 135–146)
WBC # BLD: 5.91 K/UL — SIGNIFICANT CHANGE UP (ref 4.8–10.8)
WBC # FLD AUTO: 5.91 K/UL — SIGNIFICANT CHANGE UP (ref 4.8–10.8)

## 2021-09-15 PROCEDURE — 71045 X-RAY EXAM CHEST 1 VIEW: CPT | Mod: 26

## 2021-09-15 RX ADMIN — METHOCARBAMOL 500 MILLIGRAM(S): 500 TABLET, FILM COATED ORAL at 06:27

## 2021-09-15 RX ADMIN — Medication 1 TABLET(S): at 12:43

## 2021-09-15 RX ADMIN — METHOCARBAMOL 500 MILLIGRAM(S): 500 TABLET, FILM COATED ORAL at 18:11

## 2021-09-15 RX ADMIN — DULOXETINE HYDROCHLORIDE 60 MILLIGRAM(S): 30 CAPSULE, DELAYED RELEASE ORAL at 12:43

## 2021-09-15 RX ADMIN — Medication 100 MILLIGRAM(S): at 05:11

## 2021-09-15 RX ADMIN — Medication 500 MILLIGRAM(S): at 00:47

## 2021-09-15 RX ADMIN — Medication 500 MILLIGRAM(S): at 06:28

## 2021-09-15 RX ADMIN — Medication 1 APPLICATION(S): at 12:39

## 2021-09-15 RX ADMIN — APIXABAN 5 MILLIGRAM(S): 2.5 TABLET, FILM COATED ORAL at 18:11

## 2021-09-15 RX ADMIN — Medication 5 MILLIGRAM(S): at 21:47

## 2021-09-15 RX ADMIN — Medication 100 MILLIGRAM(S): at 21:44

## 2021-09-15 RX ADMIN — Medication 1 MILLIGRAM(S): at 12:43

## 2021-09-15 RX ADMIN — Medication 500 MILLIGRAM(S): at 18:11

## 2021-09-15 RX ADMIN — Medication 1 SUPPOSITORY(S): at 21:44

## 2021-09-15 RX ADMIN — Medication 500 MILLIGRAM(S): at 12:43

## 2021-09-15 RX ADMIN — APIXABAN 5 MILLIGRAM(S): 2.5 TABLET, FILM COATED ORAL at 06:27

## 2021-09-15 RX ADMIN — Medication 100 MILLIGRAM(S): at 12:43

## 2021-09-15 RX ADMIN — Medication 100 MILLIGRAM(S): at 16:49

## 2021-09-15 NOTE — PROGRESS NOTE ADULT - SUBJECTIVE AND OBJECTIVE BOX
WEGENER, LOIS  71y  Female      Patient is a 71y old  Female who presents with a chief complaint of BRBPR (15 Sep 2021 10:47)      INTERVAL HPI/OVERNIGHT EVENTS: no acute events overnight. no major complaints. no nursing concerns.       REVIEW OF SYSTEMS:  CONSTITUTIONAL: No fever, weight loss, or fatigue  RESPIRATORY: No cough, wheezing, chills or hemoptysis; No shortness of breath  CARDIOVASCULAR: No chest pain, palpitations, dizziness, or leg swelling  GASTROINTESTINAL: No abdominal or epigastric pain. No nausea, vomiting, or hematemesis; No diarrhea or constipation. No melena or hematochezia.  GENITOURINARY: No dysuria, frequency, hematuria, or incontinence  NEUROLOGICAL: No headaches, memory loss, loss of strength, numbness, or tremors  SKIN: No itching, burning, rashes, or lesions   MUSCULOSKELETAL: No joint pain or swelling; No muscle, back, or extremity pain  PSYCHIATRIC: No depression, anxiety, mood swings, or difficulty sleeping  All other review of systems negative    VITALS  T(C): 36.8 (09-15-21 @ 13:05), Max: 36.8 (09-15-21 @ 13:05)  HR: 64 (09-15-21 @ 13:05) (64 - 73)  BP: 88/50 (09-15-21 @ 13:05) (88/50 - 110/55)  RR: 18 (09-15-21 @ 13:05) (18 - 18)  SpO2: --  Wt(kg): --Vital Signs Last 24 Hrs  T(C): 36.8 (15 Sep 2021 13:05), Max: 36.8 (15 Sep 2021 13:05)  T(F): 98.2 (15 Sep 2021 13:05), Max: 98.2 (15 Sep 2021 13:05)  HR: 64 (15 Sep 2021 13:05) (64 - 73)  BP: 88/50 (15 Sep 2021 13:05) (88/50 - 110/55)  BP(mean): --  RR: 18 (15 Sep 2021 13:05) (18 - 18)  SpO2: --        PHYSICAL EXAM:  GENERAL: elderly F, NAD, non toxic appearing  EYES: anicteric sclera, non injected conjunctiva, EOMI  ENT: hearing grossly intact, MMM  PSYCH: no agitation, baseline mentation  NERVOUS SYSTEM:  Alert & Oriented X2-3, Motor Strength 5/5 B/L upper and lower extremities; Sensory intact  PULMONARY: Clear to percussion bilaterally; No rales, rhonchi, wheezing, or rubs  CARDIOVASCULAR: Regular rate and rhythm; No murmurs, rubs, or gallops  GI: Soft, Nontender, Nondistended; Bowel sounds present  EXTREMITIES:  2+ Peripheral Pulses, No clubbing, cyanosis, or edema  LYMPH: No lymphadenopathy noted  SKIN: No rashes or lesions    Consultant(s) Notes Reviewed:  [x ] YES  [ ] NO    Discussed with Consultants/Other Providers [ x] YES     LABS                          9.4    5.91  )-----------( 192      ( 15 Sep 2021 05:43 )             30.0     09-15    138  |  104  |  12  ----------------------------<  67<L>  5.2<H>   |  27  |  0.6<L>    Ca    8.1<L>      15 Sep 2021 05:43  Mg     2.0     09-15    TPro  5.1<L>  /  Alb  2.9<L>  /  TBili  0.3  /  DBili  x   /  AST  16  /  ALT  15  /  AlkPhos  65  09-15    POCT Blood Glucose.: 91 mg/dL (14 Sep 2021 11:55)      RADIOLOGY & ADDITIONAL TESTS:  - no images 9/15      MEDICATIONS  (STANDING):  apixaban 5 milliGRAM(s) Oral every 12 hours  BACItracin   Ointment 1 Application(s) Topical <User Schedule>  chlorhexidine 4% Liquid 1 Application(s) Topical <User Schedule>  DULoxetine 60 milliGRAM(s) Oral daily  folic acid 1 milliGRAM(s) Oral daily  hydrocortisone hemorrhoidal Suppository 1 Suppository(s) Rectal at bedtime  influenza   Vaccine 0.5 milliLiter(s) IntraMuscular once  lactobacillus acidophilus 1 Tablet(s) Oral daily  methocarbamol 500 milliGRAM(s) Oral two times a day  metroNIDAZOLE  IVPB      metroNIDAZOLE  IVPB 500 milliGRAM(s) IV Intermittent every 8 hours  thiamine 100 milliGRAM(s) Oral daily  vancomycin    Solution 500 milliGRAM(s) Oral every 6 hours    MEDICATIONS  (PRN):  ALBUTerol    90 MICROgram(s) HFA Inhaler 2 Puff(s) Inhalation every 6 hours PRN Shortness of Breath and/or Wheezing  melatonin 5 milliGRAM(s) Oral at bedtime PRN Insomnia

## 2021-09-15 NOTE — PROGRESS NOTE ADULT - ASSESSMENT
71 yo F with recent dx of Afib on Eliquis, Abdominal abscess 2 mo ago, Gastric Bypass surgery 20+ years ago, COPD who presents to the ED with BRBPR .    # Hematochezia RESOLVED  # Hx of hemorrhoids  # Acute blood loss anemia  # suspect lower GIB  - currently non tachycardic, BP runs low   - MARVIN w/melena and external hemorrhoids  - chronic diarrhea and fecal incontinence reported; c diff ordered and positive   - hx of fistula/ perianal abscess  - colonoscopy 7/1/21 - multiple polypectomy   - Hb stable 9-10   - active T&S, transfuse for hgb <7  - GI consult : plan for Colonoscopy but patient refused more than once, plan for outpatient colonoscopy     #C Diff Colitis -RESOLVED  - distended colon noted on KUB, nontoxic   - start 500mg PO vancomycin and IV Flagyl, improving  - < 3 BM in 24 hours, if continues to be stable, will dc   - ID following , GI following , Colorectal Surgery following   -ID recommends stopping IV flagyl at dc and c/w with po vancomycin 500mg tid until 9/20/21  - CT A/P only notable for ? transient intussusception ( d/w CRS )     #Mechanical Fall in hospital   - CTH w/ Focal hypodensity in the inferior right cerebellar hemisphere possibly representing an age-indeterminate infarct  - MRI Brain ordered negative for infarct   - CT C/A/P per Trauma surgery - unremarkable   - Xray R wrist w/ widening of scapholunate interval c/w ligament tear   - Trauma surgery following: s/p suturing laceration of chin  - Ortho surgery consulted for possible wrist ligament tear , no acute intervention- OT and wrist splint     #Hypokalemia - resolved     #Hyponatremia- likely hypotonic hypovolemic , now resolved     #Atrial fibrillation on eliquis  - CHADVASC 3  - C/w  Eliquis     #COPD ,stable not in exacerbation  - not on home oxygen ,no wheezing on exam  - inhaler PRN    #HFpEF ,euvolemic on exam   -TTE 6/27 EF of 63 %.  - monitor I&O  - BNP; from 6/25- 1500-->912  - avoid volume overload     # EtOH abuse with suspected folate/thiamine deficiency  - on folic acid and thiamine     # Depression on duloxetine  - no suicidal ideation  - c/w SSRI  - f/u w psychiatry as outpatient      #Progress Note Handoff  Pending (specify): Authorization rehab  Family discussion: patient aware of plan. in agreement. all questions answered  Disposition: Unknown at this time________

## 2021-09-15 NOTE — PROGRESS NOTE ADULT - ASSESSMENT
71 yo F with recent dx of Afib on Eliquis, Abdominal abscess 2 mo ago, Gastric Bypass surgery 20+ years ago, COPD who presents to the ED with BRBPR .    # Hematochezia RESOLVED  # Hx of hemorrhoids  # Acute blood loss anemia  # suspect lower GIB  - currently non tachycardic, BP runs low   - MARVIN w/melena and external hemorrhoids  - chronic diarrhea and fecal incontinence reported; c diff ordered and positive   - hx of fistula/ perianal abscess  - colonoscopy 7/1/21 - multiple polypectomy   - Hb stable   - active T&S, transfuse for hgb <7  - GI consult : plan for Colonoscopy but patient refused more than once, plan for outpatient colonoscopy     #C Diff Colitis -RESOLVED  - distended colon noted on KUB, nontoxic   - start 500mg PO vancomycin and IV Flagyl, improving  - < 3 BM in 24 hours, if continues to be stable, will dc   - ID following , GI following , Colorectal Surgery following   -ID recommends stopping IV flagyl at dc and c/w with po vancomycin 500mg tid until 9/20/21  - CT A/P only notable for ? transient intussusception ( d/w CRS )     #Mechanical Fall in hospital   - CTH w/ Focal hypodensity in the inferior right cerebellar hemisphere possibly representing an age-indeterminate infarct  - MRI Brain ordered negative for infarct   - CT C/A/P per Trauma surgery - unremarkable   - Xray R wrist w/ widening of scapholunate interval c/w ligament tear   - Trauma surgery following: s/p suturing laceration of chin  - Ortho surgery consulted for possible wrist ligament tear , no acute intervention- OT and wrist splint     #Hypokalemia - resolved     #Hyponatremia- likely hypotonic hypovolemic , now resolved     #Atrial fibrillation on eliquis  - CHADVASC 3  - C/w  Eliquis     #COPD ,stable not in exacerbation  - not on home oxygen ,no wheezing on exam  - inhaler PRN    #HFpEF ,euvolemic on exam   -TTE 6/27 EF of 63 %.  - monitor I&O  - BNP; from 6/25- 1500-->912  - avoid volume overload     # EtOH abuse with suspected folate/thiamine deficiency  - on folic acid and thiamine     # Depression on duloxetine  - no suicidal ideation  - c/w SSRI  - f/u w psychiatry as outpatient      #Progress Note Handoff  Pending (specify): Authorization rehab  Family discussion: patient aware of plan. in agreement. all questions answered  Disposition: Unknown at this time________

## 2021-09-15 NOTE — PROGRESS NOTE ADULT - SUBJECTIVE AND OBJECTIVE BOX
WEGENER, LOIS  71y  Female      Patient is a 71y old  Female who presents with a chief complaint of BRBPR (14 Sep 2021 14:18)      INTERVAL HPI/OVERNIGHT EVENTS: No acute overnight events. Pt had 1 formed stool this morning. None overnight.  This morning, she was found resting comfortably in her room in no acute distress denying abdominal pain, hematochezia, diarrhea, and nausea.    REVIEW OF SYSTEMS:  CONSTITUTIONAL: No fever, weight loss, or fatigue  EYES: No eye pain, visual disturbances, or discharge  ENMT:  No difficulty hearing, tinnitus, vertigo; No sinus or throat pain  NECK: No pain or stiffness  BREASTS: No pain, masses, or nipple discharge  RESPIRATORY: No cough, wheezing, chills or hemoptysis; No shortness of breath  CARDIOVASCULAR: No chest pain, palpitations, dizziness, or leg swelling  GASTROINTESTINAL: No abdominal or epigastric pain. No nausea, vomiting, or hematemesis; No diarrhea or constipation. No melena or hematochezia.  GENITOURINARY: No dysuria, frequency, hematuria, or incontinence  NEUROLOGICAL: No headaches, memory loss, loss of strength, numbness, or tremors  SKIN: No itching, burning, rashes, or lesions   LYMPH NODES: No enlarged glands  ENDOCRINE: No heat or cold intolerance; No hair loss  MUSCULOSKELETAL: No joint pain or swelling; No muscle, back, or extremity pain  PSYCHIATRIC: No depression, anxiety, mood swings, or difficulty sleeping  HEME/LYMPH: No easy bruising, or bleeding gums  ALLERY AND IMMUNOLOGIC: No hives or eczema  FAMILY HISTORY:    T(C): 36.4 (09-15-21 @ 05:16), Max: 37.1 (09-14-21 @ 13:24)  HR: 73 (09-15-21 @ 05:16) (71 - 73)  BP: 98/57 (09-15-21 @ 05:16) (92/57 - 110/55)  RR: 18 (09-15-21 @ 05:16) (18 - 18)  SpO2: --  Wt(kg): --Vital Signs Last 24 Hrs  T(C): 36.4 (15 Sep 2021 05:16), Max: 37.1 (14 Sep 2021 13:24)  T(F): 97.6 (15 Sep 2021 05:16), Max: 98.7 (14 Sep 2021 13:24)  HR: 73 (15 Sep 2021 05:16) (71 - 73)  BP: 98/57 (15 Sep 2021 05:16) (92/57 - 110/55)  BP(mean): --  RR: 18 (15 Sep 2021 05:16) (18 - 18)  SpO2: --  No Known Allergies      PHYSICAL EXAM:  GENERAL: NAD, well-groomed, well-developed  HEAD:  Atraumatic, Normocephalic  EYES: EOMI, PERRLA, conjunctiva and sclera clear  ENMT: No tonsillar erythema, exudates, or enlargement; Moist mucous membranes, Good dentition, No lesions  NECK: Supple, No JVD, Normal thyroid  NERVOUS SYSTEM:  Alert & Oriented X3, Good concentration; Motor Strength 5/5 B/L upper and lower extremities; DTRs 2+ intact and symmetric  CHEST/LUNG: Clear to percussion bilaterally; No rales, rhonchi, wheezing, or rubs  HEART: Regular rate and rhythm; No murmurs, rubs, or gallops  ABDOMEN: Soft, Nontender, Nondistended; Bowel sounds present  EXTREMITIES:  2+ Peripheral Pulses, No clubbing, cyanosis, or edema  LYMPH: No lymphadenopathy noted  SKIN: No rashes or lesions    Consultant(s) Notes Reviewed:  [x ] YES  [ ] NO  Care Discussed with Consultants/Other Providers [ x] YES  [ ] NO    LABS:                        9.4    5.91  )-----------( 192      ( 15 Sep 2021 05:43 )             30.0     09-15    138  |  104  |  12  ----------------------------<  67<L>  5.2<H>   |  27  |  0.6<L>    Ca    8.1<L>      15 Sep 2021 05:43  Mg     2.0     09-15    TPro  5.1<L>  /  Alb  2.9<L>  /  TBili  0.3  /  DBili  x   /  AST  16  /  ALT  15  /  AlkPhos  65  09-15      RADIOLOGY & ADDITIONAL TESTS:    Imaging Personally Reviewed:  [ ] YES  [ ] NO  ALBUTerol    90 MICROgram(s) HFA Inhaler 2 Puff(s) Inhalation every 6 hours PRN  apixaban 5 milliGRAM(s) Oral every 12 hours  BACItracin   Ointment 1 Application(s) Topical <User Schedule>  chlorhexidine 4% Liquid 1 Application(s) Topical <User Schedule>  DULoxetine 60 milliGRAM(s) Oral daily  folic acid 1 milliGRAM(s) Oral daily  hydrocortisone hemorrhoidal Suppository 1 Suppository(s) Rectal at bedtime  influenza   Vaccine 0.5 milliLiter(s) IntraMuscular once  lactobacillus acidophilus 1 Tablet(s) Oral daily  melatonin 5 milliGRAM(s) Oral at bedtime PRN  methocarbamol 500 milliGRAM(s) Oral two times a day  metroNIDAZOLE  IVPB      metroNIDAZOLE  IVPB 500 milliGRAM(s) IV Intermittent every 8 hours  thiamine 100 milliGRAM(s) Oral daily  vancomycin    Solution 500 milliGRAM(s) Oral every 6 hours      HEALTH ISSUES - PROBLEM Dx:

## 2021-09-16 LAB
ALBUMIN SERPL ELPH-MCNC: 3.3 G/DL — LOW (ref 3.5–5.2)
ALP SERPL-CCNC: 79 U/L — SIGNIFICANT CHANGE UP (ref 30–115)
ALT FLD-CCNC: 16 U/L — SIGNIFICANT CHANGE UP (ref 0–41)
ANION GAP SERPL CALC-SCNC: 9 MMOL/L — SIGNIFICANT CHANGE UP (ref 7–14)
AST SERPL-CCNC: 20 U/L — SIGNIFICANT CHANGE UP (ref 0–41)
BASOPHILS # BLD AUTO: 0.06 K/UL — SIGNIFICANT CHANGE UP (ref 0–0.2)
BASOPHILS NFR BLD AUTO: 1 % — SIGNIFICANT CHANGE UP (ref 0–1)
BILIRUB SERPL-MCNC: 0.3 MG/DL — SIGNIFICANT CHANGE UP (ref 0.2–1.2)
BUN SERPL-MCNC: 15 MG/DL — SIGNIFICANT CHANGE UP (ref 10–20)
CALCIUM SERPL-MCNC: 8.5 MG/DL — SIGNIFICANT CHANGE UP (ref 8.5–10.1)
CHLORIDE SERPL-SCNC: 104 MMOL/L — SIGNIFICANT CHANGE UP (ref 98–110)
CO2 SERPL-SCNC: 26 MMOL/L — SIGNIFICANT CHANGE UP (ref 17–32)
CREAT SERPL-MCNC: 0.5 MG/DL — LOW (ref 0.7–1.5)
EOSINOPHIL # BLD AUTO: 0.15 K/UL — SIGNIFICANT CHANGE UP (ref 0–0.7)
EOSINOPHIL NFR BLD AUTO: 2.4 % — SIGNIFICANT CHANGE UP (ref 0–8)
GLUCOSE SERPL-MCNC: 75 MG/DL — SIGNIFICANT CHANGE UP (ref 70–99)
HCT VFR BLD CALC: 32.1 % — LOW (ref 37–47)
HGB BLD-MCNC: 10 G/DL — LOW (ref 12–16)
IMM GRANULOCYTES NFR BLD AUTO: 0.6 % — HIGH (ref 0.1–0.3)
LYMPHOCYTES # BLD AUTO: 1.85 K/UL — SIGNIFICANT CHANGE UP (ref 1.2–3.4)
LYMPHOCYTES # BLD AUTO: 29.8 % — SIGNIFICANT CHANGE UP (ref 20.5–51.1)
MAGNESIUM SERPL-MCNC: 2.1 MG/DL — SIGNIFICANT CHANGE UP (ref 1.8–2.4)
MCHC RBC-ENTMCNC: 30.1 PG — SIGNIFICANT CHANGE UP (ref 27–31)
MCHC RBC-ENTMCNC: 31.2 G/DL — LOW (ref 32–37)
MCV RBC AUTO: 96.7 FL — SIGNIFICANT CHANGE UP (ref 81–99)
MONOCYTES # BLD AUTO: 0.43 K/UL — SIGNIFICANT CHANGE UP (ref 0.1–0.6)
MONOCYTES NFR BLD AUTO: 6.9 % — SIGNIFICANT CHANGE UP (ref 1.7–9.3)
NEUTROPHILS # BLD AUTO: 3.68 K/UL — SIGNIFICANT CHANGE UP (ref 1.4–6.5)
NEUTROPHILS NFR BLD AUTO: 59.3 % — SIGNIFICANT CHANGE UP (ref 42.2–75.2)
NRBC # BLD: 0 /100 WBCS — SIGNIFICANT CHANGE UP (ref 0–0)
PLATELET # BLD AUTO: 217 K/UL — SIGNIFICANT CHANGE UP (ref 130–400)
POTASSIUM SERPL-MCNC: 4.9 MMOL/L — SIGNIFICANT CHANGE UP (ref 3.5–5)
POTASSIUM SERPL-SCNC: 4.9 MMOL/L — SIGNIFICANT CHANGE UP (ref 3.5–5)
PROT SERPL-MCNC: 5.7 G/DL — LOW (ref 6–8)
RBC # BLD: 3.32 M/UL — LOW (ref 4.2–5.4)
RBC # FLD: 18.2 % — HIGH (ref 11.5–14.5)
SARS-COV-2 RNA SPEC QL NAA+PROBE: SIGNIFICANT CHANGE UP
SODIUM SERPL-SCNC: 139 MMOL/L — SIGNIFICANT CHANGE UP (ref 135–146)
WBC # BLD: 6.21 K/UL — SIGNIFICANT CHANGE UP (ref 4.8–10.8)
WBC # FLD AUTO: 6.21 K/UL — SIGNIFICANT CHANGE UP (ref 4.8–10.8)

## 2021-09-16 PROCEDURE — 99232 SBSQ HOSP IP/OBS MODERATE 35: CPT

## 2021-09-16 RX ADMIN — Medication 100 MILLIGRAM(S): at 22:06

## 2021-09-16 RX ADMIN — Medication 1 SUPPOSITORY(S): at 22:06

## 2021-09-16 RX ADMIN — METHOCARBAMOL 500 MILLIGRAM(S): 500 TABLET, FILM COATED ORAL at 18:08

## 2021-09-16 RX ADMIN — Medication 100 MILLIGRAM(S): at 11:39

## 2021-09-16 RX ADMIN — Medication 500 MILLIGRAM(S): at 18:08

## 2021-09-16 RX ADMIN — Medication 1 MILLIGRAM(S): at 11:39

## 2021-09-16 RX ADMIN — Medication 500 MILLIGRAM(S): at 05:57

## 2021-09-16 RX ADMIN — Medication 5 MILLIGRAM(S): at 22:06

## 2021-09-16 RX ADMIN — Medication 100 MILLIGRAM(S): at 06:03

## 2021-09-16 RX ADMIN — Medication 500 MILLIGRAM(S): at 11:39

## 2021-09-16 RX ADMIN — Medication 1 TABLET(S): at 11:39

## 2021-09-16 RX ADMIN — APIXABAN 5 MILLIGRAM(S): 2.5 TABLET, FILM COATED ORAL at 06:03

## 2021-09-16 RX ADMIN — DULOXETINE HYDROCHLORIDE 60 MILLIGRAM(S): 30 CAPSULE, DELAYED RELEASE ORAL at 11:39

## 2021-09-16 RX ADMIN — CHLORHEXIDINE GLUCONATE 1 APPLICATION(S): 213 SOLUTION TOPICAL at 06:03

## 2021-09-16 RX ADMIN — Medication 500 MILLIGRAM(S): at 06:03

## 2021-09-16 RX ADMIN — METHOCARBAMOL 500 MILLIGRAM(S): 500 TABLET, FILM COATED ORAL at 06:03

## 2021-09-16 RX ADMIN — Medication 100 MILLIGRAM(S): at 13:07

## 2021-09-16 RX ADMIN — APIXABAN 5 MILLIGRAM(S): 2.5 TABLET, FILM COATED ORAL at 18:08

## 2021-09-16 NOTE — PROGRESS NOTE ADULT - ASSESSMENT
69 yo F with recent dx of Afib on Eliquis, Abdominal abscess 2 mo ago, Gastric Bypass surgery 20+ years ago, COPD who presents to the ED with BRBPR .    # Hematochezia RESOLVED  # Hx of hemorrhoids  # Acute blood loss anemia  # suspect lower GIB  - currently non tachycardic, BP runs low   - MARVIN w/melena and external hemorrhoids  - chronic diarrhea and fecal incontinence reported; c diff ordered and positive   - hx of fistula/ perianal abscess  - colonoscopy 7/1/21 - multiple polypectomy   - Hb stable   - active T&S, transfuse for hgb <7  - GI consult : plan for Colonoscopy but patient refused more than once, plan for outpatient colonoscopy     #C Diff Colitis -RESOLVED  - distended colon noted on KUB, nontoxic   - start 500mg PO vancomycin and IV Flagyl, improving  - < 3 BM in 24 hours, if continues to be stable, will dc   - ID following , GI following , Colorectal Surgery following   -ID recommends stopping IV flagyl at dc and c/w with po vancomycin 500mg tid until 9/20/21  - CT A/P only notable for ? transient intussusception ( d/w CRS )     #Mechanical Fall in hospital   - CTH w/ Focal hypodensity in the inferior right cerebellar hemisphere possibly representing an age-indeterminate infarct  - MRI Brain ordered negative for infarct   - CT C/A/P per Trauma surgery - unremarkable   - Xray R wrist w/ widening of scapholunate interval c/w ligament tear   - Trauma surgery following: s/p suturing laceration of chin  - Ortho surgery consulted for possible wrist ligament tear , no acute intervention- OT and wrist splint     #Hypokalemia - resolved     #Hyponatremia- likely hypotonic hypovolemic , now resolved     #Atrial fibrillation on eliquis  - CHADVASC 3  - C/w  Eliquis     #COPD ,stable not in exacerbation  - not on home oxygen ,no wheezing on exam  - inhaler PRN    #HFpEF ,euvolemic on exam   -TTE 6/27 EF of 63 %.  - monitor I&O  - BNP; from 6/25- 1500-->912  - avoid volume overload     # EtOH abuse with suspected folate/thiamine deficiency  - on folic acid and thiamine     # Depression on duloxetine  - no suicidal ideation  - c/w SSRI  - f/u w psychiatry as outpatient      #Progress Note Handoff  Pending (specify): Authorization rehab  Family discussion: patient aware of plan. in agreement. all questions answered  Disposition: Auth pending for [facility]

## 2021-09-16 NOTE — PROGRESS NOTE ADULT - SUBJECTIVE AND OBJECTIVE BOX
WEGENER, LOIS  71y  Female      Patient is a 71y old  Female who presents with a chief complaint of BRBPR (15 Sep 2021 13:23)      INTERVAL HPI/OVERNIGHT EVENTS:  No acute overnight events. Pt had 0 bm overnight and this morning.   This morning, she was found resting comfortably in her room in no acute distress denying abdominal pain, hematochezia, diarrhea, and nausea.      REVIEW OF SYSTEMS:  CONSTITUTIONAL: No fever, weight loss, or fatigue  EYES: No eye pain, visual disturbances, or discharge  ENMT:  No difficulty hearing, tinnitus, vertigo; No sinus or throat pain  NECK: No pain or stiffness  BREASTS: No pain, masses, or nipple discharge  RESPIRATORY: No cough, wheezing, chills or hemoptysis; No shortness of breath  CARDIOVASCULAR: No chest pain, palpitations, dizziness, or leg swelling  GASTROINTESTINAL: No abdominal or epigastric pain. No nausea, vomiting, or hematemesis; No diarrhea or constipation. No melena or hematochezia.  GENITOURINARY: No dysuria, frequency, hematuria, or incontinence  NEUROLOGICAL: No headaches, memory loss, loss of strength, numbness, or tremors  SKIN: No itching, burning, rashes, or lesions   LYMPH NODES: No enlarged glands  ENDOCRINE: No heat or cold intolerance; No hair loss  MUSCULOSKELETAL: No joint pain or swelling; No muscle, back, or extremity pain  PSYCHIATRIC: No depression, anxiety, mood swings, or difficulty sleeping  HEME/LYMPH: No easy bruising, or bleeding gums  ALLERY AND IMMUNOLOGIC: No hives or eczema  FAMILY HISTORY:    T(C): 35.9 (09-16-21 @ 05:27), Max: 36.8 (09-15-21 @ 13:05)  HR: 67 (09-16-21 @ 05:27) (64 - 67)  BP: 107/60 (09-16-21 @ 05:27) (88/50 - 107/60)  RR: 18 (09-16-21 @ 05:27) (18 - 18)  SpO2: --  Wt(kg): --Vital Signs Last 24 Hrs  T(C): 35.9 (16 Sep 2021 05:27), Max: 36.8 (15 Sep 2021 13:05)  T(F): 96.7 (16 Sep 2021 05:27), Max: 98.2 (15 Sep 2021 13:05)  HR: 67 (16 Sep 2021 05:27) (64 - 67)  BP: 107/60 (16 Sep 2021 05:27) (88/50 - 107/60)  BP(mean): --  RR: 18 (16 Sep 2021 05:27) (18 - 18)  SpO2: --  No Known Allergies      PHYSICAL EXAM:  GENERAL: NAD, well-groomed, well-developed  HEAD:  Atraumatic, Normocephalic  EYES: EOMI, PERRLA, conjunctiva and sclera clear  ENMT: No tonsillar erythema, exudates, or enlargement; Moist mucous membranes, Good dentition, No lesions  NECK: Supple, No JVD, Normal thyroid  NERVOUS SYSTEM:  Alert & Oriented X3, Good concentration; Motor Strength 5/5 B/L upper and lower extremities; DTRs 2+ intact and symmetric  CHEST/LUNG: Clear to percussion bilaterally; No rales, rhonchi, wheezing, or rubs  HEART: Regular rate and rhythm; No murmurs, rubs, or gallops  ABDOMEN: Soft, Nontender, Nondistended; Bowel sounds present  EXTREMITIES:  2+ Peripheral Pulses, No clubbing, cyanosis, or edema  LYMPH: No lymphadenopathy noted  SKIN: No rashes or lesions    Consultant(s) Notes Reviewed:  [x ] YES  [ ] NO  Care Discussed with Consultants/Other Providers [ x] YES  [ ] NO    LABS:                          10.0   6.21  )-----------( 217      ( 16 Sep 2021 04:30 )             32.1     09-16    139  |  104  |  15  ----------------------------<  75  4.9   |  26  |  0.5<L>    Ca    8.5      16 Sep 2021 04:30  Mg     2.1     09-16    TPro  5.7<L>  /  Alb  3.3<L>  /  TBili  0.3  /  DBili  x   /  AST  20  /  ALT  16  /  AlkPhos  79  09-16      RADIOLOGY & ADDITIONAL TESTS:          Imaging Personally Reviewed:  [ ] YES  [ ] NO  ALBUTerol    90 MICROgram(s) HFA Inhaler 2 Puff(s) Inhalation every 6 hours PRN  apixaban 5 milliGRAM(s) Oral every 12 hours  BACItracin   Ointment 1 Application(s) Topical <User Schedule>  chlorhexidine 4% Liquid 1 Application(s) Topical <User Schedule>  DULoxetine 60 milliGRAM(s) Oral daily  folic acid 1 milliGRAM(s) Oral daily  hydrocortisone hemorrhoidal Suppository 1 Suppository(s) Rectal at bedtime  influenza   Vaccine 0.5 milliLiter(s) IntraMuscular once  lactobacillus acidophilus 1 Tablet(s) Oral daily  melatonin 5 milliGRAM(s) Oral at bedtime PRN  methocarbamol 500 milliGRAM(s) Oral two times a day  metroNIDAZOLE  IVPB      metroNIDAZOLE  IVPB 500 milliGRAM(s) IV Intermittent every 8 hours  thiamine 100 milliGRAM(s) Oral daily  vancomycin    Solution 500 milliGRAM(s) Oral every 6 hours      HEALTH ISSUES - PROBLEM Dx:

## 2021-09-16 NOTE — PROGRESS NOTE ADULT - SUBJECTIVE AND OBJECTIVE BOX
WEGENER, LOIS  71y  Female      Patient is a 71y old  Female who presents with a chief complaint of BRBPR (16 Sep 2021 11:11)      INTERVAL HPI/OVERNIGHT EVENTS: no acute events overnight. asking "to be dicharged already". no nursing concerns.       REVIEW OF SYSTEMS:  CONSTITUTIONAL: No fever, weight loss, or fatigue  RESPIRATORY: No cough, wheezing, chills or hemoptysis; No shortness of breath  CARDIOVASCULAR: No chest pain, palpitations, dizziness, or leg swelling  GASTROINTESTINAL: No abdominal or epigastric pain. No nausea, vomiting, or hematemesis; No diarrhea or constipation. No melena or hematochezia.  GENITOURINARY: No dysuria, frequency, hematuria, or incontinence  NEUROLOGICAL: No headaches, memory loss, loss of strength, numbness, or tremors  SKIN: No itching, burning, rashes, or lesions   MUSCULOSKELETAL: No joint pain or swelling; No muscle, back, or extremity pain  PSYCHIATRIC: No depression, anxiety, mood swings, or difficulty sleeping  All other review of systems negative    VITALS  T(C): 37.1 (09-16-21 @ 14:20), Max: 37.1 (09-16-21 @ 14:20)  HR: 65 (09-16-21 @ 14:20) (65 - 67)  BP: 96/54 (09-16-21 @ 14:20) (96/54 - 107/60)  RR: 18 (09-16-21 @ 14:20) (18 - 18)  SpO2: --  Wt(kg): --Vital Signs Last 24 Hrs  T(C): 37.1 (16 Sep 2021 14:20), Max: 37.1 (16 Sep 2021 14:20)  T(F): 98.7 (16 Sep 2021 14:20), Max: 98.7 (16 Sep 2021 14:20)  HR: 65 (16 Sep 2021 14:20) (65 - 67)  BP: 96/54 (16 Sep 2021 14:20) (96/54 - 107/60)  BP(mean): --  RR: 18 (16 Sep 2021 14:20) (18 - 18)  SpO2: --        PHYSICAL EXAM:  GENERAL: elderly F, NAD, non toxic appearing  EYES: anicteric sclera, non injected conjunctiva, EOMI  ENT: hearing grossly intact, MMM  PSYCH: no agitation, baseline mentation  NERVOUS SYSTEM:  Alert & Oriented X2-3, Motor Strength 5/5 B/L upper and lower extremities; Sensory intact  PULMONARY: Clear to percussion bilaterally; No rales, rhonchi, wheezing, or rubs  CARDIOVASCULAR: Regular rate and rhythm; No murmurs, rubs, or gallops  GI: Soft, Nontender, Nondistended; Bowel sounds present  EXTREMITIES:  2+ Peripheral Pulses, No clubbing, cyanosis, or edema  LYMPH: No lymphadenopathy noted  SKIN: No rashes or lesions      Consultant(s) Notes Reviewed:  [x ] YES  [ ] NO    Discussed with Consultants/Other Providers [ x] YES     LABS                          10.0   6.21  )-----------( 217      ( 16 Sep 2021 04:30 )             32.1     09-16    139  |  104  |  15  ----------------------------<  75  4.9   |  26  |  0.5<L>    Ca    8.5      16 Sep 2021 04:30  Mg     2.1     09-16    TPro  5.7<L>  /  Alb  3.3<L>  /  TBili  0.3  /  DBili  x   /  AST  20  /  ALT  16  /  AlkPhos  79  09-16    Lactate Trend  09-11 @ 17:12 Lactate:1.2     RADIOLOGY & ADDITIONAL TESTS:  - no images 9/16    MEDICATIONS  (STANDING):  apixaban 5 milliGRAM(s) Oral every 12 hours  BACItracin   Ointment 1 Application(s) Topical <User Schedule>  chlorhexidine 4% Liquid 1 Application(s) Topical <User Schedule>  DULoxetine 60 milliGRAM(s) Oral daily  folic acid 1 milliGRAM(s) Oral daily  hydrocortisone hemorrhoidal Suppository 1 Suppository(s) Rectal at bedtime  influenza   Vaccine 0.5 milliLiter(s) IntraMuscular once  lactobacillus acidophilus 1 Tablet(s) Oral daily  methocarbamol 500 milliGRAM(s) Oral two times a day  metroNIDAZOLE  IVPB      metroNIDAZOLE  IVPB 500 milliGRAM(s) IV Intermittent every 8 hours  thiamine 100 milliGRAM(s) Oral daily  vancomycin    Solution 500 milliGRAM(s) Oral every 6 hours    MEDICATIONS  (PRN):  ALBUTerol    90 MICROgram(s) HFA Inhaler 2 Puff(s) Inhalation every 6 hours PRN Shortness of Breath and/or Wheezing  melatonin 5 milliGRAM(s) Oral at bedtime PRN Insomnia

## 2021-09-17 VITALS
DIASTOLIC BLOOD PRESSURE: 52 MMHG | HEART RATE: 66 BPM | SYSTOLIC BLOOD PRESSURE: 87 MMHG | RESPIRATION RATE: 18 BRPM | TEMPERATURE: 98 F

## 2021-09-17 LAB
ALBUMIN SERPL ELPH-MCNC: 3 G/DL — LOW (ref 3.5–5.2)
ALP SERPL-CCNC: 70 U/L — SIGNIFICANT CHANGE UP (ref 30–115)
ALT FLD-CCNC: 15 U/L — SIGNIFICANT CHANGE UP (ref 0–41)
ANION GAP SERPL CALC-SCNC: 7 MMOL/L — SIGNIFICANT CHANGE UP (ref 7–14)
AST SERPL-CCNC: 19 U/L — SIGNIFICANT CHANGE UP (ref 0–41)
BASOPHILS # BLD AUTO: 0.05 K/UL — SIGNIFICANT CHANGE UP (ref 0–0.2)
BASOPHILS NFR BLD AUTO: 0.9 % — SIGNIFICANT CHANGE UP (ref 0–1)
BILIRUB SERPL-MCNC: 0.4 MG/DL — SIGNIFICANT CHANGE UP (ref 0.2–1.2)
BUN SERPL-MCNC: 17 MG/DL — SIGNIFICANT CHANGE UP (ref 10–20)
CALCIUM SERPL-MCNC: 8.3 MG/DL — LOW (ref 8.5–10.1)
CHLORIDE SERPL-SCNC: 103 MMOL/L — SIGNIFICANT CHANGE UP (ref 98–110)
CO2 SERPL-SCNC: 28 MMOL/L — SIGNIFICANT CHANGE UP (ref 17–32)
CREAT SERPL-MCNC: 0.5 MG/DL — LOW (ref 0.7–1.5)
EOSINOPHIL # BLD AUTO: 0.09 K/UL — SIGNIFICANT CHANGE UP (ref 0–0.7)
EOSINOPHIL NFR BLD AUTO: 1.5 % — SIGNIFICANT CHANGE UP (ref 0–8)
GLUCOSE SERPL-MCNC: 73 MG/DL — SIGNIFICANT CHANGE UP (ref 70–99)
HCT VFR BLD CALC: 29.3 % — LOW (ref 37–47)
HGB BLD-MCNC: 9.2 G/DL — LOW (ref 12–16)
IMM GRANULOCYTES NFR BLD AUTO: 0.3 % — SIGNIFICANT CHANGE UP (ref 0.1–0.3)
LYMPHOCYTES # BLD AUTO: 1.61 K/UL — SIGNIFICANT CHANGE UP (ref 1.2–3.4)
LYMPHOCYTES # BLD AUTO: 27.7 % — SIGNIFICANT CHANGE UP (ref 20.5–51.1)
MAGNESIUM SERPL-MCNC: 2 MG/DL — SIGNIFICANT CHANGE UP (ref 1.8–2.4)
MCHC RBC-ENTMCNC: 30.6 PG — SIGNIFICANT CHANGE UP (ref 27–31)
MCHC RBC-ENTMCNC: 31.4 G/DL — LOW (ref 32–37)
MCV RBC AUTO: 97.3 FL — SIGNIFICANT CHANGE UP (ref 81–99)
MONOCYTES # BLD AUTO: 0.41 K/UL — SIGNIFICANT CHANGE UP (ref 0.1–0.6)
MONOCYTES NFR BLD AUTO: 7 % — SIGNIFICANT CHANGE UP (ref 1.7–9.3)
NEUTROPHILS # BLD AUTO: 3.64 K/UL — SIGNIFICANT CHANGE UP (ref 1.4–6.5)
NEUTROPHILS NFR BLD AUTO: 62.6 % — SIGNIFICANT CHANGE UP (ref 42.2–75.2)
NRBC # BLD: 0 /100 WBCS — SIGNIFICANT CHANGE UP (ref 0–0)
PLATELET # BLD AUTO: 214 K/UL — SIGNIFICANT CHANGE UP (ref 130–400)
POTASSIUM SERPL-MCNC: 4.7 MMOL/L — SIGNIFICANT CHANGE UP (ref 3.5–5)
POTASSIUM SERPL-SCNC: 4.7 MMOL/L — SIGNIFICANT CHANGE UP (ref 3.5–5)
PROT SERPL-MCNC: 5.1 G/DL — LOW (ref 6–8)
RBC # BLD: 3.01 M/UL — LOW (ref 4.2–5.4)
RBC # FLD: 18.1 % — HIGH (ref 11.5–14.5)
SODIUM SERPL-SCNC: 138 MMOL/L — SIGNIFICANT CHANGE UP (ref 135–146)
WBC # BLD: 5.82 K/UL — SIGNIFICANT CHANGE UP (ref 4.8–10.8)
WBC # FLD AUTO: 5.82 K/UL — SIGNIFICANT CHANGE UP (ref 4.8–10.8)

## 2021-09-17 PROCEDURE — 99232 SBSQ HOSP IP/OBS MODERATE 35: CPT

## 2021-09-17 RX ORDER — VANCOMYCIN HCL 1 G
2 VIAL (EA) INTRAVENOUS
Qty: 24 | Refills: 0
Start: 2021-09-17 | End: 2021-09-19

## 2021-09-17 RX ORDER — POLYETHYLENE GLYCOL 3350 17 G/17G
17 POWDER, FOR SOLUTION ORAL ONCE
Refills: 0 | Status: COMPLETED | OUTPATIENT
Start: 2021-09-17 | End: 2021-09-17

## 2021-09-17 RX ORDER — HYDROCORTISONE 1 %
1 OINTMENT (GRAM) TOPICAL
Qty: 30 | Refills: 0
Start: 2021-09-17 | End: 2021-10-16

## 2021-09-17 RX ORDER — SENNA PLUS 8.6 MG/1
2 TABLET ORAL
Qty: 60 | Refills: 0
Start: 2021-09-17 | End: 2021-10-16

## 2021-09-17 RX ORDER — POLYETHYLENE GLYCOL 3350 17 G/17G
17 POWDER, FOR SOLUTION ORAL
Qty: 510 | Refills: 0
Start: 2021-09-17 | End: 2021-10-16

## 2021-09-17 RX ORDER — BACITRACIN ZINC 500 UNIT/G
1 OINTMENT IN PACKET (EA) TOPICAL
Qty: 30 | Refills: 0
Start: 2021-09-17 | End: 2021-10-16

## 2021-09-17 RX ORDER — SENNA PLUS 8.6 MG/1
2 TABLET ORAL ONCE
Refills: 0 | Status: COMPLETED | OUTPATIENT
Start: 2021-09-17 | End: 2021-09-17

## 2021-09-17 RX ORDER — LACTOBACILLUS ACIDOPHILUS 100MM CELL
1 CAPSULE ORAL
Qty: 30 | Refills: 3
Start: 2021-09-17 | End: 2022-01-14

## 2021-09-17 RX ADMIN — Medication 500 MILLIGRAM(S): at 18:42

## 2021-09-17 RX ADMIN — APIXABAN 5 MILLIGRAM(S): 2.5 TABLET, FILM COATED ORAL at 06:25

## 2021-09-17 RX ADMIN — SENNA PLUS 2 TABLET(S): 8.6 TABLET ORAL at 13:07

## 2021-09-17 RX ADMIN — Medication 100 MILLIGRAM(S): at 13:09

## 2021-09-17 RX ADMIN — Medication 100 MILLIGRAM(S): at 13:06

## 2021-09-17 RX ADMIN — APIXABAN 5 MILLIGRAM(S): 2.5 TABLET, FILM COATED ORAL at 18:42

## 2021-09-17 RX ADMIN — Medication 500 MILLIGRAM(S): at 13:10

## 2021-09-17 RX ADMIN — Medication 500 MILLIGRAM(S): at 06:26

## 2021-09-17 RX ADMIN — Medication 1 SUPPOSITORY(S): at 21:47

## 2021-09-17 RX ADMIN — Medication 1 TABLET(S): at 13:06

## 2021-09-17 RX ADMIN — DULOXETINE HYDROCHLORIDE 60 MILLIGRAM(S): 30 CAPSULE, DELAYED RELEASE ORAL at 13:06

## 2021-09-17 RX ADMIN — Medication 500 MILLIGRAM(S): at 00:43

## 2021-09-17 RX ADMIN — CHLORHEXIDINE GLUCONATE 1 APPLICATION(S): 213 SOLUTION TOPICAL at 06:26

## 2021-09-17 RX ADMIN — Medication 1 APPLICATION(S): at 13:07

## 2021-09-17 RX ADMIN — METHOCARBAMOL 500 MILLIGRAM(S): 500 TABLET, FILM COATED ORAL at 18:42

## 2021-09-17 RX ADMIN — Medication 1 MILLIGRAM(S): at 13:06

## 2021-09-17 RX ADMIN — METHOCARBAMOL 500 MILLIGRAM(S): 500 TABLET, FILM COATED ORAL at 06:26

## 2021-09-17 RX ADMIN — Medication 100 MILLIGRAM(S): at 06:26

## 2021-09-17 RX ADMIN — POLYETHYLENE GLYCOL 3350 17 GRAM(S): 17 POWDER, FOR SOLUTION ORAL at 13:07

## 2021-09-17 NOTE — CHART NOTE - NSCHARTNOTEFT_GEN_A_CORE
<<<RESIDENT DISCHARGE NOTE>>>     WEGENER, LOIS  MRN-[id number]    No acute overnight events. Pt seen and evaluated at bedside in no acute distress. She denies any complaints.    VITAL SIGNS:  T(F): 97.8 (09-17-21 @ 14:37), Max: 97.8 (09-16-21 @ 20:51)  HR: 66 (09-17-21 @ 14:37)  BP: 87/52 (09-17-21 @ 14:37)  SpO2: --      PHYSICAL EXAMINATION:  General: Well appearing, no acute distress  Head & Neck: Normocephalic, atraumatic  Pulmonary: Lungs clear to auscultation bilaterally, no wheezing ronchi, rales  Cardiovascular: Regular rate, normal rhythm, S1&S2 auscultated  Gastrointestinal/Abdomen & Pelvis: Soft, nontender, nondistended, (+) bowel sounds  Neurologic/Motor: CN II-XII grossly intact, AAOx4    TEST RESULTS:                        9.2    5.82  )-----------( 214      ( 17 Sep 2021 08:00 )             29.3       09-17    138  |  103  |  17  ----------------------------<  73  4.7   |  28  |  0.5<L>    Ca    8.3<L>      17 Sep 2021 08:00  Mg     2.0     09-17    TPro  5.1<L>  /  Alb  3.0<L>  /  TBili  0.4  /  DBili  x   /  AST  19  /  ALT  15  /  AlkPhos  70  09-17      FINAL DISCHARGE INTERVIEW:  Travis Hassan, MS4, Deena Gutiérrez MD,  Deniz Garcia MD    DISCHARGE MEDICATION RECONCILIATION  reviewed with Attending (Name:___________)    DISPOSITION:  Home, Home with Visiting Nursing Services,  SNF/ NH,  Acute Rehab (4A),  Other

## 2021-09-17 NOTE — PROGRESS NOTE ADULT - REASON FOR ADMISSION
BRBPR
s/p unwitness fal with +ht, -loc + eliquis

## 2021-09-17 NOTE — PROGRESS NOTE ADULT - ASSESSMENT
71 yo F with recent dx of Afib on Eliquis, Abdominal abscess 2 mo ago, Gastric Bypass surgery 20+ years ago, COPD who presents to the ED with BRBPR .    # Hematochezia RESOLVED  # Hx of hemorrhoids  # Acute blood loss anemia  # suspect lower GIB  - currently non tachycardic, BP runs low   - MARVIN w/melena and external hemorrhoids  - chronic diarrhea and fecal incontinence reported; c diff ordered and positive   - hx of fistula/ perianal abscess  - colonoscopy 7/1/21 - multiple polypectomy   - Hb stable   - active T&S, transfuse for hgb <7  - GI consult : plan for Colonoscopy but patient refused more than once, plan for outpatient colonoscopy     #C Diff Colitis -RESOLVED  - distended colon noted on KUB, nontoxic   - start 500mg PO vancomycin and IV Flagyl, improving  - < 3 BM in 24 hours, if continues to be stable, will dc   - ID following , GI following , Colorectal Surgery following   -ID recommends stopping IV flagyl at dc and c/w with po vancomycin 500mg tid until 9/20/21  - CT A/P only notable for ? transient intussusception ( d/w CRS )     #Mechanical Fall in hospital   - CTH w/ Focal hypodensity in the inferior right cerebellar hemisphere possibly representing an age-indeterminate infarct  - MRI Brain ordered negative for infarct   - CT C/A/P per Trauma surgery - unremarkable   - Xray R wrist w/ widening of scapholunate interval c/w ligament tear   - Trauma surgery following: s/p suturing laceration of chin  - Ortho surgery consulted for possible wrist ligament tear , no acute intervention- OT and wrist splint     #Hypokalemia - resolved     #Hyponatremia- likely hypotonic hypovolemic , now resolved     #Atrial fibrillation on eliquis  - CHADVASC 3  - C/w  Eliquis     #COPD ,stable not in exacerbation  - not on home oxygen ,no wheezing on exam  - inhaler PRN    #HFpEF ,euvolemic on exam   -TTE 6/27 EF of 63 %.  - monitor I&O  - BNP; from 6/25- 1500-->912  - avoid volume overload     # EtOH abuse with suspected folate/thiamine deficiency  - on folic acid and thiamine     # Depression on duloxetine  - no suicidal ideation  - c/w SSRI  - f/u w psychiatry as outpatient      #Progress Note Handoff  Pending (specify): Authorization rehab  Family discussion: patient aware of plan. in agreement. all questions answered  Disposition: Auth pending for CM STR

## 2021-09-17 NOTE — PROGRESS NOTE ADULT - PROVIDER SPECIALTY LIST ADULT
Colorectal Surgery
Internal Medicine
Internal Medicine
Gastroenterology
Gastroenterology
Hospitalist
Internal Medicine
Gastroenterology
Hospitalist
Hospitalist
Infectious Disease
Internal Medicine
Trauma Surgery
Gastroenterology
Hospitalist
Internal Medicine
Internal Medicine

## 2021-09-17 NOTE — PROGRESS NOTE ADULT - SUBJECTIVE AND OBJECTIVE BOX
WEGENER, LOIS  71y  Female      Patient is a 71y old  Female who presents with a chief complaint of BRBPR (16 Sep 2021 15:44)      INTERVAL HPI/OVERNIGHT EVENTS:      REVIEW OF SYSTEMS:  CONSTITUTIONAL: No fever, weight loss, or fatigue  RESPIRATORY: No cough, wheezing, chills or hemoptysis; No shortness of breath  CARDIOVASCULAR: No chest pain, palpitations, dizziness, or leg swelling  GASTROINTESTINAL: No abdominal or epigastric pain. No nausea, vomiting, or hematemesis; No diarrhea or constipation. No melena or hematochezia.  GENITOURINARY: No dysuria, frequency, hematuria, or incontinence  NEUROLOGICAL: No headaches, memory loss, loss of strength, numbness, or tremors  SKIN: No itching, burning, rashes, or lesions   MUSCULOSKELETAL: No joint pain or swelling; No muscle, back, or extremity pain  PSYCHIATRIC: No depression, anxiety, mood swings, or difficulty sleeping  All other review of systems negative    T(C): 35.8 (09-17-21 @ 06:26), Max: 37.1 (09-16-21 @ 14:20)  HR: 70 (09-17-21 @ 06:26) (65 - 71)  BP: 100/60 (09-17-21 @ 06:26) (96/54 - 100/60)  RR: 18 (09-17-21 @ 06:26) (18 - 18)  SpO2: --  Wt(kg): --Vital Signs Last 24 Hrs  T(C): 35.8 (17 Sep 2021 06:26), Max: 37.1 (16 Sep 2021 14:20)  T(F): 96.4 (17 Sep 2021 06:26), Max: 98.7 (16 Sep 2021 14:20)  HR: 70 (17 Sep 2021 06:26) (65 - 71)  BP: 100/60 (17 Sep 2021 06:26) (96/54 - 100/60)  BP(mean): --  RR: 18 (17 Sep 2021 06:26) (18 - 18)  SpO2: --        PHYSICAL EXAM:  GENERAL: NAD, well-groomed, well-developed  PSYCH: no agitation, baseline mentation  NERVOUS SYSTEM:  Alert & Oriented X3, Motor Strength 5/5 B/L upper and lower extremities; Sensory intact; FTN WNL  PULMONARY: Clear to percussion bilaterally; No rales, rhonchi, wheezing, or rubs  CARDIOVASCULAR: Regular rate and rhythm; No murmurs, rubs, or gallops  GI: Soft, Nontender, Nondistended; Bowel sounds present  EXTREMITIES:  2+ Peripheral Pulses, No clubbing, cyanosis, or edema  LYMPH: No lymphadenopathy noted  SKIN: No rashes or lesions    Consultant(s) Notes Reviewed:  [x ] YES  [ ] NO    Discussed with Consultants/Other Providers [ x] YES     LABS                          9.2    5.82  )-----------( 214      ( 17 Sep 2021 08:00 )             29.3     09-17    138  |  103  |  17  ----------------------------<  73  4.7   |  28  |  0.5<L>    Ca    8.3<L>      17 Sep 2021 08:00  Mg     2.0     09-17    TPro  5.1<L>  /  Alb  3.0<L>  /  TBili  0.4  /  DBili  x   /  AST  19  /  ALT  15  /  AlkPhos  70  09-17          Lactate Trend        CAPILLARY BLOOD GLUCOSE            RADIOLOGY & ADDITIONAL TESTS:    Imaging Personally Reviewed:  [ ] YES  [ ] NO    HEALTH ISSUES - PROBLEM Dx:         WEGENER, LOIS  71y  Female      Patient is a 71y old  Female who presents with a chief complaint of BRBPR (16 Sep 2021 15:44)      INTERVAL HPI/OVERNIGHT EVENTS: no acute events overnight. patient well. no major complaints.       REVIEW OF SYSTEMS:  CONSTITUTIONAL: No fever, weight loss, or fatigue  RESPIRATORY: No cough, wheezing, chills or hemoptysis; No shortness of breath  CARDIOVASCULAR: No chest pain, palpitations, dizziness, or leg swelling  GASTROINTESTINAL: No abdominal or epigastric pain. No nausea, vomiting, or hematemesis; No diarrhea or constipation. No melena or hematochezia.  GENITOURINARY: No dysuria, frequency, hematuria, or incontinence  NEUROLOGICAL: No headaches, memory loss, loss of strength, numbness, or tremors  SKIN: No itching, burning, rashes, or lesions   MUSCULOSKELETAL: No joint pain or swelling; No muscle, back, or extremity pain  PSYCHIATRIC: No depression, anxiety, mood swings, or difficulty sleeping  All other review of systems negative    VITALS  T(C): 35.8 (09-17-21 @ 06:26), Max: 37.1 (09-16-21 @ 14:20)  HR: 70 (09-17-21 @ 06:26) (65 - 71)  BP: 100/60 (09-17-21 @ 06:26) (96/54 - 100/60)  RR: 18 (09-17-21 @ 06:26) (18 - 18)  SpO2: --  Wt(kg): --Vital Signs Last 24 Hrs  T(C): 35.8 (17 Sep 2021 06:26), Max: 37.1 (16 Sep 2021 14:20)  T(F): 96.4 (17 Sep 2021 06:26), Max: 98.7 (16 Sep 2021 14:20)  HR: 70 (17 Sep 2021 06:26) (65 - 71)  BP: 100/60 (17 Sep 2021 06:26) (96/54 - 100/60)  BP(mean): --  RR: 18 (17 Sep 2021 06:26) (18 - 18)  SpO2: --        PHYSICAL EXAM:  GENERAL: elderly F, NAD, non toxic appearing  EYES: anicteric sclera, non injected conjunctiva, EOMI  ENT: hearing grossly intact, MMM  PSYCH: no agitation, baseline mentation  NERVOUS SYSTEM:  Alert & Oriented X2-3, Motor Strength 5/5 B/L upper and lower extremities; Sensory intact  PULMONARY: Clear to percussion bilaterally; No rales, rhonchi, wheezing, or rubs  CARDIOVASCULAR: Regular rate and rhythm; No murmurs, rubs, or gallops  GI: Soft, Nontender, Nondistended; Bowel sounds present  EXTREMITIES:  2+ Peripheral Pulses, No clubbing, cyanosis, or edema  LYMPH: No lymphadenopathy noted  SKIN: No rashes or lesions    Consultant(s) Notes Reviewed:  [x ] YES  [ ] NO    Discussed with Consultants/Other Providers [ x] YES     LABS                          9.2    5.82  )-----------( 214      ( 17 Sep 2021 08:00 )             29.3     09-17    138  |  103  |  17  ----------------------------<  73  4.7   |  28  |  0.5<L>    Ca    8.3<L>      17 Sep 2021 08:00  Mg     2.0     09-17    TPro  5.1<L>  /  Alb  3.0<L>  /  TBili  0.4  /  DBili  x   /  AST  19  /  ALT  15  /  AlkPhos  70  09-17      RADIOLOGY & ADDITIONAL TESTS:  - no images 9/17    MEDICATIONS  (STANDING):  apixaban 5 milliGRAM(s) Oral every 12 hours  BACItracin   Ointment 1 Application(s) Topical <User Schedule>  chlorhexidine 4% Liquid 1 Application(s) Topical <User Schedule>  DULoxetine 60 milliGRAM(s) Oral daily  folic acid 1 milliGRAM(s) Oral daily  hydrocortisone hemorrhoidal Suppository 1 Suppository(s) Rectal at bedtime  influenza   Vaccine 0.5 milliLiter(s) IntraMuscular once  lactobacillus acidophilus 1 Tablet(s) Oral daily  methocarbamol 500 milliGRAM(s) Oral two times a day  metroNIDAZOLE  IVPB      metroNIDAZOLE  IVPB 500 milliGRAM(s) IV Intermittent every 8 hours  polyethylene glycol 3350 17 Gram(s) Oral once  senna 2 Tablet(s) Oral once  thiamine 100 milliGRAM(s) Oral daily  vancomycin    Solution 500 milliGRAM(s) Oral every 6 hours    MEDICATIONS  (PRN):  ALBUTerol    90 MICROgram(s) HFA Inhaler 2 Puff(s) Inhalation every 6 hours PRN Shortness of Breath and/or Wheezing  melatonin 5 milliGRAM(s) Oral at bedtime PRN Insomnia

## 2021-09-18 PROCEDURE — 99239 HOSP IP/OBS DSCHRG MGMT >30: CPT

## 2021-09-18 RX ADMIN — Medication 500 MILLIGRAM(S): at 00:03

## 2022-04-18 NOTE — PROGRESS NOTE ADULT - ASSESSMENT
Relevant Problems   RESPIRATORY SYSTEM   (+) COPD (chronic obstructive pulmonary disease) (HCC)   (+) Smoking      NEUROLOGY   (+) Bipolar affective disorder, manic, mild degree (HCC)   (+) Bipolar depression (HCC)      CARDIOVASCULAR   (+) Primary hypertension      ENDOCRINE   (+) Acquired hypothyroidism       Anesthetic History               Review of Systems / Medical History  Patient summary reviewed, nursing notes reviewed and pertinent labs reviewed    Pulmonary    COPD      Smoker (current tobacco smoker, former marijuana smoker)  Asthma        Neuro/Psych         Psychiatric history (depression, bipolar d/o)     Cardiovascular    Hypertension                   GI/Hepatic/Renal     GERD  Hepatitis (h/o Hepatitis B): type B    Liver disease     Endo/Other      Hypothyroidism  Arthritis  Pertinent negatives: No obesity   Other Findings   Comments: Fibromyalgia  Chronic Fatigue Syndrome           Physical Exam    Airway  Mallampati: I  TM Distance: > 6 cm  Neck ROM: normal range of motion   Mouth opening: Normal     Cardiovascular    Rhythm: regular  Rate: normal         Dental  No notable dental hx       Pulmonary  Breath sounds clear to auscultation               Abdominal  GI exam deferred       Other Findings            Anesthetic Plan    ASA: 2  Anesthesia type: MAC          Induction: Intravenous  Anesthetic plan and risks discussed with: Patient 71 yo F with recent dx of Afib on Eliquis, Abdominal abscess 2 mo ago, Gastric Bypass surgery 20+ years ago, COPD who presents to the ED with BRBPR .    # Hematochezia  # Hx of hemorrhoids  # Acute blood loss anemia  # suspect lower GIB  - currently non tachycardic, BP runs low   - MARVIN w/melena and external hemorrhoids  - chronic diarrhea and fecal incontinence reported; c diff ordered and positive   - hx of fistula/ perianal abscess  - colonoscopy 7/1/21 - multiple polypectomy   - Hb stable   - active T&S, transfuse for hgb <7  - GI consult : plan for Colonoscopy but patient refused more than once, plan for outpatient colonoscopy     #C Diff Colitis   - distended colon noted on KUB, nontoxic   - start 500mg PO vancomycin and IV Flagyl, improving  - < 3 BM in 24 hours, if continues to be stable, will dc   - ID following , GI following , Colorectal Surgery following   -F/u with ID for antibiotics course  - CT A/P only notable for ? transient intussusception ( d/w CRS )     #Mechanical Fall in hospital   - CTH w/ Focal hypodensity in the inferior right cerebellar hemisphere possibly representing an age-indeterminate infarct  - MRI Brain ordered negative for infarct   - CT C/A/P per Trauma surgery - unremarkable   - Xray R wrist w/ widening of scapholunate interval c/w ligament tear   - Trauma surgery following: s/p suturing laceration of chin  - Ortho surgery consulted for possible wrist ligament tear , no acute intervention- OT and wrist splint     #Hypokalemia - resolved     #Hyponatremia- likely hypotonic hypovolemic , now resolved     #Atrial fibrillation on eliquis  - CHADVASC 3  -  Eliquis     #COPD ,stable not in exacerbation  - not on home oxygen ,no wheezing on exam  - inhaler PRN    #HFpEF ,euvolemic on exam   -TTE 6/27 EF of 63 %.  - monitor I&O  - BNP; from 6/25- 1500-->912  - avoid volume overload     # EtOH abuse with suspected folate/thiamine deficiency  - on folic acid and thiamine     # Depression on duloxetine  - no suicidal ideation  - c/w SSRI  - f/u w psychiatry as outpatient      #Progress Note Handoff  Pending (specify): improvement in c diff colitis then dc to rehab   Family discussion: patient aware of plan. in agreement. all questions answered  Disposition: Unknown at this time________

## 2022-05-23 ENCOUNTER — INPATIENT (INPATIENT)
Facility: HOSPITAL | Age: 72
LOS: 4 days | Discharge: NURSING, REHAB OR LONG-TERM CARE | End: 2022-05-28
Attending: INTERNAL MEDICINE | Admitting: INTERNAL MEDICINE
Payer: COMMERCIAL

## 2022-05-23 VITALS
TEMPERATURE: 98 F | WEIGHT: 179.9 LBS | OXYGEN SATURATION: 95 % | DIASTOLIC BLOOD PRESSURE: 66 MMHG | RESPIRATION RATE: 20 BRPM | SYSTOLIC BLOOD PRESSURE: 109 MMHG | HEART RATE: 84 BPM | HEIGHT: 67 IN

## 2022-05-23 DIAGNOSIS — I48.20 CHRONIC ATRIAL FIBRILLATION, UNSPECIFIED: ICD-10-CM

## 2022-05-23 DIAGNOSIS — Z98.84 BARIATRIC SURGERY STATUS: ICD-10-CM

## 2022-05-23 DIAGNOSIS — E51.9 THIAMINE DEFICIENCY, UNSPECIFIED: ICD-10-CM

## 2022-05-23 DIAGNOSIS — L89.153 PRESSURE ULCER OF SACRAL REGION, STAGE 3: ICD-10-CM

## 2022-05-23 DIAGNOSIS — R79.89 OTHER SPECIFIED ABNORMAL FINDINGS OF BLOOD CHEMISTRY: ICD-10-CM

## 2022-05-23 DIAGNOSIS — I50.32 CHRONIC DIASTOLIC (CONGESTIVE) HEART FAILURE: ICD-10-CM

## 2022-05-23 DIAGNOSIS — Z22.7 LATENT TUBERCULOSIS: ICD-10-CM

## 2022-05-23 DIAGNOSIS — Z20.822 CONTACT WITH AND (SUSPECTED) EXPOSURE TO COVID-19: ICD-10-CM

## 2022-05-23 DIAGNOSIS — J98.11 ATELECTASIS: ICD-10-CM

## 2022-05-23 DIAGNOSIS — J44.9 CHRONIC OBSTRUCTIVE PULMONARY DISEASE, UNSPECIFIED: ICD-10-CM

## 2022-05-23 DIAGNOSIS — Z79.01 LONG TERM (CURRENT) USE OF ANTICOAGULANTS: ICD-10-CM

## 2022-05-23 DIAGNOSIS — F32.A DEPRESSION, UNSPECIFIED: ICD-10-CM

## 2022-05-23 DIAGNOSIS — E53.8 DEFICIENCY OF OTHER SPECIFIED B GROUP VITAMINS: ICD-10-CM

## 2022-05-23 DIAGNOSIS — R05.3 CHRONIC COUGH: ICD-10-CM

## 2022-05-23 DIAGNOSIS — R76.11 NONSPECIFIC REACTION TO TUBERCULIN SKIN TEST WITHOUT ACTIVE TUBERCULOSIS: ICD-10-CM

## 2022-05-23 DIAGNOSIS — F10.10 ALCOHOL ABUSE, UNCOMPLICATED: ICD-10-CM

## 2022-05-23 PROBLEM — Z87.09 PERSONAL HISTORY OF OTHER DISEASES OF THE RESPIRATORY SYSTEM: Chronic | Status: ACTIVE | Noted: 2021-09-03

## 2022-05-23 PROBLEM — I48.91 UNSPECIFIED ATRIAL FIBRILLATION: Chronic | Status: ACTIVE | Noted: 2021-09-03

## 2022-05-23 LAB
ALBUMIN SERPL ELPH-MCNC: 3.1 G/DL — LOW (ref 3.5–5.2)
ALP SERPL-CCNC: 101 U/L — SIGNIFICANT CHANGE UP (ref 30–115)
ALT FLD-CCNC: 8 U/L — SIGNIFICANT CHANGE UP (ref 0–41)
ANION GAP SERPL CALC-SCNC: 12 MMOL/L — SIGNIFICANT CHANGE UP (ref 7–14)
AST SERPL-CCNC: 16 U/L — SIGNIFICANT CHANGE UP (ref 0–41)
BASOPHILS # BLD AUTO: 0.04 K/UL — SIGNIFICANT CHANGE UP (ref 0–0.2)
BASOPHILS NFR BLD AUTO: 0.6 % — SIGNIFICANT CHANGE UP (ref 0–1)
BILIRUB SERPL-MCNC: 0.7 MG/DL — SIGNIFICANT CHANGE UP (ref 0.2–1.2)
BUN SERPL-MCNC: 19 MG/DL — SIGNIFICANT CHANGE UP (ref 10–20)
CALCIUM SERPL-MCNC: 9.1 MG/DL — SIGNIFICANT CHANGE UP (ref 8.5–10.1)
CHLORIDE SERPL-SCNC: 98 MMOL/L — SIGNIFICANT CHANGE UP (ref 98–110)
CO2 SERPL-SCNC: 25 MMOL/L — SIGNIFICANT CHANGE UP (ref 17–32)
CREAT SERPL-MCNC: 0.7 MG/DL — SIGNIFICANT CHANGE UP (ref 0.7–1.5)
EGFR: 92 ML/MIN/1.73M2 — SIGNIFICANT CHANGE UP
EOSINOPHIL # BLD AUTO: 0.17 K/UL — SIGNIFICANT CHANGE UP (ref 0–0.7)
EOSINOPHIL NFR BLD AUTO: 2.4 % — SIGNIFICANT CHANGE UP (ref 0–8)
GLUCOSE SERPL-MCNC: 75 MG/DL — SIGNIFICANT CHANGE UP (ref 70–99)
HCT VFR BLD CALC: 34.2 % — LOW (ref 37–47)
HGB BLD-MCNC: 9.8 G/DL — LOW (ref 12–16)
IMM GRANULOCYTES NFR BLD AUTO: 0.6 % — HIGH (ref 0.1–0.3)
LACTATE SERPL-SCNC: 0.9 MMOL/L — SIGNIFICANT CHANGE UP (ref 0.7–2)
LYMPHOCYTES # BLD AUTO: 1.3 K/UL — SIGNIFICANT CHANGE UP (ref 1.2–3.4)
LYMPHOCYTES # BLD AUTO: 18 % — LOW (ref 20.5–51.1)
MAGNESIUM SERPL-MCNC: 2 MG/DL — SIGNIFICANT CHANGE UP (ref 1.8–2.4)
MCHC RBC-ENTMCNC: 23.6 PG — LOW (ref 27–31)
MCHC RBC-ENTMCNC: 28.7 G/DL — LOW (ref 32–37)
MCV RBC AUTO: 82.2 FL — SIGNIFICANT CHANGE UP (ref 81–99)
MONOCYTES # BLD AUTO: 0.5 K/UL — SIGNIFICANT CHANGE UP (ref 0.1–0.6)
MONOCYTES NFR BLD AUTO: 6.9 % — SIGNIFICANT CHANGE UP (ref 1.7–9.3)
NEUTROPHILS # BLD AUTO: 5.16 K/UL — SIGNIFICANT CHANGE UP (ref 1.4–6.5)
NEUTROPHILS NFR BLD AUTO: 71.5 % — SIGNIFICANT CHANGE UP (ref 42.2–75.2)
NRBC # BLD: 0 /100 WBCS — SIGNIFICANT CHANGE UP (ref 0–0)
PHOSPHATE SERPL-MCNC: 4.4 MG/DL — SIGNIFICANT CHANGE UP (ref 2.1–4.9)
PLATELET # BLD AUTO: 339 K/UL — SIGNIFICANT CHANGE UP (ref 130–400)
POTASSIUM SERPL-MCNC: 4.4 MMOL/L — SIGNIFICANT CHANGE UP (ref 3.5–5)
POTASSIUM SERPL-SCNC: 4.4 MMOL/L — SIGNIFICANT CHANGE UP (ref 3.5–5)
PROT SERPL-MCNC: 5.9 G/DL — LOW (ref 6–8)
RBC # BLD: 4.16 M/UL — LOW (ref 4.2–5.4)
RBC # FLD: 22.9 % — HIGH (ref 11.5–14.5)
SARS-COV-2 RNA SPEC QL NAA+PROBE: SIGNIFICANT CHANGE UP
SODIUM SERPL-SCNC: 135 MMOL/L — SIGNIFICANT CHANGE UP (ref 135–146)
WBC # BLD: 7.21 K/UL — SIGNIFICANT CHANGE UP (ref 4.8–10.8)
WBC # FLD AUTO: 7.21 K/UL — SIGNIFICANT CHANGE UP (ref 4.8–10.8)

## 2022-05-23 PROCEDURE — 99285 EMERGENCY DEPT VISIT HI MDM: CPT

## 2022-05-23 PROCEDURE — 71045 X-RAY EXAM CHEST 1 VIEW: CPT | Mod: 26

## 2022-05-23 PROCEDURE — 99222 1ST HOSP IP/OBS MODERATE 55: CPT

## 2022-05-23 RX ORDER — DULOXETINE HYDROCHLORIDE 30 MG/1
60 CAPSULE, DELAYED RELEASE ORAL DAILY
Refills: 0 | Status: DISCONTINUED | OUTPATIENT
Start: 2022-05-23 | End: 2022-05-28

## 2022-05-23 RX ORDER — THIAMINE MONONITRATE (VIT B1) 100 MG
100 TABLET ORAL DAILY
Refills: 0 | Status: DISCONTINUED | OUTPATIENT
Start: 2022-05-23 | End: 2022-05-28

## 2022-05-23 RX ORDER — SODIUM CHLORIDE 9 MG/ML
4 INJECTION INTRAMUSCULAR; INTRAVENOUS; SUBCUTANEOUS EVERY 12 HOURS
Refills: 0 | Status: DISCONTINUED | OUTPATIENT
Start: 2022-05-23 | End: 2022-05-28

## 2022-05-23 RX ORDER — APIXABAN 2.5 MG/1
5 TABLET, FILM COATED ORAL EVERY 12 HOURS
Refills: 0 | Status: DISCONTINUED | OUTPATIENT
Start: 2022-05-23 | End: 2022-05-28

## 2022-05-23 RX ORDER — METHOCARBAMOL 500 MG/1
500 TABLET, FILM COATED ORAL
Refills: 0 | Status: DISCONTINUED | OUTPATIENT
Start: 2022-05-23 | End: 2022-05-28

## 2022-05-23 RX ORDER — POLYETHYLENE GLYCOL 3350 17 G/17G
17 POWDER, FOR SOLUTION ORAL DAILY
Refills: 0 | Status: DISCONTINUED | OUTPATIENT
Start: 2022-05-23 | End: 2022-05-28

## 2022-05-23 RX ORDER — LACTOBACILLUS ACIDOPHILUS 100MM CELL
1 CAPSULE ORAL DAILY
Refills: 0 | Status: DISCONTINUED | OUTPATIENT
Start: 2022-05-23 | End: 2022-05-28

## 2022-05-23 RX ORDER — SENNA PLUS 8.6 MG/1
2 TABLET ORAL AT BEDTIME
Refills: 0 | Status: DISCONTINUED | OUTPATIENT
Start: 2022-05-23 | End: 2022-05-28

## 2022-05-23 RX ORDER — ALBUTEROL 90 UG/1
2 AEROSOL, METERED ORAL EVERY 6 HOURS
Refills: 0 | Status: DISCONTINUED | OUTPATIENT
Start: 2022-05-23 | End: 2022-05-28

## 2022-05-23 RX ORDER — FOLIC ACID 0.8 MG
1 TABLET ORAL DAILY
Refills: 0 | Status: DISCONTINUED | OUTPATIENT
Start: 2022-05-23 | End: 2022-05-28

## 2022-05-23 RX ADMIN — SENNA PLUS 2 TABLET(S): 8.6 TABLET ORAL at 22:50

## 2022-05-23 NOTE — H&P ADULT - HISTORY OF PRESENT ILLNESS
patient is a 70 yo F with pmhx Afib on Eliquis, Abdominal abscess 2 mo ago, Gastric Bypass surgery 20+ years ago, COPD who presents from NH for evaluation of positive QuantiFeron test. Patient had a positive QuantiFERON test on her outpatient blood work and was sent to the hospital for further evaluation. Patient has no symptoms.  No respiratory symptoms no hemoptysis denies any other TB exposure. Patient denies fevers/chills, chest pain, sob, recent weight loss, travel.    CXR in the ED revealed Probable right basilar linear atelectasis. No definite acute consolidation.    · BP Systolic	109 mm Hg  · BP Diastolic	66 mm Hg  · Heart Rate	84 /min  · Respiration Rate (breaths/min)	20 /min  · Temp (F)	97.9 Degrees F  · Temp (C)	36.6 Degrees C  · Temp site	oral  · SpO2 (%)	95 %  · O2 Delivery/Oxygen Delivery Method	room air   patient is a 70 yo F with pmhx Afib on Eliquis, Abdominal abscess 2 mo ago, Gastric Bypass surgery 20+ years ago, COPD who presents from NH for evaluation of positive QuantiFeron test. Patient had a positive QuantiFERON test on her outpatient blood work and was sent to the hospital for further evaluation. Patient endorses non productive cough x 1.5 weeks, otherwise No respiratory symptoms, no hemoptysis, denies any other TB exposure. Patient denies fevers/chills, chest pain, sob, recent weight loss, travel.    CXR in the ED revealed Probable right basilar linear atelectasis. No definite acute consolidation.    · BP Systolic	109 mm Hg  · BP Diastolic	66 mm Hg  · Heart Rate	84 /min  · Respiration Rate (breaths/min)	20 /min  · Temp (F)	97.9 Degrees F  · Temp (C)	36.6 Degrees C  · Temp site	oral  · SpO2 (%)	95 %  · O2 Delivery/Oxygen Delivery Method	room air

## 2022-05-23 NOTE — ED PROVIDER NOTE - PHYSICAL EXAMINATION
CONSTITUTIONAL: Well-developed; well-nourished; in no acute distress.   SKIN: warm, dry  HEAD: Normocephalic; atraumatic.  EYES: no conjunctival injection.   ENT: No nasal discharge; airway clear.  NECK: Supple; non tender.  CARD: S1, S2 normal; no murmurs, gallops, or rubs. Regular rate and rhythm.   RESP: No wheezes, rales or rhonchi.  ABD: soft ntnd.   EXT: Normal ROM.  No clubbing, cyanosis or edema.   LYMPH: No acute cervical adenopathy.  NEURO: Alert, oriented, grossly unremarkable.  PSYCH: Cooperative, appropriate.

## 2022-05-23 NOTE — ED ADULT NURSE NOTE - NSIMPLEMENTINTERV_GEN_ALL_ED
Implemented All Universal Safety Interventions:  [place] to call system. Call bell, personal items and telephone within reach. Instruct patient to call for assistance. Room bathroom lighting operational. Non-slip footwear when patient is off stretcher. Physically safe environment: no spills, clutter or unnecessary equipment. Stretcher in lowest position, wheels locked, appropriate side rails in place.

## 2022-05-23 NOTE — ED PROVIDER NOTE - OBJECTIVE STATEMENT
The patient is a 71 year old female with a history of Afib on Eliquis, Abdominal abscess 2 mo ago, Gastric Bypass surgery 20+ years ago, COPD who presents to the ED from NH for evaluation s/p + +quantiferon Gold. Patient denies fevers/chills, chest pain, sob, recent weight loss, travel.

## 2022-05-23 NOTE — H&P ADULT - ATTENDING COMMENTS
HPI:  patient is a 72 yo F with pmhx Afib on Eliquis, Abdominal abscess 2 mo ago, Gastric Bypass surgery 20+ years ago, COPD who presents from NH for evaluation of positive QuantiFeron test. Patient had a positive QuantiFERON test on her outpatient blood work and was sent to the hospital for further evaluation. Patient endorses non productive cough x 1.5 weeks, otherwise No respiratory symptoms, no hemoptysis, denies any other TB exposure. Patient denies fevers/chills, chest pain, sob, recent weight loss, travel.    CXR in the ED revealed Probable right basilar linear atelectasis. No definite acute consolidation.    · BP Systolic	109 mm Hg  · BP Diastolic	66 mm Hg  · Heart Rate	84 /min  · Respiration Rate (breaths/min)	20 /min  · Temp (F)	97.9 Degrees F  · Temp (C)	36.6 Degrees C  · Temp site	oral  · SpO2 (%)	95 %  · O2 Delivery/Oxygen Delivery Method	room air   (23 May 2022 20:18)    REVIEW OF SYSTEMS: see cc/HPI   CONSTITUTIONAL: No weakness, fevers or chillsm No weight loss  EYES/ENT: No visual changes;  No vertigo or throat pain   NECK: No pain or stiffness  RESPIRATORY: (+) cough, wheezing, hemoptysis; No shortness of breath  CARDIOVASCULAR: No chest pain or palpitations  GASTROINTESTINAL: No abdominal or epigastric pain. No nausea, vomiting, or hematemesis; No diarrhea or constipation. No melena or hematochezia.  GENITOURINARY: No dysuria, frequency or hematuria  NEUROLOGICAL: No numbness or weakness  SKIN: No itching, rashes    Physical Exam:   General: WN/WD NAD  Neurology: A&Ox3, nonfocal, follows commands  Eyes: PERRLA/ EOMI  ENT/Neck: Neck supple, trachea midline, No JVD  Respiratory: CTA B/L, No wheezing, rales, rhonchi  CV: Normal rate regular rhythm, S1S2, no murmurs, rubs or gallops  Abdominal: Soft, NT, ND +BS,   Extremities: No edema, + peripheral pulses  Skin: No Rashes, Hematoma, Ecchymosis  Incisions:   Tubes: HPI:  patient is a 72 yo F with pmhx Afib on Eliquis, Abdominal abscess 2 mo ago, Gastric Bypass surgery 20+ years ago, COPD who presents from NH for evaluation of positive QuantiFeron test. Patient had a positive QuantiFERON test on her outpatient blood work and was sent to the hospital for further evaluation. Patient endorses non productive cough x 1.5 weeks, otherwise No respiratory symptoms, no hemoptysis, denies any other TB exposure. Patient denies fevers/chills, chest pain, sob, recent weight loss, travel.    CXR in the ED revealed Probable right basilar linear atelectasis. No definite acute consolidation.    · BP Systolic	109 mm Hg  · BP Diastolic	66 mm Hg  · Heart Rate	84 /min  · Respiration Rate (breaths/min)	20 /min  · Temp (F)	97.9 Degrees F  · Temp (C)	36.6 Degrees C  · Temp site	oral  · SpO2 (%)	95 %  · O2 Delivery/Oxygen Delivery Method	room air   (23 May 2022 20:18)    REVIEW OF SYSTEMS: see cc/HPI   CONSTITUTIONAL: No weakness, fevers or chills, No weight loss  EYES/ENT: No visual changes;  No vertigo or throat pain   NECK: No pain or stiffness  RESPIRATORY: (+) cough, wheezing, hemoptysis; No shortness of breath  CARDIOVASCULAR: No chest pain or palpitations  GASTROINTESTINAL: No abdominal or epigastric pain. No nausea, vomiting, or hematemesis; No diarrhea or constipation. No melena or hematochezia.  GENITOURINARY: No dysuria, frequency or hematuria  NEUROLOGICAL: No numbness or weakness  SKIN: No itching, rashes    Physical Exam:   General: WN/WD NAD  Neurology: A&Ox3, nonfocal, follows commands  Eyes: PERRLA/ EOMI  ENT/Neck: Neck supple, trachea midline, No JVD  Respiratory: CTA B/L, No wheezing, rales, rhonchi  CV: Normal rate regular rhythm, S1S2, no murmurs, rubs or gallops  Abdominal: Soft, NT, ND +BS,   Extremities: No edema, + peripheral pulses  Skin: No Rashes, Hematoma, Ecchymosis    A/p  Cough in association to (+) QuantiFeron TB gold r/o TB ( CXR w/ evidence of Pulm disease ) r/o LTBI, doubt active pulm disease   H/o COPD / cough r/o URI   -PRN bronchodilators   -airborne isolation for now  -sputum culture   -RVP   -ID eval     H/o Chronic A.fib on Eliquis   H/o HFpEF - euvolemic  -c/w Eliquis   -monitor Is and Os    H/o C. diff colitis w/ ileus   H/o perirectal abscess   -c/w probiotics     H/o Alcohol use disorder   suspected thiamine / folate deficiency   -supplement Folate/ Thiamine     Depression   -c/w Current Rx and outpatient Psychiatry follow up    PATIENT SEEN by ATTENDING 5/23/22 ( Note revised 5/24)

## 2022-05-23 NOTE — ED ADULT NURSE NOTE - OBJECTIVE STATEMENT
pt is a 71 year old female pw +quantiferon Gold. Patient denies fevers/chills, chest pain, sob, recent weight loss, travel.

## 2022-05-23 NOTE — ED PROVIDER NOTE - ATTENDING CONTRIBUTION TO CARE
71-year-old Female to ED with abnormal x-ray.  Patient had a positive QuantiFERON test and outpatient blood work and was sent to the ED.  Bilateral infiltrates on outpatient x-ray so sent to the ED for evaluation.  Patient has no symptoms.  No respiratory symptoms no hemoptysis denies any other TB exposure.  Exam as noted

## 2022-05-23 NOTE — H&P ADULT - NSHPPHYSICALEXAM_GEN_ALL_CORE
GENERAL: NAD, lying in bed comfortably  HEAD:  Atraumatic, Normocephalic  EYES: EOMI, PERRLA, conjunctiva and sclera clear  ENT: Moist mucous membranes  NECK: Supple, No JVD  CHEST/LUNG: Clear to auscultation bilaterally; No rales, rhonchi, wheezing, or rubs. Unlabored respirations  HEART: Regular rate and rhythm; No murmurs, rubs, or gallops  ABDOMEN: Bowel sounds present; Soft, Nontender, Nondistended.  EXTREMITIES:  2+ Peripheral Pulses, brisk capillary refill. No clubbing, cyanosis, or edema  NERVOUS SYSTEM:  Alert & Oriented X3, speech clear. No deficits   MSK: FROM all 4 extremities, full and equal strength  SKIN: No rashes or lesions

## 2022-05-23 NOTE — ED PROVIDER NOTE - NS ED ROS FT
Eyes:  No visual changes, eye pain or discharge.  ENMT:  No hearing changes, pain, discharge or infections. No neck pain or stiffness.  Cardiac:  No chest pain. No chest pain with exertion.  Respiratory:  No cough or No hemoptysis. No history of asthma or RAD.  GI:  No nausea, vomiting, diarrhea or abdominal pain.  :  No dysuria, frequency or burning.  MS:  No myalgia, muscle weakness, joint pain or back pain.  Neuro:  No headache or weakness.  No LOC.  Skin:  No skin rash.   Endocrine: No history of thyroid disease or diabetes.

## 2022-05-23 NOTE — H&P ADULT - ASSESSMENT
patient is a 70 yo F with pmhx Afib on Eliquis, Abdominal abscess 2 mo ago, Gastric Bypass surgery 20+ years ago, COPD who presents from NH for evaluation of positive QuantiFeron test.     #positive QuantiFeron test, r/o active TB infection  -CXR in the ED revealed, No definite acute consolidation.  -from nursing home, denies travel, asymptomatic otherwise, no cough/hemoptysis  -repeat quantiferon gold   -sputum culture,   -blood culture, urine culture  -screen for HIV, hepatitis panel  -baseline LFTs and creatinine  -ID eval    #Atrial fibrillation on eliquis  - CHADVASC 3  - continue with eliquis    #COPD ,stable not in exacerbation  - not on home oxygen ,no wheezing on exam  - inhaler PRN  - pulmonary toilet    #HFpEF ,euvolemic on exam   -TTE 6/27 EF of 63 %.  - monitor I&O  - avoid volume overload    # EtOH abuse with suspected folate/thiamine deficiency  - on folic acid and thiamine     # Depression on duloxetine  - c/w SSRI  - f/u w psychiatry as outpatient    #Misc  - DVT Prophylaxis: lovenox  - GI Prophylaxis: not indicated  - Diet: Regular  - Activity: as tolerated  - Code Status: Full Code   patient is a 72 yo F with pmhx Afib on Eliquis, Abdominal abscess 2 mo ago, Gastric Bypass surgery 20+ years ago, COPD who presents from NH for evaluation of positive QuantiFeron test.     #positive QuantiFeron test, r/o active TB infection  -CXR in the ED revealed, No definite acute consolidation.  -from nursing home, denies travel, asymptomatic otherwise, no cough/hemoptysis  -repeat quantiferon gold   -sputum culture,   -blood culture, urine culture  -screen for HIV, hepatitis panel  -baseline LFTs and creatinine  -ID eval    #HO C diff Colitis with illeus   #HO Perirectal abscess s/p I and D 6/26/2021  -asymptomatic  -c/w probiotics  -c diff prophylaxis per ID    #Atrial fibrillation on eliquis  - CHADVASC 3  - continue with eliquis    #COPD ,stable not in exacerbation  - not on home oxygen ,no wheezing on exam  - inhaler PRN  - pulmonary toilet    #HFpEF ,euvolemic on exam   -TTE 6/27 EF of 63 %.  - monitor I&O  - avoid volume overload    # EtOH abuse with suspected folate/thiamine deficiency  - on folic acid and thiamine     # Depression on duloxetine  - c/w SSRI  - f/u w psychiatry as outpatient    #Misc  - DVT Prophylaxis: eliquis  - GI Prophylaxis: not indicated  - Diet: Regular  - Activity: as tolerated  - Code Status: Full Code   patient is a 70 yo F with pmhx Afib on Eliquis, Abdominal abscess 2 mo ago, Gastric Bypass surgery 20+ years ago, COPD who presents from NH for evaluation of positive QuantiFeron test.     #positive QuantiFeron test, r/o active TB infection  -CXR in the ED revealed, No definite acute consolidation.  -from nursing home, born in the , denies travel, endorses non productive cough x 1.5 weeks asymptomatic otherwise, no hemoptysis  -repeat quantiferon gold   -sputum culture, may induce with 3% NS  -blood culture, urine culture  -screen for HIV, hepatitis panel  -baseline LFTs and creatinine  -ID eval    #HO C diff Colitis with illeus   #HO Perirectal abscess s/p I and D 6/26/2021  -asymptomatic  -c/w probiotics  -c diff prophylaxis per ID    #Atrial fibrillation on eliquis  - CHADVASC 3  - continue with eliquis    #COPD ,stable not in exacerbation  - not on home oxygen ,no wheezing on exam  - inhaler PRN  - pulmonary toilet    #HFpEF ,euvolemic on exam   -TTE 6/27 EF of 63 %.  - monitor I&O  - avoid volume overload    # EtOH abuse with suspected folate/thiamine deficiency  - on folic acid and thiamine     # Depression on duloxetine  - c/w SSRI  - f/u w psychiatry as outpatient    #Misc  - DVT Prophylaxis: eliquis  - GI Prophylaxis: not indicated  - Diet: Regular  - Activity: as tolerated  - Code Status: Full Code

## 2022-05-23 NOTE — ED PROVIDER NOTE - CLINICAL SUMMARY MEDICAL DECISION MAKING FREE TEXT BOX
Patient presented with positive QuantiFERON gold test, sent in for evaluation for possible TB.  Otherwise on arrival patient hemodynamically stable, lungs overall clear, no acute respiratory distress.  Obtained labs which were grossly unremarkable including no significant leukocytosis, anemia, signs of dehydration/ALVINA, transaminitis or significant electrolyte abnormalities.  Chest x-ray grossly negative for infection.  However given positive test, will require admission for sputum cultures for definitive rule out.  Hemodynamically stable at time of admission.

## 2022-05-23 NOTE — H&P ADULT - NSHPLABSRESULTS_GEN_ALL_CORE
Labs:  CAPILLARY BLOOD GLUCOSE                              9.8    7.21  )-----------( 339      ( 23 May 2022 18:14 )             34.2       Auto Neutrophil %: 71.5 % (05-23-22 @ 18:14)  Auto Immature Granulocyte %: 0.6 % (05-23-22 @ 18:14)    05-23    135  |  98  |  19  ----------------------------<  75  4.4   |  25  |  0.7      Calcium, Total Serum: 9.1 mg/dL (05-23-22 @ 18:14)      LFTs:             5.9  | 0.7  | 16       ------------------[101     ( 23 May 2022 18:14 )  3.1  | x    | 8           Lipase:x      Amylase:x         Lactate, Blood: 0.9 mmol/L (05-23-22 @ 18:14)      Coags:

## 2022-05-24 LAB
ALBUMIN SERPL ELPH-MCNC: 2.9 G/DL — LOW (ref 3.5–5.2)
ALP SERPL-CCNC: 90 U/L — SIGNIFICANT CHANGE UP (ref 30–115)
ALT FLD-CCNC: 8 U/L — SIGNIFICANT CHANGE UP (ref 0–41)
ANION GAP SERPL CALC-SCNC: 10 MMOL/L — SIGNIFICANT CHANGE UP (ref 7–14)
AST SERPL-CCNC: 14 U/L — SIGNIFICANT CHANGE UP (ref 0–41)
BASOPHILS # BLD AUTO: 0.06 K/UL — SIGNIFICANT CHANGE UP (ref 0–0.2)
BASOPHILS NFR BLD AUTO: 0.9 % — SIGNIFICANT CHANGE UP (ref 0–1)
BILIRUB SERPL-MCNC: 0.6 MG/DL — SIGNIFICANT CHANGE UP (ref 0.2–1.2)
BUN SERPL-MCNC: 19 MG/DL — SIGNIFICANT CHANGE UP (ref 10–20)
CALCIUM SERPL-MCNC: 8.8 MG/DL — SIGNIFICANT CHANGE UP (ref 8.5–10.1)
CHLORIDE SERPL-SCNC: 100 MMOL/L — SIGNIFICANT CHANGE UP (ref 98–110)
CO2 SERPL-SCNC: 27 MMOL/L — SIGNIFICANT CHANGE UP (ref 17–32)
CREAT SERPL-MCNC: <0.5 MG/DL — LOW (ref 0.7–1.5)
EGFR: 106 ML/MIN/1.73M2 — SIGNIFICANT CHANGE UP
EOSINOPHIL # BLD AUTO: 0.19 K/UL — SIGNIFICANT CHANGE UP (ref 0–0.7)
EOSINOPHIL NFR BLD AUTO: 2.8 % — SIGNIFICANT CHANGE UP (ref 0–8)
GLUCOSE SERPL-MCNC: 72 MG/DL — SIGNIFICANT CHANGE UP (ref 70–99)
HAV IGM SER-ACNC: SIGNIFICANT CHANGE UP
HBV CORE IGM SER-ACNC: SIGNIFICANT CHANGE UP
HBV SURFACE AG SER-ACNC: SIGNIFICANT CHANGE UP
HCT VFR BLD CALC: 32.4 % — LOW (ref 37–47)
HCV AB S/CO SERPL IA: 0.15 S/CO — SIGNIFICANT CHANGE UP (ref 0–0.99)
HCV AB SERPL-IMP: SIGNIFICANT CHANGE UP
HGB BLD-MCNC: 9.4 G/DL — LOW (ref 12–16)
HIV 1+2 AB+HIV1 P24 AG SERPL QL IA: SIGNIFICANT CHANGE UP
IMM GRANULOCYTES NFR BLD AUTO: 0.3 % — SIGNIFICANT CHANGE UP (ref 0.1–0.3)
LYMPHOCYTES # BLD AUTO: 1.16 K/UL — LOW (ref 1.2–3.4)
LYMPHOCYTES # BLD AUTO: 16.9 % — LOW (ref 20.5–51.1)
MAGNESIUM SERPL-MCNC: 1.8 MG/DL — SIGNIFICANT CHANGE UP (ref 1.8–2.4)
MCHC RBC-ENTMCNC: 24 PG — LOW (ref 27–31)
MCHC RBC-ENTMCNC: 29 G/DL — LOW (ref 32–37)
MCV RBC AUTO: 82.7 FL — SIGNIFICANT CHANGE UP (ref 81–99)
MONOCYTES # BLD AUTO: 0.43 K/UL — SIGNIFICANT CHANGE UP (ref 0.1–0.6)
MONOCYTES NFR BLD AUTO: 6.3 % — SIGNIFICANT CHANGE UP (ref 1.7–9.3)
NEUTROPHILS # BLD AUTO: 4.99 K/UL — SIGNIFICANT CHANGE UP (ref 1.4–6.5)
NEUTROPHILS NFR BLD AUTO: 72.8 % — SIGNIFICANT CHANGE UP (ref 42.2–75.2)
NIGHT BLUE STAIN TISS: SIGNIFICANT CHANGE UP
NRBC # BLD: 0 /100 WBCS — SIGNIFICANT CHANGE UP (ref 0–0)
PHOSPHATE SERPL-MCNC: 4.5 MG/DL — SIGNIFICANT CHANGE UP (ref 2.1–4.9)
PLATELET # BLD AUTO: 293 K/UL — SIGNIFICANT CHANGE UP (ref 130–400)
POTASSIUM SERPL-MCNC: 4.1 MMOL/L — SIGNIFICANT CHANGE UP (ref 3.5–5)
POTASSIUM SERPL-SCNC: 4.1 MMOL/L — SIGNIFICANT CHANGE UP (ref 3.5–5)
PROT SERPL-MCNC: 5.7 G/DL — LOW (ref 6–8)
RBC # BLD: 3.92 M/UL — LOW (ref 4.2–5.4)
RBC # FLD: 22.9 % — HIGH (ref 11.5–14.5)
SODIUM SERPL-SCNC: 137 MMOL/L — SIGNIFICANT CHANGE UP (ref 135–146)
SPECIMEN SOURCE: SIGNIFICANT CHANGE UP
WBC # BLD: 6.85 K/UL — SIGNIFICANT CHANGE UP (ref 4.8–10.8)
WBC # FLD AUTO: 6.85 K/UL — SIGNIFICANT CHANGE UP (ref 4.8–10.8)

## 2022-05-24 PROCEDURE — 99232 SBSQ HOSP IP/OBS MODERATE 35: CPT

## 2022-05-24 RX ADMIN — SENNA PLUS 2 TABLET(S): 8.6 TABLET ORAL at 21:48

## 2022-05-24 RX ADMIN — SODIUM CHLORIDE 4 MILLILITER(S): 9 INJECTION INTRAMUSCULAR; INTRAVENOUS; SUBCUTANEOUS at 07:00

## 2022-05-24 RX ADMIN — METHOCARBAMOL 500 MILLIGRAM(S): 500 TABLET, FILM COATED ORAL at 17:35

## 2022-05-24 RX ADMIN — APIXABAN 5 MILLIGRAM(S): 2.5 TABLET, FILM COATED ORAL at 17:35

## 2022-05-24 RX ADMIN — SODIUM CHLORIDE 4 MILLILITER(S): 9 INJECTION INTRAMUSCULAR; INTRAVENOUS; SUBCUTANEOUS at 21:26

## 2022-05-24 RX ADMIN — POLYETHYLENE GLYCOL 3350 17 GRAM(S): 17 POWDER, FOR SOLUTION ORAL at 11:54

## 2022-05-24 RX ADMIN — DULOXETINE HYDROCHLORIDE 60 MILLIGRAM(S): 30 CAPSULE, DELAYED RELEASE ORAL at 13:22

## 2022-05-24 RX ADMIN — Medication 1 TABLET(S): at 11:54

## 2022-05-24 RX ADMIN — METHOCARBAMOL 500 MILLIGRAM(S): 500 TABLET, FILM COATED ORAL at 06:02

## 2022-05-24 RX ADMIN — APIXABAN 5 MILLIGRAM(S): 2.5 TABLET, FILM COATED ORAL at 06:02

## 2022-05-24 RX ADMIN — Medication 100 MILLIGRAM(S): at 11:54

## 2022-05-24 RX ADMIN — Medication 1 MILLIGRAM(S): at 11:54

## 2022-05-24 NOTE — CONSULT NOTE ADULT - ATTENDING COMMENTS
History and physical as above.    Presents for evaluation of positive quantiferon gold.     No known contacts.     Denies any previous PPD testing.     CXR reviewed - right sided atelectasis     No symptoms to suggestive acute TB .     Regarding Latent TB - annual risk of progressing into active TB is <0.1% and cummulative risk of progressing until the age of 80 is < 1.0%.     As risk of reactivation is low, no treatement needed at this time -- if planned to be on any immunosuppressive medication, this should be re-visited.     Stop airborne precautions   No need to obtain further sputum  Follow-up HIV    Please call or message on [employer] Teams if with any questions.  Spectra [phone]

## 2022-05-24 NOTE — PROGRESS NOTE ADULT - ASSESSMENT
HPI:  patient is a 70 yo F with pmhx Afib on Eliquis, Abdominal abscess 2 mo ago, Gastric Bypass surgery 20+ years ago, COPD who presents from NH for evaluation of positive QuantiFeron test. Patient had a positive QuantiFERON test on her outpatient blood work and was sent to the hospital for further evaluation. Patient endorses non productive cough x 1.5 weeks, otherwise No respiratory symptoms, no hemoptysis, denies any other TB exposure. Patient denies fevers/chills, chest pain, sob, recent weight loss, travel.      #Cough in the setting of + Quantiferon ,   history of remote TB   repeat quantiferon and sputum x 3 per id      #Chronic Afib on Eliquis    #Abdominal abscess 2 mo ago    #Gastric Bypass surgery 20+ years ago    #COPD     #Suspected thiamine deficiency on supplementation    #Atelectasis deep breathing     PROGRESS NOTE HANDOFF    Pending: repeat quantiferon , sputum afb, id attending tanneral    Family discussion: patient verbalized understanding and agreeable to plan of care     Disposition: Home

## 2022-05-24 NOTE — PROGRESS NOTE ADULT - SUBJECTIVE AND OBJECTIVE BOX
LOIS WEGENER 71y Female  MRN#: [id number]   CODE STATUS: Full    Hospital Day: 1d    Pt is currently admitted with the primary diagnosis of positive Quant gold    SUBJECTIVE  Hospital Course    72 yo F with pmhx Afib on Eliquis, Abdominal abscess 2 mo ago, Gastric Bypass surgery 20+ years ago, COPD who presents from NH for evaluation of positive QuantiFeron test. Patient had a positive QuantiFERON test on her outpatient blood work and was sent to the hospital for further evaluation. Patient endorses non productive cough x 1.5 weeks, otherwise No respiratory symptoms, no hemoptysis, denies any other TB exposure. Patient denies fevers/chills, chest pain, sob, recent weight loss, travel.    CXR in the ED revealed Probable right basilar linear atelectasis. No definite acute consolidation.    5/24: Follow up repeat quantiferon gold. ID consult                                            ----------------------------------------------------------  OBJECTIVE  PAST MEDICAL & SURGICAL HISTORY  Atrial fibrillation    History of COPD                                              -----------------------------------------------------------  ALLERGIES:  No Known Allergies                                            ------------------------------------------------------------    HOME MEDICATIONS  Home Medications:  albuterol 90 mcg/inh inhalation aerosol: 2 puff(s) inhaled every 6 hours, As needed, Shortness of Breath and/or Wheezing (06 Jul 2021 14:58)  apixaban 5 mg oral tablet: 1 tab(s) orally every 12 hours (06 Jul 2021 14:58)  DULoxetine 60 mg oral delayed release capsule: 1 cap(s) orally once a day (06 Jul 2021 14:58)  folic acid 1 mg oral tablet: 1 tab(s) orally once a day (06 Jul 2021 14:58)  methocarbamol 500 mg oral tablet: 1 tab(s) orally 2 times a day (06 Jul 2021 14:58)  thiamine 100 mg oral tablet: 1 tab(s) orally once a day (06 Jul 2021 14:58)                           MEDICATIONS:  STANDING MEDICATIONS  apixaban 5 milliGRAM(s) Oral every 12 hours  DULoxetine 60 milliGRAM(s) Oral daily  folic acid 1 milliGRAM(s) Oral daily  lactobacillus acidophilus 1 Tablet(s) Oral daily  methocarbamol 500 milliGRAM(s) Oral two times a day  multivitamin 1 Tablet(s) Oral daily  polyethylene glycol 3350 17 Gram(s) Oral daily  senna 2 Tablet(s) Oral at bedtime  sodium chloride 3%  Inhalation 4 milliLiter(s) Inhalation every 12 hours  thiamine 100 milliGRAM(s) Oral daily    PRN MEDICATIONS  ALBUTerol    90 MICROgram(s) HFA Inhaler 2 Puff(s) Inhalation every 6 hours PRN                                            ------------------------------------------------------------  VITAL SIGNS: Last 24 Hours  T(C): 36.4 (24 May 2022 07:47), Max: 37.3 (24 May 2022 00:00)  T(F): 97.5 (24 May 2022 07:47), Max: 99.2 (24 May 2022 00:00)  HR: 70 (24 May 2022 07:47) (70 - 84)  BP: 102/59 (24 May 2022 07:47) (91/53 - 131/59)  BP(mean): --  RR: 18 (24 May 2022 07:47) (18 - 20)  SpO2: 100% (24 May 2022 07:47) (95% - 100%)      05-23-22 @ 07:01  -  05-24-22 @ 07:00  --------------------------------------------------------  IN: 360 mL / OUT: 0 mL / NET: 360 mL                                             --------------------------------------------------------------  LABS:                        9.4    6.85  )-----------( 293      ( 24 May 2022 06:05 )             32.4     05-24    137  |  100  |  19  ----------------------------<  72  4.1   |  27  |  <0.5<L>    Ca    8.8      24 May 2022 06:05  Phos  4.5     05-24  Mg     1.8     05-24    TPro  5.7<L>  /  Alb  2.9<L>  /  TBili  0.6  /  DBili  x   /  AST  14  /  ALT  8   /  AlkPhos  90  05-24          Lactate, Blood: 0.9 mmol/L (05-23-22 @ 18:14)                                                      -------------------------------------------------------------  RADIOLOGY:    < from: Xray Chest 1 View-PORTABLE IMMEDIATE (Xray Chest 1 View-PORTABLE IMMEDIATE .) (05.23.22 @ 16:32) >  IMPRESSION: Probable right basilar linear atelectasis. No definite acute   consolidation.    < end of copied text >                                            --------------------------------------------------------------    PHYSICAL EXAM:  GENERAL: NAD, lying in bed comfortably  HEAD:  Atraumatic, Normocephalic  EYES: EOMI, PERRLA, conjunctiva and sclera clear  ENT: Moist mucous membranes  NECK: Supple, No JVD  CHEST/LUNG: Clear to auscultation bilaterally; No rales, rhonchi, wheezing, or rubs. Unlabored respirations  HEART: Regular rate and rhythm; No murmurs, rubs, or gallops  ABDOMEN: Bowel sounds present; Soft, Nontender, Nondistended.  EXTREMITIES:  2+ Peripheral Pulses, brisk capillary refill. No clubbing, cyanosis, or edema  NERVOUS SYSTEM:  Alert & Oriented X3, speech clear. No deficits   MSK: FROM all 4 extremities, full and equal strength  SKIN: No rashes or lesions                                             --------------------------------------------------------------    ASSESSMENT & PLAN    72 yo F with pmhx Afib on Eliquis, Abdominal abscess 2 mo ago, Gastric Bypass surgery 20+ years ago, COPD who presents from NH for evaluation of positive QuantiFeron test.     #positive QuantiFeron test, r/o active TB infection  -CXR in the ED revealed, No definite acute consolidation.  -from nursing home, born in the , denies travel, endorses non productive cough x 1.5 weeks asymptomatic otherwise, no hemoptysis  -repeat quantiferon gold   -sputum culture, may induce with 3% NS (nurse unable to obtain 5/24)  -blood culture, urine culture  -screen for HIV, hepatitis panel  -baseline LFTs and creatinine  -F/U ID eval    #HO C diff Colitis with illeus   #HO Perirectal abscess s/p I and D 6/26/2021  -asymptomatic  -c/w probiotics  -c diff prophylaxis per ID    #Atrial fibrillation on eliquis  - CHADVASC 3  - continue with eliquis    #COPD ,stable not in exacerbation  - not on home oxygen ,no wheezing on exam  - inhaler PRN  - pulmonary toilet    #HFpEF ,euvolemic on exam   -TTE 6/27 EF of 63 %.  - monitor I&O  - avoid volume overload    # EtOH abuse with suspected folate/thiamine deficiency  - on folic acid and thiamine     # Depression on duloxetine  - c/w SSRI  - f/u w psychiatry as outpatient    #Misc  - DVT Prophylaxis: eliquis  - GI Prophylaxis: not indicated  - Diet: Regular  - Activity: as tolerated  - Code Status: Full Code

## 2022-05-24 NOTE — PATIENT PROFILE ADULT - DO YOU FEEL LIKE HURTING YOURSELF OR OTHERS?
Alondra Stone, [facility] at 10/31/2019  4:41 PM     Status: Signed      Miroslava Handy was evaluated at [facility] on October 31, 2019 at which time her blood pressure was:         BP Readings from Last 3 Encounters:   10/31/19 (!) 151/80   12/03/18 144/70   11/16/18 128/70          Pulse Readings from Last 3 Encounters:   12/03/18 75   11/06/18 85   06/28/18 90         Reviewed lifestyle modifications for blood pressure control and reduction: including making healthy food choices, managing weight, getting regular exercise, smoking cessation, reducing alcohol consumption, monitoring blood pressure regularly.      Symptoms: None     BP Goal:< 140/90 mmHg     BP Assessment:  BP too high     Potential Reasons for BP too high: Unknown/Other: patient states she has not been eating well - high sodium foods     BP Follow-Up Plan: Recheck BP in 30 days at pharmacy     Recommendation to Provider: patient state she is usually in the 140's for systolic. She may benefit from a dose increase on a blood pressure medication if she remains elevated. She will recheck with us in 30 days to see if it comes down while she works on changing her diet.     Note completed by: Alondra Stone, PharmD  [facility]  PH: [phone]            Rome Perdue MD at 10/31/2019  4:41 PM     Status: Signed      She should increase her losartan 50 mg and recheck blood pressure in ~ 1 month            no

## 2022-05-24 NOTE — PATIENT PROFILE ADULT - 
ADDITIONAL INFORMATION
Pt. stated she received 2nd COVID vaccine during her last admission at [facility] 3 weeks prior. Pt. could not remember if she received pfizer or moderna.

## 2022-05-24 NOTE — CONSULT NOTE ADULT - ASSESSMENT
ASSESSMENT  Patient is a 70 yo F with pmhx Afib on Eliquis, Abdominal abscess 2 mo ago, Gastric Bypass surgery 20+ years ago, COPD who presents from NH for evaluation of positive QuantiFeron test. Patient had a positive QuantiFERON test on her outpatient blood work and was sent to the hospital from nursing home for further evaluation. Patient endorses non productive cough ongoing for years, at her baseline. Never had a positive test before. No respiratory symptoms, no hemoptysis, denies any other TB exposure. Patient denies fevers/chills, night sweats, chest pain, sob, recent weight loss, travel. [place] L[place] whole life.     IMPRESSION  #Latent TB  - positive QuantiFeron test  - cough chronic. patient states at her baseline   - According to TST/IGRA , Annual risk of development of active TB is estimated 0.1%. Cumulative risk of active TB disease up to the age of 80 is 0.88%. If treated with INH, probability of clinically significant DI-hepatitis is 5%     #Chest Xray: no consolidations, nodules or calcifications     #Hx of C. Diff colitis with Ileus 9/2021 s/p 14 days of vancomycin 500mg QID and IV flagyl 500mg TID    RECOMMENDATIONS  - f/u rvp,   - f/u HIV, Hep C  - Can d/c isolation. Risk of developing active TB is very low. Does not require treatment for Latent TB and risks may outweigh the benefits.   - No need for C. diff prophylaxis, currently does not have any symptoms and is not on any antibiotics.

## 2022-05-24 NOTE — PROGRESS NOTE ADULT - SUBJECTIVE AND OBJECTIVE BOX
WEGENER, LOIS  71y  [facility] [id number] A      Patient is a 71y old  Female who presents with a chief complaint of +QuantiFeron test (24 May 2022 10:06)      INTERVAL HPI/OVERNIGHT EVENTS:  no acute events overnight       REVIEW OF SYSTEMS:  CONSTITUTIONAL: No fever, weight loss, or fatigue  EYES: No eye pain, visual disturbances, or discharge  ENMT:  No difficulty hearing, tinnitus, vertigo; No sinus or throat pain  NECK: No pain or stiffness  BREASTS: No pain, masses, or nipple discharge  RESPIRATORY: No cough currently   CARDIOVASCULAR: No chest pain, palpitations, dizziness, or leg swelling  GASTROINTESTINAL: No abdominal or epigastric pain. No nausea, vomiting, or hematemesis; No diarrhea or constipation. No melena or hematochezia.  GENITOURINARY: No dysuria, frequency, hematuria, or incontinence  NEUROLOGICAL: No headaches, memory loss, loss of strength, numbness, or tremors  SKIN: No itching, burning, rashes, or lesions   LYMPH NODES: No enlarged glands  ENDOCRINE: No heat or cold intolerance; No hair loss  MUSCULOSKELETAL: No joint pain or swelling; No muscle, back, or extremity pain  PSYCHIATRIC: No depression, anxiety, mood swings, or difficulty sleeping  HEME/LYMPH: No easy bruising, or bleeding gums  ALLERY AND IMMUNOLOGIC: No hives or eczema  FAMILY HISTORY:    T(C): 36.4 (05-24-22 @ 07:47), Max: 37.3 (05-24-22 @ 00:00)  HR: 70 (05-24-22 @ 07:47) (70 - 84)  BP: 102/59 (05-24-22 @ 07:47) (91/53 - 131/59)  RR: 18 (05-24-22 @ 07:47) (18 - 20)  SpO2: 100% (05-24-22 @ 07:47) (95% - 100%)  Wt(kg): --Vital Signs Last 24 Hrs  T(C): 36.4 (24 May 2022 07:47), Max: 37.3 (24 May 2022 00:00)  T(F): 97.5 (24 May 2022 07:47), Max: 99.2 (24 May 2022 00:00)  HR: 70 (24 May 2022 07:47) (70 - 84)  BP: 102/59 (24 May 2022 07:47) (91/53 - 131/59)  BP(mean): --  RR: 18 (24 May 2022 07:47) (18 - 20)  SpO2: 100% (24 May 2022 07:47) (95% - 100%)    PHYSICAL EXAM:  GENERAL: NAD, well-groomed, well-developed  HEAD:  Atraumatic, Normocephalic  EYES: EOMI, PERRLA, conjunctiva and sclera clear  ENMT: No tonsillar erythema, exudates, or enlargement; Moist mucous membranes, Good dentition, No lesions  NECK: Supple, No JVD, Normal thyroid  NERVOUS SYSTEM:  Alert & Oriented X3, Good concentration; Motor Strength 5/5 B/L upper and lower extremities; DTRs 2+ intact and symmetric  PULM: Clear to auscultation bilaterally  CARDIAC: Regular rate and rhythm; No murmurs, rubs, or gallops  GI: Soft, Nontender, Nondistended; Bowel sounds present  EXTREMITIES:  2+ Peripheral Pulses, No clubbing, cyanosis, or edema  LYMPH: No lymphadenopathy noted  SKIN: No rashes or lesions    Consultant(s) Notes Reviewed:  [x ] YES  [ ] NO  Care Discussed with Consultants/Other Providers [ x] YES  [ ] NO    LABS:                            9.4    6.85  )-----------( 293      ( 24 May 2022 06:05 )             32.4   05-24    137  |  100  |  19  ----------------------------<  72  4.1   |  27  |  <0.5<L>    Ca    8.8      24 May 2022 06:05  Phos  4.5     05-24  Mg     1.8     05-24    TPro  5.7<L>  /  Alb  2.9<L>  /  TBili  0.6  /  DBili  x   /  AST  14  /  ALT  8   /  AlkPhos  90  05-24            ALBUTerol    90 MICROgram(s) HFA Inhaler 2 Puff(s) Inhalation every 6 hours PRN  apixaban 5 milliGRAM(s) Oral every 12 hours  DULoxetine 60 milliGRAM(s) Oral daily  folic acid 1 milliGRAM(s) Oral daily  lactobacillus acidophilus 1 Tablet(s) Oral daily  methocarbamol 500 milliGRAM(s) Oral two times a day  multivitamin 1 Tablet(s) Oral daily  polyethylene glycol 3350 17 Gram(s) Oral daily  senna 2 Tablet(s) Oral at bedtime  sodium chloride 3%  Inhalation 4 milliLiter(s) Inhalation every 12 hours  thiamine 100 milliGRAM(s) Oral daily      HEALTH ISSUES - PROBLEM Dx:          Case Discussed with House Staff     Spectra [phone]

## 2022-05-24 NOTE — PATIENT PROFILE ADULT - FALL HARM RISK - HARM RISK INTERVENTIONS

## 2022-05-24 NOTE — CONSULT NOTE ADULT - SUBJECTIVE AND OBJECTIVE BOX
WEGENER, LOIS  71y, Female  Allergy: No Known Allergies    CHIEF COMPLAINT: +QuantiFeron test (24 May 2022 09:55)    HPI:  HPI:  patient is a 72 yo F with pmhx Afib on Eliquis, Abdominal abscess 2 mo ago, Gastric Bypass surgery 20+ years ago, COPD who presents from NH for evaluation of positive QuantiFeron test. Patient had a positive QuantiFERON test on her outpatient blood work and was sent to the hospital from nursing home for further evaluation. Patient endorses non productive cough x 1.5 weeks, otherwise No respiratory symptoms, no hemoptysis, denies any other TB exposure. Patient denies fevers/chills, chest pain, sob, recent weight loss, travel.    CXR in the ED revealed probable right basilar linear atelectasis. No definite acute consolidation.    · BP Systolic	109 mm Hg  · BP Diastolic	66 mm Hg  · Heart Rate	84 /min  · Respiration Rate (breaths/min)	20 /min  · Temp (F)	97.9 Degrees F  · Temp (C)	36.6 Degrees C  · Temp site	oral  · SpO2 (%)	95 %  · O2 Delivery/Oxygen Delivery Method	room air   (23 May 2022 20:18)      Infectious Diseases History:  Old Micro Data/Cultures:     FAMILY HISTORY:    PAST MEDICAL & SURGICAL HISTORY:  Atrial fibrillation    History of COPD    SOCIAL HISTORY  Social History:  Lives alone , smokes one pack for 48 years.  Alcohol twice a week .  No illicit drug use. (03 Sep 2021 14:33)      Recent Travel:  Other Exposures:     ROS  General: Denies rigors, nightsweats  HEENT: Denies headache, rhinorrhea, sore throat, eye pain  CV: Denies CP, palpitations  PULM: Denies wheezing, hemoptysis  GI: Denies hematemesis, hematochezia, melena  : Denies discharge, hematuria  MSK: Denies arthralgias, myalgias  SKIN: Denies rash, lesions  NEURO: Denies paresthesias, weakness  PSYCH: Denies depression, anxiety    VITALS:  T(F): 97.5, Max: 99.2 (05-24-22 @ 00:00)  HR: 70  BP: 102/59  RR: 18Vital Signs Last 24 Hrs  T(C): 36.4 (24 May 2022 07:47), Max: 37.3 (24 May 2022 00:00)  T(F): 97.5 (24 May 2022 07:47), Max: 99.2 (24 May 2022 00:00)  HR: 70 (24 May 2022 07:47) (70 - 84)  BP: 102/59 (24 May 2022 07:47) (91/53 - 131/59)  BP(mean): --  RR: 18 (24 May 2022 07:47) (18 - 20)  SpO2: 100% (24 May 2022 07:47) (95% - 100%)    PHYSICAL EXAM:  Gen: NAD, resting in bed  HEENT: Normocephalic, atraumatic  Neck: supple, no lymphadenopathy  CV: Regular rate & regular rhythm  Lungs: CTAB  Abdomen: Soft, BS present  Ext: Warm, well perfused  Neuro: non focal, awake  Skin: no rash, no lesions  Lines: no phlebitis    TESTS & MEASUREMENTS:                        9.4    6.85  )-----------( 293      ( 24 May 2022 06:05 )             32.4     05-24    137  |  100  |  19  ----------------------------<  72  4.1   |  27  |  <0.5<L>    Ca    8.8      24 May 2022 06:05  Phos  4.5     05-24  Mg     1.8     05-24    TPro  5.7<L>  /  Alb  2.9<L>  /  TBili  0.6  /  DBili  x   /  AST  14  /  ALT  8   /  AlkPhos  90  05-24      LIVER FUNCTIONS - ( 24 May 2022 06:05 )  Alb: 2.9 g/dL / Pro: 5.7 g/dL / ALK PHOS: 90 U/L / ALT: 8 U/L / AST: 14 U/L / GGT: x                 Lactate, Blood: 0.9 mmol/L (05-23-22 @ 18:14)      INFECTIOUS DISEASES TESTING      RADIOLOGY & ADDITIONAL TESTS:  I have personally reviewed the last available Chest xray  CXR      CT      CARDIOLOGY TESTING      All available historical records have been reviewed    MEDICATIONS  apixaban 5  DULoxetine 60  folic acid 1  lactobacillus acidophilus 1  methocarbamol 500  multivitamin 1  polyethylene glycol 3350 17  senna 2  sodium chloride 3%  Inhalation 4  thiamine 100      ANTIBIOTICS:      All available historical data has been reviewed    ASSESSMENT  Patient is a 72 yo F with pmhx Afib on Eliquis, Abdominal abscess 2 mo ago, Gastric Bypass surgery 20+ years ago, COPD who presents from NH for evaluation of positive QuantiFeron test. Patient had a positive QuantiFERON test on her outpatient blood work and was sent to the hospital from nursing home for further evaluation. Patient endorses non productive cough x 1.5 weeks, otherwise No respiratory symptoms, no hemoptysis, denies any other TB exposure. Patient denies fevers/chills, chest pain, sob, recent weight loss, travel. [place]     CXR in the ED revealed probable right basilar linear atelectasis. No definite acute consolidation.    IMPRESSION  #Positive Outpatient QuantiFeron test, r/o TB   #Chest Xray: no consolidations      RECOMMENDATIONS  - Repeat QuantiFeron  - Send SPu   - F/U RVP    This is a pended note. All final recommendations to follow pending discussion with ID Attending    WEGENER, LOIS  71y, Female  Allergy: No Known Allergies    CHIEF COMPLAINT: +QuantiFeron test (24 May 2022 09:55)    HPI:  HPI:  patient is a 70 yo F with pmhx Afib on Eliquis, Abdominal abscess 2 mo ago, Gastric Bypass surgery 20+ years ago, COPD who presents from NH for evaluation of positive QuantiFeron test. Patient had a positive QuantiFERON test on her outpatient blood work and was sent to the hospital from nursing home for further evaluation. Patient endorses non productive cough x 1.5 weeks, otherwise No respiratory symptoms, no hemoptysis, denies any other TB exposure. Patient denies fevers/chills, chest pain, sob, recent weight loss, travel.    CXR in the ED revealed probable right basilar linear atelectasis. No definite acute consolidation.    · BP Systolic	109 mm Hg  · BP Diastolic	66 mm Hg  · Heart Rate	84 /min  · Respiration Rate (breaths/min)	20 /min  · Temp (F)	97.9 Degrees F  · Temp (C)	36.6 Degrees C  · Temp site	oral  · SpO2 (%)	95 %  · O2 Delivery/Oxygen Delivery Method	room air   (23 May 2022 20:18)      Infectious Diseases History:  Old Micro Data/Cultures:     FAMILY HISTORY:    PAST MEDICAL & SURGICAL HISTORY:  Atrial fibrillation    History of COPD    SOCIAL HISTORY  Social History:  Lives alone , smokes one pack for 48 years.  Alcohol twice a week .  No illicit drug use. (03 Sep 2021 14:33)      Recent Travel:  Other Exposures:     ROS  General: Denies rigors, nightsweats  HEENT: Denies headache, rhinorrhea, sore throat, eye pain  CV: Denies CP, palpitations  PULM: Denies wheezing, hemoptysis  GI: Denies hematemesis, hematochezia, melena  : Denies discharge, hematuria  MSK: Denies arthralgias, myalgias  SKIN: Denies rash, lesions  NEURO: Denies paresthesias, weakness  PSYCH: Denies depression, anxiety    VITALS:  T(F): 97.5, Max: 99.2 (05-24-22 @ 00:00)  HR: 70  BP: 102/59  RR: 18Vital Signs Last 24 Hrs  T(C): 36.4 (24 May 2022 07:47), Max: 37.3 (24 May 2022 00:00)  T(F): 97.5 (24 May 2022 07:47), Max: 99.2 (24 May 2022 00:00)  HR: 70 (24 May 2022 07:47) (70 - 84)  BP: 102/59 (24 May 2022 07:47) (91/53 - 131/59)  BP(mean): --  RR: 18 (24 May 2022 07:47) (18 - 20)  SpO2: 100% (24 May 2022 07:47) (95% - 100%)    PHYSICAL EXAM:  Gen: NAD, resting in bed  HEENT: Normocephalic, atraumatic  Neck: supple, no lymphadenopathy  CV: Regular rate & regular rhythm  Lungs: CTAB  Abdomen: Soft, BS present  Ext: Warm, well perfused  Neuro: non focal, awake  Skin: no rash, no lesions  Lines: no phlebitis    TESTS & MEASUREMENTS:                        9.4    6.85  )-----------( 293      ( 24 May 2022 06:05 )             32.4     05-24    137  |  100  |  19  ----------------------------<  72  4.1   |  27  |  <0.5<L>    Ca    8.8      24 May 2022 06:05  Phos  4.5     05-24  Mg     1.8     05-24    TPro  5.7<L>  /  Alb  2.9<L>  /  TBili  0.6  /  DBili  x   /  AST  14  /  ALT  8   /  AlkPhos  90  05-24      LIVER FUNCTIONS - ( 24 May 2022 06:05 )  Alb: 2.9 g/dL / Pro: 5.7 g/dL / ALK PHOS: 90 U/L / ALT: 8 U/L / AST: 14 U/L / GGT: x                 Lactate, Blood: 0.9 mmol/L (05-23-22 @ 18:14)      INFECTIOUS DISEASES TESTING      RADIOLOGY & ADDITIONAL TESTS:  I have personally reviewed the last available Chest xray  CXR      CT      CARDIOLOGY TESTING      All available historical records have been reviewed    MEDICATIONS  apixaban 5  DULoxetine 60  folic acid 1  lactobacillus acidophilus 1  methocarbamol 500  multivitamin 1  polyethylene glycol 3350 17  senna 2  sodium chloride 3%  Inhalation 4  thiamine 100      ANTIBIOTICS:      All available historical data has been reviewed    ASSESSMENT  Patient is a 70 yo F with pmhx Afib on Eliquis, Abdominal abscess 2 mo ago, Gastric Bypass surgery 20+ years ago, COPD who presents from NH for evaluation of positive QuantiFeron test. Patient had a positive QuantiFERON test on her outpatient blood work and was sent to the hospital from nursing home for further evaluation. Patient endorses non productive cough x 1.5 weeks, otherwise No respiratory symptoms, no hemoptysis, denies any other TB exposure. Patient denies fevers/chills, night sweats, chest pain, sob, recent weight loss, travel. [place]     CXR in the ED revealed probable right basilar linear atelectasis. No definite acute consolidation.    IMPRESSION  #Positive Outpatient QuantiFeron test, r/o active TB   #Chest Xray: no consolidations, nodules or calcifications       RECOMMENDATIONS  - Repeat QuantiFeron  - Send sputum cultures for AFB x3 at least 8hrs apart   - f/u RVP    This is a pended note. All final recommendations to follow pending discussion with ID Attending    WEGENER, LOIS  71y, Female  Allergy: No Known Allergies    CHIEF COMPLAINT: +QuantiFeron test (24 May 2022 09:55)    HPI:  HPI:  patient is a 72 yo F with pmhx Afib on Eliquis, Abdominal abscess 2 mo ago, Gastric Bypass surgery 20+ years ago, COPD who presents from NH for evaluation of positive QuantiFeron test. Patient had a positive QuantiFERON test on her outpatient blood work and was sent to the hospital from nursing home for further evaluation. Patient endorses non productive cough x 1.5 weeks, otherwise No respiratory symptoms, no hemoptysis, denies any other TB exposure. Patient denies fevers/chills, chest pain, sob, recent weight loss, travel.    CXR in the ED revealed probable right basilar linear atelectasis. No definite acute consolidation.    · BP Systolic	109 mm Hg  · BP Diastolic	66 mm Hg  · Heart Rate	84 /min  · Respiration Rate (breaths/min)	20 /min  · Temp (F)	97.9 Degrees F  · Temp (C)	36.6 Degrees C  · Temp site	oral  · SpO2 (%)	95 %  · O2 Delivery/Oxygen Delivery Method	room air   (23 May 2022 20:18)      Infectious Diseases History:  Old Micro Data/Cultures:     FAMILY HISTORY:    PAST MEDICAL & SURGICAL HISTORY:  Atrial fibrillation    History of COPD    SOCIAL HISTORY  Social History:  Lives alone , smokes one pack for 48 years.  Alcohol twice a week .  No illicit drug use. (03 Sep 2021 14:33)      Recent Travel:  Other Exposures:     ROS  General: Denies rigors, nightsweats  HEENT: Denies headache, rhinorrhea, sore throat, eye pain  CV: Denies CP, palpitations  PULM: Denies wheezing, hemoptysis  GI: Denies hematemesis, hematochezia, melena  : Denies discharge, hematuria  MSK: Denies arthralgias, myalgias  SKIN: Denies rash, lesions  NEURO: Denies paresthesias, weakness  PSYCH: Denies depression, anxiety    VITALS:  T(F): 97.5, Max: 99.2 (05-24-22 @ 00:00)  HR: 70  BP: 102/59  RR: 18Vital Signs Last 24 Hrs  T(C): 36.4 (24 May 2022 07:47), Max: 37.3 (24 May 2022 00:00)  T(F): 97.5 (24 May 2022 07:47), Max: 99.2 (24 May 2022 00:00)  HR: 70 (24 May 2022 07:47) (70 - 84)  BP: 102/59 (24 May 2022 07:47) (91/53 - 131/59)  BP(mean): --  RR: 18 (24 May 2022 07:47) (18 - 20)  SpO2: 100% (24 May 2022 07:47) (95% - 100%)    PHYSICAL EXAM:  Gen: NAD, resting in bed  HEENT: Normocephalic, atraumatic  Neck: supple, no lymphadenopathy  CV: Regular rate & regular rhythm  Lungs: CTAB  Abdomen: Soft, BS present  Ext: Warm, well perfused  Neuro: non focal, awake  Skin: no rash, no lesions  Lines: no phlebitis    TESTS & MEASUREMENTS:                        9.4    6.85  )-----------( 293      ( 24 May 2022 06:05 )             32.4     05-24    137  |  100  |  19  ----------------------------<  72  4.1   |  27  |  <0.5<L>    Ca    8.8      24 May 2022 06:05  Phos  4.5     05-24  Mg     1.8     05-24    TPro  5.7<L>  /  Alb  2.9<L>  /  TBili  0.6  /  DBili  x   /  AST  14  /  ALT  8   /  AlkPhos  90  05-24      LIVER FUNCTIONS - ( 24 May 2022 06:05 )  Alb: 2.9 g/dL / Pro: 5.7 g/dL / ALK PHOS: 90 U/L / ALT: 8 U/L / AST: 14 U/L / GGT: x                 Lactate, Blood: 0.9 mmol/L (05-23-22 @ 18:14)      INFECTIOUS DISEASES TESTING      RADIOLOGY & ADDITIONAL TESTS:  I have personally reviewed the last available Chest xray  CXR      CT      CARDIOLOGY TESTING      All available historical records have been reviewed    MEDICATIONS  apixaban 5  DULoxetine 60  folic acid 1  lactobacillus acidophilus 1  methocarbamol 500  multivitamin 1  polyethylene glycol 3350 17  senna 2  sodium chloride 3%  Inhalation 4  thiamine 100      ANTIBIOTICS:      All available historical data has been reviewed    ASSESSMENT  Patient is a 72 yo F with pmhx Afib on Eliquis, Abdominal abscess 2 mo ago, Gastric Bypass surgery 20+ years ago, COPD who presents from NH for evaluation of positive QuantiFeron test. Patient had a positive QuantiFERON test on her outpatient blood work and was sent to the hospital from nursing home for further evaluation. Patient endorses non productive cough x 1.5 weeks, otherwise No respiratory symptoms, no hemoptysis, denies any other TB exposure. Patient denies fevers/chills, night sweats, chest pain, sob, recent weight loss, travel. [place]     IMPRESSION  #Positive Outpatient QuantiFeron test, r/o active TB   #Chest Xray: no consolidations, nodules or calcifications     #Hx of C. Diff colitis with Ileus 9/2021 s/p 14 days of vancomycin 500mg QID and IV flagyl 500mg TID    RECOMMENDATIONS  - Repeat QuantiFeron  - Send sputum cultures for AFB x3 at least 8hrs apart   - f/u RVP    This is a pended note. All final recommendations to follow pending discussion with ID Attending    WEGENER, LOIS  71y, Female  Allergy: No Known Allergies    CHIEF COMPLAINT: +QuantiFeron test (24 May 2022 09:55)    HPI:  HPI:  patient is a 72 yo F with pmhx Afib on Eliquis, Abdominal abscess 2 mo ago, Gastric Bypass surgery 20+ years ago, COPD who presents from NH for evaluation of positive QuantiFeron test. Patient had a positive QuantiFERON test on her outpatient blood work and was sent to the hospital from nursing home for further evaluation. Patient endorses non productive cough x 1.5 weeks, otherwise No respiratory symptoms, no hemoptysis, denies any other TB exposure. Patient denies fevers/chills, chest pain, sob, recent weight loss, travel.    CXR in the ED revealed probable right basilar linear atelectasis. No definite acute consolidation.    · BP Systolic	109 mm Hg  · BP Diastolic	66 mm Hg  · Heart Rate	84 /min  · Respiration Rate (breaths/min)	20 /min  · Temp (F)	97.9 Degrees F  · Temp (C)	36.6 Degrees C  · Temp site	oral  · SpO2 (%)	95 %  · O2 Delivery/Oxygen Delivery Method	room air   (23 May 2022 20:18)      Infectious Diseases History:  Old Micro Data/Cultures:     FAMILY HISTORY:    PAST MEDICAL & SURGICAL HISTORY:  Atrial fibrillation    History of COPD    SOCIAL HISTORY  Social History:  Lives alone , smokes one pack for 48 years.  Alcohol twice a week .  No illicit drug use. (03 Sep 2021 14:33)      Recent Travel:  Other Exposures:     ROS  General: Denies rigors, nightsweats  HEENT: Denies headache, rhinorrhea, sore throat, eye pain  CV: Denies CP, palpitations  PULM: Denies wheezing, hemoptysis  GI: Denies hematemesis, hematochezia, melena  : Denies discharge, hematuria  MSK: Denies arthralgias, myalgias  SKIN: Denies rash, lesions  NEURO: Denies paresthesias, weakness  PSYCH: Denies depression, anxiety    VITALS:  T(F): 97.5, Max: 99.2 (05-24-22 @ 00:00)  HR: 70  BP: 102/59  RR: 18Vital Signs Last 24 Hrs  T(C): 36.4 (24 May 2022 07:47), Max: 37.3 (24 May 2022 00:00)  T(F): 97.5 (24 May 2022 07:47), Max: 99.2 (24 May 2022 00:00)  HR: 70 (24 May 2022 07:47) (70 - 84)  BP: 102/59 (24 May 2022 07:47) (91/53 - 131/59)  BP(mean): --  RR: 18 (24 May 2022 07:47) (18 - 20)  SpO2: 100% (24 May 2022 07:47) (95% - 100%)    PHYSICAL EXAM:  Gen: NAD, resting in bed  HEENT: Normocephalic, atraumatic  Neck: supple, no lymphadenopathy  CV: Regular rate & regular rhythm  Lungs: CTAB  Abdomen: Soft, BS present  Ext: Warm, well perfused  Neuro: non focal, awake  Skin: no rash, no lesions  Lines: no phlebitis    TESTS & MEASUREMENTS:                        9.4    6.85  )-----------( 293      ( 24 May 2022 06:05 )             32.4     05-24    137  |  100  |  19  ----------------------------<  72  4.1   |  27  |  <0.5<L>    Ca    8.8      24 May 2022 06:05  Phos  4.5     05-24  Mg     1.8     05-24    TPro  5.7<L>  /  Alb  2.9<L>  /  TBili  0.6  /  DBili  x   /  AST  14  /  ALT  8   /  AlkPhos  90  05-24      LIVER FUNCTIONS - ( 24 May 2022 06:05 )  Alb: 2.9 g/dL / Pro: 5.7 g/dL / ALK PHOS: 90 U/L / ALT: 8 U/L / AST: 14 U/L / GGT: x                 Lactate, Blood: 0.9 mmol/L (05-23-22 @ 18:14)      INFECTIOUS DISEASES TESTING      RADIOLOGY & ADDITIONAL TESTS:  I have personally reviewed the last available Chest xray  CXR      CT      CARDIOLOGY TESTING      All available historical records have been reviewed    MEDICATIONS  apixaban 5  DULoxetine 60  folic acid 1  lactobacillus acidophilus 1  methocarbamol 500  multivitamin 1  polyethylene glycol 3350 17  senna 2  sodium chloride 3%  Inhalation 4  thiamine 100      ANTIBIOTICS:      All available historical data has been reviewed    ASSESSMENT  Patient is a 72 yo F with pmhx Afib on Eliquis, Abdominal abscess 2 mo ago, Gastric Bypass surgery 20+ years ago, COPD who presents from NH for evaluation of positive QuantiFeron test. Patient had a positive QuantiFERON test on her outpatient blood work and was sent to the hospital from nursing home for further evaluation. Patient endorses non productive cough ongoing for years, at her baseline. Never had a positive test before. No respiratory symptoms, no hemoptysis, denies any other TB exposure. Patient denies fevers/chills, night sweats, chest pain, sob, recent weight loss, travel. Born [place]. Lived in  her whole life.     IMPRESSION  #Latent TB  - positive QuantiFeron test  - cough chronic. patient states at her baseline   - According to [facility] , Annual risk of development of active TB is estimated 0.1%. Cumulative risk of active TB disease up to the age of 80 is 0.88%. If treated with INH, probability of clinically significant DI-hepatitis is 5%   #Chest Xray: no consolidations, nodules or calcifications     #Hx of C. Diff colitis with Ileus 9/2021 s/p 14 days of vancomycin 500mg QID and IV flagyl 500mg TID    RECOMMENDATIONS  - f/u rvp,   - f/u HIV, Hep C  - Can d/c isolation. Risk of developing active TB is very low. Does not require treatment for Latent TB and risks may outweigh the benefits.   - No need for C. diff prophylaxis, currently does not have any symptoms and is not on any antibiotics.     Discussed with attending. Attestation to follow    WEGENER, LOIS  71y, Female  Allergy: No Known Allergies    CHIEF COMPLAINT: +QuantiFeron test (24 May 2022 09:55)    HPI:  HPI:  patient is a 72 yo F with pmhx Afib on Eliquis, Abdominal abscess 2 mo ago, Gastric Bypass surgery 20+ years ago, COPD who presents from NH for evaluation of positive QuantiFeron test. Patient had a positive QuantiFERON test on her outpatient blood work and was sent to the hospital from nursing home for further evaluation. Patient endorses non productive cough x 1.5 weeks, otherwise No respiratory symptoms, no hemoptysis, denies any other TB exposure. Patient denies fevers/chills, chest pain, sob, recent weight loss, travel.    CXR in the ED revealed probable right basilar linear atelectasis. No definite acute consolidation.    · BP Systolic	109 mm Hg  · BP Diastolic	66 mm Hg  · Heart Rate	84 /min  · Respiration Rate (breaths/min)	20 /min  · Temp (F)	97.9 Degrees F  · Temp (C)	36.6 Degrees C  · Temp site	oral  · SpO2 (%)	95 %  · O2 Delivery/Oxygen Delivery Method	room air   (23 May 2022 20:18)      Infectious Diseases History:  Old Micro Data/Cultures:     FAMILY HISTORY:    PAST MEDICAL & SURGICAL HISTORY:  Atrial fibrillation    History of COPD    SOCIAL HISTORY  Social History:  Lives alone , smokes one pack for 48 years.  Alcohol twice a week .  No illicit drug use. (03 Sep 2021 14:33)      Recent Travel:  Other Exposures:     ROS  General: Denies rigors, nightsweats  HEENT: Denies headache, rhinorrhea, sore throat, eye pain  CV: Denies CP, palpitations  PULM: Denies wheezing, hemoptysis  GI: Denies hematemesis, hematochezia, melena  : Denies discharge, hematuria  MSK: Denies arthralgias, myalgias  SKIN: Denies rash, lesions  NEURO: Denies paresthesias, weakness  PSYCH: Denies depression, anxiety    VITALS:  T(F): 97.5, Max: 99.2 (05-24-22 @ 00:00)  HR: 70  BP: 102/59  RR: 18Vital Signs Last 24 Hrs  T(C): 36.4 (24 May 2022 07:47), Max: 37.3 (24 May 2022 00:00)  T(F): 97.5 (24 May 2022 07:47), Max: 99.2 (24 May 2022 00:00)  HR: 70 (24 May 2022 07:47) (70 - 84)  BP: 102/59 (24 May 2022 07:47) (91/53 - 131/59)  BP(mean): --  RR: 18 (24 May 2022 07:47) (18 - 20)  SpO2: 100% (24 May 2022 07:47) (95% - 100%)    PHYSICAL EXAM:  Gen: NAD, resting in bed  HEENT: Normocephalic, atraumatic  Neck: supple, no lymphadenopathy  CV: Regular rate & regular rhythm  Lungs: CTAB  Abdomen: Soft, BS present  Ext: Warm, well perfused  Neuro: non focal, awake  Skin: no rash, no lesions  Lines: no phlebitis    TESTS & MEASUREMENTS:                        9.4    6.85  )-----------( 293      ( 24 May 2022 06:05 )             32.4     05-24    137  |  100  |  19  ----------------------------<  72  4.1   |  27  |  <0.5<L>    Ca    8.8      24 May 2022 06:05  Phos  4.5     05-24  Mg     1.8     05-24    TPro  5.7<L>  /  Alb  2.9<L>  /  TBili  0.6  /  DBili  x   /  AST  14  /  ALT  8   /  AlkPhos  90  05-24      LIVER FUNCTIONS - ( 24 May 2022 06:05 )  Alb: 2.9 g/dL / Pro: 5.7 g/dL / ALK PHOS: 90 U/L / ALT: 8 U/L / AST: 14 U/L / GGT: x                 Lactate, Blood: 0.9 mmol/L (05-23-22 @ 18:14)      INFECTIOUS DISEASES TESTING      RADIOLOGY & ADDITIONAL TESTS:  I have personally reviewed the last available Chest xray  CXR      CT      CARDIOLOGY TESTING      All available historical records have been reviewed    MEDICATIONS  apixaban 5  DULoxetine 60  folic acid 1  lactobacillus acidophilus 1  methocarbamol 500  multivitamin 1  polyethylene glycol 3350 17  senna 2  sodium chloride 3%  Inhalation 4  thiamine 100      ANTIBIOTICS:      All available historical data has been reviewed

## 2022-05-24 NOTE — PATIENT PROFILE ADULT - NSPROPTRIGHTNOTIFY_GEN_A_NUR
Please call the [facility] at [phone], select OPTION #2,  if any of your medications change.  This means: if a med is discontinued, a new med is started, or a dose is changed.  These meds may interact with your warfarin and we may need to adjust your dose.  This is especially important if you start any type of ANTIBIOTIC.    Also, please call if you have any admissions to the hospital, visits to an emergency room, procedures planned, or if any other medical provider has instructed you to hold your warfarin.     yes

## 2022-05-25 LAB
ALBUMIN SERPL ELPH-MCNC: 3.2 G/DL — LOW (ref 3.5–5.2)
ALP SERPL-CCNC: 102 U/L — SIGNIFICANT CHANGE UP (ref 30–115)
ALT FLD-CCNC: 8 U/L — SIGNIFICANT CHANGE UP (ref 0–41)
ANION GAP SERPL CALC-SCNC: 10 MMOL/L — SIGNIFICANT CHANGE UP (ref 7–14)
AST SERPL-CCNC: 16 U/L — SIGNIFICANT CHANGE UP (ref 0–41)
BASOPHILS # BLD AUTO: 0.05 K/UL — SIGNIFICANT CHANGE UP (ref 0–0.2)
BASOPHILS NFR BLD AUTO: 0.6 % — SIGNIFICANT CHANGE UP (ref 0–1)
BILIRUB SERPL-MCNC: 0.7 MG/DL — SIGNIFICANT CHANGE UP (ref 0.2–1.2)
BUN SERPL-MCNC: 17 MG/DL — SIGNIFICANT CHANGE UP (ref 10–20)
CALCIUM SERPL-MCNC: 9.3 MG/DL — SIGNIFICANT CHANGE UP (ref 8.5–10.1)
CHLORIDE SERPL-SCNC: 102 MMOL/L — SIGNIFICANT CHANGE UP (ref 98–110)
CO2 SERPL-SCNC: 28 MMOL/L — SIGNIFICANT CHANGE UP (ref 17–32)
CREAT SERPL-MCNC: <0.5 MG/DL — LOW (ref 0.7–1.5)
EGFR: 106 ML/MIN/1.73M2 — SIGNIFICANT CHANGE UP
EOSINOPHIL # BLD AUTO: 0.25 K/UL — SIGNIFICANT CHANGE UP (ref 0–0.7)
EOSINOPHIL NFR BLD AUTO: 3.2 % — SIGNIFICANT CHANGE UP (ref 0–8)
GLUCOSE BLDC GLUCOMTR-MCNC: 108 MG/DL — HIGH (ref 70–99)
GLUCOSE SERPL-MCNC: 114 MG/DL — HIGH (ref 70–99)
GRAM STN FLD: SIGNIFICANT CHANGE UP
HCT VFR BLD CALC: 34.2 % — LOW (ref 37–47)
HGB BLD-MCNC: 9.9 G/DL — LOW (ref 12–16)
IMM GRANULOCYTES NFR BLD AUTO: 0.6 % — HIGH (ref 0.1–0.3)
LYMPHOCYTES # BLD AUTO: 1.03 K/UL — LOW (ref 1.2–3.4)
LYMPHOCYTES # BLD AUTO: 13.2 % — LOW (ref 20.5–51.1)
MAGNESIUM SERPL-MCNC: 1.8 MG/DL — SIGNIFICANT CHANGE UP (ref 1.8–2.4)
MCHC RBC-ENTMCNC: 23.7 PG — LOW (ref 27–31)
MCHC RBC-ENTMCNC: 28.9 G/DL — LOW (ref 32–37)
MCV RBC AUTO: 81.8 FL — SIGNIFICANT CHANGE UP (ref 81–99)
MONOCYTES # BLD AUTO: 0.39 K/UL — SIGNIFICANT CHANGE UP (ref 0.1–0.6)
MONOCYTES NFR BLD AUTO: 5 % — SIGNIFICANT CHANGE UP (ref 1.7–9.3)
NEUTROPHILS # BLD AUTO: 6.05 K/UL — SIGNIFICANT CHANGE UP (ref 1.4–6.5)
NEUTROPHILS NFR BLD AUTO: 77.4 % — HIGH (ref 42.2–75.2)
NIGHT BLUE STAIN TISS: SIGNIFICANT CHANGE UP
NIGHT BLUE STAIN TISS: SIGNIFICANT CHANGE UP
NRBC # BLD: 0 /100 WBCS — SIGNIFICANT CHANGE UP (ref 0–0)
PLATELET # BLD AUTO: 307 K/UL — SIGNIFICANT CHANGE UP (ref 130–400)
POTASSIUM SERPL-MCNC: 3.9 MMOL/L — SIGNIFICANT CHANGE UP (ref 3.5–5)
POTASSIUM SERPL-SCNC: 3.9 MMOL/L — SIGNIFICANT CHANGE UP (ref 3.5–5)
PROT SERPL-MCNC: 6.1 G/DL — SIGNIFICANT CHANGE UP (ref 6–8)
RBC # BLD: 4.18 M/UL — LOW (ref 4.2–5.4)
RBC # FLD: 23 % — HIGH (ref 11.5–14.5)
SODIUM SERPL-SCNC: 140 MMOL/L — SIGNIFICANT CHANGE UP (ref 135–146)
SPECIMEN SOURCE: SIGNIFICANT CHANGE UP
WBC # BLD: 7.82 K/UL — SIGNIFICANT CHANGE UP (ref 4.8–10.8)
WBC # FLD AUTO: 7.82 K/UL — SIGNIFICANT CHANGE UP (ref 4.8–10.8)

## 2022-05-25 PROCEDURE — 99232 SBSQ HOSP IP/OBS MODERATE 35: CPT

## 2022-05-25 RX ORDER — ACETAMINOPHEN 500 MG
650 TABLET ORAL EVERY 6 HOURS
Refills: 0 | Status: DISCONTINUED | OUTPATIENT
Start: 2022-05-25 | End: 2022-05-28

## 2022-05-25 RX ADMIN — Medication 1 MILLIGRAM(S): at 11:09

## 2022-05-25 RX ADMIN — METHOCARBAMOL 500 MILLIGRAM(S): 500 TABLET, FILM COATED ORAL at 05:50

## 2022-05-25 RX ADMIN — APIXABAN 5 MILLIGRAM(S): 2.5 TABLET, FILM COATED ORAL at 05:49

## 2022-05-25 RX ADMIN — DULOXETINE HYDROCHLORIDE 60 MILLIGRAM(S): 30 CAPSULE, DELAYED RELEASE ORAL at 11:10

## 2022-05-25 RX ADMIN — Medication 650 MILLIGRAM(S): at 02:47

## 2022-05-25 RX ADMIN — SODIUM CHLORIDE 4 MILLILITER(S): 9 INJECTION INTRAMUSCULAR; INTRAVENOUS; SUBCUTANEOUS at 08:59

## 2022-05-25 RX ADMIN — Medication 1 TABLET(S): at 11:09

## 2022-05-25 RX ADMIN — SENNA PLUS 2 TABLET(S): 8.6 TABLET ORAL at 21:39

## 2022-05-25 RX ADMIN — METHOCARBAMOL 500 MILLIGRAM(S): 500 TABLET, FILM COATED ORAL at 17:49

## 2022-05-25 RX ADMIN — APIXABAN 5 MILLIGRAM(S): 2.5 TABLET, FILM COATED ORAL at 17:49

## 2022-05-25 RX ADMIN — SODIUM CHLORIDE 4 MILLILITER(S): 9 INJECTION INTRAMUSCULAR; INTRAVENOUS; SUBCUTANEOUS at 20:26

## 2022-05-25 RX ADMIN — POLYETHYLENE GLYCOL 3350 17 GRAM(S): 17 POWDER, FOR SOLUTION ORAL at 11:09

## 2022-05-25 RX ADMIN — Medication 100 MILLIGRAM(S): at 11:09

## 2022-05-25 RX ADMIN — Medication 650 MILLIGRAM(S): at 04:04

## 2022-05-25 NOTE — PROGRESS NOTE ADULT - SUBJECTIVE AND OBJECTIVE BOX
LOIS WEGENER 71y Female  MRN#: [id number]   CODE STATUS: Full    Hospital Day: 2d    Pt is currently admitted with the primary diagnosis of positive Quant gold    SUBJECTIVE  Hospital Course    72 yo F with pmhx Afib on Eliquis, Abdominal abscess 2 mo ago, Gastric Bypass surgery 20+ years ago, COPD who presents from NH for evaluation of positive QuantiFeron test. Patient had a positive QuantiFERON test on her outpatient blood work and was sent to the hospital for further evaluation. Patient endorses non productive cough x 1.5 weeks, otherwise No respiratory symptoms, no hemoptysis, denies any other TB exposure. Patient denies fevers/chills, chest pain, sob, recent weight loss, travel.    CXR in the ED revealed Probable right basilar linear atelectasis. No definite acute consolidation.    5/24: Follow up repeat quantiferon gold. ID consult  5/25: Isolation DC'd per ID, repeat quant pending                                            ----------------------------------------------------------  OBJECTIVE  PAST MEDICAL & SURGICAL HISTORY  Atrial fibrillation    History of COPD                                              -----------------------------------------------------------  ALLERGIES:  No Known Allergies                                            ------------------------------------------------------------    HOME MEDICATIONS  Home Medications:  albuterol 90 mcg/inh inhalation aerosol: 2 puff(s) inhaled every 6 hours, As needed, Shortness of Breath and/or Wheezing (06 Jul 2021 14:58)  apixaban 5 mg oral tablet: 1 tab(s) orally every 12 hours (06 Jul 2021 14:58)  DULoxetine 60 mg oral delayed release capsule: 1 cap(s) orally once a day (06 Jul 2021 14:58)  folic acid 1 mg oral tablet: 1 tab(s) orally once a day (06 Jul 2021 14:58)  methocarbamol 500 mg oral tablet: 1 tab(s) orally 2 times a day (06 Jul 2021 14:58)  thiamine 100 mg oral tablet: 1 tab(s) orally once a day (06 Jul 2021 14:58)                           MEDICATIONS:  STANDING MEDICATIONS  apixaban 5 milliGRAM(s) Oral every 12 hours  DULoxetine 60 milliGRAM(s) Oral daily  folic acid 1 milliGRAM(s) Oral daily  lactobacillus acidophilus 1 Tablet(s) Oral daily  methocarbamol 500 milliGRAM(s) Oral two times a day  multivitamin 1 Tablet(s) Oral daily  polyethylene glycol 3350 17 Gram(s) Oral daily  senna 2 Tablet(s) Oral at bedtime  sodium chloride 3%  Inhalation 4 milliLiter(s) Inhalation every 12 hours  thiamine 100 milliGRAM(s) Oral daily    PRN MEDICATIONS  ALBUTerol    90 MICROgram(s) HFA Inhaler 2 Puff(s) Inhalation every 6 hours PRN                                            ------------------------------------------------------------  VITAL SIGNS: Last 24 Hours  Vital Signs Last 24 Hrs  T(C): 36.8 (25 May 2022 07:55), Max: 36.9 (24 May 2022 15:41)  T(F): 98.3 (25 May 2022 07:55), Max: 98.5 (24 May 2022 15:41)  HR: 75 (25 May 2022 07:55) (74 - 75)  BP: 115/66 (25 May 2022 07:55) (90/64 - 115/66)  BP(mean): --  RR: 18 (25 May 2022 07:55) (18 - 18)  SpO2: --                                           --------------------------------------------------------------  LABS:                                   9.4    6.85  )-----------( 293      ( 24 May 2022 06:05 )             32.4     05-24    137  |  100  |  19  ----------------------------<  72  4.1   |  27  |  <0.5<L>    Ca    8.8      24 May 2022 06:05  Phos  4.5     05-24  Mg     1.8     05-24    TPro  5.7<L>  /  Alb  2.9<L>  /  TBili  0.6  /  DBili  x   /  AST  14  /  ALT  8   /  AlkPhos  90  05-24                                                    -------------------------------------------------------------  RADIOLOGY:    < from: Xray Chest 1 View-PORTABLE IMMEDIATE (Xray Chest 1 View-PORTABLE IMMEDIATE .) (05.23.22 @ 16:32) >  IMPRESSION: Probable right basilar linear atelectasis. No definite acute   consolidation.    < end of copied text >                                            --------------------------------------------------------------    PHYSICAL EXAM:  GENERAL: NAD, lying in bed comfortably  HEAD:  Atraumatic, Normocephalic  EYES: EOMI, PERRLA, conjunctiva and sclera clear  ENT: Moist mucous membranes  NECK: Supple, No JVD  CHEST/LUNG: Clear to auscultation bilaterally; No rales, rhonchi, wheezing, or rubs. Unlabored respirations  HEART: Regular rate and rhythm; No murmurs, rubs, or gallops  ABDOMEN: Bowel sounds present; Soft, Nontender, Nondistended.  EXTREMITIES:  2+ Peripheral Pulses, brisk capillary refill. No clubbing, cyanosis, or edema  NERVOUS SYSTEM:  Alert & Oriented X3, speech clear. No deficits   MSK: FROM all 4 extremities, full and equal strength  SKIN: No rashes or lesions                                             --------------------------------------------------------------    ASSESSMENT & PLAN    72 yo F with pmhx Afib on Eliquis, Abdominal abscess 2 mo ago, Gastric Bypass surgery 20+ years ago, COPD who presents from NH for evaluation of positive QuantiFeron test.     #positive QuantiFeron test, r/o active TB infection  -CXR in the ED revealed, No definite acute consolidation.  -from nursing home, born in the , denies travel, endorses non productive cough x 1.5 weeks asymptomatic otherwise, no hemoptysis  -repeat quantiferon gold   -F/U sputum culture  -blood culture, urine culture  -screen for HIV, hepatitis panel  -baseline LFTs and creatinine  -Per ID DC isolation precautions, risk of active TB 0.1%    #HO C diff Colitis with illeus   #HO Perirectal abscess s/p I and D 6/26/2021  -asymptomatic  -c/w probiotics  -c diff prophylaxis per ID    #Atrial fibrillation on eliquis  - CHADVASC 3  - continue with eliquis    #COPD ,stable not in exacerbation  - not on home oxygen ,no wheezing on exam  - inhaler PRN  - pulmonary toilet    #HFpEF ,euvolemic on exam   -TTE 6/27 EF of 63 %.  - monitor I&O  - avoid volume overload    # EtOH abuse with suspected folate/thiamine deficiency  - on folic acid and thiamine     # Depression on duloxetine  - c/w SSRI  - f/u w psychiatry as outpatient    #Misc  - DVT Prophylaxis: eliquis  - GI Prophylaxis: not indicated  - Diet: Regular  - Activity: as tolerated  - Code Status: Full Code    Dipso: Back to [place]

## 2022-05-25 NOTE — PHYSICAL THERAPY INITIAL EVALUATION ADULT - ADDITIONAL COMMENTS
Pt lives in NH facility full time, and uses walker to ambulate in facility. Staff of NH assists pt with showering (shower chair), and dressing.

## 2022-05-25 NOTE — PHYSICAL THERAPY INITIAL EVALUATION ADULT - PERTINENT HX OF CURRENT PROBLEM, REHAB EVAL
Pt presented to ED from NH with Quantaferon GOld positive, recent hip surgery 3 weeks ago due to fall.

## 2022-05-25 NOTE — PROGRESS NOTE ADULT - ATTENDING COMMENTS
72 yo F with pmhx Afib on Eliquis, Abdominal abscess 2 mo ago, Gastric Bypass surgery 20+ years ago, COPD who presents from NH for evaluation of positive QuantiFeron test.  No signs of active infection - isolation discontinued. Seen by ID - Risk of treatment for latent TB (Hepatitis from INH) higher than risk of TB reactivation.  plan for dc back to NH - pending auth.     Progress Note Handoff:  pending: Auth for NH   Dispo: pending NH

## 2022-05-26 LAB
ALBUMIN SERPL ELPH-MCNC: 3 G/DL — LOW (ref 3.5–5.2)
ALP SERPL-CCNC: 97 U/L — SIGNIFICANT CHANGE UP (ref 30–115)
ALT FLD-CCNC: 7 U/L — SIGNIFICANT CHANGE UP (ref 0–41)
ANION GAP SERPL CALC-SCNC: 12 MMOL/L — SIGNIFICANT CHANGE UP (ref 7–14)
AST SERPL-CCNC: 13 U/L — SIGNIFICANT CHANGE UP (ref 0–41)
BASOPHILS # BLD AUTO: 0.05 K/UL — SIGNIFICANT CHANGE UP (ref 0–0.2)
BASOPHILS NFR BLD AUTO: 0.6 % — SIGNIFICANT CHANGE UP (ref 0–1)
BILIRUB SERPL-MCNC: 0.5 MG/DL — SIGNIFICANT CHANGE UP (ref 0.2–1.2)
BUN SERPL-MCNC: 18 MG/DL — SIGNIFICANT CHANGE UP (ref 10–20)
CALCIUM SERPL-MCNC: 8.8 MG/DL — SIGNIFICANT CHANGE UP (ref 8.5–10.1)
CHLORIDE SERPL-SCNC: 101 MMOL/L — SIGNIFICANT CHANGE UP (ref 98–110)
CO2 SERPL-SCNC: 26 MMOL/L — SIGNIFICANT CHANGE UP (ref 17–32)
CREAT SERPL-MCNC: <0.5 MG/DL — LOW (ref 0.7–1.5)
EGFR: 106 ML/MIN/1.73M2 — SIGNIFICANT CHANGE UP
EOSINOPHIL # BLD AUTO: 0.18 K/UL — SIGNIFICANT CHANGE UP (ref 0–0.7)
EOSINOPHIL NFR BLD AUTO: 2.1 % — SIGNIFICANT CHANGE UP (ref 0–8)
GAMMA INTERFERON BACKGROUND BLD IA-ACNC: 0.01 IU/ML — SIGNIFICANT CHANGE UP
GLUCOSE BLDC GLUCOMTR-MCNC: 100 MG/DL — HIGH (ref 70–99)
GLUCOSE BLDC GLUCOMTR-MCNC: 106 MG/DL — HIGH (ref 70–99)
GLUCOSE SERPL-MCNC: 84 MG/DL — SIGNIFICANT CHANGE UP (ref 70–99)
HCT VFR BLD CALC: 32.8 % — LOW (ref 37–47)
HGB BLD-MCNC: 9.4 G/DL — LOW (ref 12–16)
IMM GRANULOCYTES NFR BLD AUTO: 0.5 % — HIGH (ref 0.1–0.3)
LYMPHOCYTES # BLD AUTO: 1.46 K/UL — SIGNIFICANT CHANGE UP (ref 1.2–3.4)
LYMPHOCYTES # BLD AUTO: 16.7 % — LOW (ref 20.5–51.1)
M TB IFN-G BLD-IMP: POSITIVE
M TB IFN-G CD4+ BCKGRND COR BLD-ACNC: 1.99 IU/ML — SIGNIFICANT CHANGE UP
M TB IFN-G CD4+CD8+ BCKGRND COR BLD-ACNC: 1.48 IU/ML — SIGNIFICANT CHANGE UP
MAGNESIUM SERPL-MCNC: 1.8 MG/DL — SIGNIFICANT CHANGE UP (ref 1.8–2.4)
MCHC RBC-ENTMCNC: 23.7 PG — LOW (ref 27–31)
MCHC RBC-ENTMCNC: 28.7 G/DL — LOW (ref 32–37)
MCV RBC AUTO: 82.8 FL — SIGNIFICANT CHANGE UP (ref 81–99)
MONOCYTES # BLD AUTO: 0.53 K/UL — SIGNIFICANT CHANGE UP (ref 0.1–0.6)
MONOCYTES NFR BLD AUTO: 6.1 % — SIGNIFICANT CHANGE UP (ref 1.7–9.3)
NEUTROPHILS # BLD AUTO: 6.46 K/UL — SIGNIFICANT CHANGE UP (ref 1.4–6.5)
NEUTROPHILS NFR BLD AUTO: 74 % — SIGNIFICANT CHANGE UP (ref 42.2–75.2)
NRBC # BLD: 0 /100 WBCS — SIGNIFICANT CHANGE UP (ref 0–0)
PLATELET # BLD AUTO: 326 K/UL — SIGNIFICANT CHANGE UP (ref 130–400)
POTASSIUM SERPL-MCNC: 4 MMOL/L — SIGNIFICANT CHANGE UP (ref 3.5–5)
POTASSIUM SERPL-SCNC: 4 MMOL/L — SIGNIFICANT CHANGE UP (ref 3.5–5)
PROT SERPL-MCNC: 5.8 G/DL — LOW (ref 6–8)
QUANT TB PLUS MITOGEN MINUS NIL: 9.9 IU/ML — SIGNIFICANT CHANGE UP
RBC # BLD: 3.96 M/UL — LOW (ref 4.2–5.4)
RBC # FLD: 22.5 % — HIGH (ref 11.5–14.5)
SODIUM SERPL-SCNC: 139 MMOL/L — SIGNIFICANT CHANGE UP (ref 135–146)
WBC # BLD: 8.72 K/UL — SIGNIFICANT CHANGE UP (ref 4.8–10.8)
WBC # FLD AUTO: 8.72 K/UL — SIGNIFICANT CHANGE UP (ref 4.8–10.8)

## 2022-05-26 PROCEDURE — 99232 SBSQ HOSP IP/OBS MODERATE 35: CPT | Mod: GC

## 2022-05-26 RX ADMIN — Medication 1 MILLIGRAM(S): at 11:43

## 2022-05-26 RX ADMIN — DULOXETINE HYDROCHLORIDE 60 MILLIGRAM(S): 30 CAPSULE, DELAYED RELEASE ORAL at 11:43

## 2022-05-26 RX ADMIN — APIXABAN 5 MILLIGRAM(S): 2.5 TABLET, FILM COATED ORAL at 05:13

## 2022-05-26 RX ADMIN — APIXABAN 5 MILLIGRAM(S): 2.5 TABLET, FILM COATED ORAL at 18:02

## 2022-05-26 RX ADMIN — METHOCARBAMOL 500 MILLIGRAM(S): 500 TABLET, FILM COATED ORAL at 05:14

## 2022-05-26 RX ADMIN — METHOCARBAMOL 500 MILLIGRAM(S): 500 TABLET, FILM COATED ORAL at 18:02

## 2022-05-26 RX ADMIN — Medication 100 MILLIGRAM(S): at 11:43

## 2022-05-26 RX ADMIN — Medication 1 TABLET(S): at 11:43

## 2022-05-26 RX ADMIN — SENNA PLUS 2 TABLET(S): 8.6 TABLET ORAL at 21:27

## 2022-05-26 RX ADMIN — Medication 1 TABLET(S): at 11:44

## 2022-05-26 NOTE — PROGRESS NOTE ADULT - ASSESSMENT
72 yo F with pmhx Afib on Eliquis, Abdominal abscess 2 mo ago, Gastric Bypass surgery 20+ years ago, COPD who presents from NH for evaluation of positive QuantiFeron test.     #positive QuantiFeron test, r/o active TB infection  -CXR in the ED revealed, No definite acute consolidation.  -from nursing home, born in the , denies travel, endorses non productive cough x 1.5 weeks asymptomatic otherwise, no hemoptysis  -repeat quantiferon gold   -F/U sputum culture positive for GPC in clusters pairs and chains  -screen for HIV, hepatitis panel both negative  -Per ID DC isolation precautions, risk of active TB 0.1%  - AFB negative    # Exposure to covid  - Pt was exposed to covid during hospital stay  - will monitor  - asymptomatic    #HO C diff Colitis with illeus   #HO Perirectal abscess s/p I and D 6/26/2021  -asymptomatic  -c/w probiotics  - dc c diff prophylaxis per ID    #Atrial fibrillation on eliquis  - CHADVASC 3  - continue with eliquis    #COPD ,stable not in exacerbation  - not on home oxygen ,no wheezing on exam  - inhaler PRN  - pulmonary toilet    #HFpEF ,euvolemic on exam   -TTE 6/27 EF of 63 %.  - monitor I&O  - avoid volume overload    # EtOH abuse with suspected folate/thiamine deficiency  - on folic acid and thiamine     # Depression on duloxetine  - c/w SSRI  - f/u w psychiatry as outpatient    #Misc  - DVT Prophylaxis: eliquis  - GI Prophylaxis: not indicated  - Diet: Regular  - Activity: as tolerated  - Code Status: Full Code  - Dipso: Back to [facility]     70 yo F with pmhx Afib on Eliquis, Abdominal abscess 2 mo ago, Gastric Bypass surgery 20+ years ago, COPD who presents from NH for evaluation of positive QuantiFeron test.     #positive QuantiFeron test, r/o active TB infection  -CXR in the ED revealed, No definite acute consolidation.  -from nursing home, born in the , denies travel, endorses non productive cough x 1.5 weeks asymptomatic otherwise, no hemoptysis  -repeat quantiferon gold   -F/U sputum culture positive for GPC in clusters pairs and chains  -screen for HIV, hepatitis panel both negative  -Per ID DC isolation precautions, risk of active TB 0.1%  - AFB negative    # Exposure to covid on 5/25  - Pt was exposed to covid during hospital stay  - will monitor  - asymptomatic  - will test after 4 days of exposure    #HO C diff Colitis with illeus   #HO Perirectal abscess s/p I and D 6/26/2021  -asymptomatic  -c/w probiotics  - dc c diff prophylaxis per ID    #Atrial fibrillation on eliquis  - CHADVASC 3  - continue with eliquis    #COPD ,stable not in exacerbation  - not on home oxygen ,no wheezing on exam  - inhaler PRN  - pulmonary toilet    #HFpEF ,euvolemic on exam   -TTE 6/27 EF of 63 %.  - monitor I&O  - avoid volume overload    # EtOH abuse with suspected folate/thiamine deficiency  - on folic acid and thiamine     # Depression on duloxetine  - c/w SSRI  - f/u w psychiatry as outpatient    #Misc  - DVT Prophylaxis: eliquis  - GI Prophylaxis: not indicated  - Diet: Regular  - Activity: as tolerated  - Code Status: Full Code  - Dipso: Back to [facility]

## 2022-05-26 NOTE — PROGRESS NOTE ADULT - ATTENDING COMMENTS
72 yo F with pmhx Afib on Eliquis, Abdominal abscess 2 mo ago, Gastric Bypass surgery 20+ years ago, COPD who presents from NH for evaluation of positive QuantiFeron test.  pt was expose to covid + patient yesterday.  Pt will remain in isolation and repeat Covid pcr.     Progress Note Handoff:  Pending: isolation, repeat covid  Dispo: acute

## 2022-05-26 NOTE — DIETITIAN INITIAL EVALUATION ADULT - PERTINENT MEDS FT
MEDICATIONS  (STANDING):  apixaban 5 milliGRAM(s) Oral every 12 hours  DULoxetine 60 milliGRAM(s) Oral daily  folic acid 1 milliGRAM(s) Oral daily  lactobacillus acidophilus 1 Tablet(s) Oral daily  methocarbamol 500 milliGRAM(s) Oral two times a day  multivitamin 1 Tablet(s) Oral daily  polyethylene glycol 3350 17 Gram(s) Oral daily  senna 2 Tablet(s) Oral at bedtime  sodium chloride 3%  Inhalation 4 milliLiter(s) Inhalation every 12 hours  thiamine 100 milliGRAM(s) Oral daily    MEDICATIONS  (PRN):  ALBUTerol    90 MICROgram(s) HFA Inhaler 2 Puff(s) Inhalation every 6 hours PRN Shortness of Breath and/or Wheezing

## 2022-05-26 NOTE — DIETITIAN INITIAL EVALUATION ADULT - OTHER CALCULATIONS
Energy: 1646-1921kcal (MSJ 1.2-1.4 AF -- PI vs. overwt BMI)   protein: 98-115g/day (1.2-1.4g/kg -- same reason as above)  Fluid: 2050mL/day (25mL/kg -- for age >65yrs )

## 2022-05-26 NOTE — DIETITIAN INITIAL EVALUATION ADULT - NAME AND PHONE
Nutrition Intervention:meals and snacks,medical food supplements   Nutrition Monitoring:diet order,energy intake,body composition,NFPF

## 2022-05-26 NOTE — DIETITIAN INITIAL EVALUATION ADULT - PERTINENT LABORATORY DATA
05-26    139  |  101  |  18  ----------------------------<  84  4.0   |  26  |  <0.5<L>    Ca    8.8      26 May 2022 07:17  Mg     1.8     05-26    TPro  5.8<L>  /  Alb  3.0<L>  /  TBili  0.5  /  DBili  x   /  AST  13  /  ALT  7   /  AlkPhos  97  05-26  POCT Blood Glucose.: 106 mg/dL (05-26-22 @ 11:12)

## 2022-05-26 NOTE — DIETITIAN INITIAL EVALUATION ADULT - ORAL INTAKE PTA/DIET HISTORY
Pt reports optimum po/appetite PTA. eats 2 large meals, which has been her routine always. UBW: 80.9kg vs. 81.9kg -- no wt change. NKFA. takes multivitamins. Pt would like to lose weight and start making positive changes to her diet.

## 2022-05-26 NOTE — DIETITIAN INITIAL EVALUATION ADULT - OTHER INFO
--70 yo F who presents from NH for evaluation of positive QuantiFeron test.   #positive QuantiFeron test, r/o active TB infection-F/U sputum culture positive for GPC in clusters pairs and chains  -screen for HIV, hepatitis panel both negative  -Per ID DC isolation precautions, risk of active TB 0.1%  # Exposure to covid on 5/25  #HO C diff Colitis with illeus

## 2022-05-26 NOTE — DIETITIAN INITIAL EVALUATION ADULT - ADD RECOMMEND
1. Add ottoniel BID (50kcal/5g PRO)   2. Add Prosource Gelatein (150kcal/20g PRO)   3. Add Ensure Pudding daily (170kcal/4g PRO)   4. Encourage po intake   5. cont w/ multivitamins

## 2022-05-26 NOTE — PROGRESS NOTE ADULT - SUBJECTIVE AND OBJECTIVE BOX
LOIS WEGENER 71y Female  MRN#: [id number]     Hospital Day: 3d    Pt is currently admitted with the primary diagnosis of  POSITIVE QUANTIFERON TB GOLD TEST        SUBJECTIVE     Overnight events  None    Subjective complaints  Pt was evaluated this am. Patient denied any active complaints and per patient her symptoms are improving                                            ----------------------------------------------------------  OBJECTIVE  PAST MEDICAL & SURGICAL HISTORY  Atrial fibrillation    History of COPD                                              -----------------------------------------------------------  ALLERGIES:  No Known Allergies                                            ------------------------------------------------------------    HOME MEDICATIONS  Home Medications:  albuterol 90 mcg/inh inhalation aerosol: 2 puff(s) inhaled every 6 hours, As needed, Shortness of Breath and/or Wheezing (06 Jul 2021 14:58)  apixaban 5 mg oral tablet: 1 tab(s) orally every 12 hours (06 Jul 2021 14:58)  DULoxetine 60 mg oral delayed release capsule: 1 cap(s) orally once a day (06 Jul 2021 14:58)  folic acid 1 mg oral tablet: 1 tab(s) orally once a day (06 Jul 2021 14:58)  methocarbamol 500 mg oral tablet: 1 tab(s) orally 2 times a day (06 Jul 2021 14:58)  thiamine 100 mg oral tablet: 1 tab(s) orally once a day (06 Jul 2021 14:58)                           MEDICATIONS:  STANDING MEDICATIONS  apixaban 5 milliGRAM(s) Oral every 12 hours  DULoxetine 60 milliGRAM(s) Oral daily  folic acid 1 milliGRAM(s) Oral daily  lactobacillus acidophilus 1 Tablet(s) Oral daily  methocarbamol 500 milliGRAM(s) Oral two times a day  multivitamin 1 Tablet(s) Oral daily  polyethylene glycol 3350 17 Gram(s) Oral daily  senna 2 Tablet(s) Oral at bedtime  sodium chloride 3%  Inhalation 4 milliLiter(s) Inhalation every 12 hours  thiamine 100 milliGRAM(s) Oral daily    PRN MEDICATIONS  acetaminophen     Tablet .. 650 milliGRAM(s) Oral every 6 hours PRN  ALBUTerol    90 MICROgram(s) HFA Inhaler 2 Puff(s) Inhalation every 6 hours PRN                                            ------------------------------------------------------------  VITAL SIGNS: Last 24 Hours  T(C): 35.6 (26 May 2022 08:00), Max: 36.6 (26 May 2022 00:00)  T(F): 96 (26 May 2022 08:00), Max: 97.8 (26 May 2022 00:00)  HR: 69 (26 May 2022 08:00) (69 - 78)  BP: 114/66 (26 May 2022 08:00) (103/56 - 114/66)  BP(mean): --  RR: 18 (26 May 2022 08:00) (18 - 18)  SpO2: --      05-25-22 @ 07:01  -  05-26-22 @ 07:00  --------------------------------------------------------  IN: 280 mL / OUT: 0 mL / NET: 280 mL                                             --------------------------------------------------------------  LABS:                        9.9    7.82  )-----------( 307      ( 25 May 2022 08:39 )             34.2     05-25    140  |  102  |  17  ----------------------------<  114<H>  3.9   |  28  |  <0.5<L>    Ca    9.3      25 May 2022 08:39  Mg     1.8     05-25    TPro  6.1  /  Alb  3.2<L>  /  TBili  0.7  /  DBili  x   /  AST  16  /  ALT  8   /  AlkPhos  102  05-25                Culture - Acid Fast - Sputum w/Smear (collected 24 May 2022 23:00)  Source: .Sputum Sputum    Culture - Sputum (collected 24 May 2022 23:00)  Source: .Sputum Sputum  Gram Stain (25 May 2022 13:32):    Moderate Squamous epithelial cells per low power field    Moderate polymorphonuclear leukocytes per low power field    Numerous Gram Positive Rods per oil power field    Moderate Gram Positive Cocci in Clusters per oil power field    Moderate Gram PositiveCocci in Pairs and Chains per oil power field    Culture - Acid Fast - Urine (collected 24 May 2022 06:10)  Source: .Urine Urine    Culture - Acid Fast - Blood (collected 24 May 2022 01:20)  Source: .Blood Blood  Preliminary Report (25 May 2022 15:05):    Culture is being performed.                                                    -------------------------------------------------------------  RADIOLOGY:  < from: Xray Chest 1 View-PORTABLE IMMEDIATE (Xray Chest 1 View-PORTABLE IMMEDIATE .) (05.23.22 @ 16:32) >  IMPRESSION: Probable right basilar linear atelectasis. No definite acute   consolidation.    < end of copied text >                                              --------------------------------------------------------------    PHYSICAL EXAM:  GENERAL: NAD, lying in bed comfortably  HEAD:  Atraumatic, Normocephalic  EYES: EOMI, PERRLA, conjunctiva and sclera clear  ENT: Moist mucous membranes  NECK: Supple, No JVD  CHEST/LUNG: Clear to auscultation bilaterally; No rales, rhonchi, wheezing, or rubs. Unlabored respirations  HEART: Regular rate and rhythm; No murmurs, rubs, or gallops  ABDOMEN: Bowel sounds present; Soft, Nontender, Nondistended.  EXTREMITIES:  2+ Peripheral Pulses, brisk capillary refill. No clubbing, cyanosis, or edema  NERVOUS SYSTEM:  Alert & Oriented X3, speech clear. No deficits   MSK: FROM all 4 extremities, full and equal strength  SKIN: No rashes or lesions                                             --------------------------------------------------------------

## 2022-05-27 ENCOUNTER — TRANSCRIPTION ENCOUNTER (OUTPATIENT)
Age: 72
End: 2022-05-27

## 2022-05-27 LAB
ALBUMIN SERPL ELPH-MCNC: 3 G/DL — LOW (ref 3.5–5.2)
ALP SERPL-CCNC: 98 U/L — SIGNIFICANT CHANGE UP (ref 30–115)
ALT FLD-CCNC: 8 U/L — SIGNIFICANT CHANGE UP (ref 0–41)
ANION GAP SERPL CALC-SCNC: 14 MMOL/L — SIGNIFICANT CHANGE UP (ref 7–14)
AST SERPL-CCNC: 18 U/L — SIGNIFICANT CHANGE UP (ref 0–41)
BASOPHILS # BLD AUTO: 0.05 K/UL — SIGNIFICANT CHANGE UP (ref 0–0.2)
BASOPHILS NFR BLD AUTO: 0.6 % — SIGNIFICANT CHANGE UP (ref 0–1)
BILIRUB SERPL-MCNC: 0.6 MG/DL — SIGNIFICANT CHANGE UP (ref 0.2–1.2)
BUN SERPL-MCNC: 16 MG/DL — SIGNIFICANT CHANGE UP (ref 10–20)
CALCIUM SERPL-MCNC: 8.8 MG/DL — SIGNIFICANT CHANGE UP (ref 8.5–10.1)
CHLORIDE SERPL-SCNC: 100 MMOL/L — SIGNIFICANT CHANGE UP (ref 98–110)
CO2 SERPL-SCNC: 24 MMOL/L — SIGNIFICANT CHANGE UP (ref 17–32)
CREAT SERPL-MCNC: <0.5 MG/DL — LOW (ref 0.7–1.5)
CULTURE RESULTS: SIGNIFICANT CHANGE UP
EGFR: 106 ML/MIN/1.73M2 — SIGNIFICANT CHANGE UP
EOSINOPHIL # BLD AUTO: 0.15 K/UL — SIGNIFICANT CHANGE UP (ref 0–0.7)
EOSINOPHIL NFR BLD AUTO: 1.7 % — SIGNIFICANT CHANGE UP (ref 0–8)
GLUCOSE BLDC GLUCOMTR-MCNC: 84 MG/DL — SIGNIFICANT CHANGE UP (ref 70–99)
GLUCOSE BLDC GLUCOMTR-MCNC: 94 MG/DL — SIGNIFICANT CHANGE UP (ref 70–99)
GLUCOSE SERPL-MCNC: 85 MG/DL — SIGNIFICANT CHANGE UP (ref 70–99)
HCT VFR BLD CALC: 35 % — LOW (ref 37–47)
HGB BLD-MCNC: 10.3 G/DL — LOW (ref 12–16)
IMM GRANULOCYTES NFR BLD AUTO: 0.5 % — HIGH (ref 0.1–0.3)
LYMPHOCYTES # BLD AUTO: 1.12 K/UL — LOW (ref 1.2–3.4)
LYMPHOCYTES # BLD AUTO: 12.6 % — LOW (ref 20.5–51.1)
MAGNESIUM SERPL-MCNC: 1.9 MG/DL — SIGNIFICANT CHANGE UP (ref 1.8–2.4)
MCHC RBC-ENTMCNC: 24 PG — LOW (ref 27–31)
MCHC RBC-ENTMCNC: 29.4 G/DL — LOW (ref 32–37)
MCV RBC AUTO: 81.4 FL — SIGNIFICANT CHANGE UP (ref 81–99)
MONOCYTES # BLD AUTO: 0.48 K/UL — SIGNIFICANT CHANGE UP (ref 0.1–0.6)
MONOCYTES NFR BLD AUTO: 5.4 % — SIGNIFICANT CHANGE UP (ref 1.7–9.3)
NEUTROPHILS # BLD AUTO: 7.02 K/UL — HIGH (ref 1.4–6.5)
NEUTROPHILS NFR BLD AUTO: 79.2 % — HIGH (ref 42.2–75.2)
NRBC # BLD: 0 /100 WBCS — SIGNIFICANT CHANGE UP (ref 0–0)
PLATELET # BLD AUTO: 307 K/UL — SIGNIFICANT CHANGE UP (ref 130–400)
POTASSIUM SERPL-MCNC: 4.1 MMOL/L — SIGNIFICANT CHANGE UP (ref 3.5–5)
POTASSIUM SERPL-SCNC: 4.1 MMOL/L — SIGNIFICANT CHANGE UP (ref 3.5–5)
PROT SERPL-MCNC: 6 G/DL — SIGNIFICANT CHANGE UP (ref 6–8)
RBC # BLD: 4.3 M/UL — SIGNIFICANT CHANGE UP (ref 4.2–5.4)
RBC # FLD: 22.8 % — HIGH (ref 11.5–14.5)
SARS-COV-2 RNA SPEC QL NAA+PROBE: SIGNIFICANT CHANGE UP
SODIUM SERPL-SCNC: 138 MMOL/L — SIGNIFICANT CHANGE UP (ref 135–146)
SPECIMEN SOURCE: SIGNIFICANT CHANGE UP
WBC # BLD: 8.86 K/UL — SIGNIFICANT CHANGE UP (ref 4.8–10.8)
WBC # FLD AUTO: 8.86 K/UL — SIGNIFICANT CHANGE UP (ref 4.8–10.8)

## 2022-05-27 PROCEDURE — 99239 HOSP IP/OBS DSCHRG MGMT >30: CPT

## 2022-05-27 RX ADMIN — METHOCARBAMOL 500 MILLIGRAM(S): 500 TABLET, FILM COATED ORAL at 05:11

## 2022-05-27 RX ADMIN — Medication 1 MILLIGRAM(S): at 12:07

## 2022-05-27 RX ADMIN — Medication 1 TABLET(S): at 12:10

## 2022-05-27 RX ADMIN — POLYETHYLENE GLYCOL 3350 17 GRAM(S): 17 POWDER, FOR SOLUTION ORAL at 12:08

## 2022-05-27 RX ADMIN — METHOCARBAMOL 500 MILLIGRAM(S): 500 TABLET, FILM COATED ORAL at 18:52

## 2022-05-27 RX ADMIN — SENNA PLUS 2 TABLET(S): 8.6 TABLET ORAL at 22:48

## 2022-05-27 RX ADMIN — Medication 1 TABLET(S): at 12:08

## 2022-05-27 RX ADMIN — APIXABAN 5 MILLIGRAM(S): 2.5 TABLET, FILM COATED ORAL at 18:51

## 2022-05-27 RX ADMIN — Medication 100 MILLIGRAM(S): at 12:09

## 2022-05-27 RX ADMIN — DULOXETINE HYDROCHLORIDE 60 MILLIGRAM(S): 30 CAPSULE, DELAYED RELEASE ORAL at 12:07

## 2022-05-27 RX ADMIN — APIXABAN 5 MILLIGRAM(S): 2.5 TABLET, FILM COATED ORAL at 05:11

## 2022-05-27 NOTE — DISCHARGE NOTE PROVIDER - CARE PROVIDER_API CALL
CARLOS ANTHONY  Internal Medicine  [address] [place] [address]  Phone: [phone]  Fax: [phone]  Established Patient  Follow Up Time:

## 2022-05-27 NOTE — DISCHARGE NOTE NURSING/CASE MANAGEMENT/SOCIAL WORK - NSDCVIVACCINE_GEN_ALL_CORE_FT
Td (adult) preservative free; 09-Sep-2021 12:06; Dandre Ledezma (MIGUELITO); [employer]; [id number] (Exp. Date: 10-Sep-2021); IntraMuscular; Deltoid Left.; 0.5 milliLiter(s); VIS (VIS Published: 09-Sep-2021, VIS Presented: 09-Sep-2021);

## 2022-05-27 NOTE — DISCHARGE NOTE NURSING/CASE MANAGEMENT/SOCIAL WORK - PATIENT PORTAL LINK FT
You can access the FollowMyHealth Patient Portal offered by [facility] by registering at the following website: [email or web address]. By joining [facility]’s FollowMyHealth portal, you will also be able to view your health information using other applications (apps) compatible with our system.

## 2022-05-27 NOTE — DISCHARGE NOTE PROVIDER - NSDCCPCAREPLAN_GEN_ALL_CORE_FT
PRINCIPAL DISCHARGE DIAGNOSIS  Diagnosis: Positive QuantiFERON-TB Gold test  Assessment and Plan of Treatment: you presented for a positive quantiferon test. chest xray was negative,. sputum culture were negative for TB. you have latent TB. during your stay you were exposed to COVID -> no symptoms and your PCR was negative. if any symptoms develop please seek care  you should follow up with your PCP as outpatient

## 2022-05-27 NOTE — PROGRESS NOTE ADULT - SUBJECTIVE AND OBJECTIVE BOX
LOIS WEGENER 71y Female  MRN#: [id number]   CODE STATUS:________    Hospital Day: 4d    Pt is currently admitted with the primary diagnosis of     Overnight events   -No major overnight events                                          ----------------------------------------------------------  OBJECTIVE  PAST MEDICAL & SURGICAL HISTORY  Atrial fibrillation    History of COPD                                              -----------------------------------------------------------  ALLERGIES:  No Known Allergies                                            ------------------------------------------------------------    HOME MEDICATIONS  Home Medications:  albuterol 90 mcg/inh inhalation aerosol: 2 puff(s) inhaled every 6 hours, As needed, Shortness of Breath and/or Wheezing (06 Jul 2021 14:58)  apixaban 5 mg oral tablet: 1 tab(s) orally every 12 hours (06 Jul 2021 14:58)  DULoxetine 60 mg oral delayed release capsule: 1 cap(s) orally once a day (06 Jul 2021 14:58)  folic acid 1 mg oral tablet: 1 tab(s) orally once a day (06 Jul 2021 14:58)  methocarbamol 500 mg oral tablet: 1 tab(s) orally 2 times a day (06 Jul 2021 14:58)  thiamine 100 mg oral tablet: 1 tab(s) orally once a day (06 Jul 2021 14:58)                           MEDICATIONS:  STANDING MEDICATIONS  apixaban 5 milliGRAM(s) Oral every 12 hours  DULoxetine 60 milliGRAM(s) Oral daily  folic acid 1 milliGRAM(s) Oral daily  lactobacillus acidophilus 1 Tablet(s) Oral daily  methocarbamol 500 milliGRAM(s) Oral two times a day  multivitamin 1 Tablet(s) Oral daily  polyethylene glycol 3350 17 Gram(s) Oral daily  senna 2 Tablet(s) Oral at bedtime  sodium chloride 3%  Inhalation 4 milliLiter(s) Inhalation every 12 hours  thiamine 100 milliGRAM(s) Oral daily    PRN MEDICATIONS  acetaminophen     Tablet .. 650 milliGRAM(s) Oral every 6 hours PRN  ALBUTerol    90 MICROgram(s) HFA Inhaler 2 Puff(s) Inhalation every 6 hours PRN                                            ------------------------------------------------------------  VITAL SIGNS: Last 24 Hours  T(C): 36.8 (27 May 2022 00:00), Max: 36.8 (27 May 2022 00:00)  T(F): 98.2 (27 May 2022 00:00), Max: 98.2 (27 May 2022 00:00)  HR: 79 (27 May 2022 00:00) (76 - 79)  BP: 104/56 (27 May 2022 00:00) (104/56 - 108/65)  BP(mean): --  RR: 18 (27 May 2022 00:00) (17 - 18)  SpO2: --      05-26-22 @ 07:01  -  05-27-22 @ 07:00  --------------------------------------------------------  IN: 490 mL / OUT: 0 mL / NET: 490 mL                                             --------------------------------------------------------------  LABS:                        9.4    8.72  )-----------( 326      ( 26 May 2022 07:17 )             32.8     05-26    139  |  101  |  18  ----------------------------<  84  4.0   |  26  |  <0.5<L>    Ca    8.8      26 May 2022 07:17  Mg     1.8     05-26    TPro  5.8<L>  /  Alb  3.0<L>  /  TBili  0.5  /  DBili  x   /  AST  13  /  ALT  7   /  AlkPhos  97  05-26                Culture - Acid Fast - Sputum w/Smear (collected 24 May 2022 23:00)  Source: .Sputum Sputum    Culture - Sputum (collected 24 May 2022 23:00)  Source: .Sputum Sputum  Gram Stain (25 May 2022 13:32):    Moderate Squamous epithelial cells per low power field    Moderate polymorphonuclear leukocytes per low power field    Numerous Gram Positive Rods per oil power field    Moderate Gram Positive Cocci in Clusters per oil power field    Moderate Gram PositiveCocci in Pairs and Chains per oil power field  Preliminary Report (26 May 2022 09:54):    Normal Respiratory Maria Elena present                                                      --------------------------------------------------------------    PHYSICAL EXAM:  GENERAL: Awake, alert, oriented to person, place, time, situation. Well-nourished, laying in bed appearing in no acute distress  HEENT: No FNDs, atraumatic, normocephalic  LUNGS: Clear to auscultation bilaterally  HEART: S1/S2. No heaves or thrills  ABD: Soft, non-tender, non-distended.  EXT/NEURO: 5/5 strength in all extremity joints. Sensation and ROM grossly intact.  SKIN: No breaks, erythema, edema                                           --------------------------------------------------------------  72 yo F with pmhx Afib on Eliquis, Abdominal abscess 2 mo ago, Gastric Bypass surgery 20+ years ago, COPD who presents from NH for evaluation of positive QuantiFeron test.     #positive QuantiFeron test, r/o active TB infection  -CXR in the ED revealed, No definite acute consolidation.  -from nursing home, [place], denies travel, endorses non productive cough x 1.5 weeks asymptomatic otherwise, no hemoptysis  -repeat quantiferon gold   -F/U sputum culture positive for GPC in clusters pairs and chains  -screen for HIV, hepatitis panel both negative  -Per ID DC isolation precautions, risk of active TB 0.1%  - AFB negative    # Exposure to covid on 5/25  - Pt was exposed to covid during hospital stay  - will monitor  - asymptomatic  - will test after 4 days of exposure    #HO C diff Colitis with illeus   #HO Perirectal abscess s/p I and D 6/26/2021  -asymptomatic  -c/w probiotics  - dc c diff prophylaxis per ID    #Atrial fibrillation on eliquis  - CHADVASC 3  - continue with eliquis    #COPD ,stable not in exacerbation  - not on home oxygen ,no wheezing on exam  - inhaler PRN  - pulmonary toilet    #HFpEF ,euvolemic on exam   -TTE 6/27 EF of 63 %.  - monitor I&O  - avoid volume overload    # EtOH abuse with suspected folate/thiamine deficiency  - on folic acid and thiamine     # Depression on duloxetine  - c/w SSRI  - f/u w psychiatry as outpatient    #Misc  - DVT Prophylaxis: eliquis  - GI Prophylaxis: not indicated  - Diet: Regular  - Activity: as tolerated  - Code Status: Full Code  - Dipso: Back to [place]   LOIS WEGENER 71y Female  MRN#: [id number]   CODE STATUS:________    Hospital Day: 4d    Pt is currently admitted with the primary diagnosis of positive quantiferon    Overnight events   -No major overnight events                                          ----------------------------------------------------------  OBJECTIVE  PAST MEDICAL & SURGICAL HISTORY  Atrial fibrillation    History of COPD                                              -----------------------------------------------------------  ALLERGIES:  No Known Allergies                                            ------------------------------------------------------------    HOME MEDICATIONS  Home Medications:  albuterol 90 mcg/inh inhalation aerosol: 2 puff(s) inhaled every 6 hours, As needed, Shortness of Breath and/or Wheezing (06 Jul 2021 14:58)  apixaban 5 mg oral tablet: 1 tab(s) orally every 12 hours (06 Jul 2021 14:58)  DULoxetine 60 mg oral delayed release capsule: 1 cap(s) orally once a day (06 Jul 2021 14:58)  folic acid 1 mg oral tablet: 1 tab(s) orally once a day (06 Jul 2021 14:58)  methocarbamol 500 mg oral tablet: 1 tab(s) orally 2 times a day (06 Jul 2021 14:58)  thiamine 100 mg oral tablet: 1 tab(s) orally once a day (06 Jul 2021 14:58)                           MEDICATIONS:  STANDING MEDICATIONS  apixaban 5 milliGRAM(s) Oral every 12 hours  DULoxetine 60 milliGRAM(s) Oral daily  folic acid 1 milliGRAM(s) Oral daily  lactobacillus acidophilus 1 Tablet(s) Oral daily  methocarbamol 500 milliGRAM(s) Oral two times a day  multivitamin 1 Tablet(s) Oral daily  polyethylene glycol 3350 17 Gram(s) Oral daily  senna 2 Tablet(s) Oral at bedtime  sodium chloride 3%  Inhalation 4 milliLiter(s) Inhalation every 12 hours  thiamine 100 milliGRAM(s) Oral daily    PRN MEDICATIONS  acetaminophen     Tablet .. 650 milliGRAM(s) Oral every 6 hours PRN  ALBUTerol    90 MICROgram(s) HFA Inhaler 2 Puff(s) Inhalation every 6 hours PRN                                            ------------------------------------------------------------  VITAL SIGNS: Last 24 Hours  T(C): 36.8 (27 May 2022 00:00), Max: 36.8 (27 May 2022 00:00)  T(F): 98.2 (27 May 2022 00:00), Max: 98.2 (27 May 2022 00:00)  HR: 79 (27 May 2022 00:00) (76 - 79)  BP: 104/56 (27 May 2022 00:00) (104/56 - 108/65)  BP(mean): --  RR: 18 (27 May 2022 00:00) (17 - 18)  SpO2: --      05-26-22 @ 07:01  -  05-27-22 @ 07:00  --------------------------------------------------------  IN: 490 mL / OUT: 0 mL / NET: 490 mL                                             --------------------------------------------------------------  LABS:                        9.4    8.72  )-----------( 326      ( 26 May 2022 07:17 )             32.8     05-26    139  |  101  |  18  ----------------------------<  84  4.0   |  26  |  <0.5<L>    Ca    8.8      26 May 2022 07:17  Mg     1.8     05-26    TPro  5.8<L>  /  Alb  3.0<L>  /  TBili  0.5  /  DBili  x   /  AST  13  /  ALT  7   /  AlkPhos  97  05-26                Culture - Acid Fast - Sputum w/Smear (collected 24 May 2022 23:00)  Source: .Sputum Sputum    Culture - Sputum (collected 24 May 2022 23:00)  Source: .Sputum Sputum  Gram Stain (25 May 2022 13:32):    Moderate Squamous epithelial cells per low power field    Moderate polymorphonuclear leukocytes per low power field    Numerous Gram Positive Rods per oil power field    Moderate Gram Positive Cocci in Clusters per oil power field    Moderate Gram PositiveCocci in Pairs and Chains per oil power field  Preliminary Report (26 May 2022 09:54):    Normal Respiratory Maria Elena present                                                      --------------------------------------------------------------    PHYSICAL EXAM:  GENERAL: Awake, alert, oriented to person, place, time, situation. Well-nourished, laying in bed appearing in no acute distress  HEENT: No FNDs, atraumatic, normocephalic  LUNGS: Clear to auscultation bilaterally  HEART: S1/S2. No heaves or thrills  ABD: Soft, non-tender, non-distended.  EXT/NEURO: 5/5 strength in all extremity joints. Sensation and ROM grossly intact.  SKIN: No breaks, erythema, edema                                           --------------------------------------------------------------   Normal muscle tone/strength

## 2022-05-27 NOTE — DISCHARGE NOTE NURSING/CASE MANAGEMENT/SOCIAL WORK - 
ADDITIONAL INFORMATION
Pt. stated she received 2nd COVID vaccine during her last admission at [facility] 3 weeks prior. Pt. could not remember if she received pfizer or moderna.

## 2022-05-27 NOTE — PROGRESS NOTE ADULT - ATTENDING COMMENTS
70 yo F with pmhx Afib on Eliquis, Abdominal abscess 2 mo ago, Gastric Bypass surgery 20+ years ago, COPD who presents from NH for evaluation of positive QuantiFeron test.  pt was expose to covid + patient yesterday.  Pt will remain in isolation and repeat Covid pcr. Pt now with coughing and body aches.  Will continue to monitor.  repeat Swab done today - f/u.     Progress Note Handoff:  Pending: isolation, repeat covid  Dispo: acute 70 yo F with pmhx Afib on Eliquis, Abdominal abscess 2 mo ago, Gastric Bypass surgery 20+ years ago, COPD who presents from NH for evaluation of positive QuantiFeron test.  pt was expose to covid + patient yesterday.  Pt will remain in isolation and repeat Covid pcr. repeat Swab done today - negative    Progress Note Handoff:  Pending: isolation, repeat covid  Dispo: acute

## 2022-05-27 NOTE — DISCHARGE NOTE PROVIDER - NSDCMRMEDTOKEN_GEN_ALL_CORE_FT
albuterol 90 mcg/inh inhalation aerosol: 2 puff(s) inhaled every 6 hours, As needed, Shortness of Breath and/or Wheezing  apixaban 5 mg oral tablet: 1 tab(s) orally every 12 hours  bacitracin 500 units/g topical ointment: Apply topically to affected area once a day   DULoxetine 60 mg oral delayed release capsule: 1 cap(s) orally once a day  folic acid 1 mg oral tablet: 1 tab(s) orally once a day  hydrocortisone 25 mg rectal suppository: 1 suppository(ies) rectal once a day (at bedtime)  lactobacillus acidophilus oral capsule: 1 tab(s) orally once a day  methocarbamol 500 mg oral tablet: 1 tab(s) orally 2 times a day  Multiple Vitamins oral tablet, dispersible: 1 tab(s) orally once a day   polyethylene glycol 3350 oral powder for reconstitution: 17 gram(s) orally once, As Needed  senna oral tablet: 2 tab(s) orally once  thiamine 100 mg oral tablet: 1 tab(s) orally once a day

## 2022-05-27 NOTE — PROGRESS NOTE ADULT - ASSESSMENT
72 yo F with pmhx Afib on Eliquis, Abdominal abscess 2 mo ago, Gastric Bypass surgery 20+ years ago, COPD who presents from NH for evaluation of positive QuantiFeron test.     #positive QuantiFeron test, r/o active TB infection  -CXR in the ED revealed, No definite acute consolidation.  -from nursing home[place], denies travel, endorses non productive cough x 1.5 weeks asymptomatic otherwise, no hemoptysis  -repeat quantiferon gold   -F/U sputum culture positive for GPC in clusters pairs and chains  -screen for HIV, hepatitis panel both negative  -Per ID DC isolation precautions, risk of active TB 0.1%  - AFB negative    # Exposure to covid on 5/25  - Pt was exposed to covid during hospital stay  - will monitor  - asymptomatic  - will test after 4 days of exposure (5/29/22)    #HO C diff Colitis with illeus   #HO Perirectal abscess s/p I and D 6/26/2021  -asymptomatic  -c/w probiotics  - dc c diff prophylaxis per ID    #Atrial fibrillation on eliquis  - CHADVASC 3  - continue with eliquis    #COPD ,stable not in exacerbation  - not on home oxygen ,no wheezing on exam  - inhaler PRN  - pulmonary toilet    #HFpEF ,euvolemic on exam   -TTE 6/27 EF of 63 %.  - monitor I&O  - avoid volume overload    # EtOH abuse with suspected folate/thiamine deficiency  - on folic acid and thiamine     # Depression on duloxetine  - c/w SSRI  - f/u w psychiatry as outpatient    #Misc  - DVT Prophylaxis: eliquis  - GI Prophylaxis: not indicated  - Diet: Regular  - Activity: as tolerated  - Code Status: Full Code  - Dipso: Back to [facility] if COVID (-) 5/29

## 2022-05-27 NOTE — DISCHARGE NOTE PROVIDER - HOSPITAL COURSE
patient is a 70 yo F with pmhx Afib on Eliquis, Abdominal abscess 2 mo ago, Gastric Bypass surgery 20+ years ago, COPD who presents from NH for evaluation of positive QuantiFeron test. Patient had a positive QuantiFERON test on her outpatient blood work and was sent to the hospital for further evaluation. Patient endorses non productive cough x 1.5 weeks, otherwise No respiratory symptoms, no hemoptysis, denies any other TB exposure. Patient denies fevers/chills, chest pain, sob, recent weight loss, travel.    CXR in the ED revealed Probable right basilar linear atelectasis. No definite acute consolidation.    #positive QuantiFeron test, r/o active TB infection  -CXR in the ED revealed, No definite acute consolidation.  -from nursing home, [place], denies travel, endorses non productive cough x 1.5 weeks asymptomatic otherwise, no hemoptysis  -repeat quantiferon gold   -F/U sputum culture positive for GPC in clusters pairs and chains  -screen for HIV, hepatitis panel both negative  -Per ID DC isolation precautions, risk of active TB 0.1%  - AFB negative

## 2022-05-28 VITALS
DIASTOLIC BLOOD PRESSURE: 54 MMHG | HEART RATE: 70 BPM | SYSTOLIC BLOOD PRESSURE: 93 MMHG | RESPIRATION RATE: 18 BRPM | TEMPERATURE: 98 F

## 2022-05-28 LAB
ALBUMIN SERPL ELPH-MCNC: 3 G/DL — LOW (ref 3.5–5.2)
ALP SERPL-CCNC: 103 U/L — SIGNIFICANT CHANGE UP (ref 30–115)
ALT FLD-CCNC: 7 U/L — SIGNIFICANT CHANGE UP (ref 0–41)
ANION GAP SERPL CALC-SCNC: 11 MMOL/L — SIGNIFICANT CHANGE UP (ref 7–14)
AST SERPL-CCNC: 14 U/L — SIGNIFICANT CHANGE UP (ref 0–41)
BASOPHILS # BLD AUTO: 0.06 K/UL — SIGNIFICANT CHANGE UP (ref 0–0.2)
BASOPHILS NFR BLD AUTO: 0.7 % — SIGNIFICANT CHANGE UP (ref 0–1)
BILIRUB SERPL-MCNC: 0.7 MG/DL — SIGNIFICANT CHANGE UP (ref 0.2–1.2)
BUN SERPL-MCNC: 16 MG/DL — SIGNIFICANT CHANGE UP (ref 10–20)
CALCIUM SERPL-MCNC: 9.1 MG/DL — SIGNIFICANT CHANGE UP (ref 8.5–10.1)
CHLORIDE SERPL-SCNC: 102 MMOL/L — SIGNIFICANT CHANGE UP (ref 98–110)
CO2 SERPL-SCNC: 24 MMOL/L — SIGNIFICANT CHANGE UP (ref 17–32)
CREAT SERPL-MCNC: <0.5 MG/DL — LOW (ref 0.7–1.5)
EGFR: 106 ML/MIN/1.73M2 — SIGNIFICANT CHANGE UP
EOSINOPHIL # BLD AUTO: 0.18 K/UL — SIGNIFICANT CHANGE UP (ref 0–0.7)
EOSINOPHIL NFR BLD AUTO: 2.2 % — SIGNIFICANT CHANGE UP (ref 0–8)
GLUCOSE BLDC GLUCOMTR-MCNC: 88 MG/DL — SIGNIFICANT CHANGE UP (ref 70–99)
GLUCOSE SERPL-MCNC: 89 MG/DL — SIGNIFICANT CHANGE UP (ref 70–99)
HCT VFR BLD CALC: 35.5 % — LOW (ref 37–47)
HGB BLD-MCNC: 10.6 G/DL — LOW (ref 12–16)
IMM GRANULOCYTES NFR BLD AUTO: 0.4 % — HIGH (ref 0.1–0.3)
LYMPHOCYTES # BLD AUTO: 1.33 K/UL — SIGNIFICANT CHANGE UP (ref 1.2–3.4)
LYMPHOCYTES # BLD AUTO: 16.4 % — LOW (ref 20.5–51.1)
MAGNESIUM SERPL-MCNC: 1.8 MG/DL — SIGNIFICANT CHANGE UP (ref 1.8–2.4)
MCHC RBC-ENTMCNC: 24.1 PG — LOW (ref 27–31)
MCHC RBC-ENTMCNC: 29.9 G/DL — LOW (ref 32–37)
MCV RBC AUTO: 80.9 FL — LOW (ref 81–99)
MONOCYTES # BLD AUTO: 0.52 K/UL — SIGNIFICANT CHANGE UP (ref 0.1–0.6)
MONOCYTES NFR BLD AUTO: 6.4 % — SIGNIFICANT CHANGE UP (ref 1.7–9.3)
NEUTROPHILS # BLD AUTO: 5.98 K/UL — SIGNIFICANT CHANGE UP (ref 1.4–6.5)
NEUTROPHILS NFR BLD AUTO: 73.9 % — SIGNIFICANT CHANGE UP (ref 42.2–75.2)
NRBC # BLD: 0 /100 WBCS — SIGNIFICANT CHANGE UP (ref 0–0)
PLATELET # BLD AUTO: 346 K/UL — SIGNIFICANT CHANGE UP (ref 130–400)
POTASSIUM SERPL-MCNC: 4.2 MMOL/L — SIGNIFICANT CHANGE UP (ref 3.5–5)
POTASSIUM SERPL-SCNC: 4.2 MMOL/L — SIGNIFICANT CHANGE UP (ref 3.5–5)
PROT SERPL-MCNC: 6.1 G/DL — SIGNIFICANT CHANGE UP (ref 6–8)
RAPID RVP RESULT: SIGNIFICANT CHANGE UP
RBC # BLD: 4.39 M/UL — SIGNIFICANT CHANGE UP (ref 4.2–5.4)
RBC # FLD: 22.6 % — HIGH (ref 11.5–14.5)
SARS-COV-2 RNA SPEC QL NAA+PROBE: SIGNIFICANT CHANGE UP
SODIUM SERPL-SCNC: 137 MMOL/L — SIGNIFICANT CHANGE UP (ref 135–146)
WBC # BLD: 8.1 K/UL — SIGNIFICANT CHANGE UP (ref 4.8–10.8)
WBC # FLD AUTO: 8.1 K/UL — SIGNIFICANT CHANGE UP (ref 4.8–10.8)

## 2022-05-28 RX ADMIN — METHOCARBAMOL 500 MILLIGRAM(S): 500 TABLET, FILM COATED ORAL at 05:31

## 2022-05-28 RX ADMIN — APIXABAN 5 MILLIGRAM(S): 2.5 TABLET, FILM COATED ORAL at 05:31

## 2022-06-01 NOTE — CDI QUERY NOTE - NSCDIOTHERTXTBX_GEN_ALL_CORE_HH
DOCUMENTATION CLARIFICATION FORM     Encounter #: [id number]                                                           Patient’s Name: Carin Ferraro  : 1950                                                                      Discharged: 2022  Medical Record #: [id number]                                                  Admit Date: 2021  CDI Specialist/: Karel                                                  Contact #: [phone]    Dear Dr. Dean,                   Date: 2022                  The Physician’s or Provider’s documentation of the patient’s presentation, evaluation and  medical management, as identified below, may support a diagnosis that is not documented in the medical record.  In order to accurately capture all diagnoses to the greatest degree of specificity reflecting the patient’s actual severity of illness, the documentation in this patient’s medical record requires additional clarification.  Please include more specific documentation, either known or suspected, of a corresponding diagnosis associated with the clinical information described below in your Progress Note and/or Discharge Summary.    CLINICAL INDICATORS  Documentation  •	 ED:... presents to the ED from NH for evaluation s/p + +quantiferon Gold.   •	 RN Flowsheet:....Stage 3 pressure injury to sacrum, present on admission  •	 Registered Dietitian:... Reason/Indicator for Assessment: Pressure injury stage 2/>.... stage 3 PI to sacrum    Treatment  •	Protective barrier cream  •	Allyven dressing applied   •	Pressure  injury  preventive  measures  •	skin care    QUERY  Based upon your clinical judgment and the above clinical indicators, please clarify if the stage 3 pressure injury can be further specified as:  •	Stage 3 pressure injury to the sacrum was present on admission  •	Other (please specify)________________________  •	Unable to clinically determine       Documentation clarification is required for compliance, accuracy in coding and billing, and reporting severity of illness, quality data   and risk of mortality.  --------------------------------------------------------------------------------------------------------------------------------------------  DO NOT REMOVE THIS RECORD WITHOUT FIRST NOTIFYING THE CDI SPECIALIST  This form is NOT a part of the permanent Medical Record.

## 2022-07-13 LAB
CULTURE RESULTS: SIGNIFICANT CHANGE UP
SPECIMEN SOURCE: SIGNIFICANT CHANGE UP

## 2022-08-25 NOTE — PHYSICAL THERAPY INITIAL EVALUATION ADULT - REHAB POTENTIAL, PT EVAL
Attempted to contact patient in regards to today's no show; no answer.    good, to achieve stated therapy goals

## 2022-08-30 NOTE — PROGRESS NOTE ADULT - SUBJECTIVE AND OBJECTIVE BOX
LV for patient to call back and schedule HPI  Patient is a 70y old Female who presents with a chief complaint of abd abscess (04 Jul 2021 04:10)    Currently admitted to medicine with the primary diagnosis of Colitis       Today is hospital day 9d.     INTERVAL HPI / OVERNIGHT EVENTS:  Patient was examined and seen at bedside.   on my arrival patient was about to work with PT  patient was able to ambulate with walker and assist  denies any complaints        PAST MEDICAL & SURGICAL HISTORY    ALLERGIES  No Known Allergies    MEDICATIONS  STANDING MEDICATIONS  albuterol/ipratropium for Nebulization 3 milliLiter(s) Nebulizer every 8 hours  cefTRIAXone   IVPB 2000 milliGRAM(s) IV Intermittent every 24 hours  DULoxetine 60 milliGRAM(s) Oral daily  enoxaparin Injectable 40 milliGRAM(s) SubCutaneous daily  folic acid 1 milliGRAM(s) Oral daily  furosemide    Tablet 40 milliGRAM(s) Oral daily  methocarbamol 500 milliGRAM(s) Oral two times a day  metroNIDAZOLE  IVPB 500 milliGRAM(s) IV Intermittent every 8 hours  metroNIDAZOLE  IVPB      pantoprazole  Injectable 40 milliGRAM(s) IV Push two times a day  predniSONE   Tablet 40 milliGRAM(s) Oral daily  silver sulfADIAZINE 1% Cream 1 Application(s) Topical two times a day  simethicone 80 milliGRAM(s) Chew daily  sucralfate 1 Gram(s) Oral every 6 hours  thiamine 100 milliGRAM(s) Oral daily    PRN MEDICATIONS  acetaminophen   Tablet .. 650 milliGRAM(s) Oral every 6 hours PRN  ALBUTerol    90 MICROgram(s) HFA Inhaler 2 Puff(s) Inhalation every 6 hours PRN  ondansetron    Tablet 4 milliGRAM(s) Oral once PRN    VITALS:  T(F): 98  HR: 73  BP: 102/64  RR: 17  SpO2: 98%    PHYSICAL EXAM  GEN: NAD, Resting comfortably in bed  PULM: mild basal crepts present, No wheezing  CVS: Regular rate and rhythm, S1-S2, no murmurs  ABD: Soft, non-tender, non-distended, no guarding  EXT: 2+ edema  NEURO: A&Ox3, no focal deficits    LABS                        9.3    8.87  )-----------( 290      ( 04 Jul 2021 06:18 )             32.1     07-04    134<L>  |  93<L>  |  9<L>  ----------------------------<  70  4.5   |  37<H>  |  0.8    Ca    8.4<L>      04 Jul 2021 06:18  Mg     1.9     07-04    TPro  5.9<L>  /  Alb  3.5  /  TBili  0.6  /  DBili  x   /  AST  16  /  ALT  12  /  AlkPhos  80  07-04                  RADIOLOGY

## 2022-10-03 NOTE — DIETITIAN INITIAL EVALUATION ADULT - EDUCATION DIETARY MODIFICATIONS
(1) partially meets; needs review/practice/verbalization Mixed Nodular And Micronodular Bcc Histology Text: There were aggregates of basaloid cells in a nodular and micro nodular pattern.

## 2023-01-14 NOTE — PATIENT PROFILE ADULT - FUNCTIONAL ASSESSMENT - DAILY ACTIVITY 6.
Lab Results   Component Value Date    HGBA1C 6 5 (H) 07/06/2022       Recent Labs     01/13/23  1533 01/13/23  2101 01/14/23  0730 01/14/23  1127   POCGLU 105 177* 128 206*       Blood Sugar Average: Last 72 hrs:  (P) 124 [id number]   · Patient normally takes metformin 500 mg p o  twice daily with meals  · hold during hospitalization    · Will place on sliding scale coverage before meals and at bedtime algorithm 3 with meals and algorithm to at bedtime  · Diabetic diet  · Monitor 3 = A little assistance

## 2023-10-25 NOTE — PROGRESS NOTE ADULT - SUBJECTIVE AND OBJECTIVE BOX
GENERAL SURGERY PROGRESS NOTE    Patient: WEGENER, LOIS , 70y ([birth year])Female   MRN: [id number]  Location: [address]  Visit: 06-25-21 Inpatient  Date: 07-01-21 @ 11:33    Hospital Day #: 7    Procedure/Dx/Injuries: Anal fistula, pilonidal sinus, perirectal abscess    Events of past 24 hours: No acute events overnight. Plan for possible repeat c-scope and EGD today.    PAST MEDICAL & SURGICAL HISTORY:  COPD  Anal fissures  Gastric bypass 1999    Vitals:   T(F): 96.2 (07-01-21 @ 07:59), Max: 97.9 (06-30-21 @ 14:02)  HR: 61 (07-01-21 @ 08:04)  BP: 124/62 (07-01-21 @ 07:59)  RR: 18 (07-01-21 @ 07:59)  SpO2: 92% (07-01-21 @ 07:59)    Diet, NPO after Midnight:      NPO Start Date: 30-Jun-2021,   NPO Start Time: 23:59  Except Medications  Diet, NPO after Midnight:      NPO Start Date: 30-Jun-2021,   NPO Start Time: 23:59  Except Medications  Diet, Clear Liquid    Fluids:     I & O's:  06-30-21 @ 07:01  -  07-01-21 @ 07:00  --------------------------------------------------------  IN:    Oral Fluid: 360 mL  Total IN: 360 mL    OUT:    Voided (mL): 1800 mL  Total OUT: 1800 mL    Total NET: -1440 mL    Bowel Movement: : [X] YES [] NO  Flatus: : [X] YES [] NO    PHYSICAL EXAM:  General: NAD, AAOx3, calm and cooperative.  HEENT: SYL, EOMI.  Cardiac: RRR S1, S2, no Murmurs, rubs or gallops.  Respiratory: CTAB, normal respiratory effort.  Abdomen: Soft, non-distended, non-tender, no rebound, no guarding. +BS.  Rectal: Good tone, +stool, no blood, external hemorrhoids seen, perirectal abscess s/p I&D not actively draining, no packing.  Musculoskeletal: Warm and well perfused.  Skin: Warm/dry, normal color, no jaundice    MEDICATIONS  (STANDING):  albuterol/ipratropium for Nebulization 3 milliLiter(s) Nebulizer every 8 hours  cefTRIAXone   IVPB 2000 milliGRAM(s) IV Intermittent every 24 hours  folic acid 1 milliGRAM(s) Oral daily  metroNIDAZOLE  IVPB      metroNIDAZOLE  IVPB 500 milliGRAM(s) IV Intermittent every 8 hours  pantoprazole  Injectable 40 milliGRAM(s) IV Push two times a day  predniSONE   Tablet 40 milliGRAM(s) Oral daily  simethicone 80 milliGRAM(s) Chew daily  sucralfate 1 Gram(s) Oral every 6 hours  thiamine 100 milliGRAM(s) Oral daily    MEDICATIONS  (PRN):  acetaminophen   Tablet .. 650 milliGRAM(s) Oral every 6 hours PRN Temp greater or equal to 38C (100.4F), Mild Pain (1 - 3), Moderate Pain (4 - 6)  ALBUTerol    90 MICROgram(s) HFA Inhaler 2 Puff(s) Inhalation every 6 hours PRN Shortness of Breath and/or Wheezing    DVT PROPHYLAXIS:   GI PROPHYLAXIS: pantoprazole  Injectable 40 milliGRAM(s) IV Push two times a day    ANTICOAGULATION:   ANTIBIOTICS:  cefTRIAXone   IVPB 2000 milliGRAM(s)  metroNIDAZOLE  IVPB    metroNIDAZOLE  IVPB 500 milliGRAM(s)    LAB/STUDIES:  Labs:  CAPILLARY BLOOD GLUCOSE                     8.8    10.36 )-----------( 340      ( 30 Jun 2021 17:29 )             29.7       Auto Neutrophil %: 92.9 % (06-30-21 @ 17:29)  Auto Immature Granulocyte %: 0.9 % (06-30-21 @ 17:29)    06-30  132<L>  |  99  |  8<L>  ----------------------------<  111<H>  4.2   |  23  |  0.5<L>    Calcium, Total Serum: 8.5 mg/dL (06-30-21 @ 17:29)    LFTs:      6.0  | 0.4  | 15       ------------------[88      ( 30 Jun 2021 17:29 )  3.2  | x    | 10          Lipase:x      Amylase:x        Coags:  12.70  ----< 1.10    ( 30 Jun 2021 17:29 )     28.4     Serum Pro-Brain Natriuretic Peptide: 1509 pg/mL (06-25-21 @ 15:10)    IMAGING:  < from: Xray Chest 1 View- PORTABLE-Urgent (Xray Chest 1 View- PORTABLE-Urgent .) (06.30.21 @ 10:35) >  Impression:    1. Stable small bibasilar opacities, possibly atelectasis.    < end of copied text >    < from: CT Abdomen and Pelvis w/ Oral Cont and w/ IV Cont (06.30.21 @ 19:21) >  IMPRESSION:  Since June 25, 2021:    1. No significant change in gluteal subcutaneous stranding with foci of subcutaneous emphysema within right medial gluteal fold and perianal region, suggestive of soft tissue infection; evaluation for small abscesses and fistulas limited by modality. Previously described a gluteal abscess not definitely delineated on current CT.    2. Decreased colonic wall thickening,which may represent resolving colitis.    3. Increased small bilateral pleural effusions.    < end of copied text >    ACCESS/ DEVICES:  [ X ] Peripheral IV  [ ] Central Venous Line	[ ] R	[ ] L	[ ] IJ	[ ] Fem	[ ] SC	Placed:   [ ] Arterial Line		[ ] R	[ ] L	[ ] Fem	[ ] Rad	[ ] Ax	Placed:   [ ] PICC:					[ ] Mediport  [ ] Urinary Catheter,  Date Placed:   [ ] Chest tube: [ ] Right, [ ] Left  [ ] MARCO A/Nelson Drains    ASSESSMENT:  70y F w/ PMHx of  COPD, gastric bypass presents with perirectal abscess, black stools, fever, leukocytosis. Surgery consulted and bedside I&D performed.    PLAN:  -Continue current management as per primary team  -F/u GI for c-scope and EGD today  -F/u pulm for COPD - cleared for cscope/EGD  -F/u H/H and transfuse as needed    Lines/Tubes: PIV.    BLUE TEAM: [id number]   Bcc Histology Text: There were numerous aggregates of basaloid cells.

## 2024-06-12 NOTE — ED PROVIDER NOTE - NS ED MD EKG ABNORMAL RHYTHM 1
GASTROENTEROLOGY PROCEDURE NOTE    Place of Service: [facility] [address] [place] [address] [place] [address]    6/12/2024      PATIENT NAME:   Frank Ellis  YOB: 1971   MEDICAL RECORD NUMBER:[id number]     Date of Service: 6/12/2024      PERFORMING PROVIDER:   Layla Moser MD    Preoperative Diagnosis:  Screening Colonoscopy, last colonoscopy was  8  years    Post operative Diagnosis:     Polyp(s) 1    OPERATIVE PROCEDURE:       ANESTHESIA:   MAC IV    Events:   Event Tracking       Panel 1       Procedure : COLONOSCOPY        Event Time In    Procedure Start 1232    Procedure End 1244              Scope In: 1232    At Cecum: 1235   Scope Out: 1244   Scope Withdrawal Time: 9.05    Cecal withdrawal time      minutes    ESTIMATED BLOOD LOSS: 0 ml         Colonoscopy: The patient was turned in the room and  placed in the left lateral position. A rectal exam was performed The patient was monitored continuously through ECG tracing, pulse oximetry monitoring and direct observations.An Olympus Colonoscope: [id number]   was inserted into the rectum and advanced under direct vision to the cecum, which was identified by IC valve and appendiceal orifice. The procedure was considered not difficult..      During withdrawal examination, the final quality of the prep was Roy bowel prep scale: Right side: 3- (entire mucosa of colon segment seen well, with no residual staining, small fragments of stool, or opaque liquid)  Transverse colon: 3- (entire mucosa of colon segment seen well, with no residual staining, small fragments of stool, or opaque liquid)  Left Colon: 3- (entire mucosa of colon segment seen well, with no residual staining, small fragments of stool, or opaque liquid)    A careful inspection was made as the colonoscope was withdrawn, including a retroflexed view of the rectum; findings and interventions are described below. Appropriate photo documentation was obtained.        Overall  Frank Ellis tolerated the procedure well, without undue discomfort, hypotension or desaturation. The patient was adequately recovered in the endoscopy suite and was transported to the Post Anesthesia Care Unit.      Findings:  Anorectal: Normal  Cecum: 1 Polyps size ranging from 4 mm removed by cold snare polypectomy   Ascending colon: Normal mucosa with preserve vascularity, absence of ulcers,erythema, or friability  Transverse colon: Normal mucosa with preserve vascularity, absence of ulcers,erythema, or friability  Descending colon: Normal mucosa with preserve vascularity, absence of ulcers,erythema, or friability  Sigmoid colon: Normal mucosa with preserve vascularity, absence of ulcers,erythema, or friability  Rectum: Normal mucosa with preserve vascularity, absence of ulcers,erythema, or friability    Complications: none    Impression:     Polyp(s) 1    Specimens:  See Findings for Specimens       Recommendations:  -Repeat colonoscopy in 7-10 years, pending pathology   -Okay to restart home medications (including antiplatelets and anticoagulants if any)  -If you develop severe abdominal pain and overt rectal bleeding. Please go to the nearest ER and bring this report with you   -Please call our surgery center if you develop any complications after the procedure  -Patient might need earlier colonoscopy if there are any new alarming symptoms (change in bowel habit, hematochezia or weight loss)  -Follow up biopsy results      Layla Moser MD       atrial fibrillation

## 2024-11-05 NOTE — ED PROVIDER NOTE - PRO INTERPRETER NEED 2
Patient transferred from PACU to inpatient room 254. Patient vitally stable and resting comfortably in bed. Bedside report given to floor RN. All questions and concerns were addressed at this time.  
English

## 2025-03-24 ENCOUNTER — INPATIENT (INPATIENT)
Facility: HOSPITAL | Age: 75
LOS: 15 days | Discharge: HOME | DRG: 981 | End: 2025-04-09
Attending: INTERNAL MEDICINE | Admitting: INTERNAL MEDICINE
Payer: MEDICARE

## 2025-03-24 VITALS
OXYGEN SATURATION: 91 % | HEART RATE: 77 BPM | WEIGHT: 160.06 LBS | DIASTOLIC BLOOD PRESSURE: 76 MMHG | SYSTOLIC BLOOD PRESSURE: 126 MMHG | TEMPERATURE: 98 F | RESPIRATION RATE: 16 BRPM

## 2025-03-24 DIAGNOSIS — J96.02 ACUTE RESPIRATORY FAILURE WITH HYPERCAPNIA: ICD-10-CM

## 2025-03-24 LAB
ALBUMIN SERPL ELPH-MCNC: 3.5 G/DL — SIGNIFICANT CHANGE UP (ref 3.5–5.2)
ALP SERPL-CCNC: 105 U/L — SIGNIFICANT CHANGE UP (ref 30–115)
ALT FLD-CCNC: 10 U/L — SIGNIFICANT CHANGE UP (ref 0–41)
ANION GAP SERPL CALC-SCNC: 8 MMOL/L — SIGNIFICANT CHANGE UP (ref 7–14)
AST SERPL-CCNC: 27 U/L — SIGNIFICANT CHANGE UP (ref 0–41)
BASE EXCESS BLDV CALC-SCNC: 12.2 MMOL/L — HIGH (ref -2–3)
BASOPHILS # BLD AUTO: 0.11 K/UL — SIGNIFICANT CHANGE UP (ref 0–0.2)
BASOPHILS NFR BLD AUTO: 0.9 % — SIGNIFICANT CHANGE UP (ref 0–1)
BILIRUB SERPL-MCNC: 0.8 MG/DL — SIGNIFICANT CHANGE UP (ref 0.2–1.2)
BUN SERPL-MCNC: 16 MG/DL — SIGNIFICANT CHANGE UP (ref 10–20)
CA-I SERPL-SCNC: 1.25 MMOL/L — SIGNIFICANT CHANGE UP (ref 1.15–1.33)
CALCIUM SERPL-MCNC: 8.7 MG/DL — SIGNIFICANT CHANGE UP (ref 8.4–10.5)
CHLORIDE SERPL-SCNC: 98 MMOL/L — SIGNIFICANT CHANGE UP (ref 98–110)
CO2 SERPL-SCNC: 35 MMOL/L — HIGH (ref 17–32)
CREAT SERPL-MCNC: <0.5 MG/DL — LOW (ref 0.7–1.5)
EGFR: 104 ML/MIN/1.73M2 — SIGNIFICANT CHANGE UP
EGFR: 104 ML/MIN/1.73M2 — SIGNIFICANT CHANGE UP
EOSINOPHIL # BLD AUTO: 0 K/UL — SIGNIFICANT CHANGE UP (ref 0–0.7)
EOSINOPHIL NFR BLD AUTO: 0 % — SIGNIFICANT CHANGE UP (ref 0–8)
FLUAV AG NPH QL: SIGNIFICANT CHANGE UP
FLUBV AG NPH QL: SIGNIFICANT CHANGE UP
GAS PNL BLDA: SIGNIFICANT CHANGE UP
GAS PNL BLDV: 137 MMOL/L — SIGNIFICANT CHANGE UP (ref 136–145)
GAS PNL BLDV: SIGNIFICANT CHANGE UP
GLUCOSE SERPL-MCNC: 125 MG/DL — HIGH (ref 70–99)
HCO3 BLDV-SCNC: 44 MMOL/L — HIGH (ref 22–29)
HCT VFR BLD CALC: 37.6 % — SIGNIFICANT CHANGE UP (ref 37–47)
HCT VFR BLDA CALC: 31 % — LOW (ref 34.5–46.5)
HGB BLD CALC-MCNC: 10.3 G/DL — LOW (ref 11.7–16.1)
HGB BLD-MCNC: 9.7 G/DL — LOW (ref 12–16)
LACTATE BLDV-MCNC: 1.1 MMOL/L — SIGNIFICANT CHANGE UP (ref 0.5–2)
LYMPHOCYTES # BLD AUTO: 0 % — LOW (ref 20.5–51.1)
LYMPHOCYTES # BLD AUTO: 0 K/UL — LOW (ref 1.2–3.4)
MCHC RBC-ENTMCNC: 20.3 PG — LOW (ref 27–31)
MCHC RBC-ENTMCNC: 25.8 G/DL — LOW (ref 32–37)
MCV RBC AUTO: 78.5 FL — LOW (ref 81–99)
MONOCYTES # BLD AUTO: 0.31 K/UL — SIGNIFICANT CHANGE UP (ref 0.1–0.6)
MONOCYTES NFR BLD AUTO: 2.6 % — SIGNIFICANT CHANGE UP (ref 1.7–9.3)
NEUTROPHILS # BLD AUTO: 11.33 K/UL — HIGH (ref 1.4–6.5)
NEUTROPHILS NFR BLD AUTO: 96.5 % — HIGH (ref 42.2–75.2)
NT-PROBNP SERPL-SCNC: 1727 PG/ML — HIGH (ref 0–300)
PCO2 BLDV: 118 MMHG — CRITICAL HIGH (ref 39–42)
PH BLDV: 7.18 — CRITICAL LOW (ref 7.32–7.43)
PLATELET # BLD AUTO: 253 K/UL — SIGNIFICANT CHANGE UP (ref 130–400)
PMV BLD: 9.9 FL — SIGNIFICANT CHANGE UP (ref 7.4–10.4)
PO2 BLDV: 61 MMHG — HIGH (ref 25–45)
POTASSIUM BLDV-SCNC: 4.1 MMOL/L — SIGNIFICANT CHANGE UP (ref 3.5–5.1)
POTASSIUM SERPL-MCNC: 4.4 MMOL/L — SIGNIFICANT CHANGE UP (ref 3.5–5)
POTASSIUM SERPL-SCNC: 4.4 MMOL/L — SIGNIFICANT CHANGE UP (ref 3.5–5)
PROT SERPL-MCNC: 6.9 G/DL — SIGNIFICANT CHANGE UP (ref 6–8)
RBC # BLD: 4.79 M/UL — SIGNIFICANT CHANGE UP (ref 4.2–5.4)
RBC # FLD: 22.1 % — HIGH (ref 11.5–14.5)
RSV RNA NPH QL NAA+NON-PROBE: SIGNIFICANT CHANGE UP
SAO2 % BLDV: 88.6 % — HIGH (ref 67–88)
SARS-COV-2 RNA SPEC QL NAA+PROBE: SIGNIFICANT CHANGE UP
SODIUM SERPL-SCNC: 141 MMOL/L — SIGNIFICANT CHANGE UP (ref 135–146)
SOURCE RESPIRATORY: SIGNIFICANT CHANGE UP
TROPONIN T, HIGH SENSITIVITY RESULT: 17 NG/L — HIGH (ref 6–13)
WBC # BLD: 11.74 K/UL — HIGH (ref 4.8–10.8)
WBC # FLD AUTO: 11.74 K/UL — HIGH (ref 4.8–10.8)

## 2025-03-24 PROCEDURE — 70450 CT HEAD/BRAIN W/O DYE: CPT | Mod: 26

## 2025-03-24 PROCEDURE — 73552 X-RAY EXAM OF FEMUR 2/>: CPT | Mod: 50

## 2025-03-24 PROCEDURE — C1769: CPT

## 2025-03-24 PROCEDURE — 84145 PROCALCITONIN (PCT): CPT

## 2025-03-24 PROCEDURE — 85027 COMPLETE CBC AUTOMATED: CPT

## 2025-03-24 PROCEDURE — 84100 ASSAY OF PHOSPHORUS: CPT

## 2025-03-24 PROCEDURE — A9579: CPT

## 2025-03-24 PROCEDURE — 93970 EXTREMITY STUDY: CPT | Mod: 26

## 2025-03-24 PROCEDURE — 87899 AGENT NOS ASSAY W/OPTIC: CPT

## 2025-03-24 PROCEDURE — 85610 PROTHROMBIN TIME: CPT

## 2025-03-24 PROCEDURE — 93970 EXTREMITY STUDY: CPT

## 2025-03-24 PROCEDURE — 85018 HEMOGLOBIN: CPT

## 2025-03-24 PROCEDURE — 93306 TTE W/DOPPLER COMPLETE: CPT

## 2025-03-24 PROCEDURE — 70450 CT HEAD/BRAIN W/O DYE: CPT | Mod: MC

## 2025-03-24 PROCEDURE — 93308 TTE F-UP OR LMTD: CPT | Mod: 26

## 2025-03-24 PROCEDURE — 71250 CT THORAX DX C-: CPT | Mod: MC

## 2025-03-24 PROCEDURE — 93010 ELECTROCARDIOGRAM REPORT: CPT

## 2025-03-24 PROCEDURE — 83605 ASSAY OF LACTIC ACID: CPT

## 2025-03-24 PROCEDURE — 86923 COMPATIBILITY TEST ELECTRIC: CPT

## 2025-03-24 PROCEDURE — 73590 X-RAY EXAM OF LOWER LEG: CPT | Mod: 50

## 2025-03-24 PROCEDURE — 72170 X-RAY EXAM OF PELVIS: CPT

## 2025-03-24 PROCEDURE — 87641 MR-STAPH DNA AMP PROBE: CPT

## 2025-03-24 PROCEDURE — C1760: CPT

## 2025-03-24 PROCEDURE — 37244 VASC EMBOLIZE/OCCLUDE BLEED: CPT

## 2025-03-24 PROCEDURE — 36430 TRANSFUSION BLD/BLD COMPNT: CPT

## 2025-03-24 PROCEDURE — 86901 BLOOD TYPING SEROLOGIC RH(D): CPT

## 2025-03-24 PROCEDURE — 85014 HEMATOCRIT: CPT

## 2025-03-24 PROCEDURE — C1887: CPT

## 2025-03-24 PROCEDURE — 73706 CT ANGIO LWR EXTR W/O&W/DYE: CPT | Mod: MC,LT

## 2025-03-24 PROCEDURE — 74174 CTA ABD&PLVS W/CONTRAST: CPT | Mod: MC

## 2025-03-24 PROCEDURE — 82330 ASSAY OF CALCIUM: CPT

## 2025-03-24 PROCEDURE — 86900 BLOOD TYPING SEROLOGIC ABO: CPT

## 2025-03-24 PROCEDURE — C1889: CPT

## 2025-03-24 PROCEDURE — P9040: CPT

## 2025-03-24 PROCEDURE — 82962 GLUCOSE BLOOD TEST: CPT

## 2025-03-24 PROCEDURE — 83735 ASSAY OF MAGNESIUM: CPT

## 2025-03-24 PROCEDURE — 36247 INS CATH ABD/L-EXT ART 3RD: CPT

## 2025-03-24 PROCEDURE — 99291 CRITICAL CARE FIRST HOUR: CPT

## 2025-03-24 PROCEDURE — 70545 MR ANGIOGRAPHY HEAD W/DYE: CPT | Mod: MC

## 2025-03-24 PROCEDURE — 95714 VEEG EA 12-26 HR UNMNTR: CPT

## 2025-03-24 PROCEDURE — 97110 THERAPEUTIC EXERCISES: CPT | Mod: GP

## 2025-03-24 PROCEDURE — 80053 COMPREHEN METABOLIC PANEL: CPT

## 2025-03-24 PROCEDURE — 87040 BLOOD CULTURE FOR BACTERIA: CPT

## 2025-03-24 PROCEDURE — P9016: CPT

## 2025-03-24 PROCEDURE — 76937 US GUIDE VASCULAR ACCESS: CPT

## 2025-03-24 PROCEDURE — 70551 MRI BRAIN STEM W/O DYE: CPT | Mod: MC

## 2025-03-24 PROCEDURE — 73630 X-RAY EXAM OF FOOT: CPT | Mod: 50

## 2025-03-24 PROCEDURE — 74176 CT ABD & PELVIS W/O CONTRAST: CPT | Mod: MC

## 2025-03-24 PROCEDURE — 94640 AIRWAY INHALATION TREATMENT: CPT

## 2025-03-24 PROCEDURE — 87449 NOS EACH ORGANISM AG IA: CPT

## 2025-03-24 PROCEDURE — 80048 BASIC METABOLIC PNL TOTAL CA: CPT

## 2025-03-24 PROCEDURE — 97162 PT EVAL MOD COMPLEX 30 MIN: CPT | Mod: GP

## 2025-03-24 PROCEDURE — 76604 US EXAM CHEST: CPT | Mod: 26

## 2025-03-24 PROCEDURE — 84295 ASSAY OF SERUM SODIUM: CPT

## 2025-03-24 PROCEDURE — 85730 THROMBOPLASTIN TIME PARTIAL: CPT

## 2025-03-24 PROCEDURE — 85025 COMPLETE CBC W/AUTO DIFF WBC: CPT

## 2025-03-24 PROCEDURE — C1894: CPT

## 2025-03-24 PROCEDURE — 70546 MR ANGIOGRAPH HEAD W/O&W/DYE: CPT

## 2025-03-24 PROCEDURE — 87640 STAPH A DNA AMP PROBE: CPT

## 2025-03-24 PROCEDURE — 95700 EEG CONT REC W/VID EEG TECH: CPT

## 2025-03-24 PROCEDURE — 94660 CPAP INITIATION&MGMT: CPT

## 2025-03-24 PROCEDURE — 36415 COLL VENOUS BLD VENIPUNCTURE: CPT

## 2025-03-24 PROCEDURE — 93880 EXTRACRANIAL BILAT STUDY: CPT

## 2025-03-24 PROCEDURE — 84132 ASSAY OF SERUM POTASSIUM: CPT

## 2025-03-24 PROCEDURE — 82803 BLOOD GASES ANY COMBINATION: CPT

## 2025-03-24 PROCEDURE — 86850 RBC ANTIBODY SCREEN: CPT

## 2025-03-24 PROCEDURE — 71045 X-RAY EXAM CHEST 1 VIEW: CPT | Mod: 26

## 2025-03-24 PROCEDURE — 71045 X-RAY EXAM CHEST 1 VIEW: CPT

## 2025-03-24 RX ORDER — FLUTICASONE FUROATE, UMECLIDINIUM BROMIDE AND VILANTEROL TRIFENATATE 100; 62.5; 25 UG/1; UG/1; UG/1
1 POWDER RESPIRATORY (INHALATION)
Refills: 0 | DISCHARGE

## 2025-03-24 RX ORDER — CEFTRIAXONE 500 MG/1
1000 INJECTION, POWDER, FOR SOLUTION INTRAMUSCULAR; INTRAVENOUS EVERY 24 HOURS
Refills: 0 | Status: DISCONTINUED | OUTPATIENT
Start: 2025-03-25 | End: 2025-03-28

## 2025-03-24 RX ORDER — AZITHROMYCIN 250 MG
500 CAPSULE ORAL EVERY 24 HOURS
Refills: 0 | Status: COMPLETED | OUTPATIENT
Start: 2025-03-25 | End: 2025-03-26

## 2025-03-24 RX ORDER — LIDOCAINE HYDROCHLORIDE 20 MG/ML
1 JELLY TOPICAL
Refills: 0 | DISCHARGE

## 2025-03-24 RX ORDER — ACETAMINOPHEN 500 MG/5ML
650 LIQUID (ML) ORAL EVERY 6 HOURS
Refills: 0 | Status: DISCONTINUED | OUTPATIENT
Start: 2025-03-24 | End: 2025-04-09

## 2025-03-24 RX ORDER — FUROSEMIDE 10 MG/ML
40 INJECTION INTRAMUSCULAR; INTRAVENOUS DAILY
Refills: 0 | Status: DISCONTINUED | OUTPATIENT
Start: 2025-03-24 | End: 2025-03-26

## 2025-03-24 RX ORDER — IPRATROPIUM BROMIDE AND ALBUTEROL SULFATE .5; 2.5 MG/3ML; MG/3ML
3 SOLUTION RESPIRATORY (INHALATION) EVERY 6 HOURS
Refills: 0 | Status: DISCONTINUED | OUTPATIENT
Start: 2025-03-24 | End: 2025-03-25

## 2025-03-24 RX ORDER — ENOXAPARIN SODIUM 100 MG/ML
70 INJECTION SUBCUTANEOUS EVERY 12 HOURS
Refills: 0 | Status: DISCONTINUED | OUTPATIENT
Start: 2025-03-24 | End: 2025-03-29

## 2025-03-24 RX ORDER — HALOBETASOL PROPIONATE 0.5 MG/G
1 OINTMENT TOPICAL
Refills: 0 | DISCHARGE

## 2025-03-24 RX ORDER — DULOXETINE 20 MG/1
2 CAPSULE, DELAYED RELEASE ORAL
Refills: 0 | DISCHARGE

## 2025-03-24 RX ORDER — TIOTROPIUM BROMIDE INHALATION SPRAY 3.12 UG/1
2 SPRAY, METERED RESPIRATORY (INHALATION) DAILY
Refills: 0 | Status: DISCONTINUED | OUTPATIENT
Start: 2025-03-24 | End: 2025-03-26

## 2025-03-24 RX ORDER — METHYLPREDNISOLONE ACETATE 80 MG/ML
40 INJECTION, SUSPENSION INTRA-ARTICULAR; INTRALESIONAL; INTRAMUSCULAR; SOFT TISSUE EVERY 12 HOURS
Refills: 0 | Status: DISCONTINUED | OUTPATIENT
Start: 2025-03-25 | End: 2025-03-29

## 2025-03-24 RX ORDER — METHYLPREDNISOLONE ACETATE 80 MG/ML
125 INJECTION, SUSPENSION INTRA-ARTICULAR; INTRALESIONAL; INTRAMUSCULAR; SOFT TISSUE ONCE
Refills: 0 | Status: COMPLETED | OUTPATIENT
Start: 2025-03-24 | End: 2025-03-24

## 2025-03-24 RX ORDER — ACETAMINOPHEN 500 MG/5ML
2 LIQUID (ML) ORAL
Refills: 0 | DISCHARGE

## 2025-03-24 RX ORDER — HYDROCORTISONE 10 MG/G
1 CREAM TOPICAL AT BEDTIME
Refills: 0 | Status: DISCONTINUED | OUTPATIENT
Start: 2025-03-24 | End: 2025-04-09

## 2025-03-24 RX ORDER — FUROSEMIDE 10 MG/ML
40 INJECTION INTRAMUSCULAR; INTRAVENOUS ONCE
Refills: 0 | Status: COMPLETED | OUTPATIENT
Start: 2025-03-24 | End: 2025-03-24

## 2025-03-24 RX ORDER — LIDOCAINE HYDROCHLORIDE 20 MG/ML
1 JELLY TOPICAL DAILY
Refills: 0 | Status: DISCONTINUED | OUTPATIENT
Start: 2025-03-24 | End: 2025-04-09

## 2025-03-24 RX ORDER — MAGNESIUM SULFATE 500 MG/ML
2 SYRINGE (ML) INJECTION ONCE
Refills: 0 | Status: COMPLETED | OUTPATIENT
Start: 2025-03-24 | End: 2025-03-24

## 2025-03-24 RX ORDER — IPRATROPIUM BROMIDE AND ALBUTEROL SULFATE .5; 2.5 MG/3ML; MG/3ML
3 SOLUTION RESPIRATORY (INHALATION)
Refills: 0 | Status: COMPLETED | OUTPATIENT
Start: 2025-03-24 | End: 2025-03-24

## 2025-03-24 RX ORDER — IPRATROPIUM BROMIDE AND ALBUTEROL SULFATE .5; 2.5 MG/3ML; MG/3ML
3 SOLUTION RESPIRATORY (INHALATION)
Refills: 0 | DISCHARGE

## 2025-03-24 RX ORDER — AZITHROMYCIN 250 MG
500 CAPSULE ORAL ONCE
Refills: 0 | Status: COMPLETED | OUTPATIENT
Start: 2025-03-24 | End: 2025-03-24

## 2025-03-24 RX ORDER — CEFTRIAXONE 500 MG/1
1000 INJECTION, POWDER, FOR SOLUTION INTRAMUSCULAR; INTRAVENOUS ONCE
Refills: 0 | Status: COMPLETED | OUTPATIENT
Start: 2025-03-24 | End: 2025-03-24

## 2025-03-24 RX ADMIN — IPRATROPIUM BROMIDE AND ALBUTEROL SULFATE 3 MILLILITER(S): .5; 2.5 SOLUTION RESPIRATORY (INHALATION) at 09:37

## 2025-03-24 RX ADMIN — IPRATROPIUM BROMIDE AND ALBUTEROL SULFATE 3 MILLILITER(S): .5; 2.5 SOLUTION RESPIRATORY (INHALATION) at 09:07

## 2025-03-24 RX ADMIN — CEFTRIAXONE 100 MILLIGRAM(S): 500 INJECTION, POWDER, FOR SOLUTION INTRAMUSCULAR; INTRAVENOUS at 11:21

## 2025-03-24 RX ADMIN — Medication 250 MILLIGRAM(S): at 12:13

## 2025-03-24 RX ADMIN — METHYLPREDNISOLONE ACETATE 125 MILLIGRAM(S): 80 INJECTION, SUSPENSION INTRA-ARTICULAR; INTRALESIONAL; INTRAMUSCULAR; SOFT TISSUE at 09:38

## 2025-03-24 RX ADMIN — ENOXAPARIN SODIUM 70 MILLIGRAM(S): 100 INJECTION SUBCUTANEOUS at 16:29

## 2025-03-24 RX ADMIN — FUROSEMIDE 40 MILLIGRAM(S): 10 INJECTION INTRAMUSCULAR; INTRAVENOUS at 11:21

## 2025-03-24 RX ADMIN — Medication 150 GRAM(S): at 09:38

## 2025-03-24 RX ADMIN — IPRATROPIUM BROMIDE AND ALBUTEROL SULFATE 3 MILLILITER(S): .5; 2.5 SOLUTION RESPIRATORY (INHALATION) at 09:27

## 2025-03-24 NOTE — ED ADULT NURSE NOTE - NSFALLHARMRISKINTERV_ED_ALL_ED
Communicate risk of Fall with Harm to all staff, patient, and family/Provide patient with walking aids/Provide visual cue: red socks, yellow wristband, yellow gown, etc/Reinforce activity limits and safety measures with patient and family/Bed in lowest position, wheels locked, appropriate side rails in place/Call bell, personal items and telephone in reach/Instruct patient to call for assistance before getting out of bed/chair/stretcher/Non-slip footwear applied when patient is off stretcher/[place] to call system/Physically safe environment - no spills, clutter or unnecessary equipment/Purposeful Proactive Rounding/Room/bathroom lighting operational, light cord in reach

## 2025-03-24 NOTE — H&P ADULT - HISTORY OF PRESENT ILLNESS
Patient is a 74 year old F with PMHx of COPD on 3L NC, smoking hx, AFib on Eliquis, past hx of abdominal abscess (in 2021), Gastric Bypass surgery 20+ years ago, who presented to the ED from [facility] on 3/24 with CC of hypoxia and change in mental status. As per ED note, patient was calling for help at her NH this morning. She was noted to be saturating 80% while on her baseline 3L NC; was placed on non-rebreather and brought to ED.     Vitals in ED:  Labs in ED:  Imaging:  Interventions: Patient is a 74 year old F with PMHx of COPD on 3L NC, smoking hx, AFib on Eliquis, past hx of abdominal abscess (in 2021), Gastric Bypass surgery 20+ years ago, who presented to the ED from [facility] on 3/24 with CC of hypoxia and change in mental status. As per ED note, patient was calling for help at her NH this morning. She was noted to be saturating 80% while on her baseline 3L NC; was placed on non-rebreather and brought to ED.     Vitals in ED: T 97.5, HR 77, /76, sat 91% (on NRB?)  Labs in ED: WBC 11.7k, Hgb 9.7 (baseline ~10), Trop 17, Pro-BNP 1727; VBG pH 7.18| pCO2 116 |pO2 77 | HCO3 43 --> after two hours of BiPAP rpt VBG pH 7.18| pCO2 118 |pO2 61 | HCO3 44 --> transitioned to AVAPS, ABG after one hour pH 7.24| pCO2 103 |pO2 101 | HCO3 44    Imaging:   - CXR: Interstitial edema. Lingular opacities. Bilateral pleural effusions.  - POCUS Chest in ED: Diffuse B lines concerning for pulmonary edema. Small left pleural effusion.Hepatization and static air bronchograms to bilateral bases.  - CT Head: Mild generalized cerebral edema is noted.  Interventions: s/p Magnesium 2 g over 20 minutes, Solu-medrol 125 mg IVP x1, Duonebs 3 mL q20 min x3 doses, Rocephin 1g x1, Azithro 500 mg x1, Lasix 40 mg IVP x1 in ED.     Patient initially admitted to MICU for acute hypoxic hypercapnic respiratory failure. Code status was confirmed to be DNR/DNI with MOLST form faxed from [facility]. Patient subsequently downgraded to SDU.

## 2025-03-24 NOTE — ED PROVIDER NOTE - CLINICAL SUMMARY MEDICAL DECISION MAKING FREE TEXT BOX
74 female, PMHx of Afib on Eliquis, Abdominal abscess 2 mo ago, Gastric Bypass surgery 20+ years ago, COPD, presenting for change in mental status.  Patient comes from NH where she was calling for help this morning.  Normally on 3L noted to be saturating 80%, placed on NRB and brought by EMS.  Patient is intermittently sleepy but alert and oriented.  Labs and EKG were ordered and reviewed.  Imaging was ordered and reviewed by me.  Appropriate medications for patient's presenting complaints were ordered and effects were reassessed.  Patient's records (prior hospital, ED visit, and/or nursing home notes if available) were reviewed.  Additional history was obtained from EMS.  Escalation to admission/observation was considered.  Patient requires inpatient hospitalization. 74 female, PMHx of Afib on Eliquis, Abdominal abscess 2 mo ago, Gastric Bypass surgery 20+ years ago, COPD, presenting for change in mental status.  Patient comes from NH where she was calling for help this morning.  Normally on 3L noted to be saturating 80%, placed on NRB and brought by EMS.  Patient is intermittently sleepy but alert and oriented.  Labs and EKG were ordered and reviewed.  Imaging was ordered and reviewed by me.  Bilateral opacities.  Elevated BNP.  Appropriate medications for patient's presenting complaints were ordered and effects were reassessed.  Patient's records (prior hospital, ED visit, and/or nursing home notes if available) were reviewed.  Additional history was obtained from EMS.  Escalation to admission/observation was considered.  Patient requires inpatient hospitalization. 74 female, PMHx of Afib on Eliquis, Abdominal abscess 2 mo ago, Gastric Bypass surgery 20+ years ago, COPD, presenting for change in mental status.  Patient comes from NH where she was calling for help this morning.  Normally on 3L noted to be saturating 80%, placed on NRB and brought by EMS.  Patient is intermittently sleepy but alert and oriented.  Labs and EKG were ordered and reviewed.  Imaging was ordered and reviewed by me.  Bilateral opacities.  Diffuse blines on US.  Lasix administered.  Elevated BNP.  Appropriate medications for patient's presenting complaints were ordered and effects were reassessed.  Patient's records (prior hospital, ED visit, and/or nursing home notes if available) were reviewed.  Additional history was obtained from EMS.  Escalation to admission/observation was considered.  Patient requires inpatient hospitalization. 74 female, PMHx of Afib on Eliquis, Abdominal abscess 2 mo ago, Gastric Bypass surgery 20+ years ago, COPD, presenting for change in mental status.  Patient comes from NH where she was calling for help this morning.  Normally on 3L noted to be saturating 80%, placed on NRB and brought by EMS.  Patient is intermittently sleepy but alert and oriented.  Labs and EKG were ordered and reviewed.  Imaging was ordered and reviewed by me.  Bilateral opacities.  Diffuse blines on US.  Lasix administered.  Elevated BNP.  Appropriate medications for patient's presenting complaints were ordered and effects were reassessed.  Patient's records (prior hospital, ED visit, and/or nursing home notes if available) were reviewed.  Additional history was obtained from EMS.  Escalation to admission/observation was considered.  Critical care consulted.  Confirmed with NH over phone patient is DNR/DNI and faxing MOLST to hospital. Patient requires inpatient hospitalization. 74 female, PMHx of Afib on Eliquis, Abdominal abscess 2 mo ago, Gastric Bypass surgery 20+ years ago, COPD, presenting for change in mental status.  Patient comes from NH where she was calling for help this morning.  Normally on 3L noted to be saturating 80%, placed on NRB and brought by EMS.  Patient is intermittently sleepy but alert and oriented.  Labs and EKG were ordered and reviewed.  Imaging was ordered and reviewed by me.  Bilateral opacities.  Diffuse blines on US.  Lasix administered.  Elevated BNP.  Appropriate medications for patient's presenting complaints were ordered and effects were reassessed.  Patient's records (prior hospital, ED visit, and/or nursing home notes if available) were reviewed.  Additional history was obtained from EMS.  Escalation to admission/observation was considered.  Critical care consulted.  Confirmed with NH over phone patient is DNR/DNI and MOLST faxed to hospital. Patient requires inpatient hospitalization.

## 2025-03-24 NOTE — ED PROVIDER NOTE - PROGRESS NOTE DETAILS
félix: [facility] with [facility], Confirmed DNR/DNI status. Pt has MOLST on file which will be faxed over to us. Molst signed by pt herself. No emergency contacts listed.

## 2025-03-24 NOTE — H&P ADULT - NSHPPHYSICALEXAM_GEN_ALL_CORE
GENERAL: lying in bed, minimally responsive to sternal rub  HEAD:  Atraumatic, normocephalic  HEART: Regular rate and rhythm  LUNGS: Unlabored respirations.  Clear to auscultation bilaterally, no crackles, wheezing, or rhonchi  ABDOMEN: Soft, obese, nontender, nondistended, +BS  EXTREMITIES: b/l LE nonedematous; extensive dark veins noted to both feet  NERVOUS SYSTEM:  patient is not alert, unable to assess orientation GENERAL: lying in bed, minimally responsive to sternal rub  HEAD:  Atraumatic, normocephalic  HEART: Regular rate and rhythm  LUNGS: Diffuse crackles auscultated b/l  ABDOMEN: Soft, obese, nontender, nondistended, +BS  EXTREMITIES: b/l LE nonedematous; extensive dark veins noted to both feet  NERVOUS SYSTEM:  patient is not alert, unable to assess orientation GENERAL: lying in bed, minimally responsive to sternal rub  HEAD:  Atraumatic, normocephalic  HEART: BEATRIZ 2.6  LUNGS: Diffuse crackles auscultated b/l  ABDOMEN: Soft, obese, nontender, nondistended, +BS  EXTREMITIES: b/l LE nonedematous; extensive dark veins noted to both feet  NERVOUS SYSTEM:  patient is not alert, unable to assess orientation

## 2025-03-24 NOTE — ED PROVIDER NOTE - SKILLED NURSING FACILITY NOTE SUMMARY FREE TEXT FOR MDM OBTAINED AND REVIEWED OLD RECORDS QUESTION
NH notes show DNR/DNI NH notes show DNR/DNI - MOLST to be faxed by NH NH notes show DNR/DNI - MOLST received via fax confirming DNR/DNI

## 2025-03-24 NOTE — ED ADULT NURSE REASSESSMENT NOTE - NS ED NURSE REASSESS COMMENT FT1
Patient noted with redness and excoriation all over sacral region and possible stage 2 pressure injury superior. Patient also noticed with dispersed excoriation to groin and vaginal area.

## 2025-03-24 NOTE — H&P ADULT - ASSESSMENT
Patient is a 74 year old F with PMHx of COPD on 3L NC, smoking hx, AFib on Eliquis, past hx of abdominal abscess (in 2021), Gastric Bypass surgery 20+ years ago, who presented to the ED from [facility] on 3/24 with CC of hypoxia and change in mental status. As per ED note, patient was calling for help at her NH this morning. She was noted to be saturating 80% while on her baseline 3L NC; was placed on non-rebreather and brought to ED. Patient initially admitted to MICU for acute hypoxic hypercapnic respiratory failure. Code status was confirmed to be DNR/DNI with MOLST form faxed from [facility]. Patient subsequently downgraded to SDU.     #Acute Hypoxic Hypercapnic Respiratory Failure likely secondary to CAP  #COPD on 3L NC  #Smoking Hx  - while at NH was noted to be saturating 80% while on her baseline 3L NC, placed on NRB and brought to ED  - lethargic in ED, minimally responsive to sternal rub  - on admission, WBC 11.7k, Hgb 9.7 (baseline ~10), Trop 17, Pro-BNP 1727; VBG pH 7.18| pCO2 116 |pO2 77 | HCO3 43 --> after two hours of BiPAP rpt VBG pH 7.18| pCO2 118 |pO2 61 | HCO3 44 --> transitioned to AVAPS, ABG after one hour pH 7.24| pCO2 103 |pO2 101 | HCO3 44  - CXR: Interstitial edema. Lingular opacities. Bilateral pleural effusions.    - CT Head: Mild generalized cerebral edema is noted.  - s/p Magnesium 2 g over 20 minutes, Solu-medrol 125 mg IVP x1, Duonebs 3 mL q20 min x3 doses, Rocephin 1g x1, Azithro 500 mg x1, Lasix 40 mg IVP x1 in ED.   PLAN:  - c/w ABx to cover for possible CAP -- Rocephin + Azithro  - f/u Urine strep, Legionella, MRSA, Flu/COVID/RSV, Procal  - c/w continuous AVAPS   - c/w Solu-medrol 40 mg IVP qd  - c/w Duonebs q6h + Trelegy (therapeutic interchange)    #AFib on Eliquis  #HFpEF (TTE 6/2021 EF 63%, GIIIDD)  - holding Eliquis while NPO when requiring continuous BiPAP --> Lovenox 70 mg BID  - last TTE 6/2021 EF 63%, GIIIDD --> TTE ordered  - on home med Lasix 40 mg qd --> c/w Lasix 40 mg IVP qd   - f/u venous duplex b/l LE     MISC  #DVT prophylaxis: Lovenox BID  #GI prophylaxis: PPI  #Diet: NPO while on continuous BiPAP  #Activity: IAT  #Code status: DNR/DNI  #Disposition: SDU   Patient is a 74 year old F with PMHx of COPD on 3L NC, smoking hx, AFib on Eliquis, past hx of abdominal abscess (in 2021), Gastric Bypass surgery 20+ years ago, who presented to the ED from [facility] on 3/24 with CC of hypoxia and change in mental status. As per ED note, patient was calling for help at her NH this morning. She was noted to be saturating 80% while on her baseline 3L NC; was placed on non-rebreather and brought to ED. Patient initially admitted to MICU for acute hypoxic hypercapnic respiratory failure. Code status was confirmed to be DNR/DNI with MOLST form faxed from [facility]. Patient subsequently downgraded to SDU.     #Acute Hypoxic Hypercapnic Respiratory Failure likely secondary to CAP  #COPD on 3L NC  #Smoking Hx  - while at NH was noted to be saturating 80% while on her baseline 3L NC, placed on NRB and brought to ED  - lethargic in ED, minimally responsive to sternal rub  - on admission, WBC 11.7k, Hgb 9.7 (baseline ~10), Trop 17, Pro-BNP 1727; VBG pH 7.18| pCO2 116 |pO2 77 | HCO3 43 --> after two hours of BiPAP rpt VBG pH 7.18| pCO2 118 |pO2 61 | HCO3 44 --> transitioned to AVAPS, ABG after one hour pH 7.24| pCO2 103 |pO2 101 | HCO3 44  - CXR: Interstitial edema. Lingular opacities. Bilateral pleural effusions.    - CT Head: Mild generalized cerebral edema is noted.  - s/p Magnesium 2 g over 20 minutes, Solu-medrol 125 mg IVP x1, Duonebs 3 mL q20 min x3 doses, Rocephin 1g x1, Azithro 500 mg x1, Lasix 40 mg IVP x1 in ED.   PLAN:  - c/w ABx to cover for possible CAP -- Rocephin + Azithro  - f/u Urine strep, Legionella, MRSA, Flu/COVID/RSV, Procal  - c/w continuous AVAPS   - c/w Solu-medrol 40 mg IVP qd  - c/w Duonebs q6h + Trelegy (therapeutic interchange)    #AFib on Eliquis  #HFpEF (TTE 6/2021 EF 63%, GIIIDD)  #Bilateral Pleural Effusions  - holding Eliquis while NPO when requiring continuous BiPAP --> Lovenox 70 mg BID  - last TTE 6/2021 EF 63%, GIIIDD --> TTE ordered  - on home med Lasix 40 mg qd --> c/w Lasix 40 mg IVP qd   - f/u venous duplex b/l LE     MISC  #DVT prophylaxis: Lovenox BID  #GI prophylaxis: PPI  #Diet: NPO while on continuous BiPAP  #Activity: IAT  #Code status: DNR/DNI  #Disposition: SDU   Patient is a 74 year old F with PMHx of COPD on 3L NC, smoking hx, AFib on Eliquis, past hx of abdominal abscess (in 2021), Gastric Bypass surgery 20+ years ago, who presented to the ED from [facility] on 3/24 with CC of hypoxia and change in mental status. As per ED note, patient was calling for help at her NH this morning. She was noted to be saturating 80% while on her baseline 3L NC; was placed on non-rebreather and brought to ED. Patient initially admitted to MICU for acute hypoxic hypercapnic respiratory failure. Code status was confirmed to be DNR/DNI with MOLST form faxed from [facility]. Patient subsequently downgraded to SDU.     #Acute Hypoxic Hypercapnic Respiratory Failure likely secondary to CAP  #COPD on 3L NC  #Smoking Hx  - while at NH was noted to be saturating 80% while on her baseline 3L NC, placed on NRB and brought to ED  - lethargic in ED, minimally responsive to sternal rub  - on admission, WBC 11.7k, Hgb 9.7 (baseline ~10), Trop 17, Pro-BNP 1727; VBG pH 7.18| pCO2 116 |pO2 77 | HCO3 43 --> after two hours of BiPAP rpt VBG pH 7.18| pCO2 118 |pO2 61 | HCO3 44 --> transitioned to AVAPS, ABG after one hour pH 7.24| pCO2 103 |pO2 101 | HCO3 44  - CXR: Interstitial edema. Lingular opacities. Bilateral pleural effusions.    - CT Head: Mild generalized cerebral edema is noted.  - s/p Magnesium 2 g over 20 minutes, Solu-medrol 125 mg IVP x1, Duonebs 3 mL q20 min x3 doses, Rocephin 1g x1, Azithro 500 mg x1, Lasix 40 mg IVP x1 in ED.   PLAN:  - c/w ABx to cover for possible CAP -- Rocephin + Azithro  - f/u Urine strep, Legionella, MRSA, Flu/COVID/RSV, Procal  - c/w continuous AVAPS   - c/w Solu-medrol 40 mg IVP BID  - c/w Duonebs q6h + Trelegy (therapeutic interchange)    #AFib on Eliquis  #HFpEF (TTE 6/2021 EF 63%, GIIIDD)  #Bilateral Pleural Effusions  - holding Eliquis while NPO when requiring continuous BiPAP --> Lovenox 70 mg BID  - last TTE 6/2021 EF 63%, GIIIDD --> TTE ordered  - on home med Lasix 40 mg qd --> c/w Lasix 40 mg IVP qd   - f/u venous duplex b/l LE     MISC  #DVT prophylaxis: Lovenox BID  #GI prophylaxis: PPI  #Diet: NPO while on continuous BiPAP  #Activity: IAT  #Code status: DNR/DNI  #Disposition: SDU

## 2025-03-24 NOTE — ED PROVIDER NOTE - OBJECTIVE STATEMENT
74-year-old female past medical history of A-fib on Eliquis, abdominal abscess, gastric bypass, COPD presenting to the ED for shortness of breath.  Per EMS, patient woke up and was short of breath.  Patient was found to be hypoxic satting at 83% and placed on 15 L with improvement to 90%.  Patient is lethargic upon arrival.  Patient unable to contribute to history at this time.

## 2025-03-24 NOTE — ED ADULT NURSE NOTE - NS ED NURSE RECORD ANOTHER HT AND WT
Post Acute Facility Update     *The information contained in this note was received during a weekly care coordination call with the post acute facility*    Current Facility: [address] [place] [address] ([facility])  Anticipated Discharge Date: TBD    Facility Nursing Update: No current updates  Facility Social Work Update: No current updates  Upper Extremity Assistance: Stand by Assist - Presence and Cueing  Lower Extremity Assistance: Maximum Assistance  Bed Mobility: Moderate Assistance   Transfers: Moderate Assistance   Ambulation: Moderate Assistance   How far (in feet) is the patient ambulating?  20  Device Used: walker  Barriers to Discharge: SUSANNE Coelho  Yes

## 2025-03-24 NOTE — H&P ADULT - ATTENDING COMMENTS
Events noted, presented with SOB/ AMS, multiple co morbidities as above, placed on NIV/ Head CT noted  Acute on chronic hypoxemic/ hypercapnic resp failure  COPD exacerbation  Pul edema  possible pneumonia/ aspiration  Steroids AVAPS increase RR/ diuresis  Repeat ABG  ? cerebral edema mild / Neuro eval  on Full AC  poor prognosis   GOC

## 2025-03-24 NOTE — ED PROVIDER NOTE - ATTENDING CONTRIBUTION TO CARE
Attending Statement: I have personally provided the amount of critical care time documented below excluding time spent on separate procedures.     Critical Care Time Spent (min) Must be 30 or more minutes to qualify: 45.    74 female, PMHx of Afib on Eliquis, Abdominal abscess 2 mo ago, Gastric Bypass surgery 20+ years ago, COPD, presenting for change in mental status.  Patient comes from NH where she was calling for help this morning.  Normally on 3L noted to be saturating 80%, placed on NRB and brought by EMS.  Patient is intermittently sleepy but alert and oriented.    CONSTITUTIONAL: elderly female  SKIN: warm, dry  HEAD: Normocephalic  EYES: no conjunctival erythema  ENT: No nasal discharge; airway clear.  NECK: Supple;  CARD:  Regular rate and rhythm.   RESP: bilateral crackles  ABD: soft ntnd  EXT: Normal ROM.  No clubbing, cyanosis or edema.   NEURO: somnolent but reactive to verbal stimulation  PSYCH: Cooperative, appropriate.

## 2025-03-24 NOTE — ED PROVIDER NOTE - CARE PLAN
1 Principal Discharge DX:	Acute respiratory failure with hypercapnia  Secondary Diagnosis:	Fluid overload

## 2025-03-24 NOTE — ED ADULT NURSE NOTE - CHIEF COMPLAINT QUOTE
pt brought in by ems from [facility], pt woke up sob and  hypoxic on 3 L and sating 83. pt now on nonrebreather 15 L sating only 91%. pt as well very lethargic. pt fs 158 with ems, hx dm and copd

## 2025-03-24 NOTE — ED ADULT TRIAGE NOTE - TEMP AT ED ARRIVAL (C)
Chief Complaint   Patient presents with   • Office Visit     2 month follow up   • Derm Problem     History of actinic keratoses       HISTORY OF PRESENT ILLNESS:     The patient is a 88 year old male who goes by the name Elias.        The patient presents in 2 month follow up regarding 3 specific actinic keratoses that I treated with liquid nitrogen on 08/16/2022.    I recommended follow up at the 2 month  patrick to be certain that these specific lesions have resolved.      Does the  patient use the over the counter medication, sunscreen of at least SPF 30?  Yes  Does the patient avoid the peak sun hours of 10 am to 4 pm?     Yes  Does the patient wear sun  protective clothing?        Yes      When the patient was asked if there are any spots on their skin that they would like to point out today, they responded:      they would like to point out a lesion on his mid chest.     The patient states that this lesion seems to be growing, but otherwise asymptomatic.   The patient would like confirmation that it isn't anything dangerous.           The patient had genetic testing which showed POT 1 mutation which according to the document he showed me can put him on increase risk for “cancer”.          REVIEW OF SYSTEMS:  Constitutional:  In general, does the patient feel well?  Yes    Cardiovascular:    Does the patient have a pacemaker?  No    Does the patient have a defibrillator?  No    Hematologic:  The patient bleeds easily because of being on aspirin or an anticoagulant:  No     ALLERGIES:  No Known Allergies    Current Outpatient Medications   Medication Sig   • Ferrous Sulfate (Iron) 325 (65 Fe) MG Tab Take 1 tablet by mouth daily.   • metoPROLOL succinate (TOPROL-XL) 100 MG 24 hr tablet TAKE 1 TABLET EVERY DAY   • amLODIPine (NORVASC) 5 MG tablet Take 1 tablet by mouth every morning.   • irbesartan (AVAPRO) 300 MG tablet TAKE 1 TABLET EVERY DAY   • levothyroxine 125 MCG tablet TAKE 1 TABLET DAILY ON MONDAY THROUGH  SATURDAY AND TAKE 1/2 TABLET ON SUNDAY   • furosemide (LASIX) 20 MG tablet Take 1 tablet by mouth daily.   • tadalafil (Cialis) 20 MG tablet Take 1 tablet by mouth as needed for Erectile Dysfunction.   • cholecalciferol (VITAMIN D) 25 mcg (1,000 units) tablet Take 2,000 Units by mouth daily.   • calcium carbonate (TUMS) 500 MG chewable tablet Chew 1 tablet by mouth 2 times daily.     No current facility-administered medications for this visit.       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), stage IV    Stomach ulcer                                   \"long aleyda*    Fracture                                                        Comment: hand fracture (healed non-surgical)    Chronic pain                                                    Comment: r/t RA    Wears prescription eyeglasses                   2018          Wears partial dentures                          2018            Comment: UPPERS    Wears hearing aid in both ears                  2018          Thyroid cancer (CMS/formerly [facility])                                      Monoallelic mutation of POT1 gene               8/16/2022       Comment: [id number] ([id number]*); recommend annual                dermatologic exams and CBC      DERMATOLOGY PAST MEDICAL HISTORY:      Disseminated superficial actinic porokeratosis treated with liquid nitrogen.  Disseminated superficial actinic porokeratosis for which he used Efudex cream  once a day for up month on the arms December 2006.   Disseminated superficial actinic porokeratosis treated with Efudex cream once a day for a month on the legs November 2007        Inflamed seborrheic keratoses treated with liquid nitrogen.        Eczema treated with triamcinolone 0.1% cream.     Seborrheic dermatitis of scalp treated with Lidex 0.05% scalp solution because (Clobetasol 0.05% scalp solution was not covered well by his insurance.)          FAMILY HISTORY:  Does the patient have a  family history of melanoma? No      SOCIAL HISTORY:           PHYSICAL EXAMINATION:    No residual actinic keratoses at 2 lesions treated on 08/16/2022 .      Lesion(s) suspicious for nonmelanoma skin cancer  to biopsy with shave biopsy technique today as follows:  Left cheek 95 mm medial to tragus (just below left mid infraorbital crease) 7 x 4 mm pearly scaly atrophic macule            Giant pore of Luke as follows:  Left chest T4 level 15 mm from the spine 22 x 15 mm giant pore of whiner            Lesions not treated today as follows:    (Actinic keratoses NOT treated today with Liquid Nitrogen as follows:  Left cheek 98 mm medial to superior earlobe 5 mm scaly macule consistent with actinic keratosis     Lesion(s) suspicious for nonmelanoma skin cancer  to biopsy with shave biopsy technique in the future as follows:  Right cheek 40 mm medial and inferior to earlobe ill defined  3 x 7 mm flesh colored scaly macule)          The patient is well nourished, well developed, and alert and oriented to person, place and time with appropriate mood and affect.    The patient was seen and examined by Moises Rose MD.  in the presence of my medical assistant Bhumi Jarrett MA.  This office visit note was in part created by Bhumi Jarrett MA acting also as a scribe for Moises Rose MD.  Moises Rose MD.   reviewed the note for accuracy and completeness before signing.        Assessment and Plan:    1.  Actinic  keratosis x1 NOT TREATED TODAY    I pointed out this lesion to the patient today but do not recommend treating this lesion with liquid nitrogen because the lesion is near the site where I chose to perform shave biopsy today and I did not want this actinic keratosis treated because it would be in the area covered by the bandage today.      When I see the patient in follow-up in 3 months my plan will be to likely treat this lesion with liquid nitrogen at that time.      Return to clinic in 3 months for a waist up skin exam regarding history of nonmelanoma skin cancer and actinic keratoses        2. Sun damaged skin (dermatoheliosis)    For management of this problem I recommend:  Avoiding the peak sun hours of 10 am to 4 pm  Wearing appropriate sun protection clothing.  We recommend the patient use the over the counter medication, Sunscreen with SPF 30.        3. Shave biopsy of 7 x 4 mm pearly scaly atrophic macule located left cheek 95 mm medial to tragus just below left mid infraorbital crease     After discussion of the risks and benefits of shave biopsy (TANGENTIAL BIOPSY), the patient gave oral consent for the same.  The area was cleansed with alcohol and the site was anesthetized with 2% lidocaine with 1:100,000 epinephrine.  The specimen was obtained with shave technique.  Minimal bleeding was stopped with Drysol. The patient was instructed in wound care in oral and written forms.  The specimen was submitted for an [facility] dermatopathologist to read.  Diagnosis is neoplasm of uncertain behavior skin.    Differential diagnosis is:   Squamous cell carcinoma versus possible inverted follicular keratosis     We took a photograph of this site to help verify the location and appearance of the lesion.  The patient gave verbal consent for me to take this photograph and enter this photograph into EPIC.              I explained to the patient that if this lesion comes back as a skin cancer I will recommend Mohs surgery with  Dr. Jenna Bocanegra.      This Mohs surgeon practices with [facility].    This Mohs surgeon performs Mohs surgery at their clinic on [facility] in [facility] in [place].      The address is :    [id number] [facility]  [address] [place], [place] [id number]      Phone number for the  office on [facility] in [place] is as follows:  [phone]    The fax number for that office is [phone]        We gave the patient a brochure on Mohs surgery and we went over that information with the patient.      We gave the patient the biography sheet and  card regarding Dr. Jenna Bocanegra.      Return to clinic in 3 months        4.   3 x 7 mm flesh colored ill-defined scaly macule located right cheek 40 mm medial and inferior to earlobe     Explained to the patient that differential diagnosis for this lesion is residual actinic keratosis versus possible squamous cell carcinoma in-situ.      I chose not to treat this lesion today but instead when I see the patient in follow-up in 3 months if this lesion still looks suspicious at that time I will likely then make plans to biopsy this lesion.      Return to clinic in 3 months        5. Dilated pore of Luke on the chest    The diagnosis was discussed.     I manually expressed the contents from the lesion.      Since the lesion is asymptomatic and is not dangerous I do not recommend excision of this lesion.      Return to clinic p.r.n. regarding this problem.        On 10/31/2022, IBhumi MA scribed the services personally performed by Moises oRse MD   The documentation recorded by the scribe accurately and completely reflects the service(s) I personally performed and the decisions made by me.          36.4

## 2025-03-24 NOTE — ED PROVIDER NOTE - PHYSICAL EXAMINATION
CONSTITUTIONAL: lethargic   SKIN: Warm dry, normal skin turgor  HEAD: NCAT  EYES: EOMI, PERRLA, no scleral icterus, conjunctiva pink  ENT: normal pharynx with no erythema or exudates  NECK: Supple; non tender. Full ROM.  CARD: tachycardic   RESP: Crackles b/l   ABD: soft, non-tender, no rebound or guarding.  EXT: edema 1+ b/l   NEURO: normal motor. normal sensory.  PSYCH: Cooperative, appropriate.

## 2025-03-25 ENCOUNTER — RESULT REVIEW (OUTPATIENT)
Age: 75
End: 2025-03-25

## 2025-03-25 LAB
GAS PNL BLDA: SIGNIFICANT CHANGE UP
PROCALCITONIN SERPL-MCNC: <0.02 NG/ML — SIGNIFICANT CHANGE UP (ref 0.02–0.1)

## 2025-03-25 PROCEDURE — 99233 SBSQ HOSP IP/OBS HIGH 50: CPT

## 2025-03-25 PROCEDURE — 93306 TTE W/DOPPLER COMPLETE: CPT | Mod: 26

## 2025-03-25 PROCEDURE — 71045 X-RAY EXAM CHEST 1 VIEW: CPT | Mod: 26

## 2025-03-25 PROCEDURE — 99291 CRITICAL CARE FIRST HOUR: CPT

## 2025-03-25 RX ORDER — OLANZAPINE 10 MG/1
2.5 TABLET ORAL ONCE
Refills: 0 | Status: COMPLETED | OUTPATIENT
Start: 2025-03-25 | End: 2025-03-25

## 2025-03-25 RX ADMIN — ENOXAPARIN SODIUM 70 MILLIGRAM(S): 100 INJECTION SUBCUTANEOUS at 18:27

## 2025-03-25 RX ADMIN — METHYLPREDNISOLONE ACETATE 40 MILLIGRAM(S): 80 INJECTION, SUSPENSION INTRA-ARTICULAR; INTRALESIONAL; INTRAMUSCULAR; SOFT TISSUE at 05:57

## 2025-03-25 RX ADMIN — IPRATROPIUM BROMIDE AND ALBUTEROL SULFATE 3 MILLILITER(S): .5; 2.5 SOLUTION RESPIRATORY (INHALATION) at 08:00

## 2025-03-25 RX ADMIN — ENOXAPARIN SODIUM 70 MILLIGRAM(S): 100 INJECTION SUBCUTANEOUS at 05:56

## 2025-03-25 RX ADMIN — IPRATROPIUM BROMIDE AND ALBUTEROL SULFATE 3 MILLILITER(S): .5; 2.5 SOLUTION RESPIRATORY (INHALATION) at 02:00

## 2025-03-25 RX ADMIN — METHYLPREDNISOLONE ACETATE 40 MILLIGRAM(S): 80 INJECTION, SUSPENSION INTRA-ARTICULAR; INTRALESIONAL; INTRAMUSCULAR; SOFT TISSUE at 18:28

## 2025-03-25 RX ADMIN — IPRATROPIUM BROMIDE AND ALBUTEROL SULFATE 3 MILLILITER(S): .5; 2.5 SOLUTION RESPIRATORY (INHALATION) at 00:02

## 2025-03-25 RX ADMIN — Medication 250 MILLIGRAM(S): at 00:16

## 2025-03-25 RX ADMIN — Medication 40 MILLIGRAM(S): at 12:44

## 2025-03-25 RX ADMIN — CEFTRIAXONE 100 MILLIGRAM(S): 500 INJECTION, POWDER, FOR SOLUTION INTRAMUSCULAR; INTRAVENOUS at 00:02

## 2025-03-25 RX ADMIN — OLANZAPINE 2.5 MILLIGRAM(S): 10 TABLET ORAL at 02:43

## 2025-03-25 RX ADMIN — FUROSEMIDE 40 MILLIGRAM(S): 10 INJECTION INTRAMUSCULAR; INTRAVENOUS at 05:57

## 2025-03-25 NOTE — CHART NOTE - NSCHARTNOTEFT_GEN_A_CORE
Patient seen and examined at bedside.    Recommendations:  Please obtain vEEG  Obtain MRI brain noncon, MRA head and neck, and MRV  See consult note for full recommendations.

## 2025-03-25 NOTE — PROGRESS NOTE ADULT - ATTENDING COMMENTS
74 year old F with PMHx of COPD on 3L NC, smoking hx, AFib on Eliquis, past hx of abdominal abscess (in 2021), Gastric Bypass surgery 20+ years ago, who presented to the ED from [facility] on 3/24 with hypoxia and change in mental status. As per ED note, patient was calling for help at her NH this morning. She was noted to be saturating 80% while on her baseline 3L NC; was placed on non-rebreather and brought to ED. Patient initially admitted to MICU for acute hypoxic hypercapnic respiratory failure. Code status was confirmed to be DNR/DNI with MOLST form faxed from [facility]. Patient subsequently downgraded to SDU.     #Acute Hypoxic Hypercapnic Respiratory Failure likely secondary to CAP  #COPD on 3L NC  #Smoking Hx  - requiring BIPAP initially, transitioned to NC  - CXR: Interstitial edema. Lingular opacities. Bilateral pleural effusions  - CT Head: Mild generalized cerebral edema  - c/w  Rocephin + Azithro  - f/u Urine strep, Legionella, MRSA, Procal  - c/w Solumedrol 40 mg IVP BID  - c/w Duonebs q6h + Trelegy     #cerebral edema  - neurology consulted  -  MR head non contrast, MRA H/N, MRV with contrast  - if unable to obtain MRH by tomorrow, would repeat CTH tomorrow (or sooner if acute worsening mentation/neuro exam)  - vEEG  - consult neurocritical care    #AFib on Eliquis  #HFpEF (TTE 6/2021 EF 63%, GIIIDD)  #Bilateral Pleural Effusions  - check TTE  - Lovenox 70 mg BID  - Lasix 40mg IV q24hrs  - f/u venous duplex b/l LE     DVT ppx: lovenox  DNR/DNI    Pending/Handoff: f/u venous duplex, TTE, repeat CTH or obtain MRI head, wean steroids, IV diuresis

## 2025-03-25 NOTE — CONSULT NOTE ADULT - SUBJECTIVE AND OBJECTIVE BOX
---------INCOMPLETE General Neurology Consult Note----------    74F. AMS & Hypoxia. From [facility]  DNR/DNI.     Mild generalized cerebral edema on CTH today compared to 2021    PMHx of COPD on 3L NC, smoking hx, AFib on Eliquis, past hx of abdominal abscess (in 2021), Gastric Bypass surgery 20+ years ago    As per ED note, patient was calling for help at her NH this morning. She was noted to be saturating 80% while on her baseline 3L NC; was placed on non-rebreather and brought to ED.       HPI:  Patient is a 74 year old F with PMHx of COPD on 3L NC, smoking hx, AFib on Eliquis, past hx of abdominal abscess (in 2021), Gastric Bypass surgery 20+ years ago, who presented to the ED from [facility] on 3/24 with CC of hypoxia and change in mental status. As per ED note, patient was calling for help at her NH this morning. She was noted to be saturating 80% while on her baseline 3L NC; was placed on non-rebreather and brought to ED.     Vitals in ED: T 97.5, HR 77, /76, sat 91% (on NRB?)  Labs in ED: WBC 11.7k, Hgb 9.7 (baseline ~10), Trop 17, Pro-BNP 1727; VBG pH 7.18| pCO2 116 |pO2 77 | HCO3 43 --> after two hours of BiPAP rpt VBG pH 7.18| pCO2 118 |pO2 61 | HCO3 44 --> transitioned to AVAPS, ABG after one hour pH 7.24| pCO2 103 |pO2 101 | HCO3 44    Imaging:   - CXR: Interstitial edema. Lingular opacities. Bilateral pleural effusions.  - POCUS Chest in ED: Diffuse B lines concerning for pulmonary edema. Small left pleural effusion.Hepatization and static air bronchograms to bilateral bases.  - CT Head: Mild generalized cerebral edema is noted.  Interventions: s/p Magnesium 2 g over 20 minutes, Solu-medrol 125 mg IVP x1, Duonebs 3 mL q20 min x3 doses, Rocephin 1g x1, Azithro 500 mg x1, Lasix 40 mg IVP x1 in ED.     Patient initially admitted to MICU for acute hypoxic hypercapnic respiratory failure. Code status was confirmed to be DNR/DNI with MOLST form faxed from [facility]. Patient subsequently downgraded to SDU.  (24 Mar 2025 13:51)        PAST MEDICAL & SURGICAL HISTORY:  Atrial fibrillation  History of COPD      No significant past surgical history      FAMILY HISTORY:      Social History: (-) x 3    Allergies    No Known Allergies    Intolerances      MEDICATIONS  (STANDING):  albuterol/ipratropium for Nebulization 3 milliLiter(s) Nebulizer every 6 hours  azithromycin  IVPB 500 milliGRAM(s) IV Intermittent every 24 hours  cefTRIAXone   IVPB 1000 milliGRAM(s) IV Intermittent every 24 hours  enoxaparin Injectable 70 milliGRAM(s) SubCutaneous every 12 hours  fluticasone propionate/ salmeterol 100-50 MICROgram(s) Diskus 1 Dose(s) Inhalation two times a day  furosemide   Injectable 40 milliGRAM(s) IV Push daily  hydrocortisone hemorrhoidal Suppository 1 Suppository(s) Rectal at bedtime  lidocaine   4% Patch 1 Patch Transdermal daily  methylPREDNISolone sodium succinate Injectable 40 milliGRAM(s) IV Push every 12 hours  OLANZapine Injectable 2.5 milliGRAM(s) IntraMuscular once  pantoprazole  Injectable 40 milliGRAM(s) IV Push daily  tiotropium 2.5 MICROgram(s) Inhaler 2 Puff(s) Inhalation daily    MEDICATIONS  (PRN):  acetaminophen     Tablet .. 650 milliGRAM(s) Oral every 6 hours PRN Temp greater or equal to 38C (100.4F), Mild Pain (1 - 3)      Vital Signs Last 24 Hrs  T(C): 37.4 (24 Mar 2025 21:00), Max: 37.4 (24 Mar 2025 21:00)  T(F): 99.3 (24 Mar 2025 21:00), Max: 99.3 (24 Mar 2025 21:00)  HR: 70 (25 Mar 2025 01:42) (63 - 80)  BP: 116/58 (25 Mar 2025 01:42) (107/63 - 126/76)  BP(mean): 80 (24 Mar 2025 22:58) (80 - 81)  RR: 22 (25 Mar 2025 01:42) (16 - 23)  SpO2: 98% (25 Mar 2025 01:42) (89% - 100%)    Parameters below as of 25 Mar 2025 01:42  Patient On (Oxygen Delivery Method): BiPAP/CPAP        Examination:  General:  Appearance is consistent with chronologic age.  No abnormal facies.  Gross skin survey within normal limits.    Cognitive/Language:  The patient is oriented to person, place, time and date.  Recent and remote memory intact.  Fund of knowledge is intact and normal.  Language with normal repetition, comprehension and naming.  Nondysarthric.    Eyes: intact VA, VFF.  EOMI w/o nystagmus, skew or reported double vision.  PERRL.  No ptosis/weakness of eyelid closure.    Face:  Facial sensation normal V1 - 3, no facial asymmetry.    Ears/Nose/Throat:  Hearing grossly intact b/l.  Palate elevates midline.  Tongue and uvula midline.   Motor examination:   Normal tone, bulk and range of motion.  No tenderness, twitching, tremors or involuntary movements.  Formal Muscle Strength Testing: (MRC grade R/L) 5/5 UE; 5/5 LE.  No observable drift.  Reflexes:   2+ b/l pectoralis, biceps, triceps, brachioradialis, patella and Achilles.  Plantar response downgoing b/l.  Jaw jerk, Coral, clonus absent.  Sensory examination:   Intact to light touch and pinprick, pain, temperature and proprioception and vibration in all extremities.  Cerebellum:   FTN/HKS intact with normal EARL in all limbs.  No dysmetria or dysdiadokinesia.  Gait narrow based and normal.    Respiratory:  no audible wheezing or inspiratory stridor.  no use of accessory muscles.   Cardiac: pulse palpable, no audible bruits  Abdomen: supple, no guarding, no TTP    Labs:   CBC Full  -  ( 24 Mar 2025 10:00 )  WBC Count : 11.74 K/uL  RBC Count : 4.79 M/uL  Hemoglobin : 9.7 g/dL  Hematocrit : 37.6 %  Platelet Count - Automated : 253 K/uL  Mean Cell Volume : 78.5 fL  Mean Cell Hemoglobin : 20.3 pg  Mean Cell Hemoglobin Concentration : 25.8 g/dL  Auto Neutrophil # : x  Auto Lymphocyte # : x  Auto Monocyte # : x  Auto Eosinophil # : x  Auto Basophil # : x  Auto Neutrophil % : x  Auto Lymphocyte % : x  Auto Monocyte % : x  Auto Eosinophil % : x  Auto Basophil % : x    03-24    141  |  98  |  16  ----------------------------<  125[H]  4.4   |  35[H]  |  <0.5[L]    Ca    8.7      24 Mar 2025 10:00    TPro  6.9  /  Alb  3.5  /  TBili  0.8  /  DBili  x   /  AST  27  /  ALT  10  /  AlkPhos  105  03-24    LIVER FUNCTIONS - ( 24 Mar 2025 10:00 )  Alb: 3.5 g/dL / Pro: 6.9 g/dL / ALK PHOS: 105 U/L / ALT: 10 U/L / AST: 27 U/L / GGT: x             Urinalysis Basic - ( 24 Mar 2025 10:00 )    Color: x / Appearance: x / SG: x / pH: x  Gluc: 125 mg/dL / Ketone: x  / Bili: x / Urobili: x   Blood: x / Protein: x / Nitrite: x   Leuk Esterase: x / RBC: x / WBC x   Sq Epi: x / Non Sq Epi: x / Bacteria: x          Neuroimaging:  NCHCT: CT Head No Cont:   ACC: [id number] EXAM:  CT BRAIN   ORDERED BY: MARYSOL CARVAJAL     PROCEDURE DATE:  03/24/2025          INTERPRETATION:  CLINICAL INDICATION: Altered mental status.    Technique: CT of the head was performed without contrast.    Multiple contiguous axial images were acquired from the skullbase to the   vertex without the administration of intravenous contrast.  Coronal and   sagittal reformations were made.    COMPARISON:  prior head CT dated 9/9/2021.    FINDINGS:    In comparison to the prior CT examination of the ventricles are less   dilated with mild effacement of the supratentorial sulci for example   series 2 image 14. There is no brain herniation. The basal cisterns are   patent.     There is no intraparenchymal hematoma, mass effect or midline shift. No   abnormal extra-axial fluid collections are present.    The calvarium is intact. The visualized intraorbital compartments,   paranasal sinuses and mastoid complexes appear free of acute disease.    IMPRESSION:  Mild generalized cerebral edema is noted.    --- End of Report ---      MATEUS KUNZ MD; Attending Radiologist  This document has been electronically signed. Mar 24 2025 11:10AM (03-24-25 @ 11:00)      03-25-25 @ 02:31       ---------General Neurology Consult Note----------    74F DNR/DNI w/ PMHx of COPD on 3L NC, smoking and AUD hx, AFib on Eliquis, abdominal abscess (), gastric bypass surgery 20+ years ago, presents from nursing home with lethargy & hypoxia being admitted to SDU for AHRF. Neurology consulted for CTH finding of mild generalized cerebral edema compared to . When seen in the ED, patient with BiPAP, A&Ox3. She states she feels fine and endorses chronic left leg weakness. She complains about the BiPAP trying to remove the mask. She constantly asks for nurses to go to the restroom although she has Primafit and has been urinating per RN bedside. She appears delirious and agitated per bedside RT. No staff from NH available during encounter, so unclear baseline mental status and functions. Per chart review, patient at baseline A&Ox3 and full strength in . On arrival to ED, patient was somnolent but reactive to verbal stimulation. Patient denies any headache, dizziness, vision change, chest discomfort, new weakness or numbness.       PAST MEDICAL & SURGICAL HISTORY:  Atrial fibrillation  History of COPD      No significant past surgical history      FAMILY HISTORY:      Social History: (-) x 3    Allergies    No Known Allergies    Intolerances      MEDICATIONS  (STANDING):  albuterol/ipratropium for Nebulization 3 milliLiter(s) Nebulizer every 6 hours  azithromycin  IVPB 500 milliGRAM(s) IV Intermittent every 24 hours  cefTRIAXone   IVPB 1000 milliGRAM(s) IV Intermittent every 24 hours  enoxaparin Injectable 70 milliGRAM(s) SubCutaneous every 12 hours  fluticasone propionate/ salmeterol 100-50 MICROgram(s) Diskus 1 Dose(s) Inhalation two times a day  furosemide   Injectable 40 milliGRAM(s) IV Push daily  hydrocortisone hemorrhoidal Suppository 1 Suppository(s) Rectal at bedtime  lidocaine   4% Patch 1 Patch Transdermal daily  methylPREDNISolone sodium succinate Injectable 40 milliGRAM(s) IV Push every 12 hours  OLANZapine Injectable 2.5 milliGRAM(s) IntraMuscular once  pantoprazole  Injectable 40 milliGRAM(s) IV Push daily  tiotropium 2.5 MICROgram(s) Inhaler 2 Puff(s) Inhalation daily    MEDICATIONS  (PRN):  acetaminophen     Tablet .. 650 milliGRAM(s) Oral every 6 hours PRN Temp greater or equal to 38C (100.4F), Mild Pain (1 - 3)      Vital Signs Last 24 Hrs  T(C): 37.4 (24 Mar 2025 21:00), Max: 37.4 (24 Mar 2025 21:00)  T(F): 99.3 (24 Mar 2025 21:00), Max: 99.3 (24 Mar 2025 21:00)  HR: 70 (25 Mar 2025 01:42) (63 - 80)  BP: 116/58 (25 Mar 2025 01:42) (107/63 - 126/76)  BP(mean): 80 (24 Mar 2025 22:58) (80 - 81)  RR: 22 (25 Mar 2025 01:42) (16 - 23)  SpO2: 98% (25 Mar 2025 01:42) (89% - 100%)    Parameters below as of 25 Mar 2025 01:42  Patient On (Oxygen Delivery Method): BiPAP/CPAP      Examination:  General:  Appearance is consistent with chronologic age.  No abnormal facies.  Gross skin survey within normal limits.    Cognitive/Language: The patient is oriented to person, place, age, and month. Nonaphasic, nondysarthric.    Eyes: intact VA, VFF. EOMI w/o nystagmus, skew or reported double vision.  PERRL.  No ptosis/weakness of eyelid closure.    Face: Facial sensation normal V1 - 3, no facial asymmetry.    Ears/Nose/Throat: Hearing grossly intact b/l. Limited exam due to BiPAP mask  Motor examination: Normal tone, bulk and range of motion.  No tenderness, twitching, tremors or involuntary movements.  Formal Muscle Strength Testin/5 b/l UE and RLE, 4/5 LLE, no drift.  No observable drift.  Reflexes: 2+ b/l pectoralis, biceps, triceps, brachioradialis, patella and Achilles. Plantar response downgoing b/l.  Jaw jerk, Coral, clonus absent.  Sensory examination:  Intact to light touch and pinprick, pain, temperature and proprioception and vibration in all extremities.  Cerebellum:  FTN intact.  Gait deferred due to patient safety.      Labs:   CBC Full  -  ( 24 Mar 2025 10:00 )  WBC Count : 11.74 K/uL  RBC Count : 4.79 M/uL  Hemoglobin : 9.7 g/dL  Hematocrit : 37.6 %  Platelet Count - Automated : 253 K/uL  Mean Cell Volume : 78.5 fL  Mean Cell Hemoglobin : 20.3 pg  Mean Cell Hemoglobin Concentration : 25.8 g/dL  Auto Neutrophil # : x  Auto Lymphocyte # : x  Auto Monocyte # : x  Auto Eosinophil # : x  Auto Basophil # : x  Auto Neutrophil % : x  Auto Lymphocyte % : x  Auto Monocyte % : x  Auto Eosinophil % : x  Auto Basophil % : x        141  |  98  |  16  ----------------------------<  125[H]  4.4   |  35[H]  |  <0.5[L]    Ca    8.7      24 Mar 2025 10:00    TPro  6.9  /  Alb  3.5  /  TBili  0.8  /  DBili  x   /  AST  27  /  ALT  10  /  AlkPhos  105      LIVER FUNCTIONS - ( 24 Mar 2025 10:00 )  Alb: 3.5 g/dL / Pro: 6.9 g/dL / ALK PHOS: 105 U/L / ALT: 10 U/L / AST: 27 U/L / GGT: x             Urinalysis Basic - ( 24 Mar 2025 10:00 )    Color: x / Appearance: x / SG: x / pH: x  Gluc: 125 mg/dL / Ketone: x  / Bili: x / Urobili: x   Blood: x / Protein: x / Nitrite: x   Leuk Esterase: x / RBC: x / WBC x   Sq Epi: x / Non Sq Epi: x / Bacteria: x          Neuroimaging:  NCHCT: CT Head No Cont:   ACC: [id number] EXAM:  CT BRAIN   ORDERED BY: MARYSOL CARVAJAL     PROCEDURE DATE:  2025          INTERPRETATION:  CLINICAL INDICATION: Altered mental status.    Technique: CT of the head was performed without contrast.    Multiple contiguous axial images were acquired from the skullbase to the   vertex without the administration of intravenous contrast.  Coronal and   sagittal reformations were made.    COMPARISON:  prior head CT dated 2021.    FINDINGS:    In comparison to the prior CT examination of the ventricles are less   dilated with mild effacement of the supratentorial sulci for example   series 2 image 14. There is no brain herniation. The basal cisterns are   patent.     There is no intraparenchymal hematoma, mass effect or midline shift. No   abnormal extra-axial fluid collections are present.    The calvarium is intact. The visualized intraorbital compartments,   paranasal sinuses and mastoid complexes appear free of acute disease.    IMPRESSION:  Mild generalized cerebral edema is noted.    --- End of Report ---      MATEUS KUNZ MD; Attending Radiologist  This document has been electronically signed. Mar 24 2025 11:10AM (25 @ 11:00)      25 @ 02:31

## 2025-03-25 NOTE — PROGRESS NOTE ADULT - SUBJECTIVE AND OBJECTIVE BOX
Over Night Events: events noted, on NIV, awake follows commands, repeat ABG reviewed, low grade fever    PHYSICAL EXAM    ICU Vital Signs Last 24 Hrs  T(C): 37.4 (24 Mar 2025 21:00), Max: 37.4 (24 Mar 2025 21:00)  T(F): 99.3 (24 Mar 2025 21:00), Max: 99.3 (24 Mar 2025 21:00)  HR: 70 (25 Mar 2025 01:42) (63 - 80)  BP: 116/58 (25 Mar 2025 01:42) (107/63 - 126/76)  BP(mean): 80 (24 Mar 2025 22:58) (80 - 81)  RR: 22 (25 Mar 2025 01:42) (16 - 23)  SpO2: 98% (25 Mar 2025 01:42) (89% - 100%)    O2 Parameters below as of 25 Mar 2025 01:42  Patient On (Oxygen Delivery Method): BiPAP/CPAP            General: ill looking  Lungs: dec bs both bases  Cardiovascular: BEATRIZ 2.6  Abdomen: Soft, Positive BS  Extremities: No clubbing   Neurological: Non focal         LABS:                          9.7    11.74 )-----------( 253      ( 24 Mar 2025 10:00 )             37.6                                               03-24    141  |  98  |  16  ----------------------------<  125[H]  4.4   |  35[H]  |  <0.5[L]    Ca    8.7      24 Mar 2025 10:00    TPro  6.9  /  Alb  3.5  /  TBili  0.8  /  DBili  x   /  AST  27  /  ALT  10  /  AlkPhos  105  03-24                                             Urinalysis Basic - ( 24 Mar 2025 10:00 )    Color: x / Appearance: x / SG: x / pH: x  Gluc: 125 mg/dL / Ketone: x  / Bili: x / Urobili: x   Blood: x / Protein: x / Nitrite: x   Leuk Esterase: x / RBC: x / WBC x   Sq Epi: x / Non Sq Epi: x / Bacteria: x                                                  LIVER FUNCTIONS - ( 24 Mar 2025 10:00 )  Alb: 3.5 g/dL / Pro: 6.9 g/dL / ALK PHOS: 105 U/L / ALT: 10 U/L / AST: 27 U/L / GGT: x                                                                                                                                   ABG - ( 25 Mar 2025 04:30 )  pH, Arterial: 7.50  pH, Blood: x     /  pCO2: 63    /  pO2: 76    / HCO3: 49    / Base Excess: 23.0  /  SaO2: 96.3                MEDICATIONS  (STANDING):  albuterol/ipratropium for Nebulization 3 milliLiter(s) Nebulizer every 6 hours  azithromycin  IVPB 500 milliGRAM(s) IV Intermittent every 24 hours  cefTRIAXone   IVPB 1000 milliGRAM(s) IV Intermittent every 24 hours  enoxaparin Injectable 70 milliGRAM(s) SubCutaneous every 12 hours  fluticasone propionate/ salmeterol 100-50 MICROgram(s) Diskus 1 Dose(s) Inhalation two times a day  furosemide   Injectable 40 milliGRAM(s) IV Push daily  hydrocortisone hemorrhoidal Suppository 1 Suppository(s) Rectal at bedtime  lidocaine   4% Patch 1 Patch Transdermal daily  methylPREDNISolone sodium succinate Injectable 40 milliGRAM(s) IV Push every 12 hours  pantoprazole  Injectable 40 milliGRAM(s) IV Push daily  tiotropium 2.5 MICROgram(s) Inhaler 2 Puff(s) Inhalation daily    MEDICATIONS  (PRN):  acetaminophen     Tablet .. 650 milliGRAM(s) Oral every 6 hours PRN Temp greater or equal to 38C (100.4F), Mild Pain (1 - 3)      CXR Noted

## 2025-03-25 NOTE — PROGRESS NOTE ADULT - ASSESSMENT
Patient is a 74 year old F with PMHx of COPD on 3L NC, smoking hx, AFib on Eliquis, past hx of abdominal abscess (in 2021), Gastric Bypass surgery 20+ years ago, who presented to the ED from [facility] on 3/24 with CC of hypoxia and change in mental status. As per ED note, patient was calling for help at her NH this morning. She was noted to be saturating 80% while on her baseline 3L NC; was placed on non-rebreather and brought to ED. Patient initially admitted to MICU for acute hypoxic hypercapnic respiratory failure. Code status was confirmed to be DNR/DNI with MOLST form faxed from [facility]. Patient subsequently downgraded to SDU.     #Acute Hypoxic Hypercapnic Respiratory Failure likely secondary to CAP  #COPD on 3L NC  #Smoking Hx  - while at NH was noted to be saturating 80% while on her baseline 3L NC, placed on NRB and brought to ED  - lethargic in ED, minimally responsive to sternal rub  - on admission, WBC 11.7k, Hgb 9.7 (baseline ~10), Trop 17, Pro-BNP 1727; VBG pH 7.18| pCO2 116 |pO2 77 | HCO3 43 --> after two hours of BiPAP rpt VBG pH 7.18| pCO2 118 |pO2 61 | HCO3 44 --> transitioned to AVAPS, ABG after one hour pH 7.24| pCO2 103 |pO2 101 | HCO3 44  - CXR: Interstitial edema. Lingular opacities. Bilateral pleural effusions.    - CT Head: Mild generalized cerebral edema is noted.  - s/p Magnesium 2 g over 20 minutes, Solu-medrol 125 mg IVP x1, Duonebs 3 mL q20 min x3 doses, Rocephin 1g x1, Azithro 500 mg x1, Lasix 40 mg IVP x1 in ED.   PLAN:  - c/w ABx to cover for possible CAP -- Rocephin + Azithro  - f/u Urine strep, Legionella, MRSA, Flu/COVID/RSV, Procal  - was on continuous AVAPS >> currently on NC  - c/w Solu-medrol 40 mg IVP BID  - c/w Duonebs q6h + Trelegy (therapeutic interchange)    #mild generalized edema on CTH  -neuro cs noted, will get neurocrit eval  -MR head to be ordered, called [facility] today for MR checklist as pt is not reliable but no answer, will reattempt to call    #AFib on Eliquis  #HFpEF (TTE 6/2021 EF 63%, GIIIDD)  #Bilateral Pleural Effusions  - holding Eliquis while NPO when requiring continuous BiPAP --> Lovenox 70 mg BID  - last TTE 6/2021 EF 63%, GIIIDD --> TTE ordered >> f/u  - on home med Lasix 40 mg qd --> c/w Lasix 40 mg IVP qd   - f/u venous duplex b/l LE     MISC  #DVT prophylaxis: Lovenox BID  #GI prophylaxis: PPI  #Diet: NPO while on continuous BiPAP  #Activity: IAT  #Code status: DNR/DNI  #Disposition: SDU

## 2025-03-25 NOTE — CONSULT NOTE ADULT - ASSESSMENT
74F DNR/DNI w/ PMHx of COPD on 3L NC, smoking and AUD hx, AFib on Eliquis, abdominal abscess (2021), gastric bypass surgery 20+ years ago, presents from nursing home with lethargy & hypoxia being admitted to SDU for AHRF. Neurology consulted for CTH finding of mild generalized cerebral edema compared to 2021. Unclear baseline functional and mental status at this time, however during encounter patient A&Ox3 without neurological complaint.     Recommendation:  - MR head non contrast  - avoid hypotonic fluids  - head of bed 30 degree 74F DNR/DNI w/ PMHx of COPD on 3L NC, smoking and AUD hx, AFib on Eliquis, abdominal abscess (2021), gastric bypass surgery 20+ years ago, presents from nursing home with lethargy & hypoxia being admitted to SDU for AHRF. Neurology consulted for CTH finding of mild generalized cerebral edema compared to 2021. Unclear baseline functional and mental status at this time, however during encounter patient A&Ox3 without neurological complaint. Ddx includes but not limited to chronic anoxic injury, CVT, TBI related, osmotic edema, interstitial edema.     Recommendation:  - q4 general neuro checks  - MR head non contrast  - consult neurocritical care  - Na goal 145-155  - avoid hypotonic fluids  - refrain from hypotension  - head of bed 30 degree  - if symptoms worsens, will consider MRA and MRV 74F DNR/DNI w/ PMHx of COPD on 3L NC, smoking and AUD hx, AFib on Eliquis, abdominal abscess (2021), gastric bypass surgery 20+ years ago, presents from nursing home with lethargy & hypoxia being admitted to SDU for AHRF. Neurology consulted for CTH finding of mild generalized cerebral edema compared to 2021. Unclear baseline functional and mental status at this time, however during encounter patient A&Ox3 without neurological complaint. Ddx includes but not limited to chronic anoxic injury, CVT, TBI related, osmotic edema, interstitial edema.     Recommendation:  - q4 general neuro checks  - MR head non contrast, MRA H/N, MRV with contrast  - if unable to obtain MRH by tomorrow, would repeat CTH tomorrow (or sooner if acute worsening mentation/neuro exam)  - vEEG  - already on solumedrol per primary team  - consult neurocritical care  - Na goal 145-155  - avoid hypotonic fluids  - refrain from hypotension  - head of bed 30 degree

## 2025-03-25 NOTE — PROGRESS NOTE ADULT - ASSESSMENT
IMPRESSION:    Acute on chronic hypoxemic/ hypercapnic resp failure  COPD exacerbation  Pul edema  HO COPD  HO A AFIB    PLAN:    CNS: Avoid CNS depressant    HEENT:  Oral care    PULMONARY:  HOB @ 45 degrees, NIV at night and as needed, NC keep Sao2 88 to 92%, continue steroids, Neb q 4    CARDIOVASCULAR: lasix daily, echo, BNP    GI: GI prophylaxis                                          Feeding When off NIV    RENAL:  F/u  lytes.  Correct as needed. accurate I/O    INFECTIOUS DISEASE: ABX, procal, RVP noted    HEMATOLOGICAL:  DVT prophylaxis. full ac    ENDOCRINE:  Follow up FS.  Insulin protocol if needed.    SDU  GOC noted

## 2025-03-25 NOTE — PROGRESS NOTE ADULT - SUBJECTIVE AND OBJECTIVE BOX
SUBJECTIVE / OVERNIGHT EVENTS  Patient slept well overnight. agitated this am    MEDICATIONS  albuterol/ipratropium for Nebulization 3 milliLiter(s) Nebulizer every 6 hours  azithromycin  IVPB 500 milliGRAM(s) IV Intermittent every 24 hours  cefTRIAXone   IVPB 1000 milliGRAM(s) IV Intermittent every 24 hours  enoxaparin Injectable 70 milliGRAM(s) SubCutaneous every 12 hours  fluticasone propionate/ salmeterol 100-50 MICROgram(s) Diskus 1 Dose(s) Inhalation two times a day  furosemide   Injectable 40 milliGRAM(s) IV Push daily  hydrocortisone hemorrhoidal Suppository 1 Suppository(s) Rectal at bedtime  lidocaine   4% Patch 1 Patch Transdermal daily  methylPREDNISolone sodium succinate Injectable 40 milliGRAM(s) IV Push every 12 hours  pantoprazole  Injectable 40 milliGRAM(s) IV Push daily  tiotropium 2.5 MICROgram(s) Inhaler 2 Puff(s) Inhalation daily    acetaminophen     Tablet .. 650 milliGRAM(s) Oral every 6 hours PRN Temp greater or equal to 38C (100.4F), Mild Pain (1 - 3)    VITALS /  EXAM    T(C): 37 (03-25-25 @ 05:45), Max: 37.4 (03-24-25 @ 21:00)  HR: 78 (03-25-25 @ 07:34) (63 - 80)  BP: 107/49 (03-25-25 @ 07:34) (107/49 - 126/59)  RR: 22 (03-25-25 @ 07:34) (18 - 23)  SpO2: 91% (03-25-25 @ 07:34) (91% - 100%)    GENERAL: NAD, lvery agitated, refused full exam, no LLE, abd soft but reports is painful to palpation  I's & O's     LABS             9.7    11.74 )-----------( 253      ( 03-24-25 @ 10:00 )             37.6     141  |  98  |  16  -------------------------<  125   03-24-25 @ 10:00  4.4  |  35  |  <0.5    Ca      8.7     03-24-25 @ 10:00    TPro  6.9  /  Alb  3.5  /  TBili  0.8  /  DBili  x   /  AST  27  /  ALT  10  /  AlkPhos  105  /  GGT  x     03-24-25 @ 10:00      Troponin T, High Sensitivity Result: 17 ng/L (03-24-25 @ 10:00)          Urinalysis Basic - ( 24 Mar 2025 10:00 )    Color: x / Appearance: x / SG: x / pH: x  Gluc: 125 mg/dL / Ketone: x  / Bili: x / Urobili: x   Blood: x / Protein: x / Nitrite: x   Leuk Esterase: x / RBC: x / WBC x   Sq Epi: x / Non Sq Epi: x / Bacteria: x      MICRO / IMAGING / CARDIOLOGY  Telemetry: Reviewed   EKG: Reviewed    CULTURES    IMAGING  PACS Image:  (03-24-25 @ 11:00)  PACS Image:  (03-24-25 @ 09:42)  PACS Image:  (03-24-25 @ 09:38)    CARDIOLOGY

## 2025-03-25 NOTE — CONSULT NOTE ADULT - ATTENDING COMMENTS
73yo woman with COPD (on home O2), AFib (on Eliquis) presenting with lethargy & hypoxia, found to have acute hypercapnic hypoxic respiratory failure; neurology consulted for CTH finding of mild generalized cerebral edema. spO2 80s at NH, pCO2 116 on arrival. Most recent CTH/MRH from 2021 reviewed, without evidence of cerebral edema. On exam this morning, patient awake but minimally participatory with evaluation, speech fluent and without dysarthria but not answering most questions, poor insight, somewhat restless, pupils equal and reactive, face symmetric, gaze midline, arms and legs antigravity. Suspect vasogenic edema 2/2 severe hypercapnia/hypoxia, though reasonable to rule out other neurovascular etiologies. Recommendations as above (amended from prior)

## 2025-03-26 LAB
ALBUMIN SERPL ELPH-MCNC: 3.5 G/DL — SIGNIFICANT CHANGE UP (ref 3.5–5.2)
ALP SERPL-CCNC: 96 U/L — SIGNIFICANT CHANGE UP (ref 30–115)
ALT FLD-CCNC: 13 U/L — SIGNIFICANT CHANGE UP (ref 0–41)
ANION GAP SERPL CALC-SCNC: 10 MMOL/L — SIGNIFICANT CHANGE UP (ref 7–14)
AST SERPL-CCNC: 44 U/L — HIGH (ref 0–41)
BASOPHILS # BLD AUTO: 0 K/UL — SIGNIFICANT CHANGE UP (ref 0–0.2)
BASOPHILS NFR BLD AUTO: 0 % — SIGNIFICANT CHANGE UP (ref 0–1)
BILIRUB SERPL-MCNC: 0.9 MG/DL — SIGNIFICANT CHANGE UP (ref 0.2–1.2)
BUN SERPL-MCNC: 22 MG/DL — HIGH (ref 10–20)
CALCIUM SERPL-MCNC: 9.5 MG/DL — SIGNIFICANT CHANGE UP (ref 8.4–10.5)
CHLORIDE SERPL-SCNC: 93 MMOL/L — LOW (ref 98–110)
CO2 SERPL-SCNC: 39 MMOL/L — HIGH (ref 17–32)
CREAT SERPL-MCNC: 0.6 MG/DL — LOW (ref 0.7–1.5)
EGFR: 94 ML/MIN/1.73M2 — SIGNIFICANT CHANGE UP
EGFR: 94 ML/MIN/1.73M2 — SIGNIFICANT CHANGE UP
EOSINOPHIL # BLD AUTO: 0 K/UL — SIGNIFICANT CHANGE UP (ref 0–0.7)
EOSINOPHIL NFR BLD AUTO: 0 % — SIGNIFICANT CHANGE UP (ref 0–8)
GLUCOSE SERPL-MCNC: 103 MG/DL — HIGH (ref 70–99)
HCT VFR BLD CALC: 35.8 % — LOW (ref 37–47)
HGB BLD-MCNC: 9.7 G/DL — LOW (ref 12–16)
IMM GRANULOCYTES NFR BLD AUTO: 0.5 % — HIGH (ref 0.1–0.3)
LYMPHOCYTES # BLD AUTO: 0.65 K/UL — LOW (ref 1.2–3.4)
LYMPHOCYTES # BLD AUTO: 6 % — LOW (ref 20.5–51.1)
MAGNESIUM SERPL-MCNC: 2.1 MG/DL — SIGNIFICANT CHANGE UP (ref 1.8–2.4)
MCHC RBC-ENTMCNC: 19.9 PG — LOW (ref 27–31)
MCHC RBC-ENTMCNC: 27.1 G/DL — LOW (ref 32–37)
MCV RBC AUTO: 73.4 FL — LOW (ref 81–99)
MONOCYTES # BLD AUTO: 0.4 K/UL — SIGNIFICANT CHANGE UP (ref 0.1–0.6)
MONOCYTES NFR BLD AUTO: 3.7 % — SIGNIFICANT CHANGE UP (ref 1.7–9.3)
NEUTROPHILS # BLD AUTO: 9.68 K/UL — HIGH (ref 1.4–6.5)
NEUTROPHILS NFR BLD AUTO: 89.8 % — HIGH (ref 42.2–75.2)
NRBC BLD AUTO-RTO: 0 /100 WBCS — SIGNIFICANT CHANGE UP (ref 0–0)
PLATELET # BLD AUTO: 306 K/UL — SIGNIFICANT CHANGE UP (ref 130–400)
PMV BLD: 10.5 FL — HIGH (ref 7.4–10.4)
POTASSIUM SERPL-MCNC: 4.2 MMOL/L — SIGNIFICANT CHANGE UP (ref 3.5–5)
POTASSIUM SERPL-SCNC: 4.2 MMOL/L — SIGNIFICANT CHANGE UP (ref 3.5–5)
PROT SERPL-MCNC: 7.1 G/DL — SIGNIFICANT CHANGE UP (ref 6–8)
RBC # BLD: 4.88 M/UL — SIGNIFICANT CHANGE UP (ref 4.2–5.4)
RBC # FLD: 22.6 % — HIGH (ref 11.5–14.5)
SODIUM SERPL-SCNC: 144 MMOL/L — SIGNIFICANT CHANGE UP (ref 135–146)
WBC # BLD: 10.78 K/UL — SIGNIFICANT CHANGE UP (ref 4.8–10.8)
WBC # FLD AUTO: 10.78 K/UL — SIGNIFICANT CHANGE UP (ref 4.8–10.8)

## 2025-03-26 PROCEDURE — 99233 SBSQ HOSP IP/OBS HIGH 50: CPT

## 2025-03-26 PROCEDURE — 70450 CT HEAD/BRAIN W/O DYE: CPT | Mod: 26

## 2025-03-26 PROCEDURE — 95720 EEG PHY/QHP EA INCR W/VEEG: CPT

## 2025-03-26 PROCEDURE — 99232 SBSQ HOSP IP/OBS MODERATE 35: CPT

## 2025-03-26 PROCEDURE — 71045 X-RAY EXAM CHEST 1 VIEW: CPT | Mod: 26

## 2025-03-26 RX ORDER — FUROSEMIDE 10 MG/ML
60 INJECTION INTRAMUSCULAR; INTRAVENOUS DAILY
Refills: 0 | Status: DISCONTINUED | OUTPATIENT
Start: 2025-03-26 | End: 2025-03-28

## 2025-03-26 RX ORDER — IPRATROPIUM BROMIDE AND ALBUTEROL SULFATE .5; 2.5 MG/3ML; MG/3ML
3 SOLUTION RESPIRATORY (INHALATION) EVERY 6 HOURS
Refills: 0 | Status: DISCONTINUED | OUTPATIENT
Start: 2025-03-26 | End: 2025-04-09

## 2025-03-26 RX ADMIN — Medication 1 APPLICATION(S): at 11:17

## 2025-03-26 RX ADMIN — ENOXAPARIN SODIUM 70 MILLIGRAM(S): 100 INJECTION SUBCUTANEOUS at 06:30

## 2025-03-26 RX ADMIN — CEFTRIAXONE 100 MILLIGRAM(S): 500 INJECTION, POWDER, FOR SOLUTION INTRAMUSCULAR; INTRAVENOUS at 23:52

## 2025-03-26 RX ADMIN — IPRATROPIUM BROMIDE AND ALBUTEROL SULFATE 3 MILLILITER(S): .5; 2.5 SOLUTION RESPIRATORY (INHALATION) at 13:42

## 2025-03-26 RX ADMIN — Medication 250 MILLIGRAM(S): at 06:29

## 2025-03-26 RX ADMIN — CEFTRIAXONE 100 MILLIGRAM(S): 500 INJECTION, POWDER, FOR SOLUTION INTRAMUSCULAR; INTRAVENOUS at 06:29

## 2025-03-26 RX ADMIN — IPRATROPIUM BROMIDE AND ALBUTEROL SULFATE 3 MILLILITER(S): .5; 2.5 SOLUTION RESPIRATORY (INHALATION) at 19:59

## 2025-03-26 RX ADMIN — LIDOCAINE HYDROCHLORIDE 1 PATCH: 20 JELLY TOPICAL at 11:16

## 2025-03-26 RX ADMIN — LIDOCAINE HYDROCHLORIDE 1 PATCH: 20 JELLY TOPICAL at 20:00

## 2025-03-26 RX ADMIN — TIOTROPIUM BROMIDE INHALATION SPRAY 2 PUFF(S): 3.12 SPRAY, METERED RESPIRATORY (INHALATION) at 08:06

## 2025-03-26 RX ADMIN — METHYLPREDNISOLONE ACETATE 40 MILLIGRAM(S): 80 INJECTION, SUSPENSION INTRA-ARTICULAR; INTRALESIONAL; INTRAMUSCULAR; SOFT TISSUE at 06:28

## 2025-03-26 RX ADMIN — Medication 40 MILLIGRAM(S): at 11:16

## 2025-03-26 RX ADMIN — METHYLPREDNISOLONE ACETATE 40 MILLIGRAM(S): 80 INJECTION, SUSPENSION INTRA-ARTICULAR; INTRALESIONAL; INTRAMUSCULAR; SOFT TISSUE at 17:07

## 2025-03-26 RX ADMIN — ENOXAPARIN SODIUM 70 MILLIGRAM(S): 100 INJECTION SUBCUTANEOUS at 17:07

## 2025-03-26 RX ADMIN — FUROSEMIDE 40 MILLIGRAM(S): 10 INJECTION INTRAMUSCULAR; INTRAVENOUS at 06:28

## 2025-03-26 RX ADMIN — LIDOCAINE HYDROCHLORIDE 1 PATCH: 20 JELLY TOPICAL at 23:42

## 2025-03-26 RX ADMIN — Medication 250 MILLIGRAM(S): at 23:52

## 2025-03-26 RX ADMIN — HYDROCORTISONE 1 SUPPOSITORY(S): 10 CREAM TOPICAL at 22:59

## 2025-03-26 NOTE — DIETITIAN INITIAL EVALUATION ADULT - PERTINENT MEDS FT
MEDICATIONS  (STANDING):  albuterol/ipratropium for Nebulization 3 milliLiter(s) Nebulizer every 6 hours  azithromycin  IVPB 500 milliGRAM(s) IV Intermittent every 24 hours  cefTRIAXone   IVPB 1000 milliGRAM(s) IV Intermittent every 24 hours  enoxaparin Injectable 70 milliGRAM(s) SubCutaneous every 12 hours  fluticasone propionate/ salmeterol 100-50 MICROgram(s) Diskus 1 Dose(s) Inhalation two times a day  furosemide   Injectable 60 milliGRAM(s) IV Push daily  hydrocortisone hemorrhoidal Suppository 1 Suppository(s) Rectal at bedtime  methylPREDNISolone sodium succinate Injectable 40 milliGRAM(s) IV Push every 12 hours  pantoprazole  Injectable 40 milliGRAM(s) IV Push daily

## 2025-03-26 NOTE — DIETITIAN INITIAL EVALUATION ADULT - PERTINENT LABORATORY DATA
03-26    144  |  93[L]  |  22[H]  ----------------------------<  103[H]  4.2   |  39[H]  |  0.6[L]    Ca    9.5      26 Mar 2025 06:18  Mg     2.1     03-26    TPro  7.1  /  Alb  3.5  /  TBili  0.9  /  DBili  x   /  AST  44[H]  /  ALT  13  /  AlkPhos  96  03-26

## 2025-03-26 NOTE — DIETITIAN INITIAL EVALUATION ADULT - ADD RECOMMEND
1. Add Ensure plus HP daily (350kcal/20g PRO) to meet increased needs   2. cont w/ current diet order   3. encourage and assist with PO intake

## 2025-03-26 NOTE — PATIENT PROFILE ADULT - FUNCTIONAL ASSESSMENT - BASIC MOBILITY 5.
Observed patient with auditory/visual hallucinations. Pt was confused about where he was (asked what hospital he was at multiple times), where his room was and the location of the bathroom. Pt was randomly gesturing and rambling speech to to self.  Pt was med compliant. Pt spent time in the milieu eating popcorn and watching TV. Pt pleasant and cooperative with staff.   1 = Total assistance

## 2025-03-26 NOTE — DIETITIAN INITIAL EVALUATION ADULT - NSFNSGIIOFT_GEN_A_CORE
Unable to confirm ht with NH or family at this time, using ht from previous visit 5/26/22: 170.2cm   IBW: 61.3kg

## 2025-03-26 NOTE — EEG REPORT - NS EEG TEXT BOX
Epilepsy Attending Note:     WEGENER, LOIS    74y Female  MRN [id number]    Vital Signs Last 24 Hrs  T(C): 37 (26 Mar 2025 09:46), Max: 37 (25 Mar 2025 20:17)  T(F): 98.6 (26 Mar 2025 09:46), Max: 98.6 (25 Mar 2025 20:17)  HR: 80 (26 Mar 2025 07:45) (73 - 93)  BP: 124/85 (26 Mar 2025 07:45) (116/69 - 174/69)  BP(mean): 98 (26 Mar 2025 07:45) (84 - 101)  RR: 18 (26 Mar 2025 07:45) (16 - 20)  SpO2: 93% (26 Mar 2025 07:45) (93% - 100%)    Parameters below as of 26 Mar 2025 04:00  Patient On (Oxygen Delivery Method): nasal cannula  O2 Flow (L/min): 6                            9.7    10.78 )-----------( 306      ( 26 Mar 2025 06:18 )             35.8       03-    144  |  93[L]  |  22[H]  ----------------------------<  103[H]  4.2   |  39[H]  |  0.6[L]    Ca    9.5      26 Mar 2025 06:18  Mg     2.1         TPro  7.1  /  Alb  3.5  /  TBili  0.9  /  DBili  x   /  AST  44[H]  /  ALT  13  /  AlkPhos  96        MEDICATIONS  (STANDING):  azithromycin  IVPB 500 milliGRAM(s) IV Intermittent every 24 hours  cefTRIAXone   IVPB 1000 milliGRAM(s) IV Intermittent every 24 hours  chlorhexidine 2% Cloths 1 Application(s) Topical daily  enoxaparin Injectable 70 milliGRAM(s) SubCutaneous every 12 hours  fluticasone propionate/ salmeterol 100-50 MICROgram(s) Diskus 1 Dose(s) Inhalation two times a day  furosemide   Injectable 40 milliGRAM(s) IV Push daily  hydrocortisone hemorrhoidal Suppository 1 Suppository(s) Rectal at bedtime  lidocaine   4% Patch 1 Patch Transdermal daily  methylPREDNISolone sodium succinate Injectable 40 milliGRAM(s) IV Push every 12 hours  pantoprazole  Injectable 40 milliGRAM(s) IV Push daily  tiotropium 2.5 MICROgram(s) Inhaler 2 Puff(s) Inhalation daily    MEDICATIONS  (PRN):  acetaminophen     Tablet .. 650 milliGRAM(s) Oral every 6 hours PRN Temp greater or equal to 38C (100.4F), Mild Pain (1 - 3)            VEEG in the last 12 hours:    Background - continuous, symmetrical, less than optimally organized, showing reactivity, reaching frequencies in the range of 7-8 Hz superimposed by small amount of lower range low amplitude theta and low voltage fast activity. It is of note, that the recording contains small portions of sleep.    Focal and generalized slowin. borderline to mild generalized slowing  2. borderline to mild left hemispheric focal slowing    Interictal activity - none    Events - none    Seizures - none    Impression: VEEG as above    Plan - per neurology team

## 2025-03-26 NOTE — PROGRESS NOTE ADULT - ASSESSMENT
IMPRESSION:    Acute on chronic hypoxemic/ hypercapnic resp failure  COPD exacerbation  Pul edema  HO COPD  HO A AFIB    PLAN:    CNS: Avoid CNS depressant, ? Cerebral edema/ fup EEG/ Brain MRI    HEENT:  Oral care    PULMONARY:  HOB @ 45 degrees, NIV at night and as needed, NC keep Sao2 88 to 92%, continue steroids, Neb q 4    CARDIOVASCULAR: lasix dailY Keep - balance    GI: GI prophylaxis                                          Feeding When off NIV per speech    RENAL:  F/u  lytes.  Correct as needed. accurate I/O    INFECTIOUS DISEASE: ABX, procal, RVP noted    HEMATOLOGICAL:  DVT prophylaxis. full ac    ENDOCRINE:  Follow up FS.  Insulin protocol if needed.    SDU  GOC noted

## 2025-03-26 NOTE — PROGRESS NOTE ADULT - ASSESSMENT
74F DNR/DNI w/ PMHx of COPD on 3L NC, smoking and AUD hx, AFib on Eliquis, abdominal abscess (2021), gastric bypass surgery 20+ years ago, presents from nursing home with lethargy & hypoxia being admitted to SDU for AHRF. Neurology consulted for CTH finding of mild generalized cerebral edema new from 2021 CTH. Unclear baseline functional and mental status at this time, however during encounter patient A&Ox2 without neurological complaint. mentation does seem to fluctuate with CO2 retention. EEG shows mild generalized slowing, and left hemispheric slowing. Ddx includes but not limited to chronic anoxic injury, CVT, TBI related, osmotic edema, interstitial edema.     Recommendation:  - q4 general neuro checks  - avoid hypotonic fluids  - refrain from hypotension  - head of bed 30 degree  - Na goal 145-155  - consult neurocritical care  - Repeat CTH today  - MR head non contrast, MRV with contrast  - Can DC vEEG

## 2025-03-26 NOTE — PROGRESS NOTE ADULT - SUBJECTIVE AND OBJECTIVE BOX
Over Night Events: Events noted, on NC, agitated overnight, Neuro reviewed, on NC, on V EEG    PHYSICAL EXAM    ICU Vital Signs Last 24 Hrs  T(C): 36.3 (26 Mar 2025 00:03), Max: 37 (25 Mar 2025 20:17)  T(F): 97.4 (26 Mar 2025 00:03), Max: 98.6 (25 Mar 2025 20:17)  HR: 77 (26 Mar 2025 02:00) (73 - 93)  BP: 126/67 (26 Mar 2025 02:00) (107/49 - 174/69)  BP(mean): 91 (26 Mar 2025 02:00) (84 - 101)  RR: 20 (26 Mar 2025 02:00) (16 - 22)  SpO2: 95% (26 Mar 2025 02:00) (91% - 100%)    O2 Parameters below as of 26 Mar 2025 02:00  Patient On (Oxygen Delivery Method): nasal cannula  O2 Flow (L/min): 6          General: Ill looking  Lungs: dec bs both bases  Cardiovascular: BEATRIZ 2.6  Abdomen: Soft, Positive BS  Neurological: Non focal         LABS:                          9.7    11.74 )-----------( 253      ( 24 Mar 2025 10:00 )             37.6                                               03-24    141  |  98  |  16  ----------------------------<  125[H]  4.4   |  35[H]  |  <0.5[L]    Ca    8.7      24 Mar 2025 10:00    TPro  6.9  /  Alb  3.5  /  TBili  0.8  /  DBili  x   /  AST  27  /  ALT  10  /  AlkPhos  105  03-24                                             Urinalysis Basic - ( 24 Mar 2025 10:00 )    Color: x / Appearance: x / SG: x / pH: x  Gluc: 125 mg/dL / Ketone: x  / Bili: x / Urobili: x   Blood: x / Protein: x / Nitrite: x   Leuk Esterase: x / RBC: x / WBC x   Sq Epi: x / Non Sq Epi: x / Bacteria: x                                                  LIVER FUNCTIONS - ( 24 Mar 2025 10:00 )  Alb: 3.5 g/dL / Pro: 6.9 g/dL / ALK PHOS: 105 U/L / ALT: 10 U/L / AST: 27 U/L / GGT: x                                                  Culture - Blood (collected 24 Mar 2025 10:00)  Source: Blood Blood  Preliminary Report (25 Mar 2025 22:01):    No growth at 24 hours    Culture - Blood (collected 24 Mar 2025 10:00)  Source: Blood Blood  Preliminary Report (25 Mar 2025 22:01):    No growth at 24 hours                                                                                       ABG - ( 25 Mar 2025 04:30 )  pH, Arterial: 7.50  pH, Blood: x     /  pCO2: 63    /  pO2: 76    / HCO3: 49    / Base Excess: 23.0  /  SaO2: 96.3                MEDICATIONS  (STANDING):  azithromycin  IVPB 500 milliGRAM(s) IV Intermittent every 24 hours  cefTRIAXone   IVPB 1000 milliGRAM(s) IV Intermittent every 24 hours  enoxaparin Injectable 70 milliGRAM(s) SubCutaneous every 12 hours  fluticasone propionate/ salmeterol 100-50 MICROgram(s) Diskus 1 Dose(s) Inhalation two times a day  furosemide   Injectable 40 milliGRAM(s) IV Push daily  hydrocortisone hemorrhoidal Suppository 1 Suppository(s) Rectal at bedtime  lidocaine   4% Patch 1 Patch Transdermal daily  methylPREDNISolone sodium succinate Injectable 40 milliGRAM(s) IV Push every 12 hours  pantoprazole  Injectable 40 milliGRAM(s) IV Push daily  tiotropium 2.5 MICROgram(s) Inhaler 2 Puff(s) Inhalation daily    MEDICATIONS  (PRN):  acetaminophen     Tablet .. 650 milliGRAM(s) Oral every 6 hours PRN Temp greater or equal to 38C (100.4F), Mild Pain (1 - 3)

## 2025-03-26 NOTE — PROGRESS NOTE ADULT - SUBJECTIVE AND OBJECTIVE BOX
Neurology Progress Note     LOIS WEGENER 74y Female [id number]    Hospital Day: 2d     Overnight events:  None    Subjective complaints:  Pt evaluated at bedside. Pt has no acute complaints.     Vital Signs  T(F): 98.5 (11:27), Max: 98.6 (20:17)  HR: 86 (11:27) (73 - 93)  BP: 126/63 (11:27) (116/69 - 141/79)  RR: 18 (11:27) (16 - 20)  SpO2: 90% (11:27) (90% - 100%)    Neurological Exam:   Mental status: Awake alert, oriented to name, age, month, and year but not location. No dysarthria, intact naming, comprehension to simple and cross commands, intact repetition, easily distracted with poor attention/concentration but redirectable   Cranial nerves:   - Eyes: PERRL, EOMI without nystagmus, Visual fields full   - Face: BL V1-V3 sensation intact symmetrically, face symmetric   - ENT: Hearing intact to voice, palate elevation symmetric, tongue was midline  Motor: No drift in all 4 extremities, though pain limited right shoulder, 5/5 JAQUAN&LE distally, proximal exam is effort dependent and pain limited. Normal tone and bulk.  No abnormal movements.    Sensation: Intact to light touch , no extinction   Coordination: No dysmetria on finger-to-nose   Reflexes: 2+ in bilateral UE/ 0+ bilateral LE, downgoing toes bilaterally. (-) [place].  Gait: Deferred      Labs:  WBC 10.78 /HGB 9.7 /MCV 73.4 /HCT 35.8 / / 03-26 03-26    144  |  93[L]  |  22[H]  ----------------------------<  103[H]  4.2   |  39[H]  |  0.6[L]    Ca    9.5      26 Mar 2025 06:18  Mg     2.1     03-26    TPro  7.1  /  Alb  3.5  /  TBili  0.9  /  DBili  x   /  AST  44[H]  /  ALT  13  /  AlkPhos  96  03-26    LIVER FUNCTIONS - ( 26 Mar 2025 06:18 )  Alb: 3.5 g/dL / Pro: 7.1 g/dL / ALK PHOS: 96 U/L / ALT: 13 U/L / AST: 44 U/L / GGT: x             Urinalysis Basic - ( 26 Mar 2025 06:18 )    Color: x / Appearance: x / SG: x / pH: x  Gluc: 103 mg/dL / Ketone: x  / Bili: x / Urobili: x   Blood: x / Protein: x / Nitrite: x   Leuk Esterase: x / RBC: x / WBC x   Sq Epi: x / Non Sq Epi: x / Bacteria: x        Medications:  acetaminophen     Tablet .. 650 milliGRAM(s) Oral every 6 hours PRN  albuterol/ipratropium for Nebulization 3 milliLiter(s) Nebulizer every 6 hours  azithromycin  IVPB 500 milliGRAM(s) IV Intermittent every 24 hours  cefTRIAXone   IVPB 1000 milliGRAM(s) IV Intermittent every 24 hours  chlorhexidine 2% Cloths 1 Application(s) Topical daily  enoxaparin Injectable 70 milliGRAM(s) SubCutaneous every 12 hours  fluticasone propionate/ salmeterol 100-50 MICROgram(s) Diskus 1 Dose(s) Inhalation two times a day  furosemide   Injectable 60 milliGRAM(s) IV Push daily  hydrocortisone hemorrhoidal Suppository 1 Suppository(s) Rectal at bedtime  lidocaine   4% Patch 1 Patch Transdermal daily  methylPREDNISolone sodium succinate Injectable 40 milliGRAM(s) IV Push every 12 hours  pantoprazole  Injectable 40 milliGRAM(s) IV Push daily      Neuroimaging:      PERTINENT HISTORICAL RESULTS:

## 2025-03-26 NOTE — PATIENT PROFILE ADULT - NURSING HOMES
[facility] and [facility], [facility]

## 2025-03-26 NOTE — PROGRESS NOTE ADULT - ATTENDING COMMENTS
#Acute hypoxic and hypercapnic resp failure  suspect pulm edema, copd exacerbation  cxr interstitial edema  repeat cxr  blood gas acute hypercapnia, resolved s/p bipap  sputum cx, legionella, strep, mrsa  rvp neg  lasix 60 iv daily; home dose 40 po  tte with preserved ef  ceftriaxone, azithro  solumedrol 40 iv bid  nc to keep spo2 >88; bipap prn, qhs  advair, duoneb q6    #Altered mental status  cth with mild cerebral edema  mri brain, mra, mrv  veeg no interictal activity  f/u neuro    #Progress Note Handoff  Pending (specify): hypoxia, iv steroids, lasix, mri brain  Family discussion: d/w pt at bedside  Disposition: home vs. snf

## 2025-03-26 NOTE — PROGRESS NOTE ADULT - ATTENDING COMMENTS
Patients mentation much improved today: awake, following all simple commands, oriented to self, president, year, month but not place, no dysarthria and speech fluent, RUE drift and restricted ROM (patient reports chronic shoulder pain), no other focal deficits. vEEG without seizures or interictal activity, notable for mild generalized slowing, and left hemispheric slowing. Suspect cerebral edema 2/2 hypercarbia/hypoxia, now improving. Can dc vEEG, would repeat CTH today if unable to obtain MRH (can defer MRV/MRA for now), c/w NA goal 145-155, rest as above.

## 2025-03-26 NOTE — DIETITIAN INITIAL EVALUATION ADULT - OTHER CALCULATIONS
Energy: 1716-2145kcal/day (using MSJ 1.2-1.5AF due to PI)   Protein: 88-110g/day (using 1.2-1.5g/kg -- same reason as above)   Fluid: 1mL/kcal/day

## 2025-03-26 NOTE — DIETITIAN INITIAL EVALUATION ADULT - OTHER INFO
--74 year old F  presented to the ED from [facility] on 3/24 with CC of hypoxia and change in mental status.  Code status was confirmed to be DNR/DNI with MOLST form faxed from [facility]. Patient subsequently downgraded to SDU.   #Acute Hypoxic Hypercapnic Respiratory Failure likely secondary to CAP  #mild generalized edema on CTH  #AFib on Eliquis  #HFpEF (TTE 6/2021 EF 63%, GIIIDD)  #Bilateral Pleural Effusions  - holding Eliquis while NPO when requiring continuous BiPAP   #Sacrum Unstageable per wound care RN note 3/26

## 2025-03-26 NOTE — ADVANCED PRACTICE NURSE CONSULT - RECOMMEDATIONS
Cleanse wound with Vashe, pat dry.  Apply Medihoney and pack with xeroform twice daily PRN for soiling - cover with abdominal pad.  Apply Moisture barrier cream to bilateral lower buttock/posterior thigh for prevention.  Skin and incontinence care.  Pressure Injury Prevention.  Assess wound daily and inform primary provider of any changes.   Case discussed with primary RN.  Wound/ ostomy specialist to f/u as needed.   Other recommendations per Primary Team.

## 2025-03-26 NOTE — DIETITIAN INITIAL EVALUATION ADULT - ENTERAL
Nutrition Intervention:meals and snacks,medical food supplements, coordination of care   Nutrition Monitoring:diet order,energy intake,body composition,NFPF, lytes, GI (BM), BG

## 2025-03-26 NOTE — PROGRESS NOTE ADULT - ASSESSMENT
Patient is a 74 year old F with PMHx of COPD on 3L NC, smoking hx, AFib on Eliquis, past hx of abdominal abscess (in 2021), Gastric Bypass surgery 20+ years ago, who presented to the ED from [facility] on 3/24 with CC of hypoxia and change in mental status. As per ED note, patient was calling for help at her NH this morning. She was noted to be saturating 80% while on her baseline 3L NC; was placed on non-rebreather and brought to ED. Patient initially admitted to MICU for acute hypoxic hypercapnic respiratory failure. Code status was confirmed to be DNR/DNI with MOLST form faxed from [facility]. Patient subsequently downgraded to SDU.     #Acute Hypoxic Hypercapnic Respiratory Failure likely secondary to CAP  #COPD on 3L NC  #Smoking Hx  - On 6L at the moment  - while at NH was noted to be saturating 80% while on her baseline 3L NC, placed on NRB and brought to ED  - lethargic in ED, minimally responsive to sternal rub  - on admission, WBC 11.7k, Hgb 9.7 (baseline ~10), Trop 17, Pro-BNP 1727; VBG pH 7.18| pCO2 116 |pO2 77 | HCO3 43 --> after two hours of BiPAP rpt VBG pH 7.18| pCO2 118 |pO2 61 | HCO3 44 --> transitioned to AVAPS, ABG after one hour pH 7.24| pCO2 103 |pO2 101 | HCO3 44  - CXR: Interstitial edema. Lingular opacities. Bilateral pleural effusions.    - CT Head: Mild generalized cerebral edema is noted.  - s/p Magnesium 2 g over 20 minutes, Solu-medrol 125 mg IVP x1, Duonebs 3 mL q20 min x3 doses, Rocephin 1g x1, Azithro 500 mg x1, Lasix 40 mg IVP x1 in ED.   PLAN:  - c/w ABx to cover for possible CAP -- Rocephin + Azithro  - f/u Urine strep, Legionella, MRSA, Flu/COVID/RSV, Procal  - was on continuous AVAPS >> currently on NC  - c/w Solu-medrol 40 mg IVP BID  - c/w Duonebs q6h + Trelegy (therapeutic interchange)    #mild generalized edema on CTH  -neuro cs noted, will get neurocrit eval  -Obtain MRI brain noncon, MRA head and neck, and MRV  - On video EEG    #AFib on Eliquis  #HFpEF (TTE 6/2021 EF 63%, GIIIDD)  #Bilateral Pleural Effusions  - holding Eliquis while NPO when requiring continuous BiPAP --> Lovenox 70 mg BID  - last TTE 6/2021 EF 63%, GIIIDD --> TTE ordered >> f/u  - on home med Lasix 40 mg qd --> c/w Lasix 40 mg IVP qd   - Bilateral LE dopplex: No dvt    MISC  #DVT prophylaxis: Lovenox BID  #GI prophylaxis: PPI  #Diet: NPO while on continuous BiPAP  #Activity: IAT  #Code status: DNR/DNI  #Disposition: SDU

## 2025-03-26 NOTE — DIETITIAN INITIAL EVALUATION ADULT - ORAL INTAKE PTA/DIET HISTORY
Unable to reach [facility] despite being transferred to RD department 2 times. Attempted to call pt's family Liz via phone ([phone]) 2x, however, not in service. Will attempt to reach 3/27.

## 2025-03-26 NOTE — PATIENT PROFILE ADULT - MST SCORE
Cami Dillard CMA   8/10/2022 10:53 AM CDT Back to Top        Patient is scheduled for the ED&C on 8/31/2022 with Amber Baumann PA-C.    Cami Dillard CMA   8/10/2022  9:35 AM CDT         Attempted to reach patient, no answer and no voicemail.  Will also send MyChart to have her reach out to schedule.    Amber Baumann PA-C   8/10/2022  8:31 AM CDT         Please schedule with me for ED&C       
---
0

## 2025-03-26 NOTE — PATIENT PROFILE ADULT - FALL HARM RISK - HARM RISK INTERVENTIONS
Assistance with ambulation/Assistance OOB with selected safe patient handling equipment/Communicate Risk of Fall with Harm to all staff/Discuss with provider need for PT consult/Monitor for mental status changes/Monitor gait and stability/Move patient closer to nurses' station/Provide patient with walking aids - walker, cane, crutches/Reinforce activity limits and safety measures with patient and family/Reorient to person, place and time as needed/Tailored Fall Risk Interventions/Toileting schedule using arm’s reach rule for commode and bathroom/Use of alarms - bed, chair and/or voice tab/Visual Cue: Yellow wristband and red socks/Bed in lowest position, wheels locked, appropriate side rails in place/Call bell, personal items and telephone in reach/Instruct patient to call for assistance before getting out of bed or chair/Non-slip footwear when patient is out of bed/[place] to call system/Physically safe environment - no spills, clutter or unnecessary equipment/Purposeful Proactive Rounding/Room/bathroom lighting operational, light cord in reach

## 2025-03-26 NOTE — DIETITIAN INITIAL EVALUATION ADULT - ENERGY INTAKE
Pt herself very confused and just laughing at all questions asked by RD. At admit, per 1:1 sit PCA at bedside pt is eating very well. Consuming >75% of meals. This morning she ate toast, eggs, banana and coffee.

## 2025-03-26 NOTE — PROGRESS NOTE ADULT - SUBJECTIVE AND OBJECTIVE BOX
SUBJECTIVE / OVERNIGHT EVENTS  Patient was seen and examined. on 5 L nasal canula. 1:1 with video EEG. No overnight events    MEDICATIONS  azithromycin  IVPB 500 milliGRAM(s) IV Intermittent every 24 hours  cefTRIAXone   IVPB 1000 milliGRAM(s) IV Intermittent every 24 hours  chlorhexidine 2% Cloths 1 Application(s) Topical daily  enoxaparin Injectable 70 milliGRAM(s) SubCutaneous every 12 hours  fluticasone propionate/ salmeterol 100-50 MICROgram(s) Diskus 1 Dose(s) Inhalation two times a day  furosemide   Injectable 40 milliGRAM(s) IV Push daily  hydrocortisone hemorrhoidal Suppository 1 Suppository(s) Rectal at bedtime  lidocaine   4% Patch 1 Patch Transdermal daily  methylPREDNISolone sodium succinate Injectable 40 milliGRAM(s) IV Push every 12 hours  pantoprazole  Injectable 40 milliGRAM(s) IV Push daily  tiotropium 2.5 MICROgram(s) Inhaler 2 Puff(s) Inhalation daily    acetaminophen     Tablet .. 650 milliGRAM(s) Oral every 6 hours PRN Temp greater or equal to 38C (100.4F), Mild Pain (1 - 3)    VITALS /  EXAM    T(C): 37 (03-26-25 @ 09:46), Max: 37 (03-25-25 @ 20:17)  HR: 80 (03-26-25 @ 07:45) (73 - 93)  BP: 124/85 (03-26-25 @ 07:45) (116/69 - 174/69)  RR: 18 (03-26-25 @ 07:45) (16 - 20)  SpO2: 93% (03-26-25 @ 07:45) (93% - 100%)    General: Ill looking  Lungs: dec bs both bases  Cardiovascular: BEATRIZ 2.6  Abdomen: Soft, Positive BS  Neurological: Non focal       I's & O's     03-25-25 @ 07:01  -  03-26-25 @ 07:00  --------------------------------------------------------  IN:  Total IN: 0 mL    OUT:    Voided (mL): 600 mL  Total OUT: 600 mL    Total NET: -600 mL        LABS             9.7    10.78 )-----------( 306      ( 03-26-25 @ 06:18 )             35.8     144  |  93  |  22  -------------------------<  103   03-26-25 @ 06:18  4.2  |  39  |  0.6    Ca      9.5     03-26-25 @ 06:18  Mg     2.1     03-26-25 @ 06:18    TPro  7.1  /  Alb  3.5  /  TBili  0.9  /  DBili  x   /  AST  44  /  ALT  13  /  AlkPhos  96  /  GGT  x     03-26-25 @ 06:18      Troponin T, High Sensitivity Result: 17 ng/L (03-24-25 @ 10:00)                Urinalysis Basic - ( 26 Mar 2025 06:18 )    Color: x / Appearance: x / SG: x / pH: x  Gluc: 103 mg/dL / Ketone: x  / Bili: x / Urobili: x   Blood: x / Protein: x / Nitrite: x   Leuk Esterase: x / RBC: x / WBC x   Sq Epi: x / Non Sq Epi: x / Bacteria: x      MICRO / IMAGING / CARDIOLOGY  Telemetry: Reviewed   EKG: Reviewed    CULTURES    Culture - Blood (collected 03-24-25 @ 10:00)  Source: Blood Blood  Preliminary Report:    No growth at 24 hours    Culture - Blood (collected 03-24-25 @ 10:00)  Source: Blood Blood  Preliminary Report:    No growth at 24 hours      IMAGING  PACS Image:  (03-25-25 @ 05:41)  PACS Image:  (03-24-25 @ 16:52)  PACS Image:  (03-24-25 @ 11:00)    CARDIOLOGY

## 2025-03-26 NOTE — ADVANCED PRACTICE NURSE CONSULT - ASSESSMENT
History of Present Illness:   Patient is a 74 year old F with PMHx of COPD on 3L NC, smoking hx, AFib on Eliquis, past hx of abdominal abscess (in 2021), Gastric Bypass surgery 20+ years ago, who presented to the ED from [facility] on 3/24 with CC of hypoxia and change in mental status. As per ED note, patient was calling for help at her NH this morning. She was noted to be saturating 80% while on her baseline 3L NC; was placed on non-rebreather and brought to ED.          Allergies:  	No Known Allergies:     Home Medications:   * Patient Currently Takes Medications as of 24-Mar-2025 13:50 documented in Structured Notes  · 	Multiple Vitamins oral tablet, dispersible: Last Dose Taken:  , 1 tab(s) orally once a day   · 	senna oral tablet: Last Dose Taken:  , 2 tab(s) orally once  · 	hydrocortisone 25 mg rectal suppository: Last Dose Taken:  , 1 suppository(ies) rectal once a day (at bedtime)  · 	thiamine 100 mg oral tablet: Last Dose Taken:  , 1 tab(s) orally once a day  · 	folic acid 1 mg oral tablet: Last Dose Taken:  , 1 tab(s) orally once a day  · 	apixaban 5 mg oral tablet: Last Dose Taken:  , 1 tab(s) orally every 12 hours  · 	acetaminophen 325 mg oral tablet: Last Dose Taken:  , 2 tab(s) orally every 6 hours as needed for  mild pain  · 	furosemide 40 mg oral tablet: Last Dose Taken:  , 1 tab(s) orally once a day  · 	Aldactone 25 mg oral tablet: Last Dose Taken:  , 1 tab(s) orally every other day  · 	lidocaine 4% topical film: Last Dose Taken:  , Apply topically to affected area once a day apply to L shoulder topically one time a day for pain  · 	halobetasol 0.05% topical ointment: Last Dose Taken:  , Apply topically to affected area every 12 hours for four weeks, started on 3/7/2025  · 	cetirizine 10 mg oral tablet: Last Dose Taken:  , 1 tab(s) orally every 24 hours as needed for  allergy symptoms  · 	ipratropium-albuterol 0.5 mg-2.5 mg/3 mL inhalation solution: Last Dose Taken:  , 3 milliliter(s) by nebulizer 4 times a day  · 	DULoxetine 20 mg oral delayed release capsule: Last Dose Taken:  , 2 cap(s) orally once a day  · 	Trelegy Ellipta 200 mcg-62.5 mcg-25 mcg/inh inhalation powder: Last Dose Taken:  , 1 puff(s) inhaled once a day  Patient received in bed, limited mobility, high risk for pressure injury development or progression.    Wound  Type & Location: Sacrum Unstageable   Size: ~[id number]  Tissue Description: Yellow/tan tissue base  Wound Exudate: Scant, serous   Wound Edge: intact  Disha wound Condition: intact     Bilateral lower buttock/posterior thigh area has hyperpigmentation as well as ecchymosis that is fully blanching (possible old friction wounds), no open areas or pain on palpation. Bilateral buttock and bilateral heels free from pressure injuries at time of assessment.

## 2025-03-27 DIAGNOSIS — I48.20 CHRONIC ATRIAL FIBRILLATION, UNSPECIFIED: ICD-10-CM

## 2025-03-27 DIAGNOSIS — R41.82 ALTERED MENTAL STATUS, UNSPECIFIED: ICD-10-CM

## 2025-03-27 DIAGNOSIS — Z79.899 OTHER LONG TERM (CURRENT) DRUG THERAPY: ICD-10-CM

## 2025-03-27 DIAGNOSIS — J96.21 ACUTE AND CHRONIC RESPIRATORY FAILURE WITH HYPOXIA: ICD-10-CM

## 2025-03-27 DIAGNOSIS — Z98.84 BARIATRIC SURGERY STATUS: ICD-10-CM

## 2025-03-27 DIAGNOSIS — Z87.898 PERSONAL HISTORY OF OTHER SPECIFIED CONDITIONS: ICD-10-CM

## 2025-03-27 LAB
ALBUMIN SERPL ELPH-MCNC: 3.6 G/DL — SIGNIFICANT CHANGE UP (ref 3.5–5.2)
ALP SERPL-CCNC: 91 U/L — SIGNIFICANT CHANGE UP (ref 30–115)
ALT FLD-CCNC: 19 U/L — SIGNIFICANT CHANGE UP (ref 0–41)
ANION GAP SERPL CALC-SCNC: 10 MMOL/L — SIGNIFICANT CHANGE UP (ref 7–14)
AST SERPL-CCNC: 54 U/L — HIGH (ref 0–41)
BASOPHILS # BLD AUTO: 0 K/UL — SIGNIFICANT CHANGE UP (ref 0–0.2)
BASOPHILS NFR BLD AUTO: 0 % — SIGNIFICANT CHANGE UP (ref 0–1)
BILIRUB SERPL-MCNC: 1 MG/DL — SIGNIFICANT CHANGE UP (ref 0.2–1.2)
BUN SERPL-MCNC: 19 MG/DL — SIGNIFICANT CHANGE UP (ref 10–20)
CALCIUM SERPL-MCNC: 9.5 MG/DL — SIGNIFICANT CHANGE UP (ref 8.4–10.5)
CHLORIDE SERPL-SCNC: 93 MMOL/L — LOW (ref 98–110)
CO2 SERPL-SCNC: 41 MMOL/L — CRITICAL HIGH (ref 17–32)
CREAT SERPL-MCNC: 0.5 MG/DL — LOW (ref 0.7–1.5)
EGFR: 98 ML/MIN/1.73M2 — SIGNIFICANT CHANGE UP
EGFR: 98 ML/MIN/1.73M2 — SIGNIFICANT CHANGE UP
EOSINOPHIL # BLD AUTO: 0.01 K/UL — SIGNIFICANT CHANGE UP (ref 0–0.7)
EOSINOPHIL NFR BLD AUTO: 0.1 % — SIGNIFICANT CHANGE UP (ref 0–8)
GAS PNL BLDA: SIGNIFICANT CHANGE UP
GLUCOSE SERPL-MCNC: 117 MG/DL — HIGH (ref 70–99)
HCT VFR BLD CALC: 37.2 % — SIGNIFICANT CHANGE UP (ref 37–47)
HGB BLD-MCNC: 10.3 G/DL — LOW (ref 12–16)
IMM GRANULOCYTES NFR BLD AUTO: 0.6 % — HIGH (ref 0.1–0.3)
LYMPHOCYTES # BLD AUTO: 0.44 K/UL — LOW (ref 1.2–3.4)
LYMPHOCYTES # BLD AUTO: 4.1 % — LOW (ref 20.5–51.1)
MAGNESIUM SERPL-MCNC: 2.2 MG/DL — SIGNIFICANT CHANGE UP (ref 1.8–2.4)
MCHC RBC-ENTMCNC: 20.2 PG — LOW (ref 27–31)
MCHC RBC-ENTMCNC: 27.7 G/DL — LOW (ref 32–37)
MCV RBC AUTO: 72.8 FL — LOW (ref 81–99)
MONOCYTES # BLD AUTO: 0.56 K/UL — SIGNIFICANT CHANGE UP (ref 0.1–0.6)
MONOCYTES NFR BLD AUTO: 5.2 % — SIGNIFICANT CHANGE UP (ref 1.7–9.3)
MRSA PCR RESULT.: NEGATIVE — SIGNIFICANT CHANGE UP
NEUTROPHILS # BLD AUTO: 9.76 K/UL — HIGH (ref 1.4–6.5)
NEUTROPHILS NFR BLD AUTO: 90 % — HIGH (ref 42.2–75.2)
NRBC BLD AUTO-RTO: 0 /100 WBCS — SIGNIFICANT CHANGE UP (ref 0–0)
PLATELET # BLD AUTO: 294 K/UL — SIGNIFICANT CHANGE UP (ref 130–400)
PMV BLD: 10 FL — SIGNIFICANT CHANGE UP (ref 7.4–10.4)
POTASSIUM SERPL-MCNC: 3.9 MMOL/L — SIGNIFICANT CHANGE UP (ref 3.5–5)
POTASSIUM SERPL-SCNC: 3.9 MMOL/L — SIGNIFICANT CHANGE UP (ref 3.5–5)
PROT SERPL-MCNC: 7.2 G/DL — SIGNIFICANT CHANGE UP (ref 6–8)
RBC # BLD: 5.11 M/UL — SIGNIFICANT CHANGE UP (ref 4.2–5.4)
RBC # FLD: 22.7 % — HIGH (ref 11.5–14.5)
SODIUM SERPL-SCNC: 145 MMOL/L — SIGNIFICANT CHANGE UP (ref 135–146)
WBC # BLD: 10.83 K/UL — HIGH (ref 4.8–10.8)
WBC # FLD AUTO: 10.83 K/UL — HIGH (ref 4.8–10.8)

## 2025-03-27 PROCEDURE — 99231 SBSQ HOSP IP/OBS SF/LOW 25: CPT

## 2025-03-27 PROCEDURE — 99233 SBSQ HOSP IP/OBS HIGH 50: CPT

## 2025-03-27 PROCEDURE — 71045 X-RAY EXAM CHEST 1 VIEW: CPT | Mod: 26

## 2025-03-27 RX ORDER — MELATONIN 5 MG
5 TABLET ORAL ONCE
Refills: 0 | Status: COMPLETED | OUTPATIENT
Start: 2025-03-27 | End: 2025-03-27

## 2025-03-27 RX ADMIN — LIDOCAINE HYDROCHLORIDE 1 PATCH: 20 JELLY TOPICAL at 12:41

## 2025-03-27 RX ADMIN — CEFTRIAXONE 100 MILLIGRAM(S): 500 INJECTION, POWDER, FOR SOLUTION INTRAMUSCULAR; INTRAVENOUS at 23:30

## 2025-03-27 RX ADMIN — Medication 5 MILLIGRAM(S): at 23:40

## 2025-03-27 RX ADMIN — IPRATROPIUM BROMIDE AND ALBUTEROL SULFATE 3 MILLILITER(S): .5; 2.5 SOLUTION RESPIRATORY (INHALATION) at 01:20

## 2025-03-27 RX ADMIN — IPRATROPIUM BROMIDE AND ALBUTEROL SULFATE 3 MILLILITER(S): .5; 2.5 SOLUTION RESPIRATORY (INHALATION) at 07:37

## 2025-03-27 RX ADMIN — ENOXAPARIN SODIUM 70 MILLIGRAM(S): 100 INJECTION SUBCUTANEOUS at 05:38

## 2025-03-27 RX ADMIN — ENOXAPARIN SODIUM 70 MILLIGRAM(S): 100 INJECTION SUBCUTANEOUS at 17:34

## 2025-03-27 RX ADMIN — METHYLPREDNISOLONE ACETATE 40 MILLIGRAM(S): 80 INJECTION, SUSPENSION INTRA-ARTICULAR; INTRALESIONAL; INTRAMUSCULAR; SOFT TISSUE at 05:39

## 2025-03-27 RX ADMIN — METHYLPREDNISOLONE ACETATE 40 MILLIGRAM(S): 80 INJECTION, SUSPENSION INTRA-ARTICULAR; INTRALESIONAL; INTRAMUSCULAR; SOFT TISSUE at 17:34

## 2025-03-27 RX ADMIN — IPRATROPIUM BROMIDE AND ALBUTEROL SULFATE 3 MILLILITER(S): .5; 2.5 SOLUTION RESPIRATORY (INHALATION) at 13:20

## 2025-03-27 RX ADMIN — Medication 1 APPLICATION(S): at 12:59

## 2025-03-27 RX ADMIN — IPRATROPIUM BROMIDE AND ALBUTEROL SULFATE 3 MILLILITER(S): .5; 2.5 SOLUTION RESPIRATORY (INHALATION) at 21:28

## 2025-03-27 RX ADMIN — Medication 40 MILLIGRAM(S): at 12:41

## 2025-03-27 RX ADMIN — FUROSEMIDE 60 MILLIGRAM(S): 10 INJECTION INTRAMUSCULAR; INTRAVENOUS at 05:38

## 2025-03-27 NOTE — PROGRESS NOTE ADULT - SUBJECTIVE AND OBJECTIVE BOX
INTERVAL HPI/OVERNIGHT EVENTS:    SUBJECTIVE: Patient seen and examined at bedside.     unable to obtain ros    OBJECTIVE:    VITAL SIGNS:  Vital Signs Last 24 Hrs  T(C): 37.3 (27 Mar 2025 12:00), Max: 37.3 (27 Mar 2025 12:00)  T(F): 99.2 (27 Mar 2025 12:00), Max: 99.2 (27 Mar 2025 12:00)  HR: 81 (27 Mar 2025 12:00) (75 - 84)  BP: 125/63 (27 Mar 2025 12:00) (115/63 - 134/92)  BP(mean): 83 (27 Mar 2025 12:00) (83 - 108)  RR: 18 (27 Mar 2025 12:00) (17 - 19)  SpO2: 97% (27 Mar 2025 12:00) (89% - 97%)    Parameters below as of 27 Mar 2025 10:45  Patient On (Oxygen Delivery Method): nasal cannula, high flow  O2 Flow (L/min): 60  O2 Concentration (%): 80      PHYSICAL EXAM:    General: NAD  HEENT: NC/AT; PERRL, clear conjunctiva  Neck: supple  Respiratory: CTA b/l  Cardiovascular: +S1/S2; RRR  Abdomen: soft, NT/ND; +BS x4  Extremities: WWP, 2+ peripheral pulses b/l; no LE edema  Skin: normal color and turgor; no rash  Neurological:    MEDICATIONS:  MEDICATIONS  (STANDING):  albuterol/ipratropium for Nebulization 3 milliLiter(s) Nebulizer every 6 hours  cefTRIAXone   IVPB 1000 milliGRAM(s) IV Intermittent every 24 hours  chlorhexidine 2% Cloths 1 Application(s) Topical daily  enoxaparin Injectable 70 milliGRAM(s) SubCutaneous every 12 hours  fluticasone propionate/ salmeterol 100-50 MICROgram(s) Diskus 1 Dose(s) Inhalation two times a day  furosemide   Injectable 60 milliGRAM(s) IV Push daily  hydrocortisone hemorrhoidal Suppository 1 Suppository(s) Rectal at bedtime  lidocaine   4% Patch 1 Patch Transdermal daily  methylPREDNISolone sodium succinate Injectable 40 milliGRAM(s) IV Push every 12 hours  pantoprazole  Injectable 40 milliGRAM(s) IV Push daily    MEDICATIONS  (PRN):  acetaminophen     Tablet .. 650 milliGRAM(s) Oral every 6 hours PRN Temp greater or equal to 38C (100.4F), Mild Pain (1 - 3)      ALLERGIES:  Allergies    No Known Allergies    Intolerances        LABS:                        10.3   10.83 )-----------( 294      ( 27 Mar 2025 05:09 )             37.2     Hemoglobin: 10.3 g/dL (03-27 @ 05:09)  Hemoglobin: 9.7 g/dL (03-26 @ 06:18)  Hemoglobin: 9.7 g/dL (03-24 @ 10:00)    CBC Full  -  ( 27 Mar 2025 05:09 )  WBC Count : 10.83 K/uL  RBC Count : 5.11 M/uL  Hemoglobin : 10.3 g/dL  Hematocrit : 37.2 %  Platelet Count - Automated : 294 K/uL  Mean Cell Volume : 72.8 fL  Mean Cell Hemoglobin : 20.2 pg  Mean Cell Hemoglobin Concentration : 27.7 g/dL  Auto Neutrophil # : x  Auto Lymphocyte # : x  Auto Monocyte # : x  Auto Eosinophil # : x  Auto Basophil # : x  Auto Neutrophil % : x  Auto Lymphocyte % : x  Auto Monocyte % : x  Auto Eosinophil % : x  Auto Basophil % : x    03-27    145  |  93[L]  |  19  ----------------------------<  117[H]  3.9   |  41[HH]  |  0.5[L]    Ca    9.5      27 Mar 2025 05:09  Mg     2.2     03-27    TPro  7.2  /  Alb  3.6  /  TBili  1.0  /  DBili  x   /  AST  54[H]  /  ALT  19  /  AlkPhos  91  03-27    Creatinine Trend: 0.5<--, 0.6<--, <0.5<--  LIVER FUNCTIONS - ( 27 Mar 2025 05:09 )  Alb: 3.6 g/dL / Pro: 7.2 g/dL / ALK PHOS: 91 U/L / ALT: 19 U/L / AST: 54 U/L / GGT: x               hs Troponin:    ABG - ( 27 Mar 2025 09:34 )  pH, Arterial: 7.55  pH, Blood: x     /  pCO2: 62    /  pO2: 70    / HCO3: 54    / Base Excess: 27.8  /  SaO2: 93.7                09:34 - ABG - pH: 7.55  |  pCO2: 62    |  pO2: 70    | Lactate:       | BE: 27.8       Urinalysis Basic - ( 27 Mar 2025 05:09 )    Color: x / Appearance: x / SG: x / pH: x  Gluc: 117 mg/dL / Ketone: x  / Bili: x / Urobili: x   Blood: x / Protein: x / Nitrite: x   Leuk Esterase: x / RBC: x / WBC x   Sq Epi: x / Non Sq Epi: x / Bacteria: x      CSF:                      EKG:   MICROBIOLOGY:    IMAGING:      Labs, imaging, EKG personally reviewed    RADIOLOGY & ADDITIONAL TESTS: Reviewed.

## 2025-03-27 NOTE — PROGRESS NOTE ADULT - SUBJECTIVE AND OBJECTIVE BOX
Over Night Events: Events noted, on NC, EEG/ Neuro reviewed, afebrile, repeat CT head noted    PHYSICAL EXAM    ICU Vital Signs Last 24 Hrs  T(C): 36.7 (27 Mar 2025 04:00), Max: 37.1 (26 Mar 2025 16:00)  T(F): 98 (27 Mar 2025 04:00), Max: 98.7 (26 Mar 2025 16:00)  HR: 78 (27 Mar 2025 04:00) (78 - 93)  BP: 124/70 (27 Mar 2025 04:00) (121/70 - 145/77)  BP(mean): 90 (27 Mar 2025 04:00) (78 - 108)  RR: 18 (27 Mar 2025 04:00) (18 - 19)  SpO2: 96% (27 Mar 2025 04:00) (88% - 96%)    O2 Parameters below as of 27 Mar 2025 04:00  Patient On (Oxygen Delivery Method): nasal cannula  O2 Flow (L/min): 5          General: ill looking  Lungs: dec bs both bases  Cardiovascular: BEATRIZ 2/6  Abdomen: Soft, Positive BS  Extremities: No clubbing   Neurological: Non focal       03-25-25 @ 07:01  -  03-26-25 @ 07:00  --------------------------------------------------------  IN:  Total IN: 0 mL    OUT:    Voided (mL): 600 mL  Total OUT: 600 mL    Total NET: -600 mL      03-26-25 @ 07:01  -  03-27-25 @ 06:26  --------------------------------------------------------  IN:    Oral Fluid: 340 mL  Total IN: 340 mL    OUT:    Voided (mL): 1300 mL  Total OUT: 1300 mL    Total NET: -960 mL          LABS:                          10.3   10.83 )-----------( 294      ( 27 Mar 2025 05:09 )             37.2                                               03-27    145  |  93[L]  |  19  ----------------------------<  117[H]  3.9   |  41[HH]  |  0.5[L]    Ca    9.5      27 Mar 2025 05:09  Mg     2.2     03-27    TPro  7.2  /  Alb  3.6  /  TBili  1.0  /  DBili  x   /  AST  54[H]  /  ALT  19  /  AlkPhos  91  03-27                                             Urinalysis Basic - ( 27 Mar 2025 05:09 )    Color: x / Appearance: x / SG: x / pH: x  Gluc: 117 mg/dL / Ketone: x  / Bili: x / Urobili: x   Blood: x / Protein: x / Nitrite: x   Leuk Esterase: x / RBC: x / WBC x   Sq Epi: x / Non Sq Epi: x / Bacteria: x                                                  LIVER FUNCTIONS - ( 27 Mar 2025 05:09 )  Alb: 3.6 g/dL / Pro: 7.2 g/dL / ALK PHOS: 91 U/L / ALT: 19 U/L / AST: 54 U/L / GGT: x                                                  Culture - Blood (collected 24 Mar 2025 10:00)  Source: Blood Blood  Preliminary Report (26 Mar 2025 22:01):    No growth at 48 Hours    Culture - Blood (collected 24 Mar 2025 10:00)  Source: Blood Blood  Preliminary Report (26 Mar 2025 22:01):    No growth at 48 Hours                                                                                           MEDICATIONS  (STANDING):  albuterol/ipratropium for Nebulization 3 milliLiter(s) Nebulizer every 6 hours  cefTRIAXone   IVPB 1000 milliGRAM(s) IV Intermittent every 24 hours  chlorhexidine 2% Cloths 1 Application(s) Topical daily  enoxaparin Injectable 70 milliGRAM(s) SubCutaneous every 12 hours  fluticasone propionate/ salmeterol 100-50 MICROgram(s) Diskus 1 Dose(s) Inhalation two times a day  furosemide   Injectable 60 milliGRAM(s) IV Push daily  hydrocortisone hemorrhoidal Suppository 1 Suppository(s) Rectal at bedtime  lidocaine   4% Patch 1 Patch Transdermal daily  methylPREDNISolone sodium succinate Injectable 40 milliGRAM(s) IV Push every 12 hours  pantoprazole  Injectable 40 milliGRAM(s) IV Push daily    MEDICATIONS  (PRN):  acetaminophen     Tablet .. 650 milliGRAM(s) Oral every 6 hours PRN Temp greater or equal to 38C (100.4F), Mild Pain (1 - 3)

## 2025-03-27 NOTE — PROGRESS NOTE ADULT - ATTENDING COMMENTS
Exam stable, unchanged from yesterday. Repeat CTH with stable mild cerebral edema. Recommendations as above

## 2025-03-27 NOTE — PROGRESS NOTE ADULT - ASSESSMENT
74F DNR/DNI w/ PMHx of COPD on 3L NC, smoking and AUD hx, AFib on Eliquis, abdominal abscess (2021), gastric bypass surgery 20+ years ago, presents from nursing home with lethargy & hypoxia being admitted to SDU for AHRF. Neurology consulted for CTH finding of mild generalized cerebral edema new from 2021 CTH. Unclear baseline functional and mental status at this time, however during encounter patient A&Ox2 without neurological complaint. mentation does seem to fluctuate with CO2 retention. EEG shows mild generalized slowing, and left hemispheric slowing. Ddx includes but not limited to chronic anoxic injury, CVT, TBI related, osmotic edema, interstitial edema.     Recommendation:  - q4 general neuro checks  - avoid hypotonic fluids  - refrain from hypotension  - head of bed 30 degree  - Na goal 145-155  - consult neurocritical care  - MR head non contrast, MRV with contrast      >>>>INCOMPLETE>>>>INCOMPLETE>>>>INCOMPLETE>>>>INCOMPLETE>>>>INCOMPLETE>>>>INCOMPLETE>>>>INCOMPLETE>>>>INCOMPLETE       74F DNR/DNI w/ PMHx of COPD on 3L NC, smoking and AUD hx, AFib on Eliquis, abdominal abscess (2021), gastric bypass surgery 20+ years ago, presents from nursing home with lethargy & hypoxia being admitted to SDU for AHRF. Neurology consulted for CTH finding of mild generalized cerebral edema new from 2021 CTH. Unclear baseline functional and mental status at this time, however during encounter patient A&Ox2 without neurological complaint. mentation does seem to fluctuate with CO2 retention. EEG shows mild generalized slowing, and left hemispheric slowing. Ddx includes but not limited to chronic anoxic injury, CVT, TBI related, osmotic edema, interstitial edema.     Recommendation:  - MR head non contrast, MRV with contrast  - q4 general neuro checks  - avoid hypotonic fluids  - refrain from hypotension  - head of bed 30 degree  - Na goal 145-155    Discussed with attending neurologist, Dr. Womcak             74F DNR/DNI w/ PMHx of COPD on 3L NC, smoking and AUD hx, AFib on Eliquis, abdominal abscess (2021), gastric bypass surgery 20+ years ago, presents from nursing home with lethargy & hypoxia being admitted to SDU for AHRF. Neurology consulted for CTH finding of mild generalized cerebral edema new from 2021 CTH. Unclear baseline functional and mental status at this time, however during encounter patient A&Ox2 without neurological complaint. Mentation does seem to fluctuate with CO2 retention. EEG shows mild generalized slowing, and left hemispheric slowing. Most likely chronic hypoxic / hypercarbic injury, but differential also includes TBI related, osmotic edema, interstitial edema. Rule out venous thrombosis or other neurological causes of cerebral edema.     Recommendation:  - MR head non contrast, MRV with contrast  - q4 general neuro checks  - avoid hypotonic fluids  - refrain from hypotension  - head of bed 30 degree  - Na goal 145-155    Discussed with attending neurologist, Dr. Womack

## 2025-03-27 NOTE — PROGRESS NOTE ADULT - ASSESSMENT
IMPRESSION:    Acute on chronic hypoxemic/ hypercapnic resp failure  COPD exacerbation  Pul edema  HO COPD  HO A AFIB    PLAN:    CNS: Avoid CNS depressant, ? Cerebral edema/ fup EEG/ Brain MRI    HEENT:  Oral care    PULMONARY:  HOB @ 45 degrees, NIV at night and as needed, NC keep Sao2 88 to 92%,  steroids 40 daily, Neb q 4    CARDIOVASCULAR: lasix dailY Keep - balance    GI: GI prophylaxis                                          Feeding per speech    RENAL:  F/u  lytes.  Correct as needed. accurate I/O    INFECTIOUS DISEASE: finish course of abx    HEMATOLOGICAL:  DVT prophylaxis. full ac    ENDOCRINE:  Follow up FS.  Insulin protocol if needed.    floor  GOC noted

## 2025-03-27 NOTE — PROGRESS NOTE ADULT - SUBJECTIVE AND OBJECTIVE BOX
Neurology Progress Note     LOIS WEGENER 74y Female [id number]    Hospital Day: 3d     Overnight events:  None    Subjective complaints:  Pt evaluated at bedside. Pt has no acute complaints.     Vital Signs  Vital Signs Last 24 Hrs  T(C): 36.8 (27 Mar 2025 08:00), Max: 37.1 (26 Mar 2025 16:00)  T(F): 98.2 (27 Mar 2025 08:00), Max: 98.7 (26 Mar 2025 16:00)  HR: 75 (27 Mar 2025 08:00) (75 - 93)  BP: 115/63 (27 Mar 2025 08:00) (115/63 - 145/77)  BP(mean): 84 (27 Mar 2025 08:00) (78 - 108)  RR: 17 (27 Mar 2025 08:00) (17 - 19)  SpO2: 96% (27 Mar 2025 08:00) (88% - 96%)    Parameters below as of 27 Mar 2025 08:00  Patient On (Oxygen Delivery Method): nasal cannula  O2 Flow (L/min): 5      Neurological Exam:   Mental status: Awake alert, oriented to name, age, month, and year but not location. No dysarthria, intact naming, comprehension to simple and cross commands, intact repetition, easily distracted with poor attention/concentration but redirectable   Cranial nerves:   - Eyes: PERRL, EOMI without nystagmus, Visual fields full   - Face: BL V1-V3 sensation intact symmetrically, face symmetric   - ENT: Hearing intact to voice, palate elevation symmetric, tongue was midline  Motor: No drift in all 4 extremities, though pain limited right shoulder, 5/5 JAQUAN&LE distally, proximal exam is effort dependent and pain limited. Normal tone and bulk.  No abnormal movements.    Sensation: Intact to light touch , no extinction   Coordination: No dysmetria on finger-to-nose   Reflexes: 2+ in bilateral UE/ 0+ bilateral LE, downgoing toes bilaterally. (-) [place].  Gait: Deferred      Labs:                        10.3   10.83 )-----------( 294      ( 27 Mar 2025 05:09 )             37.2       03-27    145  |  93[L]  |  19  ----------------------------<  117[H]  3.9   |  41[HH]  |  0.5[L]    Ca    9.5      27 Mar 2025 05:09  Mg     2.2     03-27    TPro  7.2  /  Alb  3.6  /  TBili  1.0  /  DBili  x   /  AST  54[H]  /  ALT  19  /  AlkPhos  91  03-27              Medications:  MEDICATIONS  (STANDING):  albuterol/ipratropium for Nebulization 3 milliLiter(s) Nebulizer every 6 hours  cefTRIAXone   IVPB 1000 milliGRAM(s) IV Intermittent every 24 hours  chlorhexidine 2% Cloths 1 Application(s) Topical daily  enoxaparin Injectable 70 milliGRAM(s) SubCutaneous every 12 hours  fluticasone propionate/ salmeterol 100-50 MICROgram(s) Diskus 1 Dose(s) Inhalation two times a day  furosemide   Injectable 60 milliGRAM(s) IV Push daily  hydrocortisone hemorrhoidal Suppository 1 Suppository(s) Rectal at bedtime  lidocaine   4% Patch 1 Patch Transdermal daily  methylPREDNISolone sodium succinate Injectable 40 milliGRAM(s) IV Push every 12 hours  pantoprazole  Injectable 40 milliGRAM(s) IV Push daily    MEDICATIONS  (PRN):  acetaminophen     Tablet .. 650 milliGRAM(s) Oral every 6 hours PRN Temp greater or equal to 38C (100.4F), Mild Pain (1 - 3)      PERTINENT HISTORICAL RESULTS:      ACC: [id number] EXAM:  CT BRAIN   ORDERED BY: AILYN MONTOYA     PROCEDURE DATE:  03/26/2025          INTERPRETATION:  CLINICAL INDICATION: Altered mental status    TECHNIQUE: CT of the head was performed without the administration of   intravenous contrast. Coronal and sagittal reformats were obtained.    COMPARISON: CT head 3/24/2025    FINDINGS:    Stable mild effacement of the supratentorial sulci without evidence of   brain herniation.    There is no acute territorial infarct, intracranial hemorrhage, mass   effect, or midline shift.    No extra-axial fluid collections.    The visualized intraorbital contents are unremarkable. The imaged   portions of the paranasal sinuses are aerated. Mild opacification of the   bilateral mastoid complexes.    No acute depressed calvarial fracture.    IMPRESSION:    Since CT head 3/24/2025:    Stable mild generalized cerebral edema.    No acute intracranial hemorrhage or midline shift.    --- End of Report ---      MEÑO TOLEDO MD; Resident Radiologist  This document has been electronically signed.  MARCO A SOLIS MD; Attending Interventional Radiologist  This document has been electronically signed. Mar 26 2025  9:38PM      _______________________________________________      ACC: [id number] EXAM:  CT BRAIN   ORDERED BY: MARYSOL CARVAJAL     PROCEDURE DATE:  03/24/2025          INTERPRETATION:  CLINICAL INDICATION: Altered mental status.    Technique: CT of the head was performed without contrast.    Multiple contiguous axial images were acquired from the skullbase to the   vertex without the administration of intravenous contrast.  Coronal and   sagittal reformations were made.    COMPARISON:  prior head CT dated 9/9/2021.    FINDINGS:    In comparison to the prior CT examination of the ventricles are less   dilated with mild effacement of the supratentorial sulci for example   series 2 image 14. There is no brain herniation. The basal cisterns are   patent.     There is no intraparenchymal hematoma, mass effect or midline shift. No   abnormal extra-axial fluid collections are present.    The calvarium is intact. The visualized intraorbital compartments,   paranasal sinuses and mastoid complexes appear free of acute disease.    IMPRESSION:  Mild generalized cerebral edema is noted.    --- End of Report ---        MATEUS KUNZ MD; Attending Radiologist  This document has been electronically signed. Mar 24 2025 11:10AM   Neurology Progress Note     LOIS WEGENER 74y Female [id number]    Hospital Day: 3d     Overnight events:  None    Subjective complaints:  Pt evaluated at bedside. Pt has no acute complaints.     Vital Signs  Vital Signs Last 24 Hrs  T(C): 36.8 (27 Mar 2025 08:00), Max: 37.1 (26 Mar 2025 16:00)  T(F): 98.2 (27 Mar 2025 08:00), Max: 98.7 (26 Mar 2025 16:00)  HR: 75 (27 Mar 2025 08:00) (75 - 93)  BP: 115/63 (27 Mar 2025 08:00) (115/63 - 145/77)  BP(mean): 84 (27 Mar 2025 08:00) (78 - 108)  RR: 17 (27 Mar 2025 08:00) (17 - 19)  SpO2: 96% (27 Mar 2025 08:00) (88% - 96%)    Parameters below as of 27 Mar 2025 08:00  Patient On (Oxygen Delivery Method): nasal cannula  O2 Flow (L/min): 5      Neurological Exam:   Mental status: Awake alert, oriented to name, age, month, and year but not location. (Does not directly answer, although at some point she said "[place]") easily distracted with poor attention/concentration but redirectable   Cranial nerves:   - Eyes: PERRL, EOMI without nystagmus  - Face:  face symmetric   - ENT: Hearing intact to voice  Motor: No drift in all 4 extremities, though pain limited right shoulder, Proximal exam is effort dependent and pain limited. Normal tone and bulk.  No abnormal movements.    Sensation: Intact to light touch , no extinction   Reflexes: 2+ bilateral LE, downgoing toes bilaterally.  Gait: Deferred      Labs:                        10.3   10.83 )-----------( 294      ( 27 Mar 2025 05:09 )             37.2       03-27    145  |  93[L]  |  19  ----------------------------<  117[H]  3.9   |  41[HH]  |  0.5[L]    Ca    9.5      27 Mar 2025 05:09  Mg     2.2     03-27    TPro  7.2  /  Alb  3.6  /  TBili  1.0  /  DBili  x   /  AST  54[H]  /  ALT  19  /  AlkPhos  91  03-27              Medications:  MEDICATIONS  (STANDING):  albuterol/ipratropium for Nebulization 3 milliLiter(s) Nebulizer every 6 hours  cefTRIAXone   IVPB 1000 milliGRAM(s) IV Intermittent every 24 hours  chlorhexidine 2% Cloths 1 Application(s) Topical daily  enoxaparin Injectable 70 milliGRAM(s) SubCutaneous every 12 hours  fluticasone propionate/ salmeterol 100-50 MICROgram(s) Diskus 1 Dose(s) Inhalation two times a day  furosemide   Injectable 60 milliGRAM(s) IV Push daily  hydrocortisone hemorrhoidal Suppository 1 Suppository(s) Rectal at bedtime  lidocaine   4% Patch 1 Patch Transdermal daily  methylPREDNISolone sodium succinate Injectable 40 milliGRAM(s) IV Push every 12 hours  pantoprazole  Injectable 40 milliGRAM(s) IV Push daily    MEDICATIONS  (PRN):  acetaminophen     Tablet .. 650 milliGRAM(s) Oral every 6 hours PRN Temp greater or equal to 38C (100.4F), Mild Pain (1 - 3)      PERTINENT HISTORICAL RESULTS:      ACC: [id number] EXAM:  CT BRAIN   ORDERED BY: AILYN MONTOYA     PROCEDURE DATE:  03/26/2025          INTERPRETATION:  CLINICAL INDICATION: Altered mental status    TECHNIQUE: CT of the head was performed without the administration of   intravenous contrast. Coronal and sagittal reformats were obtained.    COMPARISON: CT head 3/24/2025    FINDINGS:    Stable mild effacement of the supratentorial sulci without evidence of   brain herniation.    There is no acute territorial infarct, intracranial hemorrhage, mass   effect, or midline shift.    No extra-axial fluid collections.    The visualized intraorbital contents are unremarkable. The imaged   portions of the paranasal sinuses are aerated. Mild opacification of the   bilateral mastoid complexes.    No acute depressed calvarial fracture.    IMPRESSION:    Since CT head 3/24/2025:    Stable mild generalized cerebral edema.    No acute intracranial hemorrhage or midline shift.    --- End of Report ---      MEÑO TOLEDO MD; Resident Radiologist  This document has been electronically signed.  MARCO A SOLIS MD; Attending Interventional Radiologist  This document has been electronically signed. Mar 26 2025  9:38PM      _______________________________________________      ACC: [id number] EXAM:  CT BRAIN   ORDERED BY: MARYSOL CARVAJAL     PROCEDURE DATE:  03/24/2025          INTERPRETATION:  CLINICAL INDICATION: Altered mental status.    Technique: CT of the head was performed without contrast.    Multiple contiguous axial images were acquired from the skullbase to the   vertex without the administration of intravenous contrast.  Coronal and   sagittal reformations were made.    COMPARISON:  prior head CT dated 9/9/2021.    FINDINGS:    In comparison to the prior CT examination of the ventricles are less   dilated with mild effacement of the supratentorial sulci for example   series 2 image 14. There is no brain herniation. The basal cisterns are   patent.     There is no intraparenchymal hematoma, mass effect or midline shift. No   abnormal extra-axial fluid collections are present.    The calvarium is intact. The visualized intraorbital compartments,   paranasal sinuses and mastoid complexes appear free of acute disease.    IMPRESSION:  Mild generalized cerebral edema is noted.    --- End of Report ---        MATEUS KUNZ MD; Attending Radiologist  This document has been electronically signed. Mar 24 2025 11:10AM

## 2025-03-27 NOTE — PROGRESS NOTE ADULT - ASSESSMENT
74F PMHx COPD on home o2, AFib on eliquis, abdominal abscess 2021, s/p gastric bypass here with acute on chronic hypoxic and hypercapnic resp failure. Altered mental status.

## 2025-03-28 LAB
ALBUMIN SERPL ELPH-MCNC: 3.5 G/DL — SIGNIFICANT CHANGE UP (ref 3.5–5.2)
ALP SERPL-CCNC: 85 U/L — SIGNIFICANT CHANGE UP (ref 30–115)
ALT FLD-CCNC: 21 U/L — SIGNIFICANT CHANGE UP (ref 0–41)
ANION GAP SERPL CALC-SCNC: 10 MMOL/L — SIGNIFICANT CHANGE UP (ref 7–14)
ANION GAP SERPL CALC-SCNC: 9 MMOL/L — SIGNIFICANT CHANGE UP (ref 7–14)
AST SERPL-CCNC: 45 U/L — HIGH (ref 0–41)
BASOPHILS # BLD AUTO: 0.01 K/UL — SIGNIFICANT CHANGE UP (ref 0–0.2)
BASOPHILS NFR BLD AUTO: 0.1 % — SIGNIFICANT CHANGE UP (ref 0–1)
BILIRUB SERPL-MCNC: 1.2 MG/DL — SIGNIFICANT CHANGE UP (ref 0.2–1.2)
BUN SERPL-MCNC: 26 MG/DL — HIGH (ref 10–20)
BUN SERPL-MCNC: 28 MG/DL — HIGH (ref 10–20)
CALCIUM SERPL-MCNC: 8.2 MG/DL — LOW (ref 8.4–10.5)
CALCIUM SERPL-MCNC: 9.1 MG/DL — SIGNIFICANT CHANGE UP (ref 8.4–10.5)
CHLORIDE SERPL-SCNC: 88 MMOL/L — LOW (ref 98–110)
CHLORIDE SERPL-SCNC: 93 MMOL/L — LOW (ref 98–110)
CO2 SERPL-SCNC: 40 MMOL/L — HIGH (ref 17–32)
CO2 SERPL-SCNC: 44 MMOL/L — CRITICAL HIGH (ref 17–32)
CREAT SERPL-MCNC: 0.5 MG/DL — LOW (ref 0.7–1.5)
CREAT SERPL-MCNC: 0.7 MG/DL — SIGNIFICANT CHANGE UP (ref 0.7–1.5)
EGFR: 91 ML/MIN/1.73M2 — SIGNIFICANT CHANGE UP
EGFR: 91 ML/MIN/1.73M2 — SIGNIFICANT CHANGE UP
EGFR: 98 ML/MIN/1.73M2 — SIGNIFICANT CHANGE UP
EGFR: 98 ML/MIN/1.73M2 — SIGNIFICANT CHANGE UP
EOSINOPHIL # BLD AUTO: 0.06 K/UL — SIGNIFICANT CHANGE UP (ref 0–0.7)
EOSINOPHIL NFR BLD AUTO: 0.4 % — SIGNIFICANT CHANGE UP (ref 0–8)
GLUCOSE SERPL-MCNC: 111 MG/DL — HIGH (ref 70–99)
GLUCOSE SERPL-MCNC: 166 MG/DL — HIGH (ref 70–99)
HCT VFR BLD CALC: 37.4 % — SIGNIFICANT CHANGE UP (ref 37–47)
HGB BLD-MCNC: 10.1 G/DL — LOW (ref 12–16)
IMM GRANULOCYTES NFR BLD AUTO: 0.5 % — HIGH (ref 0.1–0.3)
LYMPHOCYTES # BLD AUTO: 0.42 K/UL — LOW (ref 1.2–3.4)
LYMPHOCYTES # BLD AUTO: 2.9 % — LOW (ref 20.5–51.1)
MAGNESIUM SERPL-MCNC: 2.3 MG/DL — SIGNIFICANT CHANGE UP (ref 1.8–2.4)
MCHC RBC-ENTMCNC: 19.8 PG — LOW (ref 27–31)
MCHC RBC-ENTMCNC: 27 G/DL — LOW (ref 32–37)
MCV RBC AUTO: 73.3 FL — LOW (ref 81–99)
MONOCYTES # BLD AUTO: 0.7 K/UL — HIGH (ref 0.1–0.6)
MONOCYTES NFR BLD AUTO: 4.8 % — SIGNIFICANT CHANGE UP (ref 1.7–9.3)
NEUTROPHILS # BLD AUTO: 13.31 K/UL — HIGH (ref 1.4–6.5)
NEUTROPHILS NFR BLD AUTO: 91.3 % — HIGH (ref 42.2–75.2)
NRBC BLD AUTO-RTO: 0 /100 WBCS — SIGNIFICANT CHANGE UP (ref 0–0)
PLATELET # BLD AUTO: 302 K/UL — SIGNIFICANT CHANGE UP (ref 130–400)
PMV BLD: 9.9 FL — SIGNIFICANT CHANGE UP (ref 7.4–10.4)
POTASSIUM SERPL-MCNC: 3.3 MMOL/L — LOW (ref 3.5–5)
POTASSIUM SERPL-MCNC: 3.9 MMOL/L — SIGNIFICANT CHANGE UP (ref 3.5–5)
POTASSIUM SERPL-SCNC: 3.3 MMOL/L — LOW (ref 3.5–5)
POTASSIUM SERPL-SCNC: 3.9 MMOL/L — SIGNIFICANT CHANGE UP (ref 3.5–5)
PROT SERPL-MCNC: 6.8 G/DL — SIGNIFICANT CHANGE UP (ref 6–8)
RBC # BLD: 5.1 M/UL — SIGNIFICANT CHANGE UP (ref 4.2–5.4)
RBC # FLD: 22.7 % — HIGH (ref 11.5–14.5)
SODIUM SERPL-SCNC: 138 MMOL/L — SIGNIFICANT CHANGE UP (ref 135–146)
SODIUM SERPL-SCNC: 147 MMOL/L — HIGH (ref 135–146)
WBC # BLD: 14.57 K/UL — HIGH (ref 4.8–10.8)
WBC # FLD AUTO: 14.57 K/UL — HIGH (ref 4.8–10.8)

## 2025-03-28 PROCEDURE — 71045 X-RAY EXAM CHEST 1 VIEW: CPT | Mod: 26

## 2025-03-28 PROCEDURE — 73590 X-RAY EXAM OF LOWER LEG: CPT | Mod: 26,50

## 2025-03-28 PROCEDURE — 99233 SBSQ HOSP IP/OBS HIGH 50: CPT

## 2025-03-28 PROCEDURE — 72170 X-RAY EXAM OF PELVIS: CPT | Mod: 26

## 2025-03-28 PROCEDURE — 73630 X-RAY EXAM OF FOOT: CPT | Mod: 26,50

## 2025-03-28 PROCEDURE — 71250 CT THORAX DX C-: CPT | Mod: 26

## 2025-03-28 PROCEDURE — 73552 X-RAY EXAM OF FEMUR 2/>: CPT | Mod: 26,50

## 2025-03-28 RX ORDER — CEFEPIME 2 G/20ML
2000 INJECTION, POWDER, FOR SOLUTION INTRAVENOUS EVERY 8 HOURS
Refills: 0 | Status: DISCONTINUED | OUTPATIENT
Start: 2025-03-28 | End: 2025-03-29

## 2025-03-28 RX ORDER — SODIUM CHLORIDE 9 G/1000ML
1000 INJECTION, SOLUTION INTRAVENOUS
Refills: 0 | Status: COMPLETED | OUTPATIENT
Start: 2025-03-28 | End: 2025-03-28

## 2025-03-28 RX ADMIN — Medication 50 MILLIEQUIVALENT(S): at 12:24

## 2025-03-28 RX ADMIN — IPRATROPIUM BROMIDE AND ALBUTEROL SULFATE 3 MILLILITER(S): .5; 2.5 SOLUTION RESPIRATORY (INHALATION) at 07:19

## 2025-03-28 RX ADMIN — LIDOCAINE HYDROCHLORIDE 1 PATCH: 20 JELLY TOPICAL at 20:00

## 2025-03-28 RX ADMIN — METHYLPREDNISOLONE ACETATE 40 MILLIGRAM(S): 80 INJECTION, SUSPENSION INTRA-ARTICULAR; INTRALESIONAL; INTRAMUSCULAR; SOFT TISSUE at 05:46

## 2025-03-28 RX ADMIN — Medication 50 MILLIEQUIVALENT(S): at 11:12

## 2025-03-28 RX ADMIN — ENOXAPARIN SODIUM 70 MILLIGRAM(S): 100 INJECTION SUBCUTANEOUS at 18:14

## 2025-03-28 RX ADMIN — CEFEPIME 100 MILLIGRAM(S): 2 INJECTION, POWDER, FOR SOLUTION INTRAVENOUS at 13:16

## 2025-03-28 RX ADMIN — IPRATROPIUM BROMIDE AND ALBUTEROL SULFATE 3 MILLILITER(S): .5; 2.5 SOLUTION RESPIRATORY (INHALATION) at 19:33

## 2025-03-28 RX ADMIN — METHYLPREDNISOLONE ACETATE 40 MILLIGRAM(S): 80 INJECTION, SUSPENSION INTRA-ARTICULAR; INTRALESIONAL; INTRAMUSCULAR; SOFT TISSUE at 18:14

## 2025-03-28 RX ADMIN — Medication 1 APPLICATION(S): at 12:30

## 2025-03-28 RX ADMIN — HYDROCORTISONE 1 SUPPOSITORY(S): 10 CREAM TOPICAL at 21:47

## 2025-03-28 RX ADMIN — CEFEPIME 100 MILLIGRAM(S): 2 INJECTION, POWDER, FOR SOLUTION INTRAVENOUS at 21:49

## 2025-03-28 RX ADMIN — Medication 50 MILLIEQUIVALENT(S): at 06:45

## 2025-03-28 RX ADMIN — IPRATROPIUM BROMIDE AND ALBUTEROL SULFATE 3 MILLILITER(S): .5; 2.5 SOLUTION RESPIRATORY (INHALATION) at 13:54

## 2025-03-28 RX ADMIN — Medication 50 MILLIEQUIVALENT(S): at 09:08

## 2025-03-28 RX ADMIN — SODIUM CHLORIDE 50 MILLILITER(S): 9 INJECTION, SOLUTION INTRAVENOUS at 21:52

## 2025-03-28 RX ADMIN — SODIUM CHLORIDE 50 MILLILITER(S): 9 INJECTION, SOLUTION INTRAVENOUS at 09:08

## 2025-03-28 RX ADMIN — ENOXAPARIN SODIUM 70 MILLIGRAM(S): 100 INJECTION SUBCUTANEOUS at 05:45

## 2025-03-28 RX ADMIN — Medication 40 MILLIGRAM(S): at 12:29

## 2025-03-28 RX ADMIN — FUROSEMIDE 60 MILLIGRAM(S): 10 INJECTION INTRAMUSCULAR; INTRAVENOUS at 05:46

## 2025-03-28 RX ADMIN — LIDOCAINE HYDROCHLORIDE 1 PATCH: 20 JELLY TOPICAL at 12:30

## 2025-03-28 NOTE — PROGRESS NOTE ADULT - ASSESSMENT
IMPRESSION:    Acute on chronic hypoxemic/ hypercapnic resp failure  COPD exacerbation  Pul edema  Hypernatremia  HO COPD  HO A AFIB    PLAN:    CNS: Avoid CNS depressant, ? Cerebral edema/ fup EEG/ Brain MRI    HEENT:  Oral care    PULMONARY:  HOB @ 45 degrees, NIV at night and as needed, NC keep Sao2 88 to 92%,  solumedrol 40 daily, Neb q 4    CARDIOVASCULAR: hold lasix today    GI: GI prophylaxis                                          Feeding per speech    RENAL:  F/u  lytes.  Correct as needed. accurate I/O    INFECTIOUS DISEASE: finish course of abx    HEMATOLOGICAL:  DVT prophylaxis. full ac    ENDOCRINE:  Follow up FS.  Insulin protocol if needed.    SDU  DNR/I  GOC noted

## 2025-03-28 NOTE — PROGRESS NOTE ADULT - SUBJECTIVE AND OBJECTIVE BOX
Over Night Events: Events noted, on NIV overnight, intermittently agitated    PHYSICAL EXAM    ICU Vital Signs Last 24 Hrs  T(C): 37.2 (27 Mar 2025 20:00), Max: 37.3 (27 Mar 2025 12:00)  T(F): 99 (27 Mar 2025 20:00), Max: 99.2 (27 Mar 2025 12:00)  HR: 72 (28 Mar 2025 04:00) (72 - 81)  BP: 117/59 (28 Mar 2025 04:00) (115/63 - 135/81)  BP(mean): 85 (28 Mar 2025 04:00) (83 - 103)  RR: 18 (28 Mar 2025 04:00) (17 - 18)  SpO2: 95% (28 Mar 2025 04:00) (92% - 97%)    O2 Parameters below as of 27 Mar 2025 21:30  Patient On (Oxygen Delivery Method): nasal cannula, high flow  O2 Flow (L/min): 60  O2 Concentration (%): 60        General: ill looking  Lungs: Bilateral rhonchi  Cardiovascular: BEATRIZ 2.6  Abdomen: Soft, Positive BS  Neurological: Non focal       03-26-25 @ 07:01  -  03-27-25 @ 07:00  --------------------------------------------------------  IN:    Oral Fluid: 340 mL  Total IN: 340 mL    OUT:    Voided (mL): 1300 mL  Total OUT: 1300 mL    Total NET: -960 mL      03-27-25 @ 07:01  -  03-28-25 @ 06:09  --------------------------------------------------------  IN:  Total IN: 0 mL    OUT:    Voided (mL): 1500 mL  Total OUT: 1500 mL    Total NET: -1500 mL          LABS:                          10.1   14.57 )-----------( 302      ( 28 Mar 2025 04:37 )             37.4                                               03-28    147[H]  |  93[L]  |  26[H]  ----------------------------<  111[H]  3.3[L]   |  44[HH]  |  0.5[L]    Ca    9.1      28 Mar 2025 04:37  Mg     2.3     03-28    TPro  6.8  /  Alb  3.5  /  TBili  1.2  /  DBili  x   /  AST  45[H]  /  ALT  21  /  AlkPhos  85  03-28                                             Urinalysis Basic - ( 28 Mar 2025 04:37 )    Color: x / Appearance: x / SG: x / pH: x  Gluc: 111 mg/dL / Ketone: x  / Bili: x / Urobili: x   Blood: x / Protein: x / Nitrite: x   Leuk Esterase: x / RBC: x / WBC x   Sq Epi: x / Non Sq Epi: x / Bacteria: x                                                  LIVER FUNCTIONS - ( 28 Mar 2025 04:37 )  Alb: 3.5 g/dL / Pro: 6.8 g/dL / ALK PHOS: 85 U/L / ALT: 21 U/L / AST: 45 U/L / GGT: x                                                                                                                                   ABG - ( 27 Mar 2025 09:34 )  pH, Arterial: 7.55  pH, Blood: x     /  pCO2: 62    /  pO2: 70    / HCO3: 54    / Base Excess: 27.8  /  SaO2: 93.7                MEDICATIONS  (STANDING):  albuterol/ipratropium for Nebulization 3 milliLiter(s) Nebulizer every 6 hours  cefTRIAXone   IVPB 1000 milliGRAM(s) IV Intermittent every 24 hours  chlorhexidine 2% Cloths 1 Application(s) Topical daily  enoxaparin Injectable 70 milliGRAM(s) SubCutaneous every 12 hours  fluticasone propionate/ salmeterol 100-50 MICROgram(s) Diskus 1 Dose(s) Inhalation two times a day  furosemide   Injectable 60 milliGRAM(s) IV Push daily  hydrocortisone hemorrhoidal Suppository 1 Suppository(s) Rectal at bedtime  lidocaine   4% Patch 1 Patch Transdermal daily  methylPREDNISolone sodium succinate Injectable 40 milliGRAM(s) IV Push every 12 hours  pantoprazole  Injectable 40 milliGRAM(s) IV Push daily  potassium chloride  20 mEq/100 mL IVPB 20 milliEquivalent(s) IV Intermittent every 2 hours    MEDICATIONS  (PRN):  acetaminophen     Tablet .. 650 milliGRAM(s) Oral every 6 hours PRN Temp greater or equal to 38C (100.4F), Mild Pain (1 - 3)      CXR NOTED

## 2025-03-28 NOTE — PROGRESS NOTE ADULT - ASSESSMENT
74F PMHx COPD on home o2, AFib on eliquis, abdominal abscess 2021, s/p gastric bypass here with acute on chronic hypoxic and hypercapnic resp failure. Altered mental status.            Patient is a 74 year old F with PMHx of COPD on 3L NC, smoking hx, AFib on Eliquis, past hx of abdominal abscess (in 2021), Gastric Bypass surgery 20+ years ago, who presented to the ED from [facility] on 3/24 with CC of hypoxia and change in mental status. As per ED note, patient was calling for help at her NH this morning. She was noted to be saturating 80% while on her baseline 3L NC; was placed on non-rebreather and brought to ED. Patient initially admitted to MICU for acute hypoxic hypercapnic respiratory failure. Code status was confirmed to be DNR/DNI with MOLST form faxed from [facility]. Patient subsequently downgraded to SDU.     #Acute Hypoxic Hypercapnic Respiratory Failure likely secondary to CAP  #COPD on 3L NC  #Smoking Hx  - On high flow at the moment   - while at NH was noted to be saturating 80% while on her baseline 3L NC, placed on NRB and brought to ED  - lethargic in ED, minimally responsive to sternal rub  - on admission, WBC 11.7k, Hgb 9.7 (baseline ~10), Trop 17, Pro-BNP 1727; VBG pH 7.18| pCO2 116 |pO2 77 | HCO3 43 --> after two hours of BiPAP rpt VBG pH 7.18| pCO2 118 |pO2 61 | HCO3 44 --> transitioned to AVAPS, ABG after one hour pH 7.24| pCO2 103 |pO2 101 | HCO3 44  - Current ABG showing respiratory acidosis with metabolic alkalosis  - CXR: Interstitial edema. Lingular opacities. Bilateral pleural effusions.    - CT Head: Mild generalized cerebral edema is noted. Repeat showed the same  - MRSA neg. Leg and strep still not taken  - COvid neg  - c/w rocephin for now  - was on continuous AVAPS >> currently on high flow  - c/w Solu-medrol 40 mg IVP BID  - c/w Duonebs q6h + Trelegy (therapeutic interchange)  - Hold lasix due to hypernatremia  - f/u CT chest    #Mild hypernatremia  - Holding lasix for a Na of 147   - bmp at 4 after D5 50 cc/hr    #mild generalized edema on CTH  -neuro cs noted, will get neurocrit eval  -Obtain MRI brain noncon, MRA head and neck, and MRV---> problem with contacting family as we need information about implants/surgeries, will try again today   - video EEG done  -     #AFib on Eliquis  #HFpEF (TTE 6/2021 EF 63%, GIIIDD)  #Bilateral Pleural Effusions  -  Lovenox 70 mg BID  -TTE: Normal EF  - on home med Lasix 40 mg qd  - Bilateral LE dopplex: No dvt    #DNR/DNI  #DVt prophlaxis : lovenox 70 bid

## 2025-03-28 NOTE — PROGRESS NOTE ADULT - SUBJECTIVE AND OBJECTIVE BOX
SUBJECTIVE / OVERNIGHT EVENTS  Patient was seen and examined. Patient on high flow nasal cannula  MEDICATIONS  albuterol/ipratropium for Nebulization 3 milliLiter(s) Nebulizer every 6 hours  cefTRIAXone   IVPB 1000 milliGRAM(s) IV Intermittent every 24 hours  chlorhexidine 2% Cloths 1 Application(s) Topical daily  dextrose 5%. 1000 milliLiter(s) IV Continuous <Continuous>  enoxaparin Injectable 70 milliGRAM(s) SubCutaneous every 12 hours  fluticasone propionate/ salmeterol 100-50 MICROgram(s) Diskus 1 Dose(s) Inhalation two times a day  hydrocortisone hemorrhoidal Suppository 1 Suppository(s) Rectal at bedtime  lidocaine   4% Patch 1 Patch Transdermal daily  methylPREDNISolone sodium succinate Injectable 40 milliGRAM(s) IV Push every 12 hours  pantoprazole  Injectable 40 milliGRAM(s) IV Push daily  potassium chloride  20 mEq/100 mL IVPB 20 milliEquivalent(s) IV Intermittent every 2 hours  potassium chloride  20 mEq/100 mL IVPB 20 milliEquivalent(s) IV Intermittent once    acetaminophen     Tablet .. 650 milliGRAM(s) Oral every 6 hours PRN Temp greater or equal to 38C (100.4F), Mild Pain (1 - 3)    VITALS /  EXAM    T(C): 36.1 (03-28-25 @ 09:00), Max: 37.3 (03-27-25 @ 12:00)  HR: 67 (03-28-25 @ 09:00) (65 - 81)  BP: 81/50 (03-28-25 @ 09:00) (81/50 - 135/81)  RR: 18 (03-28-25 @ 09:00) (18 - 18)  SpO2: 95% (03-28-25 @ 09:34) (92% - 100%)    General: ill looking  Lungs: Bilateral rhonchi  Cardiovascular: BEATRIZ 2.6  Abdomen: Soft, Positive BS  Neurological: Non focal     I's & O's     03-27-25 @ 07:01  -  03-28-25 @ 07:00  --------------------------------------------------------  IN:  Total IN: 0 mL    OUT:    Voided (mL): 2100 mL  Total OUT: 2100 mL    Total NET: -2100 mL      03-28-25 @ 07:01  -  03-28-25 @ 10:03  --------------------------------------------------------  IN:    dextrose 5%: 100 mL    IV PiggyBack: 200 mL  Total IN: 300 mL    OUT:  Total OUT: 0 mL    Total NET: 300 mL        LABS             10.1   14.57 )-----------( 302      ( 03-28-25 @ 04:37 )             37.4     147  |  93  |  26  -------------------------<  111   03-28-25 @ 04:37  3.3  |  44  |  0.5    Ca      9.1     03-28-25 @ 04:37  Mg     2.3     03-28-25 @ 04:37    TPro  6.8  /  Alb  3.5  /  TBili  1.2  /  DBili  x   /  AST  45  /  ALT  21  /  AlkPhos  85  /  GGT  x     03-28-25 @ 04:37                    Urinalysis Basic - ( 28 Mar 2025 04:37 )    Color: x / Appearance: x / SG: x / pH: x  Gluc: 111 mg/dL / Ketone: x  / Bili: x / Urobili: x   Blood: x / Protein: x / Nitrite: x   Leuk Esterase: x / RBC: x / WBC x   Sq Epi: x / Non Sq Epi: x / Bacteria: x      MICRO / IMAGING / CARDIOLOGY  Telemetry: Reviewed   EKG: Reviewed    CULTURES    Culture - Blood (collected 03-24-25 @ 10:00)  Source: Blood Blood  Preliminary Report:    No growth at 72 Hours    Culture - Blood (collected 03-24-25 @ 10:00)  Source: Blood Blood  Preliminary Report:    No growth at 72 Hours      IMAGING  PACS Image:  (03-28-25 @ 08:12)  PACS Image:  (03-28-25 @ 05:15)  PACS Image:  (03-27-25 @ 06:24)  PACS Image:  (03-26-25 @ 16:57)  PACS Image:  (03-26-25 @ 13:36)    CARDIOLOGY

## 2025-03-28 NOTE — PROGRESS NOTE ADULT - ATTENDING COMMENTS
#Acute on chronic respiratory failure with hypoxia and hypercapnia.   suspect multifocal pna +/- copd exacerbation  cxr interstitial edema  ct chest c/w multifocal pna  blood gas acute hypercapnia, resolved s/p bipap  sputum cx, legionella, strep, mrsa  rvp neg  +hypernatremia, met alklaosis; hold lasix; home dose 40 po  tte with preserved ef  cefepime, s/p course azithro  solumedrol 40 iv bid  hfnc at 60lpm 80%  advair, duoneb q6.    #Altered mental status.   cth with mild cerebral edema  repeat cth unchanged  mri brain, mra, mrv  veeg no interictal activity  f/u neuro.    #Progress Note Handoff  Pending (specify): hypoxia, iv steroids, lasix, mri brain  Family discussion: d/w pt at bedside  Disposition: home vs. snf .

## 2025-03-29 LAB
ALBUMIN SERPL ELPH-MCNC: 3.1 G/DL — LOW (ref 3.5–5.2)
ALP SERPL-CCNC: 66 U/L — SIGNIFICANT CHANGE UP (ref 30–115)
ALT FLD-CCNC: 16 U/L — SIGNIFICANT CHANGE UP (ref 0–41)
ANION GAP SERPL CALC-SCNC: 10 MMOL/L — SIGNIFICANT CHANGE UP (ref 7–14)
APTT BLD: 30.4 SEC — SIGNIFICANT CHANGE UP (ref 27–39.2)
AST SERPL-CCNC: 23 U/L — SIGNIFICANT CHANGE UP (ref 0–41)
BASOPHILS # BLD AUTO: 0.02 K/UL — SIGNIFICANT CHANGE UP (ref 0–0.2)
BASOPHILS # BLD AUTO: 0.02 K/UL — SIGNIFICANT CHANGE UP (ref 0–0.2)
BASOPHILS NFR BLD AUTO: 0.1 % — SIGNIFICANT CHANGE UP (ref 0–1)
BASOPHILS NFR BLD AUTO: 0.1 % — SIGNIFICANT CHANGE UP (ref 0–1)
BILIRUB SERPL-MCNC: 1.1 MG/DL — SIGNIFICANT CHANGE UP (ref 0.2–1.2)
BUN SERPL-MCNC: 39 MG/DL — HIGH (ref 10–20)
CALCIUM SERPL-MCNC: 8.2 MG/DL — LOW (ref 8.4–10.5)
CHLORIDE SERPL-SCNC: 88 MMOL/L — LOW (ref 98–110)
CO2 SERPL-SCNC: 37 MMOL/L — HIGH (ref 17–32)
CREAT SERPL-MCNC: 1.2 MG/DL — SIGNIFICANT CHANGE UP (ref 0.7–1.5)
CULTURE RESULTS: SIGNIFICANT CHANGE UP
CULTURE RESULTS: SIGNIFICANT CHANGE UP
EGFR: 48 ML/MIN/1.73M2 — LOW
EGFR: 48 ML/MIN/1.73M2 — LOW
EOSINOPHIL # BLD AUTO: 0.01 K/UL — SIGNIFICANT CHANGE UP (ref 0–0.7)
EOSINOPHIL # BLD AUTO: 0.02 K/UL — SIGNIFICANT CHANGE UP (ref 0–0.7)
EOSINOPHIL NFR BLD AUTO: 0 % — SIGNIFICANT CHANGE UP (ref 0–8)
EOSINOPHIL NFR BLD AUTO: 0.1 % — SIGNIFICANT CHANGE UP (ref 0–8)
GLUCOSE SERPL-MCNC: 175 MG/DL — HIGH (ref 70–99)
HCT VFR BLD CALC: 26.3 % — LOW (ref 37–47)
HCT VFR BLD CALC: 29.7 % — LOW (ref 37–47)
HCT VFR BLD CALC: 30.7 % — LOW (ref 37–47)
HGB BLD-MCNC: 7.3 G/DL — LOW (ref 12–16)
HGB BLD-MCNC: 8.1 G/DL — LOW (ref 12–16)
HGB BLD-MCNC: 9.2 G/DL — LOW (ref 12–16)
IMM GRANULOCYTES NFR BLD AUTO: 1.1 % — HIGH (ref 0.1–0.3)
IMM GRANULOCYTES NFR BLD AUTO: 1.1 % — HIGH (ref 0.1–0.3)
INR BLD: 1.53 RATIO — HIGH (ref 0.65–1.3)
LACTATE SERPL-SCNC: 2.7 MMOL/L — HIGH (ref 0.7–2)
LACTATE SERPL-SCNC: 3.1 MMOL/L — HIGH (ref 0.7–2)
LYMPHOCYTES # BLD AUTO: 0.57 K/UL — LOW (ref 1.2–3.4)
LYMPHOCYTES # BLD AUTO: 0.97 K/UL — LOW (ref 1.2–3.4)
LYMPHOCYTES # BLD AUTO: 2.4 % — LOW (ref 20.5–51.1)
LYMPHOCYTES # BLD AUTO: 4.2 % — LOW (ref 20.5–51.1)
MAGNESIUM SERPL-MCNC: 2.1 MG/DL — SIGNIFICANT CHANGE UP (ref 1.8–2.4)
MCHC RBC-ENTMCNC: 20 PG — LOW (ref 27–31)
MCHC RBC-ENTMCNC: 20.3 PG — LOW (ref 27–31)
MCHC RBC-ENTMCNC: 22.8 PG — LOW (ref 27–31)
MCHC RBC-ENTMCNC: 27.3 G/DL — LOW (ref 32–37)
MCHC RBC-ENTMCNC: 27.8 G/DL — LOW (ref 32–37)
MCHC RBC-ENTMCNC: 30 G/DL — LOW (ref 32–37)
MCV RBC AUTO: 73.3 FL — LOW (ref 81–99)
MCV RBC AUTO: 73.5 FL — LOW (ref 81–99)
MCV RBC AUTO: 76.2 FL — LOW (ref 81–99)
MONOCYTES # BLD AUTO: 1 K/UL — HIGH (ref 0.1–0.6)
MONOCYTES # BLD AUTO: 1.4 K/UL — HIGH (ref 0.1–0.6)
MONOCYTES NFR BLD AUTO: 4.3 % — SIGNIFICANT CHANGE UP (ref 1.7–9.3)
MONOCYTES NFR BLD AUTO: 6.1 % — SIGNIFICANT CHANGE UP (ref 1.7–9.3)
NEUTROPHILS # BLD AUTO: 20.37 K/UL — HIGH (ref 1.4–6.5)
NEUTROPHILS # BLD AUTO: 21.51 K/UL — HIGH (ref 1.4–6.5)
NEUTROPHILS NFR BLD AUTO: 88.5 % — HIGH (ref 42.2–75.2)
NEUTROPHILS NFR BLD AUTO: 92 % — HIGH (ref 42.2–75.2)
NRBC BLD AUTO-RTO: 0 /100 WBCS — SIGNIFICANT CHANGE UP (ref 0–0)
PLATELET # BLD AUTO: 262 K/UL — SIGNIFICANT CHANGE UP (ref 130–400)
PLATELET # BLD AUTO: 267 K/UL — SIGNIFICANT CHANGE UP (ref 130–400)
PLATELET # BLD AUTO: 280 K/UL — SIGNIFICANT CHANGE UP (ref 130–400)
PMV BLD: 10.1 FL — SIGNIFICANT CHANGE UP (ref 7.4–10.4)
PMV BLD: 10.1 FL — SIGNIFICANT CHANGE UP (ref 7.4–10.4)
PMV BLD: 11 FL — HIGH (ref 7.4–10.4)
POTASSIUM SERPL-MCNC: 4.6 MMOL/L — SIGNIFICANT CHANGE UP (ref 3.5–5)
POTASSIUM SERPL-SCNC: 4.6 MMOL/L — SIGNIFICANT CHANGE UP (ref 3.5–5)
PROT SERPL-MCNC: 5.6 G/DL — LOW (ref 6–8)
PROTHROM AB SERPL-ACNC: 18.2 SEC — HIGH (ref 9.95–12.87)
RBC # BLD: 3.59 M/UL — LOW (ref 4.2–5.4)
RBC # BLD: 4.03 M/UL — LOW (ref 4.2–5.4)
RBC # BLD: 4.04 M/UL — LOW (ref 4.2–5.4)
RBC # FLD: 21.6 % — HIGH (ref 11.5–14.5)
RBC # FLD: 21.8 % — HIGH (ref 11.5–14.5)
RBC # FLD: 22.1 % — HIGH (ref 11.5–14.5)
SODIUM SERPL-SCNC: 135 MMOL/L — SIGNIFICANT CHANGE UP (ref 135–146)
SPECIMEN SOURCE: SIGNIFICANT CHANGE UP
SPECIMEN SOURCE: SIGNIFICANT CHANGE UP
WBC # BLD: 23.03 K/UL — HIGH (ref 4.8–10.8)
WBC # BLD: 23.37 K/UL — HIGH (ref 4.8–10.8)
WBC # BLD: 24.75 K/UL — HIGH (ref 4.8–10.8)
WBC # FLD AUTO: 23.03 K/UL — HIGH (ref 4.8–10.8)
WBC # FLD AUTO: 23.37 K/UL — HIGH (ref 4.8–10.8)
WBC # FLD AUTO: 24.75 K/UL — HIGH (ref 4.8–10.8)

## 2025-03-29 PROCEDURE — 99233 SBSQ HOSP IP/OBS HIGH 50: CPT

## 2025-03-29 PROCEDURE — 74174 CTA ABD&PLVS W/CONTRAST: CPT | Mod: 26

## 2025-03-29 PROCEDURE — 76937 US GUIDE VASCULAR ACCESS: CPT | Mod: 26

## 2025-03-29 PROCEDURE — 36247 INS CATH ABD/L-EXT ART 3RD: CPT | Mod: RT

## 2025-03-29 PROCEDURE — 37244 VASC EMBOLIZE/OCCLUDE BLEED: CPT

## 2025-03-29 PROCEDURE — 99291 CRITICAL CARE FIRST HOUR: CPT

## 2025-03-29 PROCEDURE — 71045 X-RAY EXAM CHEST 1 VIEW: CPT | Mod: 26

## 2025-03-29 RX ORDER — NOREPINEPHRINE BITARTRATE 8 MG
0.02 SOLUTION INTRAVENOUS
Qty: 8 | Refills: 0 | Status: DISCONTINUED | OUTPATIENT
Start: 2025-03-29 | End: 2025-04-03

## 2025-03-29 RX ORDER — SODIUM CHLORIDE 9 G/1000ML
500 INJECTION, SOLUTION INTRAVENOUS ONCE
Refills: 0 | Status: COMPLETED | OUTPATIENT
Start: 2025-03-29 | End: 2025-03-29

## 2025-03-29 RX ORDER — PIPERACILLIN-TAZO-DEXTROSE,ISO 3.375G/5
3.38 IV SOLUTION, PIGGYBACK PREMIX FROZEN(ML) INTRAVENOUS EVERY 8 HOURS
Refills: 0 | Status: COMPLETED | OUTPATIENT
Start: 2025-03-29 | End: 2025-04-05

## 2025-03-29 RX ORDER — VANCOMYCIN HCL IN 5 % DEXTROSE 1.5G/250ML
750 PLASTIC BAG, INJECTION (ML) INTRAVENOUS EVERY 12 HOURS
Refills: 0 | Status: DISCONTINUED | OUTPATIENT
Start: 2025-03-29 | End: 2025-03-30

## 2025-03-29 RX ORDER — METHYLPREDNISOLONE ACETATE 80 MG/ML
40 INJECTION, SUSPENSION INTRA-ARTICULAR; INTRALESIONAL; INTRAMUSCULAR; SOFT TISSUE DAILY
Refills: 0 | Status: DISCONTINUED | OUTPATIENT
Start: 2025-03-30 | End: 2025-03-31

## 2025-03-29 RX ORDER — PROTAMINE SULFATE 10 MG/ML
35 INJECTION, SOLUTION INTRAVENOUS ONCE
Refills: 0 | Status: COMPLETED | OUTPATIENT
Start: 2025-03-29 | End: 2025-03-29

## 2025-03-29 RX ORDER — SODIUM CHLORIDE 9 G/1000ML
1000 INJECTION, SOLUTION INTRAVENOUS
Refills: 0 | Status: DISCONTINUED | OUTPATIENT
Start: 2025-03-29 | End: 2025-03-31

## 2025-03-29 RX ADMIN — METHYLPREDNISOLONE ACETATE 40 MILLIGRAM(S): 80 INJECTION, SUSPENSION INTRA-ARTICULAR; INTRALESIONAL; INTRAMUSCULAR; SOFT TISSUE at 05:33

## 2025-03-29 RX ADMIN — CEFEPIME 100 MILLIGRAM(S): 2 INJECTION, POWDER, FOR SOLUTION INTRAVENOUS at 05:33

## 2025-03-29 RX ADMIN — SODIUM CHLORIDE 500 MILLILITER(S): 9 INJECTION, SOLUTION INTRAVENOUS at 03:34

## 2025-03-29 RX ADMIN — IPRATROPIUM BROMIDE AND ALBUTEROL SULFATE 3 MILLILITER(S): .5; 2.5 SOLUTION RESPIRATORY (INHALATION) at 07:48

## 2025-03-29 RX ADMIN — Medication 650 MILLIGRAM(S): at 16:00

## 2025-03-29 RX ADMIN — SODIUM CHLORIDE 500 MILLILITER(S): 9 INJECTION, SOLUTION INTRAVENOUS at 12:38

## 2025-03-29 RX ADMIN — Medication 40 MILLIGRAM(S): at 11:49

## 2025-03-29 RX ADMIN — SODIUM CHLORIDE 500 MILLILITER(S): 9 INJECTION, SOLUTION INTRAVENOUS at 09:42

## 2025-03-29 RX ADMIN — IPRATROPIUM BROMIDE AND ALBUTEROL SULFATE 3 MILLILITER(S): .5; 2.5 SOLUTION RESPIRATORY (INHALATION) at 14:36

## 2025-03-29 RX ADMIN — Medication 25 GRAM(S): at 22:57

## 2025-03-29 RX ADMIN — SODIUM CHLORIDE 50 MILLILITER(S): 9 INJECTION, SOLUTION INTRAVENOUS at 22:55

## 2025-03-29 RX ADMIN — ENOXAPARIN SODIUM 70 MILLIGRAM(S): 100 INJECTION SUBCUTANEOUS at 05:33

## 2025-03-29 RX ADMIN — LIDOCAINE HYDROCHLORIDE 1 PATCH: 20 JELLY TOPICAL at 11:50

## 2025-03-29 RX ADMIN — PROTAMINE SULFATE 321 MILLIGRAM(S): 10 INJECTION, SOLUTION INTRAVENOUS at 16:00

## 2025-03-29 RX ADMIN — Medication 25 GRAM(S): at 10:17

## 2025-03-29 RX ADMIN — Medication 1 APPLICATION(S): at 13:32

## 2025-03-29 RX ADMIN — NOREPINEPHRINE BITARTRATE 2.72 MICROGRAM(S)/KG/MIN: 8 SOLUTION at 22:58

## 2025-03-29 RX ADMIN — LIDOCAINE HYDROCHLORIDE 1 PATCH: 20 JELLY TOPICAL at 22:41

## 2025-03-29 RX ADMIN — IPRATROPIUM BROMIDE AND ALBUTEROL SULFATE 3 MILLILITER(S): .5; 2.5 SOLUTION RESPIRATORY (INHALATION) at 05:33

## 2025-03-29 RX ADMIN — NOREPINEPHRINE BITARTRATE 2.72 MICROGRAM(S)/KG/MIN: 8 SOLUTION at 08:41

## 2025-03-29 RX ADMIN — Medication 25 GRAM(S): at 14:45

## 2025-03-29 RX ADMIN — LIDOCAINE HYDROCHLORIDE 1 PATCH: 20 JELLY TOPICAL at 00:01

## 2025-03-29 NOTE — PROGRESS NOTE ADULT - SUBJECTIVE AND OBJECTIVE BOX
INTERVENTIONAL RADIOLOGY BRIEF-OPERATIVE NOTE    Procedure:  Pelvic and left lower extremity angiography with embolization of branch of the deep left femoral artery    Pre-Op Diagnosis:  Left thigh hematoma with active arterial extrravasation    Post-Op Diagnosis:  Same    Attending:  Randolph (IR) / Shasha & BRAYAN Brooks (Anaesthesia)  Resident:  None    Anesthesia (type):  [ ] General Anesthesia  [X] Sedation-- see anaesthesia record  [ ] Spinal Anesthesia  [X] Local/Regional-- 1% Lidocaine, SQ, right groin, 3 cc    Contrast:  Visipaque-320, 70 cc, ia    Estimated Blood Loss:  10 cc    Condition:   [ ] Critical  [ ] Serious  [X] Fair   [ ] Good    Findings/Follow up Plan of Care:  Iliac, left common and deep left femoral arteriography performed via right common femoral artery access:  A peripheral branch of the deep left femoral artery corresponding to the site of bleed on CTA performed this afternoon was identified.  The bleeding branch was treated with ~0.1  cc of gelfoam (2.5mm pledgets), followed by deployment of two low profile detachable Holley microcoils (2mm x 2 cm and 2 mm x 4 cm), with subsequent hemostasis and preservation of flow to neighboring branches.  Post-procedure right femoral artery pulses unchange c/w baseline.  Findings d/w medical resident, Irineo, immediately, post-procedure.    Specimens Removed:  None    Implants:  Gelfoam, Microcoils, as detailed above, to peripheral branch of deep left femoral artery.    Complications:  None immediate.    Disposition:  Back to floor; continue present care.  F/u H/H, etc.      Please call Interventional Radiology [phone] with any questions, concerns, or issues.

## 2025-03-29 NOTE — PROGRESS NOTE ADULT - ASSESSMENT
74 year old F with PMHx of COPD on 3L NC, smoking hx, AFib on Eliquis, past hx of abdominal abscess (in 2021), Gastric Bypass surgery 20+ years ago, who presented to the ED from [facility] on 3/24 with CC of hypoxia and change in mental status. As per ED note, patient was calling for help at her NH this morning. She was noted to be saturating 80% while on her baseline 3L NC; was placed on non-rebreather and brought to ED. Patient initially admitted to MICU for acute hypoxic hypercapnic respiratory failure. Code status was confirmed to be DNR/DNI with MOLST form faxed from [facility]. Patient subsequently downgraded to SDU.     #Acute Hypoxic Hypercapnic Respiratory Failure likely secondary to CAP  #COPD on 3L NC  #Smoking Hx  - on HFNC @ 60/60  - ct chest c/w multifocal pna  - blood gas acute hypercapnia, resolved s/p bipap  - f/u sputum cx, legionella, strep  - MRSA and RVP neg   - hypernatremia (resolved), met alklaosis; hold lasix; home dose 40 po  - tte with preserved ef  - now requiring pressors, broaden abx to zosyn and vanc for now   - repeat BCx  - ID eval   - cont solumedrol 40 iv bid  - advair, duoneb q6.    #Mild hypernatremia, resolved   - Na 147, improved after holding Lasix and D50     #mild generalized edema on CTH  - cth with mild cerebral edema  - repeat cth unchanged  - mri brain, mra, mrv still pending; have been having difficulty reaching family for MRI safety checklist   - veeg no interictal activity  - f/u neuro.    #AFib on Eliquis  #HFpEF (TTE 6/2021 EF 63%, GIIIDD)  #Bilateral Pleural Effusions  - cont Lovenox 70 mg BID  - TTE: Normal EF  - on home med Lasix 40 mg qd, being held currently   - Bilateral LE dopplex: No dvt    #DNR/DNI  #DVt prophlaxis : lovenox 70 bid      74 year old F with PMHx of COPD on 3L NC, smoking hx, AFib on Eliquis, past hx of abdominal abscess (in 2021), Gastric Bypass surgery 20+ years ago, who presented to the ED from [facility] on 3/24 with CC of hypoxia and change in mental status. As per ED note, patient was calling for help at her NH this morning. She was noted to be saturating 80% while on her baseline 3L NC; was placed on non-rebreather and brought to ED. Patient initially admitted to MICU for acute hypoxic hypercapnic respiratory failure. Code status was confirmed to be DNR/DNI with MOLST form faxed from [facility]. Patient subsequently downgraded to SDU.     #Acute Hypoxic Hypercapnic Respiratory Failure likely secondary to CAP  #COPD on 3L NC  #Smoking Hx  - on HFNC @ 60/60  - ct chest c/w multifocal pna  - blood gas acute hypercapnia, resolved s/p bipap  - f/u sputum cx, legionella, strep  - MRSA and RVP neg   - BCx 3/24 neg   - hypernatremia (resolved), met alklaosis; hold lasix; home dose 40 po  - tte with preserved ef  - now requiring pressors, broaden abx to zosyn and vanc for now   - repeat BCx  - ID eval   - cont solumedrol 40 iv bid  - advair, duoneb q6.    #Mild hypernatremia, resolved   - Na 147, improved after holding Lasix and D50     #mild generalized edema on CTH  - cth with mild cerebral edema  - repeat cth unchanged  - mri brain, mra, mrv still pending; have been having difficulty reaching family for MRI safety checklist   - veeg no interictal activity  - f/u neuro.    #AFib on Eliquis  #HFpEF (TTE 6/2021 EF 63%, GIIIDD)  #Bilateral Pleural Effusions  - cont Lovenox 70 mg BID  - TTE: Normal EF  - on home med Lasix 40 mg qd, being held currently   - Bilateral LE dopplex: No dvt    #DNR/DNI  #DVt prophlaxis : lovenox 70 bid      74 year old F with PMHx of COPD on 3L NC, smoking hx, AFib on Eliquis, past hx of abdominal abscess (in 2021), Gastric Bypass surgery 20+ years ago, who presented to the ED from [facility] on 3/24 with CC of hypoxia and change in mental status. As per ED note, patient was calling for help at her NH this morning. She was noted to be saturating 80% while on her baseline 3L NC; was placed on non-rebreather and brought to ED. Patient initially admitted to MICU for acute hypoxic hypercapnic respiratory failure. Code status was confirmed to be DNR/DNI with MOLST form faxed from [facility]. Patient subsequently downgraded to SDU.     #Acute Hypoxic Hypercapnic Respiratory Failure likely secondary to CAP  #COPD on 3L NC  #Smoking Hx  - on HFNC @ 60/60  - ct chest c/w multifocal pna  - blood gas acute hypercapnia, resolved s/p bipap  - f/u sputum cx, legionella, strep  - MRSA and RVP neg   - BCx 3/24 neg   - hypernatremia (resolved), met alklaosis; hold lasix; home dose 40 po  - tte with preserved ef  - now requiring pressors, broaden abx to zosyn and vanc for now   - repeat BCx  - ID eval   - solumedrol 40 iv daily  - advair, duoneb q6.    #Mild hypernatremia, resolved   - Na 147, improved after holding Lasix and D50     #mild generalized edema on CTH  - cth with mild cerebral edema  - repeat cth unchanged  - mri brain, mra, mrv still pending; have been having difficulty reaching family for MRI safety checklist   - veeg no interictal activity  - f/u neuro.    #AFib on Eliquis  #HFpEF (TTE 6/2021 EF 63%, GIIIDD)  #Bilateral Pleural Effusions  - cont Lovenox 70 mg BID  - TTE: Normal EF  - on home med Lasix 40 mg qd, being held currently   - Bilateral LE dopplex: No dvt    #DNR/DNI  #DVt prophlaxis : lovenox 70 bid

## 2025-03-29 NOTE — CHART NOTE - NSCHARTNOTEFT_GEN_A_CORE
Called by RN to bedside that patient was complaining of L thigh pain. Upon examination, patient noted to have large hematoma of L thigh, firm and tender to palpation. Noted Hgb drop from 10.1 on 8.1 on AM labs. MAP in 50s, pressor requirements increasing from 0.07 to 0.1. Given 500cc bolus. Pressor dressing applied to L thigh. Noted to be pale with mucosal pallor. Stat CBC, PT/INR, type and screen, and lactate drawn. Further drop in Hgb to 7.3 noted, INR 1.5, lactate 3.1. Pending type and screen, spoke to blood blank and released 2U of non-cross matched blood which patient received. Pressor requirements increasing during this time to 0.3. Patient also began complaining of significant back pain. Crit fellow updated. Taken for stat CTA abdomen and pelvis, spoke to IR that there was concern for active extravasation, pending follow-up. Patient MAP currently 75 on 0.3 of levo. Called by RN to bedside that patient was complaining of L thigh pain. Upon examination, patient noted to have large hematoma of L thigh, firm and tender to palpation. Noted Hgb drop from 10.1 on 8.1 on AM labs. Last dose of therapeutic Lovenox at 0530am. MAP in 50s, pressor requirements increasing from 0.07 to 0.1. Given 500cc bolus. Pressor dressing applied to L thigh. Noted to be pale with mucosal pallor. Stat CBC, PT/INR, type and screen, and lactate drawn. Further drop in Hgb to 7.3 noted, INR 1.5, lactate 3.1. Pending type and screen, spoke to blood blank and released 2U of non-cross matched blood which patient received. Pressor requirements increasing during this time to 0.3. Patient also began complaining of significant back pain. Crit fellow updated. Taken for stat CTA abdomen and pelvis, spoke to IR that there was concern for active extravasation, pending follow-up. If active extravasation, will plan to reverse Lovenox with protamine sulfate. Patient MAP currently 75 on 0.3 of levo. Called by RN to bedside that patient was complaining of L thigh pain. Upon examination, patient noted to have large hematoma of L thigh, firm and tender to palpation. Noted Hgb drop from 10.1 on 8.1 on AM labs. Last dose of therapeutic Lovenox at 0530am. MAP in 50s, pressor requirements increasing from 0.07 to 0.1. Given 500cc bolus. Pressor dressing applied to L thigh. Noted to be pale with mucosal pallor. Stat CBC, PT/INR, type and screen, and lactate drawn. Further drop in Hgb to 7.3 noted, INR 1.5, lactate 3.1. Pending type and screen, spoke to blood blank and released 2U of non-cross matched blood which patient received. Pressor requirements increasing during this time to 0.3. Patient also began complaining of significant back pain. Crit fellow updated. Taken for stat CTA abdomen and pelvis, spoke to IR that there on my read there was concern for active extravasation, pending final read. If active extravasation, will plan to reverse Lovenox with protamine sulfate. Patient MAP currently 75 on 0.3 of levo.    Update 1530 -  Spoke to IR, suspect active extravasation, pending final read. Dr. Dickson aware, will follow up for final plan. Will give 35mg protamine sulfate for reversal. Called by RN to bedside that patient was complaining of L thigh pain. Upon examination, patient noted to have large hematoma of L thigh, firm and tender to palpation. Noted Hgb drop from 10.1 on 8.1 on AM labs. Last dose of therapeutic Lovenox at 0530am. MAP in 50s, pressor requirements increasing from 0.07 to 0.1. Given 500cc bolus. Pressor dressing applied to L thigh. Noted to be pale with mucosal pallor. Stat CBC, PT/INR, type and screen, and lactate drawn. Further drop in Hgb to 7.3 noted, INR 1.5, lactate 3.1. Pending type and screen, spoke to blood blank and released 2U of non-cross matched blood which patient received. Pressor requirements increasing during this time to 0.3. Patient also began complaining of significant back pain. Crit fellow updated. Taken for stat CTA abdomen and pelvis, on my read there was concern for active extravasation, pending final read. Spoke to IR and asked to evaluate. If active extravasation, will plan to reverse Lovenox with protamine sulfate. Patient MAP currently 75 on 0.3 of levo.    Update 1630 -  Spoke to IR, suspect active extravasation, pending final read. Dr. Dickson aware, planning for angiogram. Given 35mg protamine sulfate for reversal. Made NPO. Weaning down levo, MAP 70 on 0.22 levo.

## 2025-03-29 NOTE — PROGRESS NOTE ADULT - ASSESSMENT
IMPRESSION:    Acute on chronic hypoxemic/ hypercapnic resp failure  COPD exacerbation  Pul edema  cerebral edema?  hypotension  Hypernatremia  HO COPD  HO A AFIB    PLAN:    CNS: Avoid CNS depressant, eeg noted, neuro fu, Brain MRI    HEENT:  Oral care    PULMONARY:  HOB @ 45 degrees, on hfnc 50/50 saturating well wean to lo flow today. NIV at night and as needed, Sao2 88 to 92%,  solumedrol 40 daily, Neb q 4. CXR noted.     CARDIOVASCULAR: lasix held, echo ef 60%.  hypotensive overnight started on levophed wean off pressors, IVC collapsable, give 500cc bolus.    GI: GI prophylaxis       Feeding per speech    RENAL:  F/u  lytes.  Correct as needed. accurate I/O    INFECTIOUS DISEASE: repeat cultures, ua, on zosyn complete course    HEMATOLOGICAL:  DVT prophylaxis. full ac    ENDOCRINE:  Follow up FS.  Insulin protocol if needed.    SDU  DNR/I  GOC noted   IMPRESSION:    Acute on chronic hypoxemic / hypercapnic resp failure  COPD exacerbation  Hypotension  Hypernatremia resolved   HO COPD  HO A AFIB    PLAN:    CNS: Avoid CNS depressant, eeg noted, neuro fu, Brain MRI    HEENT:  Oral care    PULMONARY:  HOB @ 45 degrees, on hfnc 50/50 saturating well wean to lo flow today. NIV at night and as needed, Sao2 88 to 92%,  solumedrol 40 daily, Neb q 4. CXR noted.     CARDIOVASCULAR: lasix held, echo ef 60%.  hypotensive overnight started on levophed wean off pressors, IVC collapsable, give 500cc bolus.    GI: GI prophylaxis       Feeding per speech.  Bowel regimen     RENAL:  F/u  lytes.  Correct as needed. accurate I/O    INFECTIOUS DISEASE: repeat cultures, ua, on zosyn complete course    HEMATOLOGICAL:  DVT prophylaxis. full ac    ENDOCRINE:  Follow up FS.  Insulin protocol if needed.    SDU  DNR/I  GOC noted

## 2025-03-29 NOTE — PROGRESS NOTE ADULT - SUBJECTIVE AND OBJECTIVE BOX
24H events:    Patient is a 74y old Female who presents with a chief complaint of AMS (28 Mar 2025 06:08)    Primary diagnosis of Acute respiratory failure with hypercapnia    Today is hospital day 5d.   On 0.7 of levo this AM, MAP in 70s. Given 500cc bolus overnight, additional 500cc bolus given this AM.       PAST MEDICAL & SURGICAL HISTORY  Atrial fibrillation    History of COPD    No significant past surgical history      SOCIAL HISTORY:  Social History:      ALLERGIES:  No Known Allergies    MEDICATIONS:  STANDING MEDICATIONS  albuterol/ipratropium for Nebulization 3 milliLiter(s) Nebulizer every 6 hours  chlorhexidine 2% Cloths 1 Application(s) Topical daily  enoxaparin Injectable 70 milliGRAM(s) SubCutaneous every 12 hours  fluticasone propionate/ salmeterol 100-50 MICROgram(s) Diskus 1 Dose(s) Inhalation two times a day  hydrocortisone hemorrhoidal Suppository 1 Suppository(s) Rectal at bedtime  lidocaine   4% Patch 1 Patch Transdermal daily  methylPREDNISolone sodium succinate Injectable 40 milliGRAM(s) IV Push every 12 hours  norepinephrine Infusion 0.02 MICROgram(s)/kG/Min IV Continuous <Continuous>  pantoprazole  Injectable 40 milliGRAM(s) IV Push daily  piperacillin/tazobactam IVPB.. 3.375 Gram(s) IV Intermittent every 8 hours  vancomycin  IVPB 750 milliGRAM(s) IV Intermittent every 12 hours    PRN MEDICATIONS  acetaminophen     Tablet .. 650 milliGRAM(s) Oral every 6 hours PRN    VITALS:   T(F): 97  HR: 83  BP: 101/54  RR: 18  SpO2: 100%    PHYSICAL EXAM:    GENERAL: NAD, ill-appearing   NECK: Supple, no stiffness, no JVD, no thyromegaly   HEART: Regular rate and rhythm, normal s1s2  LUNGS: On HFNC, b/l rhonchi   ABDOMEN: Soft, nontender, nondistended; +BS  EXTREMITIES: No clubbing, cyanosis, or edema; 2+ peripheral pulses   NERVOUS SYSTEM: AOx2   SKIN: Warm and dry    LABS:                        8.1    23.37 )-----------( 267      ( 29 Mar 2025 05:15 )             29.7     03-29    135  |  88[L]  |  39[H]  ----------------------------<  175[H]  4.6   |  37[H]  |  1.2    Ca    8.2[L]      29 Mar 2025 05:15  Mg     2.1     03-29    TPro  5.6[L]  /  Alb  3.1[L]  /  TBili  1.1  /  DBili  x   /  AST  23  /  ALT  16  /  AlkPhos  66  03-29      Urinalysis Basic - ( 29 Mar 2025 05:15 )    Color: x / Appearance: x / SG: x / pH: x  Gluc: 175 mg/dL / Ketone: x  / Bili: x / Urobili: x   Blood: x / Protein: x / Nitrite: x   Leuk Esterase: x / RBC: x / WBC x   Sq Epi: x / Non Sq Epi: x / Bacteria: x        RADIOLOGY:

## 2025-03-29 NOTE — PROGRESS NOTE ADULT - ATTENDING COMMENTS
#Hypotension/ shock  possible hypovolemic, thigh hematoma  check cta LE  cbc bid  hold lovenox for now  levophed to maintain map >65  tte as below    #Acute on chronic respiratory failure with hypoxia and hypercapnia.   suspect multifocal pna +/- copd exacerbation  cxr interstitial edema  ct chest c/w multifocal pna  blood gas acute hypercapnia, resolved s/p bipap  sputum cx, legionella, strep, mrsa  rvp neg  +hypernatremia, met alklaosis; hold lasix; home dose 40 po  tte with preserved ef  vanco, zosyn for now  solumedrol 40 iv bid  hfnc at 60lpm 50%  id eval  advair, duoneb q6.    #Altered mental status.   cth with mild cerebral edema  repeat cth unchanged  mri brain, mra, mrv  veeg no interictal activity  f/u neuro.    #Progress Note Handoff  Pending (specify): levophed  Family discussion: d/w pt at bedside  Disposition: home vs. snf . #Hypotension/ shock  possible hypovolemic, L thigh hematoma  check cta LLLE  cbc bid  hold lovenox for now  levophed to maintain map >65  tte as below    #Acute on chronic respiratory failure with hypoxia and hypercapnia.   suspect multifocal pna +/- copd exacerbation  cxr interstitial edema  ct chest c/w multifocal pna  blood gas acute hypercapnia, resolved s/p bipap  sputum cx, legionella, strep, mrsa  rvp neg  +hypernatremia, met alklaosis; hold lasix; home dose 40 po  tte with preserved ef  vanco, zosyn for now  solumedrol 40 iv bid  hfnc at 60lpm 50%  id eval  advair, duoneb q6.    #Altered mental status.   cth with mild cerebral edema  repeat cth unchanged  mri brain, mra, mrv  veeg no interictal activity  f/u neuro.    #Progress Note Handoff  Pending (specify): levophed  Family discussion: d/w pt at bedside  Disposition: home vs. snf .

## 2025-03-29 NOTE — PROGRESS NOTE ADULT - ATTENDING COMMENTS
IMPRESSION:    Acute on chronic hypoxemic / hypercapnic resp failure  COPD exacerbation  Hypotension  Hypernatremia resolved   HO COPD  HO A AFIB    Plan as outlined above

## 2025-03-29 NOTE — CHART NOTE - NSCHARTNOTEFT_GEN_A_CORE
PACU ANESTHESIA ADMISSION NOTE      Procedure: Pelvic and LLE angiogram and embolization  Post op diagnosis:  L thigh hematoma and arterial bleed    ____  Intubated  TV:______       Rate: ______      FiO2: ______    __x__  Patent Airway    __x__  Full return of protective reflexes    __x__  Full recovery from anesthesia / back to baseline status    Vitals:  T(C): 36.6 (03-29-25 @ 20:10), Max: 36.8 (03-29-25 @ 00:00)  HR: 70 (03-29-25 @ 20:10) (64 - 96)  BP: 102/51 (03-29-25 @ 20:10) (50/31 - 147/64)  RR: 18 (03-29-25 @ 20:10) (17 - 20)  SpO2: 100% (03-29-25 @ 20:10) (96% - 100%)    Mental Status:  __x__ Awake   ___x__ Alert   _____ Drowsy   _____ Sedated    Nausea/Vomiting:  __x__ NO  ______Yes,   See Post - Op Orders          Pain Scale (0-10):  _____    Treatment: ____ None    __x__ See Post - Op/PCA Orders    Post - Operative Fluids:   ____ Oral   __x__ See Post - Op Orders    Plan: Discharge:   ____Home       __x___Floor     _____Critical Care    _____  Other:_________________    Comments: Patient had smooth intraoperative event, no anesthesia complication. Patient came from step down on hi ondina NC and levo gtt.  Vascular checks in PACU per IR orders.

## 2025-03-29 NOTE — PROGRESS NOTE ADULT - SUBJECTIVE AND OBJECTIVE BOX
Patient is a 74y old  Female who presents with a chief complaint of AMS (28 Mar 2025 06:08)        Over Night Events:  hypotensive overnight, started on levophed, ivc collapsable.       ROS:     All ROS are negative except HPI         PHYSICAL EXAM    ICU Vital Signs Last 24 Hrs  T(C): 36.1 (29 Mar 2025 08:00), Max: 36.8 (29 Mar 2025 00:00)  T(F): 97 (29 Mar 2025 08:00), Max: 98.2 (29 Mar 2025 00:00)  HR: 83 (29 Mar 2025 09:43) (78 - 96)  BP: 101/54 (29 Mar 2025 09:43) (50/31 - 147/64)  BP(mean): 75 (29 Mar 2025 09:43) (36 - 92)  ABP: --  ABP(mean): --  RR: 18 (29 Mar 2025 08:00) (17 - 20)  SpO2: 100% (29 Mar 2025 09:43) (96% - 100%)    O2 Parameters below as of 29 Mar 2025 09:30  Patient On (Oxygen Delivery Method): nasal cannula, high flow  O2 Flow (L/min): 60  O2 Concentration (%): 50        CONSTITUTIONAL:  NAD    ENT:   Airway patent,   Mouth with normal mucosa.       EYES:   Pupils equal,   Round and reactive to light.    CARDIAC:   Normal rate,   Regular rhythm.      RESPIRATORY:   No wheezing  Bilateral BS  Normal chest expansion      GASTROINTESTINAL:  Abdomen soft,   Non-tender,   No guarding,   + BS    MUSCULOSKELETAL:   No clubbing, cyanosis    NEUROLOGICAL:   Alert     SKIN:   Skin normal color for race,   Warm and dry and intact.   No evidence of rash.      03-28-25 @ 07:01  -  03-29-25 @ 07:00  --------------------------------------------------------  IN:    dextrose 5%: 650 mL    IV PiggyBack: 550 mL    Lactated Ringers Bolus: 1000 mL    Norepinephrine: 57 mL    Oral Fluid: 100 mL  Total IN: 2357 mL    OUT:    Voided (mL): 1600 mL  Total OUT: 1600 mL    Total NET: 757 mL          LABS:                            8.1    23.37 )-----------( 267      ( 29 Mar 2025 05:15 )             29.7                                               03-29    135  |  88[L]  |  39[H]  ----------------------------<  175[H]  4.6   |  37[H]  |  1.2    Ca    8.2[L]      29 Mar 2025 05:15  Mg     2.1     03-29    TPro  5.6[L]  /  Alb  3.1[L]  /  TBili  1.1  /  DBili  x   /  AST  23  /  ALT  16  /  AlkPhos  66  03-29                                             Urinalysis Basic - ( 29 Mar 2025 05:15 )    Color: x / Appearance: x / SG: x / pH: x  Gluc: 175 mg/dL / Ketone: x  / Bili: x / Urobili: x   Blood: x / Protein: x / Nitrite: x   Leuk Esterase: x / RBC: x / WBC x   Sq Epi: x / Non Sq Epi: x / Bacteria: x                                                  LIVER FUNCTIONS - ( 29 Mar 2025 05:15 )  Alb: 3.1 g/dL / Pro: 5.6 g/dL / ALK PHOS: 66 U/L / ALT: 16 U/L / AST: 23 U/L / GGT: x                                                                                                                                       MEDICATIONS  (STANDING):  albuterol/ipratropium for Nebulization 3 milliLiter(s) Nebulizer every 6 hours  chlorhexidine 2% Cloths 1 Application(s) Topical daily  enoxaparin Injectable 70 milliGRAM(s) SubCutaneous every 12 hours  fluticasone propionate/ salmeterol 100-50 MICROgram(s) Diskus 1 Dose(s) Inhalation two times a day  hydrocortisone hemorrhoidal Suppository 1 Suppository(s) Rectal at bedtime  lidocaine   4% Patch 1 Patch Transdermal daily  methylPREDNISolone sodium succinate Injectable 40 milliGRAM(s) IV Push every 12 hours  norepinephrine Infusion 0.02 MICROgram(s)/kG/Min (2.72 mL/Hr) IV Continuous <Continuous>  pantoprazole  Injectable 40 milliGRAM(s) IV Push daily  piperacillin/tazobactam IVPB.. 3.375 Gram(s) IV Intermittent every 8 hours  vancomycin  IVPB 750 milliGRAM(s) IV Intermittent every 12 hours    MEDICATIONS  (PRN):  acetaminophen     Tablet .. 650 milliGRAM(s) Oral every 6 hours PRN Temp greater or equal to 38C (100.4F), Mild Pain (1 - 3)       Patient is a 74y old  Female who presents with a chief complaint of AMS (28 Mar 2025 06:08)        Over Night Events:  hypotensive overnight, started on levophed, ivc collapsable.            PHYSICAL EXAM    ICU Vital Signs Last 24 Hrs  T(C): 36.1 (29 Mar 2025 08:00), Max: 36.8 (29 Mar 2025 00:00)  T(F): 97 (29 Mar 2025 08:00), Max: 98.2 (29 Mar 2025 00:00)  HR: 83 (29 Mar 2025 09:43) (78 - 96)  BP: 101/54 (29 Mar 2025 09:43) (50/31 - 147/64)  BP(mean): 75 (29 Mar 2025 09:43) (36 - 92)  ABP: --  ABP(mean): --  RR: 18 (29 Mar 2025 08:00) (17 - 20)  SpO2: 100% (29 Mar 2025 09:43) (96% - 100%)    O2 Parameters below as of 29 Mar 2025 09:30  Patient On (Oxygen Delivery Method): nasal cannula, high flow  O2 Flow (L/min): 60  O2 Concentration (%): 50        CONSTITUTIONAL:  NAD    ENT:   Airway patent,   Mouth with normal mucosa.       EYES:   Pupils equal,   Round and reactive to light.    CARDIAC:   Normal rate,   Regular rhythm.      RESPIRATORY:   No wheezing  Bilateral BS  Normal chest expansion      GASTROINTESTINAL:  Abdomen soft,   Non-tender,   No guarding,   + BS    MUSCULOSKELETAL:   No clubbing, cyanosis    NEUROLOGICAL:   Alert     SKIN:   Skin normal color for race,   Warm and dry and intact.   No evidence of rash.      03-28-25 @ 07:01  -  03-29-25 @ 07:00  --------------------------------------------------------  IN:    dextrose 5%: 650 mL    IV PiggyBack: 550 mL    Lactated Ringers Bolus: 1000 mL    Norepinephrine: 57 mL    Oral Fluid: 100 mL  Total IN: 2357 mL    OUT:    Voided (mL): 1600 mL  Total OUT: 1600 mL    Total NET: 757 mL          LABS:                            8.1    23.37 )-----------( 267      ( 29 Mar 2025 05:15 )             29.7                                               03-29    135  |  88[L]  |  39[H]  ----------------------------<  175[H]  4.6   |  37[H]  |  1.2    Ca    8.2[L]      29 Mar 2025 05:15  Mg     2.1     03-29    TPro  5.6[L]  /  Alb  3.1[L]  /  TBili  1.1  /  DBili  x   /  AST  23  /  ALT  16  /  AlkPhos  66  03-29                                             Urinalysis Basic - ( 29 Mar 2025 05:15 )    Color: x / Appearance: x / SG: x / pH: x  Gluc: 175 mg/dL / Ketone: x  / Bili: x / Urobili: x   Blood: x / Protein: x / Nitrite: x   Leuk Esterase: x / RBC: x / WBC x   Sq Epi: x / Non Sq Epi: x / Bacteria: x                                                  LIVER FUNCTIONS - ( 29 Mar 2025 05:15 )  Alb: 3.1 g/dL / Pro: 5.6 g/dL / ALK PHOS: 66 U/L / ALT: 16 U/L / AST: 23 U/L / GGT: x                                                                                                                                       MEDICATIONS  (STANDING):  albuterol/ipratropium for Nebulization 3 milliLiter(s) Nebulizer every 6 hours  chlorhexidine 2% Cloths 1 Application(s) Topical daily  enoxaparin Injectable 70 milliGRAM(s) SubCutaneous every 12 hours  fluticasone propionate/ salmeterol 100-50 MICROgram(s) Diskus 1 Dose(s) Inhalation two times a day  hydrocortisone hemorrhoidal Suppository 1 Suppository(s) Rectal at bedtime  lidocaine   4% Patch 1 Patch Transdermal daily  methylPREDNISolone sodium succinate Injectable 40 milliGRAM(s) IV Push every 12 hours  norepinephrine Infusion 0.02 MICROgram(s)/kG/Min (2.72 mL/Hr) IV Continuous <Continuous>  pantoprazole  Injectable 40 milliGRAM(s) IV Push daily  piperacillin/tazobactam IVPB.. 3.375 Gram(s) IV Intermittent every 8 hours  vancomycin  IVPB 750 milliGRAM(s) IV Intermittent every 12 hours    MEDICATIONS  (PRN):  acetaminophen     Tablet .. 650 milliGRAM(s) Oral every 6 hours PRN Temp greater or equal to 38C (100.4F), Mild Pain (1 - 3)       Patient is a 74y old  Female who presents with a chief complaint of AMS (28 Mar 2025 06:08)        Over Night Events:  hypotensive overnight, started on levophed, ivc collapsable. On HFNCO2           PHYSICAL EXAM    ICU Vital Signs Last 24 Hrs  T(C): 36.1 (29 Mar 2025 08:00), Max: 36.8 (29 Mar 2025 00:00)  T(F): 97 (29 Mar 2025 08:00), Max: 98.2 (29 Mar 2025 00:00)  HR: 83 (29 Mar 2025 09:43) (78 - 96)  BP: 101/54 (29 Mar 2025 09:43) (50/31 - 147/64)  BP(mean): 75 (29 Mar 2025 09:43) (36 - 92)  ABP: --  ABP(mean): --  RR: 18 (29 Mar 2025 08:00) (17 - 20)  SpO2: 100% (29 Mar 2025 09:43) (96% - 100%)    O2 Parameters below as of 29 Mar 2025 09:30  Patient On (Oxygen Delivery Method): nasal cannula, high flow  O2 Flow (L/min): 60  O2 Concentration (%): 50        CONSTITUTIONAL:  NAD    ENT:   Airway patent,   Mouth with normal mucosa.       EYES:   Pupils equal,   Round and reactive to light.    CARDIAC:   Normal rate,   Regular rhythm.      RESPIRATORY:   No wheezing  Bilateral BS  Normal chest expansion      GASTROINTESTINAL:  Abdomen soft,   Non-tender,   No guarding,   + BS    MUSCULOSKELETAL:   No clubbing, cyanosis    NEUROLOGICAL:   Alert     SKIN:   Skin normal color for race,   Warm and dry   No evidence of rash.      03-28-25 @ 07:01  -  03-29-25 @ 07:00  --------------------------------------------------------  IN:    dextrose 5%: 650 mL    IV PiggyBack: 550 mL    Lactated Ringers Bolus: 1000 mL    Norepinephrine: 57 mL    Oral Fluid: 100 mL  Total IN: 2357 mL    OUT:    Voided (mL): 1600 mL  Total OUT: 1600 mL    Total NET: 757 mL          LABS:                            8.1    23.37 )-----------( 267      ( 29 Mar 2025 05:15 )             29.7                                               03-29    135  |  88[L]  |  39[H]  ----------------------------<  175[H]  4.6   |  37[H]  |  1.2    Ca    8.2[L]      29 Mar 2025 05:15  Mg     2.1     03-29    TPro  5.6[L]  /  Alb  3.1[L]  /  TBili  1.1  /  DBili  x   /  AST  23  /  ALT  16  /  AlkPhos  66  03-29                                             Urinalysis Basic - ( 29 Mar 2025 05:15 )    Color: x / Appearance: x / SG: x / pH: x  Gluc: 175 mg/dL / Ketone: x  / Bili: x / Urobili: x   Blood: x / Protein: x / Nitrite: x   Leuk Esterase: x / RBC: x / WBC x   Sq Epi: x / Non Sq Epi: x / Bacteria: x                                                  LIVER FUNCTIONS - ( 29 Mar 2025 05:15 )  Alb: 3.1 g/dL / Pro: 5.6 g/dL / ALK PHOS: 66 U/L / ALT: 16 U/L / AST: 23 U/L / GGT: x                                                                                                                                       MEDICATIONS  (STANDING):  albuterol/ipratropium for Nebulization 3 milliLiter(s) Nebulizer every 6 hours  chlorhexidine 2% Cloths 1 Application(s) Topical daily  enoxaparin Injectable 70 milliGRAM(s) SubCutaneous every 12 hours  fluticasone propionate/ salmeterol 100-50 MICROgram(s) Diskus 1 Dose(s) Inhalation two times a day  hydrocortisone hemorrhoidal Suppository 1 Suppository(s) Rectal at bedtime  lidocaine   4% Patch 1 Patch Transdermal daily  methylPREDNISolone sodium succinate Injectable 40 milliGRAM(s) IV Push every 12 hours  norepinephrine Infusion 0.02 MICROgram(s)/kG/Min (2.72 mL/Hr) IV Continuous <Continuous>  pantoprazole  Injectable 40 milliGRAM(s) IV Push daily  piperacillin/tazobactam IVPB.. 3.375 Gram(s) IV Intermittent every 8 hours  vancomycin  IVPB 750 milliGRAM(s) IV Intermittent every 12 hours    MEDICATIONS  (PRN):  acetaminophen     Tablet .. 650 milliGRAM(s) Oral every 6 hours PRN Temp greater or equal to 38C (100.4F), Mild Pain (1 - 3)

## 2025-03-29 NOTE — CHART NOTE - NSCHARTNOTEFT_GEN_A_CORE
Patient was hypotensive overnight with systolic as low as 40 with MAP 36. BP was checked in all extremities with zoll to confirm accurate reading. Patient denied any abdominal pain or back pain. Patient was then started on levo with normalization of BP. Will wean off levo for now. Bedside POCUS was performed with senior resident and IVC was totally collapsable. Patient was hypotensive overnight with systolic as low as 40 with MAP 36. BP was checked in all extremities with zoll to confirm accurate reading. Patient denied any abdominal pain or back pain. Patient was then started on levo with normalization of BP. Will wean off levo for now. Bedside POCUS was performed with senior resident and IVC was totally collapsable.  ------------------  ADDENDUM:  Patient was weaned off levo and given 1L bolus of LR with MAP 71

## 2025-03-29 NOTE — CONSULT NOTE ADULT - SUBJECTIVE AND OBJECTIVE BOX
Patient Age: 74y    Patient Gender:Female    Procedure (including site / side if known):  Urgent pelvic and left lower extremity angiography with possible embolization of bleeding/abnormal vessel(s)    Diagnosis / Indication:  Left thigh hematoma with active arterial extravasation    Interventional Radiology Attending Physician:  Dr. Dickson    Ordering Attending Physician:  Dr. Monk    Pertinent Medical History:  73yo female with h/o atrial fibrillation and COPD, hypoxemic hypercapneic respiratory failure, back pain and large left thigh hematoma with active arteria extravasation, with acute Hgb drop, post 2 units pRBC's and ivf, on vasopressor support, with CTA findings of large left thigh hematoma and active arterial extravasation.    PAST MEDICAL & SURGICAL HISTORY:  Atrial fibrillation  History of COPD  No significant past surgical history      Allergies: No Known Allergies      Pertinent Labs:                        7.3    24.75 )-----------( 280      ( 29 Mar 2025 12:22 )             26.3       03-29    135  |  88[L]  |  39[H]  ----------------------------<  175[H]  4.6   |  37[H]  |  1.2    Ca    8.2[L]      29 Mar 2025 05:15  Mg     2.1     03-29    TPro  5.6[L]  /  Alb  3.1[L]  /  TBili  1.1  /  DBili  x   /  AST  23  /  ALT  16  /  AlkPhos  66  03-29      PT/INR - ( 29 Mar 2025 12:22 )   PT: 18.20 sec;   INR: 1.53 ratio    PTT - ( 29 Mar 2025 12:22 )  PTT:30.4 sec    Consentable: [X]Y [ ] N    NPO: [X]Y [ ]N    Code Status: DNR [X]  DNI [ ] Full Code [ ] -- rescinded by the patient for the procedure    Patient and Family aware:   [X]Y   [  ]N      Risks, benefits, and alternatives to treatment discussed. All questions answered with understanding.    Please call [phone] with any further questions.

## 2025-03-30 DIAGNOSIS — I95.9 HYPOTENSION, UNSPECIFIED: ICD-10-CM

## 2025-03-30 LAB
ALBUMIN SERPL ELPH-MCNC: 2.9 G/DL — LOW (ref 3.5–5.2)
ALP SERPL-CCNC: 59 U/L — SIGNIFICANT CHANGE UP (ref 30–115)
ALT FLD-CCNC: 14 U/L — SIGNIFICANT CHANGE UP (ref 0–41)
ANION GAP SERPL CALC-SCNC: 12 MMOL/L — SIGNIFICANT CHANGE UP (ref 7–14)
AST SERPL-CCNC: 16 U/L — SIGNIFICANT CHANGE UP (ref 0–41)
BASOPHILS # BLD AUTO: 0.03 K/UL — SIGNIFICANT CHANGE UP (ref 0–0.2)
BASOPHILS # BLD AUTO: 0.04 K/UL — SIGNIFICANT CHANGE UP (ref 0–0.2)
BASOPHILS NFR BLD AUTO: 0.1 % — SIGNIFICANT CHANGE UP (ref 0–1)
BASOPHILS NFR BLD AUTO: 0.2 % — SIGNIFICANT CHANGE UP (ref 0–1)
BILIRUB SERPL-MCNC: 1.6 MG/DL — HIGH (ref 0.2–1.2)
BUN SERPL-MCNC: 56 MG/DL — HIGH (ref 10–20)
CALCIUM SERPL-MCNC: 8.1 MG/DL — LOW (ref 8.4–10.5)
CHLORIDE SERPL-SCNC: 92 MMOL/L — LOW (ref 98–110)
CO2 SERPL-SCNC: 31 MMOL/L — SIGNIFICANT CHANGE UP (ref 17–32)
CREAT SERPL-MCNC: 1.3 MG/DL — SIGNIFICANT CHANGE UP (ref 0.7–1.5)
EGFR: 43 ML/MIN/1.73M2 — LOW
EGFR: 43 ML/MIN/1.73M2 — LOW
EOSINOPHIL # BLD AUTO: 0.05 K/UL — SIGNIFICANT CHANGE UP (ref 0–0.7)
EOSINOPHIL # BLD AUTO: 0.06 K/UL — SIGNIFICANT CHANGE UP (ref 0–0.7)
EOSINOPHIL NFR BLD AUTO: 0.2 % — SIGNIFICANT CHANGE UP (ref 0–8)
EOSINOPHIL NFR BLD AUTO: 0.3 % — SIGNIFICANT CHANGE UP (ref 0–8)
GLUCOSE SERPL-MCNC: 180 MG/DL — HIGH (ref 70–99)
HCT VFR BLD CALC: 22.9 % — LOW (ref 37–47)
HCT VFR BLD CALC: 25.7 % — LOW (ref 37–47)
HCT VFR BLD CALC: 26.3 % — LOW (ref 37–47)
HCT VFR BLD CALC: 27.4 % — LOW (ref 37–47)
HCT VFR BLD CALC: 28.5 % — LOW (ref 37–47)
HGB BLD-MCNC: 7 G/DL — LOW (ref 12–16)
HGB BLD-MCNC: 7.6 G/DL — LOW (ref 12–16)
HGB BLD-MCNC: 8 G/DL — LOW (ref 12–16)
HGB BLD-MCNC: 8.3 G/DL — LOW (ref 12–16)
HGB BLD-MCNC: 8.7 G/DL — LOW (ref 12–16)
IMM GRANULOCYTES NFR BLD AUTO: 1.5 % — HIGH (ref 0.1–0.3)
IMM GRANULOCYTES NFR BLD AUTO: 2 % — HIGH (ref 0.1–0.3)
LACTATE SERPL-SCNC: 1.7 MMOL/L — SIGNIFICANT CHANGE UP (ref 0.7–2)
LYMPHOCYTES # BLD AUTO: 1.17 K/UL — LOW (ref 1.2–3.4)
LYMPHOCYTES # BLD AUTO: 1.49 K/UL — SIGNIFICANT CHANGE UP (ref 1.2–3.4)
LYMPHOCYTES # BLD AUTO: 4.9 % — LOW (ref 20.5–51.1)
LYMPHOCYTES # BLD AUTO: 6.3 % — LOW (ref 20.5–51.1)
MAGNESIUM SERPL-MCNC: 2 MG/DL — SIGNIFICANT CHANGE UP (ref 1.8–2.4)
MCHC RBC-ENTMCNC: 22.5 PG — LOW (ref 27–31)
MCHC RBC-ENTMCNC: 22.9 PG — LOW (ref 27–31)
MCHC RBC-ENTMCNC: 22.9 PG — LOW (ref 27–31)
MCHC RBC-ENTMCNC: 23 PG — LOW (ref 27–31)
MCHC RBC-ENTMCNC: 23.1 PG — LOW (ref 27–31)
MCHC RBC-ENTMCNC: 29.6 G/DL — LOW (ref 32–37)
MCHC RBC-ENTMCNC: 30.3 G/DL — LOW (ref 32–37)
MCHC RBC-ENTMCNC: 30.4 G/DL — LOW (ref 32–37)
MCHC RBC-ENTMCNC: 30.5 G/DL — LOW (ref 32–37)
MCHC RBC-ENTMCNC: 30.6 G/DL — LOW (ref 32–37)
MCV RBC AUTO: 75 FL — LOW (ref 81–99)
MCV RBC AUTO: 75.3 FL — LOW (ref 81–99)
MCV RBC AUTO: 75.5 FL — LOW (ref 81–99)
MCV RBC AUTO: 75.8 FL — LOW (ref 81–99)
MCV RBC AUTO: 76 FL — LOW (ref 81–99)
MONOCYTES # BLD AUTO: 1.28 K/UL — HIGH (ref 0.1–0.6)
MONOCYTES # BLD AUTO: 1.51 K/UL — HIGH (ref 0.1–0.6)
MONOCYTES NFR BLD AUTO: 5.4 % — SIGNIFICANT CHANGE UP (ref 1.7–9.3)
MONOCYTES NFR BLD AUTO: 6.4 % — SIGNIFICANT CHANGE UP (ref 1.7–9.3)
NEUTROPHILS # BLD AUTO: 20.04 K/UL — HIGH (ref 1.4–6.5)
NEUTROPHILS # BLD AUTO: 20.69 K/UL — HIGH (ref 1.4–6.5)
NEUTROPHILS NFR BLD AUTO: 85.4 % — HIGH (ref 42.2–75.2)
NEUTROPHILS NFR BLD AUTO: 87.3 % — HIGH (ref 42.2–75.2)
NRBC BLD AUTO-RTO: 0 /100 WBCS — SIGNIFICANT CHANGE UP (ref 0–0)
PLATELET # BLD AUTO: 249 K/UL — SIGNIFICANT CHANGE UP (ref 130–400)
PLATELET # BLD AUTO: 263 K/UL — SIGNIFICANT CHANGE UP (ref 130–400)
PLATELET # BLD AUTO: 267 K/UL — SIGNIFICANT CHANGE UP (ref 130–400)
PLATELET # BLD AUTO: 267 K/UL — SIGNIFICANT CHANGE UP (ref 130–400)
PLATELET # BLD AUTO: 270 K/UL — SIGNIFICANT CHANGE UP (ref 130–400)
PMV BLD: 10.6 FL — HIGH (ref 7.4–10.4)
PMV BLD: 10.9 FL — HIGH (ref 7.4–10.4)
PMV BLD: 11 FL — HIGH (ref 7.4–10.4)
PMV BLD: 11.1 FL — HIGH (ref 7.4–10.4)
PMV BLD: 11.3 FL — HIGH (ref 7.4–10.4)
POTASSIUM SERPL-MCNC: 4.9 MMOL/L — SIGNIFICANT CHANGE UP (ref 3.5–5)
POTASSIUM SERPL-SCNC: 4.9 MMOL/L — SIGNIFICANT CHANGE UP (ref 3.5–5)
PROT SERPL-MCNC: 5.2 G/DL — LOW (ref 6–8)
RBC # BLD: 3.04 M/UL — LOW (ref 4.2–5.4)
RBC # BLD: 3.38 M/UL — LOW (ref 4.2–5.4)
RBC # BLD: 3.47 M/UL — LOW (ref 4.2–5.4)
RBC # BLD: 3.63 M/UL — LOW (ref 4.2–5.4)
RBC # BLD: 3.8 M/UL — LOW (ref 4.2–5.4)
RBC # FLD: 21.4 % — HIGH (ref 11.5–14.5)
RBC # FLD: 21.6 % — HIGH (ref 11.5–14.5)
RBC # FLD: 21.6 % — HIGH (ref 11.5–14.5)
RBC # FLD: 22.1 % — HIGH (ref 11.5–14.5)
RBC # FLD: 22.4 % — HIGH (ref 11.5–14.5)
S PNEUM AG UR QL: NEGATIVE — SIGNIFICANT CHANGE UP
SODIUM SERPL-SCNC: 135 MMOL/L — SIGNIFICANT CHANGE UP (ref 135–146)
WBC # BLD: 23.48 K/UL — HIGH (ref 4.8–10.8)
WBC # BLD: 23.71 K/UL — HIGH (ref 4.8–10.8)
WBC # BLD: 24.58 K/UL — HIGH (ref 4.8–10.8)
WBC # BLD: 25.26 K/UL — HIGH (ref 4.8–10.8)
WBC # BLD: 25.73 K/UL — HIGH (ref 4.8–10.8)
WBC # FLD AUTO: 23.48 K/UL — HIGH (ref 4.8–10.8)
WBC # FLD AUTO: 23.71 K/UL — HIGH (ref 4.8–10.8)
WBC # FLD AUTO: 24.58 K/UL — HIGH (ref 4.8–10.8)
WBC # FLD AUTO: 25.26 K/UL — HIGH (ref 4.8–10.8)
WBC # FLD AUTO: 25.73 K/UL — HIGH (ref 4.8–10.8)

## 2025-03-30 PROCEDURE — 71045 X-RAY EXAM CHEST 1 VIEW: CPT | Mod: 26

## 2025-03-30 PROCEDURE — 99291 CRITICAL CARE FIRST HOUR: CPT

## 2025-03-30 PROCEDURE — 99233 SBSQ HOSP IP/OBS HIGH 50: CPT

## 2025-03-30 RX ORDER — HYDROMORPHONE/SOD CHLOR,ISO/PF 2 MG/10 ML
0.2 SYRINGE (ML) INJECTION ONCE
Refills: 0 | Status: DISCONTINUED | OUTPATIENT
Start: 2025-03-30 | End: 2025-03-30

## 2025-03-30 RX ORDER — VANCOMYCIN HCL IN 5 % DEXTROSE 1.5G/250ML
125 PLASTIC BAG, INJECTION (ML) INTRAVENOUS AT BEDTIME
Refills: 0 | Status: DISCONTINUED | OUTPATIENT
Start: 2025-03-30 | End: 2025-04-05

## 2025-03-30 RX ADMIN — Medication 0.2 MILLIGRAM(S): at 04:49

## 2025-03-30 RX ADMIN — IPRATROPIUM BROMIDE AND ALBUTEROL SULFATE 3 MILLILITER(S): .5; 2.5 SOLUTION RESPIRATORY (INHALATION) at 07:41

## 2025-03-30 RX ADMIN — Medication 25 GRAM(S): at 15:22

## 2025-03-30 RX ADMIN — Medication 0.2 MILLIGRAM(S): at 05:09

## 2025-03-30 RX ADMIN — IPRATROPIUM BROMIDE AND ALBUTEROL SULFATE 3 MILLILITER(S): .5; 2.5 SOLUTION RESPIRATORY (INHALATION) at 19:30

## 2025-03-30 RX ADMIN — Medication 650 MILLIGRAM(S): at 09:17

## 2025-03-30 RX ADMIN — LIDOCAINE HYDROCHLORIDE 1 PATCH: 20 JELLY TOPICAL at 12:00

## 2025-03-30 RX ADMIN — HYDROCORTISONE 1 SUPPOSITORY(S): 10 CREAM TOPICAL at 22:42

## 2025-03-30 RX ADMIN — Medication 250 MILLIGRAM(S): at 05:23

## 2025-03-30 RX ADMIN — Medication 1 APPLICATION(S): at 12:06

## 2025-03-30 RX ADMIN — Medication 125 MILLIGRAM(S): at 21:40

## 2025-03-30 RX ADMIN — IPRATROPIUM BROMIDE AND ALBUTEROL SULFATE 3 MILLILITER(S): .5; 2.5 SOLUTION RESPIRATORY (INHALATION) at 13:15

## 2025-03-30 RX ADMIN — Medication 40 MILLIGRAM(S): at 12:00

## 2025-03-30 RX ADMIN — METHYLPREDNISOLONE ACETATE 40 MILLIGRAM(S): 80 INJECTION, SUSPENSION INTRA-ARTICULAR; INTRALESIONAL; INTRAMUSCULAR; SOFT TISSUE at 05:23

## 2025-03-30 RX ADMIN — Medication 25 GRAM(S): at 05:23

## 2025-03-30 RX ADMIN — LIDOCAINE HYDROCHLORIDE 1 PATCH: 20 JELLY TOPICAL at 19:20

## 2025-03-30 RX ADMIN — Medication 25 GRAM(S): at 21:39

## 2025-03-30 NOTE — CONSULT NOTE ADULT - ASSESSMENT
ASSESSMENT  This is a 74 year old F with PMHx of COPD on 3L NC, smoking hx, AFib on Eliquis, past hx of abdominal abscess (in 2021), Gastric Bypass surgery 20+ years ago, who presented to the ED from [facility] on 3/24 with hypoxia and change in mental status.    IMPRESSION  #Acute on chronic respiratory failure.  Doubt bacterial pneumonia  CT chest noted for bilateral pleural effusions and compressive atelectasis  Likely COPD flare.   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  History of Latent TB  History of C.diff and ileus (2021)    RECOMMENDATIONS  -Blood cultures NGTD  -Sputum cultures if possible.   -On IV Zosyn 3.375 gram q 8 hours. Tentative plan for total of 7 days from 3/28/25.  -Steroids as per primary team.   -Off loading to prevent pressure sores and preventive measures to avoid aspiration.    If any questions, please send a message or call on [employer] Teams  Please continue to update ID with any pertinent new laboratory or radiographic findings.    Justin Barahona M.D  Infectious Diseases Attending/   [employer] at \[facility] [place] [facility]\""/[facility]   ASSESSMENT  This is a 74 year old F with PMHx of COPD on 3L NC, smoking hx, AFib on Eliquis, past hx of abdominal abscess (in 2021), Gastric Bypass surgery 20+ years ago, who presented to the ED from [facility] on 3/24 with hypoxia and change in mental status.    IMPRESSION  #Acute on chronic respiratory failure.  Doubt bacterial pneumonia  CT chest noted for bilateral pleural effusions and compressive atelectasis  Likely COPD flare.   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  History of Latent TB  History of C.diff and ileus (2021)    RECOMMENDATIONS  -Blood cultures NGTD  -Sputum cultures if possible.   -On IV Zosyn 3.375 gram q 8 hours. Tentative plan for total of 7 days from 3/28/25.  -Steroids as per primary team.   -Keep on PO Vanc 125 mg daily for c.diff PPX until abx are stopped.   -Off loading to prevent pressure sores and preventive measures to avoid aspiration.    If any questions, please send a message or call on [employer] Teams  Please continue to update ID with any pertinent new laboratory or radiographic findings.    Justin Barahona M.D  Infectious Diseases Attending/   [employer] at [facility]

## 2025-03-30 NOTE — PROGRESS NOTE ADULT - SUBJECTIVE AND OBJECTIVE BOX
Patient is a 74y old  Female who presents with a chief complaint of AMS (28 Mar 2025 06:08)        Over Night Events:  Events noted.  SP IR with embolization.  Remains on HFNCO2.  Off pressors.          ROS:     All ROS are negative except HPI         PHYSICAL EXAM    ICU Vital Signs Last 24 Hrs  T(C): 36.3 (30 Mar 2025 04:00), Max: 36.9 (29 Mar 2025 20:52)  T(F): 97.4 (30 Mar 2025 04:00), Max: 98.5 (29 Mar 2025 20:52)  HR: 74 (30 Mar 2025 04:00) (64 - 93)  BP: 155/73 (30 Mar 2025 04:00) (64/36 - 155/73)  BP(mean): 101 (30 Mar 2025 04:00) (44 - 101)  ABP: --  ABP(mean): --  RR: 15 (30 Mar 2025 04:00) (15 - 26)  SpO2: 100% (30 Mar 2025 04:00) (95% - 100%)    O2 Parameters below as of 30 Mar 2025 04:00  Patient On (Oxygen Delivery Method): nasal cannula, high flow            CONSTITUTIONAL:  NAD    ENT:   Airway patent,   Mouth with normal mucosa.       EYES:   Pupils equal,   Round and reactive to light.    CARDIAC:   Normal rate,   Regular rhythm.      RESPIRATORY:   No wheezing  Bilateral BS  Normal chest expansion  Not tachypneic,  No use of accessory muscles    GASTROINTESTINAL:  Abdomen soft,   Non-tender,   No guarding,   + BS    MUSCULOSKELETAL:   Range of motion is not limited,  No clubbing, cyanosis    NEUROLOGICAL:   Alert   No motor  deficits.    SKIN:   Skin normal color for race,   Warm and dry   No evidence of rash.          03-28-25 @ 07:01  -  03-29-25 @ 07:00  --------------------------------------------------------  IN:    dextrose 5%: 650 mL    IV PiggyBack: 550 mL    Lactated Ringers Bolus: 1000 mL    Norepinephrine: 57 mL    Oral Fluid: 100 mL  Total IN: 2357 mL    OUT:    Voided (mL): 1600 mL  Total OUT: 1600 mL    Total NET: 757 mL      03-29-25 @ 07:01  -  03-30-25 @ 06:52  --------------------------------------------------------  IN:    IV PiggyBack: 1900 mL    Lactated Ringers: 300 mL    Norepinephrine: 493.4 mL    Oral Fluid: 210 mL  Total IN: 2903.4 mL    OUT:    Voided (mL): 300 mL  Total OUT: 300 mL    Total NET: 2603.4 mL          LABS:                            8.3    23.48 )-----------( 270      ( 30 Mar 2025 04:40 )             27.4                                               03-29    135  |  88[L]  |  39[H]  ----------------------------<  175[H]  4.6   |  37[H]  |  1.2    Ca    8.2[L]      29 Mar 2025 05:15  Mg     2.1     03-29    TPro  5.6[L]  /  Alb  3.1[L]  /  TBili  1.1  /  DBili  x   /  AST  23  /  ALT  16  /  AlkPhos  66  03-29      PT/INR - ( 29 Mar 2025 12:22 )   PT: 18.20 sec;   INR: 1.53 ratio         PTT - ( 29 Mar 2025 12:22 )  PTT:30.4 sec                                       Urinalysis Basic - ( 29 Mar 2025 05:15 )    Color: x / Appearance: x / SG: x / pH: x  Gluc: 175 mg/dL / Ketone: x  / Bili: x / Urobili: x   Blood: x / Protein: x / Nitrite: x   Leuk Esterase: x / RBC: x / WBC x   Sq Epi: x / Non Sq Epi: x / Bacteria: x                                                  LIVER FUNCTIONS - ( 29 Mar 2025 05:15 )  Alb: 3.1 g/dL / Pro: 5.6 g/dL / ALK PHOS: 66 U/L / ALT: 16 U/L / AST: 23 U/L / GGT: x                                                                                                                                       MEDICATIONS  (STANDING):  albuterol/ipratropium for Nebulization 3 milliLiter(s) Nebulizer every 6 hours  chlorhexidine 2% Cloths 1 Application(s) Topical daily  fluticasone propionate/ salmeterol 100-50 MICROgram(s) Diskus 1 Dose(s) Inhalation two times a day  hydrocortisone hemorrhoidal Suppository 1 Suppository(s) Rectal at bedtime  lactated ringers. 1000 milliLiter(s) (50 mL/Hr) IV Continuous <Continuous>  lidocaine   4% Patch 1 Patch Transdermal daily  methylPREDNISolone sodium succinate Injectable 40 milliGRAM(s) IV Push daily  norepinephrine Infusion 0.02 MICROgram(s)/kG/Min (2.72 mL/Hr) IV Continuous <Continuous>  pantoprazole  Injectable 40 milliGRAM(s) IV Push daily  piperacillin/tazobactam IVPB.. 3.375 Gram(s) IV Intermittent every 8 hours  vancomycin  IVPB 750 milliGRAM(s) IV Intermittent every 12 hours    MEDICATIONS  (PRN):  acetaminophen     Tablet .. 650 milliGRAM(s) Oral every 6 hours PRN Temp greater or equal to 38C (100.4F), Mild Pain (1 - 3)      New X-rays reviewed:                                                                                  ECHO

## 2025-03-30 NOTE — PROGRESS NOTE ADULT - SUBJECTIVE AND OBJECTIVE BOX
INTERVAL HPI/OVERNIGHT EVENTS:    SUBJECTIVE: Patient seen and examined at bedside.     no cp, sob, abd pain, fever  no sob, orthopnea, pnd, cough    OBJECTIVE:    VITAL SIGNS:  Vital Signs Last 24 Hrs  T(C): 36.3 (30 Mar 2025 08:00), Max: 36.9 (29 Mar 2025 20:52)  T(F): 97.3 (30 Mar 2025 08:00), Max: 98.5 (29 Mar 2025 20:52)  HR: 77 (30 Mar 2025 08:00) (64 - 90)  BP: 99/50 (30 Mar 2025 08:00) (68/48 - 155/73)  BP(mean): 71 (30 Mar 2025 08:00) (54 - 101)  RR: 10 (30 Mar 2025 08:00) (10 - 26)  SpO2: 99% (30 Mar 2025 08:00) (95% - 100%)    Parameters below as of 30 Mar 2025 04:00  Patient On (Oxygen Delivery Method): nasal cannula, high flow          PHYSICAL EXAM:    General: NAD  HEENT: NC/AT; PERRL, clear conjunctiva  Neck: supple  Respiratory: CTA b/l  Cardiovascular: +S1/S2; RRR  Abdomen: soft, NT/ND; +BS x4  Extremities: WWP, 2+ peripheral pulses b/l; no LE edema  Skin: bandaged LLE  Neurological:    MEDICATIONS:  MEDICATIONS  (STANDING):  albuterol/ipratropium for Nebulization 3 milliLiter(s) Nebulizer every 6 hours  chlorhexidine 2% Cloths 1 Application(s) Topical daily  fluticasone propionate/ salmeterol 100-50 MICROgram(s) Diskus 1 Dose(s) Inhalation two times a day  hydrocortisone hemorrhoidal Suppository 1 Suppository(s) Rectal at bedtime  lactated ringers. 1000 milliLiter(s) (50 mL/Hr) IV Continuous <Continuous>  lidocaine   4% Patch 1 Patch Transdermal daily  methylPREDNISolone sodium succinate Injectable 40 milliGRAM(s) IV Push daily  norepinephrine Infusion 0.02 MICROgram(s)/kG/Min (2.72 mL/Hr) IV Continuous <Continuous>  pantoprazole  Injectable 40 milliGRAM(s) IV Push daily  piperacillin/tazobactam IVPB.. 3.375 Gram(s) IV Intermittent every 8 hours  vancomycin  IVPB 750 milliGRAM(s) IV Intermittent every 12 hours    MEDICATIONS  (PRN):  acetaminophen     Tablet .. 650 milliGRAM(s) Oral every 6 hours PRN Temp greater or equal to 38C (100.4F), Mild Pain (1 - 3)      ALLERGIES:  Allergies    No Known Allergies    Intolerances        LABS:                        8.0    23.71 )-----------( 267      ( 30 Mar 2025 05:42 )             26.3     Hemoglobin: 8.0 g/dL (03-30 @ 05:42)  Hemoglobin: 8.3 g/dL (03-30 @ 04:40)  Hemoglobin: 8.7 g/dL (03-30 @ 00:35)  Hemoglobin: 9.2 g/dL (03-29 @ 17:01)  Hemoglobin: 7.3 g/dL (03-29 @ 12:22)    CBC Full  -  ( 30 Mar 2025 05:42 )  WBC Count : 23.71 K/uL  RBC Count : 3.47 M/uL  Hemoglobin : 8.0 g/dL  Hematocrit : 26.3 %  Platelet Count - Automated : 267 K/uL  Mean Cell Volume : 75.8 fL  Mean Cell Hemoglobin : 23.1 pg  Mean Cell Hemoglobin Concentration : 30.4 g/dL  Auto Neutrophil # : x  Auto Lymphocyte # : x  Auto Monocyte # : x  Auto Eosinophil # : x  Auto Basophil # : x  Auto Neutrophil % : x  Auto Lymphocyte % : x  Auto Monocyte % : x  Auto Eosinophil % : x  Auto Basophil % : x    03-30    135  |  92[L]  |  56[H]  ----------------------------<  180[H]  4.9   |  31  |  1.3    Ca    8.1[L]      30 Mar 2025 05:42  Mg     2.0     03-30    TPro  5.2[L]  /  Alb  2.9[L]  /  TBili  1.6[H]  /  DBili  x   /  AST  16  /  ALT  14  /  AlkPhos  59  03-30    Creatinine Trend: 1.3<--, 1.2<--, 0.7<--, 0.5<--, 0.5<--, 0.6<--  LIVER FUNCTIONS - ( 30 Mar 2025 05:42 )  Alb: 2.9 g/dL / Pro: 5.2 g/dL / ALK PHOS: 59 U/L / ALT: 14 U/L / AST: 16 U/L / GGT: x           PT/INR - ( 29 Mar 2025 12:22 )   PT: 18.20 sec;   INR: 1.53 ratio         PTT - ( 29 Mar 2025 12:22 )  PTT:30.4 sec    hs Troponin:            Urinalysis Basic - ( 30 Mar 2025 05:42 )    Color: x / Appearance: x / SG: x / pH: x  Gluc: 180 mg/dL / Ketone: x  / Bili: x / Urobili: x   Blood: x / Protein: x / Nitrite: x   Leuk Esterase: x / RBC: x / WBC x   Sq Epi: x / Non Sq Epi: x / Bacteria: x      CSF:                      EKG:   MICROBIOLOGY:    IMAGING:      Labs, imaging, EKG personally reviewed    RADIOLOGY & ADDITIONAL TESTS: Reviewed.

## 2025-03-30 NOTE — PROGRESS NOTE ADULT - ASSESSMENT
IMPRESSION:    Acute on chronic hypoxemic / hypercapnic respiratory failure  COPD exacerbation  Left thigh hematoma SP IR embolization   Hypernatremia resolved   HO COPD  HO A AFIB    PLAN:    CNS: Avoid CNS depressant.  Pain control if needed.      HEENT:  Oral care    PULMONARY:  HOB @ 45 degrees, NIV during sleep and PRN during the day.  Wean Solumedrol off.  Neb PRN.  CXR noted.     CARDIOVASCULAR: GDFR.  Avoid overload  .    GI: GI prophylaxis       Feeding per speech.  Bowel regimen     RENAL:  F/u  lytes.  Correct as needed. accurate I/O    INFECTIOUS DISEASE: Monitor VS.  Follow up cultures.      HEMATOLOGICAL:  DVT prophylaxis seq.  Hold AC.  Monitor CBC and Coags.  Keep Hg > 7     ENDOCRINE:  Follow up FS.  Insulin protocol if needed.    SDU  DNR/I

## 2025-03-30 NOTE — CONSULT NOTE ADULT - SUBJECTIVE AND OBJECTIVE BOX
WEGENER, LOIS  74y, Female    Allergies    No Known Allergies    Intolerances    LOS  6d    HPI  HPI:  Patient is a 74 year old F with PMHx of COPD on 3L NC, smoking hx, AFib on Eliquis, past hx of abdominal abscess (in 2021), Gastric Bypass surgery 20+ years ago, who presented to the ED from [facility] on 3/24 with CC of hypoxia and change in mental status. As per ED note, patient was calling for help at her NH this morning. She was noted to be saturating 80% while on her baseline 3L NC; was placed on non-rebreather and brought to ED.     Vitals in ED: T 97.5, HR 77, /76, sat 91% (on NRB?)  Labs in ED: WBC 11.7k, Hgb 9.7 (baseline ~10), Trop 17, Pro-BNP 1727; VBG pH 7.18| pCO2 116 |pO2 77 | HCO3 43 --> after two hours of BiPAP rpt VBG pH 7.18| pCO2 118 |pO2 61 | HCO3 44 --> transitioned to AVAPS, ABG after one hour pH 7.24| pCO2 103 |pO2 101 | HCO3 44    Imaging:   - CXR: Interstitial edema. Lingular opacities. Bilateral pleural effusions.  - POCUS Chest in ED: Diffuse B lines concerning for pulmonary edema. Small left pleural effusion.Hepatization and static air bronchograms to bilateral bases.  - CT Head: Mild generalized cerebral edema is noted.  Interventions: s/p Magnesium 2 g over 20 minutes, Solu-medrol 125 mg IVP x1, Duonebs 3 mL q20 min x3 doses, Rocephin 1g x1, Azithro 500 mg x1, Lasix 40 mg IVP x1 in ED.     Patient initially admitted to MICU for acute hypoxic hypercapnic respiratory failure. Code status was confirmed to be DNR/DNI with MOLST form faxed from [facility]. Patient subsequently downgraded to SDU.  (24 Mar 2025 13:51)      INFECTIOUS DISEASE HISTORY:  ID consulted for antimicrobial recommendations.     Prior hospital charts reviewed [Yes]  Primary team notes reviewed [Yes]  Other consultant notes reviewed [Yes]    REVIEW OF SYSTEMS:  CONSTITUTIONAL: No fever or chills  HEENT: No sore throat  RESPIRATORY: No cough, no shortness of breath  CARDIOVASCULAR: No chest pain or palpitations  GASTROINTESTINAL: No abdominal or epigastric pain  GENITOURINARY: No dysuria  NEUROLOGICAL: No headache/dizziness  MSK: No joint pain, erythema, or swelling; no back pain  SKIN: No itching, rashes  All other ROS negative except noted above    PAST MEDICAL & SURGICAL HISTORY:  Atrial fibrillation      History of COPD      No significant past surgical history          SOCIAL HISTORY:  - No recent travel    FAMILY HISTORY: No pertinent PMH for first degree relatives.     ANTIMICROBIALS:  piperacillin/tazobactam IVPB.. 3.375 every 8 hours      ANTIMICROBIALS (past 90 days):  MEDICATIONS  (STANDING):  azithromycin  IVPB   250 mL/Hr IV Intermittent (03-26-25 @ 23:52)   250 mL/Hr IV Intermittent (03-26-25 @ 06:29)   250 mL/Hr IV Intermittent (03-25-25 @ 00:16)    azithromycin  IVPB   250 mL/Hr IV Intermittent (03-24-25 @ 12:13)    cefepime   IVPB   100 mL/Hr IV Intermittent (03-29-25 @ 05:33)   100 mL/Hr IV Intermittent (03-28-25 @ 21:49)   100 mL/Hr IV Intermittent (03-28-25 @ 13:16)    cefTRIAXone   IVPB   100 mL/Hr IV Intermittent (03-24-25 @ 11:21)    cefTRIAXone   IVPB   100 mL/Hr IV Intermittent (03-27-25 @ 23:30)   100 mL/Hr IV Intermittent (03-26-25 @ 23:52)   100 mL/Hr IV Intermittent (03-26-25 @ 06:29)   100 mL/Hr IV Intermittent (03-25-25 @ 00:02)    levoFLOXacin IVPB   100 mL/Hr IV Intermittent (03-28-25 @ 18:15)    piperacillin/tazobactam IVPB..   25 mL/Hr IV Intermittent (03-30-25 @ 05:23)   25 mL/Hr IV Intermittent (03-29-25 @ 22:57)   25 mL/Hr IV Intermittent (03-29-25 @ 14:45)   25 mL/Hr IV Intermittent (03-29-25 @ 10:17)    vancomycin  IVPB   250 mL/Hr IV Intermittent (03-30-25 @ 05:23)        OTHER MEDS:   MEDICATIONS  (STANDING):  acetaminophen     Tablet .. 650 every 6 hours PRN  albuterol/ipratropium for Nebulization 3 every 6 hours  fluticasone propionate/ salmeterol 100-50 MICROgram(s) Diskus 1 two times a day  methylPREDNISolone sodium succinate Injectable 40 daily  norepinephrine Infusion 0.02 <Continuous>  pantoprazole  Injectable 40 daily      VITALS:  Vital Signs Last 24 Hrs  T(F): 97.3 (03-30-25 @ 08:00), Max: 99.3 (03-24-25 @ 21:00)    Vital Signs Last 24 Hrs  HR: 77 (03-30-25 @ 08:00) (64 - 90)  BP: 99/50 (03-30-25 @ 08:00) (68/48 - 155/73)  RR: 10 (03-30-25 @ 08:00)  SpO2: 99% (03-30-25 @ 08:00) (95% - 100%)  Wt(kg): --    EXAM:  GENERAL: Elderly female. On HFNC  HEAD: No head lesions  NECK: Supple  CHEST/LUNG: Shallow breath sounds.   HEART: S1 S2  ABDOMEN: Soft, nontender  EXTREMITIES: No clubbing, Immobilizer on the right leg  NERVOUS SYSTEM: Alert and oriented to person  MSK: No joint erythema, swelling or pain  SKIN: No rashes or lesions, no superficial thrombophlebitis    Labs:                        8.0    23.71 )-----------( 267      ( 30 Mar 2025 05:42 )             26.3     03-30    135  |  92[L]  |  56[H]  ----------------------------<  180[H]  4.9   |  31  |  1.3    Ca    8.1[L]      30 Mar 2025 05:42  Mg     2.0     03-30    TPro  5.2[L]  /  Alb  2.9[L]  /  TBili  1.6[H]  /  DBili  x   /  AST  16  /  ALT  14  /  AlkPhos  59  03-30      WBC Trend:  WBC Count: 23.71 (03-30-25 @ 05:42)  WBC Count: 23.48 (03-30-25 @ 04:40)  WBC Count: 24.58 (03-30-25 @ 00:35)  WBC Count: 23.03 (03-29-25 @ 17:01)          Creatine Trend:  Creatinine: 1.3 (03-30)  Creatinine: 1.2 (03-29)  Creatinine: 0.7 (03-28)  Creatinine: 0.5 (03-28)      Liver Biochemical Testing Trend:  Alanine Aminotransferase (ALT/SGPT): 14 (03-30)  Alanine Aminotransferase (ALT/SGPT): 16 (03-29)  Alanine Aminotransferase (ALT/SGPT): 21 (03-28)  Alanine Aminotransferase (ALT/SGPT): 19 (03-27)  Alanine Aminotransferase (ALT/SGPT): 13 (03-26)  Aspartate Aminotransferase (AST/SGOT): 16 (03-30-25 @ 05:42)  Aspartate Aminotransferase (AST/SGOT): 23 (03-29-25 @ 05:15)  Aspartate Aminotransferase (AST/SGOT): 45 (03-28-25 @ 04:37)  Aspartate Aminotransferase (AST/SGOT): 54 (03-27-25 @ 05:09)  Aspartate Aminotransferase (AST/SGOT): 44 (03-26-25 @ 06:18)  Bilirubin Total: 1.6 (03-30)  Bilirubin Total: 1.1 (03-29)  Bilirubin Total: 1.2 (03-28)  Bilirubin Total: 1.0 (03-27)  Bilirubin Total: 0.9 (03-26)      Trend LDH          Urinalysis Basic - ( 30 Mar 2025 05:42 )    Color: x / Appearance: x / SG: x / pH: x  Gluc: 180 mg/dL / Ketone: x  / Bili: x / Urobili: x   Blood: x / Protein: x / Nitrite: x   Leuk Esterase: x / RBC: x / WBC x   Sq Epi: x / Non Sq Epi: x / Bacteria: x        MICROBIOLOGY:    Female      Procalcitonin: <0.02 (03-24)    Troponin T, High Sensitivity Result: 17 (03-24)    Lactate, Blood: 1.7 (03-30 @ 00:35)  Lactate, Blood: 2.7 (03-29 @ 17:01)  Lactate, Blood: 3.1 (03-29 @ 12:55)        INFLAMMATORY MARKERS      RADIOLOGY & ADDITIONAL TESTS:  I have personally reviewed the imagings.  CXR      CT  CT Chest No Cont:   ACC: [id number] EXAM:  CT CHEST   ORDERED BY: AILYN MONTOYA     PROCEDURE DATE:  03/28/2025          INTERPRETATION:  CLINICAL INFORMATION: Shortness of breath.    COMPARISON: None.    CONTRAST/COMPLICATIONS:  IV Contrast: NONE  Oral Contrast: NONE  .    PROCEDURE:  CT of the Chest was performed.  Sagittal and coronal reformats were performed.    FINDINGS:    LUNGS AND LARGE AIRWAYS: Patent central airways. Emphysematous changes   with minimal architectural distortion.    Bilateral pleural effusions with compressive atelectasis.    Air bronchograms within both lower lung fields.  PLEURA: Pleural effusions.  VESSELS: Significant difference enlargement of the main pulmonary artery,   4.9 cm.  HEART: Cardiomegaly. No pericardial effusion.  MEDIASTINUM AND JESIKA: Reactive mediastinal lymph nodes.  CHEST WALL AND LOWER NECK: Within normal limits.  VISUALIZED UPPER ABDOMEN: Within normal limits.  BONES: Severe degenerative change with bony demineralization.    IMPRESSION:  Cardiomegaly with evidence of pulmonary arterial hypertension.    Bilateral pleural effusions with evidence of multifocal pneumonia.        --- End of Report ---            PALMER GUILLEN MD; Attending Interventional Radiologist  This document has been electronically signed. Mar 28 2025  8:18AM (03-28-25 @ 08:12)    < from: CT Angio Abdomen and Pelvis w/ IV Cont (03.29.25 @ 14:39) >  IMPRESSION:    19.4 cm left anteromedial thigh soft tissue hematoma with evidence of   active arterial extravasation possibly from a profunda femoris branch.    Right middle lobe pulmonary nodular opacities measuring up to 0.5 cm. Per   Fleischner Society 2017 guidelines, no further recommendations provided   in a low risk patient, and consider optional CT chest in 12 months in a   high risk patient (history of smoking).    Additional findings detailed in body of the report.    Communication: The summary of above findings were discussed with readback   confirmation with Dr. Monk by radiology resident Tash Calix MD at   3:27 PM on 3/29/2025.    --- End of Report ---    < end of copied text >    CARDIOLOGY TESTING  12 Lead ECG:   Ventricular Rate 72 BPM    QRS Duration 100 ms    Q-T Interval 436 ms    QTC Calculation(Bazett) 477 ms    R Axis 105 degrees    T Axis 94 degrees    Diagnosis Line Atrial fibrillation with premature ventricular or aberrantly conducted  complexes  Rightward axis  Nonspecific ST abnormality  Abnormal ECG    Confirmed by Deepak Galvin ([id number]) on 3/26/2025 10:29:08 AM (03-24-25 @ 10:31)

## 2025-03-31 LAB
ALBUMIN SERPL ELPH-MCNC: 3 G/DL — LOW (ref 3.5–5.2)
ALP SERPL-CCNC: 55 U/L — SIGNIFICANT CHANGE UP (ref 30–115)
ALT FLD-CCNC: 15 U/L — SIGNIFICANT CHANGE UP (ref 0–41)
ANION GAP SERPL CALC-SCNC: 11 MMOL/L — SIGNIFICANT CHANGE UP (ref 7–14)
AST SERPL-CCNC: 18 U/L — SIGNIFICANT CHANGE UP (ref 0–41)
BASOPHILS # BLD AUTO: 0 K/UL — SIGNIFICANT CHANGE UP (ref 0–0.2)
BASOPHILS NFR BLD AUTO: 0 % — SIGNIFICANT CHANGE UP (ref 0–1)
BILIRUB SERPL-MCNC: 1.2 MG/DL — SIGNIFICANT CHANGE UP (ref 0.2–1.2)
BUN SERPL-MCNC: 45 MG/DL — HIGH (ref 10–20)
CALCIUM SERPL-MCNC: 7.8 MG/DL — LOW (ref 8.4–10.5)
CHLORIDE SERPL-SCNC: 90 MMOL/L — LOW (ref 98–110)
CO2 SERPL-SCNC: 30 MMOL/L — SIGNIFICANT CHANGE UP (ref 17–32)
CREAT SERPL-MCNC: 0.9 MG/DL — SIGNIFICANT CHANGE UP (ref 0.7–1.5)
EGFR: 67 ML/MIN/1.73M2 — SIGNIFICANT CHANGE UP
EGFR: 67 ML/MIN/1.73M2 — SIGNIFICANT CHANGE UP
EOSINOPHIL # BLD AUTO: 0 K/UL — SIGNIFICANT CHANGE UP (ref 0–0.7)
EOSINOPHIL NFR BLD AUTO: 0 % — SIGNIFICANT CHANGE UP (ref 0–8)
GLUCOSE SERPL-MCNC: 165 MG/DL — HIGH (ref 70–99)
HCT VFR BLD CALC: 24.6 % — LOW (ref 37–47)
HCT VFR BLD CALC: 24.9 % — LOW (ref 37–47)
HGB BLD-MCNC: 7.7 G/DL — LOW (ref 12–16)
HGB BLD-MCNC: 7.8 G/DL — LOW (ref 12–16)
LEGIONELLA AG UR QL: NEGATIVE — SIGNIFICANT CHANGE UP
LYMPHOCYTES # BLD AUTO: 1.59 K/UL — SIGNIFICANT CHANGE UP (ref 1.2–3.4)
LYMPHOCYTES # BLD AUTO: 5.2 % — LOW (ref 20.5–51.1)
MAGNESIUM SERPL-MCNC: 1.9 MG/DL — SIGNIFICANT CHANGE UP (ref 1.8–2.4)
MCHC RBC-ENTMCNC: 24.4 PG — LOW (ref 27–31)
MCHC RBC-ENTMCNC: 24.4 PG — LOW (ref 27–31)
MCHC RBC-ENTMCNC: 31.3 G/DL — LOW (ref 32–37)
MCHC RBC-ENTMCNC: 31.3 G/DL — LOW (ref 32–37)
MCV RBC AUTO: 77.8 FL — LOW (ref 81–99)
MCV RBC AUTO: 78.1 FL — LOW (ref 81–99)
MONOCYTES # BLD AUTO: 2.39 K/UL — HIGH (ref 0.1–0.6)
MONOCYTES NFR BLD AUTO: 7.8 % — SIGNIFICANT CHANGE UP (ref 1.7–9.3)
NEUTROPHILS # BLD AUTO: 26.08 K/UL — HIGH (ref 1.4–6.5)
NEUTROPHILS NFR BLD AUTO: 85.2 % — HIGH (ref 42.2–75.2)
NRBC BLD AUTO-RTO: 0 /100 WBCS — SIGNIFICANT CHANGE UP (ref 0–0)
PHOSPHATE SERPL-MCNC: 3.9 MG/DL — SIGNIFICANT CHANGE UP (ref 2.1–4.9)
PLATELET # BLD AUTO: 257 K/UL — SIGNIFICANT CHANGE UP (ref 130–400)
PLATELET # BLD AUTO: 258 K/UL — SIGNIFICANT CHANGE UP (ref 130–400)
PMV BLD: 10.8 FL — HIGH (ref 7.4–10.4)
PMV BLD: 11.5 FL — HIGH (ref 7.4–10.4)
POTASSIUM SERPL-MCNC: 4.4 MMOL/L — SIGNIFICANT CHANGE UP (ref 3.5–5)
POTASSIUM SERPL-SCNC: 4.4 MMOL/L — SIGNIFICANT CHANGE UP (ref 3.5–5)
PROT SERPL-MCNC: 5.1 G/DL — LOW (ref 6–8)
RBC # BLD: 3.15 M/UL — LOW (ref 4.2–5.4)
RBC # BLD: 3.2 M/UL — LOW (ref 4.2–5.4)
RBC # FLD: 22.7 % — HIGH (ref 11.5–14.5)
RBC # FLD: 23 % — HIGH (ref 11.5–14.5)
SODIUM SERPL-SCNC: 131 MMOL/L — LOW (ref 135–146)
WBC # BLD: 30.47 K/UL — HIGH (ref 4.8–10.8)
WBC # BLD: 30.61 K/UL — HIGH (ref 4.8–10.8)
WBC # FLD AUTO: 30.47 K/UL — HIGH (ref 4.8–10.8)
WBC # FLD AUTO: 30.61 K/UL — HIGH (ref 4.8–10.8)

## 2025-03-31 PROCEDURE — 73706 CT ANGIO LWR EXTR W/O&W/DYE: CPT | Mod: 26,LT

## 2025-03-31 PROCEDURE — 71045 X-RAY EXAM CHEST 1 VIEW: CPT | Mod: 26

## 2025-03-31 PROCEDURE — 99233 SBSQ HOSP IP/OBS HIGH 50: CPT

## 2025-03-31 RX ORDER — MIDODRINE HYDROCHLORIDE 5 MG/1
5 TABLET ORAL EVERY 8 HOURS
Refills: 0 | Status: DISCONTINUED | OUTPATIENT
Start: 2025-03-31 | End: 2025-03-31

## 2025-03-31 RX ORDER — MIDODRINE HYDROCHLORIDE 5 MG/1
10 TABLET ORAL EVERY 8 HOURS
Refills: 0 | Status: DISCONTINUED | OUTPATIENT
Start: 2025-03-31 | End: 2025-04-05

## 2025-03-31 RX ORDER — PREDNISONE 20 MG/1
40 TABLET ORAL DAILY
Refills: 0 | Status: DISCONTINUED | OUTPATIENT
Start: 2025-04-01 | End: 2025-04-05

## 2025-03-31 RX ADMIN — Medication 25 GRAM(S): at 05:55

## 2025-03-31 RX ADMIN — Medication 1 APPLICATION(S): at 11:15

## 2025-03-31 RX ADMIN — Medication 25 GRAM(S): at 22:35

## 2025-03-31 RX ADMIN — MIDODRINE HYDROCHLORIDE 10 MILLIGRAM(S): 5 TABLET ORAL at 13:26

## 2025-03-31 RX ADMIN — IPRATROPIUM BROMIDE AND ALBUTEROL SULFATE 3 MILLILITER(S): .5; 2.5 SOLUTION RESPIRATORY (INHALATION) at 13:32

## 2025-03-31 RX ADMIN — MIDODRINE HYDROCHLORIDE 5 MILLIGRAM(S): 5 TABLET ORAL at 09:17

## 2025-03-31 RX ADMIN — IPRATROPIUM BROMIDE AND ALBUTEROL SULFATE 3 MILLILITER(S): .5; 2.5 SOLUTION RESPIRATORY (INHALATION) at 04:05

## 2025-03-31 RX ADMIN — METHYLPREDNISOLONE ACETATE 40 MILLIGRAM(S): 80 INJECTION, SUSPENSION INTRA-ARTICULAR; INTRALESIONAL; INTRAMUSCULAR; SOFT TISSUE at 05:54

## 2025-03-31 RX ADMIN — Medication 25 GRAM(S): at 13:26

## 2025-03-31 RX ADMIN — IPRATROPIUM BROMIDE AND ALBUTEROL SULFATE 3 MILLILITER(S): .5; 2.5 SOLUTION RESPIRATORY (INHALATION) at 07:42

## 2025-03-31 RX ADMIN — Medication 125 MILLIGRAM(S): at 22:34

## 2025-03-31 RX ADMIN — IPRATROPIUM BROMIDE AND ALBUTEROL SULFATE 3 MILLILITER(S): .5; 2.5 SOLUTION RESPIRATORY (INHALATION) at 19:34

## 2025-03-31 RX ADMIN — MIDODRINE HYDROCHLORIDE 10 MILLIGRAM(S): 5 TABLET ORAL at 22:35

## 2025-03-31 RX ADMIN — Medication 40 MILLIGRAM(S): at 11:17

## 2025-03-31 RX ADMIN — NOREPINEPHRINE BITARTRATE 2.72 MICROGRAM(S)/KG/MIN: 8 SOLUTION at 07:23

## 2025-03-31 NOTE — PROGRESS NOTE ADULT - NSPROGADDITIONALINFOA_GEN_ALL_CORE
#Progress Note Handoff  Pending (specify): h/h, levophed  Family discussion: d/w pt at bedside  Disposition: home vs. snf .
#Progress Note Handoff  Pending (specify): hypoxia, iv steroids, lasix, mri brain  Family discussion: d/w pt at bedside  Disposition: home vs. snf .
#Progress Note Handoff  Pending (specify): h/h, levophed; iv abx, mri brain  Family discussion: d/w pt at bedside  Disposition: home vs. snf .

## 2025-03-31 NOTE — PROGRESS NOTE ADULT - PROBLEM SELECTOR PLAN 3
cth with mild cerebral edema  repeat cth unchanged  mri brain, mra, mrv  veeg no interictal activity  f/u neuro.
cth with mild cerebral edema  repeat cth unchanged  mri brain, mra, mrv  veeg no interictal activity  f/u neuro.
lovenox

## 2025-03-31 NOTE — PROGRESS NOTE ADULT - PROBLEM SELECTOR PROBLEM 2
Acute on chronic respiratory failure with hypoxia and hypercapnia
Acute on chronic respiratory failure with hypoxia and hypercapnia
Altered mental status

## 2025-03-31 NOTE — PROGRESS NOTE ADULT - PROBLEM SELECTOR PLAN 2
suspect multifocal pna +/- copd exacerbation  cxr interstitial edema  ct chest c/w multifocal pna  blood gas acute hypercapnia, resolved s/p bipap  sputum cx, legionella (neg), strep (neg), mrsa (neg)  rvp neg  lasix on hold; home dose 40 po  tte with preserved ef  zosyn for now  solumedrol 40 iv daily; decrease to prednisone 40; quick taper off  off hfnc, nc to keep spo2 >88  advair, duoneb q6.
suspect multifocal pna +/- copd exacerbation  cxr interstitial edema  ct chest c/w multifocal pna  blood gas acute hypercapnia, resolved s/p bipap  sputum cx, legionella, strep, mrsa  rvp neg  +hypernatremia, met alklaosis; hold lasix; home dose 40 po  tte with preserved ef  zosyn for now  solumedrol 40 iv bid decrease to daily  hfnc at 40lpm 40%  advair, duoneb q6.
cth with mild cerebral edema  repeat cth unchanged  mri brain, mra, mrv  veeg no interictal activity  f/u neuro

## 2025-03-31 NOTE — PROGRESS NOTE ADULT - SUBJECTIVE AND OBJECTIVE BOX
Patient is a 74y old  Female who presents with a chief complaint of AMS (28 Mar 2025 06:08)        Over Night Events:  on NC this am.  Off pressors.          ROS:     All ROS are negative except HPI         PHYSICAL EXAM    ICU Vital Signs Last 24 Hrs  T(C): 36.4 (31 Mar 2025 04:00), Max: 36.7 (30 Mar 2025 16:00)  T(F): 97.5 (31 Mar 2025 04:00), Max: 98 (30 Mar 2025 16:00)  HR: 78 (31 Mar 2025 04:00) (70 - 80)  BP: 106/53 (31 Mar 2025 04:00) (90/52 - 128/56)  BP(mean): 73 (31 Mar 2025 04:00) (65 - 86)  ABP: --  ABP(mean): --  RR: 28 (31 Mar 2025 04:00) (10 - 38)  SpO2: 92% (31 Mar 2025 04:00) (91% - 99%)    O2 Parameters below as of 31 Mar 2025 04:00  Patient On (Oxygen Delivery Method): nasal cannula            CONSTITUTIONAL:  NAD    ENT:   Airway patent,   Mouth with normal mucosa.     EYES:   Pupils equal,   Round and reactive to light.    CARDIAC:   Normal rate,   Regular rhythm.        RESPIRATORY:   No wheezing  Bilateral BS  Normal chest expansion  Not tachypneic,  No use of accessory muscles    GASTROINTESTINAL:  Abdomen soft,   Non-tender,   No guarding,   + BS    MUSCULOSKELETAL:   No clubbing, cyanosis    NEUROLOGICAL:   Alert     SKIN:   Skin normal color for race,   Warm and dry  No evidence of rash.        03-29-25 @ 07:01  -  03-30-25 @ 07:00  --------------------------------------------------------  IN:    IV PiggyBack: 2200 mL    Lactated Ringers: 500 mL    Norepinephrine: 621 mL    Oral Fluid: 210 mL  Total IN: 3531 mL    OUT:    Voided (mL): 750 mL  Total OUT: 750 mL    Total NET: 2781 mL      03-30-25 @ 07:01  -  03-31-25 @ 06:25  --------------------------------------------------------  IN:    IV PiggyBack: 100 mL    Lactated Ringers: 1150 mL    Norepinephrine: 730.1 mL    Oral Fluid: 400 mL    PRBCs (Packed Red Blood Cells): 300 mL  Total IN: 2680.1 mL    OUT:    Blood Loss (mL): 1 mL    Voided (mL): 450 mL  Total OUT: 451 mL    Total NET: 2229.1 mL          LABS:                            7.7    30.61 )-----------( 257      ( 31 Mar 2025 05:36 )             24.6                                               03-30               7.7    30.61 )-----------( 257      ( 03-31 @ 05:36 )             24.6                7.8    30.47 )-----------( 258      ( 03-31 @ 01:11 )             24.9                7.0    25.73 )-----------( 267      ( 03-30 @ 16:36 )             22.9                7.6    25.26 )-----------( 263      ( 03-30 @ 11:50 )             25.7                8.0    23.71 )-----------( 267      ( 03-30 @ 05:42 )             26.3                8.3    23.48 )-----------( 270      ( 03-30 @ 04:40 )             27.4                8.7    24.58 )-----------( 249      ( 03-30 @ 00:35 )             28.5                9.2    23.03 )-----------( 262      ( 03-29 @ 17:01 )             30.7                7.3    24.75 )-----------( 280      ( 03-29 @ 12:22 )             26.3                8.1    23.37 )-----------( 267      ( 03-29 @ 05:15 )             29.7         135  |  92[L]  |  56[H]  ----------------------------<  180[H]  4.9   |  31  |  1.3    Ca    8.1[L]      30 Mar 2025 05:42  Mg     2.0     03-30    TPro  5.2[L]  /  Alb  2.9[L]  /  TBili  1.6[H]  /  DBili  x   /  AST  16  /  ALT  14  /  AlkPhos  59  03-30      PT/INR - ( 29 Mar 2025 12:22 )   PT: 18.20 sec;   INR: 1.53 ratio         PTT - ( 29 Mar 2025 12:22 )  PTT:30.4 sec                                       Urinalysis Basic - ( 30 Mar 2025 05:42 )    Color: x / Appearance: x / SG: x / pH: x  Gluc: 180 mg/dL / Ketone: x  / Bili: x / Urobili: x   Blood: x / Protein: x / Nitrite: x   Leuk Esterase: x / RBC: x / WBC x   Sq Epi: x / Non Sq Epi: x / Bacteria: x                                                  LIVER FUNCTIONS - ( 30 Mar 2025 05:42 )  Alb: 2.9 g/dL / Pro: 5.2 g/dL / ALK PHOS: 59 U/L / ALT: 14 U/L / AST: 16 U/L / GGT: x                                                  Culture - Blood (collected 29 Mar 2025 12:12)  Source: Blood None  Preliminary Report (30 Mar 2025 22:01):    No growth at 24 hours                                                                                           MEDICATIONS  (STANDING):  albuterol/ipratropium for Nebulization 3 milliLiter(s) Nebulizer every 6 hours  chlorhexidine 2% Cloths 1 Application(s) Topical daily  fluticasone propionate/ salmeterol 100-50 MICROgram(s) Diskus 1 Dose(s) Inhalation two times a day  hydrocortisone hemorrhoidal Suppository 1 Suppository(s) Rectal at bedtime  lactated ringers. 1000 milliLiter(s) (50 mL/Hr) IV Continuous <Continuous>  lidocaine   4% Patch 1 Patch Transdermal daily  methylPREDNISolone sodium succinate Injectable 40 milliGRAM(s) IV Push daily  norepinephrine Infusion 0.02 MICROgram(s)/kG/Min (2.72 mL/Hr) IV Continuous <Continuous>  pantoprazole  Injectable 40 milliGRAM(s) IV Push daily  piperacillin/tazobactam IVPB.. 3.375 Gram(s) IV Intermittent every 8 hours  vancomycin    Solution 125 milliGRAM(s) Oral at bedtime    MEDICATIONS  (PRN):  acetaminophen     Tablet .. 650 milliGRAM(s) Oral every 6 hours PRN Temp greater or equal to 38C (100.4F), Mild Pain (1 - 3)      New X-rays reviewed:                                                                                  ECHO

## 2025-03-31 NOTE — CHART NOTE - NSCHARTNOTEFT_GEN_A_CORE
Transfer from SICU  Transfer to Floor    24H events:    Patient is a 74y old Female who presents with a chief complaint of AMS (28 Mar 2025 06:08)    Primary diagnosis of Acute respiratory failure with hypercapnia       Today is hospital day 7d.      PAST MEDICAL & SURGICAL HISTORY  Atrial fibrillation    History of COPD    No significant past surgical history      SOCIAL HISTORY:  Negative for smoking/alcohol/drug use.     ALLERGIES:  No Known Allergies    MEDICATIONS:  STANDING MEDICATIONS  albuterol/ipratropium for Nebulization 3 milliLiter(s) Nebulizer every 6 hours  chlorhexidine 2% Cloths 1 Application(s) Topical daily  fluticasone propionate/ salmeterol 100-50 MICROgram(s) Diskus 1 Dose(s) Inhalation two times a day  hydrocortisone hemorrhoidal Suppository 1 Suppository(s) Rectal at bedtime  lidocaine   4% Patch 1 Patch Transdermal daily  midodrine 5 milliGRAM(s) Oral every 8 hours  norepinephrine Infusion 0.02 MICROgram(s)/kG/Min IV Continuous <Continuous>  pantoprazole  Injectable 40 milliGRAM(s) IV Push daily  piperacillin/tazobactam IVPB.. 3.375 Gram(s) IV Intermittent every 8 hours  vancomycin    Solution 125 milliGRAM(s) Oral at bedtime    PRN MEDICATIONS  acetaminophen     Tablet .. 650 milliGRAM(s) Oral every 6 hours PRN    VITALS:   T(F): 98.3  HR: 78  BP: 107/55  RR: 35  SpO2: 98%    LABS:                        7.7    30.61 )-----------( 257      ( 31 Mar 2025 05:36 )             24.6     03-31    131[L]  |  90[L]  |  45[H]  ----------------------------<  165[H]  4.4   |  30  |  0.9    Ca    7.8[L]      31 Mar 2025 05:36  Phos  3.9     03-31  Mg     1.9     03-31    TPro  5.1[L]  /  Alb  3.0[L]  /  TBili  1.2  /  DBili  x   /  AST  18  /  ALT  15  /  AlkPhos  55  03-31    PT/INR - ( 29 Mar 2025 12:22 )   PT: 18.20 sec;   INR: 1.53 ratio         PTT - ( 29 Mar 2025 12:22 )  PTT:30.4 sec  Urinalysis Basic - ( 31 Mar 2025 05:36 )    Color: x / Appearance: x / SG: x / pH: x  Gluc: 165 mg/dL / Ketone: x  / Bili: x / Urobili: x   Blood: x / Protein: x / Nitrite: x   Leuk Esterase: x / RBC: x / WBC x   Sq Epi: x / Non Sq Epi: x / Bacteria: x            Culture - Blood (collected 29 Mar 2025 12:12)  Source: Blood None  Preliminary Report (30 Mar 2025 22:01):    No growth at 24 hours          RADIOLOGY:    PHYSICAL EXAM:  GENERAL: NAD  ENMT: No tonsillar erythema, exudates, or enlargement  NECK: Supple  NERVOUS SYSTEM:  Alert & Oriented X3  CHEST/LUNG: Mild focal wheezing  HEART: Irregulary irregular  ABDOMEN: Soft, Nontender  EXTREMITIES:  2+ Peripheral Pulses, left thigh hematoma in compression wrap      Plan  74 year old F with PMHx of COPD on 3L NC, smoking hx, AFib on Eliquis, past hx of abdominal abscess (in 2021), Gastric Bypass surgery 20+ years ago, who presented to the ED from [facility] on 3/24 with CC of hypoxia and change in mental status. As per ED note, patient was calling for help at her NH this morning. She was noted to be saturating 80% while on her baseline 3L NC; was placed on non-rebreather and brought to ED. Patient initially admitted to MICU for acute hypoxic hypercapnic respiratory failure. Code status was confirmed to be DNR/DNI with MOLST form faxed from [facility]. Patient subsequently downgraded to SDU.     Course in SICU complicated by Left thigh hematoma requiring intervention by IR    #Acute Hypoxic Hypercapnic Respiratory Failure likely secondary to CAP  #COPD on 3L NC  #Smoking Hx  - ON NC now at 4L  - ct chest c/w multifocal pna  - blood gas acute hypercapnia, resolved s/p bipap, pt refuses bipap at night  - MRSA and RVP neg   - Procal 0.02  - BCx 3/24 neg   - hypernatremia (resolved), met alklaosis; hold lasix; home dose 40 po  - tte with preserved ef  - ID eval- C/w Abx zosyn ED 4/4/25  - switch to PO pred 40 from tomorrow  - advair, duoneb q6.    #left thigh Hematoma with contrast extravasation  - S/p embolization of branch of the deep left femoral artery  - bleeding branch was treated with ~0.1  cc of gelfoam (2.5mm pledgets), followed by deployment of two low profile detachable Holley microcoils (2mm x 2 cm and 2 mm x 4 cm), with subsequent hemostasis and preservation of flow to neighboring branches.  - Hold lovenox for now and if HH stable can resume tomorrow    #Mild hypernatremia, resolved   - Na 147, improved after holding Lasix and D50     #mild generalized edema on CTH  - cth with mild cerebral edema  - repeat cth unchanged  - mri brain, mra, mrv still pending; have been having difficulty reaching family for MRI safety checklist   - veeg no interictal activity  - f/u neuro.    #AFib on Eliquis  #HFpEF (TTE 6/2021 EF 63%, GIIIDD)  #Bilateral Pleural Effusions  - hold for now resume tomorrow  - TTE: Normal EF  - on home med Lasix 40 mg qd, being held currently   - Bilateral LE dopplex: No dvt    #DNR/DNI  #DVt prophlaxis : SCDS for now Transfer from SICU  Transfer to Floor    24H events:    Patient is a 74y old Female who presents with a chief complaint of AMS (28 Mar 2025 06:08)    Primary diagnosis of Acute respiratory failure with hypercapnia       Today is hospital day 7d.      PAST MEDICAL & SURGICAL HISTORY  Atrial fibrillation    History of COPD    No significant past surgical history      SOCIAL HISTORY:  Negative for smoking/alcohol/drug use.     ALLERGIES:  No Known Allergies    MEDICATIONS:  STANDING MEDICATIONS  albuterol/ipratropium for Nebulization 3 milliLiter(s) Nebulizer every 6 hours  chlorhexidine 2% Cloths 1 Application(s) Topical daily  fluticasone propionate/ salmeterol 100-50 MICROgram(s) Diskus 1 Dose(s) Inhalation two times a day  hydrocortisone hemorrhoidal Suppository 1 Suppository(s) Rectal at bedtime  lidocaine   4% Patch 1 Patch Transdermal daily  midodrine 5 milliGRAM(s) Oral every 8 hours  norepinephrine Infusion 0.02 MICROgram(s)/kG/Min IV Continuous <Continuous>  pantoprazole  Injectable 40 milliGRAM(s) IV Push daily  piperacillin/tazobactam IVPB.. 3.375 Gram(s) IV Intermittent every 8 hours  vancomycin    Solution 125 milliGRAM(s) Oral at bedtime    PRN MEDICATIONS  acetaminophen     Tablet .. 650 milliGRAM(s) Oral every 6 hours PRN    VITALS:   T(F): 98.3  HR: 78  BP: 107/55  RR: 35  SpO2: 98%    LABS:                        7.7    30.61 )-----------( 257      ( 31 Mar 2025 05:36 )             24.6     03-31    131[L]  |  90[L]  |  45[H]  ----------------------------<  165[H]  4.4   |  30  |  0.9    Ca    7.8[L]      31 Mar 2025 05:36  Phos  3.9     03-31  Mg     1.9     03-31    TPro  5.1[L]  /  Alb  3.0[L]  /  TBili  1.2  /  DBili  x   /  AST  18  /  ALT  15  /  AlkPhos  55  03-31    PT/INR - ( 29 Mar 2025 12:22 )   PT: 18.20 sec;   INR: 1.53 ratio         PTT - ( 29 Mar 2025 12:22 )  PTT:30.4 sec  Urinalysis Basic - ( 31 Mar 2025 05:36 )    Color: x / Appearance: x / SG: x / pH: x  Gluc: 165 mg/dL / Ketone: x  / Bili: x / Urobili: x   Blood: x / Protein: x / Nitrite: x   Leuk Esterase: x / RBC: x / WBC x   Sq Epi: x / Non Sq Epi: x / Bacteria: x            Culture - Blood (collected 29 Mar 2025 12:12)  Source: Blood None  Preliminary Report (30 Mar 2025 22:01):    No growth at 24 hours          RADIOLOGY:    PHYSICAL EXAM:  GENERAL: NAD  ENMT: No tonsillar erythema, exudates, or enlargement  NECK: Supple  NERVOUS SYSTEM:  Alert & Oriented X3  CHEST/LUNG: Mild focal wheezing  HEART: Irregulary irregular  ABDOMEN: Soft, Nontender  EXTREMITIES:  2+ Peripheral Pulses, left thigh hematoma in compression wrap      Plan  74 year old F with PMHx of COPD on 3L NC, smoking hx, AFib on Eliquis, past hx of abdominal abscess (in 2021), Gastric Bypass surgery 20+ years ago, who presented to the ED from [facility] on 3/24 with CC of hypoxia and change in mental status. As per ED note, patient was calling for help at her NH this morning. She was noted to be saturating 80% while on her baseline 3L NC; was placed on non-rebreather and brought to ED. Patient initially admitted to MICU for acute hypoxic hypercapnic respiratory failure. Code status was confirmed to be DNR/DNI with MOLST form faxed from [facility]. Patient subsequently downgraded to SDU.     Course in SICU complicated by Left thigh hematoma requiring intervention by IR    #Acute Hypoxic Hypercapnic Respiratory Failure likely secondary to CAP  #COPD on 3L NC  #Smoking Hx  - ON NC now at 4L  - ct chest c/w multifocal pna  - blood gas acute hypercapnia, resolved s/p bipap, pt refuses bipap at night  - MRSA and RVP neg   - Procal 0.02  - BCx 3/24 neg   - hypernatremia (resolved), met alklaosis; hold lasix; home dose 40 po  - tte with preserved ef  - ID eval- C/w Abx zosyn ED 4/4/25  - switch to PO pred 40 from tomorrow  - advair, duoneb q6.  - Was placed on levophed 2/2 hypovoluemic shock 2/2 bleed  - Will titrate off levo and place on midodrine and send to floor once off levophed    #left thigh Hematoma with contrast extravasation  - S/p embolization of branch of the deep left femoral artery  - bleeding branch was treated with ~0.1  cc of gelfoam (2.5mm pledgets), followed by deployment of two low profile detachable Holley microcoils (2mm x 2 cm and 2 mm x 4 cm), with subsequent hemostasis and preservation of flow to neighboring branches.  - Hold lovenox for now and if HH stable can resume tomorrow    #Mild hypernatremia, resolved   - Na 147, improved after holding Lasix and D50     #mild generalized edema on CTH  - cth with mild cerebral edema  - repeat cth unchanged  - mri brain, mra, mrv still pending; have been having difficulty reaching family for MRI safety checklist   - veeg no interictal activity  - f/u neuro.    #AFib on Eliquis  #HFpEF (TTE 6/2021 EF 63%, GIIIDD)  #Bilateral Pleural Effusions  - hold for now resume tomorrow  - TTE: Normal EF  - on home med Lasix 40 mg qd, being held currently   - Bilateral LE dopplex: No dvt    #DNR/DNI  #DVt prophlaxis : SCDS for now Transfer from SICU  Transfer to Floor    24H events:    Patient is a 74y old Female who presents with a chief complaint of AMS (28 Mar 2025 06:08)    Primary diagnosis of Acute respiratory failure with hypercapnia       Today is hospital day 7d.      PAST MEDICAL & SURGICAL HISTORY  Atrial fibrillation    History of COPD    No significant past surgical history      SOCIAL HISTORY:  Negative for smoking/alcohol/drug use.     ALLERGIES:  No Known Allergies    MEDICATIONS:  STANDING MEDICATIONS  albuterol/ipratropium for Nebulization 3 milliLiter(s) Nebulizer every 6 hours  chlorhexidine 2% Cloths 1 Application(s) Topical daily  fluticasone propionate/ salmeterol 100-50 MICROgram(s) Diskus 1 Dose(s) Inhalation two times a day  hydrocortisone hemorrhoidal Suppository 1 Suppository(s) Rectal at bedtime  lidocaine   4% Patch 1 Patch Transdermal daily  midodrine 5 milliGRAM(s) Oral every 8 hours  norepinephrine Infusion 0.02 MICROgram(s)/kG/Min IV Continuous <Continuous>  pantoprazole  Injectable 40 milliGRAM(s) IV Push daily  piperacillin/tazobactam IVPB.. 3.375 Gram(s) IV Intermittent every 8 hours  vancomycin    Solution 125 milliGRAM(s) Oral at bedtime    PRN MEDICATIONS  acetaminophen     Tablet .. 650 milliGRAM(s) Oral every 6 hours PRN    VITALS:   T(F): 98.3  HR: 78  BP: 107/55  RR: 35  SpO2: 98%    LABS:                        7.7    30.61 )-----------( 257      ( 31 Mar 2025 05:36 )             24.6     03-31    131[L]  |  90[L]  |  45[H]  ----------------------------<  165[H]  4.4   |  30  |  0.9    Ca    7.8[L]      31 Mar 2025 05:36  Phos  3.9     03-31  Mg     1.9     03-31    TPro  5.1[L]  /  Alb  3.0[L]  /  TBili  1.2  /  DBili  x   /  AST  18  /  ALT  15  /  AlkPhos  55  03-31    PT/INR - ( 29 Mar 2025 12:22 )   PT: 18.20 sec;   INR: 1.53 ratio         PTT - ( 29 Mar 2025 12:22 )  PTT:30.4 sec  Urinalysis Basic - ( 31 Mar 2025 05:36 )    Color: x / Appearance: x / SG: x / pH: x  Gluc: 165 mg/dL / Ketone: x  / Bili: x / Urobili: x   Blood: x / Protein: x / Nitrite: x   Leuk Esterase: x / RBC: x / WBC x   Sq Epi: x / Non Sq Epi: x / Bacteria: x            Culture - Blood (collected 29 Mar 2025 12:12)  Source: Blood None  Preliminary Report (30 Mar 2025 22:01):    No growth at 24 hours          RADIOLOGY:    PHYSICAL EXAM:  GENERAL: NAD  ENMT: No tonsillar erythema, exudates, or enlargement  NECK: Supple  NERVOUS SYSTEM:  Alert & Oriented X3  CHEST/LUNG: Mild focal wheezing  HEART: Irregulary irregular  ABDOMEN: Soft, Nontender  EXTREMITIES:  2+ Peripheral Pulses, left thigh hematoma in compression wrap      Plan  74 year old F with PMHx of COPD on 3L NC, smoking hx, AFib on Eliquis, past hx of abdominal abscess (in 2021), Gastric Bypass surgery 20+ years ago, who presented to the ED from [facility] on 3/24 with CC of hypoxia and change in mental status. As per ED note, patient was calling for help at her NH this morning. She was noted to be saturating 80% while on her baseline 3L NC; was placed on non-rebreather and brought to ED. Patient initially admitted to MICU for acute hypoxic hypercapnic respiratory failure. Code status was confirmed to be DNR/DNI with MOLST form faxed from [facility]. Patient subsequently downgraded to SDU.     Course in SICU complicated by Left thigh hematoma requiring intervention by IR    #Acute Hypoxic Hypercapnic Respiratory Failure likely secondary to CAP  #COPD on 3L NC  #Smoking Hx  - ON NC now at 4L  - ct chest c/w multifocal pna  - blood gas acute hypercapnia, resolved s/p bipap, pt refuses bipap at night  - MRSA and RVP neg   - Procal 0.02  - BCx 3/24 neg   - hypernatremia (resolved), met alklaosis; hold lasix; home dose 40 po  - tte with preserved ef  - ID eval- C/w Abx zosyn ED 4/4/25  - switch to PO pred 40 from tomorrow  - advair, duoneb q6.  - Was placed on levophed 2/2 hypovoluemic shock 2/2 bleed  - Will titrate off levo and place on midodrine and send to floor once off levophed    #left thigh Hematoma with contrast extravasation  - S/p embolization of branch of the deep left femoral artery  - bleeding branch was treated with ~0.1  cc of gelfoam (2.5mm pledgets), followed by deployment of two low profile detachable Holley microcoils (2mm x 2 cm and 2 mm x 4 cm), with subsequent hemostasis and preservation of flow to neighboring branches.  - Hold lovenox for now and if HH stable can resume tomorrow    #Mild hypernatremia, resolved   - Na 147, improved after holding Lasix and D50     #mild generalized edema on CTH  - cth with mild cerebral edema  - repeat cth unchanged  - MRA/MRV pending until can be off pressors, MRI checklist not needed as was cleared by radiology.   - veeg no interictal activity  - f/u neuro.    #AFib on Eliquis  #HFpEF (TTE 6/2021 EF 63%, GIIIDD)  #Bilateral Pleural Effusions  - hold for now resume tomorrow  - TTE: Normal EF  - on home med Lasix 40 mg qd, being held currently   - Bilateral LE dopplex: No dvt    #DNR/DNI  #DVt prophlaxis : SCDS for now

## 2025-03-31 NOTE — PROGRESS NOTE ADULT - ASSESSMENT
ED Attending Attestation Note     Date of evaluation: 6/7/2022    This patient was seen by the advance practice provider. I have seen and examined the patient, agree with the workup, evaluation, management and diagnosis. The care plan has been discussed. My assessment reveals a 61-year-old male with history of dementia presents to the emerge department with laceration to the left forearm. Patient was leaning on a glass table earlier today when it shattered and he fell through. He did not strike his head or lose consciousness. He suffered multiple lacerations to his left posterior forearm and presents for further evaluation. Here the patient is reporting mild pain discomfort but otherwise is at his baseline. He has no additional injuries or areas of pain. He has normal neurovascular exam in this extremity. On examination there are multiple scattered skin tears as well as deeper lacerations to his left forearm. Please see NICOLE documentation for measurements. He has full strength in this upper extremity as well as intact radial pulse. We will proceed with plain films and laceration repair. I was present for the laceration repair performed by the NICOLE. Rosa Morris MD MPH   Physician.        Lili Deutsch MD  06/07/22 0326 IMPRESSION:    Acute on chronic hypoxemic / hypercapnic respiratory failure improved   COPD exacerbation improving   Left thigh hematoma SP IR embolization   Hypernatremia resolved   HO COPD  HO A AFIB    PLAN:    CNS: Avoid CNS depressant.  Pain control if needed.      HEENT:  Oral care    PULMONARY:  HOB @ 45 degrees, NIV during sleep and PRN during the day.  DC Solumedrol.  Advair 250/50 adn Spiriva.  Albuterol PRN     CARDIOVASCULAR: GDFR.  Avoid overload  .    GI: GI prophylaxis       Feeding per speech.  Bowel regimen     RENAL:  F/u  lytes.  Correct as needed. accurate I/O    INFECTIOUS DISEASE: Monitor VS.  Follow up cultures.  ID follow up noted.  Finish Zosyn course per ID     HEMATOLOGICAL:  DVT prophylaxis seq.  Hold AC.  Monitor CBC and Coags.  Keep Hg > 7     ENDOCRINE:  Follow up FS.  Insulin protocol if needed.    DNR/I    DGTF

## 2025-03-31 NOTE — PROGRESS NOTE ADULT - PROBLEM SELECTOR PLAN 1
suspect pulm edema, copd exacerbation  cxr interstitial edema  blood gas acute hypercapnia, resolved s/p bipap  sputum cx, legionella, strep, mrsa  rvp neg  lasix 60 iv daily; home dose 40 po  tte with preserved ef  ceftriaxone, azithro  solumedrol 40 iv bid  check ct chest  alkalotic on blood gas; off bipap; on hfnc 60lpm 80%  advair, duoneb q6
hypovolemic shock due to L thigh hematoma  cta LLE; 19 cm L thigh hematoma with active extravasation  s/p IR coil 3/29  s/p 2 units prbcs  h/h stable, trend  s/p protamine; hold lovenox for now  levophed at 0.21
hypovolemic shock due to L thigh hematoma  cta LLE; 19 cm L thigh hematoma with active extravasation  s/p IR coil 3/29  repeat cta no active bleed  s/p 2 units prbcs  h/h stable, trend  s/p protamine; hold lovenox  levophed at 0.05; start midodrine 10 tid  f/u ir

## 2025-03-31 NOTE — PROGRESS NOTE ADULT - SUBJECTIVE AND OBJECTIVE BOX
INTERVAL HPI/OVERNIGHT EVENTS:    SUBJECTIVE: Patient seen and examined at bedside.     no cp, sob, abd pain, fever    OBJECTIVE:    VITAL SIGNS:  Vital Signs Last 24 Hrs  T(C): 36.8 (31 Mar 2025 12:00), Max: 36.8 (31 Mar 2025 08:00)  T(F): 98.3 (31 Mar 2025 12:00), Max: 98.3 (31 Mar 2025 08:00)  HR: 80 (31 Mar 2025 12:00) (70 - 82)  BP: 82/46 (31 Mar 2025 12:00) (82/46 - 128/56)  BP(mean): 56 (31 Mar 2025 12:00) (56 - 86)  RR: 27 (31 Mar 2025 12:00) (20 - 36)  SpO2: 91% (31 Mar 2025 12:00) (91% - 100%)    Parameters below as of 31 Mar 2025 10:00  Patient On (Oxygen Delivery Method): nasal cannula  O2 Flow (L/min): 4        PHYSICAL EXAM:    General: NAD  HEENT: NC/AT; PERRL, clear conjunctiva  Neck: supple  Respiratory: CTA b/l  Cardiovascular: +S1/S2; RRR  Abdomen: soft, NT/ND; +BS x4  Extremities: WWP, 2+ peripheral pulses b/l; no LE edema  Skin: normal color and turgor; no rash  Neurological:    MEDICATIONS:  MEDICATIONS  (STANDING):  albuterol/ipratropium for Nebulization 3 milliLiter(s) Nebulizer every 6 hours  chlorhexidine 2% Cloths 1 Application(s) Topical daily  fluticasone propionate/ salmeterol 100-50 MICROgram(s) Diskus 1 Dose(s) Inhalation two times a day  hydrocortisone hemorrhoidal Suppository 1 Suppository(s) Rectal at bedtime  lidocaine   4% Patch 1 Patch Transdermal daily  midodrine 10 milliGRAM(s) Oral every 8 hours  norepinephrine Infusion 0.02 MICROgram(s)/kG/Min (2.72 mL/Hr) IV Continuous <Continuous>  pantoprazole  Injectable 40 milliGRAM(s) IV Push daily  piperacillin/tazobactam IVPB.. 3.375 Gram(s) IV Intermittent every 8 hours  vancomycin    Solution 125 milliGRAM(s) Oral at bedtime    MEDICATIONS  (PRN):  acetaminophen     Tablet .. 650 milliGRAM(s) Oral every 6 hours PRN Temp greater or equal to 38C (100.4F), Mild Pain (1 - 3)      ALLERGIES:  Allergies    No Known Allergies    Intolerances        LABS:                        7.7    30.61 )-----------( 257      ( 31 Mar 2025 05:36 )             24.6     Hemoglobin: 7.7 g/dL (03-31 @ 05:36)  Hemoglobin: 7.8 g/dL (03-31 @ 01:11)  Hemoglobin: 7.0 g/dL (03-30 @ 16:36)  Hemoglobin: 7.6 g/dL (03-30 @ 11:50)  Hemoglobin: 8.0 g/dL (03-30 @ 05:42)    CBC Full  -  ( 31 Mar 2025 05:36 )  WBC Count : 30.61 K/uL  RBC Count : 3.15 M/uL  Hemoglobin : 7.7 g/dL  Hematocrit : 24.6 %  Platelet Count - Automated : 257 K/uL  Mean Cell Volume : 78.1 fL  Mean Cell Hemoglobin : 24.4 pg  Mean Cell Hemoglobin Concentration : 31.3 g/dL  Auto Neutrophil # : x  Auto Lymphocyte # : x  Auto Monocyte # : x  Auto Eosinophil # : x  Auto Basophil # : x  Auto Neutrophil % : x  Auto Lymphocyte % : x  Auto Monocyte % : x  Auto Eosinophil % : x  Auto Basophil % : x    03-31    131[L]  |  90[L]  |  45[H]  ----------------------------<  165[H]  4.4   |  30  |  0.9    Ca    7.8[L]      31 Mar 2025 05:36  Phos  3.9     03-31  Mg     1.9     03-31    TPro  5.1[L]  /  Alb  3.0[L]  /  TBili  1.2  /  DBili  x   /  AST  18  /  ALT  15  /  AlkPhos  55  03-31    Creatinine Trend: 0.9<--, 1.3<--, 1.2<--, 0.7<--, 0.5<--, 0.5<--  LIVER FUNCTIONS - ( 31 Mar 2025 05:36 )  Alb: 3.0 g/dL / Pro: 5.1 g/dL / ALK PHOS: 55 U/L / ALT: 15 U/L / AST: 18 U/L / GGT: x               hs Troponin:            Urinalysis Basic - ( 31 Mar 2025 05:36 )    Color: x / Appearance: x / SG: x / pH: x  Gluc: 165 mg/dL / Ketone: x  / Bili: x / Urobili: x   Blood: x / Protein: x / Nitrite: x   Leuk Esterase: x / RBC: x / WBC x   Sq Epi: x / Non Sq Epi: x / Bacteria: x      CSF:                      EKG:   MICROBIOLOGY:    Culture - Blood (collected 29 Mar 2025 12:12)  Source: Blood None  Preliminary Report (30 Mar 2025 22:01):    No growth at 24 hours      IMAGING:      Labs, imaging, EKG personally reviewed    RADIOLOGY & ADDITIONAL TESTS: Reviewed.

## 2025-04-01 LAB
ANION GAP SERPL CALC-SCNC: 20 MMOL/L — HIGH (ref 7–14)
BASOPHILS # BLD AUTO: 0.03 K/UL — SIGNIFICANT CHANGE UP (ref 0–0.2)
BASOPHILS NFR BLD AUTO: 0.1 % — SIGNIFICANT CHANGE UP (ref 0–1)
BUN SERPL-MCNC: 23 MG/DL — HIGH (ref 10–20)
CALCIUM SERPL-MCNC: 7.8 MG/DL — LOW (ref 8.4–10.5)
CHLORIDE SERPL-SCNC: 97 MMOL/L — LOW (ref 98–110)
CO2 SERPL-SCNC: 30 MMOL/L — SIGNIFICANT CHANGE UP (ref 17–32)
CREAT SERPL-MCNC: 0.7 MG/DL — SIGNIFICANT CHANGE UP (ref 0.7–1.5)
EGFR: 91 ML/MIN/1.73M2 — SIGNIFICANT CHANGE UP
EGFR: 91 ML/MIN/1.73M2 — SIGNIFICANT CHANGE UP
EOSINOPHIL # BLD AUTO: 0.01 K/UL — SIGNIFICANT CHANGE UP (ref 0–0.7)
EOSINOPHIL NFR BLD AUTO: 0 % — SIGNIFICANT CHANGE UP (ref 0–8)
GLUCOSE SERPL-MCNC: 134 MG/DL — HIGH (ref 70–99)
HCT VFR BLD CALC: 22 % — LOW (ref 37–47)
HCT VFR BLD CALC: 22.5 % — LOW (ref 37–47)
HGB BLD-MCNC: 6.7 G/DL — CRITICAL LOW (ref 12–16)
HGB BLD-MCNC: 6.8 G/DL — CRITICAL LOW (ref 12–16)
IMM GRANULOCYTES NFR BLD AUTO: 2 % — HIGH (ref 0.1–0.3)
LYMPHOCYTES # BLD AUTO: 1.26 K/UL — SIGNIFICANT CHANGE UP (ref 1.2–3.4)
LYMPHOCYTES # BLD AUTO: 4.5 % — LOW (ref 20.5–51.1)
MCHC RBC-ENTMCNC: 24.8 PG — LOW (ref 27–31)
MCHC RBC-ENTMCNC: 25.7 PG — LOW (ref 27–31)
MCHC RBC-ENTMCNC: 30.2 G/DL — LOW (ref 32–37)
MCHC RBC-ENTMCNC: 30.5 G/DL — LOW (ref 32–37)
MCV RBC AUTO: 82.1 FL — SIGNIFICANT CHANGE UP (ref 81–99)
MCV RBC AUTO: 84.3 FL — SIGNIFICANT CHANGE UP (ref 81–99)
MONOCYTES # BLD AUTO: 1.89 K/UL — HIGH (ref 0.1–0.6)
MONOCYTES NFR BLD AUTO: 6.8 % — SIGNIFICANT CHANGE UP (ref 1.7–9.3)
NEUTROPHILS # BLD AUTO: 24.17 K/UL — HIGH (ref 1.4–6.5)
NEUTROPHILS NFR BLD AUTO: 86.6 % — HIGH (ref 42.2–75.2)
NRBC BLD AUTO-RTO: 1 /100 WBCS — HIGH (ref 0–0)
NRBC BLD AUTO-RTO: 1 /100 WBCS — HIGH (ref 0–0)
PLATELET # BLD AUTO: 192 K/UL — SIGNIFICANT CHANGE UP (ref 130–400)
PLATELET # BLD AUTO: 248 K/UL — SIGNIFICANT CHANGE UP (ref 130–400)
PMV BLD: 10.2 FL — SIGNIFICANT CHANGE UP (ref 7.4–10.4)
PMV BLD: 10.4 FL — SIGNIFICANT CHANGE UP (ref 7.4–10.4)
POTASSIUM SERPL-MCNC: 4.2 MMOL/L — SIGNIFICANT CHANGE UP (ref 3.5–5)
POTASSIUM SERPL-SCNC: 4.2 MMOL/L — SIGNIFICANT CHANGE UP (ref 3.5–5)
RBC # BLD: 2.61 M/UL — LOW (ref 4.2–5.4)
RBC # BLD: 2.74 M/UL — LOW (ref 4.2–5.4)
RBC # FLD: 23.1 % — HIGH (ref 11.5–14.5)
RBC # FLD: 24.6 % — HIGH (ref 11.5–14.5)
SODIUM SERPL-SCNC: 147 MMOL/L — HIGH (ref 135–146)
WBC # BLD: 20.48 K/UL — HIGH (ref 4.8–10.8)
WBC # BLD: 27.93 K/UL — HIGH (ref 4.8–10.8)
WBC # FLD AUTO: 20.48 K/UL — HIGH (ref 4.8–10.8)
WBC # FLD AUTO: 27.93 K/UL — HIGH (ref 4.8–10.8)

## 2025-04-01 PROCEDURE — 99233 SBSQ HOSP IP/OBS HIGH 50: CPT

## 2025-04-01 RX ORDER — SODIUM CHLORIDE 9 G/1000ML
1000 INJECTION, SOLUTION INTRAVENOUS
Refills: 0 | Status: DISCONTINUED | OUTPATIENT
Start: 2025-04-01 | End: 2025-04-02

## 2025-04-01 RX ADMIN — IPRATROPIUM BROMIDE AND ALBUTEROL SULFATE 3 MILLILITER(S): .5; 2.5 SOLUTION RESPIRATORY (INHALATION) at 13:24

## 2025-04-01 RX ADMIN — IPRATROPIUM BROMIDE AND ALBUTEROL SULFATE 3 MILLILITER(S): .5; 2.5 SOLUTION RESPIRATORY (INHALATION) at 19:53

## 2025-04-01 RX ADMIN — PREDNISONE 40 MILLIGRAM(S): 20 TABLET ORAL at 06:04

## 2025-04-01 RX ADMIN — IPRATROPIUM BROMIDE AND ALBUTEROL SULFATE 3 MILLILITER(S): .5; 2.5 SOLUTION RESPIRATORY (INHALATION) at 08:07

## 2025-04-01 RX ADMIN — IPRATROPIUM BROMIDE AND ALBUTEROL SULFATE 3 MILLILITER(S): .5; 2.5 SOLUTION RESPIRATORY (INHALATION) at 01:43

## 2025-04-01 RX ADMIN — Medication 40 MILLIGRAM(S): at 13:10

## 2025-04-01 RX ADMIN — LIDOCAINE HYDROCHLORIDE 1 PATCH: 20 JELLY TOPICAL at 13:11

## 2025-04-01 RX ADMIN — MIDODRINE HYDROCHLORIDE 10 MILLIGRAM(S): 5 TABLET ORAL at 06:04

## 2025-04-01 RX ADMIN — Medication 1 APPLICATION(S): at 13:13

## 2025-04-01 RX ADMIN — Medication 25 GRAM(S): at 06:04

## 2025-04-01 RX ADMIN — Medication 25 GRAM(S): at 21:52

## 2025-04-01 RX ADMIN — MIDODRINE HYDROCHLORIDE 10 MILLIGRAM(S): 5 TABLET ORAL at 21:52

## 2025-04-01 RX ADMIN — MIDODRINE HYDROCHLORIDE 10 MILLIGRAM(S): 5 TABLET ORAL at 13:10

## 2025-04-01 RX ADMIN — HYDROCORTISONE 1 SUPPOSITORY(S): 10 CREAM TOPICAL at 23:04

## 2025-04-01 RX ADMIN — Medication 125 MILLIGRAM(S): at 21:52

## 2025-04-01 RX ADMIN — Medication 25 GRAM(S): at 13:10

## 2025-04-01 NOTE — PROGRESS NOTE ADULT - SUBJECTIVE AND OBJECTIVE BOX
Patient is a 74y old  Female who presents with a chief complaint of AMS (28 Mar 2025 06:08)        Over Night Events:  On NC.  on Low dose Levophed this am.          ROS:     All ROS are negative except HPI         PHYSICAL EXAM    ICU Vital Signs Last 24 Hrs  T(C): 36.2 (01 Apr 2025 04:00), Max: 36.8 (31 Mar 2025 08:00)  T(F): 97.1 (01 Apr 2025 04:00), Max: 98.3 (31 Mar 2025 08:00)  HR: 66 (01 Apr 2025 06:00) (59 - 82)  BP: 99/53 (01 Apr 2025 06:00) (82/44 - 118/58)  BP(mean): 74 (01 Apr 2025 06:00) (56 - 83)  ABP: --  ABP(mean): --  RR: 9 (01 Apr 2025 06:00) (9 - 35)  SpO2: 93% (01 Apr 2025 06:00) (91% - 100%)    O2 Parameters below as of 01 Apr 2025 02:00  Patient On (Oxygen Delivery Method): nasal cannula  O2 Flow (L/min): 3          CONSTITUTIONAL:   NAD    ENT:   Airway patent,   Mouth with normal mucosa.       EYES:   Pupils equal,   Round and reactive to light.    CARDIAC:   Normal rate,   Regular rhythm.      RESPIRATORY:   No wheezing  Bilateral BS  Normal chest expansion  Not tachypneic,  No use of accessory muscles    GASTROINTESTINAL:  Abdomen soft,   Non-tender,   No guarding,   + BS    MUSCULOSKELETAL:   Range of motion is not limited,  No clubbing, cyanosis    NEUROLOGICAL:   Alert   Follows simple commands   No motor  deficits.    SKIN:   Skin normal color for race,   Warm and dry   No evidence of rash.        03-31-25 @ 07:01  -  04-01-25 @ 07:00  --------------------------------------------------------  IN:    IV PiggyBack: 200 mL    Norepinephrine: 299.5 mL    Oral Fluid: 360 mL  Total IN: 859.5 mL    OUT:    Voided (mL): 1950 mL  Total OUT: 1950 mL    Total NET: -1090.5 mL          LABS:                            6.8    27.93 )-----------( 248      ( 01 Apr 2025 05:42 )             22.5                        6.8    27.93 )-----------( 248      ( 04-01 @ 05:42 )             22.5                7.7    30.61 )-----------( 257      ( 03-31 @ 05:36 )             24.6                7.8    30.47 )-----------( 258      ( 03-31 @ 01:11 )             24.9                7.0    25.73 )-----------( 267      ( 03-30 @ 16:36 )             22.9                7.6    25.26 )-----------( 263      ( 03-30 @ 11:50 )             25.7                8.0    23.71 )-----------( 267      ( 03-30 @ 05:42 )             26.3                8.3    23.48 )-----------( 270      ( 03-30 @ 04:40 )             27.4                8.7    24.58 )-----------( 249      ( 03-30 @ 00:35 )             28.5                9.2    23.03 )-----------( 262      ( 03-29 @ 17:01 )             30.7                7.3    24.75 )-----------( 280      ( 03-29 @ 12:22 )             26.3                                           04-01    147[H]  |  97[L]  |  23[H]  ----------------------------<  134[H]  4.2   |  30  |  0.7    Ca    7.8[L]      01 Apr 2025 05:42  Phos  3.9     03-31  Mg     1.9     03-31    TPro  5.1[L]  /  Alb  3.0[L]  /  TBili  1.2  /  DBili  x   /  AST  18  /  ALT  15  /  AlkPhos  55  03-31                                             Urinalysis Basic - ( 01 Apr 2025 05:42 )    Color: x / Appearance: x / SG: x / pH: x  Gluc: 134 mg/dL / Ketone: x  / Bili: x / Urobili: x   Blood: x / Protein: x / Nitrite: x   Leuk Esterase: x / RBC: x / WBC x   Sq Epi: x / Non Sq Epi: x / Bacteria: x                                                  LIVER FUNCTIONS - ( 31 Mar 2025 05:36 )  Alb: 3.0 g/dL / Pro: 5.1 g/dL / ALK PHOS: 55 U/L / ALT: 15 U/L / AST: 18 U/L / GGT: x                                                  Culture - Blood (collected 29 Mar 2025 12:12)  Source: Blood None  Preliminary Report (31 Mar 2025 23:01):    No growth at 48 Hours                                                                                           MEDICATIONS  (STANDING):  albuterol/ipratropium for Nebulization 3 milliLiter(s) Nebulizer every 6 hours  chlorhexidine 2% Cloths 1 Application(s) Topical daily  fluticasone propionate/ salmeterol 100-50 MICROgram(s) Diskus 1 Dose(s) Inhalation two times a day  hydrocortisone hemorrhoidal Suppository 1 Suppository(s) Rectal at bedtime  lidocaine   4% Patch 1 Patch Transdermal daily  midodrine 10 milliGRAM(s) Oral every 8 hours  norepinephrine Infusion 0.02 MICROgram(s)/kG/Min (2.72 mL/Hr) IV Continuous <Continuous>  pantoprazole  Injectable 40 milliGRAM(s) IV Push daily  piperacillin/tazobactam IVPB.. 3.375 Gram(s) IV Intermittent every 8 hours  predniSONE   Tablet 40 milliGRAM(s) Oral daily  vancomycin    Solution 125 milliGRAM(s) Oral at bedtime    MEDICATIONS  (PRN):  acetaminophen     Tablet .. 650 milliGRAM(s) Oral every 6 hours PRN Temp greater or equal to 38C (100.4F), Mild Pain (1 - 3)      New X-rays reviewed:                                                                                  ECHO

## 2025-04-01 NOTE — PROGRESS NOTE ADULT - SUBJECTIVE AND OBJECTIVE BOX
24H events:    Patient is a 74y old Female who presents with a chief complaint of AMS (28 Mar 2025 06:08)    Primary diagnosis of Acute respiratory failure with hypercapnia       Today is hospital day 8d.      PAST MEDICAL & SURGICAL HISTORY  Atrial fibrillation    History of COPD    No significant past surgical history      SOCIAL HISTORY:  Negative for smoking/alcohol/drug use.     ALLERGIES:  No Known Allergies    MEDICATIONS:  STANDING MEDICATIONS  albuterol/ipratropium for Nebulization 3 milliLiter(s) Nebulizer every 6 hours  chlorhexidine 2% Cloths 1 Application(s) Topical daily  fluticasone propionate/ salmeterol 100-50 MICROgram(s) Diskus 1 Dose(s) Inhalation two times a day  hydrocortisone hemorrhoidal Suppository 1 Suppository(s) Rectal at bedtime  lidocaine   4% Patch 1 Patch Transdermal daily  midodrine 10 milliGRAM(s) Oral every 8 hours  norepinephrine Infusion 0.02 MICROgram(s)/kG/Min IV Continuous <Continuous>  pantoprazole  Injectable 40 milliGRAM(s) IV Push daily  piperacillin/tazobactam IVPB.. 3.375 Gram(s) IV Intermittent every 8 hours  predniSONE   Tablet 40 milliGRAM(s) Oral daily  vancomycin    Solution 125 milliGRAM(s) Oral at bedtime    PRN MEDICATIONS  acetaminophen     Tablet .. 650 milliGRAM(s) Oral every 6 hours PRN    VITALS:   T(F): 97.8  HR: 71  BP: 101/61  RR: 25  SpO2: 94%    LABS:                        6.8    27.93 )-----------( 248      ( 01 Apr 2025 05:42 )             22.5     04-01    147[H]  |  97[L]  |  23[H]  ----------------------------<  134[H]  4.2   |  30  |  0.7    Ca    7.8[L]      01 Apr 2025 05:42  Phos  3.9     03-31  Mg     1.9     03-31    TPro  5.1[L]  /  Alb  3.0[L]  /  TBili  1.2  /  DBili  x   /  AST  18  /  ALT  15  /  AlkPhos  55  03-31      Urinalysis Basic - ( 01 Apr 2025 05:42 )    Color: x / Appearance: x / SG: x / pH: x  Gluc: 134 mg/dL / Ketone: x  / Bili: x / Urobili: x   Blood: x / Protein: x / Nitrite: x   Leuk Esterase: x / RBC: x / WBC x   Sq Epi: x / Non Sq Epi: x / Bacteria: x            Culture - Blood (collected 29 Mar 2025 12:12)  Source: Blood None  Preliminary Report (31 Mar 2025 23:01):    No growth at 48 Hours          RADIOLOGY:    PHYSICAL EXAM:  GENERAL: NAD  EYES: conjunctiva and sclera clear  NECK: Supple  NERVOUS SYSTEM:  Alert & Oriented X3  CHEST/LUNG: Clear to auscultation bilaterally  HEART: Regular rate and rhythm  ABDOMEN: Soft, Nontender  EXTREMITIES:  + Peripheral Pulses

## 2025-04-01 NOTE — PROGRESS NOTE ADULT - ASSESSMENT
74 year old F with PMHx of COPD on 3L NC, smoking hx, AFib on Eliquis, past hx of abdominal abscess (in 2021), Gastric Bypass surgery 20+ years ago, who presented to the ED from [facility] on 3/24 with CC of hypoxia and change in mental status. As per ED note, patient was calling for help at her NH this morning. She was noted to be saturating 80% while on her baseline 3L NC; was placed on non-rebreather and brought to ED. Patient initially admitted to MICU for acute hypoxic hypercapnic respiratory failure. Code status was confirmed to be DNR/DNI with MOLST form faxed from [facility]. Patient subsequently downgraded to SDU.     Course in SICU complicated by Left thigh hematoma requiring intervention by IR    #Acute Hypoxic Hypercapnic Respiratory Failure likely secondary to CAP  #COPD on 3L NC  #Smoking Hx  - ON NC now at 4L  - ct chest c/w multifocal pna  - blood gas acute hypercapnia, resolved s/p bipap, pt refuses bipap at night  - MRSA and RVP neg   - Procal 0.02  - BCx 3/24 neg   - hypernatremia (resolved), met alklaosis; hold lasix; home dose 40 po  - tte with preserved ef  - ID eval- C/w Abx zosyn ED 4/4/25  - PO pred from today  - advair, duoneb q6.  - Was placed on levophed 2/2 hypovoluemic shock 2/2 bleed, still on levo with increasing requirements    #left thigh Hematoma with contrast extravasation  - S/p embolization of branch of the deep left femoral artery  - bleeding branch was treated with ~0.1  cc of gelfoam (2.5mm pledgets), followed by deployment of two low profile detachable Holley microcoils (2mm x 2 cm and 2 mm x 4 cm), with subsequent hemostasis and preservation of flow to neighboring branches.  - Today the pt dropped 1 unit of Hb, will transfuse 1 unit and repeat hb at 4pm, if stable repeat at 11pm  - If hb dropping stat CTA abdomen and pelvis to assess for rebleed in hematoma.    #Mild hypernatremia, resolved   - Na 147, improved after holding Lasix and D50     #mild generalized edema on CTH  - cth with mild cerebral edema  - repeat cth unchanged  - MRA/MRV pending until can be off pressors, MRI checklist not needed as was cleared by radiology.   - veeg no interictal activity  - f/u neuro.    #AFib on Eliquis  #HFpEF (TTE 6/2021 EF 63%, GIIIDD)  #Bilateral Pleural Effusions  - hold for now resume tomorrow  - TTE: Normal EF  - on home med Lasix 40 mg qd, being held currently   - Bilateral LE dopplex: No dvt    #DNR/DNI  #DVt prophlaxis : SCDS for now.       74 year old F with PMHx of COPD on 3L NC, smoking hx, AFib on Eliquis, past hx of abdominal abscess (in 2021), Gastric Bypass surgery 20+ years ago, who presented to the ED from [facility] on 3/24 with CC of hypoxia and change in mental status. As per ED note, patient was calling for help at her NH this morning. She was noted to be saturating 80% while on her baseline 3L NC; was placed on non-rebreather and brought to ED. Patient initially admitted to MICU for acute hypoxic hypercapnic respiratory failure. Code status was confirmed to be DNR/DNI with MOLST form faxed from [facility]. Patient subsequently downgraded to SDU.     Course in SICU complicated by Left thigh hematoma requiring intervention by IR    #Acute Hypoxic Hypercapnic Respiratory Failure likely secondary to CAP  #COPD on 3L NC  #Smoking Hx  - ON NC now at 4L  - ct chest c/w multifocal pna  - blood gas acute hypercapnia, resolved s/p bipap, pt refuses bipap at night  - MRSA and RVP neg   - Procal 0.02  - BCx 3/24 neg   - hypernatremia>>>encourage PO intake, met alklaosis; hold lasix; home dose 40 po  - tte with preserved ef  - ID eval- C/w Abx zosyn ED 4/4/25  - PO pred from today  - advair, duoneb q6.  - Was placed on levophed 2/2 hypovolumic shock 2/2 bleed, still on levo with increasing requirements  - IVC was 2.2cm and non collapsible, cannot receive more fluids    #left thigh Hematoma with contrast extravasation  - S/p embolization of branch of the deep left femoral artery  - bleeding branch was treated with ~0.1  cc of gelfoam (2.5mm pledgets), followed by deployment of two low profile detachable Holley microcoils (2mm x 2 cm and 2 mm x 4 cm), with subsequent hemostasis and preservation of flow to neighboring branches.  - Today the pt dropped 1 unit of Hb, will transfuse 1 unit and repeat hb at 4pm, if stable repeat at 11pm  - If hb dropping stat CTA abdomen and pelvis to assess for rebleed in hematoma.    #Mild hypernatremia, resolved   - Na 147, improved after holding Lasix and D50     #mild generalized edema on CTH  - cth with mild cerebral edema  - repeat cth unchanged  - MRA/MRV pending until can be off pressors, MRI checklist not needed as was cleared by radiology.   - veeg no interictal activity  - f/u neuro.    #AFib on Eliquis  #HFpEF (TTE 6/2021 EF 63%, GIIIDD)  #Bilateral Pleural Effusions  - hold for now resume tomorrow  - TTE: Normal EF  - on home med Lasix 40 mg qd, being held currently   - Bilateral LE dopplex: No dvt    #DNR/DNI  #DVt prophlaxis : SCDS for now.       74 year old F with PMHx of COPD on 3L NC, smoking hx, AFib on Eliquis, past hx of abdominal abscess (in 2021), Gastric Bypass surgery 20+ years ago, who presented to the ED from [facility] on 3/24 with CC of hypoxia and change in mental status. As per ED note, patient was calling for help at her NH this morning. She was noted to be saturating 80% while on her baseline 3L NC; was placed on non-rebreather and brought to ED. Patient initially admitted to MICU for acute hypoxic hypercapnic respiratory failure. Code status was confirmed to be DNR/DNI with MOLST form faxed from [facility]. Patient subsequently downgraded to SDU.     Course in SICU complicated by Left thigh hematoma requiring intervention by IR    #Acute Hypoxic Hypercapnic Respiratory Failure likely secondary to CAP  #COPD on 3L NC  #Smoking Hx  - ON NC now at 4L  - ct chest c/w multifocal pna  - blood gas acute hypercapnia, resolved s/p bipap, pt refuses bipap at night  - MRSA and RVP neg   - Procal 0.02  - BCx 3/24 neg   - hypernatremia>>>encourage PO intake, met alklaosis; hold lasix; home dose 40 po  - tte with preserved ef  - ID eval- C/w Abx zosyn ED 4/4/25  - PO pred from today  - advair, duoneb q6.  - Was placed on levophed 2/2 hypovolumic shock 2/2 bleed, still on levo with increasing requirements  - IVC was 2.2cm and non collapsible, cannot receive more fluids    #left thigh Hematoma with contrast extravasation  - S/p embolization of branch of the deep left femoral artery  - bleeding branch was treated with ~0.1  cc of gelfoam (2.5mm pledgets), followed by deployment of two low profile detachable Holley microcoils (2mm x 2 cm and 2 mm x 4 cm), with subsequent hemostasis and preservation of flow to neighboring branches.  - Today the pt dropped 1 unit of Hb, will transfuse 1 unit and repeat hb at 4pm, if stable repeat at 11pm  - CTA of abdomen and pelvis ordered and informed to radiology, F/u read once done    #Mild hypernatremia, resolved   - Na 147, improved after holding Lasix and D50     #mild generalized edema on CTH  - cth with mild cerebral edema  - repeat cth unchanged  - MRA/MRV pending until can be off pressors, MRI checklist not needed as was cleared by radiology.   - veeg no interictal activity  - f/u neuro.    #AFib on Eliquis  #HFpEF (TTE 6/2021 EF 63%, GIIIDD)  #Bilateral Pleural Effusions  - hold for now resume tomorrow  - TTE: Normal EF  - on home med Lasix 40 mg qd, being held currently   - Bilateral LE dopplex: No dvt    #DNR/DNI  #DVt prophlaxis : SCDS for now.       74 year old F with PMHx of COPD on 3L NC, smoking hx, AFib on Eliquis, past hx of abdominal abscess (in 2021), Gastric Bypass surgery 20+ years ago, who presented to the ED from [facility] on 3/24 with CC of hypoxia and change in mental status. As per ED note, patient was calling for help at her NH this morning. She was noted to be saturating 80% while on her baseline 3L NC; was placed on non-rebreather and brought to ED. Patient initially admitted to MICU for acute hypoxic hypercapnic respiratory failure. Code status was confirmed to be DNR/DNI with MOLST form faxed from [facility]. Patient subsequently downgraded to SDU.     Course in SICU complicated by Left thigh hematoma requiring intervention by IR    #Acute Hypoxic Hypercapnic Respiratory Failure likely secondary to CAP  #COPD on 3L NC  #Smoking Hx  - ON NC now at 4L  - ct chest c/w multifocal pna  - blood gas acute hypercapnia, resolved s/p bipap, pt refuses bipap at night  - MRSA and RVP neg   - Procal 0.02  - BCx 3/24 neg   - hypernatremia>>>encourage PO intake, met alklaosis; hold lasix; home dose 40 po  - tte with preserved ef  - ID eval- C/w Abx zosyn ED 4/4/25  - PO pred from today  - advair, duoneb q6.  - Was placed on levophed 2/2 hypovolumic shock 2/2 bleed, still on levo with increasing requirements  - IVC was 2.2cm and non collapsible, cannot receive more fluids    #left thigh Hematoma with contrast extravasation  - S/p embolization of branch of the deep left femoral artery  - bleeding branch was treated with ~0.1  cc of gelfoam (2.5mm pledgets), followed by deployment of two low profile detachable Holley microcoils (2mm x 2 cm and 2 mm x 4 cm), with subsequent hemostasis and preservation of flow to neighboring branches.  - Today the pt dropped 1 unit of Hb, will transfuse 1 unit and repeat hb at 4pm, if stable repeat at 11pm  - if hb dropping get urgent CTA abd and pelvis. Currently off levo    #Mild hypernatremia, resolved   - Na 147, improved after holding Lasix and D50     #mild generalized edema on CTH  - cth with mild cerebral edema  - repeat cth unchanged  - MRA/MRV pending until can be off pressors, MRI checklist not needed as was cleared by radiology.   - veeg no interictal activity  - f/u neuro.    #AFib on Eliquis  #HFpEF (TTE 6/2021 EF 63%, GIIIDD)  #Bilateral Pleural Effusions  - hold for now resume tomorrow  - TTE: Normal EF  - on home med Lasix 40 mg qd, being held currently   - Bilateral LE dopplex: No dvt    #DNR/DNI  #DVt prophlaxis : SCDS for now.       74 year old F with PMHx of COPD on 3L NC, smoking hx, AFib on Eliquis, past hx of abdominal abscess (in 2021), Gastric Bypass surgery 20+ years ago, who presented to the ED from [facility] on 3/24 with CC of hypoxia and change in mental status. As per ED note, patient was calling for help at her NH this morning. She was noted to be saturating 80% while on her baseline 3L NC; was placed on non-rebreather and brought to ED. Patient initially admitted to MICU for acute hypoxic hypercapnic respiratory failure. Code status was confirmed to be DNR/DNI with MOLST form faxed from [facility]. Patient subsequently downgraded to SDU.     Course in SICU complicated by Left thigh hematoma requiring intervention by IR    #Acute Hypoxic Hypercapnic Respiratory Failure likely secondary to CAP  #COPD on 3L NC  #Smoking Hx  - ON NC now at 4L  - ct chest c/w multifocal pna  - blood gas acute hypercapnia, resolved s/p bipap, pt refuses bipap at night  - MRSA and RVP neg   - Procal 0.02  - BCx 3/24 neg   - hypernatremia>>>encourage PO intake, met alklaosis; hold lasix; home dose 40 po  - tte with preserved ef  - ID eval- C/w Abx zosyn ED 4/4/25  - PO pred from today  - advair, duoneb q6.  - Was placed on levophed 2/2 hypovolumic shock 2/2 bleed, still on levo with increasing requirements  - IVC was 2.2cm and non collapsible, cannot receive more fluids    #left thigh Hematoma with contrast extravasation  - S/p embolization of branch of the deep left femoral artery  - bleeding branch was treated with ~0.1  cc of gelfoam (2.5mm pledgets), followed by deployment of two low profile detachable Holley microcoils (2mm x 2 cm and 2 mm x 4 cm), with subsequent hemostasis and preservation of flow to neighboring branches.  - Today the pt dropped 1 unit of Hb, will transfuse 1 unit and repeat hb at 4pm, if stable repeat at 11pm  - if hb dropping get urgent CTA abd and pelvis. Currently off levo    #Mild hypernatremia  - Na 147, D50 at 50 cc for 12 hrs    #mild generalized edema on CTH  - cth with mild cerebral edema  - repeat cth unchanged  - MRA/MRV pending until can be off pressors, MRI checklist not needed as was cleared by radiology.   - veeg no interictal activity  - f/u neuro.    #AFib on Eliquis  #HFpEF (TTE 6/2021 EF 63%, GIIIDD)  #Bilateral Pleural Effusions  - hold for now resume tomorrow  - TTE: Normal EF  - on home med Lasix 40 mg qd, being held currently   - Bilateral LE dopplex: No dvt    #DNR/DNI  #DVt prophlaxis : SCDS for now.

## 2025-04-01 NOTE — PROGRESS NOTE ADULT - SUBJECTIVE AND OBJECTIVE BOX
WEGENER, LOIS  74y, Female  Allergy: No Known Allergies      LOS  8d    CHIEF COMPLAINT: AMS (28 Mar 2025 06:08)      INTERVAL EVENTS/HPI  - No acute events overnight  - T(F): , Max: 97.8 (04-01-25 @ 08:00)  - on levophed - denies left thigh pain, denies shortness of breath   - WBC Count: 27.93 (04-01-25 @ 05:42)  WBC Count: 30.61 (03-31-25 @ 05:36)     - Creatinine: 0.7 (04-01-25 @ 05:42)  Creatinine: 0.9 (03-31-25 @ 05:36)       ROS  General: Denies rigors, nightsweats  HEENT: Denies headache, rhinorrhea, sore throat, eye pain  CV: Denies CP, palpitations  PULM: Denies wheezing, hemoptysis  GI: Denies hematemesis, hematochezia, melena  : Denies discharge, hematuria  MSK: Denies arthralgias, myalgias  SKIN: Denies rash, lesions  NEURO: Denies paresthesias, weakness  PSYCH: Denies depression, anxiety    VITALS:  T(F): 97.7, Max: 97.8 (04-01-25 @ 08:00)  HR: 74  BP: 92/51  RR: 20Vital Signs Last 24 Hrs  T(C): 36.5 (01 Apr 2025 12:00), Max: 36.6 (01 Apr 2025 08:00)  T(F): 97.7 (01 Apr 2025 12:00), Max: 97.8 (01 Apr 2025 08:00)  HR: 74 (01 Apr 2025 12:00) (59 - 77)  BP: 92/51 (01 Apr 2025 12:00) (82/44 - 118/58)  BP(mean): 68 (01 Apr 2025 12:00) (58 - 83)  RR: 20 (01 Apr 2025 12:00) (9 - 28)  SpO2: 97% (01 Apr 2025 12:00) (91% - 100%)    Parameters below as of 01 Apr 2025 12:00  Patient On (Oxygen Delivery Method): nasal cannula        PHYSICAL EXAM:  Gen: NAD, resting in bed  HEENT: Normocephalic, atraumatic  Neck: supple, no lymphadenopathy  CV: Regular rate & regular rhythm  Lungs: decreased BS at bases, no fremitus  Abdomen: Soft, BS present  Ext: Warm, well perfused  Neuro: non focal, awake  Skin: no rash, no erythema  Lines: no phlebitis    FH: Non-contributory  Social Hx: Non-contributory    TESTS & MEASUREMENTS:                        6.8    27.93 )-----------( 248      ( 01 Apr 2025 05:42 )             22.5     04-01    147[H]  |  97[L]  |  23[H]  ----------------------------<  134[H]  4.2   |  30  |  0.7    Ca    7.8[L]      01 Apr 2025 05:42  Phos  3.9     03-31  Mg     1.9     03-31    TPro  5.1[L]  /  Alb  3.0[L]  /  TBili  1.2  /  DBili  x   /  AST  18  /  ALT  15  /  AlkPhos  55  03-31      LIVER FUNCTIONS - ( 31 Mar 2025 05:36 )  Alb: 3.0 g/dL / Pro: 5.1 g/dL / ALK PHOS: 55 U/L / ALT: 15 U/L / AST: 18 U/L / GGT: x           Urinalysis Basic - ( 01 Apr 2025 05:42 )    Color: x / Appearance: x / SG: x / pH: x  Gluc: 134 mg/dL / Ketone: x  / Bili: x / Urobili: x   Blood: x / Protein: x / Nitrite: x   Leuk Esterase: x / RBC: x / WBC x   Sq Epi: x / Non Sq Epi: x / Bacteria: x        Culture - Blood (collected 03-29-25 @ 12:12)  Source: Blood None  Preliminary Report (03-31-25 @ 23:01):    No growth at 48 Hours    Culture - Blood (collected 03-24-25 @ 10:00)  Source: Blood Blood  Final Report (03-29-25 @ 22:00):    No growth at 5 days    Culture - Blood (collected 03-24-25 @ 10:00)  Source: Blood Blood  Final Report (03-29-25 @ 22:00):    No growth at 5 days        Lactate, Blood: 1.7 mmol/L (03-30-25 @ 00:35)  Lactate, Blood: 2.7 mmol/L (03-29-25 @ 17:01)  Lactate, Blood: 3.1 mmol/L (03-29-25 @ 12:55)      INFECTIOUS DISEASES TESTING  Legionella Antigen, Urine: Negative (03-28-25 @ 14:00)  MRSA PCR Result.: Negative (03-27-25 @ 09:15)  Procalcitonin: <0.02 (03-24-25 @ 15:45)      INFLAMMATORY MARKERS      RADIOLOGY & ADDITIONAL TESTS:  I have personally reviewed the last available Chest xray  CXR      CT      CARDIOLOGY TESTING  12 Lead ECG:   Ventricular Rate 72 BPM    QRS Duration 100 ms    Q-T Interval 436 ms    QTC Calculation(Bazett) 477 ms    R Axis 105 degrees    T Axis 94 degrees    Diagnosis Line Atrial fibrillation with premature ventricular or aberrantly conducted  complexes  Rightward axis  Nonspecific ST abnormality  Abnormal ECG    Confirmed by Deepak Galvin ([id number]) on 3/26/2025 10:29:08 AM (03-24-25 @ 10:31)      MEDICATIONS  albuterol/ipratropium for Nebulization 3 Nebulizer every 6 hours  chlorhexidine 2% Cloths 1 Topical daily  dextrose 5%. 1000 IV Continuous <Continuous>  fluticasone propionate/ salmeterol 100-50 MICROgram(s) Diskus 1 Inhalation two times a day  hydrocortisone hemorrhoidal Suppository 1 Rectal at bedtime  lidocaine   4% Patch 1 Transdermal daily  midodrine 10 Oral every 8 hours  norepinephrine Infusion 0.02 IV Continuous <Continuous>  pantoprazole  Injectable 40 IV Push daily  piperacillin/tazobactam IVPB.. 3.375 IV Intermittent every 8 hours  predniSONE   Tablet 40 Oral daily  vancomycin    Solution 125 Oral at bedtime      WEIGHT  Weight (kg): 85 (03-29-25 @ 22:40)  Creatinine: 0.7 mg/dL (04-01-25 @ 05:42)      ANTIBIOTICS:  piperacillin/tazobactam IVPB.. 3.375 Gram(s) IV Intermittent every 8 hours  vancomycin    Solution 125 milliGRAM(s) Oral at bedtime      All available historical records have been reviewed

## 2025-04-01 NOTE — PROGRESS NOTE ADULT - ATTENDING COMMENTS
#Hypotension.   hypovolemic shock due to L thigh hematoma  cta LLE; 19 cm L thigh hematoma with active extravasation  s/p IR coil 3/29  repeat cta no active bleed  s/p 2 units prbcs  h/h stable, trend  s/p protamine; hold lovenox  levophed at 0.18; midodrine 10 tid  h/h downtrending; give 1 unit prbcs now  repeat cta LLE  f/u ir.    #Acute on chronic respiratory failure with hypoxia and hypercapnia.   suspect multifocal pna +/- copd exacerbation  cxr interstitial edema  ct chest c/w multifocal pna  blood gas acute hypercapnia, resolved s/p bipap  sputum cx, legionella (neg), strep (neg), mrsa (neg)  rvp neg  lasix on hold; home dose 40 po  tte with preserved ef  zosyn for now  prednisone 40; quick taper off  off hfnc, nc to keep spo2 >88  advair, duoneb q6.    #Altered mental status.   cth with mild cerebral edema  repeat cth unchanged  mri brain, mra, mrv  veeg no interictal activity  f/u neuro.    #Progress Note Handoff  Pending (specify): h/h, levophed; iv abx, mri brain  Family discussion: d/w pt at bedside  Disposition: home vs. snf .

## 2025-04-01 NOTE — PROGRESS NOTE ADULT - ASSESSMENT
ASSESSMENT  This is a 74 year old F with PMHx of COPD on 3L NC, smoking hx, AFib on Eliquis, past hx of abdominal abscess (in 2021), Gastric Bypass surgery 20+ years ago, who presented to the ED from [facility] on 3/24 with hypoxia and change in mental status.    IMPRESSION  #Acute on chronic respiratory failure.  - Doubt bacterial pneumonia  - CT chest noted for bilateral pleural effusions and compressive atelectasis  Likely COPD flare.   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  History of Latent TB  History of C.diff and ileus (2021)    RECOMMENDATIONS  - continue IV Zosyn 3.375 gram q 8 hours. Tentative plan for total of 7 days from 3/28/25.  - Steroids as per primary team.   - Keep on PO Vanc 125 mg daily for c.diff PPX until abx are stopped.   - trend WBC     Please call or message on [employer] Teams if with any questions.  Spectra [id number]

## 2025-04-01 NOTE — PROGRESS NOTE ADULT - ASSESSMENT
IMPRESSION:    Acute on chronic hypoxemic / hypercapnic respiratory failure improved   COPD exacerbation improving   Left thigh hematoma SP IR embolization   Hypernatremia resolved   HO COPD  HO A AFIB    PLAN:    CNS: Avoid CNS depressant.  Pain control if needed.      HEENT:  Oral care    PULMONARY:  HOB @ 45 degrees, NIV during sleep and PRN during the day.  DC Solumedrol.  Advair 250/50 adn Spiriva.  Albuterol PRN     CARDIOVASCULAR: GDFR.  Avoid overload.  Wean Levophed.  Target MAP > 60     GI: GI prophylaxis       Feeding per speech.  Bowel regimen     RENAL:  F/u  lytes.  Correct as needed. accurate I/O    INFECTIOUS DISEASE: Monitor VS.  Follow up cultures.  ID follow up noted.  Finish Zosyn course per ID     HEMATOLOGICAL:  DVT prophylaxis seq.  Hold AC.  Monitor CBC and Coags.  Keep Hg > 7.  One Unit PRBC      ENDOCRINE:  Follow up FS.  Insulin protocol if needed.    DNR/I    SDU for now

## 2025-04-02 LAB
ANION GAP SERPL CALC-SCNC: 11 MMOL/L — SIGNIFICANT CHANGE UP (ref 7–14)
BASOPHILS # BLD AUTO: 0.02 K/UL — SIGNIFICANT CHANGE UP (ref 0–0.2)
BASOPHILS NFR BLD AUTO: 0.1 % — SIGNIFICANT CHANGE UP (ref 0–1)
BUN SERPL-MCNC: 14 MG/DL — SIGNIFICANT CHANGE UP (ref 10–20)
CALCIUM SERPL-MCNC: 7.7 MG/DL — LOW (ref 8.4–10.5)
CHLORIDE SERPL-SCNC: 96 MMOL/L — LOW (ref 98–110)
CO2 SERPL-SCNC: 29 MMOL/L — SIGNIFICANT CHANGE UP (ref 17–32)
CREAT SERPL-MCNC: 0.6 MG/DL — LOW (ref 0.7–1.5)
EGFR: 94 ML/MIN/1.73M2 — SIGNIFICANT CHANGE UP
EGFR: 94 ML/MIN/1.73M2 — SIGNIFICANT CHANGE UP
EOSINOPHIL # BLD AUTO: 0.02 K/UL — SIGNIFICANT CHANGE UP (ref 0–0.7)
EOSINOPHIL NFR BLD AUTO: 0.1 % — SIGNIFICANT CHANGE UP (ref 0–8)
GLUCOSE SERPL-MCNC: 176 MG/DL — HIGH (ref 70–99)
HCT VFR BLD CALC: 22.5 % — LOW (ref 37–47)
HCT VFR BLD CALC: 25.4 % — LOW (ref 37–47)
HGB BLD-MCNC: 6.9 G/DL — CRITICAL LOW (ref 12–16)
HGB BLD-MCNC: 7.8 G/DL — LOW (ref 12–16)
IMM GRANULOCYTES NFR BLD AUTO: 2.2 % — HIGH (ref 0.1–0.3)
LYMPHOCYTES # BLD AUTO: 0.43 K/UL — LOW (ref 1.2–3.4)
LYMPHOCYTES # BLD AUTO: 2.2 % — LOW (ref 20.5–51.1)
MCHC RBC-ENTMCNC: 25.7 PG — LOW (ref 27–31)
MCHC RBC-ENTMCNC: 25.9 PG — LOW (ref 27–31)
MCHC RBC-ENTMCNC: 30.7 G/DL — LOW (ref 32–37)
MCHC RBC-ENTMCNC: 30.7 G/DL — LOW (ref 32–37)
MCV RBC AUTO: 83.6 FL — SIGNIFICANT CHANGE UP (ref 81–99)
MCV RBC AUTO: 84.6 FL — SIGNIFICANT CHANGE UP (ref 81–99)
MONOCYTES # BLD AUTO: 0.76 K/UL — HIGH (ref 0.1–0.6)
MONOCYTES NFR BLD AUTO: 3.8 % — SIGNIFICANT CHANGE UP (ref 1.7–9.3)
NEUTROPHILS # BLD AUTO: 18.2 K/UL — HIGH (ref 1.4–6.5)
NEUTROPHILS NFR BLD AUTO: 91.6 % — HIGH (ref 42.2–75.2)
NRBC BLD AUTO-RTO: 2 /100 WBCS — HIGH (ref 0–0)
NRBC BLD AUTO-RTO: 2 /100 WBCS — HIGH (ref 0–0)
PLATELET # BLD AUTO: 198 K/UL — SIGNIFICANT CHANGE UP (ref 130–400)
PLATELET # BLD AUTO: 215 K/UL — SIGNIFICANT CHANGE UP (ref 130–400)
PMV BLD: 10.3 FL — SIGNIFICANT CHANGE UP (ref 7.4–10.4)
PMV BLD: 10.6 FL — HIGH (ref 7.4–10.4)
POTASSIUM SERPL-MCNC: 4.6 MMOL/L — SIGNIFICANT CHANGE UP (ref 3.5–5)
POTASSIUM SERPL-SCNC: 4.6 MMOL/L — SIGNIFICANT CHANGE UP (ref 3.5–5)
RBC # BLD: 2.66 M/UL — LOW (ref 4.2–5.4)
RBC # BLD: 3.04 M/UL — LOW (ref 4.2–5.4)
RBC # FLD: 23.9 % — HIGH (ref 11.5–14.5)
RBC # FLD: 23.9 % — HIGH (ref 11.5–14.5)
SODIUM SERPL-SCNC: 136 MMOL/L — SIGNIFICANT CHANGE UP (ref 135–146)
WBC # BLD: 19.54 K/UL — HIGH (ref 4.8–10.8)
WBC # BLD: 19.86 K/UL — HIGH (ref 4.8–10.8)
WBC # FLD AUTO: 19.54 K/UL — HIGH (ref 4.8–10.8)
WBC # FLD AUTO: 19.86 K/UL — HIGH (ref 4.8–10.8)

## 2025-04-02 PROCEDURE — 73706 CT ANGIO LWR EXTR W/O&W/DYE: CPT | Mod: 26,LT

## 2025-04-02 PROCEDURE — 99233 SBSQ HOSP IP/OBS HIGH 50: CPT

## 2025-04-02 PROCEDURE — 74176 CT ABD & PELVIS W/O CONTRAST: CPT | Mod: 26

## 2025-04-02 RX ORDER — SODIUM CHLORIDE 9 G/1000ML
1000 INJECTION, SOLUTION INTRAVENOUS
Refills: 0 | Status: DISCONTINUED | OUTPATIENT
Start: 2025-04-02 | End: 2025-04-04

## 2025-04-02 RX ADMIN — Medication 25 GRAM(S): at 21:48

## 2025-04-02 RX ADMIN — Medication 40 MILLIGRAM(S): at 11:39

## 2025-04-02 RX ADMIN — LIDOCAINE HYDROCHLORIDE 1 PATCH: 20 JELLY TOPICAL at 23:30

## 2025-04-02 RX ADMIN — NOREPINEPHRINE BITARTRATE 2.72 MICROGRAM(S)/KG/MIN: 8 SOLUTION at 03:28

## 2025-04-02 RX ADMIN — IPRATROPIUM BROMIDE AND ALBUTEROL SULFATE 3 MILLILITER(S): .5; 2.5 SOLUTION RESPIRATORY (INHALATION) at 08:18

## 2025-04-02 RX ADMIN — LIDOCAINE HYDROCHLORIDE 1 PATCH: 20 JELLY TOPICAL at 11:39

## 2025-04-02 RX ADMIN — IPRATROPIUM BROMIDE AND ALBUTEROL SULFATE 3 MILLILITER(S): .5; 2.5 SOLUTION RESPIRATORY (INHALATION) at 19:36

## 2025-04-02 RX ADMIN — Medication 125 MILLIGRAM(S): at 22:01

## 2025-04-02 RX ADMIN — MIDODRINE HYDROCHLORIDE 10 MILLIGRAM(S): 5 TABLET ORAL at 15:04

## 2025-04-02 RX ADMIN — MIDODRINE HYDROCHLORIDE 10 MILLIGRAM(S): 5 TABLET ORAL at 05:24

## 2025-04-02 RX ADMIN — IPRATROPIUM BROMIDE AND ALBUTEROL SULFATE 3 MILLILITER(S): .5; 2.5 SOLUTION RESPIRATORY (INHALATION) at 03:09

## 2025-04-02 RX ADMIN — SODIUM CHLORIDE 50 MILLILITER(S): 9 INJECTION, SOLUTION INTRAVENOUS at 17:10

## 2025-04-02 RX ADMIN — Medication 1 DOSE(S): at 15:04

## 2025-04-02 RX ADMIN — Medication 25 GRAM(S): at 15:04

## 2025-04-02 RX ADMIN — LIDOCAINE HYDROCHLORIDE 1 PATCH: 20 JELLY TOPICAL at 19:48

## 2025-04-02 RX ADMIN — Medication 25 GRAM(S): at 05:27

## 2025-04-02 RX ADMIN — MIDODRINE HYDROCHLORIDE 10 MILLIGRAM(S): 5 TABLET ORAL at 22:01

## 2025-04-02 RX ADMIN — IPRATROPIUM BROMIDE AND ALBUTEROL SULFATE 3 MILLILITER(S): .5; 2.5 SOLUTION RESPIRATORY (INHALATION) at 13:11

## 2025-04-02 RX ADMIN — PREDNISONE 40 MILLIGRAM(S): 20 TABLET ORAL at 05:25

## 2025-04-02 RX ADMIN — Medication 1 APPLICATION(S): at 11:39

## 2025-04-02 NOTE — PROGRESS NOTE ADULT - ASSESSMENT
IMPRESSION:    Acute on chronic hypoxemic / hypercapnic respiratory failure improved   COPD exacerbation improving   Left thigh hematoma SP IR embolization   Hypernatremia   Shock on Levophed.    HO COPD  HO A AFIB    PLAN:    CNS: Avoid CNS depressant.  Pain control if needed.      HEENT:  Oral care    PULMONARY:  HOB @ 45 degrees, NIV during sleep and PRN during the day.  Advair 250/50 and Spiriva.  Albuterol PRN     CARDIOVASCULAR: GDFR.  Avoid overload.  Wean Levophed.  Target MAP > 60     GI: GI prophylaxis.  Feeding per speech.  Bowel regimen     RENAL:  F/u  lytes.  Correct as needed.  Monitor UO     INFECTIOUS DISEASE: Monitor VS.  Follow up cultures.  ID follow up noted.  Finish Zosyn course per ID     HEMATOLOGICAL:  DVT prophylaxis seq.  Hold AC.  Monitor CBC and Coags.  Keep Hg > 7.  One Unit PRBC.  Going for repeat CTA      ENDOCRINE:  Follow up FS.  Insulin protocol if needed.    DNR/I    SDU for now     DW team

## 2025-04-02 NOTE — PROGRESS NOTE ADULT - SUBJECTIVE AND OBJECTIVE BOX
Patient is a 74y old  Female who presents with a chief complaint of AMS (28 Mar 2025 06:08)        Over Night Events:  Remains on Levophed  On RA         ROS:     All ROS are negative except HPI         PHYSICAL EXAM    ICU Vital Signs Last 24 Hrs  T(C): 36.4 (02 Apr 2025 08:00), Max: 36.7 (01 Apr 2025 20:00)  T(F): 97.5 (02 Apr 2025 08:00), Max: 98 (01 Apr 2025 20:00)  HR: 67 (02 Apr 2025 08:00) (62 - 80)  BP: 101/71 (02 Apr 2025 08:00) (80/47 - 123/51)  BP(mean): 81 (02 Apr 2025 08:00) (58 - 82)  ABP: --  ABP(mean): --  RR: 22 (02 Apr 2025 08:00) (10 - 218)  SpO2: 94% (02 Apr 2025 06:30) (86% - 100%)    O2 Parameters below as of 02 Apr 2025 06:30  Patient On (Oxygen Delivery Method): room air            CONSTITUTIONAL:  NAD    ENT:   Airway patent,   Mouth with normal mucosa.       EYES:   Pupils equal,   Round and reactive to light.    CARDIAC:   Normal rate,   Regular rhythm.        RESPIRATORY:   No wheezing  Bilateral BS  Normal chest expansion  Not tachypneic,  No use of accessory muscles    GASTROINTESTINAL:  Abdomen soft,   Non-tender,   No guarding,   + BS    MUSCULOSKELETAL:   No clubbing, cyanosis    NEUROLOGICAL:   Alert  No motor  deficits.    SKIN:   Skin normal color for race,   Warm and dry  No evidence of rash.      04-01-25 @ 07:01  -  04-02-25 @ 07:00  --------------------------------------------------------  IN:    dextrose 5%: 650 mL    IV PiggyBack: 300 mL    Norepinephrine: 82.8 mL    Oral Fluid: 2000 mL    PRBCs (Packed Red Blood Cells): 320 mL  Total IN: 3352.8 mL    OUT:    Voided (mL): 1800 mL  Total OUT: 1800 mL    Total NET: 1552.8 mL          LABS:                            6.9    19.54 )-----------( 198      ( 02 Apr 2025 01:15 )             22.5                                               04-01    147[H]  |  97[L]  |  23[H]  ----------------------------<  134[H]  4.2   |  30  |  0.7    Ca    7.8[L]      01 Apr 2025 05:42                                               Urinalysis Basic - ( 01 Apr 2025 05:42 )    Color: x / Appearance: x / SG: x / pH: x  Gluc: 134 mg/dL / Ketone: x  / Bili: x / Urobili: x   Blood: x / Protein: x / Nitrite: x   Leuk Esterase: x / RBC: x / WBC x   Sq Epi: x / Non Sq Epi: x / Bacteria: x                                                                                                                                                                                MEDICATIONS  (STANDING):  albuterol/ipratropium for Nebulization 3 milliLiter(s) Nebulizer every 6 hours  chlorhexidine 2% Cloths 1 Application(s) Topical daily  dextrose 5%. 1000 milliLiter(s) (50 mL/Hr) IV Continuous <Continuous>  fluticasone propionate/ salmeterol 100-50 MICROgram(s) Diskus 1 Dose(s) Inhalation two times a day  hydrocortisone hemorrhoidal Suppository 1 Suppository(s) Rectal at bedtime  lidocaine   4% Patch 1 Patch Transdermal daily  midodrine 10 milliGRAM(s) Oral every 8 hours  norepinephrine Infusion 0.02 MICROgram(s)/kG/Min (2.72 mL/Hr) IV Continuous <Continuous>  pantoprazole  Injectable 40 milliGRAM(s) IV Push daily  piperacillin/tazobactam IVPB.. 3.375 Gram(s) IV Intermittent every 8 hours  predniSONE   Tablet 40 milliGRAM(s) Oral daily  vancomycin    Solution 125 milliGRAM(s) Oral at bedtime    MEDICATIONS  (PRN):  acetaminophen     Tablet .. 650 milliGRAM(s) Oral every 6 hours PRN Temp greater or equal to 38C (100.4F), Mild Pain (1 - 3)  oxycodone    5 mG/acetaminophen 325 mG 1 Tablet(s) Oral every 12 hours PRN Severe Pain (7 - 10)      New X-rays reviewed:                                                                                  ECHO

## 2025-04-02 NOTE — PROGRESS NOTE ADULT - ATTENDING COMMENTS
Interval history: Pt seen and examined at bedside. No cp or sob.   Vital Signs (24 Hrs):  T(C): 36.4 (04-02-25 @ 08:00), Max: 36.7 (04-01-25 @ 20:00)  HR: 67 (04-02-25 @ 08:00) (62 - 80)  BP: 101/71 (04-02-25 @ 08:00) (80/47 - 114/57)  RR: 22 (04-02-25 @ 08:00) (10 - 218)  SpO2: 100% (04-02-25 @ 08:00) (86% - 100%)  Wt(kg): --  Daily     Daily     I&O's Summary    01 Apr 2025 07:01  -  02 Apr 2025 07:00  --------------------------------------------------------  IN: 3352.8 mL / OUT: 1800 mL / NET: 1552.8 mL    02 Apr 2025 07:01  -  02 Apr 2025 11:20  --------------------------------------------------------  IN: 490 mL / OUT: 0 mL / NET: 490 mL      PHYSICAL EXAM:  GENERAL: NAD, well-developed  HEAD:  Atraumatic, Normocephalic  EYES: EOMI, PERRLA, conjunctiva and sclera clear  NECK: Supple, No JVD  CHEST/LUNG: Clear to auscultation bilaterally; No wheeze  HEART: Regular rate and rhythm; No murmurs, rubs, or gallops  ABDOMEN: Soft, Nontender, Nondistended; Bowel sounds present  EXTREMITIES:  2+ Peripheral Pulses, No clubbing, cyanosis, or edema, left leg hematoma  PSYCH: AAOx2-3  NEUROLOGY: non-focal  SKIN: No rashes or lesions  Labs reviewed  Imaging reviewed independently and reviewed read      Plan  74 year old F with PMHx of COPD on 3L NC, smoking hx, AFib on Eliquis, past hx of abdominal abscess (in 2021), Gastric Bypass surgery 20+ years ago, who presented to the ED from [facility] on 3/24 with CC of hypoxia and change in mental status. As per ED note, patient was calling for help at her NH this morning. She was noted to be saturating 80% while on her baseline 3L NC; was placed on non-rebreather and brought to ED. Patient initially admitted to MICU for acute hypoxic hypercapnic respiratory failure. Code status was confirmed to be DNR/DNI with MOLST form faxed from [facility]. Patient subsequently downgraded to SDU.     Course in SICU complicated by Left thigh hematoma requiring intervention by IR    #Acute Hypoxic Hypercapnic Respiratory Failure likely secondary to CAP- Resolved  #COPD on 3L NC  #Smoking Hx  - ON NC now at 4L  - ct chest c/w multifocal pna  - blood gas acute hypercapnia, resolved s/p bipap, pt refuses bipap at night  - MRSA and RVP neg   - Procal 0.02  - BCx 3/24 neg   - hypernatremia>>>encourage PO intake, met alklaosis; hold lasix; home dose 40 po  - tte with preserved ef  - ID eval- C/w Abx zosyn ED 4/4/25  - PO pred from today  - advair, duoneb q6.  - S/p 2 units of PRBC and Hb not corrected, will send for urgent repeat CTA and CT abd Non, F/u Hb at 11am and 8PM  - IVC was 2.2cm and non collapsible, cannot receive more fluids    #left thigh Hematoma with contrast extravasation  - S/p embolization of branch of the deep left femoral artery  - bleeding branch was treated with ~0.1  cc of gelfoam (2.5mm pledgets), followed by deployment of two low profile detachable Holley microcoils (2mm x 2 cm and 2 mm x 4 cm), with subsequent hemostasis and preservation of flow to neighboring branches.  - S/p 2 units of PRBC and Hb not corrected, will send for urgent repeat CTA and CT abd Non, F/u Hb at 11am and 8PM  - Currently off levo    #Progress Note Handoff  Pending (specify):  Urgent CTA abd and pelvic, dropping hbg , cbc   Family discussion: house staff updated pt family  Disposition: sdu, dw CC   Decision to admit the pt is based on acuity as above

## 2025-04-02 NOTE — PROGRESS NOTE ADULT - ASSESSMENT
74 year old F with PMHx of COPD on 3L NC, smoking hx, AFib on Eliquis, past hx of abdominal abscess (in 2021), Gastric Bypass surgery 20+ years ago, who presented to the ED from [facility] on 3/24 with CC of hypoxia and change in mental status. As per ED note, patient was calling for help at her NH this morning. She was noted to be saturating 80% while on her baseline 3L NC; was placed on non-rebreather and brought to ED. Patient initially admitted to MICU for acute hypoxic hypercapnic respiratory failure. Code status was confirmed to be DNR/DNI with MOLST form faxed from [facility]. Patient subsequently downgraded to SDU.     Course in SICU complicated by Left thigh hematoma requiring intervention by IR    #Acute Hypoxic Hypercapnic Respiratory Failure likely secondary to CAP- Resolved  #COPD on 3L NC  #Smoking Hx  - ON NC now at 4L  - ct chest c/w multifocal pna  - blood gas acute hypercapnia, resolved s/p bipap, pt refuses bipap at night  - MRSA and RVP neg   - Procal 0.02  - BCx 3/24 neg   - hypernatremia>>>encourage PO intake, met alklaosis; hold lasix; home dose 40 po  - tte with preserved ef  - ID eval- C/w Abx zosyn ED 4/4/25  - PO pred from today  - advair, duoneb q6.  - S/p 2 units of PRBC and Hb not corrected, will send for urgent repeat CTA and CT abd Non, F/u Hb at 1am and 8PM  - IVC was 2.2cm and non collapsible, cannot receive more fluids    #left thigh Hematoma with contrast extravasation  - S/p embolization of branch of the deep left femoral artery  - bleeding branch was treated with ~0.1  cc of gelfoam (2.5mm pledgets), followed by deployment of two low profile detachable Holley microcoils (2mm x 2 cm and 2 mm x 4 cm), with subsequent hemostasis and preservation of flow to neighboring branches.  - S/p 2 units of PRBC and Hb not corrected, will send for urgent repeat CTA and CT abd Non, F/u Hb at 1am and 8PM  - Currently off levo    #Mild hypernatremia  - Na 147, D50 at 50 cc for 12 hrs    #mild generalized edema on CTH  - cth with mild cerebral edema  - repeat cth unchanged  - MRA/MRV pending until can be off pressors, MRI checklist not needed as was cleared by radiology.   - veeg no interictal activity  - f/u neuro.    #AFib on Eliquis  #HFpEF (TTE 6/2021 EF 63%, GIIIDD)  #Bilateral Pleural Effusions  - hold for now resume tomorrow  - TTE: Normal EF  - on home med Lasix 40 mg qd, being held currently   - Bilateral LE dopplex: No dvt    #DNR/DNI  #DVt prophlaxis : SCDS for now.       74 year old F with PMHx of COPD on 3L NC, smoking hx, AFib on Eliquis, past hx of abdominal abscess (in 2021), Gastric Bypass surgery 20+ years ago, who presented to the ED from [facility] on 3/24 with CC of hypoxia and change in mental status. As per ED note, patient was calling for help at her NH this morning. She was noted to be saturating 80% while on her baseline 3L NC; was placed on non-rebreather and brought to ED. Patient initially admitted to MICU for acute hypoxic hypercapnic respiratory failure. Code status was confirmed to be DNR/DNI with MOLST form faxed from [facility]. Patient subsequently downgraded to SDU.     Course in SICU complicated by Left thigh hematoma requiring intervention by IR    #Acute Hypoxic Hypercapnic Respiratory Failure likely secondary to CAP- Resolved  #COPD on 3L NC  #Smoking Hx  - ON NC now at 4L  - ct chest c/w multifocal pneumonia  - blood gas acute hypercapnia, resolved s/p bipap, pt refuses bipap at night  - MRSA and RVP neg   - Procal 0.02  - BCx 3/24 neg   - hypernatremia>>>encourage PO intake, met alklaosis; hold lasix; home dose 40 po  - tte with preserved ef  - ID eval- C/w Abx zosyn ED 4/4/25  - PO pred from today  - advair, duoneb q6.  - S/p 2 units of PRBC and Hb not corrected, will send for urgent repeat CTA and CT abd Non, F/u Hb at 11am and 8PM  - IVC was 2.2cm and non collapsible, cannot receive more fluids    #left thigh Hematoma with contrast extravasation  - S/p embolization of branch of the deep left femoral artery  - bleeding branch was treated with ~0.1  cc of gelfoam (2.5mm pledgets), followed by deployment of two low profile detachable Holley microcoils (2mm x 2 cm and 2 mm x 4 cm), with subsequent hemostasis and preservation of flow to neighboring branches.  - S/p 2 units of PRBC and Hb not corrected, will send for urgent repeat CTA and CT abd Non, F/u Hb at 11am and 8PM  - Currently off levo    #Mild hypernatremia  - Na 147, D50 at 50 cc for 12 hrs    #mild generalized edema on CTH  - cth with mild cerebral edema  - repeat cth unchanged  - MRA/MRV pending until can be off pressors, MRI checklist not needed as was cleared by radiology.   - veeg no interictal activity  - f/u neuro.    #paroxysmal AFib on Eliquis  #chronic HFpEF (TTE 6/2021 EF 63%, GIIIDD)  #Bilateral Pleural Effusions  - hold for now resume tomorrow  - TTE: Normal EF  - on home med Lasix 40 mg qd, being held currently   - Bilateral LE dopplex: No dvt    #DNR/DNI  #DVt prophlaxis : SCDS for now.       74 year old F with PMHx of COPD on 3L NC, smoking hx, AFib on Eliquis, past hx of abdominal abscess (in 2021), Gastric Bypass surgery 20+ years ago, who presented to the ED from [facility] on 3/24 with CC of hypoxia and change in mental status. As per ED note, patient was calling for help at her NH this morning. She was noted to be saturating 80% while on her baseline 3L NC; was placed on non-rebreather and brought to ED. Patient initially admitted to MICU for acute hypoxic hypercapnic respiratory failure. Code status was confirmed to be DNR/DNI with MOLST form faxed from [facility]. Patient subsequently downgraded to SDU.     Course in SICU complicated by Left thigh hematoma requiring intervention by IR    #Acute Hypoxic Hypercapnic Respiratory Failure likely secondary to CAP- Resolved  #COPD on 3L NC  #Smoking Hx  - ON NC now at 4L  - ct chest c/w multifocal pneumonia  - blood gas acute hypercapnia, resolved s/p bipap, pt refuses bipap at night  - MRSA and RVP neg   - Procal 0.02  - BCx 3/24 neg   - hypernatremia>>>encourage PO intake, met alklaosis; hold lasix; home dose 40 po  - tte with preserved ef  - ID eval- C/w Abx zosyn ED 4/4/25  - PO pred from today  - advair, duoneb q6.  - S/p 2 units of PRBC and Hb not corrected, will send for urgent repeat CTA and CT abd Non, F/u Hb at 11am and 8PM  - IVC was 2.2cm and non collapsible, cannot receive more fluids    #left thigh Hematoma with contrast extravasation  - S/p embolization of branch of the deep left femoral artery  - bleeding branch was treated with ~0.1  cc of gelfoam (2.5mm pledgets), followed by deployment of two low profile detachable Holley microcoils (2mm x 2 cm and 2 mm x 4 cm), with subsequent hemostasis and preservation of flow to neighboring branches.  - S/p 2 units of PRBC and Hb not corrected, will send for urgent repeat CTA and CT abd Non, F/u Hb at 11am and 8PM  - Currently off levo    #Mild hypernatremia- resolved  - Na improved today s/p D5    #mild generalized edema on CTH  - cth with mild cerebral edema  - repeat cth unchanged  - MRA/MRV pending until can be off pressors, MRI checklist not needed as was cleared by radiology.   - veeg no interictal activity  - f/u neuro.    #paroxysmal AFib on Eliquis  #chronic HFpEF (TTE 6/2021 EF 63%, GIIIDD)  #Bilateral Pleural Effusions  - hold for now resume tomorrow  - TTE: Normal EF  - on home med Lasix 40 mg qd, being held currently   - Bilateral LE dopplex: No dvt    #DNR/DNI  #DVt prophlaxis : SCDS for now.

## 2025-04-02 NOTE — PROGRESS NOTE ADULT - SUBJECTIVE AND OBJECTIVE BOX
24H events:    Patient is a 74y old Female who presents with a chief complaint of AMS (28 Mar 2025 06:08)    Primary diagnosis of Acute respiratory failure with hypercapnia       Today is hospital day 9d.      PAST MEDICAL & SURGICAL HISTORY  Atrial fibrillation    History of COPD    No significant past surgical history      SOCIAL HISTORY:  Negative for smoking/alcohol/drug use.     ALLERGIES:  No Known Allergies    MEDICATIONS:  STANDING MEDICATIONS  albuterol/ipratropium for Nebulization 3 milliLiter(s) Nebulizer every 6 hours  chlorhexidine 2% Cloths 1 Application(s) Topical daily  dextrose 5%. 1000 milliLiter(s) IV Continuous <Continuous>  fluticasone propionate/ salmeterol 100-50 MICROgram(s) Diskus 1 Dose(s) Inhalation two times a day  hydrocortisone hemorrhoidal Suppository 1 Suppository(s) Rectal at bedtime  lidocaine   4% Patch 1 Patch Transdermal daily  midodrine 10 milliGRAM(s) Oral every 8 hours  norepinephrine Infusion 0.02 MICROgram(s)/kG/Min IV Continuous <Continuous>  pantoprazole  Injectable 40 milliGRAM(s) IV Push daily  piperacillin/tazobactam IVPB.. 3.375 Gram(s) IV Intermittent every 8 hours  predniSONE   Tablet 40 milliGRAM(s) Oral daily  vancomycin    Solution 125 milliGRAM(s) Oral at bedtime    PRN MEDICATIONS  acetaminophen     Tablet .. 650 milliGRAM(s) Oral every 6 hours PRN  oxycodone    5 mG/acetaminophen 325 mG 1 Tablet(s) Oral every 12 hours PRN    VITALS:   T(F): 97.5  HR: 67  BP: 101/71  RR: 22  SpO2: 94%    LABS:                        6.9    19.54 )-----------( 198      ( 02 Apr 2025 01:15 )             22.5     04-01    147[H]  |  97[L]  |  23[H]  ----------------------------<  134[H]  4.2   |  30  |  0.7    Ca    7.8[L]      01 Apr 2025 05:42        Urinalysis Basic - ( 01 Apr 2025 05:42 )    Color: x / Appearance: x / SG: x / pH: x  Gluc: 134 mg/dL / Ketone: x  / Bili: x / Urobili: x   Blood: x / Protein: x / Nitrite: x   Leuk Esterase: x / RBC: x / WBC x   Sq Epi: x / Non Sq Epi: x / Bacteria: x                RADIOLOGY:    PHYSICAL EXAM:  GENERAL: NAD  ENMT: No tonsillar erythema, exudates  NERVOUS SYSTEM:  Alert & Oriented X2-3  CHEST/LUNG: Clear to auscultation bilaterally  HEART: Regular rate and rhythm  ABDOMEN: Soft, Nontender  EXTREMITIES:  + Peripheral Pulses, Left thigh hematoma

## 2025-04-02 NOTE — CHART NOTE - NSCHARTNOTEFT_GEN_A_CORE
Registered Dietitian Follow-Up     Patient Profile Reviewed                           Yes [x]   No []     Nutrition History Previously Obtained        Yes [x]  No []       Pertinent Subjective Information: Pt has a good appetite; consumed >75% of meals provided in-house. Pt did not drink Ensure Plus HP yet at time of visit this morning. Tolerating diet texture/consistency well. No nausea or vomiting reported.      Pertinent Medical Information:   Per MD note on :  # Acute Hypoxic Hypercapnic Respiratory Failure likely secondary to CAP- Resolved  # COPD on 3L NC  # left thigh Hematoma with contrast extravasation  - S/p embolization of branch of the deep left femoral artery  # Mild hypernatremia  # AFib on Eliquis  # HFpEF (TTE 2021 EF 63%, GIIIDD)     Diet order: Diet, DASH/TLC:   Sodium & Cholesterol Restricted  Supplement Feeding Modality:  Oral  Ensure Plus High Protein Cans or Servings Per Day:  1       Frequency:  Daily (25 @ 05:00) [Active]    Anthropometrics:  Height (cm): 170.2 (25 @ 18:17)  Weight (kg): 85 (25 @ 22:40)  BMI (kg/m2): 29.3 (25 @ 22:40)  IBW: 61.4 KG    Daily Weight in k.6 () -- please note pt with edema $+ to left thigh; weight fluctuations likely d/t fluid shifts    MEDICATIONS  (STANDING):  albuterol/ipratropium for Nebulization 3 milliLiter(s) Nebulizer every 6 hours  chlorhexidine 2% Cloths 1 Application(s) Topical daily  dextrose 5%. 1000 milliLiter(s) (50 mL/Hr) IV Continuous <Continuous>  fluticasone propionate/ salmeterol 100-50 MICROgram(s) Diskus 1 Dose(s) Inhalation two times a day  hydrocortisone hemorrhoidal Suppository 1 Suppository(s) Rectal at bedtime  lidocaine   4% Patch 1 Patch Transdermal daily  midodrine 10 milliGRAM(s) Oral every 8 hours  norepinephrine Infusion 0.02 MICROgram(s)/kG/Min (2.72 mL/Hr) IV Continuous <Continuous>  pantoprazole  Injectable 40 milliGRAM(s) IV Push daily  piperacillin/tazobactam IVPB.. 3.375 Gram(s) IV Intermittent every 8 hours  predniSONE   Tablet 40 milliGRAM(s) Oral daily  vancomycin    Solution 125 milliGRAM(s) Oral at bedtime    MEDICATIONS  (PRN):  acetaminophen     Tablet .. 650 milliGRAM(s) Oral every 6 hours PRN Temp greater or equal to 38C (100.4F), Mild Pain (1 - 3)  oxycodone    5 mG/acetaminophen 325 mG 1 Tablet(s) Oral every 12 hours PRN Severe Pain (7 - 10)    Pertinent Labs:  @ 05:42 - Na 147 (L). Chloride 97 (L); BUN 23 (H);  (H); Ca 7.8 (L)    Physical Findings:  - Appearance: confused per flow sheet   - GI function: last BM on   - Tubes: n/a   - Oral/Mouth cavity: regular with thin liquids  - Skin: unstageable to sacrum - confirmed by wound care RN note on 3/26  - Edema: edema 4+ to left thigh     Nutrition Requirements:  Weight Used: 72.6 KG -- with consideration for age, BMI, unstageable PI, acuity of illness     Estimated Energy Needs    Continue [x]  Adjust []  Energy: 1716-2145kcal/day (using MSJ 1.2-1.5AF)     Estimated Protein Needs    Continue [x]  Adjust []  Protein: 88-110g/day (using 1.2-1.5g/kg)     Estimated Fluid Needs        Continue [x]  Adjust []  Fluid: 1mL/kcal/day    [x] Previous Nutrition Diagnosis: Increased Nutrient Needs            [x] Ongoing          [] Resolved     Nutrition Education: Deferred      Goal/Expected Outcome: Continue to meet >75% of estimated energy/protein needs within 5-7 days     Indicator/Monitoring: Diet order, PO intake, weights, labs, NFPF, body composition, BM and tolerance to medical food supplements    Recommendation:  1. Continue with current diet order and supplement (Ensure Plus HP 1x -- provides 350 kcal, 20g pro total)  2. Encourage PO intake     Moderate risk f/u    RD to remain available: Clara Bagley [phone] or KEITH

## 2025-04-03 LAB
ANION GAP SERPL CALC-SCNC: 2 MMOL/L — LOW (ref 7–14)
BASOPHILS # BLD AUTO: 0.02 K/UL — SIGNIFICANT CHANGE UP (ref 0–0.2)
BASOPHILS # BLD AUTO: 0.03 K/UL — SIGNIFICANT CHANGE UP (ref 0–0.2)
BASOPHILS NFR BLD AUTO: 0.1 % — SIGNIFICANT CHANGE UP (ref 0–1)
BASOPHILS NFR BLD AUTO: 0.2 % — SIGNIFICANT CHANGE UP (ref 0–1)
BUN SERPL-MCNC: 13 MG/DL — SIGNIFICANT CHANGE UP (ref 10–20)
CALCIUM SERPL-MCNC: 8.4 MG/DL — SIGNIFICANT CHANGE UP (ref 8.4–10.5)
CHLORIDE SERPL-SCNC: 106 MMOL/L — SIGNIFICANT CHANGE UP (ref 98–110)
CO2 SERPL-SCNC: 30 MMOL/L — SIGNIFICANT CHANGE UP (ref 17–32)
CREAT SERPL-MCNC: 0.7 MG/DL — SIGNIFICANT CHANGE UP (ref 0.7–1.5)
CULTURE RESULTS: SIGNIFICANT CHANGE UP
EGFR: 91 ML/MIN/1.73M2 — SIGNIFICANT CHANGE UP
EGFR: 91 ML/MIN/1.73M2 — SIGNIFICANT CHANGE UP
EOSINOPHIL # BLD AUTO: 0.01 K/UL — SIGNIFICANT CHANGE UP (ref 0–0.7)
EOSINOPHIL # BLD AUTO: 0.04 K/UL — SIGNIFICANT CHANGE UP (ref 0–0.7)
EOSINOPHIL NFR BLD AUTO: 0.1 % — SIGNIFICANT CHANGE UP (ref 0–8)
EOSINOPHIL NFR BLD AUTO: 0.3 % — SIGNIFICANT CHANGE UP (ref 0–8)
GLUCOSE SERPL-MCNC: 86 MG/DL — SIGNIFICANT CHANGE UP (ref 70–99)
HCT VFR BLD CALC: 24.9 % — LOW (ref 37–47)
HCT VFR BLD CALC: 26.2 % — LOW (ref 37–47)
HGB BLD-MCNC: 7.6 G/DL — LOW (ref 12–16)
HGB BLD-MCNC: 7.8 G/DL — LOW (ref 12–16)
IMM GRANULOCYTES NFR BLD AUTO: 1.1 % — HIGH (ref 0.1–0.3)
IMM GRANULOCYTES NFR BLD AUTO: 1.3 % — HIGH (ref 0.1–0.3)
LYMPHOCYTES # BLD AUTO: 0.96 K/UL — LOW (ref 1.2–3.4)
LYMPHOCYTES # BLD AUTO: 1.27 K/UL — SIGNIFICANT CHANGE UP (ref 1.2–3.4)
LYMPHOCYTES # BLD AUTO: 5.8 % — LOW (ref 20.5–51.1)
LYMPHOCYTES # BLD AUTO: 8 % — LOW (ref 20.5–51.1)
MCHC RBC-ENTMCNC: 25.9 PG — LOW (ref 27–31)
MCHC RBC-ENTMCNC: 26 PG — LOW (ref 27–31)
MCHC RBC-ENTMCNC: 29.8 G/DL — LOW (ref 32–37)
MCHC RBC-ENTMCNC: 30.5 G/DL — LOW (ref 32–37)
MCV RBC AUTO: 85 FL — SIGNIFICANT CHANGE UP (ref 81–99)
MCV RBC AUTO: 87.3 FL — SIGNIFICANT CHANGE UP (ref 81–99)
MONOCYTES # BLD AUTO: 1.03 K/UL — HIGH (ref 0.1–0.6)
MONOCYTES # BLD AUTO: 1.12 K/UL — HIGH (ref 0.1–0.6)
MONOCYTES NFR BLD AUTO: 6.2 % — SIGNIFICANT CHANGE UP (ref 1.7–9.3)
MONOCYTES NFR BLD AUTO: 7.1 % — SIGNIFICANT CHANGE UP (ref 1.7–9.3)
NEUTROPHILS # BLD AUTO: 13.15 K/UL — HIGH (ref 1.4–6.5)
NEUTROPHILS # BLD AUTO: 14.38 K/UL — HIGH (ref 1.4–6.5)
NEUTROPHILS NFR BLD AUTO: 83.2 % — HIGH (ref 42.2–75.2)
NEUTROPHILS NFR BLD AUTO: 86.6 % — HIGH (ref 42.2–75.2)
NRBC BLD AUTO-RTO: 1 /100 WBCS — HIGH (ref 0–0)
NRBC BLD AUTO-RTO: 2 /100 WBCS — HIGH (ref 0–0)
PLATELET # BLD AUTO: 247 K/UL — SIGNIFICANT CHANGE UP (ref 130–400)
PLATELET # BLD AUTO: 272 K/UL — SIGNIFICANT CHANGE UP (ref 130–400)
PMV BLD: 10.3 FL — SIGNIFICANT CHANGE UP (ref 7.4–10.4)
PMV BLD: 10.5 FL — HIGH (ref 7.4–10.4)
POTASSIUM SERPL-MCNC: 4.6 MMOL/L — SIGNIFICANT CHANGE UP (ref 3.5–5)
POTASSIUM SERPL-SCNC: 4.6 MMOL/L — SIGNIFICANT CHANGE UP (ref 3.5–5)
RBC # BLD: 2.93 M/UL — LOW (ref 4.2–5.4)
RBC # BLD: 3 M/UL — LOW (ref 4.2–5.4)
RBC # FLD: 24.6 % — HIGH (ref 11.5–14.5)
RBC # FLD: 24.9 % — HIGH (ref 11.5–14.5)
SODIUM SERPL-SCNC: 138 MMOL/L — SIGNIFICANT CHANGE UP (ref 135–146)
SPECIMEN SOURCE: SIGNIFICANT CHANGE UP
WBC # BLD: 15.81 K/UL — HIGH (ref 4.8–10.8)
WBC # BLD: 16.59 K/UL — HIGH (ref 4.8–10.8)
WBC # FLD AUTO: 15.81 K/UL — HIGH (ref 4.8–10.8)
WBC # FLD AUTO: 16.59 K/UL — HIGH (ref 4.8–10.8)

## 2025-04-03 PROCEDURE — 99233 SBSQ HOSP IP/OBS HIGH 50: CPT

## 2025-04-03 PROCEDURE — 70546 MR ANGIOGRAPH HEAD W/O&W/DYE: CPT | Mod: 26

## 2025-04-03 PROCEDURE — 70551 MRI BRAIN STEM W/O DYE: CPT | Mod: 26,59

## 2025-04-03 RX ADMIN — Medication 40 MILLIGRAM(S): at 11:41

## 2025-04-03 RX ADMIN — MIDODRINE HYDROCHLORIDE 10 MILLIGRAM(S): 5 TABLET ORAL at 06:09

## 2025-04-03 RX ADMIN — HYDROCORTISONE 1 SUPPOSITORY(S): 10 CREAM TOPICAL at 21:24

## 2025-04-03 RX ADMIN — Medication 25 GRAM(S): at 21:25

## 2025-04-03 RX ADMIN — MIDODRINE HYDROCHLORIDE 10 MILLIGRAM(S): 5 TABLET ORAL at 14:14

## 2025-04-03 RX ADMIN — Medication 125 MILLIGRAM(S): at 21:26

## 2025-04-03 RX ADMIN — MIDODRINE HYDROCHLORIDE 10 MILLIGRAM(S): 5 TABLET ORAL at 21:25

## 2025-04-03 RX ADMIN — IPRATROPIUM BROMIDE AND ALBUTEROL SULFATE 3 MILLILITER(S): .5; 2.5 SOLUTION RESPIRATORY (INHALATION) at 14:04

## 2025-04-03 RX ADMIN — PREDNISONE 40 MILLIGRAM(S): 20 TABLET ORAL at 06:09

## 2025-04-03 RX ADMIN — LIDOCAINE HYDROCHLORIDE 1 PATCH: 20 JELLY TOPICAL at 19:35

## 2025-04-03 RX ADMIN — Medication 25 GRAM(S): at 06:08

## 2025-04-03 RX ADMIN — Medication 25 GRAM(S): at 14:14

## 2025-04-03 RX ADMIN — LIDOCAINE HYDROCHLORIDE 1 PATCH: 20 JELLY TOPICAL at 22:36

## 2025-04-03 RX ADMIN — IPRATROPIUM BROMIDE AND ALBUTEROL SULFATE 3 MILLILITER(S): .5; 2.5 SOLUTION RESPIRATORY (INHALATION) at 08:58

## 2025-04-03 RX ADMIN — Medication 1 APPLICATION(S): at 11:42

## 2025-04-03 RX ADMIN — IPRATROPIUM BROMIDE AND ALBUTEROL SULFATE 3 MILLILITER(S): .5; 2.5 SOLUTION RESPIRATORY (INHALATION) at 01:30

## 2025-04-03 RX ADMIN — LIDOCAINE HYDROCHLORIDE 1 PATCH: 20 JELLY TOPICAL at 11:39

## 2025-04-03 RX ADMIN — IPRATROPIUM BROMIDE AND ALBUTEROL SULFATE 3 MILLILITER(S): .5; 2.5 SOLUTION RESPIRATORY (INHALATION) at 20:20

## 2025-04-03 NOTE — PROGRESS NOTE ADULT - ATTENDING COMMENTS
Interval history: Pt seen and examined at bedside. No cp or sob.   Vital Signs (24 Hrs):  T(C): 36.4 (04-02-25 @ 08:00), Max: 36.7 (04-01-25 @ 20:00)  HR: 67 (04-02-25 @ 08:00) (62 - 80)  BP: 101/71 (04-02-25 @ 08:00) (80/47 - 114/57)  RR: 22 (04-02-25 @ 08:00) (10 - 218)  SpO2: 100% (04-02-25 @ 08:00) (86% - 100%)  Wt(kg): --  Daily     Daily     I&O's Summary    01 Apr 2025 07:01  -  02 Apr 2025 07:00  --------------------------------------------------------  IN: 3352.8 mL / OUT: 1800 mL / NET: 1552.8 mL    02 Apr 2025 07:01  -  02 Apr 2025 11:20  --------------------------------------------------------  IN: 490 mL / OUT: 0 mL / NET: 490 mL      PHYSICAL EXAM:  GENERAL: NAD, well-developed  HEAD:  Atraumatic, Normocephalic  EYES: EOMI, PERRLA, conjunctiva and sclera clear  NECK: Supple, No JVD  CHEST/LUNG: Clear to auscultation bilaterally; No wheeze  HEART: Regular rate and rhythm; No murmurs, rubs, or gallops  ABDOMEN: Soft, Nontender, Nondistended; Bowel sounds present  EXTREMITIES:  2+ Peripheral Pulses, No clubbing, cyanosis, or edema, left leg hematoma  PSYCH: AAOx2-3  NEUROLOGY: non-focal  SKIN: No rashes or lesions  Labs reviewed  Imaging reviewed independently and reviewed read      Plan  74 year old F with PMHx of COPD on 3L NC, smoking hx, AFib on Eliquis, past hx of abdominal abscess (in 2021), Gastric Bypass surgery 20+ years ago, who presented to the ED from [facility] on 3/24 with CC of hypoxia and change in mental status. As per ED note, patient was calling for help at her NH this morning. She was noted to be saturating 80% while on her baseline 3L NC; was placed on non-rebreather and brought to ED. Patient initially admitted to MICU for acute hypoxic hypercapnic respiratory failure. Code status was confirmed to be DNR/DNI with MOLST form faxed from [facility]. Patient subsequently downgraded to SDU.     Course in SICU complicated by Left thigh hematoma requiring intervention by IR    #Acute Hypoxic Hypercapnic Respiratory Failure likely secondary to CAP- Resolved  #COPD on 3L NC  #Smoking Hx  - ON NC now at 4L  - ct chest c/w multifocal pna  - blood gas acute hypercapnia, resolved s/p bipap, pt refuses bipap at night  - MRSA and RVP neg   - Procal 0.02  - BCx 3/24 neg   - hypernatremia>>>encourage PO intake, met alklaosis; hold lasix; home dose 40 po  - tte with preserved ef  - ID eval- C/w Abx zosyn ED 4/4/25  - PO pred from today  - advair, duoneb q6.  - S/p 2 units of PRBC and Hb not corrected, will send for urgent repeat CTA and CT abd Non, F/u Hb at 11am and 8PM  - IVC was 2.2cm and non collapsible, cannot receive more fluids    #left thigh Hematoma with contrast extravasation  - S/p embolization of branch of the deep left femoral artery  - bleeding branch was treated with ~0.1  cc of gelfoam (2.5mm pledgets), followed by deployment of two low profile detachable Holley microcoils (2mm x 2 cm and 2 mm x 4 cm), with subsequent hemostasis and preservation of flow to neighboring branches.  - S/p 2 units of PRBC and Hb not corrected, will send for urgent repeat CTA and CT abd Non, F/u Hb at 11am and 8PM  - Currently off levo    #Progress Note Handoff  Pending (specify):  Urgent CTA abd and pelvic, dropping hbg , cbc   Family discussion: house staff updated pt family  Disposition: sdu, dw CC   Decision to admit the pt is based on acuity as above

## 2025-04-03 NOTE — PROGRESS NOTE ADULT - SUBJECTIVE AND OBJECTIVE BOX
Patient is a 74y old  Female who presents with a chief complaint of AMS (03-28-25)      Pt seen and examined at bedside. No CP or SOB.          PAST MEDICAL & SURGICAL HISTORY:  Atrial fibrillation    History of COPD    No significant past surgical history        VITAL SIGNS (Last 24 hrs):  T(C): 35.8 (04-03-25 @ 00:00), Max: 36.4 (04-02-25 @ 08:00)  HR: 67 (04-03-25 @ 00:00) (63 - 74)  BP: 90/57 (04-03-25 @ 00:25) (89/54 - 106/52)  RR: 26 (04-03-25 @ 00:00) (18 - 30)  SpO2: 97% (04-03-25 @ 00:00) (92% - 100%)  Wt(kg): --  Daily     Daily     I&O's Summary    01 Apr 2025 07:01  -  02 Apr 2025 07:00  --------------------------------------------------------  IN: 3352.8 mL / OUT: 1800 mL / NET: 1552.8 mL    02 Apr 2025 07:01  -  03 Apr 2025 05:02  --------------------------------------------------------  IN: 810 mL / OUT: 1300 mL / NET: -490 mL        PHYSICAL EXAM:  GENERAL: NAD, well-developed  HEAD:  Atraumatic, Normocephalic  EYES: EOMI, PERRLA, conjunctiva and sclera clear  NECK: Supple, No JVD  CHEST/LUNG: Clear to auscultation bilaterally; No wheeze  HEART: Regular rate and rhythm; No murmurs, rubs, or gallops  ABDOMEN: Soft, Nontender, Nondistended; Bowel sounds present  EXTREMITIES:  2+ Peripheral Pulses, No clubbing, cyanosis, or edema  PSYCH: AAOx3  NEUROLOGY: non-focal  SKIN: No rashes or lesions    Labs Reviewed  Spoke to patient in regards to abnormal labs.    CBC Full  -  ( 02 Apr 2025 11:54 )  WBC Count : 19.86 K/uL  Hemoglobin : 7.8 g/dL  Hematocrit : 25.4 %  Platelet Count - Automated : 215 K/uL  Mean Cell Volume : 83.6 fL  Mean Cell Hemoglobin : 25.7 pg  Mean Cell Hemoglobin Concentration : 30.7 g/dL  Auto Neutrophil # : x  Auto Lymphocyte # : x  Auto Monocyte # : x  Auto Eosinophil # : x  Auto Basophil # : x  Auto Neutrophil % : x  Auto Lymphocyte % : x  Auto Monocyte % : x  Auto Eosinophil % : x  Auto Basophil % : x    BMP:    04-02 @ 11:54    Blood Urea Nitrogen - 14  Calcium - 7.7  Carbond Dioxide - 29  Chloride - 96  Creatinine - 0.6  Glucose - 176  Potassium - 4.6  Sodium - 136      Hemoglobin A1c -     Urine Culture:  03-29 @ 12:12 Urine culture: --    Culture Results:   No growth at 4 days  Method Type: --  Organism: --  Organism Identification: --  Specimen Source: Blood None        COVID Labs  CRP:    Procalcitonin: <0.02 ng/mL (03-24-25 @ 15:45)    D-Dimer:            Imaging reviewed independently and reviewed read  < from: CT Angio Lower Extremity w/ IV Cont, Left (04.02.25 @ 14:56) >  IMPRESSION:    The hematoma involving the left anteromedial thigh musculature is   essentially unchanged, with the hematoma measuring 19.0 x 12.1 x 19.6 cm   (AP/Trans/CC)    There is no evidence of expansion of the hematoma. There is no evidence   of active extravasation.    < end of copied text >    < from: CT Abdomen and Pelvis No Cont (04.02.25 @ 14:51) >  IMPRESSION:    1. Since March 29, 2025, no evidence of intraperitoneal or   retroperitoneal hemorrhage; new trace perisplenic ascites.  2. Partially imaged left anteromedial thigh musculature hematoma is   overall unchanged since reference exam of March 31, 2025 and better   evaluated on concurrently performed left lower extremity CT.  3. Increased trace bilateral pleural effusions with bibasilar   consolidated opacities/atelectasis, right greater than left.  4. Remainder of findings are overall unchanged on this short-term   follow-up exam.    < end of copied text >      MEDICATIONS  (STANDING):  albuterol/ipratropium for Nebulization 3 milliLiter(s) Nebulizer every 6 hours  chlorhexidine 2% Cloths 1 Application(s) Topical daily  fluticasone propionate/ salmeterol 100-50 MICROgram(s) Diskus 1 Dose(s) Inhalation two times a day  hydrocortisone hemorrhoidal Suppository 1 Suppository(s) Rectal at bedtime  lactated ringers. 1000 milliLiter(s) (50 mL/Hr) IV Continuous <Continuous>  lidocaine   4% Patch 1 Patch Transdermal daily  midodrine 10 milliGRAM(s) Oral every 8 hours  norepinephrine Infusion 0.02 MICROgram(s)/kG/Min (2.72 mL/Hr) IV Continuous <Continuous>  pantoprazole  Injectable 40 milliGRAM(s) IV Push daily  piperacillin/tazobactam IVPB.. 3.375 Gram(s) IV Intermittent every 8 hours  predniSONE   Tablet 40 milliGRAM(s) Oral daily  vancomycin    Solution 125 milliGRAM(s) Oral at bedtime    MEDICATIONS  (PRN):  acetaminophen     Tablet .. 650 milliGRAM(s) Oral every 6 hours PRN Temp greater or equal to 38C (100.4F), Mild Pain (1 - 3)  oxycodone    5 mG/acetaminophen 325 mG 1 Tablet(s) Oral every 12 hours PRN Severe Pain (7 - 10)       Patient is a 74y old  Female who presents with a chief complaint of AMS (03-28-25)      Pt seen and examined at bedside. No CP or SOB.          PAST MEDICAL & SURGICAL HISTORY:  Atrial fibrillation    History of COPD    No significant past surgical history        Vital Signs (24 Hrs):  T(C): 36.2 (04-03-25 @ 08:00), Max: 36.3 (04-02-25 @ 20:00)  HR: 60 (04-03-25 @ 08:10) (54 - 74)  BP: 92/46 (04-03-25 @ 08:10) (84/42 - 106/52)  RR: 20 (04-03-25 @ 08:10) (18 - 26)  SpO2: 100% (04-03-25 @ 08:10) (96% - 100%)  Wt(kg): --  Daily     Daily     I&O's Summary    02 Apr 2025 07:01  -  03 Apr 2025 07:00  --------------------------------------------------------  IN: 810 mL / OUT: 2000 mL / NET: -1190 mL    03 Apr 2025 07:01  -  03 Apr 2025 10:33  --------------------------------------------------------  IN: 0 mL / OUT: 0 mL / NET: 0 mL          PHYSICAL EXAM:  GENERAL: NAD, well-developed  HEAD:  Atraumatic, Normocephalic  EYES: EOMI, PERRLA, conjunctiva and sclera clear  NECK: Supple, No JVD  CHEST/LUNG: Clear to auscultation bilaterally; No wheeze  HEART: Regular rate and rhythm; No murmurs, rubs, or gallops  ABDOMEN: Soft, Nontender, Nondistended; Bowel sounds present  EXTREMITIES:  2+ Peripheral Pulses, No clubbing, cyanosis, or edema, left lower ext hematoma  PSYCH: AAOx3  NEUROLOGY: non-focal  SKIN: No rashes or lesions          Imaging reviewed independently and reviewed read  < from: CT Angio Lower Extremity w/ IV Cont, Left (04.02.25 @ 14:56) >  IMPRESSION:    The hematoma involving the left anteromedial thigh musculature is   essentially unchanged, with the hematoma measuring 19.0 x 12.1 x 19.6 cm   (AP/Trans/CC)    There is no evidence of expansion of the hematoma. There is no evidence   of active extravasation.    < end of copied text >    < from: CT Abdomen and Pelvis No Cont (04.02.25 @ 14:51) >  IMPRESSION:    1. Since March 29, 2025, no evidence of intraperitoneal or   retroperitoneal hemorrhage; new trace perisplenic ascites.  2. Partially imaged left anteromedial thigh musculature hematoma is   overall unchanged since reference exam of March 31, 2025 and better   evaluated on concurrently performed left lower extremity CT.  3. Increased trace bilateral pleural effusions with bibasilar   consolidated opacities/atelectasis, right greater than left.  4. Remainder of findings are overall unchanged on this short-term   follow-up exam.    < end of copied text >      MEDICATIONS  (STANDING):  albuterol/ipratropium for Nebulization 3 milliLiter(s) Nebulizer every 6 hours  chlorhexidine 2% Cloths 1 Application(s) Topical daily  fluticasone propionate/ salmeterol 100-50 MICROgram(s) Diskus 1 Dose(s) Inhalation two times a day  hydrocortisone hemorrhoidal Suppository 1 Suppository(s) Rectal at bedtime  lactated ringers. 1000 milliLiter(s) (50 mL/Hr) IV Continuous <Continuous>  lidocaine   4% Patch 1 Patch Transdermal daily  midodrine 10 milliGRAM(s) Oral every 8 hours  norepinephrine Infusion 0.02 MICROgram(s)/kG/Min (2.72 mL/Hr) IV Continuous <Continuous>  pantoprazole  Injectable 40 milliGRAM(s) IV Push daily  piperacillin/tazobactam IVPB.. 3.375 Gram(s) IV Intermittent every 8 hours  predniSONE   Tablet 40 milliGRAM(s) Oral daily  vancomycin    Solution 125 milliGRAM(s) Oral at bedtime    MEDICATIONS  (PRN):  acetaminophen     Tablet .. 650 milliGRAM(s) Oral every 6 hours PRN Temp greater or equal to 38C (100.4F), Mild Pain (1 - 3)  oxycodone    5 mG/acetaminophen 325 mG 1 Tablet(s) Oral every 12 hours PRN Severe Pain (7 - 10)

## 2025-04-03 NOTE — PROGRESS NOTE ADULT - NS ATTEST RISK PROBLEM GEN_ALL_CORE FT
droping hgb, sdu, dw cc, labs reviewed, labs ordered, ct scan ordered , imaging reviewed droping hgb, sdu-->DGTF, dw cc, labs reviewed, labs ordered, ct scan ordered , imaging reviewed droping hgb, sdu-->DGTF, dw cc, labs reviewed, labs ordered, MRI ordered , imaging reviewed

## 2025-04-03 NOTE — PROGRESS NOTE ADULT - ASSESSMENT
74 year old F with PMHx of COPD on 3L NC, smoking hx, AFib on Eliquis, past hx of abdominal abscess (in 2021), Gastric Bypass surgery 20+ years ago, who presented to the ED from [facility] on 3/24 with CC of hypoxia and change in mental status. As per ED note, patient was calling for help at her NH this morning. She was noted to be saturating 80% while on her baseline 3L NC; was placed on non-rebreather and brought to ED. Patient initially admitted to MICU for acute hypoxic hypercapnic respiratory failure. Code status was confirmed to be DNR/DNI with MOLST form faxed from [facility]. Patient subsequently downgraded to SDU.     Course in SICU complicated by Left thigh hematoma requiring intervention by IR    #Acute Hypoxic Hypercapnic Respiratory Failure likely secondary to CAP- Resolved  #COPD on 3L NC  #Smoking Hx  - ON NC now at 4L  - ct chest c/w multifocal pneumonia  - blood gas acute hypercapnia, resolved s/p bipap, pt refuses bipap at night  - MRSA and RVP neg   - Procal 0.02  - BCx 3/24 neg   - hypernatremia>>>encourage PO intake, met alklaosis; hold lasix; home dose 40 po  - tte with preserved ef  - ID eval- C/w Abx zosyn ED 4/4/25  - PO pred from today  - advair, duoneb q6.  -4/2-  S/p 2 units of PRBC and Hb not corrected, will send for urgent repeat CTA and CT abd Non, F/u Hb at 11am and 8PM,  IVC was 2.2cm and non collapsible, cannot receive more fluids  - 4/3- CTA hemtoma stable unchanged--- Hgb today****    #left thigh Hematoma with contrast extravasation  - S/p embolization of branch of the deep left femoral artery  - bleeding branch was treated with ~0.1  cc of gelfoam (2.5mm pledgets), followed by deployment of two low profile detachable Holley microcoils (2mm x 2 cm and 2 mm x 4 cm), with subsequent hemostasis and preservation of flow to neighboring branches.  - S/p 2 units of PRBC and Hb not corrected, will send for urgent repeat CTA and CT abd Non, F/u Hb at 11am and 8PM  - Currently off levo  - 4/3- - 4/3- CTA hemtoma stable unchanged--- Hgb today****    #hypernatremia- resolved  - Na improved  s/p D5  - Monitor    #mild generalized edema on CTH  - cth with mild cerebral edema  - repeat cth unchanged  - MRA/MRV pending until can be off pressors, MRI checklist not needed as was cleared by radiology.   - veeg no interictal activity  - f/u neuro.    #paroxysmal AFib on Eliquis  #chronic HFpEF (TTE 6/2021 EF 63%, GIIIDD)  #Bilateral Pleural Effusions  - hold for now resume tomorrow  - TTE: Normal EF  - on home med Lasix 40 mg qd   restart if BP appropriate   - Bilateral LE dopplex: No dvt    #Progress Note Handoff  Pending (specify):   trend hgb   Family discussion: house staff updated pt family  Disposition: jennifer CC---  Decision to admit the pt is based on acuity as above      74 year old F with PMHx of COPD on 3L NC, smoking hx, AFib on Eliquis, past hx of abdominal abscess (in 2021), Gastric Bypass surgery 20+ years ago, who presented to the ED from [facility] on 3/24 with CC of hypoxia and change in mental status. As per ED note, patient was calling for help at her NH this morning. She was noted to be saturating 80% while on her baseline 3L NC; was placed on non-rebreather and brought to ED. Patient initially admitted to MICU for acute hypoxic hypercapnic respiratory failure. Code status was confirmed to be DNR/DNI with MOLST form faxed from [facility]. Patient subsequently downgraded to SDU.     Course in SICU complicated by Left thigh hematoma requiring intervention by IR    #Acute Hypoxic Hypercapnic Respiratory Failure likely secondary to CAP- Resolved  #COPD on 3L NC  #Smoking Hx  - ON NC now at 4L  - ct chest c/w multifocal pneumonia  - blood gas acute hypercapnia, resolved s/p bipap, pt refuses bipap at night  - MRSA and RVP neg   - Procal 0.02  - BCx 3/24 neg   - hypernatremia>>>encourage PO intake, met alklaosis; hold lasix; home dose 40 po  - tte with preserved ef  - ID eval- C/w Abx zosyn ED 4/4/25  - PO pred from today  - advair, duoneb q6.  -4/2-  S/p 2 units of PRBC and Hb not corrected, will send for urgent repeat CTA and CT abd Non, F/u Hb at 11am and 8PM,  IVC was 2.2cm and non collapsible, cannot receive more fluids  - 4/3- CTA hemtoma stable unchanged--- Hgb today 7.6, cbc in pm    #left thigh Hematoma with contrast extravasation  - S/p embolization of branch of the deep left femoral artery  - bleeding branch was treated with ~0.1  cc of gelfoam (2.5mm pledgets), followed by deployment of two low profile detachable Holley microcoils (2mm x 2 cm and 2 mm x 4 cm), with subsequent hemostasis and preservation of flow to neighboring branches.  - S/p 2 units of PRBC and Hb not corrected, will send for urgent repeat CTA and CT abd Non, F/u Hb at 11am and 8PM  - Currently off levo  - 4/3- - 4/3- CTA hemtoma stable unchanged--- Hgb today 7.6    #hypernatremia- resolved  - Na improved  s/p D5  - Monitor    #mild generalized edema on CTH  - cth with mild cerebral edema  - repeat cth unchanged  - MRA/MRV pending until can be off pressors, MRI checklist not needed as was cleared by radiology.   - veeg no interictal activity  - f/u neuro.    #paroxysmal AFib on Eliquis  #chronic HFpEF (TTE 6/2021 EF 63%, GIIIDD)  #Bilateral Pleural Effusions  - hold for now resume tomorrow  - TTE: Normal EF  - on home med Lasix 40 mg qd   restart if BP appropriate   - Bilateral LE dopplex: No dvt    #Progress Note Handoff  Pending (specify):   trend hgb   Family discussion: house staff updated pt family  Disposition: jennifer CC--->DGTF  Decision to admit the pt is based on acuity as above      74 year old F with PMHx of COPD on 3L NC, smoking hx, AFib on Eliquis, past hx of abdominal abscess (in 2021), Gastric Bypass surgery 20+ years ago, who presented to the ED from [facility] on 3/24 with CC of hypoxia and change in mental status. As per ED note, patient was calling for help at her NH this morning. She was noted to be saturating 80% while on her baseline 3L NC; was placed on non-rebreather and brought to ED. Patient initially admitted to MICU for acute hypoxic hypercapnic respiratory failure. Code status was confirmed to be DNR/DNI with MOLST form faxed from [facility]. Patient subsequently downgraded to SDU.     Course in SICU complicated by Left thigh hematoma requiring intervention by IR    #Acute Hypoxic Hypercapnic Respiratory Failure likely secondary to CAP- Resolved  #COPD on 3L NC  #Smoking Hx  - ON NC now at 4L  - ct chest c/w multifocal pneumonia  - blood gas acute hypercapnia, resolved s/p bipap, pt refuses bipap at night  - MRSA and RVP neg   - Procal 0.02  - BCx 3/24 neg   - hypernatremia>>>encourage PO intake, met alklaosis; hold lasix; home dose 40 po  - tte with preserved ef  - ID eval- C/w Abx zosyn ED 4/4/25  - PO pred from today  - advair, duoneb q6.  -4/2-  S/p 2 units of PRBC and Hb not corrected, will send for urgent repeat CTA and CT abd Non, F/u Hb at 11am and 8PM,  IVC was 2.2cm and non collapsible, cannot receive more fluids  - 4/3- CTA hemtoma stable unchanged--- Hgb today 7.6, cbc in pm    #left thigh Hematoma with contrast extravasation  - S/p embolization of branch of the deep left femoral artery  - bleeding branch was treated with ~0.1  cc of gelfoam (2.5mm pledgets), followed by deployment of two low profile detachable Holley microcoils (2mm x 2 cm and 2 mm x 4 cm), with subsequent hemostasis and preservation of flow to neighboring branches.  - S/p 2 units of PRBC and Hb not corrected, will send for urgent repeat CTA and CT abd Non, F/u Hb at 11am and 8PM  - Currently off levo  - 4/3- - 4/3- CTA hemtoma stable unchanged--- Hgb today 7.6    #hypernatremia- resolved  - Na improved  s/p D5  - Monitor    #mild generalized edema on CTH  - cth with mild cerebral edema  - repeat cth unchanged  - MRA/MRV pending until can be off pressors, MRI checklist not needed as was cleared by radiology.   - veeg no interictal activity  - f/u neuro.    #paroxysmal AFib on Eliquis  #chronic HFpEF (TTE 6/2021 EF 63%, GIIIDD)  #Bilateral Pleural Effusions  - hold for now resume tomorrow  - TTE: Normal EF  - on home med Lasix 40 mg qd   restart if BP appropriate   - Bilateral LE dopplex: No dvt    #Progress Note Handoff  Pending (specify):   trend hgb   Family discussion: house staff updated pt family  Disposition: jennifer CC--->DGTF  Decision to admit the pt is based on acuity as above

## 2025-04-03 NOTE — PROGRESS NOTE ADULT - ASSESSMENT
IMPRESSION:    Acute on chronic hypoxemic / hypercapnic respiratory failure improved   COPD exacerbation improved  Left thigh hematoma SP IR embolization   Hypernatremia   Shock on Levophed.    HO COPD  HO A AFIB    PLAN:    CNS: Avoid CNS depressant.  Pain control if needed.      HEENT:  Oral care    PULMONARY:  HOB @ 45 degrees, NIV during sleep and PRN during the day.  Advair 250/50 and Spiriva.  Albuterol PRN     CARDIOVASCULAR: GDFR.  Avoid overload.  Target MAP > 60     GI: GI prophylaxis.  Feeding per speech.  Bowel regimen     RENAL:  F/u  lytes.  Correct as needed.  Monitor UO     INFECTIOUS DISEASE: Monitor VS.  Follow up cultures.  ID follow up noted.  Finish Zosyn course per ID     HEMATOLOGICAL:  DVT prophylaxis seq.  Continue Holding AC.  Monitor CBC and Coags.  Keep Hg > 7.  Repeat CTA noted       ENDOCRINE:  Follow up FS.  Insulin protocol if needed.    DNR/I    DGTF    DW team

## 2025-04-03 NOTE — CHART NOTE - NSCHARTNOTEFT_GEN_A_CORE
Transfer from SICU  Transfer to Floor      74 year old F with PMHx of COPD on 3L NC, smoking hx, AFib on Eliquis, past hx of abdominal abscess (in 2021), Gastric Bypass surgery 20+ years ago, who presented to the ED from [facility] on 3/24 with CC of hypoxia and change in mental status. As per ED note, patient was calling for help at her NH this morning. She was noted to be saturating 80% while on her baseline 3L NC; was placed on non-rebreather and brought to ED. Patient initially admitted to MICU for acute hypoxic hypercapnic respiratory failure. Code status was confirmed to be DNR/DNI with MOLST form faxed from [facility]. Patient subsequently downgraded to SDU.     24H events:    Patient is a 74y old Female who presents with a chief complaint of AMS (28 Mar 2025 06:08)    Primary diagnosis of Acute respiratory failure with hypercapnia       Today is hospital day 10d.      PAST MEDICAL & SURGICAL HISTORY  Atrial fibrillation    History of COPD    No significant past surgical history      SOCIAL HISTORY:  Negative for smoking/alcohol/drug use.     ALLERGIES:  No Known Allergies    MEDICATIONS:  STANDING MEDICATIONS  albuterol/ipratropium for Nebulization 3 milliLiter(s) Nebulizer every 6 hours  chlorhexidine 2% Cloths 1 Application(s) Topical daily  fluticasone propionate/ salmeterol 100-50 MICROgram(s) Diskus 1 Dose(s) Inhalation two times a day  hydrocortisone hemorrhoidal Suppository 1 Suppository(s) Rectal at bedtime  lactated ringers. 1000 milliLiter(s) IV Continuous <Continuous>  lidocaine   4% Patch 1 Patch Transdermal daily  midodrine 10 milliGRAM(s) Oral every 8 hours  norepinephrine Infusion 0.02 MICROgram(s)/kG/Min IV Continuous <Continuous>  pantoprazole  Injectable 40 milliGRAM(s) IV Push daily  piperacillin/tazobactam IVPB.. 3.375 Gram(s) IV Intermittent every 8 hours  predniSONE   Tablet 40 milliGRAM(s) Oral daily  vancomycin    Solution 125 milliGRAM(s) Oral at bedtime    PRN MEDICATIONS  acetaminophen     Tablet .. 650 milliGRAM(s) Oral every 6 hours PRN  oxycodone    5 mG/acetaminophen 325 mG 1 Tablet(s) Oral every 12 hours PRN    VITALS:   T(F): 97.1  HR: 54  BP: 84/42  RR: 21  SpO2: 97%    LABS:                        7.6    15.81 )-----------( 247      ( 03 Apr 2025 07:29 )             24.9     04-02    136  |  96[L]  |  14  ----------------------------<  176[H]  4.6   |  29  |  0.6[L]    Ca    7.7[L]      02 Apr 2025 11:54        Urinalysis Basic - ( 02 Apr 2025 11:54 )    Color: x / Appearance: x / SG: x / pH: x  Gluc: 176 mg/dL / Ketone: x  / Bili: x / Urobili: x   Blood: x / Protein: x / Nitrite: x   Leuk Esterase: x / RBC: x / WBC x   Sq Epi: x / Non Sq Epi: x / Bacteria: x                RADIOLOGY:    PHYSICAL EXAM:  GENERAL: NAD  NECK: Supple  NERVOUS SYSTEM:  Alert & Oriented X3  CHEST/LUNG: Clear to auscultation bilaterally  HEART: irregular rate and rhythm  ABDOMEN: Soft, Nontender  EXTREMITIES:  + Peripheral Pulses, Left thigh hematoma      SICU course: The pt SICU course was complicated by a left thigh hematoma on 3/29/25. Therapeutic lovenox was rversed with protamine and the pt had a CTA that showed active extravasation from a deep femoral artery branch. S/p embolization by IR. The pts Hb remeained stable and she underwent a post embolization CTA which showed no bleeding. The pts Hb remained stable for a day but he Hb dropped again on 4/1/25 and she required pressors and 2PRBCs. A repeat CTA showed no active bleed or retroperitoneal bleed. The pt was weaned off pressors and is stable for DGTF.    Plan  #Acute Hypoxic Hypercapnic Respiratory Failure likely secondary to CAP- Resolved  #COPD on 3L NC  #Smoking Hx  - ON NC now at 3L  - ct chest c/w multifocal pneumonia  - blood gas acute hypercapnia, resolved s/p bipap, pt refuses bipap at night  - MRSA and RVP neg   - Procal 0.02  - BCx 3/24 neg   - hypernatremia>>>encourage PO intake, met alklaosis; hold lasix; home dose 40 po  - tte with preserved ef  - ID eval- C/w Abx zosyn ED 4/4/25  - PO pred for total of 5 days  - advair, duoneb q6.    #left thigh Hematoma with contrast extravasation  - S/p embolization of branch of the deep left femoral artery  - bleeding branch was treated with ~0.1  cc of gelfoam (2.5mm pledgets), followed by deployment of two low profile detachable Holley microcoils (2mm x 2 cm and 2 mm x 4 cm), with subsequent hemostasis and preservation of flow to neighboring branches.  - Repeat Hb stable today  - Currently off levo, C/w midodrine    #Mild hypernatremia- resolved  - Na improved s/p D5    #mild generalized edema on CTH  - cth with mild cerebral edema  - repeat cth unchanged  - MRA/MRV pending, MRI checklist not needed as was cleared by radiology.   - veeg no interictal activity  - f/u neuro.    #paroxysmal AFib on Eliquis  #chronic HFpEF (TTE 6/2021 EF 63%, GIIIDD)  #Bilateral Pleural Effusions  - hold for now   - TTE: Normal EF  - on home med Lasix 40 mg qd, being held currently, Resume when appropriate  - Bilateral LE dopplex: No dvt  - Resume lovenox from tomorrow    TO DO- MRV, Neuro follow up, Therapeutic AC, PT    #DNR/DNI  #DVt prophlaxis : SCDS for now.

## 2025-04-03 NOTE — PROGRESS NOTE ADULT - SUBJECTIVE AND OBJECTIVE BOX
Patient is a 74y old  Female who presents with a chief complaint of AMS (28 Mar 2025 06:08)        Over Night Events:  Off pressors.  SP CTA.  On NC         ROS:     All ROS are negative except HPI         PHYSICAL EXAM    ICU Vital Signs Last 24 Hrs  T(C): 36.2 (03 Apr 2025 04:00), Max: 36.4 (02 Apr 2025 08:00)  T(F): 97.2 (03 Apr 2025 04:00), Max: 97.5 (02 Apr 2025 08:00)  HR: 64 (03 Apr 2025 04:00) (63 - 74)  BP: 99/55 (03 Apr 2025 04:00) (89/54 - 106/52)  BP(mean): 67 (03 Apr 2025 00:00) (67 - 81)  ABP: --  ABP(mean): --  RR: 22 (03 Apr 2025 04:00) (18 - 26)  SpO2: 96% (03 Apr 2025 04:00) (94% - 100%)    O2 Parameters below as of 03 Apr 2025 04:00  Patient On (Oxygen Delivery Method): nasal cannula  O2 Flow (L/min): 3          CONSTITUTIONAL:  NAD    ENT:   Airway patent,   Mouth with normal mucosa.       EYES:   Pupils equal,   Round and reactive to light.    CARDIAC:   Normal rate,   Regular rhythm.        RESPIRATORY:   No wheezing  Bilateral BS  Normal chest expansion  Not tachypneic,  No use of accessory muscles    GASTROINTESTINAL:  Abdomen soft,   Non-tender,   No guarding,   + BS    MUSCULOSKELETAL:   No clubbing, cyanosis    NEUROLOGICAL:   Alert   No motor  deficits.    SKIN:   Skin normal color for race,   Warm and dry  No evidence of rash.          04-01-25 @ 07:01  -  04-02-25 @ 07:00  --------------------------------------------------------  IN:    dextrose 5%: 650 mL    IV PiggyBack: 300 mL    Norepinephrine: 82.8 mL    Oral Fluid: 2000 mL    PRBCs (Packed Red Blood Cells): 320 mL  Total IN: 3352.8 mL    OUT:    Voided (mL): 1800 mL  Total OUT: 1800 mL    Total NET: 1552.8 mL      04-02-25 @ 07:01  -  04-03-25 @ 06:21  --------------------------------------------------------  IN:    dextrose 5%: 300 mL    Lactated Ringers: 150 mL    Oral Fluid: 360 mL  Total IN: 810 mL    OUT:    Norepinephrine: 0 mL    Voided (mL): 2000 mL  Total OUT: 2000 mL    Total NET: -1190 mL          LABS:                            7.8    19.86 )-----------( 215      ( 02 Apr 2025 11:54 )             25.4                                               04-02    136  |  96[L]  |  14  ----------------------------<  176[H]  4.6   |  29  |  0.6[L]    Ca    7.7[L]      02 Apr 2025 11:54                                               Urinalysis Basic - ( 02 Apr 2025 11:54 )    Color: x / Appearance: x / SG: x / pH: x  Gluc: 176 mg/dL / Ketone: x  / Bili: x / Urobili: x   Blood: x / Protein: x / Nitrite: x   Leuk Esterase: x / RBC: x / WBC x   Sq Epi: x / Non Sq Epi: x / Bacteria: x                                                                                                                                                                                MEDICATIONS  (STANDING):  albuterol/ipratropium for Nebulization 3 milliLiter(s) Nebulizer every 6 hours  chlorhexidine 2% Cloths 1 Application(s) Topical daily  fluticasone propionate/ salmeterol 100-50 MICROgram(s) Diskus 1 Dose(s) Inhalation two times a day  hydrocortisone hemorrhoidal Suppository 1 Suppository(s) Rectal at bedtime  lactated ringers. 1000 milliLiter(s) (50 mL/Hr) IV Continuous <Continuous>  lidocaine   4% Patch 1 Patch Transdermal daily  midodrine 10 milliGRAM(s) Oral every 8 hours  norepinephrine Infusion 0.02 MICROgram(s)/kG/Min (2.72 mL/Hr) IV Continuous <Continuous>  pantoprazole  Injectable 40 milliGRAM(s) IV Push daily  piperacillin/tazobactam IVPB.. 3.375 Gram(s) IV Intermittent every 8 hours  predniSONE   Tablet 40 milliGRAM(s) Oral daily  vancomycin    Solution 125 milliGRAM(s) Oral at bedtime    MEDICATIONS  (PRN):  acetaminophen     Tablet .. 650 milliGRAM(s) Oral every 6 hours PRN Temp greater or equal to 38C (100.4F), Mild Pain (1 - 3)  oxycodone    5 mG/acetaminophen 325 mG 1 Tablet(s) Oral every 12 hours PRN Severe Pain (7 - 10)      New X-rays reviewed:                                                                                  ECHO

## 2025-04-04 LAB
ANION GAP SERPL CALC-SCNC: 4 MMOL/L — LOW (ref 7–14)
BASOPHILS # BLD AUTO: 0.01 K/UL — SIGNIFICANT CHANGE UP (ref 0–0.2)
BASOPHILS # BLD AUTO: 0.02 K/UL — SIGNIFICANT CHANGE UP (ref 0–0.2)
BASOPHILS NFR BLD AUTO: 0.1 % — SIGNIFICANT CHANGE UP (ref 0–1)
BASOPHILS NFR BLD AUTO: 0.1 % — SIGNIFICANT CHANGE UP (ref 0–1)
BUN SERPL-MCNC: 15 MG/DL — SIGNIFICANT CHANGE UP (ref 10–20)
CALCIUM SERPL-MCNC: 7.9 MG/DL — LOW (ref 8.4–10.5)
CHLORIDE SERPL-SCNC: 102 MMOL/L — SIGNIFICANT CHANGE UP (ref 98–110)
CO2 SERPL-SCNC: 31 MMOL/L — SIGNIFICANT CHANGE UP (ref 17–32)
CREAT SERPL-MCNC: 0.6 MG/DL — LOW (ref 0.7–1.5)
EGFR: 94 ML/MIN/1.73M2 — SIGNIFICANT CHANGE UP
EGFR: 94 ML/MIN/1.73M2 — SIGNIFICANT CHANGE UP
EOSINOPHIL # BLD AUTO: 0.01 K/UL — SIGNIFICANT CHANGE UP (ref 0–0.7)
EOSINOPHIL # BLD AUTO: 0.02 K/UL — SIGNIFICANT CHANGE UP (ref 0–0.7)
EOSINOPHIL NFR BLD AUTO: 0.1 % — SIGNIFICANT CHANGE UP (ref 0–8)
EOSINOPHIL NFR BLD AUTO: 0.1 % — SIGNIFICANT CHANGE UP (ref 0–8)
GLUCOSE SERPL-MCNC: 103 MG/DL — HIGH (ref 70–99)
HCT VFR BLD CALC: 24.4 % — LOW (ref 37–47)
HCT VFR BLD CALC: 24.6 % — LOW (ref 37–47)
HGB BLD-MCNC: 7.3 G/DL — LOW (ref 12–16)
HGB BLD-MCNC: 7.3 G/DL — LOW (ref 12–16)
IMM GRANULOCYTES NFR BLD AUTO: 1.2 % — HIGH (ref 0.1–0.3)
IMM GRANULOCYTES NFR BLD AUTO: 1.2 % — HIGH (ref 0.1–0.3)
LACTATE SERPL-SCNC: 2.1 MMOL/L — HIGH (ref 0.7–2)
LACTATE SERPL-SCNC: 2.8 MMOL/L — HIGH (ref 0.7–2)
LYMPHOCYTES # BLD AUTO: 0.3 K/UL — LOW (ref 1.2–3.4)
LYMPHOCYTES # BLD AUTO: 0.64 K/UL — LOW (ref 1.2–3.4)
LYMPHOCYTES # BLD AUTO: 1.9 % — LOW (ref 20.5–51.1)
LYMPHOCYTES # BLD AUTO: 3.8 % — LOW (ref 20.5–51.1)
MCHC RBC-ENTMCNC: 26.1 PG — LOW (ref 27–31)
MCHC RBC-ENTMCNC: 26.3 PG — LOW (ref 27–31)
MCHC RBC-ENTMCNC: 29.7 G/DL — LOW (ref 32–37)
MCHC RBC-ENTMCNC: 29.9 G/DL — LOW (ref 32–37)
MCV RBC AUTO: 87.8 FL — SIGNIFICANT CHANGE UP (ref 81–99)
MCV RBC AUTO: 87.9 FL — SIGNIFICANT CHANGE UP (ref 81–99)
MONOCYTES # BLD AUTO: 0.29 K/UL — SIGNIFICANT CHANGE UP (ref 0.1–0.6)
MONOCYTES # BLD AUTO: 0.8 K/UL — HIGH (ref 0.1–0.6)
MONOCYTES NFR BLD AUTO: 1.8 % — SIGNIFICANT CHANGE UP (ref 1.7–9.3)
MONOCYTES NFR BLD AUTO: 4.8 % — SIGNIFICANT CHANGE UP (ref 1.7–9.3)
NEUTROPHILS # BLD AUTO: 15.16 K/UL — HIGH (ref 1.4–6.5)
NEUTROPHILS # BLD AUTO: 15.4 K/UL — HIGH (ref 1.4–6.5)
NEUTROPHILS NFR BLD AUTO: 90 % — HIGH (ref 42.2–75.2)
NEUTROPHILS NFR BLD AUTO: 94.9 % — HIGH (ref 42.2–75.2)
NRBC BLD AUTO-RTO: 0 /100 WBCS — SIGNIFICANT CHANGE UP (ref 0–0)
NRBC BLD AUTO-RTO: 1 /100 WBCS — HIGH (ref 0–0)
PLATELET # BLD AUTO: 268 K/UL — SIGNIFICANT CHANGE UP (ref 130–400)
PLATELET # BLD AUTO: 272 K/UL — SIGNIFICANT CHANGE UP (ref 130–400)
PMV BLD: 10.3 FL — SIGNIFICANT CHANGE UP (ref 7.4–10.4)
PMV BLD: 9.9 FL — SIGNIFICANT CHANGE UP (ref 7.4–10.4)
POTASSIUM SERPL-MCNC: 4.6 MMOL/L — SIGNIFICANT CHANGE UP (ref 3.5–5)
POTASSIUM SERPL-SCNC: 4.6 MMOL/L — SIGNIFICANT CHANGE UP (ref 3.5–5)
RBC # BLD: 2.78 M/UL — LOW (ref 4.2–5.4)
RBC # BLD: 2.8 M/UL — LOW (ref 4.2–5.4)
RBC # FLD: 24.9 % — HIGH (ref 11.5–14.5)
RBC # FLD: 24.9 % — HIGH (ref 11.5–14.5)
SODIUM SERPL-SCNC: 137 MMOL/L — SIGNIFICANT CHANGE UP (ref 135–146)
WBC # BLD: 16.21 K/UL — HIGH (ref 4.8–10.8)
WBC # BLD: 16.84 K/UL — HIGH (ref 4.8–10.8)
WBC # FLD AUTO: 16.21 K/UL — HIGH (ref 4.8–10.8)
WBC # FLD AUTO: 16.84 K/UL — HIGH (ref 4.8–10.8)

## 2025-04-04 PROCEDURE — 99233 SBSQ HOSP IP/OBS HIGH 50: CPT

## 2025-04-04 PROCEDURE — 93880 EXTRACRANIAL BILAT STUDY: CPT | Mod: 26

## 2025-04-04 RX ORDER — SODIUM CHLORIDE 9 G/1000ML
500 INJECTION, SOLUTION INTRAVENOUS
Refills: 0 | Status: COMPLETED | OUTPATIENT
Start: 2025-04-04 | End: 2025-04-04

## 2025-04-04 RX ORDER — NICOTINE POLACRILEX 4 MG/1
1 GUM, CHEWING ORAL DAILY
Refills: 0 | Status: DISCONTINUED | OUTPATIENT
Start: 2025-04-04 | End: 2025-04-09

## 2025-04-04 RX ORDER — SODIUM CHLORIDE 9 G/1000ML
500 INJECTION, SOLUTION INTRAVENOUS ONCE
Refills: 0 | Status: COMPLETED | OUTPATIENT
Start: 2025-04-04 | End: 2025-04-04

## 2025-04-04 RX ADMIN — SODIUM CHLORIDE 500 MILLILITER(S): 9 INJECTION, SOLUTION INTRAVENOUS at 13:04

## 2025-04-04 RX ADMIN — Medication 25 GRAM(S): at 14:10

## 2025-04-04 RX ADMIN — Medication 125 MILLIGRAM(S): at 21:28

## 2025-04-04 RX ADMIN — NICOTINE POLACRILEX 1 PATCH: 4 GUM, CHEWING ORAL at 11:12

## 2025-04-04 RX ADMIN — LIDOCAINE HYDROCHLORIDE 1 PATCH: 20 JELLY TOPICAL at 11:11

## 2025-04-04 RX ADMIN — MIDODRINE HYDROCHLORIDE 10 MILLIGRAM(S): 5 TABLET ORAL at 05:02

## 2025-04-04 RX ADMIN — SODIUM CHLORIDE 500 MILLILITER(S): 9 INJECTION, SOLUTION INTRAVENOUS at 22:31

## 2025-04-04 RX ADMIN — Medication 25 GRAM(S): at 21:30

## 2025-04-04 RX ADMIN — MIDODRINE HYDROCHLORIDE 10 MILLIGRAM(S): 5 TABLET ORAL at 11:32

## 2025-04-04 RX ADMIN — PREDNISONE 40 MILLIGRAM(S): 20 TABLET ORAL at 05:02

## 2025-04-04 RX ADMIN — Medication 40 MILLIGRAM(S): at 11:12

## 2025-04-04 RX ADMIN — Medication 1 APPLICATION(S): at 11:14

## 2025-04-04 RX ADMIN — MIDODRINE HYDROCHLORIDE 10 MILLIGRAM(S): 5 TABLET ORAL at 21:29

## 2025-04-04 RX ADMIN — Medication 25 GRAM(S): at 05:03

## 2025-04-04 RX ADMIN — IPRATROPIUM BROMIDE AND ALBUTEROL SULFATE 3 MILLILITER(S): .5; 2.5 SOLUTION RESPIRATORY (INHALATION) at 14:20

## 2025-04-04 RX ADMIN — IPRATROPIUM BROMIDE AND ALBUTEROL SULFATE 3 MILLILITER(S): .5; 2.5 SOLUTION RESPIRATORY (INHALATION) at 19:57

## 2025-04-04 RX ADMIN — IPRATROPIUM BROMIDE AND ALBUTEROL SULFATE 3 MILLILITER(S): .5; 2.5 SOLUTION RESPIRATORY (INHALATION) at 08:35

## 2025-04-04 RX ADMIN — IPRATROPIUM BROMIDE AND ALBUTEROL SULFATE 3 MILLILITER(S): .5; 2.5 SOLUTION RESPIRATORY (INHALATION) at 01:50

## 2025-04-04 NOTE — PROGRESS NOTE ADULT - NS ATTEND AMEND GEN_ALL_CORE FT
Pt seen, examined and discussed with neurology ACP.  Agree with above assessment and plan.  Incidental small right frontal acute ischemic strokes. Likely 2/2 holding AC in the setting of acute blood loss anemia. Resume AC when medically able. Obtain CUS. Avoid hypotension and maintain Hb>7

## 2025-04-04 NOTE — PROGRESS NOTE ADULT - ASSESSMENT
74 year old F with PMHx of COPD on 3L NC, smoking hx, AFib on Eliquis, past hx of abdominal abscess (in 2021), Gastric Bypass surgery 20+ years ago, who presented to the ED from [facility] on 3/24 with CC of hypoxia and change in mental status. As per ED note, patient was calling for help at her NH this morning. She was noted to be saturating 80% while on her baseline 3L NC; was placed on non-rebreather and brought to ED. Patient initially admitted to MICU for acute hypoxic hypercapnic respiratory failure. Code status was confirmed to be DNR/DNI with MOLST form faxed from [facility]. Patient subsequently downgraded to SDU.   Course in SICU complicated by Left thigh hematoma requiring intervention by IR    Acute on chronic hypoxemic / hypercapnic respiratory failure   COPD exacerbation   Multifocal PNA  Left thigh hematoma SP IR embolization   CVA new   Hypernatremia   Septic Shock on Levophed.    paroxysmal A AFIB  chronic HFpEF    PLANs:    - O2 requirements similar to baseline 2-3 LNC, finish Zosyn, finish pred in AM,  Advair and Spiriva.  Albuterol PRN   - MRI w  Small acute infarct within the right frontal subcortical white matter  - neuro f/u needed, etiology alva cardioembolic  TTE noted no gross abnormalities for CVA [place]   - Hbg stable but at low 7s, would keep holding AC and dvt ppx for now (aware of CVA but also she had spont left thigh hematoma), needs further dw family)  - BP noted, has been on lower side, asymptomatic, give 500cc bolus, c/w midodrine, check Hbg and lactate   - keep off lasix for now given low BP   - SCD for DVT ppx for now  - PT/OT eval   - spent 55 mins eval pt and coordinating care,r tanneriewed all labs and images

## 2025-04-04 NOTE — PROGRESS NOTE ADULT - TIME BILLING
I have personally seen and examined this patient.    I have reviewed all pertinent clinical information and reviewed all relevant imaging and diagnostic studies personally.   I counseled the patient about diagnostic testing and treatment plan. All questions were answered.   I discussed recommendations with the primary team.
I have personally seen and examined this patient.    I have reviewed all pertinent clinical information and reviewed all relevant imaging and diagnostic studies personally.   I counseled the patient about diagnostic testing and treatment plan. All questions were answered.   I discussed recommendations with the primary team.
time spent on review of labs, imaging studies, old records, obtaining history, personally examining patient, counselling and communicating with patient/ family, entering orders for medications/tests/etc, discussions with other health care providers, documentation in electronic health records, independent interpretation of labs, imaging/procedure results and care coordination.
Review of imaging and chart; obtaining history; examination of patient; discussion and coordination of care.
Review of imaging and chart; obtaining history; examination of pt; discussion and coordination of care.
time spent on review of labs, imaging studies, old records, obtaining history, personally examining patient, counselling and communicating with patient/ family, entering orders for medications/tests/etc, discussions with other health care providers, documentation in electronic health records, independent interpretation of labs, imaging/procedure results and care coordination.

## 2025-04-04 NOTE — PROGRESS NOTE ADULT - ASSESSMENT
74 year old F with PMHx of COPD on 3L NC, smoking hx, AFib on Eliquis, past hx of abdominal abscess (in 2021), Gastric Bypass surgery 20+ years ago, admitted for acute hypoxic hypercapnic respiratory failure, course complicated by left thigh hematoma requiring interventional radiology intervention and 2units of packed red blood cells. Pt not currently on Eliquis due to hemoglobin drop and hematoma. Neurovascular now following for punctate acute infarct appearing on MRI. On exam, pt is minimally participatory, she is A&Ox3, however confused (repeatedly asking when she is going to the smoke room). Neuro exam limited, however pt appears to have weakness of RUE (not antigravity upon command). However, right upper extremity weakness does not correlate w infarct. Suspect infarct occurred in the setting of afib and hypotension.     RECS  - Resume anticoagulation when medically able   - Obtain TTE  - Obtain CTA head and neck  - *Avoid hypotension, target -160  - Q8H neuro checks     RECS NOT FINALIZED   74 year old F with PMHx of COPD on 3L NC, smoking hx, AFib on Eliquis, past hx of abdominal abscess (in 2021), Gastric Bypass surgery 20+ years ago, admitted for acute hypoxic hypercapnic respiratory failure, course complicated by left thigh hematoma requiring interventional radiology intervention and 2units of packed red blood cells. Pt not currently on Eliquis due to hemoglobin drop and hematoma. Neurovascular now following for punctate acute infarct appearing on MRI. On exam, pt is minimally participatory, she is A&Ox3, however confused (repeatedly asking when she is going to the smoke room). Neuro exam limited, however pt appears to have weakness of RUE (not antigravity upon command). However, right upper extremity weakness does not correlate w infarct. Suspect infarct occurred in the setting of afib with no anticoagulation and hypotension.     RECS  - Resume anticoagulation when medically able   - Obtain TTE  - Obtain CTA head and neck  - *Avoid hypotension, target -160  - Q8H neuro checks     RECS NOT FINALIZED   74 year old F with PMHx of COPD on 3L NC, smoking hx, AFib on Eliquis, past hx of abdominal abscess (in 2021), Gastric Bypass surgery 20+ years ago, admitted for acute hypoxic hypercapnic respiratory failure, course complicated by left thigh hematoma requiring interventional radiology intervention and 2units of packed red blood cells. Pt not currently on Eliquis due to hemoglobin drop and hematoma. Neurovascular now following for punctate acute infarct appearing on MRI. On exam, pt is minimally participatory, she is A&Ox3, however confused (repeatedly asking when she is going to the smoke room). Neuro exam limited, however pt appears to have weakness of RUE (not antigravity upon command). However, right upper extremity weakness does not correlate w infarct. Suspect infarct occurred in the setting of afib with no anticoagulation and hypotension.     RECS  - Resume anticoagulation when medically able   - Obtain TTE  - Obtain b/l carotid artery US  - *Avoid hypotension, target -160  - Q8H neuro checks     Discussed w Dr. Nunez  74 year old F with PMHx of COPD on 3L NC, smoking hx, AFib on Eliquis, past hx of abdominal abscess (in 2021), Gastric Bypass surgery 20+ years ago, admitted for acute hypoxic hypercapnic respiratory failure, course complicated by left thigh hematoma requiring interventional radiology intervention and 2units of packed red blood cells. Pt not currently on Eliquis due to hemoglobin drop and hematoma. Neurovascular now following for punctate acute infarct appearing on MRI. On exam, pt is minimally participatory, she is A&Ox3, however confused (repeatedly asking when she is going to the smoke room). Neuro exam limited, however pt appears to have weakness of RUE (not antigravity upon command). However, right upper extremity weakness does not correlate w infarct. Suspect infarct occurred in the setting of afib with no anticoagulation and hypotension.     RECS  - Resume anticoagulation when medically able   - Obtain b/l carotid artery US  - *Avoid hypotension, target -160  - Q8H neuro checks     Discussed w Dr. Nunez

## 2025-04-04 NOTE — PROGRESS NOTE ADULT - SUBJECTIVE AND OBJECTIVE BOX
24H events:    Patient is a 74y old Female who presents with a chief complaint of AMS (28 Mar 2025 06:08)  Primary diagnosis of Acute respiratory failure with hypercapnia      Today is 11d of hospitalization. This morning patient was seen and examined at bedside, resting comfortably in bed.    No acute or major events overnight.    Code Status:    Family communication:  Contact date:  Name of person contacted:  Relationship to patient:  Communication details:  What matters most:    PAST MEDICAL & SURGICAL HISTORY  Atrial fibrillation    History of COPD    No significant past surgical history      SOCIAL HISTORY:  Social History:      ALLERGIES:  No Known Allergies    MEDICATIONS:  STANDING MEDICATIONS  albuterol/ipratropium for Nebulization 3 milliLiter(s) Nebulizer every 6 hours  chlorhexidine 2% Cloths 1 Application(s) Topical daily  fluticasone propionate/ salmeterol 100-50 MICROgram(s) Diskus 1 Dose(s) Inhalation two times a day  hydrocortisone hemorrhoidal Suppository 1 Suppository(s) Rectal at bedtime  lactated ringers. 1000 milliLiter(s) IV Continuous <Continuous>  lidocaine   4% Patch 1 Patch Transdermal daily  midodrine 10 milliGRAM(s) Oral every 8 hours  nicotine -   7 mG/24Hr(s) Patch 1 Patch Transdermal daily  pantoprazole  Injectable 40 milliGRAM(s) IV Push daily  piperacillin/tazobactam IVPB.. 3.375 Gram(s) IV Intermittent every 8 hours  predniSONE   Tablet 40 milliGRAM(s) Oral daily  vancomycin    Solution 125 milliGRAM(s) Oral at bedtime    PRN MEDICATIONS  acetaminophen     Tablet .. 650 milliGRAM(s) Oral every 6 hours PRN  oxycodone    5 mG/acetaminophen 325 mG 1 Tablet(s) Oral every 12 hours PRN    VITALS:   T(F): 98.3  HR: 54  BP: 101/64  RR: 19  SpO2: 95%    PHYSICAL EXAM:  GENERAL:   ( x) NAD, lying in bed comfortably     (  ) obtunded     (  ) lethargic     (  ) somnolent    HEART:  Rate -->     (x) normal rate     (  ) bradycardic     (  ) tachycardic  Rhythm -->     (x) regular     (  ) regularly irregular     (  ) irregularly irregular  Murmurs -->     (x) normal s1s2     (  ) systolic murmur     (  ) diastolic murmur     (  ) continuous murmur      (  ) S3 present     (  ) S4 present    LUNGS: On 2L  ( x)Unlabored respirations     (  ) tachypnea  (  ) B/L air entry     ( x ) decreased breath sounds in:  (location     )    ( x) no adventitious sound     (  ) crackles     (  ) wheezing      (  ) rhonchi      (specify location:       )  (  ) chest wall tenderness (specify location:       )    ABDOMEN:   ( x) Soft     (  ) tense   |   (  ) nondistended     (  ) distended   |   (  ) +BS     (  ) hypoactive bowel sounds     (  ) hyperactive bowel sounds  ( x) nontender     (  ) RUQ tenderness     (  ) RLQ tenderness     (  ) LLQ tenderness     (  ) epigastric tenderness     (  ) diffuse tenderness  (  ) Splenomegaly      (  ) Hepatomegaly      (  ) Jaundice     (  ) ecchymosis     EXTREMITIES:  ( x) Normal     (  ) Rash     (  ) ecchymosis     (  ) varicose veins      (  ) pitting edema     (  ) non-pitting edema   (  ) ulceration     (  ) gangrene:     (location:     )    NERVOUS SYSTEM:    ( x) A&Ox3     (  ) confused     (  ) lethargic      LABS:                        7.8    16.59 )-----------( 272      ( 03 Apr 2025 22:18 )             26.2     04-03    138  |  106  |  13  ----------------------------<  86  4.6   |  30  |  0.7    Ca    8.4      03 Apr 2025 07:29        Urinalysis Basic - ( 03 Apr 2025 07:29 )    Color: x / Appearance: x / SG: x / pH: x  Gluc: 86 mg/dL / Ketone: x  / Bili: x / Urobili: x   Blood: x / Protein: x / Nitrite: x   Leuk Esterase: x / RBC: x / WBC x   Sq Epi: x / Non Sq Epi: x / Bacteria: x                RADIOLOGY:    ASSESSMENT AND PLAN  74 year old F with PMHx of COPD on 3L NC, smoking hx, AFib on Eliquis, past hx of abdominal abscess (in 2021), Gastric Bypass surgery 20+ years ago, who presented to the ED from [facility] on 3/24 with CC of hypoxia and change in mental status. As per ED note, patient was calling for help at her NH this morning. She was noted to be saturating 80% while on her baseline 3L NC; was placed on non-rebreather and brought to ED. Patient initially admitted to MICU for acute hypoxic hypercapnic respiratory failure. Code status was confirmed to be DNR/DNI with MOLST form faxed from [facility]. Patient subsequently downgraded to SDU. Course in SICU complicated by Left thigh hematoma requiring intervention by IR. Downgraded to floor on 4/3/25    #Acute Hypoxic Hypercapnic Respiratory Failure likely secondary to CAP- Resolved  #COPD on 3L NC  #Smoking Hx  - ON NC now at 4L  - ct chest c/w multifocal pneumonia  - blood gas acute hypercapnia, resolved s/p bipap, pt refuses bipap at night  - MRSA and RVP neg   - Procal 0.02  - BCx 3/24 neg   - hypernatremia>>>encourage PO intake, met alklaosis; hold lasix; home dose 40 po  - tte with preserved ef  - ID eval - C/w Abx zosyn until 4/4/25  - PO Prednisone 40mg dailyfor 5 days from 4/1 => end date 4/5/25  - advair, duoneb q6.    #left thigh Hematoma with contrast extravasation  - S/p embolization of branch of the deep left femoral artery  - bleeding branch was treated with ~0.1  cc of gelfoam (2.5mm pledgets), followed by deployment of two low profile detachable Holley microcoils (2mm x 2 cm and 2 mm x 4 cm), with subsequent hemostasis and preservation of flow to neighboring branches.  - off levo  - 4/2-  S/p 2 units of PRBC and Hb not corrected, will send for urgent repeat CTA and CT abd Non, F/u Hb at 11am and 8PM,  IVC was 2.2cm and non collapsible, cannot receive more fluids  - 4/3- CTA hemtoma stable unchanged--- Hgb today 7.6  - Monitor CBC   - Restart lovenox for DVT ppx     #hypernatremia- resolved  - Na improved  s/p D5  - Monitor    #mild generalized edema on CTH  - cth with mild cerebral edema  - repeat cth unchanged  - veeg no interictal activity  - MRA/MRV performed => follow up   - f/u neuro.    #paroxysmal AFib on Eliquis  #chronic HFpEF (TTE 6/2021 EF 63%, GIIIDD)  #Bilateral Pleural Effusions  - hold for now resume tomorrow  - TTE: Normal EF  - on home med Lasix 40 mg qd    - Restart Lasix as appropriate   - Bilateral LE dopplex: No dvt      #DVT ppx: Held because of hematoma   #GI ppx: PPI  #Activity: IAT  #Dispo: Medicine     #Hand off  - Follow up MR MRV   - Follow up neuro

## 2025-04-04 NOTE — PROGRESS NOTE ADULT - SUBJECTIVE AND OBJECTIVE BOX
Patient is a 74y old  Female who presents with a chief complaint of AMS (28 Mar 2025 06:08)      OVERNIGHT EVENTS: no major events reported     SUBJECTIVE / INTERVAL HPI: Patient seen and examined at bedside.     VITAL SIGNS:  Vital Signs Last 24 Hrs  T(C): 36.8 (04 Apr 2025 04:37), Max: 37.3 (03 Apr 2025 16:25)  T(F): 98.3 (04 Apr 2025 04:37), Max: 99.1 (03 Apr 2025 16:25)  HR: 54 (04 Apr 2025 04:37) (54 - 72)  BP: 101/64 (04 Apr 2025 04:37) (91/59 - 110/57)  BP(mean): 76 (04 Apr 2025 04:37) (76 - 78)  RR: 19 (04 Apr 2025 04:37) (19 - 25)  SpO2: 97% (04 Apr 2025 08:18) (94% - 98%)    Parameters below as of 04 Apr 2025 08:18  Patient On (Oxygen Delivery Method): nasal cannula  O2 Flow (L/min): 3      PHYSICAL EXAM:    General: old lady chronically looks ill   HEENT: NC/AT; PERRL, clear conjunctiva  Neck: supple  Cardiovascular: +S1/S2; RRR  Respiratory: CTA b/l; no W/R/R  Gastrointestinal: soft, NT/ND; +BSx4  Extremities: WWP; 2+ peripheral pulses; no edema   Neurological: alert and awake, oriented x3, ,but intermittently confused, moves all exts, globally weak; no focal deficits    MEDICATIONS:  MEDICATIONS  (STANDING):  albuterol/ipratropium for Nebulization 3 milliLiter(s) Nebulizer every 6 hours  chlorhexidine 2% Cloths 1 Application(s) Topical daily  fluticasone propionate/ salmeterol 100-50 MICROgram(s) Diskus 1 Dose(s) Inhalation two times a day  hydrocortisone hemorrhoidal Suppository 1 Suppository(s) Rectal at bedtime  lactated ringers. 1000 milliLiter(s) (50 mL/Hr) IV Continuous <Continuous>  lidocaine   4% Patch 1 Patch Transdermal daily  midodrine 10 milliGRAM(s) Oral every 8 hours  nicotine -   7 mG/24Hr(s) Patch 1 Patch Transdermal daily  pantoprazole  Injectable 40 milliGRAM(s) IV Push daily  piperacillin/tazobactam IVPB.. 3.375 Gram(s) IV Intermittent every 8 hours  predniSONE   Tablet 40 milliGRAM(s) Oral daily  vancomycin    Solution 125 milliGRAM(s) Oral at bedtime    MEDICATIONS  (PRN):  acetaminophen     Tablet .. 650 milliGRAM(s) Oral every 6 hours PRN Temp greater or equal to 38C (100.4F), Mild Pain (1 - 3)  oxycodone    5 mG/acetaminophen 325 mG 1 Tablet(s) Oral every 12 hours PRN Severe Pain (7 - 10)      ALLERGIES:  Allergies    No Known Allergies    Intolerances        LABS:                        7.3    16.84 )-----------( 272      ( 04 Apr 2025 07:36 )             24.6     04-04    137  |  102  |  15  ----------------------------<  103[H]  4.6   |  31  |  0.6[L]    Ca    7.9[L]      04 Apr 2025 07:36        Urinalysis Basic - ( 04 Apr 2025 07:36 )    Color: x / Appearance: x / SG: x / pH: x  Gluc: 103 mg/dL / Ketone: x  / Bili: x / Urobili: x   Blood: x / Protein: x / Nitrite: x   Leuk Esterase: x / RBC: x / WBC x   Sq Epi: x / Non Sq Epi: x / Bacteria: x      CAPILLARY BLOOD GLUCOSE          RADIOLOGY & ADDITIONAL TESTS: Reviewed.    ASSESSMENT:    PLAN:

## 2025-04-04 NOTE — PROGRESS NOTE ADULT - SUBJECTIVE AND OBJECTIVE BOX
THE PATIENT WAS SEEN AND EXAMINED BY ME WITH THE HOUSESTAFF AND STROKE TEAM DURING MORNING ROUNDS.   HPI:  Patient is a 74 year old F with PMHx of COPD on 3L NC, smoking hx, AFib on Eliquis, past hx of abdominal abscess (in 2021), Gastric Bypass surgery 20+ years ago, who presented to the ED from [facility] on 3/24 with complaint of hypoxia and change in mental status. As per ED note, patient was calling for help at her NH this morning. She was noted to be saturating 80% while on her baseline 3L NC; was placed on non-rebreather and brought to ED. Pt initially admitted to MICU for acute hypoxic hypercapnic respiratory failure. General neurology originally following for mild diffuse cerebral edema on CTH. MRI recommended and obtained which showed punctate acute infarct of right frontal region.       SUBJECTIVE: No events overnight. Pt is poor historian, reports no acute complaints at this time, continually states she was already asked all these qts, and will not further participate until she goes to the "smoke room."     acetaminophen     Tablet .. 650 milliGRAM(s) Oral every 6 hours PRN  albuterol/ipratropium for Nebulization 3 milliLiter(s) Nebulizer every 6 hours  chlorhexidine 2% Cloths 1 Application(s) Topical daily  fluticasone propionate/ salmeterol 100-50 MICROgram(s) Diskus 1 Dose(s) Inhalation two times a day  hydrocortisone hemorrhoidal Suppository 1 Suppository(s) Rectal at bedtime  lactated ringers. 1000 milliLiter(s) IV Continuous <Continuous>  lidocaine   4% Patch 1 Patch Transdermal daily  midodrine 10 milliGRAM(s) Oral every 8 hours  nicotine -   7 mG/24Hr(s) Patch 1 Patch Transdermal daily  oxycodone    5 mG/acetaminophen 325 mG 1 Tablet(s) Oral every 12 hours PRN  pantoprazole  Injectable 40 milliGRAM(s) IV Push daily  piperacillin/tazobactam IVPB.. 3.375 Gram(s) IV Intermittent every 8 hours  predniSONE   Tablet 40 milliGRAM(s) Oral daily  vancomycin    Solution 125 milliGRAM(s) Oral at bedtime      PHYSICAL EXAM:   Vital Signs Last 24 Hrs  T(C): 36.8 (04 Apr 2025 04:37), Max: 37.3 (03 Apr 2025 16:25)  T(F): 98.3 (04 Apr 2025 04:37), Max: 99.1 (03 Apr 2025 16:25)  HR: 54 (04 Apr 2025 04:37) (54 - 72)  BP: 101/64 (04 Apr 2025 04:37) (91/54 - 110/57)  BP(mean): 76 (04 Apr 2025 04:37) (70 - 78)  RR: 19 (04 Apr 2025 04:37) (19 - 32)  SpO2: 97% (04 Apr 2025 08:18) (92% - 98%)    Parameters below as of 04 Apr 2025 08:18  Patient On (Oxygen Delivery Method): nasal cannula  O2 Flow (L/min): 3      General: No acute distress  HEENT: EOM intact, visual fields full  Abdomen: Soft, nontender, nondistended   Extremities: No edema    NEUROLOGICAL EXAM:  Mental status: Awake, alert, oriented x3, speech fluent, follows commands, no neglect, impaired memory, pt states she saw examiner already (she had not), and she needs to go to smoke room before it closes.  Cranial Nerves: No obvious facial asymmetry, no dysarthria,  EOM intact, VF unable to be performed due to pt non participatory.   Motor exam: Limited motor exam due to pt minimally participatory. Pt moves LUE spontanously, refuses to hold it up, states she can only move RUE from elbow onward, not antigravity upon command. Pt refusing LE motor testing.  Sensation: Refusing sensory exam  Coordination/ Gait: refusing coordination exam     LABS:                        7.3    16.84 )-----------( 272      ( 04 Apr 2025 07:36 )             24.6    04-04    137  |  102  |  15  ----------------------------<  103[H]  4.6   |  31  |  0.6[L]    Ca    7.9[L]      04 Apr 2025 07:36          IMAGING: Reviewed by me.      THE PATIENT WAS SEEN AND EXAMINED BY ME WITH THE HOUSESTAFF AND STROKE TEAM DURING MORNING ROUNDS.   HPI:  Patient is a 74 year old F with PMHx of COPD on 3L NC, smoking hx, AFib on Eliquis (not currently on eliquis due to thigh hematoma), past hx of abdominal abscess (in 2021), Gastric Bypass surgery 20+ years ago, who presented to the ED from [facility] on 3/24 with complaint of hypoxia and change in mental status. As per ED note, patient was calling for help at her NH this morning. She was noted to be saturating 80% while on her baseline 3L NC; was placed on non-rebreather and brought to ED. Pt initially admitted to MICU for acute hypoxic hypercapnic respiratory failure. General neurology originally following for mild diffuse cerebral edema on CTH. MRI recommended and obtained which showed punctate acute infarct of right frontal region.       SUBJECTIVE: No events overnight. Pt is poor historian, reports no acute complaints at this time, continually states she was already asked all these qts, and will not further participate until she goes to the "smoke room."     acetaminophen     Tablet .. 650 milliGRAM(s) Oral every 6 hours PRN  albuterol/ipratropium for Nebulization 3 milliLiter(s) Nebulizer every 6 hours  chlorhexidine 2% Cloths 1 Application(s) Topical daily  fluticasone propionate/ salmeterol 100-50 MICROgram(s) Diskus 1 Dose(s) Inhalation two times a day  hydrocortisone hemorrhoidal Suppository 1 Suppository(s) Rectal at bedtime  lactated ringers. 1000 milliLiter(s) IV Continuous <Continuous>  lidocaine   4% Patch 1 Patch Transdermal daily  midodrine 10 milliGRAM(s) Oral every 8 hours  nicotine -   7 mG/24Hr(s) Patch 1 Patch Transdermal daily  oxycodone    5 mG/acetaminophen 325 mG 1 Tablet(s) Oral every 12 hours PRN  pantoprazole  Injectable 40 milliGRAM(s) IV Push daily  piperacillin/tazobactam IVPB.. 3.375 Gram(s) IV Intermittent every 8 hours  predniSONE   Tablet 40 milliGRAM(s) Oral daily  vancomycin    Solution 125 milliGRAM(s) Oral at bedtime      PHYSICAL EXAM:   Vital Signs Last 24 Hrs  T(C): 36.8 (04 Apr 2025 04:37), Max: 37.3 (03 Apr 2025 16:25)  T(F): 98.3 (04 Apr 2025 04:37), Max: 99.1 (03 Apr 2025 16:25)  HR: 54 (04 Apr 2025 04:37) (54 - 72)  BP: 101/64 (04 Apr 2025 04:37) (91/54 - 110/57)  BP(mean): 76 (04 Apr 2025 04:37) (70 - 78)  RR: 19 (04 Apr 2025 04:37) (19 - 32)  SpO2: 97% (04 Apr 2025 08:18) (92% - 98%)    Parameters below as of 04 Apr 2025 08:18  Patient On (Oxygen Delivery Method): nasal cannula  O2 Flow (L/min): 3      General: No acute distress  HEENT: EOM intact, visual fields full  Abdomen: Soft, nontender, nondistended   Extremities: No edema    NEUROLOGICAL EXAM:  Mental status: Awake, alert, oriented x3, speech fluent, follows commands, no neglect, impaired memory, pt states she saw examiner already (she had not), and she needs to go to smoke room before it closes.  Cranial Nerves: No obvious facial asymmetry, no dysarthria,  EOM intact, VF unable to be performed due to pt non participatory.   Motor exam: Limited motor exam due to pt minimally participatory. Pt moves LUE spontanously, refuses to hold it up, states she can only move RUE from elbow onward, not antigravity upon command. Pt refusing LE motor testing.  Sensation: Refusing sensory exam  Coordination/ Gait: refusing coordination exam     LABS:                        7.3    16.84 )-----------( 272      ( 04 Apr 2025 07:36 )             24.6    04-04    137  |  102  |  15  ----------------------------<  103[H]  4.6   |  31  |  0.6[L]    Ca    7.9[L]      04 Apr 2025 07:36          IMAGING: Reviewed by me.

## 2025-04-04 NOTE — PROGRESS NOTE ADULT - SUBJECTIVE AND OBJECTIVE BOX
WEGENER, LOIS  74y, Female  Allergy: No Known Allergies      LOS  11d    CHIEF COMPLAINT: AMS (28 Mar 2025 06:08)      INTERVAL EVENTS/HPI  - No acute events overnight  - T(F): , Max: 99.1 (04-03-25 @ 16:25)  - Denies any worsening symptoms  - Tolerating medication  - WBC Count: 16.84 (04-04-25 @ 07:36)  WBC Count: 16.59 (04-03-25 @ 22:18)     - Creatinine: 0.6 (04-04-25 @ 07:36)  Creatinine: 0.7 (04-03-25 @ 07:29)       ROS  General: Denies rigors, nightsweats  HEENT: Denies headache, rhinorrhea, sore throat, eye pain  CV: Denies CP, palpitations  PULM: Denies wheezing, hemoptysis  GI: Denies hematemesis, hematochezia, melena  : Denies discharge, hematuria  MSK: Denies arthralgias, myalgias  SKIN: Denies rash, lesions  NEURO: Denies paresthesias, weakness  PSYCH: Denies depression, anxiety    VITALS:  T(F): 98.3, Max: 99.1 (04-03-25 @ 16:25)  HR: 54  BP: 101/64  RR: 19Vital Signs Last 24 Hrs  T(C): 36.8 (04 Apr 2025 04:37), Max: 37.3 (03 Apr 2025 16:25)  T(F): 98.3 (04 Apr 2025 04:37), Max: 99.1 (03 Apr 2025 16:25)  HR: 54 (04 Apr 2025 04:37) (54 - 72)  BP: 101/64 (04 Apr 2025 04:37) (91/54 - 110/57)  BP(mean): 76 (04 Apr 2025 04:37) (70 - 78)  RR: 19 (04 Apr 2025 04:37) (19 - 32)  SpO2: 97% (04 Apr 2025 08:18) (92% - 98%)    Parameters below as of 04 Apr 2025 08:18  Patient On (Oxygen Delivery Method): nasal cannula  O2 Flow (L/min): 3      PHYSICAL EXAM:  Gen: NAD, resting in bed  HEENT: Normocephalic, atraumatic  Neck: supple, no lymphadenopathy  CV: Regular rate & regular rhythm  Lungs: decreased BS at bases, no fremitus  Abdomen: Soft, BS present  Ext: Warm, well perfused  Neuro: non focal, awake  Skin: no rash, no erythema; left thigh dressed  Lines: no phlebitis    FH: Non-contributory  Social Hx: Non-contributory    TESTS & MEASUREMENTS:                        7.3    16.84 )-----------( 272      ( 04 Apr 2025 07:36 )             24.6     04-04    137  |  102  |  15  ----------------------------<  103[H]  4.6   |  31  |  0.6[L]    Ca    7.9[L]      04 Apr 2025 07:36          Urinalysis Basic - ( 04 Apr 2025 07:36 )    Color: x / Appearance: x / SG: x / pH: x  Gluc: 103 mg/dL / Ketone: x  / Bili: x / Urobili: x   Blood: x / Protein: x / Nitrite: x   Leuk Esterase: x / RBC: x / WBC x   Sq Epi: x / Non Sq Epi: x / Bacteria: x        Culture - Blood (collected 03-29-25 @ 12:12)  Source: Blood None  Final Report (04-03-25 @ 22:01):    No growth at 5 days    Culture - Blood (collected 03-24-25 @ 10:00)  Source: Blood Blood  Final Report (03-29-25 @ 22:00):    No growth at 5 days    Culture - Blood (collected 03-24-25 @ 10:00)  Source: Blood Blood  Final Report (03-29-25 @ 22:00):    No growth at 5 days            INFECTIOUS DISEASES TESTING  Legionella Antigen, Urine: Negative (03-28-25 @ 14:00)  MRSA PCR Result.: Negative (03-27-25 @ 09:15)  Procalcitonin: <0.02 (03-24-25 @ 15:45)      INFLAMMATORY MARKERS      RADIOLOGY & ADDITIONAL TESTS:  I have personally reviewed the last available Chest xray  CXR      CT  CT Abdomen and Pelvis No Cont:   ACC: [id number] EXAM:  CT ABDOMEN AND PELVIS   ORDERED BY: DAVID GEE     PROCEDURE DATE:  04/02/2025          INTERPRETATION:  CLINICAL INFORMATION: Retroperitoneal hemorrhage.    COMPARISON: Correlation is made with abdominopelvic CT dated March 29, 2025 and CT of the lower extremity dated March 31, 2025.    CONTRAST/COMPLICATIONS:  IV Contrast: NONE  Oral Contrast: NONE    PROCEDURE:  CT of the Abdomen and Pelvis was performed.  Sagittal and coronal reformats were performed.    FINDINGS:  LOWER CHEST: Increased trace bilateral pleural effusions with bibasilar   consolidated opacities/atelectasis, right greater than left.    LIVER: Unchanged.  BILE DUCTS: Unchanged.  GALLBLADDER: Unchanged.  SPLEEN: Unchanged.  PANCREAS: Unchanged.  ADRENALS: Unchanged.  KIDNEYS/URETERS: Unchanged.    PELVIC ORGANS: Unchanged.    BOWEL: Unchanged.  PERITONEUM/RETROPERITONEUM: No evidence of intraperitoneal or   retroperitoneal hemorrhage. New trace perisplenic ascites.  VESSELS: Unchanged.  LYMPH NODES: Unchanged.  ABDOMINAL WALL: Unchanged.  BONES/SOFT TISSUES: Partially imaged left anteromedial thigh musculature   hematoma is overall unchanged since reference exam of March 31, 2025, and   better evaluated on concurrently performed left lowerextremity CT.    IMPRESSION:    1. Since March 29, 2025, no evidence of intraperitoneal or   retroperitoneal hemorrhage; new trace perisplenic ascites.  2. Partially imaged left anteromedial thigh musculature hematoma is   overall unchanged since reference exam of March 31, 2025 and better   evaluated on concurrently performed left lower extremity CT.  3. Increased trace bilateral pleural effusions with bibasilar   consolidated opacities/atelectasis, right greater than left.  4. Remainder of findings are overall unchanged on this short-term   follow-up exam.        --- End of Report ---            CINDY DOWLING MD; Attending Radiologist  This document has been electronically signed. Apr 2 2025  4:38PM (04-02-25 @ 14:51)      CARDIOLOGY TESTING  12 Lead ECG:   Ventricular Rate 72 BPM    QRS Duration 100 ms    Q-T Interval 436 ms    QTC Calculation(Bazett) 477 ms    R Axis 105 degrees    T Axis 94 degrees    Diagnosis Line Atrial fibrillation with premature ventricular or aberrantly conducted  complexes  Rightward axis  Nonspecific ST abnormality  Abnormal ECG    Confirmed by Deepak Galvin ([id number]) on 3/26/2025 10:29:08 AM (03-24-25 @ 10:31)      MEDICATIONS  albuterol/ipratropium for Nebulization 3 Nebulizer every 6 hours  chlorhexidine 2% Cloths 1 Topical daily  fluticasone propionate/ salmeterol 100-50 MICROgram(s) Diskus 1 Inhalation two times a day  hydrocortisone hemorrhoidal Suppository 1 Rectal at bedtime  lactated ringers. 1000 IV Continuous <Continuous>  lidocaine   4% Patch 1 Transdermal daily  midodrine 10 Oral every 8 hours  nicotine -   7 mG/24Hr(s) Patch 1 Transdermal daily  pantoprazole  Injectable 40 IV Push daily  piperacillin/tazobactam IVPB.. 3.375 IV Intermittent every 8 hours  predniSONE   Tablet 40 Oral daily  vancomycin    Solution 125 Oral at bedtime      WEIGHT  Weight (kg): 93.3 (04-03-25 @ 17:26)  Creatinine: 0.6 mg/dL (04-04-25 @ 07:36)      ANTIBIOTICS:  piperacillin/tazobactam IVPB.. 3.375 Gram(s) IV Intermittent every 8 hours  vancomycin    Solution 125 milliGRAM(s) Oral at bedtime      All available historical records have been reviewed

## 2025-04-04 NOTE — PROGRESS NOTE ADULT - ASSESSMENT
ASSESSMENT  This is a 74 year old F with PMHx of COPD on 3L NC, smoking hx, AFib on Eliquis, past hx of abdominal abscess (in 2021), Gastric Bypass surgery 20+ years ago, who presented to the ED from [facility] on 3/24 with hypoxia and change in mental status.    IMPRESSION  #Acute on chronic respiratory failure.  - Doubt bacterial pneumonia  - CT chest noted for bilateral pleural effusions and compressive atelectasis  Likely COPD flare.   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  History of Latent TB  History of C.diff and ileus (2021)    RECOMMENDATIONS  - Zosyn 3.375 gram q 8 hours - last day today   - PO vanco 125 mg daily until 4/6     Please call or message on Microsoft Teams if with any questions.  Spectra [phone]

## 2025-04-05 LAB
ALBUMIN SERPL ELPH-MCNC: 2.3 G/DL — LOW (ref 3.5–5.2)
ALP SERPL-CCNC: 45 U/L — SIGNIFICANT CHANGE UP (ref 30–115)
ALT FLD-CCNC: 11 U/L — SIGNIFICANT CHANGE UP (ref 0–41)
ANION GAP SERPL CALC-SCNC: 14 MMOL/L — SIGNIFICANT CHANGE UP (ref 7–14)
AST SERPL-CCNC: 10 U/L — SIGNIFICANT CHANGE UP (ref 0–41)
BASOPHILS # BLD AUTO: 0 K/UL — SIGNIFICANT CHANGE UP (ref 0–0.2)
BASOPHILS # BLD AUTO: 0.02 K/UL — SIGNIFICANT CHANGE UP (ref 0–0.2)
BASOPHILS NFR BLD AUTO: 0 % — SIGNIFICANT CHANGE UP (ref 0–1)
BASOPHILS NFR BLD AUTO: 0.1 % — SIGNIFICANT CHANGE UP (ref 0–1)
BILIRUB SERPL-MCNC: 0.6 MG/DL — SIGNIFICANT CHANGE UP (ref 0.2–1.2)
BUN SERPL-MCNC: 10 MG/DL — SIGNIFICANT CHANGE UP (ref 10–20)
CALCIUM SERPL-MCNC: 7.1 MG/DL — LOW (ref 8.4–10.5)
CHLORIDE SERPL-SCNC: 103 MMOL/L — SIGNIFICANT CHANGE UP (ref 98–110)
CO2 SERPL-SCNC: 20 MMOL/L — SIGNIFICANT CHANGE UP (ref 17–32)
CREAT SERPL-MCNC: <0.5 MG/DL — LOW (ref 0.7–1.5)
EGFR: 104 ML/MIN/1.73M2 — SIGNIFICANT CHANGE UP
EGFR: 104 ML/MIN/1.73M2 — SIGNIFICANT CHANGE UP
EOSINOPHIL # BLD AUTO: 0 K/UL — SIGNIFICANT CHANGE UP (ref 0–0.7)
EOSINOPHIL # BLD AUTO: 0 K/UL — SIGNIFICANT CHANGE UP (ref 0–0.7)
EOSINOPHIL NFR BLD AUTO: 0 % — SIGNIFICANT CHANGE UP (ref 0–8)
EOSINOPHIL NFR BLD AUTO: 0 % — SIGNIFICANT CHANGE UP (ref 0–8)
GAS PNL BLDA: SIGNIFICANT CHANGE UP
GLUCOSE SERPL-MCNC: 75 MG/DL — SIGNIFICANT CHANGE UP (ref 70–99)
HCT VFR BLD CALC: 17.3 % — LOW (ref 37–47)
HCT VFR BLD CALC: 23.7 % — LOW (ref 37–47)
HCT VFR BLD CALC: 28.4 % — LOW (ref 37–47)
HGB BLD-MCNC: 4.9 G/DL — CRITICAL LOW (ref 12–16)
HGB BLD-MCNC: 6.9 G/DL — CRITICAL LOW (ref 12–16)
HGB BLD-MCNC: 8.4 G/DL — LOW (ref 12–16)
IMM GRANULOCYTES NFR BLD AUTO: 1 % — HIGH (ref 0.1–0.3)
LACTATE SERPL-SCNC: 1.4 MMOL/L — SIGNIFICANT CHANGE UP (ref 0.7–2)
LACTATE SERPL-SCNC: 10.8 MMOL/L — CRITICAL HIGH (ref 0.7–2)
LYMPHOCYTES # BLD AUTO: 0.46 K/UL — LOW (ref 1.2–3.4)
LYMPHOCYTES # BLD AUTO: 0.49 K/UL — LOW (ref 1.2–3.4)
LYMPHOCYTES # BLD AUTO: 2.5 % — LOW (ref 20.5–51.1)
LYMPHOCYTES # BLD AUTO: 5.2 % — LOW (ref 20.5–51.1)
MAGNESIUM SERPL-MCNC: 1.6 MG/DL — LOW (ref 1.8–2.4)
MCHC RBC-ENTMCNC: 25.8 PG — LOW (ref 27–31)
MCHC RBC-ENTMCNC: 25.8 PG — LOW (ref 27–31)
MCHC RBC-ENTMCNC: 26.4 PG — LOW (ref 27–31)
MCHC RBC-ENTMCNC: 28.3 G/DL — LOW (ref 32–37)
MCHC RBC-ENTMCNC: 29.1 G/DL — LOW (ref 32–37)
MCHC RBC-ENTMCNC: 29.6 G/DL — LOW (ref 32–37)
MCV RBC AUTO: 88.8 FL — SIGNIFICANT CHANGE UP (ref 81–99)
MCV RBC AUTO: 89.3 FL — SIGNIFICANT CHANGE UP (ref 81–99)
MCV RBC AUTO: 91.1 FL — SIGNIFICANT CHANGE UP (ref 81–99)
MONOCYTES # BLD AUTO: 0.16 K/UL — SIGNIFICANT CHANGE UP (ref 0.1–0.6)
MONOCYTES # BLD AUTO: 0.44 K/UL — SIGNIFICANT CHANGE UP (ref 0.1–0.6)
MONOCYTES NFR BLD AUTO: 1.7 % — SIGNIFICANT CHANGE UP (ref 1.7–9.3)
MONOCYTES NFR BLD AUTO: 2.4 % — SIGNIFICANT CHANGE UP (ref 1.7–9.3)
NEUTROPHILS # BLD AUTO: 17.1 K/UL — HIGH (ref 1.4–6.5)
NEUTROPHILS # BLD AUTO: 8.84 K/UL — HIGH (ref 1.4–6.5)
NEUTROPHILS NFR BLD AUTO: 93.1 % — HIGH (ref 42.2–75.2)
NEUTROPHILS NFR BLD AUTO: 94 % — HIGH (ref 42.2–75.2)
NRBC BLD AUTO-RTO: 0 /100 WBCS — SIGNIFICANT CHANGE UP (ref 0–0)
NRBC BLD AUTO-RTO: 0 /100 WBCS — SIGNIFICANT CHANGE UP (ref 0–0)
NRBC BLD AUTO-RTO: SIGNIFICANT CHANGE UP /100 WBCS (ref 0–0)
PLATELET # BLD AUTO: 183 K/UL — SIGNIFICANT CHANGE UP (ref 130–400)
PLATELET # BLD AUTO: 240 K/UL — SIGNIFICANT CHANGE UP (ref 130–400)
PLATELET # BLD AUTO: 263 K/UL — SIGNIFICANT CHANGE UP (ref 130–400)
PMV BLD: 10.2 FL — SIGNIFICANT CHANGE UP (ref 7.4–10.4)
PMV BLD: 10.3 FL — SIGNIFICANT CHANGE UP (ref 7.4–10.4)
PMV BLD: 9.8 FL — SIGNIFICANT CHANGE UP (ref 7.4–10.4)
POTASSIUM SERPL-MCNC: 4.8 MMOL/L — SIGNIFICANT CHANGE UP (ref 3.5–5)
POTASSIUM SERPL-SCNC: 4.8 MMOL/L — SIGNIFICANT CHANGE UP (ref 3.5–5)
PROT SERPL-MCNC: 3.4 G/DL — LOW (ref 6–8)
RBC # BLD: 1.9 M/UL — LOW (ref 4.2–5.4)
RBC # BLD: 2.67 M/UL — LOW (ref 4.2–5.4)
RBC # BLD: 3.18 M/UL — LOW (ref 4.2–5.4)
RBC # FLD: 23.9 % — HIGH (ref 11.5–14.5)
RBC # FLD: 24.6 % — HIGH (ref 11.5–14.5)
RBC # FLD: 24.9 % — HIGH (ref 11.5–14.5)
SODIUM SERPL-SCNC: 137 MMOL/L — SIGNIFICANT CHANGE UP (ref 135–146)
WBC # BLD: 13.61 K/UL — HIGH (ref 4.8–10.8)
WBC # BLD: 18.21 K/UL — HIGH (ref 4.8–10.8)
WBC # BLD: 9.5 K/UL — SIGNIFICANT CHANGE UP (ref 4.8–10.8)
WBC # FLD AUTO: 13.61 K/UL — HIGH (ref 4.8–10.8)
WBC # FLD AUTO: 18.21 K/UL — HIGH (ref 4.8–10.8)
WBC # FLD AUTO: 9.5 K/UL — SIGNIFICANT CHANGE UP (ref 4.8–10.8)

## 2025-04-05 PROCEDURE — 71045 X-RAY EXAM CHEST 1 VIEW: CPT | Mod: 26

## 2025-04-05 PROCEDURE — 99233 SBSQ HOSP IP/OBS HIGH 50: CPT

## 2025-04-05 PROCEDURE — 73706 CT ANGIO LWR EXTR W/O&W/DYE: CPT | Mod: 26,LT

## 2025-04-05 PROCEDURE — 74174 CTA ABD&PLVS W/CONTRAST: CPT | Mod: 26

## 2025-04-05 RX ORDER — VANCOMYCIN HCL IN 5 % DEXTROSE 1.5G/250ML
125 PLASTIC BAG, INJECTION (ML) INTRAVENOUS AT BEDTIME
Refills: 0 | Status: COMPLETED | OUTPATIENT
Start: 2025-04-05 | End: 2025-04-05

## 2025-04-05 RX ORDER — SODIUM CHLORIDE 9 G/1000ML
500 INJECTION, SOLUTION INTRAVENOUS ONCE
Refills: 0 | Status: COMPLETED | OUTPATIENT
Start: 2025-04-05 | End: 2025-04-05

## 2025-04-05 RX ORDER — MAGNESIUM SULFATE 500 MG/ML
2 SYRINGE (ML) INJECTION ONCE
Refills: 0 | Status: COMPLETED | OUTPATIENT
Start: 2025-04-05 | End: 2025-04-05

## 2025-04-05 RX ORDER — NOREPINEPHRINE BITARTRATE 8 MG
0.05 SOLUTION INTRAVENOUS
Qty: 8 | Refills: 0 | Status: DISCONTINUED | OUTPATIENT
Start: 2025-04-05 | End: 2025-04-05

## 2025-04-05 RX ADMIN — NICOTINE POLACRILEX 1 PATCH: 4 GUM, CHEWING ORAL at 13:18

## 2025-04-05 RX ADMIN — NICOTINE POLACRILEX 1 PATCH: 4 GUM, CHEWING ORAL at 19:56

## 2025-04-05 RX ADMIN — Medication 125 MILLIGRAM(S): at 22:28

## 2025-04-05 RX ADMIN — Medication 1 APPLICATION(S): at 13:18

## 2025-04-05 RX ADMIN — IPRATROPIUM BROMIDE AND ALBUTEROL SULFATE 3 MILLILITER(S): .5; 2.5 SOLUTION RESPIRATORY (INHALATION) at 08:09

## 2025-04-05 RX ADMIN — IPRATROPIUM BROMIDE AND ALBUTEROL SULFATE 3 MILLILITER(S): .5; 2.5 SOLUTION RESPIRATORY (INHALATION) at 04:21

## 2025-04-05 RX ADMIN — IPRATROPIUM BROMIDE AND ALBUTEROL SULFATE 3 MILLILITER(S): .5; 2.5 SOLUTION RESPIRATORY (INHALATION) at 20:27

## 2025-04-05 RX ADMIN — PREDNISONE 40 MILLIGRAM(S): 20 TABLET ORAL at 05:46

## 2025-04-05 RX ADMIN — Medication 25 GRAM(S): at 10:25

## 2025-04-05 RX ADMIN — HYDROCORTISONE 1 SUPPOSITORY(S): 10 CREAM TOPICAL at 22:28

## 2025-04-05 RX ADMIN — Medication 40 MILLIGRAM(S): at 13:19

## 2025-04-05 RX ADMIN — Medication 25 GRAM(S): at 05:46

## 2025-04-05 RX ADMIN — SODIUM CHLORIDE 500 MILLILITER(S): 9 INJECTION, SOLUTION INTRAVENOUS at 02:44

## 2025-04-05 RX ADMIN — SODIUM CHLORIDE 500 MILLILITER(S): 9 INJECTION, SOLUTION INTRAVENOUS at 00:25

## 2025-04-05 NOTE — CHART NOTE - NSCHARTNOTEFT_GEN_A_CORE
Since AM patient Bp has been soft at 90/40s on midodrine. Informed by RN patients Bp has been dropping to 80s-40s. Ordered repeat CXR, lizeth cultures cbc and lactate. Hgb dropped to 4.9 and lactate increased to 10, however stat ABG with lytes showed Hgb 7.5 and lactate 1. Unclear if original CBC was diluted sample. Ordered repeat cbc and lactate. Given patient history of pneumonia and recent left thigh hematoma, possibly blood loss from rebleeding hematoma vs septic shock.     Throughout events patient remained asymptomatic although confused. Blood pressure repeatedly dropping to  80s/40s, although fluid responsive, will hold off on starting pressors because patient BP improved to 103/61 after 3rd LR bolus. Will start levophed if BP drops again. Called crit care fellow, transfer to step down and ordered CT angio abdomen, pelvis and left lower extremity. Since AM patient Bp has been soft at 90/40s on midodrine. Informed by RN patients Bp has been dropping to 80s-40s. Ordered repeat CXR, lizeth cultures cbc and lactate. Hgb dropped to 4.9 and lactate increased to 10, however stat ABG with lytes showed Hgb 7.5 and lactate 1. Unclear if original CBC was diluted sample. Ordered repeat cbc and lactate. Given patient history of pneumonia and recent left thigh hematoma, possibly blood loss from rebleeding hematoma vs septic shock.     Throughout events patient remained asymptomatic although confused. Blood pressure repeatedly dropping to  80s/40s, although fluid responsive. Although fluid responsive, BP repeatedly dropping. Called crit care fellow, start pressors, transfer to step down and ordered CT angio abdomen, pelvis and left lower extremity. Held off on starting levophed as patient /61    Update: Patient's BP dropped again to 90/55 (map 67). If MAP drops below 65 again will start levophed. Repeat cbc 6.9 and lactate 1.4. Earlier sample of Hgb 4.9 and lactate 10, likely lab error. WIll transfuse 1 unit pRBC. Pending CT angio abdomen/pelvis/LLE

## 2025-04-05 NOTE — CHART NOTE - NSCHARTNOTEFT_GEN_A_CORE
HPI:   74 year old F with PMHx of COPD on 3L NC, smoking hx, AFib on Eliquis, past hx of abdominal abscess (in 2021), Gastric Bypass surgery 20+ years ago, who presented to the ED from [facility] on 3/24 with CC of hypoxia and change in mental status. As per ED note, patient was calling for help at her NH this morning. She was noted to be saturating 80% while on her baseline 3L NC; was placed on non-rebreather and brought to ED. Patient initially admitted to MICU for acute hypoxic hypercapnic respiratory failure. Code status was confirmed to be DNR/DNI with MOLST form faxed from [facility]. Patient subsequently downgraded to SDU.       SICU course :  was complicated by a left thigh hematoma on 3/29/25. Therapeutic lovenox was rversed with protamine and the pt had a CTA that showed active extravasation from a deep femoral artery branch. S/p embolization by IR. The pts Hb remeained stable and she underwent a post embolization CTA which showed no bleeding. The pts Hb remained stable for a day but he Hb dropped again on 4/1/25 and she required pressors and 2PRBCs. A repeat CTA showed no active bleed or retroperitoneal bleed. The pt was weaned off pressors and is stable for DGTF.    3A course:  Patient had low BP. Started on midodrine, however evening of 4/4 patient's MAP dropped below 65. Fluid responsive. Received total of 1.5l LR. Hgb dropped to 6.9. Transfused 1 units. MAP still borderline. Concern for rebleed of hematoma vs septic shock. Repeat CXR ordered. CT angio abdomen/pelvis/LLE ordered. Upgrade to SDU.    Vital Signs Last 24 Hrs  T(C): 36.5 (05 Apr 2025 05:10), Max: 36.9 (04 Apr 2025 20:30)  T(F): 97.7 (05 Apr 2025 05:10), Max: 98.5 (04 Apr 2025 20:30)  HR: 58 (05 Apr 2025 05:10) (56 - 72)  BP: 90/55 (05 Apr 2025 05:10) (72/33 - 103/61)  BP(mean): 67 (05 Apr 2025 05:10) (59 - 75)  ABP: --  ABP(mean): --  RR: 19 (05 Apr 2025 05:10) (19 - 20)  SpO2: 97% (05 Apr 2025 05:10) (93% - 98%)    O2 Parameters below as of 05 Apr 2025 05:10  Patient On (Oxygen Delivery Method): nasal cannula    ROS:   unable to perform, patient non cooeprative    Physical exam:  General: old lady chronically looks ill   HEENT: NC/AT; PERRL, clear conjunctiva  Neck: supple  Cardiovascular: +S1/S2; RRR  Respiratory: CTA b/l; no W/R/R  Gastrointestinal: soft, NT/ND; +BSx4  Extremities: WWP; 2+ peripheral pulses; no edema   Neurological: alert and awake, oriented x3, ,but intermittently confused, moves all exts, globally weak; no focal deficit         #Acute Hypoxic Hypercapnic Respiratory Failure likely secondary to CAP- Resolved  #COPD on 3L NC  #Smoking Hx  - ON NC now at 4L  - ct chest c/w multifocal pneumonia  - blood gas acute hypercapnia, resolved s/p bipap, pt refuses bipap at night  - MRSA and RVP neg   - Procal 0.02  - BCx 3/24 neg   - hypernatremia>>>encourage PO intake, met alklaosis; hold lasix; home dose 40 po  - tte with preserved ef  - ID eval - C/w Abx zosyn until 4/4/25  - PO Prednisone 40mg dailyfor 5 days from 4/1 => end date 4/5/25  - advair, duoneb q6.  - f/u repeat CXR read  -monitor BP    #left thigh Hematoma with contrast extravasation  - S/p embolization of branch of the deep left femoral artery  - bleeding branch was treated with ~0.1  cc of gelfoam (2.5mm pledgets), followed by deployment of two low profile detachable Holley microcoils (2mm x 2 cm and 2 mm x 4 cm), with subsequent hemostasis and preservation of flow to neighboring branches.  - off levo  - 4/2-  S/p 2 units of PRBC and Hb not corrected, will send for urgent repeat CTA and CT abd Non, F/u Hb at 11am and 8PM,  IVC was 2.2cm and non collapsible, cannot receive more fluids  - 4/3- CTA hemtoma stable unchanged--- Hgb today 7.6  - Monitor CBC - Hgb 7.3 -->4.9(likely error)--> 6.9  - f/u repeat CT angio abdomen/pelvis/LLE  - transfuse 1 unit pRBC    #hypernatremia- resolved  - Na improved  s/p D5  - Monitor    #mild generalized edema on CTH  - cth with mild cerebral edema  - repeat cth unchanged  - veeg no interictal activity  - MRA/MRV performed => follow up   - f/u neuro.    #paroxysmal AFib on Eliquis  #chronic HFpEF (TTE 6/2021 EF 63%, GIIIDD)  #Bilateral Pleural Effusions  - hold for now resume tomorrow  - TTE: Normal EF  - on home med Lasix 40 mg qd    - Restart Lasix as appropriate   - Bilateral LE dopplex: No dvt      #DVT ppx: Held because of hematoma   #GI ppx: PPI  #Activity: IAT  #Dispo: SDU HPI:   74 year old F with PMHx of COPD on 3L NC, smoking hx, AFib on Eliquis, past hx of abdominal abscess (in 2021), Gastric Bypass surgery 20+ years ago, who presented to the ED from [facility] on 3/24 with CC of hypoxia and change in mental status. As per ED note, patient was calling for help at her NH this morning. She was noted to be saturating 80% while on her baseline 3L NC; was placed on non-rebreather and brought to ED. Patient initially admitted to MICU for acute hypoxic hypercapnic respiratory failure. Code status was confirmed to be DNR/DNI with MOLST form faxed from [facility]. Patient subsequently downgraded to SDU.       SICU course :  was complicated by a left thigh hematoma on 3/29/25. Therapeutic lovenox was rversed with protamine and the pt had a CTA that showed active extravasation from a deep femoral artery branch. S/p embolization by IR. The pts Hb remeained stable and she underwent a post embolization CTA which showed no bleeding. The pts Hb remained stable for a day but he Hb dropped again on 4/1/25 and she required pressors and 2PRBCs. A repeat CTA showed no active bleed or retroperitoneal bleed. The pt was weaned off pressors and is stable for DGTF.    3A course:  Patient had low BP. Started on midodrine, however evening of 4/4 patient's MAP dropped below 65. Fluid responsive. Received total of 1.5l LR. Hgb dropped to 6.9. Transfused 1 units. MAP still borderline. Concern for rebleed of hematoma vs septic shock. Repeat CXR, cultures, procal ordered. CT angio abdomen/pelvis/LLE ordered. Upgrade to SDU.    Vital Signs Last 24 Hrs  T(C): 36.5 (05 Apr 2025 05:10), Max: 36.9 (04 Apr 2025 20:30)  T(F): 97.7 (05 Apr 2025 05:10), Max: 98.5 (04 Apr 2025 20:30)  HR: 58 (05 Apr 2025 05:10) (56 - 72)  BP: 90/55 (05 Apr 2025 05:10) (72/33 - 103/61)  BP(mean): 67 (05 Apr 2025 05:10) (59 - 75)  ABP: --  ABP(mean): --  RR: 19 (05 Apr 2025 05:10) (19 - 20)  SpO2: 97% (05 Apr 2025 05:10) (93% - 98%)    O2 Parameters below as of 05 Apr 2025 05:10  Patient On (Oxygen Delivery Method): nasal cannula    ROS:   unable to perform, patient non cooeprative    Physical exam:  General: old lady chronically looks ill   HEENT: NC/AT; PERRL, clear conjunctiva  Neck: supple  Cardiovascular: +S1/S2; RRR  Respiratory: CTA b/l; no W/R/R  Gastrointestinal: soft, NT/ND; +BSx4  Extremities: WWP; 2+ peripheral pulses; no edema   Neurological: alert and awake, oriented x3, ,but intermittently confused, moves all exts, globally weak; no focal deficit         #Acute Hypoxic Hypercapnic Respiratory Failure likely secondary to CAP- Resolved  #COPD on 3L NC  #Smoking Hx  - ON NC now at 4L  - ct chest c/w multifocal pneumonia  - blood gas acute hypercapnia, resolved s/p bipap, pt refuses bipap at night  - MRSA and RVP neg   - Procal 0.02  - BCx 3/24 neg   - hypernatremia>>>encourage PO intake, met alklaosis; hold lasix; home dose 40 po  - tte with preserved ef  - ID eval - C/w Abx zosyn until 4/4/25  - PO Prednisone 40mg dailyfor 5 days from 4/1 => end date 4/5/25  - advair, duoneb q6.  - f/u repeat CXR read  -monitor BP    #left thigh Hematoma with contrast extravasation  - S/p embolization of branch of the deep left femoral artery  - bleeding branch was treated with ~0.1  cc of gelfoam (2.5mm pledgets), followed by deployment of two low profile detachable Holley microcoils (2mm x 2 cm and 2 mm x 4 cm), with subsequent hemostasis and preservation of flow to neighboring branches.  - off levo  - 4/2-  S/p 2 units of PRBC and Hb not corrected, will send for urgent repeat CTA and CT abd Non, F/u Hb at 11am and 8PM,  IVC was 2.2cm and non collapsible, cannot receive more fluids  - 4/3- CTA hemtoma stable unchanged--- Hgb today 7.6  - Monitor CBC - Hgb 7.3 -->4.9(likely error)--> 6.9  - f/u repeat CT angio abdomen/pelvis/LLE  - transfuse 1 unit pRBC    #hypernatremia- resolved  - Na improved  s/p D5  - Monitor    #mild generalized edema on CTH  - cth with mild cerebral edema  - repeat cth unchanged  - veeg no interictal activity  - MRA/MRV performed => follow up   - f/u neuro.    #paroxysmal AFib on Eliquis  #chronic HFpEF (TTE 6/2021 EF 63%, GIIIDD)  #Bilateral Pleural Effusions  - hold for now resume tomorrow  - TTE: Normal EF  - on home med Lasix 40 mg qd    - Restart Lasix as appropriate   - Bilateral LE dopplex: No dvt      #DVT ppx: Held because of hematoma   #GI ppx: PPI  #Activity: IAT  #Dispo: SDU

## 2025-04-05 NOTE — GOALS OF CARE CONVERSATION - ADVANCED CARE PLANNING - CONVERSATION DETAILS
discussed w the patient her GOCs and MOLST,  she had prior MOLST indicates DNR and DNI,     she wants to be full code, to get resuscitation and intubation if needed  but no Trach, feeding tube (NG or PEG) and no dialysis     FULL CODE

## 2025-04-05 NOTE — PROGRESS NOTE ADULT - SUBJECTIVE AND OBJECTIVE BOX
Patient is a 74y old  Female who presents with a chief complaint of AMS (28 Mar 2025 06:08)      OVERNIGHT EVENTS: event noted, drop in BP, hbg and elevated lactate     SUBJECTIVE / INTERVAL HPI: Patient seen and examined at bedside.     VITAL SIGNS:  Vital Signs Last 24 Hrs  T(C): 35.7 (05 Apr 2025 09:30), Max: 36.9 (04 Apr 2025 20:30)  T(F): 96.3 (05 Apr 2025 09:30), Max: 98.5 (04 Apr 2025 20:30)  HR: 73 (05 Apr 2025 09:30) (56 - 73)  BP: 97/52 (05 Apr 2025 09:30) (72/33 - 103/61)  BP(mean): 70 (05 Apr 2025 09:30) (59 - 75)  RR: 18 (05 Apr 2025 09:30) (18 - 20)  SpO2: 98% (05 Apr 2025 09:30) (93% - 99%)    Parameters below as of 05 Apr 2025 09:30  Patient On (Oxygen Delivery Method): nasal cannula  O2 Flow (L/min): 3      PHYSICAL EXAM:    General: old lady chronically looks ill   HEENT: NC/AT; PERRL, clear conjunctiva  Neck: supple  Cardiovascular: +S1/S2; RRR  Respiratory: CTA b/l; no W/R/R  Gastrointestinal: soft, NT/ND; +BSx4  Extremities: WWP; 2+ peripheral pulses; no edema   Neurological: alert and awake, oriented x3,, moves all exts, globally weak; no focal deficits    MEDICATIONS:  MEDICATIONS  (STANDING):  albuterol/ipratropium for Nebulization 3 milliLiter(s) Nebulizer every 6 hours  chlorhexidine 2% Cloths 1 Application(s) Topical daily  fluticasone propionate/ salmeterol 250-50 MICROgram(s) Diskus 1 Dose(s) Inhalation two times a day  hydrocortisone hemorrhoidal Suppository 1 Suppository(s) Rectal at bedtime  lidocaine   4% Patch 1 Patch Transdermal daily  nicotine -   7 mG/24Hr(s) Patch 1 Patch Transdermal daily  norepinephrine Infusion 0.05 MICROgram(s)/kG/Min (8.75 mL/Hr) IV Continuous <Continuous>  pantoprazole  Injectable 40 milliGRAM(s) IV Push daily  predniSONE   Tablet 40 milliGRAM(s) Oral daily  vancomycin    Solution 125 milliGRAM(s) Oral at bedtime    MEDICATIONS  (PRN):  acetaminophen     Tablet .. 650 milliGRAM(s) Oral every 6 hours PRN Temp greater or equal to 38C (100.4F), Mild Pain (1 - 3)  oxycodone    5 mG/acetaminophen 325 mG 1 Tablet(s) Oral every 12 hours PRN Severe Pain (7 - 10)      ALLERGIES:  Allergies    No Known Allergies    Intolerances        LABS:                        6.9    13.61 )-----------( 263      ( 05 Apr 2025 04:00 )             23.7     04-05    137  |  103  |  10  ----------------------------<  75  4.8   |  20  |  <0.5[L]    Ca    7.1[L]      05 Apr 2025 00:54  Mg     1.6     04-05    TPro  3.4[L]  /  Alb  2.3[L]  /  TBili  0.6  /  DBili  x   /  AST  10  /  ALT  11  /  AlkPhos  45  04-05      Urinalysis Basic - ( 05 Apr 2025 00:54 )    Color: x / Appearance: x / SG: x / pH: x  Gluc: 75 mg/dL / Ketone: x  / Bili: x / Urobili: x   Blood: x / Protein: x / Nitrite: x   Leuk Esterase: x / RBC: x / WBC x   Sq Epi: x / Non Sq Epi: x / Bacteria: x      CAPILLARY BLOOD GLUCOSE          RADIOLOGY & ADDITIONAL TESTS: Reviewed.    ASSESSMENT:    PLAN:

## 2025-04-05 NOTE — PROGRESS NOTE ADULT - ASSESSMENT
IMPRESSION:    Acute on chronic hypoxemic / hypercapnic respiratory failure improved   COPD exacerbation improved  Left thigh hematoma SP IR embolization   Hypernatremia   Shock - resolved   HO COPD  HO A AFIB    PLAN:    CNS: Avoid CNS depressant.  Pain control if needed.      HEENT:  Oral care    PULMONARY:  HOB @ 45 degrees, NIV during sleep and PRN during the day.  Advair 250/50 and Spiriva.  Albuterol PRN     CARDIOVASCULAR: GDFR.  Avoid overload.  Target MAP > 60. Levo if needed.     GI: GI prophylaxis.  Feeding per speech.  Bowel regimen     RENAL:  F/u  lytes.  Correct as needed.  Monitor UO     INFECTIOUS DISEASE: Monitor VS.  Follow up cultures.  ID follow up noted.  Finish Zosyn course per ID. PO vanc until 4/6 per ID.      HEMATOLOGICAL:  DVT prophylaxis seq.  Continue Holding AC.  Monitor CBC and Coags.  Keep Hg > 7.  Repeat CTA noted. SP 1U pRBC. CBC in PM.     ENDOCRINE:  Follow up FS.  Insulin protocol if needed.    DNR/I    SDU for now.     DW team  IMPRESSION:    Acute on chronic hypoxemic / hypercapnic respiratory failure  COPD exacerbation improved  Left thigh hematoma SP IR embolization   Acute blood loss anemoia  Hypernatremia   Shock - resolved   HO COPD  HO A AFIB    PLAN:    CNS: Avoid CNS depressant.  Pain control if needed.      HEENT:  Oral care    PULMONARY:  HOB @ 45 degrees, NIV during sleep and PRN during the day.  Advair 250/50 and Spiriva.  Albuterol PRN     CARDIOVASCULAR: GDFR.  Avoid overload.  Target MAP > 60. Levo if needed.     GI: GI prophylaxis.  Feeding per speech.  Bowel regimen     RENAL:  F/u  lytes.  Correct as needed.  Monitor UO     INFECTIOUS DISEASE: Monitor VS.  Follow up cultures.  ID follow up noted.  Finish Zosyn course per ID. PO vanc until 4/6 per ID.      HEMATOLOGICAL:  DVT prophylaxis seq.  Continue Holding AC.  Monitor CBC and Coags.  Keep Hg > 7.  Repeat CTA . SP 1U pRBC. CBC in PM.     ENDOCRINE:  Follow up FS.  Insulin protocol if needed.        SDU for now.     DW team

## 2025-04-05 NOTE — PROGRESS NOTE ADULT - ASSESSMENT
74 year old F with PMHx of COPD on 3L NC, smoking hx, AFib on Eliquis, past hx of abdominal abscess (in 2021), Gastric Bypass surgery 20+ years ago, who presented to the ED from [facility] on 3/24 with CC of hypoxia and change in mental status. As per ED note, patient was calling for help at her NH this morning. She was noted to be saturating 80% while on her baseline 3L NC; was placed on non-rebreather and brought to ED. Patient initially admitted to MICU for acute hypoxic hypercapnic respiratory failure. Code status was confirmed to be DNR/DNI with MOLST form faxed from [facility]. Patient subsequently downgraded to SDU.   Course in SICU complicated by Left thigh hematoma requiring intervention by IR    Acute on chronic hypoxemic / hypercapnic respiratory failure   COPD exacerbation   Multifocal PNA  Left thigh hematoma SP IR embolization   CVA new   Hypernatremia   Septic Shock was onLevophed.    paroxysmal A AFIB  chronic HFpEF    PLANs:    - drop in BP (although she has been on low side) drop in hbg and elevated lactate noted, concerns for re bleeding, responded to 1 u prbc,   - trend lactate and Hbg, pending CT abd/ LLE  - off AC and DVT ppx   - CHAO monitor   - O2 requirements similar to baseline 2-3 LNC, s/p Zosyn and pred,  Advair and Spiriva.  Albuterol PRN   - MRI w  Small acute infarct within the right frontal subcortical white matter  - neuro following:, etiology likley cardioembolic / Vessels to vessels, pending carotid duplex TTE noted no gross abnormalities for CVA [place]   - keep off lasix for now given low BP   - SCD for DVT ppx for now  - PT/OT eval once more stable   - plans dw crit team   - spent 55 mins eval pt and coordinating care,r tanneriewed all labs and images  74 year old F with PMHx of COPD on 3L NC, smoking hx, AFib on Eliquis, past hx of abdominal abscess (in 2021), Gastric Bypass surgery 20+ years ago, who presented to the ED from [facility] on 3/24 with CC of hypoxia and change in mental status. As per ED note, patient was calling for help at her NH this morning. She was noted to be saturating 80% while on her baseline 3L NC; was placed on non-rebreather and brought to ED. Patient initially admitted to MICU for acute hypoxic hypercapnic respiratory failure. Code status was confirmed to be DNR/DNI with MOLST form faxed from [facility]. Patient subsequently downgraded to SDU.   Course in SICU complicated by Left thigh hematoma requiring intervention by IR    Acute on chronic hypoxemic / hypercapnic respiratory failure   COPD exacerbation   Multifocal PNA  Left thigh hematoma SP IR embolization   CVA new   Hypernatremia   Septic Shock was onLevophed.    paroxysmal A AFIB  chronic HFpEF    PLANs:    - drop in BP (although she has been on low side) drop in hbg and elevated lactate noted, concerns for re bleeding, responded to 1 u prbc,   - trend lactate and Hbg, pending CT abd/ LLE  - off AC and DVT ppx   - CHAO monitor   - O2 requirements similar to baseline 2-3 LNC, s/p Zosyn and pred,  Advair and Spiriva.  Albuterol PRN   - MRI w  Small acute infarct within the right frontal subcortical white matter  - neuro following:, etiology likley cardioembolic / Vessels to vessels, pending carotid duplex TTE noted no gross abnormalities for CVA [place]   - keep off lasix for now given low BP   - SCD for DVT ppx for now  - PT/OT eval once more stable   - plans dw crit team   - FULL CODE  - spent 55 mins eval pt and coordinating care,r tanneriewed all labs and images

## 2025-04-05 NOTE — CHART NOTE - NSCHARTNOTESELECT_GEN_ALL_CORE
Event Note
Event Note
Hematoma/Event Note
Transfer Note
Event Note
Follow Up/Nutrition Services
Hypotension/Event Note
Off Service Note
Transfer Note
Transfer Note

## 2025-04-05 NOTE — PROGRESS NOTE ADULT - SUBJECTIVE AND OBJECTIVE BOX
Patient is a 74y old  Female who presents with a chief complaint of AMS (28 Mar 2025 06:08)        Over Night Events:  Upgraded from 3A overnight for hypotension. Not on pressors. Afebrile. SP 1U prbc overnight.       ROS:     All ROS are negative except HPI         PHYSICAL EXAM    ICU Vital Signs Last 24 Hrs  T(C): 36.3 (05 Apr 2025 07:58), Max: 36.9 (04 Apr 2025 20:30)  T(F): 97.4 (05 Apr 2025 07:58), Max: 98.5 (04 Apr 2025 20:30)  HR: 60 (05 Apr 2025 07:58) (56 - 72)  BP: 88/53 (05 Apr 2025 07:58) (72/33 - 103/61)  BP(mean): 67 (05 Apr 2025 05:10) (59 - 75)  ABP: --  ABP(mean): --  RR: 19 (05 Apr 2025 07:58) (19 - 20)  SpO2: 99% (05 Apr 2025 07:58) (93% - 99%)    O2 Parameters below as of 05 Apr 2025 07:58  Patient On (Oxygen Delivery Method): nasal cannula  O2 Flow (L/min): 3          CONSTITUTIONAL:  NAD    ENT:   Airway patent,   Mouth with normal mucosa.     EYES:   Pupils equal,   Round and reactive to light.    CARDIAC:   Normal rate,   Regular rhythm.    No edema    RESPIRATORY:   Bilateral BS  Normal chest expansion  Not tachypneic,  No use of accessory muscles    GASTROINTESTINAL:  Abdomen soft,   Non-tender,   No guarding,     MUSCULOSKELETAL:   Range of motion is limited,  No clubbing, cyanosis    NEUROLOGICAL:   Alert and oriented   No motor  deficits.    SKIN:   Skin normal color for race,   Warm and dry       04-04-25 @ 07:01  -  04-05-25 @ 07:00  --------------------------------------------------------  IN:  Total IN: 0 mL    OUT:    Voided (mL): 1200 mL  Total OUT: 1200 mL    Total NET: -1200 mL          LABS:                            6.9    13.61 )-----------( 263      ( 05 Apr 2025 04:00 )             23.7                                               04-05    137  |  103  |  10  ----------------------------<  75  4.8   |  20  |  <0.5[L]    Ca    7.1[L]      05 Apr 2025 00:54  Mg     1.6     04-05    TPro  3.4[L]  /  Alb  2.3[L]  /  TBili  0.6  /  DBili  x   /  AST  10  /  ALT  11  /  AlkPhos  45  04-05                                             Urinalysis Basic - ( 05 Apr 2025 00:54 )    Color: x / Appearance: x / SG: x / pH: x  Gluc: 75 mg/dL / Ketone: x  / Bili: x / Urobili: x   Blood: x / Protein: x / Nitrite: x   Leuk Esterase: x / RBC: x / WBC x   Sq Epi: x / Non Sq Epi: x / Bacteria: x                                                  LIVER FUNCTIONS - ( 05 Apr 2025 00:54 )  Alb: 2.3 g/dL / Pro: 3.4 g/dL / ALK PHOS: 45 U/L / ALT: 11 U/L / AST: 10 U/L / GGT: x                                                                                                                                   ABG - ( 05 Apr 2025 04:03 )  pH, Arterial: 7.40  pH, Blood: x     /  pCO2: 49    /  pO2: 78    / HCO3: 30    / Base Excess: x     /  SaO2: 95.0                MEDICATIONS  (STANDING):  albuterol/ipratropium for Nebulization 3 milliLiter(s) Nebulizer every 6 hours  chlorhexidine 2% Cloths 1 Application(s) Topical daily  fluticasone propionate/ salmeterol 100-50 MICROgram(s) Diskus 1 Dose(s) Inhalation two times a day  hydrocortisone hemorrhoidal Suppository 1 Suppository(s) Rectal at bedtime  lidocaine   4% Patch 1 Patch Transdermal daily  nicotine -   7 mG/24Hr(s) Patch 1 Patch Transdermal daily  norepinephrine Infusion 0.05 MICROgram(s)/kG/Min (8.75 mL/Hr) IV Continuous <Continuous>  pantoprazole  Injectable 40 milliGRAM(s) IV Push daily  predniSONE   Tablet 40 milliGRAM(s) Oral daily  vancomycin    Solution 125 milliGRAM(s) Oral at bedtime    MEDICATIONS  (PRN):  acetaminophen     Tablet .. 650 milliGRAM(s) Oral every 6 hours PRN Temp greater or equal to 38C (100.4F), Mild Pain (1 - 3)  oxycodone    5 mG/acetaminophen 325 mG 1 Tablet(s) Oral every 12 hours PRN Severe Pain (7 - 10)      New X-rays reviewed:                                                                                  ECHO   Patient is a 74y old  Female who presents with a chief complaint of AMS (28 Mar 2025 06:08)        Over Night Events:  Upgraded from 3A overnight for hypotension. Not on pressors. Afebrile. SP 1U prbc overnight.           PHYSICAL EXAM    ICU Vital Signs Last 24 Hrs  T(C): 36.3 (05 Apr 2025 07:58), Max: 36.9 (04 Apr 2025 20:30)  T(F): 97.4 (05 Apr 2025 07:58), Max: 98.5 (04 Apr 2025 20:30)  HR: 60 (05 Apr 2025 07:58) (56 - 72)  BP: 88/53 (05 Apr 2025 07:58) (72/33 - 103/61)  BP(mean): 67 (05 Apr 2025 05:10) (59 - 75)  RR: 19 (05 Apr 2025 07:58) (19 - 20)  SpO2: 99% (05 Apr 2025 07:58) (93% - 99%)    O2 Parameters below as of 05 Apr 2025 07:58  Patient On (Oxygen Delivery Method): nasal cannula  O2 Flow (L/min): 3          CONSTITUTIONAL:  ill looking    CARDIAC:   SE, 2/6    RESPIRATORY:   bl rhonchi    GASTROINTESTINAL:  Abdomen soft,   Non-tender,   No guarding,     MUSCULOSKELETAL:   Range of motion is limited,  No clubbing, cyanosis        04-04-25 @ 07:01  -  04-05-25 @ 07:00  --------------------------------------------------------  IN:  Total IN: 0 mL    OUT:    Voided (mL): 1200 mL  Total OUT: 1200 mL    Total NET: -1200 mL          LABS:                            6.9    13.61 )-----------( 263      ( 05 Apr 2025 04:00 )             23.7                                               04-05    137  |  103  |  10  ----------------------------<  75  4.8   |  20  |  <0.5[L]    Ca    7.1[L]      05 Apr 2025 00:54  Mg     1.6     04-05    TPro  3.4[L]  /  Alb  2.3[L]  /  TBili  0.6  /  DBili  x   /  AST  10  /  ALT  11  /  AlkPhos  45  04-05                                             Urinalysis Basic - ( 05 Apr 2025 00:54 )    Color: x / Appearance: x / SG: x / pH: x  Gluc: 75 mg/dL / Ketone: x  / Bili: x / Urobili: x   Blood: x / Protein: x / Nitrite: x   Leuk Esterase: x / RBC: x / WBC x   Sq Epi: x / Non Sq Epi: x / Bacteria: x                                                  LIVER FUNCTIONS - ( 05 Apr 2025 00:54 )  Alb: 2.3 g/dL / Pro: 3.4 g/dL / ALK PHOS: 45 U/L / ALT: 11 U/L / AST: 10 U/L / GGT: x                                                                                                                                   ABG - ( 05 Apr 2025 04:03 )  pH, Arterial: 7.40  pH, Blood: x     /  pCO2: 49    /  pO2: 78    / HCO3: 30    / Base Excess: x     /  SaO2: 95.0                MEDICATIONS  (STANDING):  albuterol/ipratropium for Nebulization 3 milliLiter(s) Nebulizer every 6 hours  chlorhexidine 2% Cloths 1 Application(s) Topical daily  fluticasone propionate/ salmeterol 100-50 MICROgram(s) Diskus 1 Dose(s) Inhalation two times a day  hydrocortisone hemorrhoidal Suppository 1 Suppository(s) Rectal at bedtime  lidocaine   4% Patch 1 Patch Transdermal daily  nicotine -   7 mG/24Hr(s) Patch 1 Patch Transdermal daily  norepinephrine Infusion 0.05 MICROgram(s)/kG/Min (8.75 mL/Hr) IV Continuous <Continuous>  pantoprazole  Injectable 40 milliGRAM(s) IV Push daily  predniSONE   Tablet 40 milliGRAM(s) Oral daily  vancomycin    Solution 125 milliGRAM(s) Oral at bedtime    MEDICATIONS  (PRN):  acetaminophen     Tablet .. 650 milliGRAM(s) Oral every 6 hours PRN Temp greater or equal to 38C (100.4F), Mild Pain (1 - 3)  oxycodone    5 mG/acetaminophen 325 mG 1 Tablet(s) Oral every 12 hours PRN Severe Pain (7 - 10)    \

## 2025-04-05 NOTE — PROVIDER CONTACT NOTE (SEPSIS SCREENING) - NOTIFICATION DETAILS (SBAR) SITUATION:
Low BP 91/47 and previous record of low BP since 12noon. Thereafter repeated low BPs and repeated LR bolus, reassessed BP results reported to MD and some BP levels done by MD in room in presence of  MIGUELITO Yung.

## 2025-04-05 NOTE — CHART NOTE - NSCHARTNOTEFT_GEN_A_CORE
Patient having multiple episodes of diarrhea today, per chart review patient have a history of CDiff and was started on prophylactic PO vancomycin. Spoke with ID, will hold off sending c/diff panel for now unless patient persistently having diarrhea for 48hrs with worsening leukocytosis or new fever, then can send for c.diff.

## 2025-04-06 LAB
ALBUMIN SERPL ELPH-MCNC: 3.1 G/DL — LOW (ref 3.5–5.2)
ALP SERPL-CCNC: 77 U/L — SIGNIFICANT CHANGE UP (ref 30–115)
ALT FLD-CCNC: 21 U/L — SIGNIFICANT CHANGE UP (ref 0–41)
ANION GAP SERPL CALC-SCNC: 11 MMOL/L — SIGNIFICANT CHANGE UP (ref 7–14)
AST SERPL-CCNC: 25 U/L — SIGNIFICANT CHANGE UP (ref 0–41)
BASOPHILS # BLD AUTO: 0.01 K/UL — SIGNIFICANT CHANGE UP (ref 0–0.2)
BASOPHILS NFR BLD AUTO: 0.1 % — SIGNIFICANT CHANGE UP (ref 0–1)
BILIRUB SERPL-MCNC: 1.1 MG/DL — SIGNIFICANT CHANGE UP (ref 0.2–1.2)
BUN SERPL-MCNC: 14 MG/DL — SIGNIFICANT CHANGE UP (ref 10–20)
CALCIUM SERPL-MCNC: 8.1 MG/DL — LOW (ref 8.4–10.5)
CHLORIDE SERPL-SCNC: 103 MMOL/L — SIGNIFICANT CHANGE UP (ref 98–110)
CO2 SERPL-SCNC: 26 MMOL/L — SIGNIFICANT CHANGE UP (ref 17–32)
CREAT SERPL-MCNC: 0.6 MG/DL — LOW (ref 0.7–1.5)
EGFR: 94 ML/MIN/1.73M2 — SIGNIFICANT CHANGE UP
EGFR: 94 ML/MIN/1.73M2 — SIGNIFICANT CHANGE UP
EOSINOPHIL # BLD AUTO: 0.03 K/UL — SIGNIFICANT CHANGE UP (ref 0–0.7)
EOSINOPHIL NFR BLD AUTO: 0.2 % — SIGNIFICANT CHANGE UP (ref 0–8)
GLUCOSE BLDC GLUCOMTR-MCNC: 119 MG/DL — HIGH (ref 70–99)
GLUCOSE SERPL-MCNC: 86 MG/DL — SIGNIFICANT CHANGE UP (ref 70–99)
HCT VFR BLD CALC: 27.9 % — LOW (ref 37–47)
HGB BLD-MCNC: 8.2 G/DL — LOW (ref 12–16)
IMM GRANULOCYTES NFR BLD AUTO: 1 % — HIGH (ref 0.1–0.3)
LYMPHOCYTES # BLD AUTO: 1.24 K/UL — SIGNIFICANT CHANGE UP (ref 1.2–3.4)
LYMPHOCYTES # BLD AUTO: 8.1 % — LOW (ref 20.5–51.1)
MAGNESIUM SERPL-MCNC: 2.5 MG/DL — HIGH (ref 1.8–2.4)
MCHC RBC-ENTMCNC: 26.2 PG — LOW (ref 27–31)
MCHC RBC-ENTMCNC: 29.4 G/DL — LOW (ref 32–37)
MCV RBC AUTO: 89.1 FL — SIGNIFICANT CHANGE UP (ref 81–99)
MONOCYTES # BLD AUTO: 0.76 K/UL — HIGH (ref 0.1–0.6)
MONOCYTES NFR BLD AUTO: 5 % — SIGNIFICANT CHANGE UP (ref 1.7–9.3)
NEUTROPHILS # BLD AUTO: 13.07 K/UL — HIGH (ref 1.4–6.5)
NEUTROPHILS NFR BLD AUTO: 85.6 % — HIGH (ref 42.2–75.2)
NRBC BLD AUTO-RTO: 0 /100 WBCS — SIGNIFICANT CHANGE UP (ref 0–0)
PLATELET # BLD AUTO: 222 K/UL — SIGNIFICANT CHANGE UP (ref 130–400)
PMV BLD: 9.6 FL — SIGNIFICANT CHANGE UP (ref 7.4–10.4)
POTASSIUM SERPL-MCNC: 5.1 MMOL/L — HIGH (ref 3.5–5)
POTASSIUM SERPL-SCNC: 5.1 MMOL/L — HIGH (ref 3.5–5)
PROCALCITONIN SERPL-MCNC: 0.03 NG/ML — SIGNIFICANT CHANGE UP (ref 0.02–0.1)
PROT SERPL-MCNC: 5.7 G/DL — LOW (ref 6–8)
RBC # BLD: 3.13 M/UL — LOW (ref 4.2–5.4)
RBC # FLD: 23.9 % — HIGH (ref 11.5–14.5)
SODIUM SERPL-SCNC: 140 MMOL/L — SIGNIFICANT CHANGE UP (ref 135–146)
WBC # BLD: 15.26 K/UL — HIGH (ref 4.8–10.8)
WBC # FLD AUTO: 15.26 K/UL — HIGH (ref 4.8–10.8)

## 2025-04-06 PROCEDURE — 99233 SBSQ HOSP IP/OBS HIGH 50: CPT

## 2025-04-06 RX ORDER — POLYETHYLENE GLYCOL 3350 17 G/17G
17 POWDER, FOR SOLUTION ORAL
Refills: 0 | Status: DISCONTINUED | OUTPATIENT
Start: 2025-04-06 | End: 2025-04-09

## 2025-04-06 RX ORDER — SENNA 187 MG
2 TABLET ORAL AT BEDTIME
Refills: 0 | Status: DISCONTINUED | OUTPATIENT
Start: 2025-04-06 | End: 2025-04-09

## 2025-04-06 RX ADMIN — Medication 40 MILLIGRAM(S): at 11:45

## 2025-04-06 RX ADMIN — NICOTINE POLACRILEX 1 PATCH: 4 GUM, CHEWING ORAL at 11:50

## 2025-04-06 RX ADMIN — IPRATROPIUM BROMIDE AND ALBUTEROL SULFATE 3 MILLILITER(S): .5; 2.5 SOLUTION RESPIRATORY (INHALATION) at 02:17

## 2025-04-06 RX ADMIN — Medication 2 TABLET(S): at 22:38

## 2025-04-06 RX ADMIN — IPRATROPIUM BROMIDE AND ALBUTEROL SULFATE 3 MILLILITER(S): .5; 2.5 SOLUTION RESPIRATORY (INHALATION) at 07:48

## 2025-04-06 RX ADMIN — HYDROCORTISONE 1 SUPPOSITORY(S): 10 CREAM TOPICAL at 22:37

## 2025-04-06 RX ADMIN — NICOTINE POLACRILEX 1 PATCH: 4 GUM, CHEWING ORAL at 07:47

## 2025-04-06 RX ADMIN — Medication 1 APPLICATION(S): at 11:46

## 2025-04-06 RX ADMIN — Medication 1 DOSE(S): at 22:35

## 2025-04-06 RX ADMIN — NICOTINE POLACRILEX 1 PATCH: 4 GUM, CHEWING ORAL at 11:45

## 2025-04-06 RX ADMIN — Medication 1 APPLICATION(S): at 07:00

## 2025-04-06 RX ADMIN — IPRATROPIUM BROMIDE AND ALBUTEROL SULFATE 3 MILLILITER(S): .5; 2.5 SOLUTION RESPIRATORY (INHALATION) at 19:29

## 2025-04-06 RX ADMIN — NICOTINE POLACRILEX 1 PATCH: 4 GUM, CHEWING ORAL at 21:25

## 2025-04-06 NOTE — CONSULT NOTE ADULT - ASSESSMENT
74 year old F with PMHx of COPD on 3L NC, smoking hx, AFib on Eliquis, past hx of abdominal abscess (in 2021), Gastric Bypass surgery 20+ years ago, who presented for acute hypoxic hypercapnic respiratory failure. Course complicated by a left thigh hematoma on 3/29/25 requiring IR embolization and PRBC transfusion. GI consulted 4/6 to rule out Gi bleed in the absence of overt bleeding.         Patient has had no blood in stool or melena. Patient has notable moderate stool burden on recent CT, currently patient has digney shield in place showing brown stool. Hemoglobin stable after blood transfusion.    #Acute anemia 2/2 left thigh hematoma s/p IR embolization - r/o GI bleed in the absence of overt GI bleeding   - Hemodynamically stable & no active bleeding  - Digney shield in place showing light brown stool; no blood or melena   - Hb 7.3 (4/4) ->6.9 (4/5) -> s/p 1 unit PRBC -> 8.4 (4/6)  - CTA 4/5 showing no active GI bleed     #Rec  - likely Hb on 4/5 was slightly dilutional as there has been signs of bleeding and repeat CTA shows no active GI bleed and stable hematoma with appropriate response to PRBC transfusion (7.3-> 8.4 s/p 1 unit PRBC)  - Trend H&H and monitor for overt GI bleeding   - Maintain active Type and screen, x2 18G IVs  - If patient to be on steroids would start daily PPI GI ppx   - Please avoid any NSAIDs  - Recommend outpatient colonoscopy for history of colon polyps   - If any unstable bleed, please call GI

## 2025-04-06 NOTE — PROGRESS NOTE ADULT - ATTENDING SUPERVISION STATEMENT
Fellow
Resident
Fellow
Fellow
Resident
Resident

## 2025-04-06 NOTE — CONSULT NOTE ADULT - SUBJECTIVE AND OBJECTIVE BOX
Gastroenterology Consultation:    Patient is a 74y old  Female who presents with a chief complaint of AMS (28 Mar 2025 06:08)        Admitted on: 03-24-25      HPI:  74 year old F with PMHx of COPD on 3L NC, smoking hx, AFib on Eliquis, past hx of abdominal abscess (in 2021), Gastric Bypass surgery 20+ years ago, who presented to the ED from [facility] on 3/24 with CC of hypoxia Patient initially admitted to MICU for acute hypoxic hypercapnic respiratory failure. Course complicated by a left thigh hematoma on 3/29/25. Therapeutic lovenox was rversed with protamine and the pt had a CTA that showed active extravasation from a deep femoral artery branch. S/p embolization by IR. The pts Hb remeained stable and she underwent a post embolization CTA which showed no bleeding. The pts Hb remained stable for a day but he Hb dropped again on 4/1/25 and she required pressors and 2PRBCs. A repeat CTA showed no active bleed or retroperitoneal bleed. The pt was weaned off pressors and is stable for DGTF. on 4/5 Hgb dropped to 6.9. Transfused 1 units. Repeat CTA shows no active extrav. Hb improved to 8.4. GI consulted for Gi bleed in the absence of overt bleeding. Patient has had no blood in stool or melena. Patient has notable moderate stool burden on recent CT, currently patient has digney shield in place showing brown stool. Hemoglobin stable after blood transfusion.    Prior EGD:  2021 EGD Impressions:    White plaques in the esophagus (Biopsy).    Erythema in the stomach compatible with non-erosive gastritis. (Biopsy).    No evidence of gastric bypass surgery inEGD. 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 No ulcer was noted at the anastomosis site. .     Prior Colonoscopy:  2021 Colonoscopy Impressions:    Normal mucosa in the whole colon. (Biopsy).    Polyp (8 mm) in the cecum. (Polypectomy).    Polyp (8 mm) in the ascending colon. (Polypectomy).    Polyps (4 mm) in the ascending colon. (Polypectomy).    Internal and external hemorrhoids.    Multiple opening of perianal abscess were noted on physical exam. .     PAST MEDICAL & SURGICAL HISTORY:  Atrial fibrillation      History of COPD      No significant past surgical history            FAMILY HISTORY:      Social History:  Tobacco:  Alcohol:  Drugs:    Home Medications:  acetaminophen 325 mg oral tablet: 2 tab(s) orally every 6 hours as needed for  mild pain (24 Mar 2025 13:41)  Aldactone 25 mg oral tablet: 1 tab(s) orally every other day (24 Mar 2025 13:48)  apixaban 5 mg oral tablet: 1 tab(s) orally every 12 hours (24 Mar 2025 13:50)  cetirizine 10 mg oral tablet: 1 tab(s) orally every 24 hours as needed for  allergy symptoms (24 Mar 2025 13:47)  DULoxetine 20 mg oral delayed release capsule: 2 cap(s) orally once a day (24 Mar 2025 13:47)  folic acid 1 mg oral tablet: 1 tab(s) orally once a day (24 Mar 2025 13:50)  furosemide 40 mg oral tablet: 1 tab(s) orally once a day (24 Mar 2025 13:47)  halobetasol 0.05% topical ointment: Apply topically to affected area every 12 hours for four weeks, started on 3/7/2025 (24 Mar 2025 13:47)  ipratropium-albuterol 0.5 mg-2.5 mg/3 mL inhalation solution: 3 milliliter(s) by nebulizer 4 times a day (24 Mar 2025 13:47)  lidocaine 4% topical film: Apply topically to affected area once a day apply to L shoulder topically one time a day for pain (24 Mar 2025 13:47)  thiamine 100 mg oral tablet: 1 tab(s) orally once a day (24 Mar 2025 13:50)  Trelegy Ellipta 200 mcg-62.5 mcg-25 mcg/inh inhalation powder: 1 puff(s) inhaled once a day (24 Mar 2025 13:48)        MEDICATIONS  (STANDING):  albuterol/ipratropium for Nebulization 3 milliLiter(s) Nebulizer every 6 hours  chlorhexidine 2% Cloths 1 Application(s) Topical daily  chlorhexidine 2% Cloths 1 Application(s) Topical daily  fluticasone propionate/ salmeterol 250-50 MICROgram(s) Diskus 1 Dose(s) Inhalation two times a day  hydrocortisone hemorrhoidal Suppository 1 Suppository(s) Rectal at bedtime  lidocaine   4% Patch 1 Patch Transdermal daily  nicotine -   7 mG/24Hr(s) Patch 1 Patch Transdermal daily  pantoprazole  Injectable 40 milliGRAM(s) IV Push daily  polyethylene glycol 3350 17 Gram(s) Oral two times a day  senna 2 Tablet(s) Oral at bedtime    MEDICATIONS  (PRN):  acetaminophen     Tablet .. 650 milliGRAM(s) Oral every 6 hours PRN Temp greater or equal to 38C (100.4F), Mild Pain (1 - 3)  oxycodone    5 mG/acetaminophen 325 mG 1 Tablet(s) Oral every 12 hours PRN Severe Pain (7 - 10)      Allergies  No Known Allergies      Review of Systems:   Constitutional:  No Fever, No Chills  ENT/Mouth:  No Hearing Changes,  No Difficulty Swallowing  Eyes:  No Eye Pain, No Vision Changes  Cardiovascular:  No Chest Pain, No Palpitations  Respiratory:  No Cough, No Dyspnea  Gastrointestinal:  As described in HPI  Musculoskeletal:  No Joint Swelling, No Back Pain  Skin:  No Skin Lesions, No Jaundice  Neuro:  No Syncope, No Dizziness  Heme/Lymph:  No Bruising, No Bleeding.          Physical Examination:  T(C): 35.8 (04-06-25 @ 07:57), Max: 36.5 (04-05-25 @ 21:00)  HR: 65 (04-06-25 @ 07:57) (59 - 68)  BP: 89/52 (04-06-25 @ 07:57) (89/52 - 109/53)  RR: 18 (04-06-25 @ 07:57) (18 - 19)  SpO2: 89% (04-06-25 @ 07:57) (89% - 98%)      04-04-25 @ 07:01  -  04-05-25 @ 07:00  --------------------------------------------------------  IN: 0 mL / OUT: 1200 mL / NET: -1200 mL    04-05-25 @ 07:01  -  04-06-25 @ 07:00  --------------------------------------------------------  IN: 175 mL / OUT: 750 mL / NET: -575 mL          GENERAL: AAOx3, no acute distress.  HEAD:  Atraumatic, Normocephalic  EYES: conjunctiva and sclera clear  NECK: Supple, no JVD or thyromegaly  CHEST/LUNG: Clear to auscultation bilaterally; No wheeze, rhonchi, or rales  HEART: Regular rate and rhythm; normal S1, S2, No murmurs.  ABDOMEN: Soft, nontender, nondistended; Bowel sounds present  NEUROLOGY: No asterixis or tremor.   SKIN: Intact, no jaundice        Data:                        8.2    15.26 )-----------( 222      ( 06 Apr 2025 05:52 )             27.9     Hgb Trend:  8.2  04-06-25 @ 05:52  8.4  04-05-25 @ 11:00  6.9  04-05-25 @ 04:00  4.9  04-05-25 @ 00:54  7.3  04-04-25 @ 13:30  7.3  04-04-25 @ 07:36  7.8  04-03-25 @ 22:18        04-06    140  |  103  |  14  ----------------------------<  86  5.1[H]   |  26  |  0.6[L]    Ca    8.1[L]      06 Apr 2025 05:52  Mg     2.5     04-06    TPro  5.7[L]  /  Alb  3.1[L]  /  TBili  1.1  /  DBili  x   /  AST  25  /  ALT  21  /  AlkPhos  77  04-06    Liver panel trend:  TBili 1.1   /   AST 25   /   ALT 21   /   AlkP 77   /   Tptn 5.7   /   Alb 3.1    /   DBili --      04-06  TBili 0.6   /   AST 10   /   ALT 11   /   AlkP 45   /   Tptn 3.4   /   Alb 2.3    /   DBili --      04-05  TBili 1.2   /   AST 18   /   ALT 15   /   AlkP 55   /   Tptn 5.1   /   Alb 3.0    /   DBili --      03-31  TBili 1.6   /   AST 16   /   ALT 14   /   AlkP 59   /   Tptn 5.2   /   Alb 2.9    /   DBili --      03-30  TBili 1.1   /   AST 23   /   ALT 16   /   AlkP 66   /   Tptn 5.6   /   Alb 3.1    /   DBili --      03-29  TBili 1.2   /   AST 45   /   ALT 21   /   AlkP 85   /   Tptn 6.8   /   Alb 3.5    /   DBili --      03-28          Culture - Blood (collected 05 Apr 2025 00:54)  Source: Blood Blood  Preliminary Report (06 Apr 2025 06:01):    No growth at 24 hours          Radiology:  CT Angio Abdomen and Pelvis w/ IV Cont:   ACC: [id number] EXAM:  CT ANGIO ABD PELV (W)AW IC   ORDERED BY: RESHMA BLANCO     PROCEDURE DATE:  04/05/2025          INTERPRETATION:  CLINICAL INFORMATION: Anemia with concern for bleed    COMPARISON: April 2, 2025    CONTRAST/COMPLICATIONS:  IV Contrast: Omnipaque 350  100 cc administered   0 cc discarded  Oral Contrast: NONE  Complications: None .    PROCEDURE:  CT of the Abdomen and Pelvis was performed.  Precontrast, Arterial and Delayed phases were performed.  Sagittal and coronal reformats were performed.    FINDINGS:  LOWER CHEST: Cardiomegaly with markedly dilated pulmonary artery. Small   bilateral pleural effusions with lower lobe atelectasis and possible   superimposed right basilar consolidation, likely unchanged from prior.    LIVER: Within normal limits.  BILE DUCTS: Normal caliber.  GALLBLADDER: Cholecystectomy.  SPLEEN: Within normal limits.  PANCREAS: Within normal limits.  ADRENALS: Within normal limits.  KIDNEYS/URETERS: Symmetric renal enhancement without hydronephrosis   bilaterally. Left renal cyst. Right cortical scarring.    BLADDER: Within normal limits.  REPRODUCTIVE ORGANS: Unchanged    BOWEL: Postsurgical changes of the stomach and bowel. Rectal fecal   impaction present. Moderate fecal load throughout the colon no   intraluminal contrast extravasation.  PERITONEUM/RETROPERITONEUM: Within normal limits.    VESSELS: Abdominal aorta is normal in caliber with atherosclerotic   calcifications. Patent bilateral common iliac and external iliac arteries   with atherosclerotic calcifications. No significant stenosis.    LYMPH NODES: No lymphadenopathy.  ABDOMINAL WALL: Partially imaged left anteromedial thigh intramuscular   hematoma, better evaluated on CT lower extremity performed concurrently.   No definite evidence for extravasation within proximal aspect. Right   lateral subcutaneous soft tissue 4.8 x 4.4 cm is unchanged (series 13   image 304).  BONES: Postsurgical changes left femur.    IMPRESSION:    No evidence for arterial extravasation of contrast to confirm bleeding   site.    Partially imaged left thigh hematoma, better evaluated with CT lower   extremity performed concurrently.    Small bilateral pleural effusions with lower lobe atelectasis and   possible superimposed right basilar consolidation, likely unchanged from   prior.    --- End of Report ---            ELLIOT LANDAU MD; Attending Radiologist  This document has been electronically signed. Apr 6 2025  9:27AM (04-05-25 @ 13:25)

## 2025-04-06 NOTE — PROGRESS NOTE ADULT - ASSESSMENT
IMPRESSION:    Acute on chronic hypoxemic / hypercapnic respiratory failure  COPD exacerbation improved  Left thigh hematoma SP IR embolization   Acute blood loss anemoia  Hypernatremia   Shock - resolved   HO COPD  HO A AFIB    PLAN:    CNS: Avoid CNS depressant.  Pain control if needed.      HEENT:  Oral care    PULMONARY:  HOB @ 45 degrees, NIV during sleep and PRN during the day.  Advair 250/50 and Spiriva.  Albuterol PRN     CARDIOVASCULAR: GDFR.  Avoid overload.  Target MAP > 60. Levo if needed.     GI: GI prophylaxis. Feeding per speech. Bowel regimen, GI consult    RENAL:  F/u  lytes.  Correct as needed.  Monitor UO     INFECTIOUS DISEASE: Monitor VS.  Follow up cultures.  ID follow up noted.  Finish Zosyn course per ID. PO vanc until 4/6 per ID.      HEMATOLOGICAL:  DVT prophylaxis seq.  Continue Holding AC.  Monitor CBC and Coags.  Keep Hg > 7.  Repeat CTA noted . SP 1U pRBC.     ENDOCRINE:  Follow up FS.  Insulin protocol if needed.    DGTF  DW team IMPRESSION:    Acute on chronic hypoxemic / hypercapnic respiratory failure  COPD exacerbation improved  Left thigh hematoma SP IR embolization   Acute blood loss anemia  Hypernatremia   Shock - resolved   HO COPD  HO A AFIB    PLAN:    CNS: Avoid CNS depressant.  Pain control if needed.      HEENT:  Oral care    PULMONARY:  HOB @ 45 degrees, NIV during sleep and PRN during the day.  Advair 250/50 and Spiriva.  Albuterol PRN     CARDIOVASCULAR: GDFR.  Avoid overload.  Target MAP > 60. Levo if needed.     GI: GI prophylaxis. Feeding per speech. Bowel regimen, GI consult    RENAL:  F/u  lytes.  Correct as needed.  Monitor UO     INFECTIOUS DISEASE: Monitor VS.  Follow up cultures.  ID follow up noted.  Finish Zosyn course per ID. PO vanc until 4/6 per ID.      HEMATOLOGICAL:  DVT prophylaxis seq.  Continue Holding AC.  Monitor CBC and Coags.  Keep Hg > 7.  Repeat CTA noted . SP 1U pRBC.     ENDOCRINE:  Follow up FS.  Insulin protocol if needed.      DW team

## 2025-04-06 NOTE — PROGRESS NOTE ADULT - SUBJECTIVE AND OBJECTIVE BOX
Patient is a 74y old  Female who presents with a chief complaint of AMS (28 Mar 2025 06:08)        Over Night Events:    No overnight events    ROS:  See HPI    PHYSICAL EXAM    ICU Vital Signs Last 24 Hrs  T(C): 35.8 (06 Apr 2025 07:57), Max: 36.5 (05 Apr 2025 21:00)  T(F): 96.5 (06 Apr 2025 07:57), Max: 97.7 (05 Apr 2025 21:00)  HR: 65 (06 Apr 2025 07:57) (59 - 68)  BP: 89/52 (06 Apr 2025 07:57) (89/52 - 109/53)  BP(mean): 68 (06 Apr 2025 07:57) (68 - 69)  ABP: --  ABP(mean): --  RR: 18 (06 Apr 2025 07:57) (18 - 19)  SpO2: 89% (06 Apr 2025 07:57) (89% - 98%)    O2 Parameters below as of 06 Apr 2025 05:10  Patient On (Oxygen Delivery Method): nasal cannula    General: NAD  Lungs: Bilateral BS  Cardiovascular: Regular   Gastrointestinal: Soft,   Skin: sacral stage 3  Neurological: aox3       04-05-25 @ 07:01  -  04-06-25 @ 07:00  --------------------------------------------------------  IN:    IV PiggyBack: 50 mL    IV PiggyBack: 125 mL  Total IN: 175 mL    OUT:    Other (mL): 300 mL    Voided (mL): 450 mL  Total OUT: 750 mL    Total NET: -575 mL      LABS:                            8.2    15.26 )-----------( 222      ( 06 Apr 2025 05:52 )             27.9                                               04-06    140  |  103  |  14  ----------------------------<  86  5.1[H]   |  26  |  0.6[L]    Ca    8.1[L]      06 Apr 2025 05:52  Mg     2.5     04-06    TPro  5.7[L]  /  Alb  3.1[L]  /  TBili  1.1  /  DBili  x   /  AST  25  /  ALT  21  /  AlkPhos  77  04-06                                             Urinalysis Basic - ( 06 Apr 2025 05:52 )    Color: x / Appearance: x / SG: x / pH: x  Gluc: 86 mg/dL / Ketone: x  / Bili: x / Urobili: x   Blood: x / Protein: x / Nitrite: x   Leuk Esterase: x / RBC: x / WBC x   Sq Epi: x / Non Sq Epi: x / Bacteria: x                                              LIVER FUNCTIONS - ( 06 Apr 2025 05:52 )  Alb: 3.1 g/dL / Pro: 5.7 g/dL / ALK PHOS: 77 U/L / ALT: 21 U/L / AST: 25 U/L / GGT: x                                                  Culture - Blood (collected 05 Apr 2025 00:54)  Source: Blood Blood  Preliminary Report (06 Apr 2025 06:01):    No growth at 24 hours                                                                                       ABG - ( 05 Apr 2025 04:03 )  pH, Arterial: 7.40  pH, Blood: x     /  pCO2: 49    /  pO2: 78    / HCO3: 30    / Base Excess: x     /  SaO2: 95.0            MEDICATIONS  (STANDING):  albuterol/ipratropium for Nebulization 3 milliLiter(s) Nebulizer every 6 hours  chlorhexidine 2% Cloths 1 Application(s) Topical daily  chlorhexidine 2% Cloths 1 Application(s) Topical daily  fluticasone propionate/ salmeterol 250-50 MICROgram(s) Diskus 1 Dose(s) Inhalation two times a day  hydrocortisone hemorrhoidal Suppository 1 Suppository(s) Rectal at bedtime  lidocaine   4% Patch 1 Patch Transdermal daily  nicotine -   7 mG/24Hr(s) Patch 1 Patch Transdermal daily  pantoprazole  Injectable 40 milliGRAM(s) IV Push daily    MEDICATIONS  (PRN):  acetaminophen     Tablet .. 650 milliGRAM(s) Oral every 6 hours PRN Temp greater or equal to 38C (100.4F), Mild Pain (1 - 3)  oxycodone    5 mG/acetaminophen 325 mG 1 Tablet(s) Oral every 12 hours PRN Severe Pain (7 - 10)      Xrays:                                                                                     ECHO     Patient is a 74y old  Female who presents with a chief complaint of AMS (28 Mar 2025 06:08)        Over Night Events:    No overnight events, CT AP reviewed      PHYSICAL EXAM    ICU Vital Signs Last 24 Hrs  T(C): 35.8 (06 Apr 2025 07:57), Max: 36.5 (05 Apr 2025 21:00)  T(F): 96.5 (06 Apr 2025 07:57), Max: 97.7 (05 Apr 2025 21:00)  HR: 65 (06 Apr 2025 07:57) (59 - 68)  BP: 89/52 (06 Apr 2025 07:57) (89/52 - 109/53)  BP(mean): 68 (06 Apr 2025 07:57) (68 - 69)  RR: 18 (06 Apr 2025 07:57) (18 - 19)  SpO2: 89% (06 Apr 2025 07:57) (89% - 98%)    O2 Parameters below as of 06 Apr 2025 05:10  Patient On (Oxygen Delivery Method): nasal cannula    General: ill looking  Lungs: dec bs both bases  Cardiovascular: BEATRIZ 2.6  Gastrointestinal: Soft,   Skin: sacral stage 3  Neurological: aox3       04-05-25 @ 07:01  -  04-06-25 @ 07:00  --------------------------------------------------------  IN:    IV PiggyBack: 50 mL    IV PiggyBack: 125 mL  Total IN: 175 mL    OUT:    Other (mL): 300 mL    Voided (mL): 450 mL  Total OUT: 750 mL    Total NET: -575 mL      LABS:                            8.2    15.26 )-----------( 222      ( 06 Apr 2025 05:52 )             27.9                                               04-06    140  |  103  |  14  ----------------------------<  86  5.1[H]   |  26  |  0.6[L]    Ca    8.1[L]      06 Apr 2025 05:52  Mg     2.5     04-06    TPro  5.7[L]  /  Alb  3.1[L]  /  TBili  1.1  /  DBili  x   /  AST  25  /  ALT  21  /  AlkPhos  77  04-06                                             Urinalysis Basic - ( 06 Apr 2025 05:52 )    Color: x / Appearance: x / SG: x / pH: x  Gluc: 86 mg/dL / Ketone: x  / Bili: x / Urobili: x   Blood: x / Protein: x / Nitrite: x   Leuk Esterase: x / RBC: x / WBC x   Sq Epi: x / Non Sq Epi: x / Bacteria: x                                              LIVER FUNCTIONS - ( 06 Apr 2025 05:52 )  Alb: 3.1 g/dL / Pro: 5.7 g/dL / ALK PHOS: 77 U/L / ALT: 21 U/L / AST: 25 U/L / GGT: x                                                  Culture - Blood (collected 05 Apr 2025 00:54)  Source: Blood Blood  Preliminary Report (06 Apr 2025 06:01):    No growth at 24 hours                                                                                       ABG - ( 05 Apr 2025 04:03 )  pH, Arterial: 7.40  pH, Blood: x     /  pCO2: 49    /  pO2: 78    / HCO3: 30    / Base Excess: x     /  SaO2: 95.0            MEDICATIONS  (STANDING):  albuterol/ipratropium for Nebulization 3 milliLiter(s) Nebulizer every 6 hours  chlorhexidine 2% Cloths 1 Application(s) Topical daily  chlorhexidine 2% Cloths 1 Application(s) Topical daily  fluticasone propionate/ salmeterol 250-50 MICROgram(s) Diskus 1 Dose(s) Inhalation two times a day  hydrocortisone hemorrhoidal Suppository 1 Suppository(s) Rectal at bedtime  lidocaine   4% Patch 1 Patch Transdermal daily  nicotine -   7 mG/24Hr(s) Patch 1 Patch Transdermal daily  pantoprazole  Injectable 40 milliGRAM(s) IV Push daily    MEDICATIONS  (PRN):  acetaminophen     Tablet .. 650 milliGRAM(s) Oral every 6 hours PRN Temp greater or equal to 38C (100.4F), Mild Pain (1 - 3)  oxycodone    5 mG/acetaminophen 325 mG 1 Tablet(s) Oral every 12 hours PRN Severe Pain (7 - 10)

## 2025-04-06 NOTE — PROGRESS NOTE ADULT - ASSESSMENT
74 year old F with PMHx of COPD on 3L NC, smoking hx, AFib on Eliquis, past hx of abdominal abscess (in 2021), Gastric Bypass surgery 20+ years ago, who presented to the ED from [facility] on 3/24 with CC of hypoxia and change in mental status. Hospital course was complicated by a left thigh hematoma on 3/29/25. Therapeutic lovenox was rversed with protamine and the pt had a CTA that showed active extravasation from a deep femoral artery branch. S/p embolization by IR. The pts Hb remeained stable and she underwent a post embolization CTA which showed no bleeding. The pts Hb remained stable for a day but he Hb dropped again on 4/1/25 and she required pressors and 2PRBCs. A repeat CTA showed no active bleed or retroperitoneal bleed. She was transferred to the floor, whre had hypotension and acute drop in Hb, transferred to SDU    Acute Hypoxic Hypercapnic Respiratory Failure likely secondary to CAP- Resolved  COPD on 3L NC  Smoking Hx  - ct chest c/w multifocal pneumonia, s/p course of antibiotics, ended 4/4  - s/p course of steroids, ended 4/5  - currently on NC, taper down as tolerated  - PT/OT    Left thigh Hematoma with contrast extravasation with acute blood loss anemia   - S/p embolization of branch of the deep left femoral artery by IR  - serial CTAs with stable hematoma, no active bleed   - s/p multiple PRBC transfusions  - Hb stable  - AC on hold, repeat LE duplex    Hypernatremia- resolved  - Na improved  s/p D5  - Monitor and encourage PO intake     Mild generalized edema on CTH  - cth with mild cerebral edema  - repeat cth unchanged  - veeg no interictal activity  - MRA/MRV performed => small acute infarct  - f/u neuro.    Paroxysmal AFib on Eliquis  Chronic HFpEF (TTE 6/2021 EF 63%, GIIIDD)  Bilateral Pleural Effusions  - on home med Lasix 40 mg qd, currently being held  - Eliquis on hold   - Bilateral LE dopplex: No dvt in march, repeat another duplex     Previously DNR/DNI, rescinded yesterday, now full code  Pending: monitor CBC, repeat LE duplex, if CBC stable, resume AC, fu neuro re: mri findings, taper supplemental 02, PT/OT       Patient seen at bedside, total time spent to evaluate and treat the patient's acute illness and chronic medical conditions as well as time spent reviewing prior records, labs, radiology, documenting in electronic medical records,  discussing medical plan with   medical team was 51 minutes with >50% of time spent face to face with patient, discussing with patient/family as well as coordination of care

## 2025-04-06 NOTE — PROGRESS NOTE ADULT - SUBJECTIVE AND OBJECTIVE BOX
WEGENER, LOIS  74y Female    CHIEF COMPLAINT:    Patient is a 74y old  Female who presents with a chief complaint of AMS (28 Mar 2025 06:08)    INTERVAL HPI/OVERNIGHT EVENTS:    Patient seen and examined. No acute events overnight. Intermittently agitated     ROS: All other systems are negative.    Vital Signs:    T(F): 97.5 (04-06-25 @ 12:12), Max: 97.7 (04-05-25 @ 21:00)  HR: 62 (04-06-25 @ 12:12) (59 - 68)  BP: 93/65 (04-06-25 @ 12:12) (89/52 - 109/53)  RR: 18 (04-06-25 @ 12:12) (18 - 19)  SpO2: 95% (04-06-25 @ 12:12) (89% - 98%)    05 Apr 2025 07:01  -  06 Apr 2025 07:00  --------------------------------------------------------  IN: 175 mL / OUT: 750 mL / NET: -575 mL    PHYSICAL EXAM:    GENERAL:  NAD  SKIN: No rashes or lesions  HEENT: Atraumatic. Normocephalic.    NECK: Supple, No JVD.    PULMONARY: CTA B/L. No wheezing. No rales  CVS: Normal S1, S2. Rate and Rhythm are regular   ABDOMEN/GI: Soft, Nontender, Nondistended Echymoses noted   MSK:  No clubbing or cyanosis .  NEUROLOGIC: moves all extremities   PSYCH: Alert & oriented x 2, normal affect    Consultant(s) Notes Reviewed:  [x ] YES  [ ] NO  Care Discussed with Consultants/Other Providers [ x] YES  [ ] NO    LABS:                        8.2    15.26 )-----------( 222      ( 06 Apr 2025 05:52 )             27.9     140  |  103  |  14  ----------------------------<  86  5.1[H]   |  26  |  0.6[L]    Ca    8.1[L]      06 Apr 2025 05:52  Mg     2.5     04-06    TPro  5.7[L]  /  Alb  3.1[L]  /  TBili  1.1  /  DBili  x   /  AST  25  /  ALT  21  /  AlkPhos  77  04-06    Culture - Blood (collected 05 Apr 2025 00:54)  Source: Blood Blood  Preliminary Report (06 Apr 2025 06:01):    No growth at 24 hours    RADIOLOGY & ADDITIONAL TESTS:  Imaging or report Personally Reviewed:  [x] YES  [ ] NO  EKG reviewed: [x] YES  [ ] NO    Medications:  Standing  albuterol/ipratropium for Nebulization 3 milliLiter(s) Nebulizer every 6 hours  chlorhexidine 2% Cloths 1 Application(s) Topical daily  chlorhexidine 2% Cloths 1 Application(s) Topical daily  fluticasone propionate/ salmeterol 250-50 MICROgram(s) Diskus 1 Dose(s) Inhalation two times a day  hydrocortisone hemorrhoidal Suppository 1 Suppository(s) Rectal at bedtime  lidocaine   4% Patch 1 Patch Transdermal daily  nicotine -   7 mG/24Hr(s) Patch 1 Patch Transdermal daily  pantoprazole  Injectable 40 milliGRAM(s) IV Push daily  polyethylene glycol 3350 17 Gram(s) Oral two times a day  senna 2 Tablet(s) Oral at bedtime    PRN Meds  acetaminophen     Tablet .. 650 milliGRAM(s) Oral every 6 hours PRN  oxycodone    5 mG/acetaminophen 325 mG 1 Tablet(s) Oral every 12 hours PRN

## 2025-04-07 LAB
ALBUMIN SERPL ELPH-MCNC: 3.1 G/DL — LOW (ref 3.5–5.2)
ALP SERPL-CCNC: 72 U/L — SIGNIFICANT CHANGE UP (ref 30–115)
ALT FLD-CCNC: 18 U/L — SIGNIFICANT CHANGE UP (ref 0–41)
ANION GAP SERPL CALC-SCNC: 5 MMOL/L — LOW (ref 7–14)
AST SERPL-CCNC: 17 U/L — SIGNIFICANT CHANGE UP (ref 0–41)
BASOPHILS # BLD AUTO: 0.01 K/UL — SIGNIFICANT CHANGE UP (ref 0–0.2)
BASOPHILS NFR BLD AUTO: 0.1 % — SIGNIFICANT CHANGE UP (ref 0–1)
BILIRUB SERPL-MCNC: 1 MG/DL — SIGNIFICANT CHANGE UP (ref 0.2–1.2)
BUN SERPL-MCNC: 16 MG/DL — SIGNIFICANT CHANGE UP (ref 10–20)
CALCIUM SERPL-MCNC: 8 MG/DL — LOW (ref 8.4–10.5)
CHLORIDE SERPL-SCNC: 104 MMOL/L — SIGNIFICANT CHANGE UP (ref 98–110)
CO2 SERPL-SCNC: 29 MMOL/L — SIGNIFICANT CHANGE UP (ref 17–32)
CREAT SERPL-MCNC: <0.5 MG/DL — LOW (ref 0.7–1.5)
EGFR: 104 ML/MIN/1.73M2 — SIGNIFICANT CHANGE UP
EGFR: 104 ML/MIN/1.73M2 — SIGNIFICANT CHANGE UP
EOSINOPHIL # BLD AUTO: 0.03 K/UL — SIGNIFICANT CHANGE UP (ref 0–0.7)
EOSINOPHIL NFR BLD AUTO: 0.2 % — SIGNIFICANT CHANGE UP (ref 0–8)
GLUCOSE SERPL-MCNC: 176 MG/DL — HIGH (ref 70–99)
HCT VFR BLD CALC: 27.9 % — LOW (ref 37–47)
HGB BLD-MCNC: 8 G/DL — LOW (ref 12–16)
IMM GRANULOCYTES NFR BLD AUTO: 0.9 % — HIGH (ref 0.1–0.3)
LYMPHOCYTES # BLD AUTO: 0.9 K/UL — LOW (ref 1.2–3.4)
LYMPHOCYTES # BLD AUTO: 7.5 % — LOW (ref 20.5–51.1)
MAGNESIUM SERPL-MCNC: 2.4 MG/DL — SIGNIFICANT CHANGE UP (ref 1.8–2.4)
MCHC RBC-ENTMCNC: 26.2 PG — LOW (ref 27–31)
MCHC RBC-ENTMCNC: 28.7 G/DL — LOW (ref 32–37)
MCV RBC AUTO: 91.5 FL — SIGNIFICANT CHANGE UP (ref 81–99)
MONOCYTES # BLD AUTO: 0.44 K/UL — SIGNIFICANT CHANGE UP (ref 0.1–0.6)
MONOCYTES NFR BLD AUTO: 3.6 % — SIGNIFICANT CHANGE UP (ref 1.7–9.3)
NEUTROPHILS # BLD AUTO: 10.59 K/UL — HIGH (ref 1.4–6.5)
NEUTROPHILS NFR BLD AUTO: 87.7 % — HIGH (ref 42.2–75.2)
NRBC BLD AUTO-RTO: 0 /100 WBCS — SIGNIFICANT CHANGE UP (ref 0–0)
PLATELET # BLD AUTO: 223 K/UL — SIGNIFICANT CHANGE UP (ref 130–400)
PMV BLD: 9.7 FL — SIGNIFICANT CHANGE UP (ref 7.4–10.4)
POTASSIUM SERPL-MCNC: 5 MMOL/L — SIGNIFICANT CHANGE UP (ref 3.5–5)
POTASSIUM SERPL-SCNC: 5 MMOL/L — SIGNIFICANT CHANGE UP (ref 3.5–5)
PROT SERPL-MCNC: 5.3 G/DL — LOW (ref 6–8)
RBC # BLD: 3.05 M/UL — LOW (ref 4.2–5.4)
RBC # FLD: 23.5 % — HIGH (ref 11.5–14.5)
SODIUM SERPL-SCNC: 138 MMOL/L — SIGNIFICANT CHANGE UP (ref 135–146)
WBC # BLD: 12.08 K/UL — HIGH (ref 4.8–10.8)
WBC # FLD AUTO: 12.08 K/UL — HIGH (ref 4.8–10.8)

## 2025-04-07 PROCEDURE — 93970 EXTREMITY STUDY: CPT | Mod: 26

## 2025-04-07 PROCEDURE — 99232 SBSQ HOSP IP/OBS MODERATE 35: CPT

## 2025-04-07 RX ORDER — MIDODRINE HYDROCHLORIDE 5 MG/1
5 TABLET ORAL THREE TIMES A DAY
Refills: 0 | Status: DISCONTINUED | OUTPATIENT
Start: 2025-04-07 | End: 2025-04-09

## 2025-04-07 RX ADMIN — LIDOCAINE HYDROCHLORIDE 1 PATCH: 20 JELLY TOPICAL at 12:49

## 2025-04-07 RX ADMIN — Medication 2 TABLET(S): at 21:05

## 2025-04-07 RX ADMIN — MIDODRINE HYDROCHLORIDE 5 MILLIGRAM(S): 5 TABLET ORAL at 18:09

## 2025-04-07 RX ADMIN — NICOTINE POLACRILEX 1 PATCH: 4 GUM, CHEWING ORAL at 19:37

## 2025-04-07 RX ADMIN — IPRATROPIUM BROMIDE AND ALBUTEROL SULFATE 3 MILLILITER(S): .5; 2.5 SOLUTION RESPIRATORY (INHALATION) at 14:14

## 2025-04-07 RX ADMIN — Medication 1 APPLICATION(S): at 12:50

## 2025-04-07 RX ADMIN — Medication 1 DOSE(S): at 21:05

## 2025-04-07 RX ADMIN — Medication 1 DOSE(S): at 09:12

## 2025-04-07 RX ADMIN — IPRATROPIUM BROMIDE AND ALBUTEROL SULFATE 3 MILLILITER(S): .5; 2.5 SOLUTION RESPIRATORY (INHALATION) at 19:49

## 2025-04-07 RX ADMIN — Medication 40 MILLIGRAM(S): at 12:49

## 2025-04-07 RX ADMIN — IPRATROPIUM BROMIDE AND ALBUTEROL SULFATE 3 MILLILITER(S): .5; 2.5 SOLUTION RESPIRATORY (INHALATION) at 08:22

## 2025-04-07 RX ADMIN — NICOTINE POLACRILEX 1 PATCH: 4 GUM, CHEWING ORAL at 12:49

## 2025-04-07 RX ADMIN — POLYETHYLENE GLYCOL 3350 17 GRAM(S): 17 POWDER, FOR SOLUTION ORAL at 05:36

## 2025-04-07 RX ADMIN — NICOTINE POLACRILEX 1 PATCH: 4 GUM, CHEWING ORAL at 07:30

## 2025-04-07 RX ADMIN — Medication 1 APPLICATION(S): at 12:49

## 2025-04-07 RX ADMIN — NICOTINE POLACRILEX 1 PATCH: 4 GUM, CHEWING ORAL at 11:45

## 2025-04-07 RX ADMIN — LIDOCAINE HYDROCHLORIDE 1 PATCH: 20 JELLY TOPICAL at 19:37

## 2025-04-07 NOTE — ED PROVIDER NOTE - WET READ LAUNCH FT
A (GUIDEWIRE VASC OD.36 MM L180 CM L3 CM RUNTHROUGH RADOPQ X) guidewire was removed. There are no Wet Read(s) to document. There is 1 Wet Read(s) to document.

## 2025-04-07 NOTE — PROGRESS NOTE ADULT - ASSESSMENT
This is a 74-year-old female patient with PMHx of COPD on 3L NC, smoking hx, AFib on Eliquis, past hx of abdominal abscess (in 2021), Gastric Bypass surgery 20+ years ago, who presented to the ED from [facility] on 3/24 with CC of hypoxia and change in mental status. Hospital course was complicated by a left thigh hematoma on 3/29/25. Therapeutic lovenox was rversed with protamine and the pt had a CTA that showed active extravasation from a deep femoral artery branch. S/p embolization by IR. The pts Hb remeained stable and she underwent a post embolization CTA which showed no bleeding. The pts Hb remained stable for a day but he Hb dropped again on 4/1/25 and she required pressors and 2PRBCs. A repeat CTA showed no active bleed or retroperitoneal bleed. She was transferred to the floor, whre had hypotension and acute drop in Hb, transferred to SDU    #Acute Hypoxic Hypercapnic Respiratory Failure likely secondary to CAP- Resolved  #COPD on 3L NC  #Smoking Hx  - CT chest c/w multifocal pneumonia, s/p course of antibiotics, ended 4/4  - s/p course of steroids, ended 4/5  - currently on NC, taper down as tolerated  - PT/OT eval    #Left thigh Hematoma with contrast extravasation with acute blood loss anemia   - s/p embolization of branch of the deep left femoral artery by IR  - serial CTAs with stable hematoma, no active bleed   - s/p multiple PRBC transfusions  - Hb stable  - AC on hold  - repeat LE duplex ordered    #Hypernatremia- resolved  - Na improved s/p D5  - Monitor and encourage PO intake     #Mild generalized edema on CTH  - CTH with mild cerebral edema  - repeat CTH unchanged  - veeg no interictal activity  - MRA/MRV performed => small acute infarct in R frontal subcortical white matter  - neuro following    #Paroxysmal AFib on Eliquis  #Chronic HFpEF (TTE 6/2021 EF 63%, GIIIDD)  #Bilateral Pleural Effusions  - on home med Lasix 40 mg qd, currently being held  - Eliquis on hold   - Bilateral LE dopplex: No DVT in march, repeat another duplex     #MISC  - DVT ppx: holding Eliquis for now  - GI ppx: PPI  - Diet: DASH  - Activity: IAT, PT eval   - Code: Previously DNR/DNI, rescinded yesterday, now full code    Pendings: monitor CBC, if CBC stable, resume AC, f/u repeat LE duplex, f/u neuro for stroke recs, PT eval        This is a 74-year-old female patient with PMHx of COPD on 3L NC, smoking hx, AFib on Eliquis, past hx of abdominal abscess (in 2021), Gastric Bypass surgery 20+ years ago, who presented to the ED from [facility] on 3/24 with CC of hypoxia and change in mental status. Hospital course was complicated by a left thigh hematoma on 3/29/25. Therapeutic lovenox was rversed with protamine and the pt had a CTA that showed active extravasation from a deep femoral artery branch. S/p embolization by IR. The pts Hb remeained stable and she underwent a post embolization CTA which showed no bleeding. The pts Hb remained stable for a day but he Hb dropped again on 4/1/25 and she required pressors and 2PRBCs. A repeat CTA showed no active bleed or retroperitoneal bleed. She was transferred to the floor, whre had hypotension and acute drop in Hb, transferred to SDU    #Acute Hypoxic Hypercapnic Respiratory Failure likely secondary to CAP- Resolved  #COPD on 3L NC  #Smoking Hx  - CT chest c/w multifocal pneumonia, s/p course of antibiotics, ended 4/4  - s/p course of steroids, ended 4/5  - currently on NC, taper down as tolerated  - PT/OT eval    #Left thigh Hematoma with contrast extravasation with acute blood loss anemia   - s/p embolization of branch of the deep left femoral artery by IR  - serial CTAs with stable hematoma, no active bleed   - s/p multiple PRBC transfusions  - Hb stable  - AC on hold  - repeat LE duplex ordered    #Hypernatremia- resolved  - Na improved s/p D5  - Monitor and encourage PO intake     #Mild generalized edema on CTH  - CTH with mild cerebral edema  - repeat CTH unchanged  - veeg no interictal activity  - MRA/MRV performed: small acute infarct in R frontal subcortical white matter  - neuro following: resume AC when medically able    #Paroxysmal AFib on Eliquis  #Chronic HFpEF (TTE 6/2021 EF 63%, GIIIDD)  #Bilateral Pleural Effusions  - on home med Lasix 40 mg qd, currently being held  - Eliquis on hold   - Bilateral LE dopplex: No DVT in march, repeat another duplex     #MISC  - DVT ppx: holding Eliquis for now  - GI ppx: PPI  - Diet: DASH  - Activity: IAT, PT eval   - Code: Previously DNR/DNI, rescinded yesterday, now full code    Pendings: monitor CBC, if CBC stable will resume DVT ppx tomorrow, f/u repeat LE duplex, PT eval

## 2025-04-07 NOTE — PROGRESS NOTE ADULT - ASSESSMENT
74 year old F with PMHx of COPD on 3L NC, smoking hx, AFib on Eliquis, past hx of abdominal abscess (in 2021), Gastric Bypass surgery 20+ years ago, who presented to the ED from [facility] on 3/24 with CC of hypoxia and change in mental status. Hospital course was complicated by a left thigh hematoma on 3/29/25. Therapeutic lovenox was rversed with protamine and the pt had a CTA that showed active extravasation from a deep femoral artery branch. S/p embolization by IR. The pts Hb remeained stable and she underwent a post embolization CTA which showed no bleeding. The pts Hb remained stable for a day but he Hb dropped again on 4/1/25 and she required pressors and 2PRBCs. A repeat CTA showed no active bleed or retroperitoneal bleed. She was transferred to the floor, whre had hypotension and acute drop in Hb, transferred to SDU    Acute Hypoxic Hypercapnic Respiratory Failure likely secondary to CAP- Resolved  COPD on 3L NC  active Smoking   -  s/p course of antibiotics, ended 4/4  - s/p course of steroids, ended 4/5  - PT/OT    Left thigh Hematoma with contrast extravasation with acute blood loss anemia   - S/p embolization of branch of the deep left femoral artery by IR  - serial CTAs with stable hematoma, no active bleed   - s/p multiple PRBC transfusions  - Hb stable  - Afib was on AC,  on hold, dw pt risk/benefits of AC, didnt decide yet, repeat LE duplex    Hypernatremia- resolved      incidental finding of small acute infarct  - suspected cardioembolic .    Paroxysmal AFib on Eliquis  Chronic HFpEF (TTE 6/2021 EF 63%, GIIIDD)  Bilateral Pleural Effusions  - on home med Lasix 40 mg qd, currently being held      full code  Pending: monitor CBC, repeat LE duplex, if CBC stable, resume heparin sq, PT/OT    74 year old F with PMHx of COPD on 3L NC, smoking hx, AFib on Eliquis, past hx of abdominal abscess (in 2021), Gastric Bypass surgery 20+ years ago, who presented to the ED from [facility] on 3/24 with CC of hypoxia and change in mental status. Hospital course was complicated by a left thigh hematoma on 3/29/25. Therapeutic lovenox was rversed with protamine and the pt had a CTA that showed active extravasation from a deep femoral artery branch. S/p embolization by IR. The pts Hb remeained stable and she underwent a post embolization CTA which showed no bleeding. The pts Hb remained stable for a day but he Hb dropped again on 4/1/25 and she required pressors and 2PRBCs. A repeat CTA showed no active bleed or retroperitoneal bleed. She was transferred to the floor, whre had hypotension and acute drop in Hb, transferred to SDU    Acute Hypoxic Hypercapnic Respiratory Failure likely secondary to CAP- Resolved  COPD on 3L NC  active Smoking   -  s/p course of antibiotics, ended 4/4  - s/p course of steroids, ended 4/5  - PT/OT    Left thigh Hematoma with contrast extravasation with acute blood loss anemia   - S/p embolization of branch of the deep left femoral artery by IR  - serial CTAs with stable hematoma, no active bleed   - s/p multiple PRBC transfusions  - Hb stable, dvt ppx to start in AM  - Afib was on AC,  on hold, dw pt risk/benefits of AC, didnt decide yet, repeat LE duplex  - BP noted to be on low side,  midodrine 5mg tid    Hypernatremia- resolved      incidental finding of small acute infarct  - suspected cardioembolic .    Paroxysmal AFib on Eliquis  Chronic HFpEF (TTE 6/2021 EF 63%, GIIIDD)  Bilateral Pleural Effusions  - on home med Lasix 40 mg qd, currently being held      full code  Pending: monitor CBC, repeat LE duplex, if CBC stable, resume heparin sq, PT/OT

## 2025-04-08 LAB
ANION GAP SERPL CALC-SCNC: 9 MMOL/L — SIGNIFICANT CHANGE UP (ref 7–14)
BUN SERPL-MCNC: 18 MG/DL — SIGNIFICANT CHANGE UP (ref 10–20)
CALCIUM SERPL-MCNC: 8 MG/DL — LOW (ref 8.4–10.5)
CHLORIDE SERPL-SCNC: 104 MMOL/L — SIGNIFICANT CHANGE UP (ref 98–110)
CO2 SERPL-SCNC: 27 MMOL/L — SIGNIFICANT CHANGE UP (ref 17–32)
CREAT SERPL-MCNC: <0.5 MG/DL — LOW (ref 0.7–1.5)
EGFR: 104 ML/MIN/1.73M2 — SIGNIFICANT CHANGE UP
EGFR: 104 ML/MIN/1.73M2 — SIGNIFICANT CHANGE UP
GLUCOSE SERPL-MCNC: 95 MG/DL — SIGNIFICANT CHANGE UP (ref 70–99)
HCT VFR BLD CALC: 27.9 % — LOW (ref 37–47)
HGB BLD-MCNC: 8 G/DL — LOW (ref 12–16)
MAGNESIUM SERPL-MCNC: 2.5 MG/DL — HIGH (ref 1.8–2.4)
MCHC RBC-ENTMCNC: 26.3 PG — LOW (ref 27–31)
MCHC RBC-ENTMCNC: 28.7 G/DL — LOW (ref 32–37)
MCV RBC AUTO: 91.8 FL — SIGNIFICANT CHANGE UP (ref 81–99)
NRBC BLD AUTO-RTO: 0 /100 WBCS — SIGNIFICANT CHANGE UP (ref 0–0)
PLATELET # BLD AUTO: 225 K/UL — SIGNIFICANT CHANGE UP (ref 130–400)
PMV BLD: 9.8 FL — SIGNIFICANT CHANGE UP (ref 7.4–10.4)
POTASSIUM SERPL-MCNC: 4.7 MMOL/L — SIGNIFICANT CHANGE UP (ref 3.5–5)
POTASSIUM SERPL-SCNC: 4.7 MMOL/L — SIGNIFICANT CHANGE UP (ref 3.5–5)
RBC # BLD: 3.04 M/UL — LOW (ref 4.2–5.4)
RBC # FLD: 23.8 % — HIGH (ref 11.5–14.5)
SODIUM SERPL-SCNC: 140 MMOL/L — SIGNIFICANT CHANGE UP (ref 135–146)
WBC # BLD: 11.97 K/UL — HIGH (ref 4.8–10.8)
WBC # FLD AUTO: 11.97 K/UL — HIGH (ref 4.8–10.8)

## 2025-04-08 PROCEDURE — 99232 SBSQ HOSP IP/OBS MODERATE 35: CPT

## 2025-04-08 RX ORDER — SODIUM CHLORIDE 9 G/1000ML
500 INJECTION, SOLUTION INTRAVENOUS ONCE
Refills: 0 | Status: COMPLETED | OUTPATIENT
Start: 2025-04-08 | End: 2025-04-08

## 2025-04-08 RX ADMIN — MIDODRINE HYDROCHLORIDE 5 MILLIGRAM(S): 5 TABLET ORAL at 18:20

## 2025-04-08 RX ADMIN — SODIUM CHLORIDE 500 MILLILITER(S): 9 INJECTION, SOLUTION INTRAVENOUS at 23:26

## 2025-04-08 RX ADMIN — IPRATROPIUM BROMIDE AND ALBUTEROL SULFATE 3 MILLILITER(S): .5; 2.5 SOLUTION RESPIRATORY (INHALATION) at 19:48

## 2025-04-08 RX ADMIN — Medication 1 APPLICATION(S): at 11:57

## 2025-04-08 RX ADMIN — IPRATROPIUM BROMIDE AND ALBUTEROL SULFATE 3 MILLILITER(S): .5; 2.5 SOLUTION RESPIRATORY (INHALATION) at 15:28

## 2025-04-08 RX ADMIN — IPRATROPIUM BROMIDE AND ALBUTEROL SULFATE 3 MILLILITER(S): .5; 2.5 SOLUTION RESPIRATORY (INHALATION) at 10:11

## 2025-04-08 RX ADMIN — MIDODRINE HYDROCHLORIDE 5 MILLIGRAM(S): 5 TABLET ORAL at 11:57

## 2025-04-08 RX ADMIN — NICOTINE POLACRILEX 1 PATCH: 4 GUM, CHEWING ORAL at 11:57

## 2025-04-08 RX ADMIN — NICOTINE POLACRILEX 1 PATCH: 4 GUM, CHEWING ORAL at 07:00

## 2025-04-08 RX ADMIN — NICOTINE POLACRILEX 1 PATCH: 4 GUM, CHEWING ORAL at 19:59

## 2025-04-08 RX ADMIN — MIDODRINE HYDROCHLORIDE 5 MILLIGRAM(S): 5 TABLET ORAL at 05:19

## 2025-04-08 RX ADMIN — Medication 40 MILLIGRAM(S): at 11:57

## 2025-04-08 RX ADMIN — NICOTINE POLACRILEX 1 PATCH: 4 GUM, CHEWING ORAL at 11:58

## 2025-04-08 RX ADMIN — IPRATROPIUM BROMIDE AND ALBUTEROL SULFATE 3 MILLILITER(S): .5; 2.5 SOLUTION RESPIRATORY (INHALATION) at 01:28

## 2025-04-08 NOTE — PROGRESS NOTE ADULT - SUBJECTIVE AND OBJECTIVE BOX
Patient is a 74y old  Female who presents with a chief complaint of hypoxia (08 Apr 2025 10:41)      OVERNIGHT EVENTS: remained stable     SUBJECTIVE / INTERVAL HPI: Patient seen and examined at bedside.     VITAL SIGNS:  Vital Signs Last 24 Hrs  T(C): 36.6 (08 Apr 2025 11:50), Max: 36.7 (07 Apr 2025 19:47)  T(F): 97.8 (08 Apr 2025 11:50), Max: 98.1 (07 Apr 2025 19:47)  HR: 65 (08 Apr 2025 11:50) (65 - 67)  BP: 92/49 (08 Apr 2025 11:50) (91/49 - 99/68)  BP(mean): 63 (08 Apr 2025 11:50) (63 - 63)  RR: 18 (07 Apr 2025 19:47) (18 - 18)  SpO2: 93% (08 Apr 2025 11:50) (93% - 97%)    Parameters below as of 08 Apr 2025 11:50  Patient On (Oxygen Delivery Method): nasal cannula  O2 Flow (L/min): 3      PHYSICAL EXAM:    General: old lady chronically looks ill   HEENT: NC/AT; PERRL, clear conjunctiva  Neck: supple  Cardiovascular: +S1/S2; RRR  Respiratory: CTA b/l; no W/R/R  Gastrointestinal: soft, NT/ND; +BSx4  Extremities: WWP; 2+ peripheral pulses; no edema   +ve stable painless left thigh swelling   Neurological: AAOx3; globally weak, no focal deficits    MEDICATIONS:  MEDICATIONS  (STANDING):  albuterol/ipratropium for Nebulization 3 milliLiter(s) Nebulizer every 6 hours  chlorhexidine 2% Cloths 1 Application(s) Topical daily  chlorhexidine 2% Cloths 1 Application(s) Topical daily  fluticasone propionate/ salmeterol 250-50 MICROgram(s) Diskus 1 Dose(s) Inhalation two times a day  hydrocortisone hemorrhoidal Suppository 1 Suppository(s) Rectal at bedtime  lidocaine   4% Patch 1 Patch Transdermal daily  midodrine. 5 milliGRAM(s) Oral three times a day  nicotine -   7 mG/24Hr(s) Patch 1 Patch Transdermal daily  pantoprazole  Injectable 40 milliGRAM(s) IV Push daily  polyethylene glycol 3350 17 Gram(s) Oral two times a day  senna 2 Tablet(s) Oral at bedtime    MEDICATIONS  (PRN):  acetaminophen     Tablet .. 650 milliGRAM(s) Oral every 6 hours PRN Temp greater or equal to 38C (100.4F), Mild Pain (1 - 3)  oxycodone    5 mG/acetaminophen 325 mG 1 Tablet(s) Oral every 12 hours PRN Severe Pain (7 - 10)      ALLERGIES:  Allergies    No Known Allergies    Intolerances        LABS:                        8.0    11.97 )-----------( 225      ( 08 Apr 2025 05:09 )             27.9     04-08    140  |  104  |  18  ----------------------------<  95  4.7   |  27  |  <0.5[L]    Ca    8.0[L]      08 Apr 2025 05:09  Mg     2.5     04-08    TPro  5.3[L]  /  Alb  3.1[L]  /  TBili  1.0  /  DBili  x   /  AST  17  /  ALT  18  /  AlkPhos  72  04-07      Urinalysis Basic - ( 08 Apr 2025 05:09 )    Color: x / Appearance: x / SG: x / pH: x  Gluc: 95 mg/dL / Ketone: x  / Bili: x / Urobili: x   Blood: x / Protein: x / Nitrite: x   Leuk Esterase: x / RBC: x / WBC x   Sq Epi: x / Non Sq Epi: x / Bacteria: x      CAPILLARY BLOOD GLUCOSE      POCT Blood Glucose.: 119 mg/dL (06 Apr 2025 21:27)      RADIOLOGY & ADDITIONAL TESTS: Reviewed.    ASSESSMENT:    PLAN:

## 2025-04-08 NOTE — PHYSICAL THERAPY INITIAL EVALUATION ADULT - ADDITIONAL COMMENTS
As per report pt was able to amb , needed assist with ADL's PTA. As per report pt was able to amb , did not state if used DME despite multiple questions by PT, needed assist with ADL's PTA. Pt resides in [facility] as LTC resident as per chart reviews. As per report pt was able to amb ,did not state if used DME despite multiple questions by PT, needed assist with ADL's PTA.

## 2025-04-08 NOTE — PHYSICAL THERAPY INITIAL EVALUATION ADULT - PRECAUTIONS/LIMITATIONS, REHAB EVAL
respiratory precautions, on home O2 3 L NC./cardiac precautions/fall precautions
respiratory precautions, on home O2 3 L NC/cardiac precautions/fall precautions

## 2025-04-08 NOTE — PHYSICAL THERAPY INITIAL EVALUATION ADULT - GENERAL OBSERVATIONS, REHAB EVAL
13:20-13:45. Pt encountered semifowler in bed in NAD,+ O2 via NC, needed hygenic care at b/s 2* to BM and food all over. Corinne FARLEY notified. PT to follow up.

## 2025-04-08 NOTE — PROGRESS NOTE ADULT - ASSESSMENT
74 year old F with PMHx of COPD on 3L NC, smoking hx, AFib on Eliquis, past hx of abdominal abscess (in 2021), Gastric Bypass surgery 20+ years ago, who presented to the ED from [facility] on 3/24 with CC of hypoxia and change in mental status. Hospital course was complicated by a left thigh hematoma on 3/29/25. Therapeutic lovenox was rversed with protamine and the pt had a CTA that showed active extravasation from a deep femoral artery branch. S/p embolization by IR. The pts Hb remeained stable and she underwent a post embolization CTA which showed no bleeding. The pts Hb remained stable for a day but he Hb dropped again on 4/1/25 and she required pressors and 2PRBCs. A repeat CTA showed no active bleed or retroperitoneal bleed. She was transferred to the floor, whre had hypotension and acute drop in Hb, transferred to SDU    Acute Hypoxic Hypercapnic Respiratory Failure likely secondary to CAP- Resolved  COPD on 3L NC  active Smoking   -  s/p course of antibiotics, ended 4/4  - s/p course of steroids, ended 4/5  - PT/OT    Left thigh Hematoma with contrast extravasation with acute blood loss anemia   - S/p embolization of branch of the deep left femoral artery by IR  - serial CTAs with stable hematoma, no active bleed   - s/p multiple PRBC transfusions  - Hb stable,  - Afib was on AC,  hematoma happened spontaneously,, dw pt risk/benefits of AC, pt decided to keep of AC and re eval resuming it in outpt setting, pt aware of cardioembolic risk being off AC  - BP noted to be on low side,  midodrine 5mg tid    #Complex fluid collection in the left proximal thigh measurements are not occluded. outpt f/u     Hypernatremia- resolved      incidental finding of small acute infarct  - called neuro for f/u for etiology diagnostic purpose, cardioembolic VS others     Paroxysmal AFib on Eliquis  Chronic HFpEF (TTE 6/2021 EF 63%, GIIIDD)  Bilateral Pleural Effusions  - off home lasix     DVT ppx; SCD    full code  Pending: nEURO F/U, PT/OT, DC plan

## 2025-04-08 NOTE — PROGRESS NOTE ADULT - SUBJECTIVE AND OBJECTIVE BOX
SUBJECTIVE/OVERNIGHT EVENTS  Today is hospital day 15d. This morning patient was seen and examined at bedside, resting comfortably in bed. No acute or major events overnight.  Patient denies pain, no signs of bleeding, Hgb stable.       MEDICATIONS  STANDING MEDICATIONS  albuterol/ipratropium for Nebulization 3 milliLiter(s) Nebulizer every 6 hours  chlorhexidine 2% Cloths 1 Application(s) Topical daily  chlorhexidine 2% Cloths 1 Application(s) Topical daily  fluticasone propionate/ salmeterol 250-50 MICROgram(s) Diskus 1 Dose(s) Inhalation two times a day  hydrocortisone hemorrhoidal Suppository 1 Suppository(s) Rectal at bedtime  lidocaine   4% Patch 1 Patch Transdermal daily  midodrine. 5 milliGRAM(s) Oral three times a day  nicotine -   7 mG/24Hr(s) Patch 1 Patch Transdermal daily  pantoprazole  Injectable 40 milliGRAM(s) IV Push daily  polyethylene glycol 3350 17 Gram(s) Oral two times a day  senna 2 Tablet(s) Oral at bedtime    PRN MEDICATIONS  acetaminophen     Tablet .. 650 milliGRAM(s) Oral every 6 hours PRN  oxycodone    5 mG/acetaminophen 325 mG 1 Tablet(s) Oral every 12 hours PRN    VITALS  T(F): 97.3 (04-08-25 @ 04:30), Max: 98.1 (04-07-25 @ 19:47)  HR: 67 (04-08-25 @ 04:30) (65 - 67)  BP: 92/53 (04-08-25 @ 04:30) (91/49 - 99/68)  RR: 18 (04-07-25 @ 19:47) (18 - 19)  SpO2: 97% (04-08-25 @ 04:30) (96% - 97%)    PHYSICAL EXAM  GENERAL  ( x ) NAD, lying in bed comfortably     (  ) obtunded     (  ) lethargic     (  ) somnolent    HEAD  ( x ) Atraumatic     (  ) hematoma     (  ) laceration (specify location:       )     NECK  ( x ) Supple     (  ) neck stiffness     (  ) nuchal rigidity     (  )  no JVD     (  ) JVD present ( -- cm)    HEART  Rate -->  ( x ) normal rate    (  ) bradycardic    (  ) tachycardic  Rhythm -->  ( x ) regular    (  ) regularly irregular    (  ) irregularly irregular  Murmurs -->  ( x ) normal s1/s2    (  ) systolic murmur    (  ) diastolic murmur    (  ) continuous murmur     (  ) S3 present    (  ) S4 present    LUNGS  ( x ) Unlabored respirations     (  ) tachypnea  ( x ) B/L air entry     (  ) decreased breath sounds in:  (location     )    (  ) no adventitious sound     (  ) crackles     (  ) wheezing      (  ) rhonchi      (specify location:       )  (  ) chest wall tenderness (specify location:       )    ABDOMEN  ( x ) Soft     (  ) tense   |   (  ) nondistended     (  ) distended   |   (  ) +BS     (  ) hypoactive bowel sounds     (  ) hyperactive bowel sounds  ( x ) nontender     (  ) RUQ tenderness     (  ) RLQ tenderness     (  ) LLQ tenderness     (  ) epigastric tenderness     (  ) diffuse tenderness  (  ) Splenomegaly      (  ) Hepatomegaly      (  ) Jaundice     (  ) ecchymosis     EXTREMITIES      L thigh swelling and mild tenderness 2/2 hematoma  (  ) Normal     (  ) Rash     (  ) ecchymosis     (  ) varicose veins      (  ) pitting edema     (  ) non-pitting edema   (  ) ulceration     (  ) gangrene:     (location:     )    NERVOUS SYSTEM  ( x ) A&Ox3     (  ) confused     (  ) lethargic  CN II-XII:     (  ) Intact     (  ) focal deficits  (Specify:     )   Upper extremities:     (  ) strength X/5     (  ) focal deficit (specify:    )  Lower extremities:     (  ) strength  X/5    (  ) focal deficit (specify:    )        LABS             8.0    11.97 )-----------( 225      ( 04-08-25 @ 05:09 )             27.9     140  |  104  |  18  -------------------------<  95   04-08-25 @ 05:09  4.7  |  27  |  <0.5    Ca      8.0     04-08-25 @ 05:09  Mg     2.5     04-08-25 @ 05:09    TPro  5.3  /  Alb  3.1  /  TBili  1.0  /  DBili  x   /  AST  17  /  ALT  18  /  AlkPhos  72  /  GGT  x     04-07-25 @ 11:16        Urinalysis Basic - ( 08 Apr 2025 05:09 )    Color: x / Appearance: x / SG: x / pH: x  Gluc: 95 mg/dL / Ketone: x  / Bili: x / Urobili: x   Blood: x / Protein: x / Nitrite: x   Leuk Esterase: x / RBC: x / WBC x   Sq Epi: x / Non Sq Epi: x / Bacteria: x          IMAGING

## 2025-04-08 NOTE — PROGRESS NOTE ADULT - ASSESSMENT
This is a 74-year-old female patient with PMHx of COPD on 3L NC, smoking hx, AFib on Eliquis, past hx of abdominal abscess (in 2021), Gastric Bypass surgery 20+ years ago, who presented to the ED from [facility] on 3/24 with CC of hypoxia and change in mental status. Hospital course was complicated by a left thigh hematoma on 3/29/25. Therapeutic lovenox was rversed with protamine and the pt had a CTA that showed active extravasation from a deep femoral artery branch. S/p embolization by IR. The pts Hb remeained stable and she underwent a post embolization CTA which showed no bleeding. The pts Hb remained stable for a day but he Hb dropped again on 4/1/25 and she required pressors and 2PRBCs. A repeat CTA showed no active bleed or retroperitoneal bleed. She was transferred to the floor, whre had hypotension and acute drop in Hb, transferred to SDU    #Acute Hypoxic Hypercapnic Respiratory Failure likely secondary to CAP- Resolved  #COPD on 3L NC  #Smoking Hx  - CT chest c/w multifocal pneumonia, s/p course of antibiotics, ended 4/4  - s/p course of steroids, ended 4/5  - currently on NC, taper down as tolerated  - PT/OT eval    #Left thigh Hematoma with contrast extravasation with acute blood loss anemia   - s/p embolization of branch of the deep left femoral artery by IR  - serial CTAs with stable hematoma, no active bleed   - s/p multiple PRBC transfusions  - Hgb stable around 8  - AC on hold  - repeat LE duplex negative 4/7    #Hypernatremia- resolved  - Na improved s/p D5  - Monitor and encourage PO intake     #Mild generalized edema on CTH  - CTH with mild cerebral edema  - repeat CTH unchanged  - vEEG no interictal activity  - MRA/MRV performed: small acute infarct in R frontal subcortical white matter  - neuro following: resume AC when medically able, f/u for further stroke w/u    #Paroxysmal AFib on Eliquis  #Chronic HFpEF (TTE 6/2021 EF 63%, GIIIDD)  #Bilateral Pleural Effusions  - on home med Lasix 40 mg qd, currently being held  - Eliquis on hold   - Bilateral LE dopplex: No DVT in march, repeat Duplex negative on 4/7     #MISC  - DVT ppx: holding Eliquis for now  - GI ppx: PPI  - Diet: DASH  - Activity: IAT, PT eval   - Code: Previously DNR/DNI, rescinded on 4/6, now full code    Pendings: f/u surgery for recs regarding resuming AC, neuro f/u, PT eval

## 2025-04-08 NOTE — PHYSICAL THERAPY INITIAL EVALUATION ADULT - PERTINENT HX OF CURRENT PROBLEM, REHAB EVAL
Patient is a 74 year old F with PMHx of COPD on 3L NC, smoking hx, AFib on Eliquis, past hx of abdominal abscess (in 2021), Gastric Bypass surgery 20+ years ago, who presented to the ED from [facility] on 3/24 with CC of hypoxia and change in mental status. As per ED note, patient was calling for help at her NH this morning. She was noted to be saturating 80% while on her baseline 3L NC; was placed on non-rebreather and brought to ED.
Patient is a 74 year old F with PMHx of COPD on 3L NC, smoking hx, AFib on Eliquis, past hx of abdominal abscess (in 2021), Gastric Bypass surgery 20+ years ago, who presented to the ED from [facility] on 3/24 with CC of hypoxia and change in mental status. As per ED note, patient was calling for help at her NH this morning. She was noted to be saturating 80% while on her baseline 3L NC; was placed on non-rebreather and brought to ED.

## 2025-04-08 NOTE — PHYSICAL THERAPY INITIAL EVALUATION ADULT - ADDITIONAL COMMENTS
As per pt report pt lives in NH as LTC (as per chart since 2020), was able to amb and assist with ADL's PTA as per pt report. Has home O2 3 lpm via NC.

## 2025-04-08 NOTE — PHYSICAL THERAPY INITIAL EVALUATION ADULT - GENERAL OBSERVATIONS, REHAB EVAL
14:51-15:15. Pt encountered semifowler in bed in NAD, + O2 3 lpm via NC, +sequentials B LEs, agreeable to PT. 14:51-15:15. Pt encountered semifowler in bed in NAD, + primafit, + O2 3 lpm via NC, +sequentials B LEs, agreeable to PT.

## 2025-04-09 ENCOUNTER — TRANSCRIPTION ENCOUNTER (OUTPATIENT)
Age: 75
End: 2025-04-09

## 2025-04-09 VITALS — DIASTOLIC BLOOD PRESSURE: 58 MMHG | SYSTOLIC BLOOD PRESSURE: 93 MMHG | HEART RATE: 72 BPM

## 2025-04-09 PROCEDURE — 99239 HOSP IP/OBS DSCHRG MGMT >30: CPT

## 2025-04-09 RX ORDER — SPIRONOLACTONE 25 MG
1 TABLET ORAL
Refills: 0 | DISCHARGE

## 2025-04-09 RX ORDER — MIDODRINE HYDROCHLORIDE 5 MG/1
10 TABLET ORAL THREE TIMES A DAY
Refills: 0 | Status: DISCONTINUED | OUTPATIENT
Start: 2025-04-09 | End: 2025-04-09

## 2025-04-09 RX ORDER — MIDODRINE HYDROCHLORIDE 5 MG/1
1 TABLET ORAL
Qty: 0 | Refills: 0 | DISCHARGE
Start: 2025-04-09

## 2025-04-09 RX ORDER — FUROSEMIDE 10 MG/ML
1 INJECTION INTRAMUSCULAR; INTRAVENOUS
Refills: 0 | DISCHARGE

## 2025-04-09 RX ADMIN — Medication 40 MILLIGRAM(S): at 11:11

## 2025-04-09 RX ADMIN — Medication 1 APPLICATION(S): at 11:07

## 2025-04-09 RX ADMIN — MIDODRINE HYDROCHLORIDE 5 MILLIGRAM(S): 5 TABLET ORAL at 06:18

## 2025-04-09 RX ADMIN — Medication 1 APPLICATION(S): at 11:06

## 2025-04-09 RX ADMIN — NICOTINE POLACRILEX 1 PATCH: 4 GUM, CHEWING ORAL at 07:00

## 2025-04-09 RX ADMIN — MIDODRINE HYDROCHLORIDE 10 MILLIGRAM(S): 5 TABLET ORAL at 11:44

## 2025-04-09 RX ADMIN — NICOTINE POLACRILEX 1 PATCH: 4 GUM, CHEWING ORAL at 11:10

## 2025-04-09 RX ADMIN — MIDODRINE HYDROCHLORIDE 10 MILLIGRAM(S): 5 TABLET ORAL at 16:51

## 2025-04-09 RX ADMIN — IPRATROPIUM BROMIDE AND ALBUTEROL SULFATE 3 MILLILITER(S): .5; 2.5 SOLUTION RESPIRATORY (INHALATION) at 08:07

## 2025-04-09 RX ADMIN — IPRATROPIUM BROMIDE AND ALBUTEROL SULFATE 3 MILLILITER(S): .5; 2.5 SOLUTION RESPIRATORY (INHALATION) at 13:40

## 2025-04-09 RX ADMIN — NICOTINE POLACRILEX 1 PATCH: 4 GUM, CHEWING ORAL at 11:08

## 2025-04-09 NOTE — DISCHARGE NOTE NURSING/CASE MANAGEMENT/SOCIAL WORK - PATIENT PORTAL LINK FT
You can access the FollowMyHealth Patient Portal offered by [facility] by registering at the following website: [email or web address]. By joining [employer]’s FollowMyHealth portal, you will also be able to view your health information using other applications (apps) compatible with our system.

## 2025-04-09 NOTE — DISCHARGE NOTE PROVIDER - CARE PROVIDER_API CALL
Kamala Kwon  Internal Medicine  [address], [address]  [place] [address]  Phone: [phone]  Fax: [phone]  Established Patient  Follow Up Time: 1 week    Scar Keith  Pulmonary Disease  [address] [place] [address]  Phone: [phone]  Fax: [phone]  Follow Up Time: 2 weeks    Max Vergara  Neurology  [address] [place] [address], [address]  [place] [address]  Phone: [phone]  Fax: [phone]  Follow Up Time: 2 weeks

## 2025-04-09 NOTE — DISCHARGE NOTE PROVIDER - HOSPITAL COURSE
This is a 74-year-old female patient with PMHx of COPD on 3L NC, smoking hx, AFib on Eliquis, past hx of abdominal abscess (in 2021), Gastric Bypass surgery 20+ years ago, who presented to the ED from [facility] on 3/24 with CC of hypoxia and change in mental status. Hospital course was complicated by a left thigh hematoma on 3/29/25. Therapeutic lovenox was rversed with protamine and the pt had a CTA that showed active extravasation from a deep femoral artery branch. S/p embolization by IR. The pts Hb remeained stable and she underwent a post embolization CTA which showed no bleeding. The pts Hb remained stable for a day but he Hb dropped again on 4/1/25 and she required pressors and 2PRBCs. A repeat CTA showed no active bleed or retroperitoneal bleed. She was transferred to the floor, whre had hypotension and acute drop in Hb, transferred to SDU    #Acute Hypoxic Hypercapnic Respiratory Failure likely secondary to CAP- Resolved  #COPD on 3L NC  #Smoking Hx  - CT chest c/w multifocal pneumonia, s/p course of antibiotics, ended 4/4  - s/p course of steroids, ended 4/5  - currently on NC, taper down as tolerated  - PT/OT eval -> rehab at LTC    #Left thigh Hematoma with contrast extravasation with acute blood loss anemia   - s/p embolization of branch of the deep left femoral artery by IR  - serial CTAs with stable hematoma, no active bleed   - s/p multiple PRBC transfusions  - Hgb stable around 8  - AC on hold - discussed with patient she prefers being off AC given bleeding risk, f/u outpatient with PCP for possibly resuming  - repeat LE duplex negative 4/7    #Hypernatremia- resolved  - Na improved s/p D5  - Monitor and encourage PO intake     #Mild generalized edema on CTH  - CTH with mild cerebral edema  - repeat CTH unchanged  - vEEG no interictal activity  - MRA/MRV performed: small acute infarct in R frontal subcortical white matter  - neuro following: resume AC when medically able, f/u for further stroke w/u    #Paroxysmal AFib on Eliquis  #Chronic HFpEF (TTE 6/2021 EF 63%, GIIIDD)  #Bilateral Pleural Effusions  - on home med Lasix 40 mg qd, currently being held  - Eliquis on hold   - Bilateral LE dopplex: No DVT in march, repeat Duplex negative on 4/7     The patient is medically stable for discharge. Dc plan including diagnosis, med rec and f/u, was discussed with Dr. Knight on 4/9, and dc was approved.

## 2025-04-09 NOTE — DISCHARGE NOTE PROVIDER - NSDCFUADDAPPT_GEN_ALL_CORE_FT
1) Please follow-up with your primary care doctor 1 week after discharge from the hospital, and possibly to resume anti-coagulation later.   2) Please follow-up with pulmonologist for further management of your COPD.  3) Please follow-up with neurology clinic for further evaluation and management of stroke.

## 2025-04-09 NOTE — DISCHARGE NOTE PROVIDER - ATTENDING DISCHARGE PHYSICAL EXAMINATION:
Vital Signs Last 24 Hrs  T(C): 36.3 (09 Apr 2025 11:45), Max: 37.1 (09 Apr 2025 05:00)  T(F): 97.4 (09 Apr 2025 11:45), Max: 98.8 (09 Apr 2025 05:00)  HR: 61 (09 Apr 2025 11:45) (57 - 76)  BP: 97/58 (09 Apr 2025 11:45) (81/47 - 123/67)  BP(mean): 58 (09 Apr 2025 11:00) (58 - 86)  RR: 19 (09 Apr 2025 11:45) (18 - 19)  SpO2: 98% (09 Apr 2025 11:45) (96% - 98%)    Parameters below as of 08 Apr 2025 21:04  Patient On (Oxygen Delivery Method): nasal cannula    PHYSICAL EXAM:  GENERAL: NAD, well-developed  HEAD:  Atraumatic, Normocephalic  EYES: EOMI, PERRLA, conjunctiva and sclera clear  NECK: Supple, No JVD  CHEST/LUNG: Clear to auscultation bilaterally; No wheeze  HEART: Regular rate and rhythm; No murmurs, rubs, or gallops  ABDOMEN: Soft, Nontender, Nondistended; Bowel sounds present  EXTREMITIES:  2+ Peripheral Pulses, No clubbing, cyanosis, + LEFT THIGH HEMATOMA  PSYCH: AAOx3  NEUROLOGY: non-focal  SKIN: No rashes or lesions                        8.0    11.97 )-----------( 225      ( 08 Apr 2025 05:09 )             27.9     04-08    140  |  104  |  18  ----------------------------<  95  4.7   |  27  |  <0.5[L]    Ca    8.0[L]      08 Apr 2025 05:09  Mg     2.5     04-08

## 2025-04-09 NOTE — PROGRESS NOTE ADULT - PROVIDER SPECIALTY LIST ADULT
Critical Care
Hospitalist
Intervent Radiology
Neurology
Critical Care
Hospitalist
Internal Medicine
Neurology
Pulmonology
Critical Care
Critical Care
Infectious Disease
Infectious Disease
Internal Medicine
Neurology
Critical Care
Critical Care
Internal Medicine

## 2025-04-09 NOTE — PROGRESS NOTE ADULT - SUBJECTIVE AND OBJECTIVE BOX
SUBJECTIVE/OVERNIGHT EVENTS  Today is hospital day 16d. This morning patient was seen and examined at bedside, resting comfortably in bed. No acute or major events overnight.  Patient satting well on 3L NC, no complaints, no signs of bleeding, patient not interested in AC iso A fib.      MEDICATIONS  STANDING MEDICATIONS  albuterol/ipratropium for Nebulization 3 milliLiter(s) Nebulizer every 6 hours  chlorhexidine 2% Cloths 1 Application(s) Topical daily  chlorhexidine 2% Cloths 1 Application(s) Topical daily  fluticasone propionate/ salmeterol 250-50 MICROgram(s) Diskus 1 Dose(s) Inhalation two times a day  hydrocortisone hemorrhoidal Suppository 1 Suppository(s) Rectal at bedtime  lidocaine   4% Patch 1 Patch Transdermal daily  midodrine. 5 milliGRAM(s) Oral three times a day  nicotine -   7 mG/24Hr(s) Patch 1 Patch Transdermal daily  pantoprazole  Injectable 40 milliGRAM(s) IV Push daily  polyethylene glycol 3350 17 Gram(s) Oral two times a day  senna 2 Tablet(s) Oral at bedtime    PRN MEDICATIONS  acetaminophen     Tablet .. 650 milliGRAM(s) Oral every 6 hours PRN    VITALS  T(F): 98.8 (04-09-25 @ 05:00), Max: 98.8 (04-09-25 @ 05:00)  HR: 57 (04-09-25 @ 05:00) (57 - 76)  BP: 91/57 (04-09-25 @ 05:00) (88/57 - 123/67)  RR: 18 (04-08-25 @ 21:04) (18 - 18)  SpO2: 97% (04-09-25 @ 05:00) (93% - 97%)    PHYSICAL EXAM  GENERAL  ( x ) NAD, lying in bed comfortably     (  ) obtunded     (  ) lethargic     (  ) somnolent    HEAD  ( x ) Atraumatic     (  ) hematoma     (  ) laceration (specify location:       )     NECK  ( x ) Supple     (  ) neck stiffness     (  ) nuchal rigidity     (  )  no JVD     (  ) JVD present ( -- cm)    HEART  Rate -->  ( x ) normal rate    (  ) bradycardic    (  ) tachycardic  Rhythm -->  ( x ) regular    (  ) regularly irregular    (  ) irregularly irregular  Murmurs -->  ( x ) normal s1/s2    (  ) systolic murmur    (  ) diastolic murmur    (  ) continuous murmur     (  ) S3 present    (  ) S4 present    LUNGS  ( x ) Unlabored respirations     (  ) tachypnea  ( x ) B/L air entry     (  ) decreased breath sounds in:  (location     )    (  ) no adventitious sound     (  ) crackles     (  ) wheezing      (  ) rhonchi      (specify location:       )  (  ) chest wall tenderness (specify location:       )    ABDOMEN  ( x ) Soft     (  ) tense   |   (  ) nondistended     (  ) distended   |   (  ) +BS     (  ) hypoactive bowel sounds     (  ) hyperactive bowel sounds  ( x ) nontender     (  ) RUQ tenderness     (  ) RLQ tenderness     (  ) LLQ tenderness     (  ) epigastric tenderness     (  ) diffuse tenderness  (  ) Splenomegaly      (  ) Hepatomegaly      (  ) Jaundice     (  ) ecchymosis     EXTREMITIES     L thigh swelling from hematoma  (  ) Normal     (  ) Rash     (  ) ecchymosis     (  ) varicose veins      (  ) pitting edema     (  ) non-pitting edema   (  ) ulceration     (  ) gangrene:     (location:     )    NERVOUS SYSTEM  ( x ) A&Ox3     (  ) confused     (  ) lethargic  CN II-XII:     (  ) Intact     (  ) focal deficits  (Specify:     )   Upper extremities:     (  ) strength X/5     (  ) focal deficit (specify:    )  Lower extremities:     (  ) strength  X/5    (  ) focal deficit (specify:    )      LABS             8.0    11.97 )-----------( 225      ( 04-08-25 @ 05:09 )             27.9     140  |  104  |  18  -------------------------<  95   04-08-25 @ 05:09  4.7  |  27  |  <0.5    Ca      8.0     04-08-25 @ 05:09  Mg     2.5     04-08-25 @ 05:09    TPro  5.3  /  Alb  3.1  /  TBili  1.0  /  DBili  x   /  AST  17  /  ALT  18  /  AlkPhos  72  /  GGT  x     04-07-25 @ 11:16        Urinalysis Basic - ( 08 Apr 2025 05:09 )    Color: x / Appearance: x / SG: x / pH: x  Gluc: 95 mg/dL / Ketone: x  / Bili: x / Urobili: x   Blood: x / Protein: x / Nitrite: x   Leuk Esterase: x / RBC: x / WBC x   Sq Epi: x / Non Sq Epi: x / Bacteria: x          IMAGING

## 2025-04-09 NOTE — DISCHARGE NOTE PROVIDER - NSDCMRMEDTOKEN_GEN_ALL_CORE_FT
acetaminophen 325 mg oral tablet: 2 tab(s) orally every 6 hours as needed for  mild pain  cetirizine 10 mg oral tablet: 1 tab(s) orally every 24 hours as needed for  allergy symptoms  DULoxetine 20 mg oral delayed release capsule: 2 cap(s) orally once a day  folic acid 1 mg oral tablet: 1 tab(s) orally once a day  halobetasol 0.05% topical ointment: Apply topically to affected area every 12 hours for four weeks, started on 3/7/2025  hydrocortisone 25 mg rectal suppository: 1 suppository(ies) rectal once a day (at bedtime)  ipratropium-albuterol 0.5 mg-2.5 mg/3 mL inhalation solution: 3 milliliter(s) by nebulizer 4 times a day  lidocaine 4% topical film: Apply topically to affected area once a day apply to L shoulder topically one time a day for pain  midodrine 5 mg oral tablet: 1 tab(s) orally 3 times a day  Multiple Vitamins oral tablet, dispersible: 1 tab(s) orally once a day   senna oral tablet: 2 tab(s) orally once  thiamine 100 mg oral tablet: 1 tab(s) orally once a day  Trelegy Ellipta 200 mcg-62.5 mcg-25 mcg/inh inhalation powder: 1 puff(s) inhaled once a day

## 2025-04-09 NOTE — DISCHARGE NOTE PROVIDER - PROVIDER TOKENS
PROVIDER:[TOKEN:[[id number]],FOLLOWUP:[1 week],ESTABLISHEDPATIENT:[T]],PROVIDER:[TOKEN:[[id number]],FOLLOWUP:[2 weeks]],PROVIDER:[TOKEN:[[id number]],FOLLOWUP:[2 weeks]]

## 2025-04-09 NOTE — DISCHARGE NOTE NURSING/CASE MANAGEMENT/SOCIAL WORK - FINANCIAL ASSISTANCE
[facility] provides services at a reduced cost to those who are determined to be eligible through [facility]’s financial assistance program. Information regarding [facility]’s financial assistance program can be found by going to [email or web address] or by calling [phone].

## 2025-04-09 NOTE — DISCHARGE NOTE PROVIDER - NSDCCPCAREPLAN_GEN_ALL_CORE_FT
PRINCIPAL DISCHARGE DIAGNOSIS  Diagnosis: Acute respiratory failure with hypercapnia  Assessment and Plan of Treatment: You presented here with hypoxia and altered mental status, likely secondary to pneumonia infection.   We did all the required workup and management.  We consulted critical care and infectious disease teams.  You finished course of antibiotics and steroids in the hospital.  After discharge, kindly take your medications as prescribed.  Please follow-up your appointment(s) as scheduled.  If you experience any shortness of breath, nausea, vomiting, fever, chills, pain or any other complaint, contact a doctor asap.   If you still have any question, don't hesitate to ask.      SECONDARY DISCHARGE DIAGNOSES  Diagnosis: Hematoma of left thigh  Assessment and Plan of Treatment: Your hospital course was complicated with left thigh hematoma. You underwent embolization procedure with interventional radiology team to stop the bleeding. Repeat imaging looked stable and your hemoglobin level is stable. We stopped your blood thinner, and you agreed to be discharged off blood thinners. You should follow-up with your primary care doctor after discharge for possibly resuming anti-coagulation, as well as neurology for further evaluation and management of the small stroke that was found on MRI.     PRINCIPAL DISCHARGE DIAGNOSIS  Diagnosis: Acute respiratory failure with hypercapnia  Assessment and Plan of Treatment: You presented here with hypoxia and altered mental status, likely secondary to GRAM NEGATIVE BACTERIAL PNA.   We did all the required workup and management.  We consulted critical care and infectious disease teams.  You finished course of antibiotics and steroids in the hospital.  After discharge, kindly take your medications as prescribed.  Please follow-up your appointment(s) as scheduled.  If you experience any shortness of breath, nausea, vomiting, fever, chills, pain or any other complaint, contact a doctor asap.   If you still have any question, don't hesitate to ask.      SECONDARY DISCHARGE DIAGNOSES  Diagnosis: Hematoma of left thigh  Assessment and Plan of Treatment: Your hospital course was complicated with LEFT THIGH SPONTANEOUS HEMATOMA SUSPECTED DUE TO ELIQUIS (AFIB). You underwent embolization procedure with interventional radiology team to stop the bleeding. Repeat imaging looked stable and your hemoglobin level is stable. We stopped your blood thinner, and you agreed to be discharged off blood thinners. You should follow-up with your primary care doctor after discharge for possibly resuming anti-coagulation, as well as neurology for further evaluation and management of the small stroke that was found on MRI.  ACUTE BLOOD LOSS ANEMIA 2/2 ACUTE BLEEDING AS ABOVE  S/P 1 PRBC TRANSFUSION  YOU REMAIN AT RISK FOR POSSIBLE STROKE HOWEVER AS DISCUSSED YOU HAVE A BLEEDING RISK AS WELL AND YOU OPTED TO NOT TAKE ALL BLOOD THINNERS AND ASA. PLEASE FOLLOW UP WITH YOUR PMD AND CARDIOLOGIST AND NEUROLOGIST IN 1-2 WEEKS OR RIGHT AFTER YOUR REHABILITATION.        PRINCIPAL DISCHARGE DIAGNOSIS  Diagnosis: Acute respiratory failure with hypercapnia  Assessment and Plan of Treatment: You presented here with hypoxia and altered mental status, likely secondary to GRAM NEGATIVE BACTERIAL PNA.   CHRONIC HYPOXIC RESPIRATORY FAILURE 2/2 COPD NOT IN EXARC  We did all the required workup and management.  We consulted critical care and infectious disease teams.  You finished course of antibiotics and steroids in the hospital.  After discharge, kindly take your medications as prescribed.  Please follow-up your appointment(s) as scheduled.  If you experience any shortness of breath, nausea, vomiting, fever, chills, pain or any other complaint, contact a doctor asap.   If you still have any question, don't hesitate to ask.      SECONDARY DISCHARGE DIAGNOSES  Diagnosis: Hematoma of left thigh  Assessment and Plan of Treatment: Your hospital course was complicated with LEFT THIGH SPONTANEOUS HEMATOMA SUSPECTED DUE TO ELIQUIS (AFIB). You underwent embolization procedure with interventional radiology team to stop the bleeding. Repeat imaging looked stable and your hemoglobin level is stable. We stopped your blood thinner, and you agreed to be discharged off blood thinners. You should follow-up with your primary care doctor after discharge for possibly resuming anti-coagulation, as well as neurology for further evaluation and management of the small stroke that was found on MRI.  ACUTE BLOOD LOSS ANEMIA 2/2 ACUTE BLEEDING AS ABOVE  S/P 1 PRBC TRANSFUSION  YOU REMAIN AT RISK FOR POSSIBLE STROKE HOWEVER AS DISCUSSED YOU HAVE A BLEEDING RISK AS WELL AND YOU OPTED TO NOT TAKE ALL BLOOD THINNERS AND ASA. PLEASE FOLLOW UP WITH YOUR PMD AND CARDIOLOGIST AND NEUROLOGIST IN 1-2 WEEKS OR RIGHT AFTER YOUR REHABILITATION.

## 2025-04-09 NOTE — PROGRESS NOTE ADULT - ASSESSMENT
This is a 74-year-old female patient with PMHx of COPD on 3L NC, smoking hx, AFib on Eliquis, past hx of abdominal abscess (in 2021), Gastric Bypass surgery 20+ years ago, who presented to the ED from [facility] on 3/24 with CC of hypoxia and change in mental status. Hospital course was complicated by a left thigh hematoma on 3/29/25. Therapeutic lovenox was rversed with protamine and the pt had a CTA that showed active extravasation from a deep femoral artery branch. S/p embolization by IR. The pts Hb remeained stable and she underwent a post embolization CTA which showed no bleeding. The pts Hb remained stable for a day but he Hb dropped again on 4/1/25 and she required pressors and 2PRBCs. A repeat CTA showed no active bleed or retroperitoneal bleed. She was transferred to the floor, whre had hypotension and acute drop in Hb, transferred to SDU    #Acute Hypoxic Hypercapnic Respiratory Failure likely secondary to CAP- Resolved  #COPD on 3L NC  #Smoking Hx  - CT chest c/w multifocal pneumonia, s/p course of antibiotics, ended 4/4  - s/p course of steroids, ended 4/5  - currently on NC, taper down as tolerated  - PT/OT eval -> rehab at [facility]    #Left thigh Hematoma with contrast extravasation with acute blood loss anemia   - s/p embolization of branch of the deep left femoral artery by IR  - serial CTAs with stable hematoma, no active bleed   - s/p multiple PRBC transfusions  - Hgb stable around 8  - AC on hold  - repeat LE duplex negative 4/7    #Hypernatremia- resolved  - Na improved s/p D5  - Monitor and encourage PO intake     #Mild generalized edema on CTH  - CTH with mild cerebral edema  - repeat CTH unchanged  - vEEG no interictal activity  - MRA/MRV performed: small acute infarct in R frontal subcortical white matter  - neuro following: resume AC when medically able, f/u for further stroke w/u    #Paroxysmal AFib on Eliquis  #Chronic HFpEF (TTE 6/2021 EF 63%, GIIIDD)  #Bilateral Pleural Effusions  - on home med Lasix 40 mg qd, currently being held  - Eliquis on hold   - Bilateral LE dopplex: No DVT in march, repeat Duplex negative on 4/7     #MISC  - DVT ppx: holding Eliquis for now  - GI ppx: PPI  - Diet: DASH  - Activity: IAT, PT eval   - Code: Previously DNR/DNI, rescinded on 4/6, now full code    Pendings: monitor H&H, discussions regarding AC, anticipate for discharge

## 2025-04-09 NOTE — DISCHARGE NOTE NURSING/CASE MANAGEMENT/SOCIAL WORK - NSDCVIVACCINE_GEN_ALL_CORE_FT
Td (adult) preservative free; 09-Sep-2021 12:06; Dandre Ledezma (MIGUELITO); [employer]; [id number] (Exp. Date: 10-Sep-2021); IntraMuscular; Deltoid Left.; 0.5 milliLiter(s); VIS (VIS Published: 09-Sep-2021, VIS Presented: 09-Sep-2021);

## 2025-04-10 LAB
CULTURE RESULTS: SIGNIFICANT CHANGE UP
SPECIMEN SOURCE: SIGNIFICANT CHANGE UP

## 2025-04-23 DIAGNOSIS — J96.22 ACUTE AND CHRONIC RESPIRATORY FAILURE WITH HYPERCAPNIA: ICD-10-CM

## 2025-04-23 DIAGNOSIS — D84.9 IMMUNODEFICIENCY, UNSPECIFIED: ICD-10-CM

## 2025-04-23 DIAGNOSIS — Z79.01 LONG TERM (CURRENT) USE OF ANTICOAGULANTS: ICD-10-CM

## 2025-04-23 DIAGNOSIS — Z11.52 ENCOUNTER FOR SCREENING FOR COVID-19: ICD-10-CM

## 2025-04-23 DIAGNOSIS — Z78.1 PHYSICAL RESTRAINT STATUS: ICD-10-CM

## 2025-04-23 DIAGNOSIS — J15.69 PNEUMONIA DUE TO OTHER GRAM-NEGATIVE BACTERIA: ICD-10-CM

## 2025-04-23 DIAGNOSIS — M79.81 NONTRAUMATIC HEMATOMA OF SOFT TISSUE: ICD-10-CM

## 2025-04-23 DIAGNOSIS — Z86.15 PERSONAL HISTORY OF LATENT TUBERCULOSIS INFECTION: ICD-10-CM

## 2025-04-23 DIAGNOSIS — G47.00 INSOMNIA, UNSPECIFIED: ICD-10-CM

## 2025-04-23 DIAGNOSIS — G93.6 CEREBRAL EDEMA: ICD-10-CM

## 2025-04-23 DIAGNOSIS — E87.0 HYPEROSMOLALITY AND HYPERNATREMIA: ICD-10-CM

## 2025-04-23 DIAGNOSIS — T45.515A ADVERSE EFFECT OF ANTICOAGULANTS, INITIAL ENCOUNTER: ICD-10-CM

## 2025-04-23 DIAGNOSIS — J96.21 ACUTE AND CHRONIC RESPIRATORY FAILURE WITH HYPOXIA: ICD-10-CM

## 2025-04-23 DIAGNOSIS — I48.0 PAROXYSMAL ATRIAL FIBRILLATION: ICD-10-CM

## 2025-04-23 DIAGNOSIS — E83.42 HYPOMAGNESEMIA: ICD-10-CM

## 2025-04-23 DIAGNOSIS — R57.1 HYPOVOLEMIC SHOCK: ICD-10-CM

## 2025-04-23 DIAGNOSIS — I95.1 ORTHOSTATIC HYPOTENSION: ICD-10-CM

## 2025-04-23 DIAGNOSIS — D62 ACUTE POSTHEMORRHAGIC ANEMIA: ICD-10-CM

## 2025-04-23 DIAGNOSIS — Z99.81 DEPENDENCE ON SUPPLEMENTAL OXYGEN: ICD-10-CM

## 2025-04-23 DIAGNOSIS — F17.210 NICOTINE DEPENDENCE, CIGARETTES, UNCOMPLICATED: ICD-10-CM

## 2025-04-23 DIAGNOSIS — D68.32 HEMORRHAGIC DISORDER DUE TO EXTRINSIC CIRCULATING ANTICOAGULANTS: ICD-10-CM

## 2025-04-23 DIAGNOSIS — I63.40 CEREBRAL INFARCTION DUE TO EMBOLISM OF UNSPECIFIED CEREBRAL ARTERY: ICD-10-CM

## 2025-04-23 DIAGNOSIS — Z66 DO NOT RESUSCITATE: ICD-10-CM

## 2025-04-23 DIAGNOSIS — Z79.51 LONG TERM (CURRENT) USE OF INHALED STEROIDS: ICD-10-CM

## 2025-04-23 DIAGNOSIS — E87.3 ALKALOSIS: ICD-10-CM

## 2025-04-23 DIAGNOSIS — J44.0 CHRONIC OBSTRUCTIVE PULMONARY DISEASE WITH (ACUTE) LOWER RESPIRATORY INFECTION: ICD-10-CM

## 2025-04-23 DIAGNOSIS — I50.32 CHRONIC DIASTOLIC (CONGESTIVE) HEART FAILURE: ICD-10-CM

## 2025-06-10 NOTE — DIETITIAN INITIAL EVALUATION ADULT - NUTRITIONGOAL OUTCOME1
Physical Therapy Visit    Visit Type: Daily Treatment Note  Visit: 7  Referring Provider: Keshawn Ambrosio MD  Medical Diagnosis (from order): M48.062 - Spinal stenosis, lumbar region, with neurogenic claudication     SUBJECTIVE                                                                                                               Right buttock/hip painful.      OBJECTIVE                                                                                                                         Palpation  Right  - Gluteus Medius: muscle spasm, tenderness and trigger point  - Gluteus Minimus: trigger point, tenderness and muscle spasm  - Piriformis: muscle spasm, tenderness and trigger point  Joint Play   Lumbar   - L4-L5: hypomobile and pain  - L5-S1: pain and hypomobile  - Sacroiliac Joint:   Right hypomobile and pain                 Treatment     Therapeutic Exercise  Ther Ex  Patient was instructed in parameters and technique with specific demonstration of the following activities to develop strength, endurance, range of motion, and muscle/joint flexibility to improve function, progress towards goals, and maximize independence with daily tasks.  · Exercises:  NuStep L3 8 minutes  Repex table supine and bolster with lumbar and quadriceps heating pad position 5 for 15 minutes  - Assistance needed:  - Cues:  - Corrected:  - Adapted/modified:  - Equipment used:  - Quality of movement/task:      Manual Therapy   Patient was seen for tissue and joint mobilizations to work on progress in mobility, flexibility and pain:    Soft/Deep tissue mobilization to: In standing right gluteals    -MUSCLE ENERGY TECHNIQUE for right SI and right lumbar region    Joint mobilizations:   -    Post-Treatment: improvement in soft tissue extensibility, decreased pain, muscle symmetry, capsular end feel, or general mobility with observed improvements in all function end of session after treatment for progress towards set  goals.    Skilled input: verbal instruction/cues and posture correction    Writer verbally educated and received verbal consent for hand placement, positioning of patient, and techniques to be performed today from patient for therapist position for techniques as described above and how they are pertinent to the patient's plan of care.      ASSESSMENT                                                                                                            Right gluteals and SI decreased mobility impacting lumbar and hip mechanics.  Education:   - Results of above outlined education: Verbalizes understanding and Demonstrates understanding    PLAN                                                                                                                           Suggestions for next session as indicated: Progress per plan of care         Therapy procedure time and total treatment time can be found documented on the Time Entry flowsheet     Pt to consume and tolerate >75% of meals/snacks and PO supplements in 4 days.  Pt to consume and tolerate >75% of meals/snacks and PO supplements in 10 days.

## 2025-09-18 ENCOUNTER — APPOINTMENT (OUTPATIENT)
Facility: CLINIC | Age: 75
End: 2025-09-18